# Patient Record
Sex: MALE | Race: OTHER | Employment: OTHER | URBAN - METROPOLITAN AREA
[De-identification: names, ages, dates, MRNs, and addresses within clinical notes are randomized per-mention and may not be internally consistent; named-entity substitution may affect disease eponyms.]

---

## 2019-07-09 ENCOUNTER — APPOINTMENT (OUTPATIENT)
Dept: CT IMAGING | Age: 44
DRG: 020 | End: 2019-07-09
Attending: NURSE PRACTITIONER
Payer: COMMERCIAL

## 2019-07-09 ENCOUNTER — HOSPITAL ENCOUNTER (INPATIENT)
Age: 44
LOS: 27 days | Discharge: NURSING, REHAB OR LONG-TERM CARE | DRG: 020 | End: 2019-08-05
Attending: NEUROLOGICAL SURGERY | Admitting: NEUROLOGICAL SURGERY
Payer: COMMERCIAL

## 2019-07-09 DIAGNOSIS — I61.5 IVH (INTRAVENTRICULAR HEMORRHAGE) (HCC): ICD-10-CM

## 2019-07-09 DIAGNOSIS — I60.4 SUBARACHNOID HEMORRHAGE FROM BASILAR ARTERY ANEURYSM (HCC): ICD-10-CM

## 2019-07-09 DIAGNOSIS — R93.0 ABNORMAL ANGIOGRAM OF HEAD: ICD-10-CM

## 2019-07-09 DIAGNOSIS — J15.211 PNEUMONIA OF LEFT LOWER LOBE DUE TO METHICILLIN SUSCEPTIBLE STAPHYLOCOCCUS AUREUS (MSSA) (HCC): ICD-10-CM

## 2019-07-09 DIAGNOSIS — I60.9 SAH (SUBARACHNOID HEMORRHAGE) (HCC): Primary | ICD-10-CM

## 2019-07-09 DIAGNOSIS — I67.848 CEREBRAL VASOSPASM: ICD-10-CM

## 2019-07-09 DIAGNOSIS — D63.1 ANEMIA DUE TO CHRONIC KIDNEY DISEASE, UNSPECIFIED CKD STAGE: ICD-10-CM

## 2019-07-09 DIAGNOSIS — R56.9 SEIZURE (HCC): ICD-10-CM

## 2019-07-09 DIAGNOSIS — J11.00 INFLUENZA AND PNEUMONIA: ICD-10-CM

## 2019-07-09 DIAGNOSIS — N18.9 ANEMIA DUE TO CHRONIC KIDNEY DISEASE, UNSPECIFIED CKD STAGE: ICD-10-CM

## 2019-07-09 DIAGNOSIS — J96.01 ACUTE RESPIRATORY FAILURE WITH HYPOXIA (HCC): ICD-10-CM

## 2019-07-09 DIAGNOSIS — G91.0 COMMUNICATING HYDROCEPHALUS (HCC): ICD-10-CM

## 2019-07-09 DIAGNOSIS — R79.89 ELEVATED SERUM CREATININE: ICD-10-CM

## 2019-07-09 DIAGNOSIS — Z72.0 TOBACCO ABUSE: ICD-10-CM

## 2019-07-09 LAB
ALBUMIN SERPL-MCNC: 3.4 G/DL (ref 3.5–5)
ALBUMIN/GLOB SERPL: 1 {RATIO} (ref 1.2–3.5)
ALP SERPL-CCNC: 92 U/L (ref 50–136)
ALT SERPL-CCNC: 9 U/L (ref 12–65)
AMPHET UR QL SCN: NEGATIVE
ANION GAP SERPL CALC-SCNC: 6 MMOL/L (ref 7–16)
ANION GAP SERPL CALC-SCNC: 7 MMOL/L (ref 7–16)
AST SERPL-CCNC: 11 U/L (ref 15–37)
BARBITURATES UR QL SCN: NEGATIVE
BENZODIAZ UR QL: NEGATIVE
BILIRUB DIRECT SERPL-MCNC: <0.1 MG/DL
BILIRUB SERPL-MCNC: 0.2 MG/DL (ref 0.2–1.1)
BUN SERPL-MCNC: 40 MG/DL (ref 6–23)
BUN SERPL-MCNC: 48 MG/DL (ref 6–23)
CALCIUM SERPL-MCNC: 8 MG/DL (ref 8.3–10.4)
CALCIUM SERPL-MCNC: 8.6 MG/DL (ref 8.3–10.4)
CANNABINOIDS UR QL SCN: NEGATIVE
CHLORIDE SERPL-SCNC: 111 MMOL/L (ref 98–107)
CHLORIDE SERPL-SCNC: 119 MMOL/L (ref 98–107)
CHOLEST SERPL-MCNC: 220 MG/DL
CK SERPL-CCNC: 122 U/L (ref 21–215)
CO2 SERPL-SCNC: 19 MMOL/L (ref 21–32)
CO2 SERPL-SCNC: 20 MMOL/L (ref 21–32)
COCAINE UR QL SCN: NEGATIVE
CREAT SERPL-MCNC: 2.99 MG/DL (ref 0.8–1.5)
CREAT SERPL-MCNC: 3.15 MG/DL (ref 0.8–1.5)
ERYTHROCYTE [DISTWIDTH] IN BLOOD BY AUTOMATED COUNT: 12.4 % (ref 11.9–14.6)
EST. AVERAGE GLUCOSE BLD GHB EST-MCNC: 111 MG/DL
GLOBULIN SER CALC-MCNC: 3.3 G/DL (ref 2.3–3.5)
GLUCOSE SERPL-MCNC: 110 MG/DL (ref 65–100)
GLUCOSE SERPL-MCNC: 121 MG/DL (ref 65–100)
HBA1C MFR BLD: 5.5 % (ref 4.8–6)
HCT VFR BLD AUTO: 37.7 % (ref 41.1–50.3)
HDLC SERPL-MCNC: 37 MG/DL (ref 40–60)
HDLC SERPL: 5.9 {RATIO}
HGB BLD-MCNC: 12.3 G/DL (ref 13.6–17.2)
LDLC SERPL CALC-MCNC: 154.6 MG/DL
LIPID PROFILE,FLP: ABNORMAL
MAGNESIUM SERPL-MCNC: 2.2 MG/DL (ref 1.8–2.4)
MCH RBC QN AUTO: 29.2 PG (ref 26.1–32.9)
MCHC RBC AUTO-ENTMCNC: 32.6 G/DL (ref 31.4–35)
MCV RBC AUTO: 89.5 FL (ref 79.6–97.8)
METHADONE UR QL: NEGATIVE
NRBC # BLD: 0 K/UL (ref 0–0.2)
OPIATES UR QL: NEGATIVE
PCP UR QL: NEGATIVE
PLATELET # BLD AUTO: 153 K/UL (ref 150–450)
PMV BLD AUTO: 11.2 FL (ref 9.4–12.3)
POTASSIUM SERPL-SCNC: 4.4 MMOL/L (ref 3.5–5.1)
POTASSIUM SERPL-SCNC: 4.7 MMOL/L (ref 3.5–5.1)
PROT SERPL-MCNC: 6.7 G/DL (ref 6.3–8.2)
RBC # BLD AUTO: 4.21 M/UL (ref 4.23–5.6)
SODIUM SERPL-SCNC: 137 MMOL/L (ref 136–145)
SODIUM SERPL-SCNC: 139 MMOL/L (ref 136–145)
SODIUM SERPL-SCNC: 145 MMOL/L (ref 136–145)
TRIGL SERPL-MCNC: 142 MG/DL (ref 35–150)
TROPONIN I SERPL-MCNC: 0.08 NG/ML (ref 0.02–0.05)
VLDLC SERPL CALC-MCNC: 28.4 MG/DL (ref 6–23)
WBC # BLD AUTO: 18.2 K/UL (ref 4.3–11.1)

## 2019-07-09 PROCEDURE — 80048 BASIC METABOLIC PNL TOTAL CA: CPT

## 2019-07-09 PROCEDURE — 80076 HEPATIC FUNCTION PANEL: CPT

## 2019-07-09 PROCEDURE — 74011000258 HC RX REV CODE- 258: Performed by: NEUROLOGICAL SURGERY

## 2019-07-09 PROCEDURE — 75810000275 HC EMERGENCY DEPT VISIT NO LEVEL OF CARE: Performed by: EMERGENCY MEDICINE

## 2019-07-09 PROCEDURE — 36620 INSERTION CATHETER ARTERY: CPT | Performed by: NURSE PRACTITIONER

## 2019-07-09 PROCEDURE — 03HY32Z INSERTION OF MONITORING DEVICE INTO UPPER ARTERY, PERCUTANEOUS APPROACH: ICD-10-PCS | Performed by: NURSE PRACTITIONER

## 2019-07-09 PROCEDURE — 99222 1ST HOSP IP/OBS MODERATE 55: CPT | Performed by: NEUROLOGICAL SURGERY

## 2019-07-09 PROCEDURE — 77030019605

## 2019-07-09 PROCEDURE — 36620 INSERTION CATHETER ARTERY: CPT

## 2019-07-09 PROCEDURE — 65610000001 HC ROOM ICU GENERAL

## 2019-07-09 PROCEDURE — 83036 HEMOGLOBIN GLYCOSYLATED A1C: CPT

## 2019-07-09 PROCEDURE — 36573 INSJ PICC RS&I 5 YR+: CPT | Performed by: NURSE PRACTITIONER

## 2019-07-09 PROCEDURE — 70496 CT ANGIOGRAPHY HEAD: CPT

## 2019-07-09 PROCEDURE — 80307 DRUG TEST PRSMV CHEM ANLYZR: CPT

## 2019-07-09 PROCEDURE — 74011636320 HC RX REV CODE- 636/320: Performed by: NEUROLOGICAL SURGERY

## 2019-07-09 PROCEDURE — 77030020256 HC SOL INJ NACL 0.9%  500ML

## 2019-07-09 PROCEDURE — 77030002916 HC SUT ETHLN J&J -A

## 2019-07-09 PROCEDURE — 36600 WITHDRAWAL OF ARTERIAL BLOOD: CPT

## 2019-07-09 PROCEDURE — 77030032490 HC SLV COMPR SCD KNE COVD -B

## 2019-07-09 PROCEDURE — 70450 CT HEAD/BRAIN W/O DYE: CPT

## 2019-07-09 PROCEDURE — 74011000258 HC RX REV CODE- 258: Performed by: NURSE PRACTITIONER

## 2019-07-09 PROCEDURE — 80061 LIPID PANEL: CPT

## 2019-07-09 PROCEDURE — 36415 COLL VENOUS BLD VENIPUNCTURE: CPT

## 2019-07-09 PROCEDURE — 77030013794 HC KT TRNSDUC BLD EDWD -B

## 2019-07-09 PROCEDURE — 74011000250 HC RX REV CODE- 250: Performed by: NURSE PRACTITIONER

## 2019-07-09 PROCEDURE — 74011250637 HC RX REV CODE- 250/637: Performed by: NURSE PRACTITIONER

## 2019-07-09 PROCEDURE — 85027 COMPLETE CBC AUTOMATED: CPT

## 2019-07-09 PROCEDURE — 77030034850

## 2019-07-09 PROCEDURE — 77030005402 HC CATH RAD ART LN KT TELE -B

## 2019-07-09 PROCEDURE — 93005 ELECTROCARDIOGRAM TRACING: CPT | Performed by: NURSE PRACTITIONER

## 2019-07-09 PROCEDURE — 84295 ASSAY OF SERUM SODIUM: CPT

## 2019-07-09 PROCEDURE — 77030020263 HC SOL INJ SOD CL0.9% LFCR 1000ML

## 2019-07-09 PROCEDURE — 74011258636 HC RX REV CODE- 258/636: Performed by: NURSE PRACTITIONER

## 2019-07-09 PROCEDURE — 83735 ASSAY OF MAGNESIUM: CPT

## 2019-07-09 PROCEDURE — 74011250636 HC RX REV CODE- 250/636: Performed by: NURSE PRACTITIONER

## 2019-07-09 PROCEDURE — 77030034848

## 2019-07-09 PROCEDURE — 82550 ASSAY OF CK (CPK): CPT

## 2019-07-09 PROCEDURE — 84484 ASSAY OF TROPONIN QUANT: CPT

## 2019-07-09 RX ORDER — ACETAMINOPHEN 325 MG/1
650 TABLET ORAL
Status: DISCONTINUED | OUTPATIENT
Start: 2019-07-09 | End: 2019-07-10

## 2019-07-09 RX ORDER — SODIUM CHLORIDE 0.9 % (FLUSH) 0.9 %
10 SYRINGE (ML) INJECTION
Status: DISCONTINUED | OUTPATIENT
Start: 2019-07-09 | End: 2019-07-09

## 2019-07-09 RX ORDER — FENTANYL CITRATE 50 UG/ML
50 INJECTION, SOLUTION INTRAMUSCULAR; INTRAVENOUS
Status: DISCONTINUED | OUTPATIENT
Start: 2019-07-09 | End: 2019-07-12

## 2019-07-09 RX ORDER — ONDANSETRON 2 MG/ML
4 INJECTION INTRAMUSCULAR; INTRAVENOUS
Status: DISCONTINUED | OUTPATIENT
Start: 2019-07-09 | End: 2019-08-05 | Stop reason: HOSPADM

## 2019-07-09 RX ORDER — SODIUM CHLORIDE 3 G/100ML
250 INJECTION, SOLUTION INTRAVENOUS ONCE
Status: COMPLETED | OUTPATIENT
Start: 2019-07-09 | End: 2019-07-09

## 2019-07-09 RX ORDER — SODIUM CHLORIDE 9 MG/ML
1000 INJECTION, SOLUTION INTRAVENOUS ONCE
Status: COMPLETED | OUTPATIENT
Start: 2019-07-09 | End: 2019-07-09

## 2019-07-09 RX ORDER — LEVETIRACETAM 5 MG/ML
500 INJECTION INTRAVASCULAR EVERY 12 HOURS
Status: DISCONTINUED | OUTPATIENT
Start: 2019-07-09 | End: 2019-07-09 | Stop reason: SDUPTHER

## 2019-07-09 RX ORDER — SODIUM CHLORIDE 9 MG/ML
100 INJECTION, SOLUTION INTRAVENOUS CONTINUOUS
Status: DISCONTINUED | OUTPATIENT
Start: 2019-07-09 | End: 2019-07-15

## 2019-07-09 RX ORDER — MAGNESIUM SULFATE HEPTAHYDRATE 40 MG/ML
2 INJECTION, SOLUTION INTRAVENOUS DAILY PRN
Status: DISCONTINUED | OUTPATIENT
Start: 2019-07-09 | End: 2019-08-03

## 2019-07-09 RX ORDER — SODIUM CHLORIDE 3 G/100ML
250 INJECTION, SOLUTION INTRAVENOUS EVERY 6 HOURS
Status: DISPENSED | OUTPATIENT
Start: 2019-07-09 | End: 2019-07-10

## 2019-07-09 RX ORDER — MAGNESIUM SULFATE HEPTAHYDRATE 40 MG/ML
4 INJECTION, SOLUTION INTRAVENOUS DAILY PRN
Status: DISCONTINUED | OUTPATIENT
Start: 2019-07-09 | End: 2019-08-03

## 2019-07-09 RX ORDER — ATORVASTATIN CALCIUM 40 MG/1
40 TABLET, FILM COATED ORAL
Status: DISCONTINUED | OUTPATIENT
Start: 2019-07-09 | End: 2019-07-10

## 2019-07-09 RX ORDER — NICARDIPINE HYDROCHLORIDE 0.1 MG/ML
5-15 INJECTION INTRAVENOUS
Status: DISCONTINUED | OUTPATIENT
Start: 2019-07-09 | End: 2019-07-09 | Stop reason: SDUPTHER

## 2019-07-09 RX ORDER — NIMODIPINE 30 MG/1
60 CAPSULE, LIQUID FILLED ORAL EVERY 4 HOURS
Status: DISCONTINUED | OUTPATIENT
Start: 2019-07-09 | End: 2019-07-10 | Stop reason: SDUPTHER

## 2019-07-09 RX ORDER — FAMOTIDINE 20 MG/1
20 TABLET, FILM COATED ORAL DAILY
Status: DISCONTINUED | OUTPATIENT
Start: 2019-07-10 | End: 2019-07-10

## 2019-07-09 RX ORDER — LANOLIN ALCOHOL/MO/W.PET/CERES
400 CREAM (GRAM) TOPICAL 2 TIMES DAILY
Status: DISCONTINUED | OUTPATIENT
Start: 2019-07-09 | End: 2019-07-10

## 2019-07-09 RX ORDER — AMOXICILLIN 250 MG
2 CAPSULE ORAL
Status: DISCONTINUED | OUTPATIENT
Start: 2019-07-09 | End: 2019-07-10

## 2019-07-09 RX ADMIN — Medication 10 ML: at 19:04

## 2019-07-09 RX ADMIN — SODIUM CHLORIDE 1000 ML: 900 INJECTION, SOLUTION INTRAVENOUS at 19:40

## 2019-07-09 RX ADMIN — NICARDIPINE HYDROCHLORIDE 15 MG/HR: 25 INJECTION INTRAVENOUS at 20:27

## 2019-07-09 RX ADMIN — SODIUM CHLORIDE 250 ML: 3 INJECTION, SOLUTION INTRAVENOUS at 21:23

## 2019-07-09 RX ADMIN — NICARDIPINE HYDROCHLORIDE 15 MG/HR: 25 INJECTION INTRAVENOUS at 22:02

## 2019-07-09 RX ADMIN — SODIUM CHLORIDE 1000 ML: 900 INJECTION, SOLUTION INTRAVENOUS at 20:52

## 2019-07-09 RX ADMIN — SENNOSIDES, DOCUSATE SODIUM 2 TABLET: 50; 8.6 TABLET, FILM COATED ORAL at 22:03

## 2019-07-09 RX ADMIN — ATORVASTATIN CALCIUM 40 MG: 40 TABLET, FILM COATED ORAL at 22:03

## 2019-07-09 RX ADMIN — SODIUM CHLORIDE 100 ML: 900 INJECTION, SOLUTION INTRAVENOUS at 19:04

## 2019-07-09 RX ADMIN — NIMODIPINE 60 MG: 30 CAPSULE ORAL at 20:22

## 2019-07-09 RX ADMIN — SODIUM CHLORIDE 500 MG: 900 INJECTION, SOLUTION INTRAVENOUS at 21:23

## 2019-07-09 RX ADMIN — SODIUM CHLORIDE 100 ML/HR: 900 INJECTION, SOLUTION INTRAVENOUS at 22:07

## 2019-07-09 RX ADMIN — FENTANYL CITRATE 50 MCG: 50 INJECTION INTRAMUSCULAR; INTRAVENOUS at 19:36

## 2019-07-09 RX ADMIN — IOPAMIDOL 70 ML: 755 INJECTION, SOLUTION INTRAVENOUS at 19:04

## 2019-07-09 RX ADMIN — MAGNESIUM GLUCONATE 500 MG ORAL TABLET 400 MG: 500 TABLET ORAL at 20:22

## 2019-07-09 NOTE — H&P
History and Physical    Patient: Lucia Villegas MRN: [id number]  SSN: [id number]    YOB: 1975  Age: 37 y.o. Sex: male      Subjective:      Lucia Villegas is a 37 y.o. male who originally reported to [facility] ED via EMS after found with altered mental status and possible seizure like activity. Per brother, pt was at [address] and had witnessed syncopal event with possible seizure like activity. Pt had CT head and CTA head and neck and found to have [facility] secondary to Basilar tip aneurysm rupture. Pt transferred to [facility] ED via [facility] team. Pt with eyes closed and drowsy, but will open eyes to voice and follow commands. Pt GCS E3, V5, M6: 14, PERRL +3 with EOM's intact. NIHSS of 2 for drowsiness and altered mental status. Pt admitted to our ICU under [facility] protocol. Labs from [facility] show bun/cr 46/3.4, will give pt IVF's x2 L bolus and reeval. Pt states he smokes about 9 cig a day, denies any ETOH or drugs, denies any family hx of SAH. Pt unsure of what happened. Will have low threshold for intubation and EVD if  hydrocephalus increased and LOC changes. Airway is intact at this time. Arterial line placed on admit, pt with IV x2., phelps. MED HX: no past hx per notes/pt. Pt drowsy, will inquire with family when present. No past surgical history on file. unsure. No family history on file. unknown at this time due to pt condition. Social History     Tobacco Use    Smoking status: Not on file   Substance Use Topics    Alcohol use: Not on file      Prior to Admission medications    Not on File        Allergies not on file    Review of Systems:  Unable to obtain secondary to pt condition. Per report, pt found with altered mental status, + nausea and vomiting and complained of headache. Unknown if any trauma. Report of seizure like activity on scene. Objective: There were no vitals filed for this visit.      Physical Exam:  General:  Pt drowsy, but will wake and follow commands. Josh Bellamylanette HEENT: Supple, symmetrical, trachea midline, no adenopathy, thyroid: no enlargment/tenderness/nodules, no carotid bruit and no JVD. Lungs:   Clear to auscultation bilaterally. Heart:  Regular rate and rhythm, S1, S2 normal, no murmur, click, rub or gallop. Abdomen:   Soft, non-tender. Bowel sounds normal. No masses,  No organomegaly. Extremities: Extremities normal, atraumatic, no cyanosis or edema. Pulses: 2+ and symmetric all extremities. Skin: Skin color, texture, turgor normal. No rashes or lesions   Neurologic: CNII-XII intact. Normal strength, sensation and reflexes throughout. Drowsy. Airway intact. Assessment:     Hospital Problems  Never Reviewed          Codes Class Noted POA    SAH (subarachnoid hemorrhage) ([facility] [address] ICD-10-CM: I60.9  ICD-9-CM: 063  7/9/2019 Unknown            SHULTZ SOLIS ON ADMIT:3  WATTS GRADE ON ADMIT: 4  DYSPHAGIA SCORE ON ADMIT: 0 ( NPO)    Plan:     NEURO: Pt admitted to ICU under [address], Shultz/Solis 3, Watts Grade 4 secondary to basilar tip aneurysm rupture. Q1 hour neuro checks. On SAH protocol - Mg, nimotop, zocor. Pt is drowsy, airway patent, will follow commands, maew. PERRL +3. Pt NPO and will have repeat head CT in am, unless acute neuro change and then will repeat ASAP, low threshold for intubation and EVD if increased drowsiness and hydrocephalus. MAP goal 70-90. Fentanyl PRN IV for pain. PT/OT/ST/ per [address] protocol. RESP: Pt airway patent at this time, swallow intact. No distress. 2L NC.   CV:MAP GOAL 70-90, cardene gtt infusing wo difficulty. 2D Echo pending, trop neg, repeat pending. EKG pending. SCD/SQ heparin to start in am.  HEME: Pending repeat INR 0.9 at [facility] ED.   NEPH: bun/cr: 39/3.4 at [facility] ED. Will give 2L NS bolus for cr 3.4, pt with large clear UOP per phelps bag, pt denies any known kidney injury. Na 138. NA goal >140. 3% NS bolus every 6hrs prn. GI: NPO. Pepcid, Stand exam this pm, ST to see in am.  ID: no fever, no abx.   LINES: IV x2, left art line placed, phelps. No family present at this time. Will look for family contact and reach out asap. Consents and procedures to be discussed with family and pt.      Signed By: Louann Keith NP     July 9, 2019

## 2019-07-10 ENCOUNTER — APPOINTMENT (OUTPATIENT)
Dept: CT IMAGING | Age: 44
DRG: 020 | End: 2019-07-10
Attending: NURSE PRACTITIONER
Payer: COMMERCIAL

## 2019-07-10 ENCOUNTER — APPOINTMENT (OUTPATIENT)
Dept: GENERAL RADIOLOGY | Age: 44
DRG: 020 | End: 2019-07-10
Attending: NURSE PRACTITIONER
Payer: COMMERCIAL

## 2019-07-10 PROBLEM — R56.9 SEIZURE (HCC): Status: ACTIVE | Noted: 2019-07-10

## 2019-07-10 PROBLEM — Z97.8 ENDOTRACHEALLY INTUBATED: Status: RESOLVED | Noted: 2019-07-10 | Resolved: 2019-07-10

## 2019-07-10 PROBLEM — G91.0 COMMUNICATING HYDROCEPHALUS (HCC): Status: ACTIVE | Noted: 2019-07-10

## 2019-07-10 PROBLEM — Z97.8 ENDOTRACHEALLY INTUBATED: Status: ACTIVE | Noted: 2019-07-10

## 2019-07-10 PROBLEM — R79.89 ELEVATED SERUM CREATININE: Status: ACTIVE | Noted: 2019-07-10

## 2019-07-10 PROBLEM — I67.848 CEREBRAL VASOSPASM: Status: ACTIVE | Noted: 2019-07-10

## 2019-07-10 PROBLEM — I61.5 IVH (INTRAVENTRICULAR HEMORRHAGE) (HCC): Status: ACTIVE | Noted: 2019-07-10

## 2019-07-10 PROBLEM — Z72.0 TOBACCO ABUSE: Status: ACTIVE | Noted: 2019-07-10

## 2019-07-10 PROBLEM — I60.4 SUBARACHNOID HEMORRHAGE FROM BASILAR ARTERY ANEURYSM (HCC): Status: ACTIVE | Noted: 2019-07-10

## 2019-07-10 PROBLEM — I60.9 SAH (SUBARACHNOID HEMORRHAGE) (HCC): Status: RESOLVED | Noted: 2019-07-09 | Resolved: 2019-07-10

## 2019-07-10 PROBLEM — J96.01 ACUTE RESPIRATORY FAILURE WITH HYPOXIA (HCC): Status: RESOLVED | Noted: 2019-07-10 | Resolved: 2019-07-10

## 2019-07-10 PROBLEM — J96.01 ACUTE RESPIRATORY FAILURE WITH HYPOXIA (HCC): Status: ACTIVE | Noted: 2019-07-10

## 2019-07-10 LAB
ANION GAP SERPL CALC-SCNC: 5 MMOL/L (ref 7–16)
ANION GAP SERPL CALC-SCNC: 8 MMOL/L (ref 7–16)
ARTERIAL PATENCY WRIST A: ABNORMAL
ARTERIAL PATENCY WRIST A: NO
ARTERIAL PATENCY WRIST A: NO
ATRIAL RATE: 86 BPM
BASE DEFICIT BLD-SCNC: 10 MMOL/L
BASE DEFICIT BLD-SCNC: 10 MMOL/L
BASE DEFICIT BLD-SCNC: 5 MMOL/L
BASE DEFICIT BLD-SCNC: 7 MMOL/L
BASE DEFICIT BLD-SCNC: 8 MMOL/L
BDY SITE: ABNORMAL
BODY TEMPERATURE: 98.6
BODY TEMPERATURE: 99.1
BUN SERPL-MCNC: 32 MG/DL (ref 6–23)
BUN SERPL-MCNC: 37 MG/DL (ref 6–23)
CALCIUM SERPL-MCNC: 6.8 MG/DL (ref 8.3–10.4)
CALCIUM SERPL-MCNC: 7.5 MG/DL (ref 8.3–10.4)
CALCULATED P AXIS, ECG09: 77 DEGREES
CALCULATED R AXIS, ECG10: 44 DEGREES
CALCULATED T AXIS, ECG11: 60 DEGREES
CHLORIDE SERPL-SCNC: 123 MMOL/L (ref 98–107)
CHLORIDE SERPL-SCNC: 125 MMOL/L (ref 98–107)
CO2 BLD-SCNC: 16 MMOL/L
CO2 BLD-SCNC: 19 MMOL/L
CO2 BLD-SCNC: 20 MMOL/L
CO2 BLD-SCNC: 20 MMOL/L
CO2 BLD-SCNC: 22 MMOL/L
CO2 SERPL-SCNC: 19 MMOL/L (ref 21–32)
CO2 SERPL-SCNC: 26 MMOL/L (ref 21–32)
COLLECT TIME,HTIME: 1910
COLLECT TIME,HTIME: 304
COLLECT TIME,HTIME: 458
COLLECT TIME,HTIME: 858
CREAT SERPL-MCNC: 2.46 MG/DL (ref 0.8–1.5)
CREAT SERPL-MCNC: 2.83 MG/DL (ref 0.8–1.5)
DIAGNOSIS, 93000: NORMAL
ERYTHROCYTE [DISTWIDTH] IN BLOOD BY AUTOMATED COUNT: 12.6 % (ref 11.9–14.6)
EXHALED MINUTE VOLUME, VE: 6 L/MIN
EXHALED MINUTE VOLUME, VE: 6.1 L/MIN
EXHALED MINUTE VOLUME, VE: 7.1 L/MIN
EXHALED MINUTE VOLUME, VE: 7.1 L/MIN
FLOW RATE ISTAT,IFRATE: 1122 L/MIN
GAS FLOW.O2 O2 DELIVERY SYS: ABNORMAL L/MIN
GAS FLOW.O2 SETTING OXYMISER: 12 BPM
GAS FLOW.O2 SETTING OXYMISER: 12 BPM
GAS FLOW.O2 SETTING OXYMISER: 14 BPM
GAS FLOW.O2 SETTING OXYMISER: 14 BPM
GLUCOSE SERPL-MCNC: 135 MG/DL (ref 65–100)
GLUCOSE SERPL-MCNC: 96 MG/DL (ref 65–100)
HCO3 BLD-SCNC: 15.3 MMOL/L (ref 22–26)
HCO3 BLD-SCNC: 17.3 MMOL/L (ref 22–26)
HCO3 BLD-SCNC: 18.5 MMOL/L (ref 22–26)
HCO3 BLD-SCNC: 19.2 MMOL/L (ref 22–26)
HCO3 BLD-SCNC: 20.8 MMOL/L (ref 22–26)
HCT VFR BLD AUTO: 30.6 % (ref 41.1–50.3)
HGB BLD-MCNC: 10.1 G/DL (ref 13.6–17.2)
INR PPP: 1
INSPIRATION.DURATION SETTING TIME VENT: 1.43 SEC
INSPIRATION.DURATION SETTING TIME VENT: 1.46 SEC
MAGNESIUM SERPL-MCNC: 2.6 MG/DL (ref 1.8–2.4)
MCH RBC QN AUTO: 29.4 PG (ref 26.1–32.9)
MCHC RBC AUTO-ENTMCNC: 33 G/DL (ref 31.4–35)
MCV RBC AUTO: 89.2 FL (ref 79.6–97.8)
NRBC # BLD: 0 K/UL (ref 0–0.2)
O2/TOTAL GAS SETTING VFR VENT: 21 %
O2/TOTAL GAS SETTING VFR VENT: 40 %
P-R INTERVAL, ECG05: 186 MS
PCO2 BLD: 33 MMHG (ref 35–45)
PCO2 BLD: 41.2 MMHG (ref 35–45)
PCO2 BLD: 41.4 MMHG (ref 35–45)
PCO2 BLD: 41.6 MMHG (ref 35–45)
PCO2 BLD: 44.6 MMHG (ref 35–45)
PEEP RESPIRATORY: 5 CMH2O
PEEP RESPIRATORY: 8 CMH2O
PH BLD: 7.2 [PH] (ref 7.35–7.45)
PH BLD: 7.26 [PH] (ref 7.35–7.45)
PH BLD: 7.28 [PH] (ref 7.35–7.45)
PH BLD: 7.28 [PH] (ref 7.35–7.45)
PH BLD: 7.31 [PH] (ref 7.35–7.45)
PLATELET # BLD AUTO: 157 K/UL (ref 150–450)
PMV BLD AUTO: 11 FL (ref 9.4–12.3)
PO2 BLD: 163 MMHG (ref 75–100)
PO2 BLD: 166 MMHG (ref 75–100)
PO2 BLD: 170 MMHG (ref 75–100)
PO2 BLD: 175 MMHG (ref 75–100)
PO2 BLD: 85 MMHG (ref 75–100)
POTASSIUM SERPL-SCNC: 3.6 MMOL/L (ref 3.5–5.1)
POTASSIUM SERPL-SCNC: 4.6 MMOL/L (ref 3.5–5.1)
PROCALCITONIN SERPL-MCNC: 0.1 NG/ML
PROTHROMBIN TIME: 13.4 SEC (ref 11.7–14.5)
Q-T INTERVAL, ECG07: 354 MS
QRS DURATION, ECG06: 72 MS
QTC CALCULATION (BEZET), ECG08: 423 MS
RBC # BLD AUTO: 3.43 M/UL (ref 4.23–5.6)
SAO2 % BLD: 95 % (ref 95–98)
SAO2 % BLD: 99 % (ref 95–98)
SERVICE CMNT-IMP: ABNORMAL
SODIUM SERPL-SCNC: 149 MMOL/L (ref 136–145)
SODIUM SERPL-SCNC: 152 MMOL/L (ref 136–145)
SODIUM SERPL-SCNC: 154 MMOL/L (ref 136–145)
SODIUM SERPL-SCNC: 154 MMOL/L (ref 136–145)
SPECIMEN TYPE: ABNORMAL
TROPONIN I SERPL-MCNC: 0.07 NG/ML (ref 0.02–0.05)
TROPONIN I SERPL-MCNC: 0.08 NG/ML (ref 0.02–0.05)
VENTILATION MODE VENT: ABNORMAL
VENTRICULAR RATE, ECG03: 86 BPM
VT SETTING VENT: 500 ML
WBC # BLD AUTO: 13.8 K/UL (ref 4.3–11.1)

## 2019-07-10 PROCEDURE — 99291 CRITICAL CARE FIRST HOUR: CPT | Performed by: NEUROLOGICAL SURGERY

## 2019-07-10 PROCEDURE — 77030012393 HC DRN CSF INLC -C

## 2019-07-10 PROCEDURE — 74011250637 HC RX REV CODE- 250/637

## 2019-07-10 PROCEDURE — 71045 X-RAY EXAM CHEST 1 VIEW: CPT

## 2019-07-10 PROCEDURE — 77030018798 HC PMP KT ENTRL FED COVD -A

## 2019-07-10 PROCEDURE — 74011250637 HC RX REV CODE- 250/637: Performed by: NEUROLOGICAL SURGERY

## 2019-07-10 PROCEDURE — 74011250636 HC RX REV CODE- 250/636

## 2019-07-10 PROCEDURE — 61210 BURR HOLE IMPLT VENTR CATH: CPT

## 2019-07-10 PROCEDURE — 77030013794 HC KT TRNSDUC BLD EDWD -B

## 2019-07-10 PROCEDURE — 84484 ASSAY OF TROPONIN QUANT: CPT

## 2019-07-10 PROCEDURE — 82803 BLOOD GASES ANY COMBINATION: CPT

## 2019-07-10 PROCEDURE — 74011250636 HC RX REV CODE- 250/636: Performed by: NURSE PRACTITIONER

## 2019-07-10 PROCEDURE — 77030002916 HC SUT ETHLN J&J -A

## 2019-07-10 PROCEDURE — 85610 PROTHROMBIN TIME: CPT

## 2019-07-10 PROCEDURE — 74011250637 HC RX REV CODE- 250/637: Performed by: NURSE PRACTITIONER

## 2019-07-10 PROCEDURE — 36600 WITHDRAWAL OF ARTERIAL BLOOD: CPT

## 2019-07-10 PROCEDURE — 85027 COMPLETE CBC AUTOMATED: CPT

## 2019-07-10 PROCEDURE — 74011000250 HC RX REV CODE- 250: Performed by: NURSE PRACTITIONER

## 2019-07-10 PROCEDURE — 0BH18EZ INSERTION OF ENDOTRACHEAL AIRWAY INTO TRACHEA, VIA NATURAL OR ARTIFICIAL OPENING ENDOSCOPIC: ICD-10-PCS | Performed by: NURSE PRACTITIONER

## 2019-07-10 PROCEDURE — 74011000258 HC RX REV CODE- 258: Performed by: NURSE PRACTITIONER

## 2019-07-10 PROCEDURE — 74011258636 HC RX REV CODE- 258/636: Performed by: NURSE PRACTITIONER

## 2019-07-10 PROCEDURE — 80048 BASIC METABOLIC PNL TOTAL CA: CPT

## 2019-07-10 PROCEDURE — 94002 VENT MGMT INPAT INIT DAY: CPT

## 2019-07-10 PROCEDURE — 93886 INTRACRANIAL COMPLETE STUDY: CPT

## 2019-07-10 PROCEDURE — 84295 ASSAY OF SERUM SODIUM: CPT

## 2019-07-10 PROCEDURE — 31500 INSERT EMERGENCY AIRWAY: CPT

## 2019-07-10 PROCEDURE — 77030020263 HC SOL INJ SOD CL0.9% LFCR 1000ML

## 2019-07-10 PROCEDURE — 65610000001 HC ROOM ICU GENERAL

## 2019-07-10 PROCEDURE — 77030008771 HC TU NG SALEM SUMP -A

## 2019-07-10 PROCEDURE — C1729 CATH, DRAINAGE: HCPCS

## 2019-07-10 PROCEDURE — 74011000250 HC RX REV CODE- 250: Performed by: NEUROLOGICAL SURGERY

## 2019-07-10 PROCEDURE — 84145 PROCALCITONIN (PCT): CPT

## 2019-07-10 PROCEDURE — 77030020257 HC SOL INJ SOD CL 0.45% 1000ML BG

## 2019-07-10 PROCEDURE — 74011000250 HC RX REV CODE- 250

## 2019-07-10 PROCEDURE — C8929 TTE W OR WO FOL WCON,DOPPLER: HCPCS

## 2019-07-10 PROCEDURE — 77030018764 HC KT CRANIAL ACC J&J -D

## 2019-07-10 PROCEDURE — 70450 CT HEAD/BRAIN W/O DYE: CPT

## 2019-07-10 PROCEDURE — C1751 CATH, INF, PER/CENT/MIDLINE: HCPCS

## 2019-07-10 PROCEDURE — 74018 RADEX ABDOMEN 1 VIEW: CPT

## 2019-07-10 PROCEDURE — 93886 INTRACRANIAL COMPLETE STUDY: CPT | Performed by: PSYCHIATRY & NEUROLOGY

## 2019-07-10 PROCEDURE — 83735 ASSAY OF MAGNESIUM: CPT

## 2019-07-10 PROCEDURE — 99252 IP/OBS CONSLTJ NEW/EST SF 35: CPT | Performed by: PHYSICAL MEDICINE & REHABILITATION

## 2019-07-10 PROCEDURE — 5A1955Z RESPIRATORY VENTILATION, GREATER THAN 96 CONSECUTIVE HOURS: ICD-10-PCS | Performed by: NURSE PRACTITIONER

## 2019-07-10 PROCEDURE — 74011250636 HC RX REV CODE- 250/636: Performed by: NEUROLOGICAL SURGERY

## 2019-07-10 RX ORDER — FENTANYL CITRATE 50 UG/ML
50 INJECTION, SOLUTION INTRAMUSCULAR; INTRAVENOUS ONCE
Status: COMPLETED | OUTPATIENT
Start: 2019-07-10 | End: 2019-07-10

## 2019-07-10 RX ORDER — FENTANYL CITRATE 50 UG/ML
INJECTION, SOLUTION INTRAMUSCULAR; INTRAVENOUS
Status: COMPLETED
Start: 2019-07-10 | End: 2019-07-10

## 2019-07-10 RX ORDER — HEPARIN SODIUM 5000 [USP'U]/ML
5000 INJECTION, SOLUTION INTRAVENOUS; SUBCUTANEOUS EVERY 8 HOURS
Status: DISCONTINUED | OUTPATIENT
Start: 2019-07-10 | End: 2019-08-05 | Stop reason: HOSPADM

## 2019-07-10 RX ORDER — SODIUM CHLORIDE 0.9 % (FLUSH) 0.9 %
30 SYRINGE (ML) INJECTION EVERY 8 HOURS
Status: DISCONTINUED | OUTPATIENT
Start: 2019-07-10 | End: 2019-08-05 | Stop reason: HOSPADM

## 2019-07-10 RX ORDER — MIDAZOLAM HYDROCHLORIDE 1 MG/ML
2 INJECTION, SOLUTION INTRAMUSCULAR; INTRAVENOUS ONCE
Status: COMPLETED | OUTPATIENT
Start: 2019-07-10 | End: 2019-07-10

## 2019-07-10 RX ORDER — SODIUM BICARBONATE 84 MG/ML
50 INJECTION, SOLUTION INTRAVENOUS ONCE
Status: COMPLETED | OUTPATIENT
Start: 2019-07-10 | End: 2019-07-10

## 2019-07-10 RX ORDER — HEPARIN 100 UNIT/ML
900 SYRINGE INTRAVENOUS AS NEEDED
Status: DISCONTINUED | OUTPATIENT
Start: 2019-07-10 | End: 2019-08-05 | Stop reason: HOSPADM

## 2019-07-10 RX ORDER — ACETAMINOPHEN 325 MG/1
650 TABLET ORAL
Status: DISCONTINUED | OUTPATIENT
Start: 2019-07-10 | End: 2019-07-10

## 2019-07-10 RX ORDER — FAMOTIDINE 20 MG/1
20 TABLET, FILM COATED ORAL DAILY
Status: DISCONTINUED | OUTPATIENT
Start: 2019-07-10 | End: 2019-07-12

## 2019-07-10 RX ORDER — SODIUM BICARBONATE 1 MEQ/ML
50 SYRINGE (ML) INTRAVENOUS ONCE
Status: COMPLETED | OUTPATIENT
Start: 2019-07-10 | End: 2019-07-10

## 2019-07-10 RX ORDER — ETOMIDATE 2 MG/ML
INJECTION INTRAVENOUS
Status: COMPLETED
Start: 2019-07-10 | End: 2019-07-10

## 2019-07-10 RX ORDER — ACETAMINOPHEN 325 MG/1
650 TABLET ORAL
Status: DISCONTINUED | OUTPATIENT
Start: 2019-07-10 | End: 2019-07-15

## 2019-07-10 RX ORDER — MIDAZOLAM HYDROCHLORIDE 1 MG/ML
INJECTION, SOLUTION INTRAMUSCULAR; INTRAVENOUS
Status: COMPLETED
Start: 2019-07-10 | End: 2019-07-10

## 2019-07-10 RX ORDER — FENTANYL CITRATE 50 UG/ML
100 INJECTION, SOLUTION INTRAMUSCULAR; INTRAVENOUS ONCE
Status: COMPLETED | OUTPATIENT
Start: 2019-07-10 | End: 2019-07-10

## 2019-07-10 RX ORDER — ATORVASTATIN CALCIUM 40 MG/1
40 TABLET, FILM COATED ORAL
Status: DISCONTINUED | OUTPATIENT
Start: 2019-07-10 | End: 2019-07-15

## 2019-07-10 RX ORDER — AMOXICILLIN 250 MG
2 CAPSULE ORAL
Status: DISCONTINUED | OUTPATIENT
Start: 2019-07-10 | End: 2019-07-15

## 2019-07-10 RX ORDER — LANOLIN ALCOHOL/MO/W.PET/CERES
400 CREAM (GRAM) TOPICAL 2 TIMES DAILY
Status: DISCONTINUED | OUTPATIENT
Start: 2019-07-10 | End: 2019-07-15

## 2019-07-10 RX ORDER — IBUPROFEN 200 MG
1 TABLET ORAL EVERY 24 HOURS
Status: DISCONTINUED | OUTPATIENT
Start: 2019-07-10 | End: 2019-07-29

## 2019-07-10 RX ORDER — SODIUM BICARBONATE 1 MEQ/ML
SYRINGE (ML) INTRAVENOUS
Status: COMPLETED
Start: 2019-07-10 | End: 2019-07-10

## 2019-07-10 RX ORDER — SUCCINYLCHOLINE CHLORIDE 20 MG/ML INJECTION SOLUTION
SOLUTION
Status: COMPLETED
Start: 2019-07-10 | End: 2019-07-10

## 2019-07-10 RX ORDER — IBUPROFEN 200 MG
1 TABLET ORAL EVERY 24 HOURS
Status: DISCONTINUED | OUTPATIENT
Start: 2019-07-10 | End: 2019-07-10

## 2019-07-10 RX ORDER — ETOMIDATE 2 MG/ML
20 INJECTION INTRAVENOUS ONCE
Status: COMPLETED | OUTPATIENT
Start: 2019-07-10 | End: 2019-07-10

## 2019-07-10 RX ORDER — TRIPROLIDINE/PSEUDOEPHEDRINE 2.5MG-60MG
800 TABLET ORAL ONCE
Status: COMPLETED | OUTPATIENT
Start: 2019-07-10 | End: 2019-07-10

## 2019-07-10 RX ORDER — PROPOFOL 10 MG/ML
INJECTION, EMULSION INTRAVENOUS
Status: COMPLETED
Start: 2019-07-10 | End: 2019-07-10

## 2019-07-10 RX ORDER — HEPARIN 100 UNIT/ML
900 SYRINGE INTRAVENOUS EVERY 8 HOURS
Status: DISCONTINUED | OUTPATIENT
Start: 2019-07-10 | End: 2019-08-05 | Stop reason: HOSPADM

## 2019-07-10 RX ORDER — PROPOFOL 10 MG/ML
0-50 VIAL (ML) INTRAVENOUS
Status: DISCONTINUED | OUTPATIENT
Start: 2019-07-10 | End: 2019-07-12

## 2019-07-10 RX ORDER — SODIUM CHLORIDE 0.9 % (FLUSH) 0.9 %
30 SYRINGE (ML) INJECTION AS NEEDED
Status: DISCONTINUED | OUTPATIENT
Start: 2019-07-10 | End: 2019-08-03

## 2019-07-10 RX ORDER — SUCCINYLCHOLINE CHLORIDE 20 MG/ML
100 INJECTION INTRAMUSCULAR; INTRAVENOUS ONCE
Status: COMPLETED | OUTPATIENT
Start: 2019-07-10 | End: 2019-07-10

## 2019-07-10 RX ADMIN — Medication 400 MG: at 08:35

## 2019-07-10 RX ADMIN — ETOMIDATE 20 MG: 2 INJECTION INTRAVENOUS at 04:00

## 2019-07-10 RX ADMIN — Medication 50 MEQ: at 05:05

## 2019-07-10 RX ADMIN — SODIUM CHLORIDE 250 ML: 3 INJECTION, SOLUTION INTRAVENOUS at 03:02

## 2019-07-10 RX ADMIN — PROPOFOL 50 MCG/KG/MIN: 10 INJECTION, EMULSION INTRAVENOUS at 04:09

## 2019-07-10 RX ADMIN — NICARDIPINE HYDROCHLORIDE 15 MG/HR: 25 INJECTION INTRAVENOUS at 03:48

## 2019-07-10 RX ADMIN — NICARDIPINE HYDROCHLORIDE 15 MG/HR: 25 INJECTION INTRAVENOUS at 00:20

## 2019-07-10 RX ADMIN — SODIUM CHLORIDE 1000 ML: 450 INJECTION, SOLUTION INTRAVENOUS at 07:50

## 2019-07-10 RX ADMIN — SODIUM CHLORIDE 250 ML: 3 INJECTION, SOLUTION INTRAVENOUS at 14:54

## 2019-07-10 RX ADMIN — NICARDIPINE HYDROCHLORIDE 10 MG/HR: 25 INJECTION INTRAVENOUS at 17:16

## 2019-07-10 RX ADMIN — NIMODIPINE 60 MG: 30 CAPSULE, LIQUID FILLED ORAL at 17:16

## 2019-07-10 RX ADMIN — NIMODIPINE 60 MG: 30 CAPSULE, LIQUID FILLED ORAL at 20:36

## 2019-07-10 RX ADMIN — NIMODIPINE 60 MG: 30 CAPSULE, LIQUID FILLED ORAL at 11:22

## 2019-07-10 RX ADMIN — FENTANYL CITRATE 100 MCG: 50 INJECTION INTRAMUSCULAR; INTRAVENOUS at 04:26

## 2019-07-10 RX ADMIN — FENTANYL CITRATE 100 MCG: 50 INJECTION, SOLUTION INTRAMUSCULAR; INTRAVENOUS at 04:26

## 2019-07-10 RX ADMIN — PROPOFOL 30 MCG/KG/MIN: 10 INJECTION, EMULSION INTRAVENOUS at 18:07

## 2019-07-10 RX ADMIN — NICARDIPINE HYDROCHLORIDE 15 MG/HR: 25 INJECTION INTRAVENOUS at 02:11

## 2019-07-10 RX ADMIN — FENTANYL CITRATE 100 MCG: 50 INJECTION, SOLUTION INTRAMUSCULAR; INTRAVENOUS at 05:20

## 2019-07-10 RX ADMIN — MIDAZOLAM HYDROCHLORIDE 2 MG: 1 INJECTION, SOLUTION INTRAMUSCULAR; INTRAVENOUS at 03:54

## 2019-07-10 RX ADMIN — FENTANYL CITRATE 100 MCG: 50 INJECTION, SOLUTION INTRAMUSCULAR; INTRAVENOUS at 05:40

## 2019-07-10 RX ADMIN — NIMODIPINE 60 MG: 30 CAPSULE, LIQUID FILLED ORAL at 07:50

## 2019-07-10 RX ADMIN — SODIUM CHLORIDE 500 MG: 900 INJECTION, SOLUTION INTRAVENOUS at 09:00

## 2019-07-10 RX ADMIN — Medication 400 MG: at 17:16

## 2019-07-10 RX ADMIN — FENTANYL CITRATE 50 MCG: 50 INJECTION INTRAMUSCULAR; INTRAVENOUS at 21:08

## 2019-07-10 RX ADMIN — Medication 50 MEQ: at 10:02

## 2019-07-10 RX ADMIN — SODIUM BICARBONATE 50 MEQ: 84 INJECTION, SOLUTION INTRAVENOUS at 13:12

## 2019-07-10 RX ADMIN — FENTANYL CITRATE 50 MCG: 50 INJECTION, SOLUTION INTRAMUSCULAR; INTRAVENOUS at 04:17

## 2019-07-10 RX ADMIN — NICARDIPINE HYDROCHLORIDE 12.5 MG/HR: 25 INJECTION INTRAVENOUS at 08:55

## 2019-07-10 RX ADMIN — SODIUM CHLORIDE 500 MG: 900 INJECTION, SOLUTION INTRAVENOUS at 20:36

## 2019-07-10 RX ADMIN — MAGNESIUM SULFATE HEPTAHYDRATE 2 G: 40 INJECTION, SOLUTION INTRAVENOUS at 00:20

## 2019-07-10 RX ADMIN — ACETAMINOPHEN 650 MG: 325 TABLET, FILM COATED ORAL at 07:15

## 2019-07-10 RX ADMIN — FENTANYL CITRATE 50 MCG: 50 INJECTION INTRAMUSCULAR; INTRAVENOUS at 14:59

## 2019-07-10 RX ADMIN — FENTANYL CITRATE 50 MCG: 50 INJECTION INTRAMUSCULAR; INTRAVENOUS at 23:25

## 2019-07-10 RX ADMIN — FENTANYL CITRATE 100 MCG: 50 INJECTION INTRAMUSCULAR; INTRAVENOUS at 05:40

## 2019-07-10 RX ADMIN — NICARDIPINE HYDROCHLORIDE 10 MG/HR: 25 INJECTION INTRAVENOUS at 23:52

## 2019-07-10 RX ADMIN — HEPARIN SODIUM 5000 UNITS: 5000 INJECTION INTRAVENOUS; SUBCUTANEOUS at 13:13

## 2019-07-10 RX ADMIN — IBUPROFEN 800 MG: 200 SUSPENSION ORAL at 10:29

## 2019-07-10 RX ADMIN — SUCCINYLCHOLINE CHLORIDE 100 MG: 20 INJECTION INTRAMUSCULAR; INTRAVENOUS at 04:00

## 2019-07-10 RX ADMIN — MIDAZOLAM HYDROCHLORIDE 2 MG: 1 INJECTION, SOLUTION INTRAMUSCULAR; INTRAVENOUS at 04:09

## 2019-07-10 RX ADMIN — NICARDIPINE HYDROCHLORIDE 12.5 MG/HR: 25 INJECTION INTRAVENOUS at 10:46

## 2019-07-10 RX ADMIN — FENTANYL CITRATE 100 MCG: 50 INJECTION, SOLUTION INTRAMUSCULAR; INTRAVENOUS at 08:48

## 2019-07-10 RX ADMIN — ACETAMINOPHEN 650 MG: 325 TABLET, FILM COATED ORAL at 19:58

## 2019-07-10 RX ADMIN — NICARDIPINE HYDROCHLORIDE 10 MG/HR: 25 INJECTION INTRAVENOUS at 20:40

## 2019-07-10 RX ADMIN — SODIUM CHLORIDE, PRESERVATIVE FREE 900 UNITS: 5 INJECTION INTRAVENOUS at 21:10

## 2019-07-10 RX ADMIN — FENTANYL CITRATE 50 MCG: 50 INJECTION INTRAMUSCULAR; INTRAVENOUS at 06:54

## 2019-07-10 RX ADMIN — FENTANYL CITRATE 50 MCG: 50 INJECTION, SOLUTION INTRAMUSCULAR; INTRAVENOUS at 03:54

## 2019-07-10 RX ADMIN — ATORVASTATIN CALCIUM 40 MG: 40 TABLET, FILM COATED ORAL at 21:10

## 2019-07-10 RX ADMIN — SENNOSIDES, DOCUSATE SODIUM 2 TABLET: 50; 8.6 TABLET, FILM COATED ORAL at 23:23

## 2019-07-10 RX ADMIN — SODIUM CHLORIDE 100 ML/HR: 900 INJECTION, SOLUTION INTRAVENOUS at 19:57

## 2019-07-10 RX ADMIN — Medication 100 MG: at 04:00

## 2019-07-10 RX ADMIN — SODIUM CHLORIDE 1000 ML: 450 INJECTION, SOLUTION INTRAVENOUS at 09:23

## 2019-07-10 RX ADMIN — FENTANYL CITRATE 50 MCG: 50 INJECTION, SOLUTION INTRAMUSCULAR; INTRAVENOUS at 04:15

## 2019-07-10 RX ADMIN — NICARDIPINE HYDROCHLORIDE 12.5 MG/HR: 25 INJECTION INTRAVENOUS at 15:21

## 2019-07-10 RX ADMIN — HEPARIN SODIUM 5000 UNITS: 5000 INJECTION INTRAVENOUS; SUBCUTANEOUS at 21:10

## 2019-07-10 RX ADMIN — FENTANYL CITRATE 50 MCG: 50 INJECTION, SOLUTION INTRAMUSCULAR; INTRAVENOUS at 04:09

## 2019-07-10 RX ADMIN — Medication 50 MEQ: at 07:48

## 2019-07-10 RX ADMIN — PERFLUTREN 1 ML: 6.52 INJECTION, SUSPENSION INTRAVENOUS at 13:00

## 2019-07-10 RX ADMIN — ETOMIDATE 20 MG: 2 INJECTION, SOLUTION INTRAVENOUS at 04:00

## 2019-07-10 RX ADMIN — FENTANYL CITRATE 100 MCG: 50 INJECTION INTRAMUSCULAR; INTRAVENOUS at 05:20

## 2019-07-10 RX ADMIN — Medication 30 ML: at 13:13

## 2019-07-10 RX ADMIN — ONDANSETRON 4 MG: 2 INJECTION INTRAMUSCULAR; INTRAVENOUS at 01:40

## 2019-07-10 RX ADMIN — NIMODIPINE 60 MG: 30 CAPSULE ORAL at 00:10

## 2019-07-10 RX ADMIN — Medication 30 ML: at 23:25

## 2019-07-10 RX ADMIN — FAMOTIDINE 20 MG: 20 TABLET ORAL at 08:35

## 2019-07-10 RX ADMIN — FENTANYL CITRATE 100 MCG: 50 INJECTION, SOLUTION INTRAMUSCULAR; INTRAVENOUS at 09:25

## 2019-07-10 RX ADMIN — PROPOFOL 30 MCG/KG/MIN: 10 INJECTION, EMULSION INTRAVENOUS at 10:47

## 2019-07-10 RX ADMIN — FENTANYL CITRATE 50 MCG: 50 INJECTION INTRAMUSCULAR; INTRAVENOUS at 18:22

## 2019-07-10 RX ADMIN — SODIUM CHLORIDE, PRESERVATIVE FREE 900 UNITS: 5 INJECTION INTRAVENOUS at 13:13

## 2019-07-10 RX ADMIN — SODIUM CHLORIDE 250 ML: 3 INJECTION, SOLUTION INTRAVENOUS at 00:00

## 2019-07-10 NOTE — PROGRESS NOTES
Bedside shift change report given to [address] (oncoming nurse) by Deshawn Shaikh RN (offgoing nurse). Report included the following information SBAR, Kardex, Intake/Output, MAR, Accordion, Recent Results, Med Rec Status, Cardiac Rhythm SR and Alarm Parameters . Diprivan on hold for sedation vacation. Patient awake and restless. Follows commands in all extremities. Pupils 3mm PERRLA.

## 2019-07-10 NOTE — PROGRESS NOTES
Initial visit was made, prayer, emotional support and a spiritual presence were provided for the patient, his father, Estefania Macdonald, and the patient's brother, Deepa Bello .  card was left with the patient's brother. The patient was intubated and not alert.       [employer]

## 2019-07-10 NOTE — PROGRESS NOTES
Pt arrived via [facility] met by Yolanda Maldonado, NP and EndoRN. VSS, Pt drowsy, airway intact. Pt taken to CT for imaging.

## 2019-07-10 NOTE — PROGRESS NOTES
Pt taken to CT. Pt vomited when transferred to CT table. PRN Zofran given. Have You Had Microneedling Treatments Before?: has had a previous microneedling treatment Additional History: Full face and neck package MD with PRP

## 2019-07-10 NOTE — PROGRESS NOTES
Physical Therapy Note:    Orders received, chart reviewed, discussed with RN. Patient very drowsy this AM and easily becoming agitated. Has not had neuro intervention. Will hold today and continue to monitor until medically appropriate to participate.     Thank you,  Juan Linares, PT, DPT

## 2019-07-10 NOTE — CONSULTS
PM&R Rehab Consult    Subjective:     Date of Consultation:  July 10, 2019    Referring Physician: Dr Ricky Frausto    Patient is a 37 y.o. male who is being seen for rehab recommendations/ initial eval due to non traumatic SAH    Active Problems:    SAH (subarachnoid hemorrhage) ([facility] [address] (7/9/2019)      Tobacco abuse (7/10/2019)      Endotracheally intubated (7/10/2019)    per HPI; Aristeo López is a 37 y.o. male who originally reported to [facility] ED via EMS after found with altered mental status and possible seizure like activity. Per brother, pt was at [address] and had witnessed syncopal event with possible seizure like activity. Pt had CT head and CTA head and neck and found to have [facility] secondary to Basilar tip aneurysm rupture. Pt transferred to [facility] ED via [facility] team. Pt with eyes closed and drowsy, but will open eyes to voice and follow commands. Pt GCS E3, V5, M6: 14, PERRL +3 with EOM's intact. NIHSS of 2 for drowsiness and altered mental status. Pt admitted to our ICU under [facility] protocol. Labs from [facility] show bun/cr 46/3.4, will give pt IVF's x2 L bolus and reeval. Pt states he smokes about 9 cig a day, denies any ETOH or drugs, denies any family hx of SAH. Pt unsure of what happened. Arterial line placed on admit, pt with IV x2., phelps. \"    Stuart Older , unfortunately, had a decline in status overnoc and required intubation for airway protection. F/u HCT showed extensive/diffuse SAH and increased ICP. An EVD was placed into the 3rd ventricle and head Ct repeated with dec in ventricular size. Pts creatinine has improved to 2.83 with a BUN of 37 this a.m. troponins elevated at 0.08 and now 0.07. 2D ECHO pending. Na 154 earlier this a.m, now 149. Leukocytosis improved from 118.2 to 13.8. He is on Nimotop to prevent vasospasm and Keppra for sz control. No family at bedside. Pt unresponsive on vent. PT and OT deferred this a.m due to medical status      No past medical history on file.    No family history on file. Social History     Tobacco Use    Smoking status: Not on file   Substance Use Topics    Alcohol use: Not on file     No past surgical history on file. Prior to Admission medications    Not on File     No Known Allergies     Review of Systems:  Review of systems not obtained due to patient factors. Objective:     Vitals:  Blood pressure 179/61, pulse 69, temperature 99.5 °F (37.5 °C), resp. rate 14, height 6' (1.829 m), weight 151 lb 8 oz (68.7 kg), SpO2 100 %. Temp (24hrs), Av.6 °F (37 °C), Min:96.2 °F (35.7 °C), Max:100.2 °F (37.9 °C)      Intake and Output:   1901 - 07/10 0700  In: 8406 [I.V.:2885]  Out: 6548 [Urine:2945; Drains:46]    Physical Exam:  General:  Orally intubated. Not following commands, no spont eye opening   Lungs:   Clear to auscultation bilaterally. Heart:  Regular rate and rhythm, S1, S2 stable, no murmur, click, rub or gallop. Abdomen:   Soft, non-tender. Bowel sounds present. No masses,  No organomegaly. Genitourinary: phelps   Neuro Muscular: Not fcs. W/d to pain all 4. Brisk reflexes at knees and biceps. PERRLA. Skin:  No rashes, lesions, or signs/symptoms or infection.       No edema    Labs/Studies:  Recent Results (from the past 72 hour(s))   DRUG SCREEN, URINE    Collection Time: 19  7:31 PM   Result Value Ref Range    PCP(PHENCYCLIDINE) NEGATIVE       BENZODIAZEPINES NEGATIVE       COCAINE NEGATIVE       AMPHETAMINES NEGATIVE       METHADONE NEGATIVE       THC (TH-CANNABINOL) NEGATIVE       OPIATES NEGATIVE       BARBITURATES NEGATIVE      CBC W/O DIFF    Collection Time: 19  7:38 PM   Result Value Ref Range    WBC 18.2 (H) 4.3 - 11.1 K/uL    RBC 4.21 (L) 4.23 - 5.6 M/uL    HGB 12.3 (L) 13.6 - 17.2 g/dL    HCT 37.7 (L) 41.1 - 50.3 %    MCV 89.5 79.6 - 97.8 FL    MCH 29.2 26.1 - 32.9 PG    MCHC 32.6 31.4 - 35.0 g/dL    RDW 12.4 11.9 - 14.6 %    PLATELET 751 782 - 883 K/uL    MPV 11.2 9.4 - 12.3 FL    ABSOLUTE NRBC 0.00 0.0 - 0.2 K/uL   METABOLIC PANEL, BASIC    Collection Time: 07/09/19  7:38 PM   Result Value Ref Range    Sodium 137 136 - 145 mmol/L    Potassium 4.4 3.5 - 5.1 mmol/L    Chloride 111 (H) 98 - 107 mmol/L    CO2 20 (L) 21 - 32 mmol/L    Anion gap 6 (L) 7 - 16 mmol/L    Glucose 121 (H) 65 - 100 mg/dL    BUN 48 (H) 6 - 23 MG/DL    Creatinine 3.15 (H) 0.8 - 1.5 MG/DL    GFR est AA 28 (L) >60 ml/min/1.73m2    GFR est non-AA 23 (L) >60 ml/min/1.73m2    Calcium 8.6 8.3 - 10.4 MG/DL   MAGNESIUM    Collection Time: 07/09/19  7:38 PM   Result Value Ref Range    Magnesium 2.2 1.8 - 2.4 mg/dL   CK    Collection Time: 07/09/19  7:38 PM   Result Value Ref Range     21 - 215 U/L   TROPONIN I    Collection Time: 07/09/19  7:38 PM   Result Value Ref Range    Troponin-I, Qt. 0.08 (H) 0.02 - 0.05 NG/ML   LIPID PANEL    Collection Time: 07/09/19  7:38 PM   Result Value Ref Range    LIPID PROFILE          Cholesterol, total 220 (H) <200 MG/DL    Triglyceride 142 35 - 150 MG/DL    HDL Cholesterol 37 (L) 40 - 60 MG/DL    LDL, calculated 154.6 (H) <100 MG/DL    VLDL, calculated 28.4 (H) 6.0 - 23.0 MG/DL    CHOL/HDL Ratio 5.9     HEMOGLOBIN A1C WITH EAG    Collection Time: 07/09/19  7:38 PM   Result Value Ref Range    Hemoglobin A1c 5.5 4.8 - 6.0 %    Est. average glucose 111 mg/dL   HEPATIC FUNCTION PANEL    Collection Time: 07/09/19  7:38 PM   Result Value Ref Range    Protein, total 6.7 6.3 - 8.2 g/dL    Albumin 3.4 (L) 3.5 - 5.0 g/dL    Globulin 3.3 2.3 - 3.5 g/dL    A-G Ratio 1.0 (L) 1.2 - 3.5      Bilirubin, total 0.2 0.2 - 1.1 MG/DL    Bilirubin, direct <0.1 <0.4 MG/DL    Alk.  phosphatase 92 50 - 136 U/L    AST (SGOT) 11 (L) 15 - 37 U/L    ALT (SGPT) 9 (L) 12 - 65 U/L   SODIUM    Collection Time: 07/09/19  7:38 PM   Result Value Ref Range    Sodium 139 136 - 432 mmol/L   METABOLIC PANEL, BASIC    Collection Time: 07/09/19 11:08 PM   Result Value Ref Range    Sodium 145 136 - 145 mmol/L    Potassium 4.7 3.5 - 5.1 mmol/L    Chloride 119 (H) 98 - 107 mmol/L    CO2 19 (L) 21 - 32 mmol/L    Anion gap 7 7 - 16 mmol/L    Glucose 110 (H) 65 - 100 mg/dL    BUN 40 (H) 6 - 23 MG/DL    Creatinine 2.99 (H) 0.8 - 1.5 MG/DL    GFR est AA 29 (L) >60 ml/min/1.73m2    GFR est non-AA 24 (L) >60 ml/min/1.73m2    Calcium 8.0 (L) 8.3 - 10.4 MG/DL   EKG, 12 LEAD, INITIAL    Collection Time: 07/09/19 11:50 PM   Result Value Ref Range    Ventricular Rate 86 BPM    Atrial Rate 86 BPM    P-R Interval 186 ms    QRS Duration 72 ms    Q-T Interval 354 ms    QTC Calculation (Bezet) 423 ms    Calculated P Axis 77 degrees    Calculated R Axis 44 degrees    Calculated T Axis 60 degrees    Diagnosis       Normal sinus rhythm    Confirmed by Tee Christy MD (), TEE COLEY ([id number]) on 7/10/2019 7:39:41 AM     POC G3    Collection Time: 07/10/19  3:09 AM   Result Value Ref Range    Device: ROOM AIR      FIO2 (POC) 21 %    pH (POC) 7.277 (L) 7.35 - 7.45      pCO2 (POC) 33.0 (L) 35 - 45 MMHG    pO2 (POC) 85 75 - 100 MMHG    HCO3 (POC) 15.3 (L) 22 - 26 MMOL/L    sO2 (POC) 95 95 - 98 %    Base deficit (POC) 10 mmol/L    Allens test (POC) NOT APPLICABLE      Site DRAWN FROM ARTERIAL LINE      Patient temp. 99.1      Specimen type (POC) ARTERIAL      Performed by Giancarlo     CO2, POC 16 MMOL/L    Respiratory comment: NurseNotified     COLLECT TIME 304     POC G3    Collection Time: 07/10/19  4:58 AM   Result Value Ref Range    Device: VENT      FIO2 (POC) 40 %    pH (POC) 7.197 (LL) 7.35 - 7.45      pCO2 (POC) 44.6 35 - 45 MMHG    pO2 (POC) 175 (H) 75 - 100 MMHG    HCO3 (POC) 17.3 (L) 22 - 26 MMOL/L    sO2 (POC) 99 (H) 95 - 98 %    Base deficit (POC) 10 mmol/L    Mode Pressure regulated volume control      Tidal volume 500 ml    Set Rate 12 bpm    PEEP/CPAP (POC) 5 cmH2O    Allens test (POC) NOT APPLICABLE      Inspiratory Time 1.46 sec    Site DRAWN FROM ARTERIAL LINE      Patient temp.  98.6      Specimen type (POC) ARTERIAL      Performed by Giancarlo TALBOT, POC 19 MMOL/L    Critical value read back 04:59     Respiratory comment: PhysicianNotified     Exhaled minute volume 6.00 L/min    COLLECT TIME 219     METABOLIC PANEL, BASIC    Collection Time: 07/10/19  5:16 AM   Result Value Ref Range    Sodium 154 (H) 136 - 145 mmol/L    Potassium 4.6 3.5 - 5.1 mmol/L    Chloride 123 (H) 98 - 107 mmol/L    CO2 26 21 - 32 mmol/L    Anion gap 5 (L) 7 - 16 mmol/L    Glucose 135 (H) 65 - 100 mg/dL    BUN 37 (H) 6 - 23 MG/DL    Creatinine 2.83 (H) 0.8 - 1.5 MG/DL    GFR est AA 31 (L) >60 ml/min/1.73m2    GFR est non-AA 26 (L) >60 ml/min/1.73m2    Calcium 7.5 (L) 8.3 - 10.4 MG/DL   MAGNESIUM    Collection Time: 07/10/19  5:16 AM   Result Value Ref Range    Magnesium 2.6 (H) 1.8 - 2.4 mg/dL   PROTHROMBIN TIME + INR    Collection Time: 07/10/19  5:16 AM   Result Value Ref Range    Prothrombin time 13.4 11.7 - 14.5 sec    INR 1.0     CBC W/O DIFF    Collection Time: 07/10/19  5:16 AM   Result Value Ref Range    WBC 13.8 (H) 4.3 - 11.1 K/uL    RBC 3.43 (L) 4.23 - 5.6 M/uL    HGB 10.1 (L) 13.6 - 17.2 g/dL    HCT 30.6 (L) 41.1 - 50.3 %    MCV 89.2 79.6 - 97.8 FL    MCH 29.4 26.1 - 32.9 PG    MCHC 33.0 31.4 - 35.0 g/dL    RDW 12.6 11.9 - 14.6 %    PLATELET 991 024 - 533 K/uL    MPV 11.0 9.4 - 12.3 FL    ABSOLUTE NRBC 0.00 0.0 - 0.2 K/uL   TROPONIN I    Collection Time: 07/10/19  5:16 AM   Result Value Ref Range    Troponin-I, Qt. 0.08 (H) 0.02 - 0.05 NG/ML   SODIUM    Collection Time: 07/10/19  8:54 AM   Result Value Ref Range    Sodium 149 (H) 136 - 145 mmol/L   PROCALCITONIN    Collection Time: 07/10/19  8:54 AM   Result Value Ref Range    Procalcitonin 0.1 ng/mL   TROPONIN I    Collection Time: 07/10/19  8:55 AM   Result Value Ref Range    Troponin-I, Qt. 0.07 (H) 0.02 - 0.05 NG/ML   POC G3    Collection Time: 07/10/19  9:01 AM   Result Value Ref Range    Device: VENT      FIO2 (POC) 40 %    pH (POC) 7.256 (L) 7.35 - 7.45      pCO2 (POC) 41.6 35 - 45 MMHG    pO2 (POC) 163 (H) 75 - 100 MMHG HCO3 (POC) 18.5 (L) 22 - 26 MMOL/L    sO2 (POC) 99 (H) 95 - 98 %    Base deficit (POC) 8 mmol/L    Mode Pressure regulated volume control      Tidal volume 500 ml    Set Rate 12 bpm    PEEP/CPAP (POC) 8 cmH2O    Allens test (POC) NO      Site DRAWN FROM ARTERIAL LINE      Patient temp. 98.6      Specimen type (POC) ARTERIAL      Performed by Shania     CO2, POC 20 MMOL/L    Critical value read back 00:02     Exhaled minute volume 6.10 L/min    COLLECT TIME 858             Speech Assessment:    Dysphagia Screening  Vocal Quality/Secretions: Normal  History of Dysphagia: No  O2 Saturation: Normal  Alertness: Normal  Pre-Swallow Assessment Score: 0  Purees: No difficulty noted  Water by Cup: No difficulty noted  Water by Straw: No difficulty noted                Ambulation:  Activity and Safety  Activity Level: Bed Rest  Ambulate: No (Comment)  Activity: In bed, Family/Visitors present  Activity Assistance: Partial (two people)  Weight Bearing Status: WBAT (Weight Bearing as Tolerated)  Mode of Transportation: Stretcher, Oxygen, IV equipment  Repositioned: Head of bed elevated (degrees), Heels off loaded, Extremity elevated, lower, Extremity elevated, upper  Patient Turned: Supine  Head of Bed Elevated: HOB 30  Activity Response:  Tolerated well  Assistive Device: Fall prevention device  Safety Measures: Bed/Chair alarm on, Bed/Chair-Wheels locked, Bed in low position, Call light within reach, Fall prevention (comment), Gripper socks, Restraints, Side rails X 3     Impression/Plan:     Active Problems:    SAH (subarachnoid hemorrhage) (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 EVD placed and pt on vent  -PT/OT/ST when medically stable  -pending IR angio and coiling of basilar tip aneurysm   -ECHO pending  -cont SAH protocol under the direction of Dr Rufino Campbell.  Goal MAP 70-90, Na goal >140 currently on 3% NS bolus q 6hrs prn  -thank you for this consultation; [facility]  Discussion with Staff  Reviewed Labs/Meds/Records  Corrie Booth MD

## 2019-07-10 NOTE — PROCEDURES
Endotracheal Intubation  Date: 7-10-19  Time: 0400    Indication: hydrocephaluss/altered mental statu with need to protect airway. Attending: Dr. La Denny    A time-out was completed verifying correct patient, procedure, site, positioning, and special equipment if applicable. The patient was placed in a flat position. Sedation was obtained using <Versed 2mg>, and additionally with Etomidate 20mg and Anectine 100mcg x1. The patient was easily ventilated using an ambu bag. The GLID[employer]/ MAC 3 BLADE was used and inserted into the oropharynx at which time there was a Grade 1 view of the vocal cords. A 7.5-Irish endotracheal tube was inserted and visualized going through the vocal cords. The stylette was removed. Colorimetric change was visualized on the CO2 meter. Breath sounds were heard in both lung fields equally. The endotracheal tube was placed at 24 cm, measured at the teeth. A chest x-ray was ordered to assess for pneumothorax and verify endotrachealtube placement. The patient tolerated the procedure well and there were no complications.

## 2019-07-10 NOTE — PROGRESS NOTES
Ventilator check complete; patient has a #7.5 ET tube secured at the 24 at the lip. Patient is sedated. Breath sounds are CTAB after ETS. Trachea is midline, Negative for subcutaneous air, and chest excursion is symmetrical. Patient is also Negative for cyanosis and is Negative for pitting edema. All alarms are set and audible.   Resuscitation bag and mask are at the head of the bed.       Ventilator Settings  Mode FIO2 Rate Tidal Volume Pressure PEEP I:E Ratio   PRVC  40 %  14 500 ml     8 cm H20  1:2       Peak airway pressure: 16 cm H2O   Minute ventilation: 7.1 l/min

## 2019-07-10 NOTE — PROGRESS NOTES
CC: called due to decrease in LOC of pt. Plan: CT head obtained and there is increase in hydrocephalus. Will intubate pt and place emergent EVD. Discussed findings and imaging with Dr. Suzette Solis, she is in agreement with plan of care. EXAM: pt more drowsy, agitated and not following commands. Will intubate for airway protection and place emergent EVD for increased ICP/hydrocephalus.

## 2019-07-10 NOTE — PROGRESS NOTES
Chung Rose NP called with CT results. Orders to get ABG and to go ahead to give scheduled 3% Saline.

## 2019-07-10 NOTE — PROCEDURES
ARTERIAL LINE (A-Line) PLACEMENT  Date: 7-9-19  Time: 2000  Indication: Hemodynamic monitoring    Attending: Dr. Kymberly John      A time-out was completed verifying correct patient, procedure, site, positioning, and special equipment if applicable. The patients left wrist was prepped and draped in sterile fashion. 1% Lidocaine was used to anesthetize the area. A 20 Arrow arterial line was introduced into the left radial artery. The catheter was threaded over the guide wire and the needle was removed with appropriate pulsatile blood return. The catheter was then sutured in place to the skin and a sterile dressing applied. Perfusion to the extremity distal to the point of catheter insertion was checked and found to be adequate. Dr. Kwame Jameson was in agreement with plan of care and available at all times during procedure.     The patient tolerated the procedure well and there were no complications

## 2019-07-10 NOTE — PROGRESS NOTES
SAH Education:    Pt's father and brother given education booklet regarding SAH diagnosis, treatment & recovery. Discussed with family - verbalized understanding.

## 2019-07-10 NOTE — PROGRESS NOTES
Nutrition:  Nutrition Consult for TF Management. (Dr. Prince Fleming, NP)  Assessment:  Anthropometrics:   Ht - 6'0\", wgt - 68.7 kg (CCU bed 7/9/19), BMI 20.5 c/w acceptable weight, edema - none reported. Macronutrient Needs:  Estimated calorie needs - 0878-6973 jayce/day (25-28 jayce/kg/day)   Estimated protein needs - 69-83 gm pro/day (1-1.2 gm pro/kgIBW/day)   Max CHO/day - 242 gm CHO/day (50% jayce/day)   Fluid/day - 1.7-2 liters/day (1 ml/jayce/day)  Intake/Comparative Standards: The patient is currently NPO with Diprivan infusing @ 10.3 ml/hr, contributing ~272 calories/day which meets 16% of his calorie and 0% of his protein needs. Pertinent Labs:   Sodium 154, BUN 37, creatinine 2.83; MAP - 78. Pertinent Medications/Drips:   Keppra, Mag-Ox, Pericolace; Cardene and Diprivan drips; IVF - NS @ 100 ml/hr. GI Function/Activity:   Flat, soft abdomen with active bowel sounds. Last bowel movement is unknown. Diet:   NPO. Enteral Access:   NGT. Food/Nutrition History:   37year old lady with a h/o tobacco abuse admitted with SAH. He is currently intubated, ventilated and sedated. Diagnosis (Nutrition): Inadequate energy intake related to NPO status as evidenced by the patient being intubated and ventilated and requires TF for nutrition support. Intervention:  Meals and Snacks: NPO. Enteral Nutrition: Start TF with Promote @ 25 ml/hr with a 20 ml/hr water flush via NGT. Increase TF as tolerated to the goal rate of 65 ml/hr - 1560 calories/day (87% calorie goal), 98 grams protein/day (>100% protein goal), 203 grams CHO/day (does not exceed max CHO limit) and 1790 ml water/day (100% fluid goal). Diprivan contributes an additional 272 calories/day making his total caloric intake 1832 calories/day (100% calorie goal). IV Fluid: Continue current IVF. Mineral Supplement Therapy: Nutrition Support Orders for Electrolyte Management Replacements are activated and placed on the [facility] for potassium only.   Coordination of Nutrition Care by a Nutrition Professional: AM ICU rounds, collaboration with Paula Dumas NP. Nutrition Discharge Plan: Too soon to determine. Heron Apgar.  [facility]  [phone]

## 2019-07-10 NOTE — PROGRESS NOTES
Abg drawn post HCO3 and reported to Dr. Colette Higgins by Saúl Mir, [address] [facility]

## 2019-07-10 NOTE — PROGRESS NOTES
Unable to complete admission required database due to pt's condition and family unable to give account of pt's medical history.

## 2019-07-10 NOTE — PROGRESS NOTES
Progress Note    Patient: Jonny Maldonado MRN: [id number]  SSN: [id number]    YOB: 1975  Age: 37 y.o. Sex: male      Admit Date: 7/9/2019    LOS: 1 day     Subjective:   Pt with increased drowsiness and acute change this am and required intubation for airway protection and EVD placement for hydrocephalus. Family, brother called and updated, he presented post placement and in agreement with plan of care. Father and brother next of legal kin for decisions. Pt will now follow with thumbs up when sedation decreased.      Current Facility-Administered Medications   Medication Dose Route Frequency    propofol (DIPRIVAN) infusion  0-50 mcg/kg/min IntraVENous TITRATE    acetaminophen (TYLENOL) tablet 650 mg  650 mg Per NG tube Q4H PRN    niMODipine (NIMOTOP) 30 mg/mL oral solution (compound) 60 mg  60 mg Per NG tube Q4H    magnesium oxide (MAG-OX) tablet 400 mg  400 mg Per NG tube BID    senna-docusate (PERICOLACE) 8.6-50 mg per tablet 2 Tab  2 Tab Per NG tube QHS    atorvastatin (LIPITOR) tablet 40 mg  40 mg Per NG tube QHS    famotidine (PEPCID) tablet 20 mg  20 mg Per OG Tube DAILY    heparin (porcine) injection 5,000 Units  5,000 Units SubCUTAneous Q8H    sodium chloride (NS) flush 30 mL  30 mL InterCATHeter Q8H    heparin (porcine) pf 900 Units  900 Units InterCATHeter Q8H    sodium chloride (NS) flush 30 mL  30 mL InterCATHeter PRN    heparin (porcine) pf 900 Units  900 Units InterCATHeter PRN    nicotine (NICODERM CQ) 21 mg/24 hr patch 1 Patch  1 Patch TransDERmal Q24H    sodium bicarbonate (8.4%) injection 50 mEq  50 mEq IntraVENous ONCE    ondansetron (ZOFRAN) injection 4 mg  4 mg IntraVENous Q6H PRN    NUTRITIONAL SUPPORT ELECTROLYTE PRN ORDERS   Does Not Apply PRN    0.9% sodium chloride infusion  100 mL/hr IntraVENous CONTINUOUS    magnesium sulfate 4 g/100 mL IVPB  4 g IntraVENous DAILY PRN    magnesium sulfate 2 g/50 ml IVPB (premix or compounded)  2 g IntraVENous DAILY PRN  fentaNYL citrate (PF) injection 50 mcg  50 mcg IntraVENous Q2H PRN    niCARdipine in Saline (CARDENE) 25 MG/250 mL infusion kit  0-15 mg/hr IntraVENous TITRATE    3% sodium chloride (*HIGH ALERT*CONCENTRATED IV*) bolus infusion 250 mL  250 mL IntraVENous Q6H    levETIRAcetam (KEPPRA) 500 mg in 0.9% sodium chloride 100 mL IVPB  500 mg IntraVENous Q12H       Objective:     Vitals:    07/10/19 1215 07/10/19 1230 07/10/19 1245 07/10/19 1300   BP:       Pulse: 71 66 64 71   Resp: 21 18 19 10   Temp: 99.3 °F (37.4 °C) 99.5 °F (37.5 °C) 99.5 °F (37.5 °C) 99.5 °F (37.5 °C)   SpO2: 100% 100% 100% 100%   Weight:       Height:             Intake and Output:  Current Shift: 07/10 0701 - 07/10 1900  In: 1289.1 [I.V.:1159.1]  Out: 585 [Urine:530; Drains:55]  Last 24 hr: 07/09 0701 - 07/10 0700  In: 3963 [I.V.:2885]  Out: 2991 [Urine:2945; Drains:46]    Physical Exam:   General:  Drowsy. Intubated/EVD. Follows commands. Eyes:  PERRL+2, midline. +corneal.   Neck: Supple, symmetrical, trachea midline, no adenopathy, thyroid: no enlargment/tenderness/nodules, no carotid bruit and no JVD. Lungs:   Clear to auscultation bilaterally. Heart:  Regular rate and rhythm, S1, S2 normal, no murmur, click, rub or gallop. Abdomen:   Soft, non-tender. Bowel sounds normal. No masses,  No organomegaly. Extremities: Extremities normal, atraumatic, no cyanosis or edema. Pulses: 2+ and symmetric all extremities. Skin: Skin color, texture, turgor normal. No rashes or lesions   Neurologic: Decreased LOC. Pt can stovall, followed commands after EVD placement, EVD @15/open.         Lab/Data Review:    Recent Results (from the past 12 hour(s))   POC G3    Collection Time: 07/10/19  3:09 AM   Result Value Ref Range    Device: ROOM AIR      FIO2 (POC) 21 %    pH (POC) 7.277 (L) 7.35 - 7.45      pCO2 (POC) 33.0 (L) 35 - 45 MMHG    pO2 (POC) 85 75 - 100 MMHG    HCO3 (POC) 15.3 (L) 22 - 26 MMOL/L    sO2 (POC) 95 95 - 98 %    Base deficit (POC) 10 mmol/L    Allens test (POC) NOT APPLICABLE      Site DRAWN FROM ARTERIAL LINE      Patient temp. 99.1      Specimen type (POC) ARTERIAL      Performed by StoddardBrandonRT     CO2, POC 16 MMOL/L    Respiratory comment: NurseNotified     COLLECT TIME 304     POC G3    Collection Time: 07/10/19  4:58 AM   Result Value Ref Range    Device: VENT      FIO2 (POC) 40 %    pH (POC) 7.197 (LL) 7.35 - 7.45      pCO2 (POC) 44.6 35 - 45 MMHG    pO2 (POC) 175 (H) 75 - 100 MMHG    HCO3 (POC) 17.3 (L) 22 - 26 MMOL/L    sO2 (POC) 99 (H) 95 - 98 %    Base deficit (POC) 10 mmol/L    Mode Pressure regulated volume control      Tidal volume 500 ml    Set Rate 12 bpm    PEEP/CPAP (POC) 5 cmH2O    Allens test (POC) NOT APPLICABLE      Inspiratory Time 1.46 sec    Site DRAWN FROM ARTERIAL LINE      Patient temp.  98.6      Specimen type (POC) ARTERIAL      Performed by StoddardBrandonRT     CO2, POC 19 MMOL/L    Critical value read back 04:59     Respiratory comment: PhysicianNotified     Exhaled minute volume 6.00 L/min    COLLECT TIME 188     METABOLIC PANEL, BASIC    Collection Time: 07/10/19  5:16 AM   Result Value Ref Range    Sodium 154 (H) 136 - 145 mmol/L    Potassium 4.6 3.5 - 5.1 mmol/L    Chloride 123 (H) 98 - 107 mmol/L    CO2 26 21 - 32 mmol/L    Anion gap 5 (L) 7 - 16 mmol/L    Glucose 135 (H) 65 - 100 mg/dL    BUN 37 (H) 6 - 23 MG/DL    Creatinine 2.83 (H) 0.8 - 1.5 MG/DL    GFR est AA 31 (L) >60 ml/min/1.73m2    GFR est non-AA 26 (L) >60 ml/min/1.73m2    Calcium 7.5 (L) 8.3 - 10.4 MG/DL   MAGNESIUM    Collection Time: 07/10/19  5:16 AM   Result Value Ref Range    Magnesium 2.6 (H) 1.8 - 2.4 mg/dL   PROTHROMBIN TIME + INR    Collection Time: 07/10/19  5:16 AM   Result Value Ref Range    Prothrombin time 13.4 11.7 - 14.5 sec    INR 1.0     CBC W/O DIFF    Collection Time: 07/10/19  5:16 AM   Result Value Ref Range    WBC 13.8 (H) 4.3 - 11.1 K/uL    RBC 3.43 (L) 4.23 - 5.6 M/uL    HGB 10.1 (L) 13.6 - 17.2 g/dL    HCT 30.6 (L) 41.1 - 50.3 %    MCV 89.2 79.6 - 97.8 FL    MCH 29.4 26.1 - 32.9 PG    MCHC 33.0 31.4 - 35.0 g/dL    RDW 12.6 11.9 - 14.6 %    PLATELET 357 680 - 377 K/uL    MPV 11.0 9.4 - 12.3 FL    ABSOLUTE NRBC 0.00 0.0 - 0.2 K/uL   TROPONIN I    Collection Time: 07/10/19  5:16 AM   Result Value Ref Range    Troponin-I, Qt. 0.08 (H) 0.02 - 0.05 NG/ML   SODIUM    Collection Time: 07/10/19  8:54 AM   Result Value Ref Range    Sodium 149 (H) 136 - 145 mmol/L   PROCALCITONIN    Collection Time: 07/10/19  8:54 AM   Result Value Ref Range    Procalcitonin 0.1 ng/mL   TROPONIN I    Collection Time: 07/10/19  8:55 AM   Result Value Ref Range    Troponin-I, Qt. 0.07 (H) 0.02 - 0.05 NG/ML   POC G3    Collection Time: 07/10/19  9:01 AM   Result Value Ref Range    Device: VENT      FIO2 (POC) 40 %    pH (POC) 7.256 (L) 7.35 - 7.45      pCO2 (POC) 41.6 35 - 45 MMHG    pO2 (POC) 163 (H) 75 - 100 MMHG    HCO3 (POC) 18.5 (L) 22 - 26 MMOL/L    sO2 (POC) 99 (H) 95 - 98 %    Base deficit (POC) 8 mmol/L    Mode Pressure regulated volume control      Tidal volume 500 ml    Set Rate 12 bpm    PEEP/CPAP (POC) 8 cmH2O    Allens test (POC) NO      Site DRAWN FROM ARTERIAL LINE      Patient temp.  98.6      Specimen type (POC) ARTERIAL      Performed by Shania     CO2, POC 20 MMOL/L    Critical value read back 00:02     Exhaled minute volume 6.10 L/min    COLLECT TIME 112     METABOLIC PANEL, BASIC    Collection Time: 07/10/19 11:24 AM   Result Value Ref Range    Sodium 152 (H) 136 - 145 mmol/L    Potassium 3.6 3.5 - 5.1 mmol/L    Chloride 125 (H) 98 - 107 mmol/L    CO2 19 (L) 21 - 32 mmol/L    Anion gap 8 7 - 16 mmol/L    Glucose 96 65 - 100 mg/dL    BUN 32 (H) 6 - 23 MG/DL    Creatinine 2.46 (H) 0.8 - 1.5 MG/DL    GFR est AA 37 (L) >60 ml/min/1.73m2    GFR est non-AA 30 (L) >60 ml/min/1.73m2    Calcium 6.8 (L) 8.3 - 10.4 MG/DL   POC G3    Collection Time: 07/10/19 11:26 AM   Result Value Ref Range    Device: VENT      FIO2 (POC) 40 %    pH (POC) 7.276 (L) 7.35 - 7.45      pCO2 (POC) 41.2 35 - 45 MMHG    pO2 (POC) 166 (H) 75 - 100 MMHG    HCO3 (POC) 19.2 (L) 22 - 26 MMOL/L    sO2 (POC) 99 (H) 95 - 98 %    Base deficit (POC) 7 mmol/L    Mode Pressure regulated volume control      Tidal volume 500 ml    Set Rate 14 bpm    PEEP/CPAP (POC) 8 cmH2O    Allens test (POC) NO      Site DRAWN FROM ARTERIAL LINE      Patient temp. 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 [address] (7/10/2019)      Seizure ([facility] [address] (7/10/2019)      NEURO: Pt admitted to ICU under [facility], [place] [facility], [place] Grade 4 secondary to basilar tip aneurysm rupture. Q1 hour neuro checks. On [facility] protocol - Mg, nimotop, zocor. PERRL +3. MAP goal 70-90. Patient got drowsy and repeat CT head showed increased ventricles and communicating hydrocephalus. EVD placed emergently. EVD set to 15cm H20. ICP 8-12. Repeat CT head after EVD showed good placement and decreased ventricles. Plan is stent assisted coiling tomorrow. I spoke with the family about the CTA results and that he has a large basilar aneurysm with a wide neck. I discussed the procedure as well the risks, benefits and alternatives. I also discussed the HUD devices. Consent for the procedure and the HUD devise was obtained. Their questions were answered. TCDs daily and show Peak velocities are already increased. RESP: Pt with increased lethargy, intubated for airway protection due to acute respiratory failure with hypoxia.       7.5OETT: AB on RA prior to intubation. POST: 7.19/44/175, on PRVC 40%. Pt given Bicarb ampx4. PH improving. Rate increased to 14. CV:MAP GOAL 70-90, cardene gtt infusing wo difficulty. 2D Echo pending, trop peaked @ 0.8, trending down. SCD/SQ heparin. HEME: 10/30,    NEPH: bun/cr:32/2.4, improving with IVF's, unsure if underlying kidney disease, family unsure. Will monitor. GI: NPO. Pepcid, will start tube feeds today. Nutrition consulted and will see pt. ID: Tm: 100.2  no abx. 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 Motrin x1 only given due to Cr. LINES: IV x2, RUE PICC, phelps, R EVD. Tobacco abuse: On Nicoderm patch.     Patient is critical due to [facility], risk of rebleeding, vasospasm, acute respiratory failure, communicating hydrocephalus, and kidney disease. I spent 40 minutes at bedside doing critical care examining the patient, reviewing labs and films, correcting care and updating the family.       Signed By: Samira Davis NP     July 10, 2019

## 2019-07-10 NOTE — PROGRESS NOTES
Pt seen in ICU s/p admission [facility]. Currently intubated/vent. CSF drain. Father, Heri Adams (75y/o) and cousin, Joelle Grajeda, at bedside. Able to answer some questions. Pt has 13 and 4 y/o old children. Lives with father. Independent of ADL's prior to admission, drives and works full-time at [employer] in [place] where he lives. Family does think pt has insurance through work and will try and get information. Explained that insurance company may need notification of hospital admission. Verbalized understanding. CM to follow for any assist and d/c POC when medically stable. Care Management Interventions  PCP Verified by CM: No(pt uses Urgent care or Doctor's care)  Mode of Transport at Discharge: Other (see comment)  Transition of Care Consult (CM Consult): Discharge Planning  Discharge Durable Medical Equipment: (none currently)  Current Support Network: Relative's Home(pt currently lives with father, Heri Adams [phone], has 2 children -13 and 4 y/o)  Confirm Follow Up Transport: Self(driving self prior to admission)  Plan discussed with Pt/Family/Caregiver: Yes  Freedom of Choice Offered: Yes  [employer] Information Provided?: (family thinks pt has insurance through work.  They will work on getting information)  Discharge Location  Discharge Placement: Unable to determine at this time

## 2019-07-10 NOTE — PROGRESS NOTES
SPEECH PATHOLOGY NOTE:    Speech therapy consult received and appreciated. Patient currently intubated. Will hold speech therapy assessment until medically appropriate to participate.      Sara Sandhoff, [facility] [facility], [facility] KAMI VALENTE, CCC-SLP

## 2019-07-10 NOTE — PROGRESS NOTES
Orders received, chart reviewed. Per interdisciplinary report, Patient very drowsy this AM and easily becoming agitated. Has not had neuro intervention. Will hold today and continue to monitor until medically appropriate to participate.     Sun Stinson, OT

## 2019-07-10 NOTE — PROGRESS NOTES
Kameron Anderson NP called about pt's change in neuro status. Orders to go ahead and get STAT CT. NIH scale is now an 8 from a previous score of 2.

## 2019-07-10 NOTE — PROGRESS NOTES
Problem: Delirium Treatment  Goal: *Level of consciousness restored to baseline  Outcome: Progressing Towards Goal  Goal: *Absence of falls  Outcome: Progressing Towards Goal  Goal: *Will remain free of delirium, CAM Score negative  Outcome: Progressing Towards Goal     Problem: Patient Education: Go to Patient Education Activity  Goal: Patient/Family Education  Outcome: Progressing Towards Goal     Problem: Falls - Risk of  Goal: *Absence of Falls  Description  Document Chelsey Elizabeth Fall Risk and appropriate interventions in the flowsheet.   Outcome: Progressing Towards Goal     Problem: Patient Education: Go to Patient Education Activity  Goal: Patient/Family Education  Outcome: Progressing Towards Goal

## 2019-07-10 NOTE — PROGRESS NOTES
Ventilator check complete; patient has a #7.5 ET tube secured at the 24 at the lip. Patient is sedated. Breath sounds are coarse. Trachea is midline, Negative for subcutaneous air, and chest excursion is symmetric. Patient is also Negative for cyanosis and is Negative for pitting edema. All alarms are set and audible. Resuscitation bag is at the head of the bed. Ventilator Settings  Mode FIO2 Rate Tidal Volume Pressure PEEP I:E Ratio   PRVC  40 %    500 ml     8 cm H20  1:2      Peak airway pressure: 21 cm H2O   Minute ventilation: 7 l/min     ABG: No results for input(s): PH, PCO2, PO2, HCO3 in the last 72 hours.       Jg Villa, RT

## 2019-07-10 NOTE — PROGRESS NOTES
Bedside and Verbal shift change report given to Jose Elias Gong RN (oncoming nurse) by Mary Lou Alejandro RN (offgoing nurse). Report included the following information SBAR, Kardex, Procedure Summary, Intake/Output, MAR, Accordion, Recent Results and Cardiac Rhythm NSR. Dual neuro and NIH performed with Jose Elias Gong RN, Mary Lou Alejandro RN and Daune Shone RN.

## 2019-07-10 NOTE — PROGRESS NOTES
Patient transferred for CT scan on transport ventilator. Returned to ventilator in room on documented settings PRVC 500 Vt, RR12, +5 Peep, and 40% FiO2.

## 2019-07-10 NOTE — PROGRESS NOTES
PICC Placement Note    PRE-PROCEDURE VERIFICATION  Correct Procedure: yes. Time out completed with assistant Panfilo Omer rn and all persons present in agreement with time out. Correct Site:  yes  Temperature: Temp: 99.5 °F (37.5 °C), Temperature Source: Temp Source: Bladder  Recent Labs     07/10/19  0516   BUN 37*   CREA 2.83*      INR 1.0   WBC 13.8*     Allergies: Patient has no known allergies. Education materials for [facility] given to patient or family. PROCEDURE DETAIL  A triple lumen PICC line was started for vascular access. The following documentation is in addition to the PICC properties in the lines/airways flowsheet :  Lot #: [id number]  xylocaine used: yes  Mid-Arm Circumference: 31 (cm)  Internal Catheter Length: 38 (cm)  Internal Catheter Total Length: 38 (cm)  Vein Selection for PICC:right basilic  Central Line Bundle followed yes  Complication Related to Insertion: none  Both the insertion guidewire and ECG guidewire were removed intact all ports have positive blood return and were flush well with normal saline. The location of the tip of the PICC is verified using ECG technology. The tip is in the SVC per ECG reading. See image below.              Line is okay to use: yes

## 2019-07-11 ENCOUNTER — APPOINTMENT (OUTPATIENT)
Dept: INTERVENTIONAL RADIOLOGY/VASCULAR | Age: 44
DRG: 020 | End: 2019-07-11
Attending: NURSE PRACTITIONER
Payer: COMMERCIAL

## 2019-07-11 ENCOUNTER — APPOINTMENT (OUTPATIENT)
Dept: CT IMAGING | Age: 44
DRG: 020 | End: 2019-07-11
Attending: NURSE PRACTITIONER
Payer: COMMERCIAL

## 2019-07-11 ENCOUNTER — APPOINTMENT (OUTPATIENT)
Dept: GENERAL RADIOLOGY | Age: 44
DRG: 020 | End: 2019-07-11
Attending: NURSE PRACTITIONER
Payer: COMMERCIAL

## 2019-07-11 LAB
ANION GAP SERPL CALC-SCNC: 7 MMOL/L (ref 7–16)
ANION GAP SERPL CALC-SCNC: 8 MMOL/L (ref 7–16)
ARTERIAL PATENCY WRIST A: ABNORMAL
BASE DEFICIT BLD-SCNC: 6 MMOL/L
BDY SITE: ABNORMAL
BODY TEMPERATURE: 98.6
BUN SERPL-MCNC: 25 MG/DL (ref 6–23)
BUN SERPL-MCNC: 28 MG/DL (ref 6–23)
CALCIUM SERPL-MCNC: 7.7 MG/DL (ref 8.3–10.4)
CALCIUM SERPL-MCNC: 8.1 MG/DL (ref 8.3–10.4)
CHLORIDE SERPL-SCNC: 123 MMOL/L (ref 98–107)
CHLORIDE SERPL-SCNC: 124 MMOL/L (ref 98–107)
CO2 BLD-SCNC: 21 MMOL/L
CO2 SERPL-SCNC: 20 MMOL/L (ref 21–32)
CO2 SERPL-SCNC: 21 MMOL/L (ref 21–32)
COLLECT TIME,HTIME: 323
CREAT SERPL-MCNC: 2.66 MG/DL (ref 0.8–1.5)
CREAT SERPL-MCNC: 2.73 MG/DL (ref 0.8–1.5)
ERYTHROCYTE [DISTWIDTH] IN BLOOD BY AUTOMATED COUNT: 13.1 % (ref 11.9–14.6)
ERYTHROCYTE [DISTWIDTH] IN BLOOD BY AUTOMATED COUNT: 13.2 % (ref 11.9–14.6)
EXHALED MINUTE VOLUME, VE: 8.5 L/MIN
GAS FLOW.O2 O2 DELIVERY SYS: ABNORMAL L/MIN
GAS FLOW.O2 SETTING OXYMISER: 14 BPM
GLUCOSE BLD STRIP.AUTO-MCNC: 129 MG/DL (ref 65–100)
GLUCOSE BLD STRIP.AUTO-MCNC: 134 MG/DL (ref 65–100)
GLUCOSE SERPL-MCNC: 123 MG/DL (ref 65–100)
GLUCOSE SERPL-MCNC: 125 MG/DL (ref 65–100)
HCO3 BLD-SCNC: 19.8 MMOL/L (ref 22–26)
HCT VFR BLD AUTO: 29.3 % (ref 41.1–50.3)
HCT VFR BLD AUTO: 30.5 % (ref 41.1–50.3)
HGB BLD-MCNC: 9.2 G/DL (ref 13.6–17.2)
HGB BLD-MCNC: 9.6 G/DL (ref 13.6–17.2)
MAGNESIUM SERPL-MCNC: 2.3 MG/DL (ref 1.8–2.4)
MCH RBC QN AUTO: 28.8 PG (ref 26.1–32.9)
MCH RBC QN AUTO: 29 PG (ref 26.1–32.9)
MCHC RBC AUTO-ENTMCNC: 31.4 G/DL (ref 31.4–35)
MCHC RBC AUTO-ENTMCNC: 31.5 G/DL (ref 31.4–35)
MCV RBC AUTO: 91.6 FL (ref 79.6–97.8)
MCV RBC AUTO: 92.4 FL (ref 79.6–97.8)
NRBC # BLD: 0 K/UL (ref 0–0.2)
NRBC # BLD: 0 K/UL (ref 0–0.2)
O2/TOTAL GAS SETTING VFR VENT: 40 %
PCO2 BLD: 40.6 MMHG (ref 35–45)
PEEP RESPIRATORY: 8 CMH2O
PH BLD: 7.29 [PH] (ref 7.35–7.45)
PHOSPHATE SERPL-MCNC: 3.8 MG/DL (ref 2.5–4.5)
PLATELET # BLD AUTO: 148 K/UL (ref 150–450)
PLATELET # BLD AUTO: 154 K/UL (ref 150–450)
PMV BLD AUTO: 10.8 FL (ref 9.4–12.3)
PMV BLD AUTO: 11.2 FL (ref 9.4–12.3)
PO2 BLD: 180 MMHG (ref 75–100)
POTASSIUM SERPL-SCNC: 3.9 MMOL/L (ref 3.5–5.1)
POTASSIUM SERPL-SCNC: 4.1 MMOL/L (ref 3.5–5.1)
RBC # BLD AUTO: 3.17 M/UL (ref 4.23–5.6)
RBC # BLD AUTO: 3.33 M/UL (ref 4.23–5.6)
SAO2 % BLD: 99 % (ref 95–98)
SERVICE CMNT-IMP: ABNORMAL
SERVICE CMNT-IMP: ABNORMAL
SODIUM SERPL-SCNC: 150 MMOL/L (ref 136–145)
SODIUM SERPL-SCNC: 151 MMOL/L (ref 136–145)
SODIUM SERPL-SCNC: 152 MMOL/L (ref 136–145)
SODIUM SERPL-SCNC: 152 MMOL/L (ref 136–145)
SODIUM SERPL-SCNC: 154 MMOL/L (ref 136–145)
SPECIMEN TYPE: ABNORMAL
VENTILATION MODE VENT: ABNORMAL
VT SETTING VENT: 500 ML
WBC # BLD AUTO: 13.5 K/UL (ref 4.3–11.1)
WBC # BLD AUTO: 18.2 K/UL (ref 4.3–11.1)

## 2019-07-11 PROCEDURE — 77030012468 HC VLV BLEEDBK CNTRL ABBT -B

## 2019-07-11 PROCEDURE — 77030020257 HC SOL INJ SOD CL 0.45% 1000ML BG

## 2019-07-11 PROCEDURE — 85027 COMPLETE CBC AUTOMATED: CPT

## 2019-07-11 PROCEDURE — 80048 BASIC METABOLIC PNL TOTAL CA: CPT

## 2019-07-11 PROCEDURE — 76376 3D RENDER W/INTRP POSTPROCES: CPT

## 2019-07-11 PROCEDURE — 74011250636 HC RX REV CODE- 250/636: Performed by: NEUROLOGICAL SURGERY

## 2019-07-11 PROCEDURE — C1760 CLOSURE DEV, VASC: HCPCS

## 2019-07-11 PROCEDURE — 36600 WITHDRAWAL OF ARTERIAL BLOOD: CPT

## 2019-07-11 PROCEDURE — C1894 INTRO/SHEATH, NON-LASER: HCPCS

## 2019-07-11 PROCEDURE — 36226 PLACE CATH VERTEBRAL ART: CPT | Performed by: NEUROLOGICAL SURGERY

## 2019-07-11 PROCEDURE — 03LG3DZ OCCLUSION OF INTRACRANIAL ARTERY WITH INTRALUMINAL DEVICE, PERCUTANEOUS APPROACH: ICD-10-PCS | Performed by: NEUROLOGICAL SURGERY

## 2019-07-11 PROCEDURE — 61624 TCAT PERM OCCLS/EMBOLJ CNS: CPT | Performed by: NEUROLOGICAL SURGERY

## 2019-07-11 PROCEDURE — 93886 INTRACRANIAL COMPLETE STUDY: CPT

## 2019-07-11 PROCEDURE — C1769 GUIDE WIRE: HCPCS

## 2019-07-11 PROCEDURE — 77030004566 HC CATH ANGI DX TORCON COOK -B

## 2019-07-11 PROCEDURE — 75898 FOLLOW-UP ANGIOGRAPHY: CPT | Performed by: NEUROLOGICAL SURGERY

## 2019-07-11 PROCEDURE — 99291 CRITICAL CARE FIRST HOUR: CPT | Performed by: NEUROLOGICAL SURGERY

## 2019-07-11 PROCEDURE — 77030020263 HC SOL INJ SOD CL0.9% LFCR 1000ML

## 2019-07-11 PROCEDURE — 74011250637 HC RX REV CODE- 250/637: Performed by: NURSE PRACTITIONER

## 2019-07-11 PROCEDURE — 77030040172 HC SYS EMB LIQ TRUFILL J&J -D

## 2019-07-11 PROCEDURE — 75894 X-RAYS TRANSCATH THERAPY: CPT

## 2019-07-11 PROCEDURE — 75898 FOLLOW-UP ANGIOGRAPHY: CPT

## 2019-07-11 PROCEDURE — 86022 PLATELET ANTIBODIES: CPT

## 2019-07-11 PROCEDURE — 84295 ASSAY OF SERUM SODIUM: CPT

## 2019-07-11 PROCEDURE — 76376 3D RENDER W/INTRP POSTPROCES: CPT | Performed by: NEUROLOGICAL SURGERY

## 2019-07-11 PROCEDURE — 93886 INTRACRANIAL COMPLETE STUDY: CPT | Performed by: PSYCHIATRY & NEUROLOGY

## 2019-07-11 PROCEDURE — 75894 X-RAYS TRANSCATH THERAPY: CPT | Performed by: NEUROLOGICAL SURGERY

## 2019-07-11 PROCEDURE — C1887 CATHETER, GUIDING: HCPCS

## 2019-07-11 PROCEDURE — 99152 MOD SED SAME PHYS/QHP 5/>YRS: CPT

## 2019-07-11 PROCEDURE — 74011250636 HC RX REV CODE- 250/636: Performed by: NURSE PRACTITIONER

## 2019-07-11 PROCEDURE — 77030002916 HC SUT ETHLN J&J -A

## 2019-07-11 PROCEDURE — 65610000001 HC ROOM ICU GENERAL

## 2019-07-11 PROCEDURE — 82803 BLOOD GASES ANY COMBINATION: CPT

## 2019-07-11 PROCEDURE — 74011000258 HC RX REV CODE- 258: Performed by: NEUROLOGICAL SURGERY

## 2019-07-11 PROCEDURE — 74011000258 HC RX REV CODE- 258: Performed by: NURSE PRACTITIONER

## 2019-07-11 PROCEDURE — 84100 ASSAY OF PHOSPHORUS: CPT

## 2019-07-11 PROCEDURE — 99153 MOD SED SAME PHYS/QHP EA: CPT

## 2019-07-11 PROCEDURE — 70450 CT HEAD/BRAIN W/O DYE: CPT

## 2019-07-11 PROCEDURE — 77030040173 HC COIL EMB ORBIT GALAXY J&J -H

## 2019-07-11 PROCEDURE — C1874 STENT, COATED/COV W/DEL SYS: HCPCS

## 2019-07-11 PROCEDURE — C1877 STENT, NON-COAT/COV W/O DEL: HCPCS

## 2019-07-11 PROCEDURE — 74011636320 HC RX REV CODE- 636/320: Performed by: NEUROLOGICAL SURGERY

## 2019-07-11 PROCEDURE — 71045 X-RAY EXAM CHEST 1 VIEW: CPT

## 2019-07-11 PROCEDURE — 74011250637 HC RX REV CODE- 250/637: Performed by: NEUROLOGICAL SURGERY

## 2019-07-11 PROCEDURE — 99232 SBSQ HOSP IP/OBS MODERATE 35: CPT | Performed by: PHYSICAL MEDICINE & REHABILITATION

## 2019-07-11 PROCEDURE — 82962 GLUCOSE BLOOD TEST: CPT

## 2019-07-11 PROCEDURE — 94003 VENT MGMT INPAT SUBQ DAY: CPT

## 2019-07-11 PROCEDURE — 74011250637 HC RX REV CODE- 250/637

## 2019-07-11 PROCEDURE — 83735 ASSAY OF MAGNESIUM: CPT

## 2019-07-11 PROCEDURE — 74011000250 HC RX REV CODE- 250: Performed by: NURSE PRACTITIONER

## 2019-07-11 PROCEDURE — 74011250636 HC RX REV CODE- 250/636

## 2019-07-11 PROCEDURE — 76937 US GUIDE VASCULAR ACCESS: CPT

## 2019-07-11 RX ORDER — LIDOCAINE HYDROCHLORIDE 20 MG/ML
2-20 INJECTION, SOLUTION INFILTRATION; PERINEURAL ONCE
Status: DISCONTINUED | OUTPATIENT
Start: 2019-07-11 | End: 2019-07-11

## 2019-07-11 RX ORDER — PHENYLEPHRINE HCL IN 0.9% NACL 30MG/250ML
10-300 PLASTIC BAG, INJECTION (ML) INTRAVENOUS
Status: DISCONTINUED | OUTPATIENT
Start: 2019-07-11 | End: 2019-07-12

## 2019-07-11 RX ORDER — NOREPINEPHRINE BIT/0.9 % NACL 4MG/250ML
0-30 PLASTIC BAG, INJECTION (ML) INTRAVENOUS
Status: DISCONTINUED | OUTPATIENT
Start: 2019-07-11 | End: 2019-07-12

## 2019-07-11 RX ORDER — MIDAZOLAM HYDROCHLORIDE 1 MG/ML
.5-2 INJECTION, SOLUTION INTRAMUSCULAR; INTRAVENOUS
Status: DISCONTINUED | OUTPATIENT
Start: 2019-07-11 | End: 2019-07-11

## 2019-07-11 RX ORDER — FENTANYL CITRATE 50 UG/ML
25-50 INJECTION, SOLUTION INTRAMUSCULAR; INTRAVENOUS
Status: DISCONTINUED | OUTPATIENT
Start: 2019-07-11 | End: 2019-07-11

## 2019-07-11 RX ORDER — EPTIFIBATIDE 0.75 MG/ML
1-2 INJECTION, SOLUTION INTRAVENOUS CONTINUOUS
Status: DISCONTINUED | OUTPATIENT
Start: 2019-07-11 | End: 2019-07-12

## 2019-07-11 RX ORDER — HEPARIN SODIUM 200 [USP'U]/100ML
1000 INJECTION, SOLUTION INTRAVENOUS AS NEEDED
Status: DISCONTINUED | OUTPATIENT
Start: 2019-07-11 | End: 2019-07-11

## 2019-07-11 RX ORDER — SODIUM BICARBONATE 1 MEQ/ML
50 SYRINGE (ML) INTRAVENOUS ONCE
Status: COMPLETED | OUTPATIENT
Start: 2019-07-11 | End: 2019-07-11

## 2019-07-11 RX ADMIN — FENTANYL CITRATE 50 MCG: 50 INJECTION INTRAMUSCULAR; INTRAVENOUS at 10:27

## 2019-07-11 RX ADMIN — FAMOTIDINE 20 MG: 20 TABLET ORAL at 08:30

## 2019-07-11 RX ADMIN — NIMODIPINE 60 MG: 30 CAPSULE, LIQUID FILLED ORAL at 03:41

## 2019-07-11 RX ADMIN — FENTANYL CITRATE 50 MCG: 50 INJECTION INTRAMUSCULAR; INTRAVENOUS at 21:17

## 2019-07-11 RX ADMIN — FENTANYL CITRATE 50 MCG: 50 INJECTION INTRAMUSCULAR; INTRAVENOUS at 07:38

## 2019-07-11 RX ADMIN — SODIUM CHLORIDE, PRESERVATIVE FREE 300 UNITS: 5 INJECTION INTRAVENOUS at 13:35

## 2019-07-11 RX ADMIN — PROPOFOL 25 MCG/KG/MIN: 10 INJECTION, EMULSION INTRAVENOUS at 22:53

## 2019-07-11 RX ADMIN — HEPARIN SODIUM 2000 UNITS: 200 INJECTION, SOLUTION INTRAVENOUS at 17:51

## 2019-07-11 RX ADMIN — Medication 30 ML: at 06:00

## 2019-07-11 RX ADMIN — Medication 400 MG: at 08:30

## 2019-07-11 RX ADMIN — SODIUM CHLORIDE 100 ML/HR: 900 INJECTION, SOLUTION INTRAVENOUS at 16:08

## 2019-07-11 RX ADMIN — PROPOFOL 15 MCG/KG/MIN: 10 INJECTION, EMULSION INTRAVENOUS at 09:58

## 2019-07-11 RX ADMIN — FENTANYL CITRATE 50 MCG: 50 INJECTION INTRAMUSCULAR; INTRAVENOUS at 15:20

## 2019-07-11 RX ADMIN — NICARDIPINE HYDROCHLORIDE 15 MG/HR: 25 INJECTION INTRAVENOUS at 02:06

## 2019-07-11 RX ADMIN — NICARDIPINE HYDROCHLORIDE 10 MG/HR: 25 INJECTION INTRAVENOUS at 11:15

## 2019-07-11 RX ADMIN — IOPAMIDOL 250 ML: 612 INJECTION, SOLUTION INTRAVENOUS at 19:00

## 2019-07-11 RX ADMIN — HEPARIN SODIUM 5000 UNITS: 5000 INJECTION INTRAVENOUS; SUBCUTANEOUS at 05:27

## 2019-07-11 RX ADMIN — NICARDIPINE HYDROCHLORIDE 15 MG/HR: 25 INJECTION INTRAVENOUS at 23:48

## 2019-07-11 RX ADMIN — HEPARIN SODIUM 2000 UNITS: 200 INJECTION, SOLUTION INTRAVENOUS at 17:45

## 2019-07-11 RX ADMIN — Medication 30 ML: at 13:35

## 2019-07-11 RX ADMIN — NICARDIPINE HYDROCHLORIDE 10 MG/HR: 25 INJECTION INTRAVENOUS at 14:18

## 2019-07-11 RX ADMIN — NICARDIPINE HYDROCHLORIDE 10 MG/HR: 25 INJECTION INTRAVENOUS at 16:48

## 2019-07-11 RX ADMIN — FENTANYL CITRATE 100 MCG: 50 INJECTION, SOLUTION INTRAMUSCULAR; INTRAVENOUS at 17:26

## 2019-07-11 RX ADMIN — NICARDIPINE HYDROCHLORIDE 10 MG/HR: 25 INJECTION INTRAVENOUS at 08:32

## 2019-07-11 RX ADMIN — NICARDIPINE HYDROCHLORIDE 10 MG/HR: 25 INJECTION INTRAVENOUS at 21:38

## 2019-07-11 RX ADMIN — SODIUM CHLORIDE 100 ML/HR: 900 INJECTION, SOLUTION INTRAVENOUS at 06:03

## 2019-07-11 RX ADMIN — MAGNESIUM SULFATE HEPTAHYDRATE 2 G: 40 INJECTION, SOLUTION INTRAVENOUS at 05:28

## 2019-07-11 RX ADMIN — PROPOFOL 100 MCG/KG/MIN: 10 INJECTION, EMULSION INTRAVENOUS at 17:58

## 2019-07-11 RX ADMIN — HEPARIN SODIUM 2000 UNITS: 200 INJECTION, SOLUTION INTRAVENOUS at 16:58

## 2019-07-11 RX ADMIN — ACETAMINOPHEN 650 MG: 325 TABLET, FILM COATED ORAL at 21:32

## 2019-07-11 RX ADMIN — MIDAZOLAM HYDROCHLORIDE 2 MG: 1 INJECTION, SOLUTION INTRAMUSCULAR; INTRAVENOUS at 17:26

## 2019-07-11 RX ADMIN — HEPARIN SODIUM 2000 UNITS: 200 INJECTION, SOLUTION INTRAVENOUS at 18:47

## 2019-07-11 RX ADMIN — NIMODIPINE 60 MG: 30 CAPSULE, LIQUID FILLED ORAL at 07:38

## 2019-07-11 RX ADMIN — ACETAMINOPHEN 650 MG: 325 TABLET, FILM COATED ORAL at 11:15

## 2019-07-11 RX ADMIN — HEPARIN SODIUM 2000 UNITS: 200 INJECTION, SOLUTION INTRAVENOUS at 18:46

## 2019-07-11 RX ADMIN — ATORVASTATIN CALCIUM 40 MG: 40 TABLET, FILM COATED ORAL at 21:33

## 2019-07-11 RX ADMIN — SODIUM CHLORIDE 1000 ML: 450 INJECTION, SOLUTION INTRAVENOUS at 20:29

## 2019-07-11 RX ADMIN — NICARDIPINE HYDROCHLORIDE 10 MG/HR: 25 INJECTION INTRAVENOUS at 05:46

## 2019-07-11 RX ADMIN — FENTANYL CITRATE 50 MCG: 50 INJECTION INTRAMUSCULAR; INTRAVENOUS at 12:43

## 2019-07-11 RX ADMIN — SENNOSIDES, DOCUSATE SODIUM 2 TABLET: 50; 8.6 TABLET, FILM COATED ORAL at 21:33

## 2019-07-11 RX ADMIN — LEVETIRACETAM 500 MG: 100 SOLUTION ORAL at 20:33

## 2019-07-11 RX ADMIN — FENTANYL CITRATE 50 MCG: 50 INJECTION INTRAMUSCULAR; INTRAVENOUS at 03:08

## 2019-07-11 RX ADMIN — FENTANYL CITRATE 50 MCG: 50 INJECTION INTRAMUSCULAR; INTRAVENOUS at 23:50

## 2019-07-11 RX ADMIN — FENTANYL CITRATE 50 MCG: 50 INJECTION INTRAMUSCULAR; INTRAVENOUS at 06:01

## 2019-07-11 RX ADMIN — ACETAMINOPHEN 650 MG: 325 TABLET, FILM COATED ORAL at 16:48

## 2019-07-11 RX ADMIN — ACETAMINOPHEN 650 MG: 325 TABLET, FILM COATED ORAL at 07:37

## 2019-07-11 RX ADMIN — NIMODIPINE 60 MG: 30 CAPSULE, LIQUID FILLED ORAL at 11:15

## 2019-07-11 RX ADMIN — Medication 50 MEQ: at 12:43

## 2019-07-11 RX ADMIN — NIMODIPINE 60 MG: 30 CAPSULE, LIQUID FILLED ORAL at 15:20

## 2019-07-11 RX ADMIN — HEPARIN SODIUM 2000 UNITS: 200 INJECTION, SOLUTION INTRAVENOUS at 17:47

## 2019-07-11 RX ADMIN — FENTANYL CITRATE 100 MCG: 50 INJECTION, SOLUTION INTRAMUSCULAR; INTRAVENOUS at 17:09

## 2019-07-11 RX ADMIN — NIMODIPINE 60 MG: 30 CAPSULE, LIQUID FILLED ORAL at 20:27

## 2019-07-11 RX ADMIN — EPTIFIBATIDE 1 MCG/KG/MIN: 0.75 INJECTION INTRAVENOUS at 18:04

## 2019-07-11 RX ADMIN — NICARDIPINE HYDROCHLORIDE 15 MG/HR: 25 INJECTION INTRAVENOUS at 03:52

## 2019-07-11 RX ADMIN — NIMODIPINE 60 MG: 30 CAPSULE, LIQUID FILLED ORAL at 00:16

## 2019-07-11 RX ADMIN — SODIUM CHLORIDE 500 MG: 900 INJECTION, SOLUTION INTRAVENOUS at 08:30

## 2019-07-11 RX ADMIN — NIMODIPINE 60 MG: 30 CAPSULE, LIQUID FILLED ORAL at 23:02

## 2019-07-11 RX ADMIN — HEPARIN SODIUM 5000 UNITS: 5000 INJECTION INTRAVENOUS; SUBCUTANEOUS at 13:35

## 2019-07-11 RX ADMIN — SODIUM CHLORIDE, PRESERVATIVE FREE 900 UNITS: 5 INJECTION INTRAVENOUS at 05:28

## 2019-07-11 RX ADMIN — HEPARIN SODIUM 2000 UNITS: 200 INJECTION, SOLUTION INTRAVENOUS at 17:50

## 2019-07-11 RX ADMIN — HEPARIN SODIUM 5000 UNITS: 5000 INJECTION INTRAVENOUS; SUBCUTANEOUS at 21:33

## 2019-07-11 RX ADMIN — PROPOFOL 30 MCG/KG/MIN: 10 INJECTION, EMULSION INTRAVENOUS at 02:06

## 2019-07-11 RX ADMIN — HEPARIN SODIUM 2000 UNITS: 200 INJECTION, SOLUTION INTRAVENOUS at 17:48

## 2019-07-11 NOTE — PROGRESS NOTES
Progress Note    Patient: Jocelyn Sykes MRN: [id number]  SSN: [id number]    YOB: 1975  Age: 37 y.o. Sex: male      Admit Date: 7/9/2019    LOS: 2 days     Subjective:   No acute changes. Pt on minimal sedation, propofol @15mcg/min  Pt will open eyes to voice and follow commands all extrems. Drowsy. EVD at 10/open.      Current Facility-Administered Medications   Medication Dose Route Frequency    levETIRAcetam (KEPPRA) oral solution 500 mg  500 mg Per G Tube Q12H    propofol (DIPRIVAN) infusion  0-50 mcg/kg/min IntraVENous TITRATE    niMODipine (NIMOTOP) 30 mg/mL oral solution (compound) 60 mg  60 mg Per NG tube Q4H    magnesium oxide (MAG-OX) tablet 400 mg  400 mg Per NG tube BID    senna-docusate (PERICOLACE) 8.6-50 mg per tablet 2 Tab  2 Tab Per NG tube QHS    atorvastatin (LIPITOR) tablet 40 mg  40 mg Per NG tube QHS    famotidine (PEPCID) tablet 20 mg  20 mg Per OG Tube DAILY    heparin (porcine) injection 5,000 Units  5,000 Units SubCUTAneous Q8H    sodium chloride (NS) flush 30 mL  30 mL InterCATHeter Q8H    heparin (porcine) pf 900 Units  900 Units InterCATHeter Q8H    sodium chloride (NS) flush 30 mL  30 mL InterCATHeter PRN    heparin (porcine) pf 900 Units  900 Units InterCATHeter PRN    nicotine (NICODERM CQ) 21 mg/24 hr patch 1 Patch  1 Patch TransDERmal Q24H    acetaminophen (TYLENOL) tablet 650 mg  650 mg Per NG tube Q4H PRN    ondansetron (ZOFRAN) injection 4 mg  4 mg IntraVENous Q6H PRN    NUTRITIONAL SUPPORT ELECTROLYTE PRN ORDERS   Does Not Apply PRN    0.9% sodium chloride infusion  100 mL/hr IntraVENous CONTINUOUS    magnesium sulfate 4 g/100 mL IVPB  4 g IntraVENous DAILY PRN    magnesium sulfate 2 g/50 ml IVPB (premix or compounded)  2 g IntraVENous DAILY PRN    fentaNYL citrate (PF) injection 50 mcg  50 mcg IntraVENous Q2H PRN    niCARdipine in Saline (CARDENE) 25 MG/250 mL infusion kit  0-15 mg/hr IntraVENous TITRATE       Objective:     Vitals: 07/11/19 0945 07/11/19 1000 07/11/19 1015 07/11/19 1030   BP:  129/62     Pulse: 86 97 (!) 101 97   Resp: 14 13 13 14   Temp: 99.6 °F (37.6 °C) 99.6 °F (37.6 °C) 99.6 °F (37.6 °C) 99.5 °F (37.5 °C)   SpO2: 100% 100% 100% 100%   Weight:       Height:             Intake and Output:  Current Shift: 07/11 0701 - 07/11 1900  In: 230   Out: 560 [Urine:490; Drains:70]  Last 24 hr: 07/10 0701 - 07/11 0700  In: 9239.4 [I.V.:8687.4]  Out: 4974 [Urine:3410; Drains:234]     IO: +5595/24hr  TOTAL: +5159/overall  EVD: 234/24hr. Physical Exam:   General:  Drowsy. Intubated/EVD. Follows commands. Eyes:  PERRL+3, midline. +corneal.   Neck: Supple, symmetrical, trachea midline, no adenopathy, thyroid: no enlargment/tenderness/nodules, no carotid bruit and no JVD. Lungs:   Clear to auscultation bilaterally. Heart:  Regular rate and rhythm, S1, S2 normal, no murmur, click, rub or gallop. Abdomen:   Soft, non-tender. Bowel sounds normal. No masses,  No organomegaly. Extremities: Extremities normal, atraumatic, no cyanosis or edema. Pulses: 2+ and symmetric all extremities. Skin: Skin color, texture, turgor normal. No rashes or lesions   Neurologic: Drowsy, but will open eyes and follow commands all extrems. EVD at 10/open. Lab/Data Review:    Recent Results (from the past 12 hour(s))   SODIUM    Collection Time: 07/11/19 12:19 AM   Result Value Ref Range    Sodium 154 (H) 136 - 145 mmol/L   POC G3    Collection Time: 07/11/19  3:25 AM   Result Value Ref Range    Device: VENT      FIO2 (POC) 40 %    pH (POC) 7.295 (L) 7.35 - 7.45      pCO2 (POC) 40.6 35 - 45 MMHG    pO2 (POC) 180 (H) 75 - 100 MMHG    HCO3 (POC) 19.8 (L) 22 - 26 MMOL/L    sO2 (POC) 99 (H) 95 - 98 %    Base deficit (POC) 6 mmol/L    Mode Pressure regulated volume control      Tidal volume 500 ml    Set Rate 14 bpm    PEEP/CPAP (POC) 8 cmH2O    Allens test (POC) NOT APPLICABLE      Site DRAWN FROM ARTERIAL LINE      Patient temp.  98.6      Specimen type (POC) ARTERIAL      Performed by Price     CO2, POC 21 MMOL/L    Respiratory comment: NurseNotified     Exhaled minute volume 8.50 L/min    COLLECT TIME 398     METABOLIC PANEL, BASIC    Collection Time: 07/11/19  3:43 AM   Result Value Ref Range    Sodium 152 (H) 136 - 145 mmol/L    Potassium 3.9 3.5 - 5.1 mmol/L    Chloride 123 (H) 98 - 107 mmol/L    CO2 21 21 - 32 mmol/L    Anion gap 8 7 - 16 mmol/L    Glucose 125 (H) 65 - 100 mg/dL    BUN 28 (H) 6 - 23 MG/DL    Creatinine 2.73 (H) 0.8 - 1.5 MG/DL    GFR est AA 33 (L) >60 ml/min/1.73m2    GFR est non-AA 27 (L) >60 ml/min/1.73m2    Calcium 8.1 (L) 8.3 - 10.4 MG/DL   MAGNESIUM    Collection Time: 07/11/19  3:43 AM   Result Value Ref Range    Magnesium 2.3 1.8 - 2.4 mg/dL   CBC W/O DIFF    Collection Time: 07/11/19  3:43 AM   Result Value Ref Range    WBC 13.5 (H) 4.3 - 11.1 K/uL    RBC 3.33 (L) 4.23 - 5.6 M/uL    HGB 9.6 (L) 13.6 - 17.2 g/dL    HCT 30.5 (L) 41.1 - 50.3 %    MCV 91.6 79.6 - 97.8 FL    MCH 28.8 26.1 - 32.9 PG    MCHC 31.5 31.4 - 35.0 g/dL    RDW 13.1 11.9 - 14.6 %    PLATELET 137 (L) 072 - 450 K/uL    MPV 10.8 9.4 - 12.3 FL    ABSOLUTE NRBC 0.00 0.0 - 0.2 K/uL   GLUCOSE, POC    Collection Time: 07/11/19 10:01 AM   Result Value Ref Range    Glucose (POC) 134 (H) 65 - 100 mg/dL   SODIUM    Collection Time: 07/11/19 10:05 AM   Result Value Ref Range    Sodium 150 (H) 136 - 145 mmol/L   PHOSPHORUS    Collection Time: 07/11/19 10:05 AM   Result Value Ref Range    Phosphorus 3.8 2.5 - 4.5 MG/DL       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 (Formerly [facility]) (7/10/2019)    Active Problems:    Tobacco abuse (7/10/2019)      Acute respiratory failure with hypoxia ([facility]) (7/10/2019)      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 Q1 hour neuro checks. On SAH protocol - Mg, nimotop, zocor. PERRL +3. MAP goal 70-90. Patient got drowsy and repeat CT head showed increased ventricles and communicating hydrocephalus. EVD placed emergently. EVD set to 15cm H20. CT head this am showed continued communicating hydrocephalus. EVD taken to 10cm H20. Plan is stent assisted coiling tomorrow. I spoke with the family about the CTA results and that he has a large basilar aneurysm with a wide neck. I discussed the procedure as well the risks, benefits and alternatives. I also discussed the HUD devices. Consent for the procedure and the HUD devise was obtained. Their questions were answered. TCDs daily and show Peak velocities are already increased. Will follow trend. Procedure today at 5PM.  RESP: Intubated PRVC @40%. AB.2/40/180. Will give one amp bicarb. CV: MAP GOAL 70-90, cardene gtt infusing wo difficulty. 2D Echo: 55-60%, trop peaked @ 0.8, trending down. SCD/SQ heparin. HEME: 9.6/30, . HIT panel sent. HH drop most likely dilutional from IVF's. Will monitor. NEPH: bun/cr:28/2.7. Will send Phos, monitor. GI: Tube feeds held for procedure today, pepcid, OGT. ID: Tm: [id number]  no abx. 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 Tylenol PRN. LINES: IV x2, RUE PICC, phelps, R EVD. Tobacco abuse: On Nicoderm patch.     Patient is critical due to [facility], risk of rebleeding, vasospasm, acute respiratory failure, communicating hydrocephalus, and kidney disease. I spent 38 minutes at bedside doing critical care examining the patient, reviewing labs and films, correcting care and updating the family.       Signed By: Erica Grullon NP     2019

## 2019-07-11 NOTE — PROGRESS NOTES
Bedside and Verbal shift change report given to Dylon Dye RN (oncoming nurse) by Michael Bassett RN (offgoing nurse). Report included the following information SBAR, Kardex, ED Summary, Procedure Summary, Intake/Output, MAR, Recent Results and Cardiac Rhythm NSR/ST. Dual skin assessment reviewed. Dual neuro check complete. Pt lightly sedated and intubated. Diprivan gtt stopped to obtain more accurate exam.  Pt opens eyes to voice and will follow commands. Pupils 3mm, round and brisk. Pt able to track/focus. Right arm and Right leg mild drift present. NIH 4.  EVD drain leveled at 15 cm H2O above the tragus, CSF serosanguinous in appearance. Pt denies any pain but acknowledges his throat is sore from ETT. Pt becomes more restless without sedation and not in sync with vent. Will restart sedation at decreased rate to monitor neuro status more accurately and use fentanyl prn for comfort.

## 2019-07-11 NOTE — PROGRESS NOTES
Ventilator check complete; patient has a #7.5 ET tube secured at the 25 at the lip. Patient is sedated. Patient is not able to follow commands. Breath sounds are coarse. Trachea is midline, Negative for subcutaneous air, and chest excursion is symmetric. Patient is also Negative for cyanosis and is Negative for pitting edema. All alarms are set and audible. Resuscitation bag is at the head of the bed.       Ventilator Settings  Mode FIO2 Rate Tidal Volume Pressure PEEP I:E Ratio   PRVC  60 %    500 ml     8 cm H20  1:2      Peak airway pressure: 15.6 cm H2O   Minute ventilation: 9.4 l/min       Louis Polanco

## 2019-07-11 NOTE — OP NOTES
Procedure: Stent Assisted Coiling of Basilar Tip Aneurysm  Surgeon: Dr. 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

## 2019-07-11 NOTE — PROGRESS NOTES
Hold today. Going for intervention this afternoon. Will check on him in the morning.   Vika Zhong, PT

## 2019-07-11 NOTE — PROGRESS NOTES
PM&R Consult Progress Note      Patient: Maritza Aiken  Admit Date: 7/9/2019  Admit Diagnosis: SAH (subarachnoid hemorrhage) ([facility] [address] [I60.9]  Recommendations: Continue Acute Rehab Program, Coordination of rehab/medical care, Counseling of PM & R care issues management  -endovascular intervention later today. Mariola equally and fcs when sedation off. Has had increasing ICP with Hydrocephalus; EVD at 10 above tragus. -PT/OT/ST when medically cleared  -good rehab potential.  History/Subjective/Complaint:     Patient seen and examined. Records reviewed. Awake alert and fcs. A bit restless pulling up arms against wrist restraints    Pain 1  Pain Scale 1: Adult Nonverbal Pain Scale (07/11/19 0700)  Pain Intensity 1: 0 (07/11/19 0700)  Patient Stated Pain Goal: Unable to verbalize/indicate pain (07/11/19 0400)  Pain Reassessment 1: Yes (07/10/19 1847)  Pain Onset 1: since admit (07/09/19 2000)  Pain Location 1: Head (07/10/19 0002)  Pain Orientation 1: Posterior (07/10/19 0002)  Pain Description 1: Aching;Constant; Sore (07/10/19 0002)  Pain Intervention(s) 1: Medication (see MAR) (07/10/19 1822)     Objective:     Vitals:  Patient Vitals for the past 8 hrs:   BP Temp Pulse Resp SpO2   07/11/19 1030 -- 99.5 °F (37.5 °C) 97 14 100 %   07/11/19 1015 -- 99.6 °F (37.6 °C) (!) 101 13 100 %   07/11/19 1000 129/62 99.6 °F (37.6 °C) 97 13 100 %   07/11/19 0945 -- 99.6 °F (37.6 °C) 86 14 100 %   07/11/19 0930 -- 99.8 °F (37.7 °C) 84 13 100 %   07/11/19 0915 -- 99.8 °F (37.7 °C) 84 13 100 %   07/11/19 0900 120/59 100 °F (37.8 °C) 83 13 100 %   07/11/19 0845 -- 100 °F (37.8 °C) 81 15 100 %   07/11/19 0830 -- 100.2 °F (37.9 °C) 85 14 100 %   07/11/19 0815 -- 100.2 °F (37.9 °C) 94 12 100 %   07/11/19 0810 -- -- 96 10 100 %   07/11/19 0800 123/59 100.2 °F (37.9 °C) 84 20 100 %   07/11/19 0745 -- 100 °F (37.8 °C) 94 14 100 %   07/11/19 0730 -- 100 °F (37.8 °C) (!) 111 15 100 %   07/11/19 0715 -- 100.2 °F (37.9 °C) (!) 111 12 100 % 07/11/19 0700 134/64 100.2 °F (37.9 °C) (!) 106 30 100 %   07/11/19 0645 -- 100.2 °F (37.9 °C) 85 16 100 %   07/11/19 0630 -- 100.4 °F (38 °C) 85 14 100 %   07/11/19 0615 -- 100.4 °F (38 °C) 85 14 100 %   07/11/19 0600 133/64 100.4 °F (38 °C) 98 16 100 %   07/11/19 0552 -- 100.4 °F (38 °C) 89 17 100 %   07/11/19 0545 -- 100.4 °F (38 °C) 88 14 100 %   07/11/19 0536 -- 100.4 °F (38 °C) 91 12 100 %   07/11/19 0530 -- 100.4 °F (38 °C) 92 10 100 %   07/11/19 0520 -- 100.4 °F (38 °C) 93 13 100 %   07/11/19 0515 -- 100.4 °F (38 °C) 93 12 100 %   07/11/19 0504 -- 100.4 °F (38 °C) (!) 104 13 100 %   07/11/19 0500 138/62 100.4 °F (38 °C) (!) 108 22 100 %   07/11/19 0450 140/63 -- (!) 121 10 100 %   07/11/19 0415 -- 100.4 °F (38 °C) 88 14 100 %   07/11/19 0400 118/60 100.2 °F (37.9 °C) 90 10 100 %   07/11/19 0345 -- 100.2 °F (37.9 °C) 93 10 100 %   07/11/19 0330 -- 100 °F (37.8 °C) 97 13 100 %   07/11/19 0321 -- -- (!) 102 12 100 %   07/11/19 0320 -- -- (!) 106 11 100 %   07/11/19 0315 -- 100.2 °F (37.9 °C) (!) 125 19 99 %      Intake and Output:  07/09 1901 - 07/11 0700  In: 19009.4 [I.V.:40588.4]  Out: 7073 [Urine:6355; Drains:280]    No Known Allergies  Current Facility-Administered Medications   Medication Dose Route Frequency    levETIRAcetam (KEPPRA) oral solution 500 mg  500 mg Per G Tube Q12H    propofol (DIPRIVAN) infusion  0-50 mcg/kg/min IntraVENous TITRATE    niMODipine (NIMOTOP) 30 mg/mL oral solution (compound) 60 mg  60 mg Per NG tube Q4H    magnesium oxide (MAG-OX) tablet 400 mg  400 mg Per NG tube BID    senna-docusate (PERICOLACE) 8.6-50 mg per tablet 2 Tab  2 Tab Per NG tube QHS    atorvastatin (LIPITOR) tablet 40 mg  40 mg Per NG tube QHS    famotidine (PEPCID) tablet 20 mg  20 mg Per OG Tube DAILY    heparin (porcine) injection 5,000 Units  5,000 Units SubCUTAneous Q8H    sodium chloride (NS) flush 30 mL  30 mL InterCATHeter Q8H    heparin (porcine) pf 900 Units  900 Units InterCATHeter Q8H    sodium chloride (NS) flush 30 mL  30 mL InterCATHeter PRN    heparin (porcine) pf 900 Units  900 Units InterCATHeter PRN    nicotine (NICODERM CQ) 21 mg/24 hr patch 1 Patch  1 Patch TransDERmal Q24H    acetaminophen (TYLENOL) tablet 650 mg  650 mg Per NG tube Q4H PRN    ondansetron (ZOFRAN) injection 4 mg  4 mg IntraVENous Q6H PRN    NUTRITIONAL SUPPORT ELECTROLYTE PRN ORDERS   Does Not Apply PRN    0.9% sodium chloride infusion  100 mL/hr IntraVENous CONTINUOUS    magnesium sulfate 4 g/100 mL IVPB  4 g IntraVENous DAILY PRN    magnesium sulfate 2 g/50 ml IVPB (premix or compounded)  2 g IntraVENous DAILY PRN    fentaNYL citrate (PF) injection 50 mcg  50 mcg IntraVENous Q2H PRN    niCARdipine in Saline (CARDENE) 25 MG/250 mL infusion kit  0-15 mg/hr IntraVENous TITRATE       Physical Exam:  Awake , alert.  Intubated  Opens eyes and attends briefly to examiner  PERRLA 3mm  Mariola on command without focal weakness  CV rrr no m  LUNGS cta b  ABD soft ntnd bs +  EXT no edema      Labs/Studies:  Recent Results (from the past 72 hour(s))   DRUG SCREEN, URINE    Collection Time: 07/09/19  7:31 PM   Result Value Ref Range    PCP(PHENCYCLIDINE) NEGATIVE       BENZODIAZEPINES NEGATIVE       COCAINE NEGATIVE       AMPHETAMINES NEGATIVE       METHADONE NEGATIVE       THC (TH-CANNABINOL) NEGATIVE       OPIATES NEGATIVE       BARBITURATES NEGATIVE      CBC W/O DIFF    Collection Time: 07/09/19  7:38 PM   Result Value Ref Range    WBC 18.2 (H) 4.3 - 11.1 K/uL    RBC 4.21 (L) 4.23 - 5.6 M/uL    HGB 12.3 (L) 13.6 - 17.2 g/dL    HCT 37.7 (L) 41.1 - 50.3 %    MCV 89.5 79.6 - 97.8 FL    MCH 29.2 26.1 - 32.9 PG    MCHC 32.6 31.4 - 35.0 g/dL    RDW 12.4 11.9 - 14.6 %    PLATELET 099 739 - 311 K/uL    MPV 11.2 9.4 - 12.3 FL    ABSOLUTE NRBC 0.00 0.0 - 0.2 K/uL   METABOLIC PANEL, BASIC    Collection Time: 07/09/19  7:38 PM   Result Value Ref Range    Sodium 137 136 - 145 mmol/L    Potassium 4.4 3.5 - 5.1 mmol/L    Chloride 111 (H) 98 - 107 mmol/L    CO2 20 (L) 21 - 32 mmol/L    Anion gap 6 (L) 7 - 16 mmol/L    Glucose 121 (H) 65 - 100 mg/dL    BUN 48 (H) 6 - 23 MG/DL    Creatinine 3.15 (H) 0.8 - 1.5 MG/DL    GFR est AA 28 (L) >60 ml/min/1.73m2    GFR est non-AA 23 (L) >60 ml/min/1.73m2    Calcium 8.6 8.3 - 10.4 MG/DL   MAGNESIUM    Collection Time: 07/09/19  7:38 PM   Result Value Ref Range    Magnesium 2.2 1.8 - 2.4 mg/dL   CK    Collection Time: 07/09/19  7:38 PM   Result Value Ref Range     21 - 215 U/L   TROPONIN I    Collection Time: 07/09/19  7:38 PM   Result Value Ref Range    Troponin-I, Qt. 0.08 (H) 0.02 - 0.05 NG/ML   LIPID PANEL    Collection Time: 07/09/19  7:38 PM   Result Value Ref Range    LIPID PROFILE          Cholesterol, total 220 (H) <200 MG/DL    Triglyceride 142 35 - 150 MG/DL    HDL Cholesterol 37 (L) 40 - 60 MG/DL    LDL, calculated 154.6 (H) <100 MG/DL    VLDL, calculated 28.4 (H) 6.0 - 23.0 MG/DL    CHOL/HDL Ratio 5.9     HEMOGLOBIN A1C WITH EAG    Collection Time: 07/09/19  7:38 PM   Result Value Ref Range    Hemoglobin A1c 5.5 4.8 - 6.0 %    Est. average glucose 111 mg/dL   HEPATIC FUNCTION PANEL    Collection Time: 07/09/19  7:38 PM   Result Value Ref Range    Protein, total 6.7 6.3 - 8.2 g/dL    Albumin 3.4 (L) 3.5 - 5.0 g/dL    Globulin 3.3 2.3 - 3.5 g/dL    A-G Ratio 1.0 (L) 1.2 - 3.5      Bilirubin, total 0.2 0.2 - 1.1 MG/DL    Bilirubin, direct <0.1 <0.4 MG/DL    Alk.  phosphatase 92 50 - 136 U/L    AST (SGOT) 11 (L) 15 - 37 U/L    ALT (SGPT) 9 (L) 12 - 65 U/L   SODIUM    Collection Time: 07/09/19  7:38 PM   Result Value Ref Range    Sodium 139 136 - 245 mmol/L   METABOLIC PANEL, BASIC    Collection Time: 07/09/19 11:08 PM   Result Value Ref Range    Sodium 145 136 - 145 mmol/L    Potassium 4.7 3.5 - 5.1 mmol/L    Chloride 119 (H) 98 - 107 mmol/L    CO2 19 (L) 21 - 32 mmol/L    Anion gap 7 7 - 16 mmol/L    Glucose 110 (H) 65 - 100 mg/dL    BUN 40 (H) 6 - 23 MG/DL    Creatinine 2.99 (H) 0.8 - 1.5 MG/DL GFR est AA 29 (L) >60 ml/min/1.73m2    GFR est non-AA 24 (L) >60 ml/min/1.73m2    Calcium 8.0 (L) 8.3 - 10.4 MG/DL   EKG, 12 LEAD, INITIAL    Collection Time: 07/09/19 11:50 PM   Result Value Ref Range    Ventricular Rate 86 BPM    Atrial Rate 86 BPM    P-R Interval 186 ms    QRS Duration 72 ms    Q-T Interval 354 ms    QTC Calculation (Bezet) 423 ms    Calculated P Axis 77 degrees    Calculated R Axis 44 degrees    Calculated T Axis 60 degrees    Diagnosis       Normal sinus rhythm    Confirmed by [facility]  MD ()TEE ([id number]) on 7/10/2019 7:39:41 AM     POC G3    Collection Time: 07/10/19  3:09 AM   Result Value Ref Range    Device: ROOM AIR      FIO2 (POC) 21 %    pH (POC) 7.277 (L) 7.35 - 7.45      pCO2 (POC) 33.0 (L) 35 - 45 MMHG    pO2 (POC) 85 75 - 100 MMHG    HCO3 (POC) 15.3 (L) 22 - 26 MMOL/L    sO2 (POC) 95 95 - 98 %    Base deficit (POC) 10 mmol/L    Allens test (POC) NOT APPLICABLE      Site DRAWN FROM ARTERIAL LINE      Patient temp. 99.1      Specimen type (POC) ARTERIAL      Performed by StoddardBrandonRT     CO2, POC 16 MMOL/L    Respiratory comment: NurseNotified     COLLECT TIME 304     POC G3    Collection Time: 07/10/19  4:58 AM   Result Value Ref Range    Device: VENT      FIO2 (POC) 40 %    pH (POC) 7.197 (LL) 7.35 - 7.45      pCO2 (POC) 44.6 35 - 45 MMHG    pO2 (POC) 175 (H) 75 - 100 MMHG    HCO3 (POC) 17.3 (L) 22 - 26 MMOL/L    sO2 (POC) 99 (H) 95 - 98 %    Base deficit (POC) 10 mmol/L    Mode Pressure regulated volume control      Tidal volume 500 ml    Set Rate 12 bpm    PEEP/CPAP (POC) 5 cmH2O    Allens test (POC) NOT APPLICABLE      Inspiratory Time 1.46 sec    Site DRAWN FROM ARTERIAL LINE      Patient temp.  98.6      Specimen type (POC) ARTERIAL      Performed by StoddardBrandonRT     CO2, POC 19 MMOL/L    Critical value read back 04:59     Respiratory comment: PhysicianNotified     Exhaled minute volume 6.00 L/min    COLLECT TIME 519     METABOLIC PANEL, BASIC    Collection Time: 07/10/19  5:16 AM   Result Value Ref Range    Sodium 154 (H) 136 - 145 mmol/L    Potassium 4.6 3.5 - 5.1 mmol/L    Chloride 123 (H) 98 - 107 mmol/L    CO2 26 21 - 32 mmol/L    Anion gap 5 (L) 7 - 16 mmol/L    Glucose 135 (H) 65 - 100 mg/dL    BUN 37 (H) 6 - 23 MG/DL    Creatinine 2.83 (H) 0.8 - 1.5 MG/DL    GFR est AA 31 (L) >60 ml/min/1.73m2    GFR est non-AA 26 (L) >60 ml/min/1.73m2    Calcium 7.5 (L) 8.3 - 10.4 MG/DL   MAGNESIUM    Collection Time: 07/10/19  5:16 AM   Result Value Ref Range    Magnesium 2.6 (H) 1.8 - 2.4 mg/dL   PROTHROMBIN TIME + INR    Collection Time: 07/10/19  5:16 AM   Result Value Ref Range    Prothrombin time 13.4 11.7 - 14.5 sec    INR 1.0     CBC W/O DIFF    Collection Time: 07/10/19  5:16 AM   Result Value Ref Range    WBC 13.8 (H) 4.3 - 11.1 K/uL    RBC 3.43 (L) 4.23 - 5.6 M/uL    HGB 10.1 (L) 13.6 - 17.2 g/dL    HCT 30.6 (L) 41.1 - 50.3 %    MCV 89.2 79.6 - 97.8 FL    MCH 29.4 26.1 - 32.9 PG    MCHC 33.0 31.4 - 35.0 g/dL    RDW 12.6 11.9 - 14.6 %    PLATELET 114 328 - 822 K/uL    MPV 11.0 9.4 - 12.3 FL    ABSOLUTE NRBC 0.00 0.0 - 0.2 K/uL   TROPONIN I    Collection Time: 07/10/19  5:16 AM   Result Value Ref Range    Troponin-I, Qt. 0.08 (H) 0.02 - 0.05 NG/ML   SODIUM    Collection Time: 07/10/19  8:54 AM   Result Value Ref Range    Sodium 149 (H) 136 - 145 mmol/L   PROCALCITONIN    Collection Time: 07/10/19  8:54 AM   Result Value Ref Range    Procalcitonin 0.1 ng/mL   TROPONIN I    Collection Time: 07/10/19  8:55 AM   Result Value Ref Range    Troponin-I, Qt. 0.07 (H) 0.02 - 0.05 NG/ML   POC G3    Collection Time: 07/10/19  9:01 AM   Result Value Ref Range    Device: VENT      FIO2 (POC) 40 %    pH (POC) 7.256 (L) 7.35 - 7.45      pCO2 (POC) 41.6 35 - 45 MMHG    pO2 (POC) 163 (H) 75 - 100 MMHG    HCO3 (POC) 18.5 (L) 22 - 26 MMOL/L    sO2 (POC) 99 (H) 95 - 98 %    Base deficit (POC) 8 mmol/L    Mode Pressure regulated volume control      Tidal volume 500 ml    Set Rate 12 bpm PEEP/CPAP (POC) 8 cmH2O    Allens test (POC) NO      Site DRAWN FROM ARTERIAL LINE      Patient temp. 98.6      Specimen type (POC) ARTERIAL      Performed by LizakMichaelRT     CO2, POC 20 MMOL/L    Critical value read back 00:02     Exhaled minute volume 6.10 L/min    COLLECT TIME 474     METABOLIC PANEL, BASIC    Collection Time: 07/10/19 11:24 AM   Result Value Ref Range    Sodium 152 (H) 136 - 145 mmol/L    Potassium 3.6 3.5 - 5.1 mmol/L    Chloride 125 (H) 98 - 107 mmol/L    CO2 19 (L) 21 - 32 mmol/L    Anion gap 8 7 - 16 mmol/L    Glucose 96 65 - 100 mg/dL    BUN 32 (H) 6 - 23 MG/DL    Creatinine 2.46 (H) 0.8 - 1.5 MG/DL    GFR est AA 37 (L) >60 ml/min/1.73m2    GFR est non-AA 30 (L) >60 ml/min/1.73m2    Calcium 6.8 (L) 8.3 - 10.4 MG/DL   POC G3    Collection Time: 07/10/19 11:26 AM   Result Value Ref Range    Device: VENT      FIO2 (POC) 40 %    pH (POC) 7.276 (L) 7.35 - 7.45      pCO2 (POC) 41.2 35 - 45 MMHG    pO2 (POC) 166 (H) 75 - 100 MMHG    HCO3 (POC) 19.2 (L) 22 - 26 MMOL/L    sO2 (POC) 99 (H) 95 - 98 %    Base deficit (POC) 7 mmol/L    Mode Pressure regulated volume control      Tidal volume 500 ml    Set Rate 14 bpm    PEEP/CPAP (POC) 8 cmH2O    Allens test (POC) NO      Site DRAWN FROM ARTERIAL LINE      Patient temp.  98.6      Specimen type (POC) ARTERIAL      Performed by LizakMichaelRT     CO2, POC 20 MMOL/L    Flow rate (POC) 1,122.000 L/min    Critical value read back 00:02     Exhaled minute volume 7.10 L/min   SODIUM    Collection Time: 07/10/19  5:52 PM   Result Value Ref Range    Sodium 154 (H) 136 - 145 mmol/L   POC G3    Collection Time: 07/10/19  7:16 PM   Result Value Ref Range    Device: VENT      FIO2 (POC) 40 %    pH (POC) 7.309 (L) 7.35 - 7.45      pCO2 (POC) 41.4 35 - 45 MMHG    pO2 (POC) 170 (H) 75 - 100 MMHG    HCO3 (POC) 20.8 (L) 22 - 26 MMOL/L    sO2 (POC) 99 (H) 95 - 98 %    Base deficit (POC) 5 mmol/L    Mode Pressure regulated volume control      Tidal volume 500 ml    Set Rate 14 bpm    PEEP/CPAP (POC) 8 cmH2O    Allens test (POC) NOT APPLICABLE      Inspiratory Time 1.43 sec    Site DRAWN FROM ARTERIAL LINE      Patient temp. 98.6      Specimen type (POC) ARTERIAL      Performed by Vicki     CO2, POC 22 MMOL/L    Critical value read back 19:17     Respiratory comment: NurseNotified     Exhaled minute volume 7.10 L/min    COLLECT TIME 1,910     SODIUM    Collection Time: 07/11/19 12:19 AM   Result Value Ref Range    Sodium 154 (H) 136 - 145 mmol/L   POC G3    Collection Time: 07/11/19  3:25 AM   Result Value Ref Range    Device: VENT      FIO2 (POC) 40 %    pH (POC) 7.295 (L) 7.35 - 7.45      pCO2 (POC) 40.6 35 - 45 MMHG    pO2 (POC) 180 (H) 75 - 100 MMHG    HCO3 (POC) 19.8 (L) 22 - 26 MMOL/L    sO2 (POC) 99 (H) 95 - 98 %    Base deficit (POC) 6 mmol/L    Mode Pressure regulated volume control      Tidal volume 500 ml    Set Rate 14 bpm    PEEP/CPAP (POC) 8 cmH2O    Allens test (POC) NOT APPLICABLE      Site DRAWN FROM ARTERIAL LINE      Patient temp.  98.6      Specimen type (POC) ARTERIAL      Performed by Price     CO2, POC 21 MMOL/L    Respiratory comment: NurseNotified     Exhaled minute volume 8.50 L/min    COLLECT TIME 060     METABOLIC PANEL, BASIC    Collection Time: 07/11/19  3:43 AM   Result Value Ref Range    Sodium 152 (H) 136 - 145 mmol/L    Potassium 3.9 3.5 - 5.1 mmol/L    Chloride 123 (H) 98 - 107 mmol/L    CO2 21 21 - 32 mmol/L    Anion gap 8 7 - 16 mmol/L    Glucose 125 (H) 65 - 100 mg/dL    BUN 28 (H) 6 - 23 MG/DL    Creatinine 2.73 (H) 0.8 - 1.5 MG/DL    GFR est AA 33 (L) >60 ml/min/1.73m2    GFR est non-AA 27 (L) >60 ml/min/1.73m2    Calcium 8.1 (L) 8.3 - 10.4 MG/DL   MAGNESIUM    Collection Time: 07/11/19  3:43 AM   Result Value Ref Range    Magnesium 2.3 1.8 - 2.4 mg/dL   CBC W/O DIFF    Collection Time: 07/11/19  3:43 AM   Result Value Ref Range    WBC 13.5 (H) 4.3 - 11.1 K/uL    RBC 3.33 (L) 4.23 - 5.6 M/uL    HGB 9.6 (L) 13.6 - 17.2 g/dL    HCT 30.5 (L) 41.1 - 50.3 %    MCV 91.6 79.6 - 97.8 FL    MCH 28.8 26.1 - 32.9 PG    MCHC 31.5 31.4 - 35.0 g/dL    RDW 13.1 11.9 - 14.6 %    PLATELET 311 (L) 002 - 450 K/uL    MPV 10.8 9.4 - 12.3 FL    ABSOLUTE NRBC 0.00 0.0 - 0.2 K/uL   GLUCOSE, POC    Collection Time: 07/11/19 10:01 AM   Result Value Ref Range    Glucose (POC) 134 (H) 65 - 100 mg/dL   SODIUM    Collection Time: 07/11/19 10:05 AM   Result Value Ref Range    Sodium 150 (H) 136 - 145 mmol/L   PHOSPHORUS    Collection Time: 07/11/19 10:05 AM   Result Value Ref Range    Phosphorus 3.8 2.5 - 4.5 MG/DL        Assessment:     Principal Problem:    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)    Active Problems:    Tobacco abuse (7/10/2019)      Acute respiratory failure with hypoxia (HCC) (7/10/2019)      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

## 2019-07-11 NOTE — PROGRESS NOTES
Informed DEANNE Khalil of AM CT Head results. No new orders received. Also informed [facility], NP regarding pts changed ICP levels from 12- 10 previously to now -2 to 2 post CT. No new orders received - will continue to monitor.

## 2019-07-11 NOTE — PROGRESS NOTES
Harpreet Moise NP notified of new onset R Arm and R Leg drift. Patient able to focus and track with eyes, but delayed with tracking to right side. ICP ranging 11-12, following commands. NIH 4. PERRLA 3 mm. Per Harpreet Moise NP, decrease EVD drain to 10 above tragus from 15.

## 2019-07-11 NOTE — PROGRESS NOTES
OT NOTE:    OT orders received, chart reviewed, and evaluation attempted. Pt awaiting neurological intervention, scheduled for later today. Will hold and check back again tomorrow.     Adis Thomas, OTR/L

## 2019-07-11 NOTE — PROGRESS NOTES
Pt transported to [facility] OR with RN x2 (including Eduardo Felix, Axel Tompkins [id number] RN) and RT on monitor, without problems. Report to Eduardo Felix RN.

## 2019-07-11 NOTE — PROGRESS NOTES
Head to toe assessment completed on pt this PM.     Neuro: Pt eyes open to voice / stimulus. Able to follow commands on BUEs and BLEs. PERRLA - 3 mm and brisk to react. Able to focus with eyes. Propofol on standby to assess for drift - no drift noted. NIH 3 - 4. Pt nods appropriately and is able to indicate pain. Strong cough with suction. Cardiac: NSR to ST. Goal MAP 70-90 - cardene gtt infusing. Resp: PRVC FiO2 40%. Coarse upper / diminished lower breath sounds. GI:  OGT patent and infusing - 24 inches exposed OGT. : Patiño - patent and draining. Skin: Clean, dry and intact. Prevalon boots and Allevyn intact. Propofol at 30, Cardene at 7.5, NS at 100.

## 2019-07-11 NOTE — PROGRESS NOTES
[facility] having procedure done. Will assess patient upon his return to unit.

## 2019-07-11 NOTE — PROGRESS NOTES
Ventilator check complete; patient has a #7.5 ET tube secured at the 25 at the lip. .  Breath sounds are coarse. Trachea is midline, Negative for subcutaneous air, and chest excursion is symmetric. Patient is also Negative for cyanosis and is Negative for pitting edema. All alarms are set and audible. Resuscitation bag is at the head of the bed. Ventilator Settings  Mode FIO2 Rate Tidal Volume Pressure PEEP I:E Ratio   PRVC  40 %    500 ml     8 cm H20  1:2      Peak airway pressure: 8.5 cm H2O   Minute ventilation: 6.9 l/min     ABG: No results for input(s): PH, PCO2, PO2, HCO3 in the last 72 hours.       Rolf Mota, RT

## 2019-07-12 ENCOUNTER — APPOINTMENT (OUTPATIENT)
Dept: GENERAL RADIOLOGY | Age: 44
DRG: 020 | End: 2019-07-12
Attending: NURSE PRACTITIONER
Payer: COMMERCIAL

## 2019-07-12 ENCOUNTER — APPOINTMENT (OUTPATIENT)
Dept: CT IMAGING | Age: 44
DRG: 020 | End: 2019-07-12
Attending: NURSE PRACTITIONER
Payer: COMMERCIAL

## 2019-07-12 LAB
ALBUMIN SERPL-MCNC: 2.4 G/DL (ref 3.5–5)
ALBUMIN/GLOB SERPL: 0.9 {RATIO} (ref 1.2–3.5)
ALP SERPL-CCNC: 52 U/L (ref 50–136)
ALT SERPL-CCNC: 16 U/L (ref 12–65)
AMORPH CRY URNS QL MICRO: NORMAL
ANION GAP SERPL CALC-SCNC: 7 MMOL/L (ref 7–16)
APPEARANCE UR: CLEAR
ARTERIAL PATENCY WRIST A: ABNORMAL
AST SERPL-CCNC: 17 U/L (ref 15–37)
BACTERIA URNS QL MICRO: NORMAL /HPF
BASE DEFICIT BLD-SCNC: 8 MMOL/L
BASE DEFICIT BLD-SCNC: 8 MMOL/L
BASE DEFICIT BLD-SCNC: 9 MMOL/L
BASE DEFICIT BLD-SCNC: 9 MMOL/L
BDY SITE: ABNORMAL
BILIRUB DIRECT SERPL-MCNC: 0.1 MG/DL
BILIRUB SERPL-MCNC: 0.2 MG/DL (ref 0.2–1.1)
BILIRUB UR QL: NEGATIVE
BODY TEMPERATURE: 98.6
BUN SERPL-MCNC: 28 MG/DL (ref 6–23)
CALCIUM SERPL-MCNC: 7.7 MG/DL (ref 8.3–10.4)
CASTS URNS QL MICRO: 0 /LPF
CHLORIDE SERPL-SCNC: 123 MMOL/L (ref 98–107)
CO2 BLD-SCNC: 17 MMOL/L
CO2 BLD-SCNC: 18 MMOL/L
CO2 BLD-SCNC: 19 MMOL/L
CO2 BLD-SCNC: 19 MMOL/L
CO2 SERPL-SCNC: 20 MMOL/L (ref 21–32)
COLLECT TIME,HTIME: 1145
COLLECT TIME,HTIME: 305
COLLECT TIME,HTIME: 410
COLLECT TIME,HTIME: 853
COLOR UR: YELLOW
CREAT SERPL-MCNC: 2.6 MG/DL (ref 0.8–1.5)
CRYSTALS URNS QL MICRO: 0 /LPF
EPI CELLS #/AREA URNS HPF: 0 /HPF
ERYTHROCYTE [DISTWIDTH] IN BLOOD BY AUTOMATED COUNT: 13.2 % (ref 11.9–14.6)
EXHALED MINUTE VOLUME, VE: 10.6 L/MIN
EXHALED MINUTE VOLUME, VE: 12.6 L/MIN
EXHALED MINUTE VOLUME, VE: 7.3 L/MIN
EXHALED MINUTE VOLUME, VE: 8.1 L/MIN
GAS FLOW.O2 O2 DELIVERY SYS: ABNORMAL L/MIN
GAS FLOW.O2 SETTING OXYMISER: 14 BPM
GAS FLOW.O2 SETTING OXYMISER: 14 BPM
GLOBULIN SER CALC-MCNC: 2.7 G/DL (ref 2.3–3.5)
GLUCOSE SERPL-MCNC: 113 MG/DL (ref 65–100)
GLUCOSE UR STRIP.AUTO-MCNC: NEGATIVE MG/DL
HCO3 BLD-SCNC: 16.6 MMOL/L (ref 22–26)
HCO3 BLD-SCNC: 16.6 MMOL/L (ref 22–26)
HCO3 BLD-SCNC: 17.4 MMOL/L (ref 22–26)
HCO3 BLD-SCNC: 17.9 MMOL/L (ref 22–26)
HCT VFR BLD AUTO: 27.4 % (ref 41.1–50.3)
HGB BLD-MCNC: 8.8 G/DL (ref 13.6–17.2)
HGB UR QL STRIP: ABNORMAL
INSPIRATION.DURATION SETTING TIME VENT: 0.6 SEC
INSPIRATION.DURATION SETTING TIME VENT: 4 SEC
KETONES UR QL STRIP.AUTO: NEGATIVE MG/DL
LEUKOCYTE ESTERASE UR QL STRIP.AUTO: NEGATIVE
MAGNESIUM SERPL-MCNC: 2.3 MG/DL (ref 1.8–2.4)
MCH RBC QN AUTO: 29.3 PG (ref 26.1–32.9)
MCHC RBC AUTO-ENTMCNC: 32.1 G/DL (ref 31.4–35)
MCV RBC AUTO: 91.3 FL (ref 79.6–97.8)
MUCOUS THREADS URNS QL MICRO: 0 /LPF
NITRITE UR QL STRIP.AUTO: NEGATIVE
NRBC # BLD: 0 K/UL (ref 0–0.2)
O2/TOTAL GAS SETTING VFR VENT: 100 %
O2/TOTAL GAS SETTING VFR VENT: 60 %
PCO2 BLD: 31.3 MMHG (ref 35–45)
PCO2 BLD: 34 MMHG (ref 35–45)
PCO2 BLD: 35.8 MMHG (ref 35–45)
PCO2 BLD: 37.4 MMHG (ref 35–45)
PEEP RESPIRATORY: 0 CMH2O
PEEP RESPIRATORY: 8 CMH2O
PH BLD: 7.28 [PH] (ref 7.35–7.45)
PH BLD: 7.3 [PH] (ref 7.35–7.45)
PH BLD: 7.31 [PH] (ref 7.35–7.45)
PH BLD: 7.33 [PH] (ref 7.35–7.45)
PH UR STRIP: 5 [PH] (ref 5–9)
PIP ISTAT,IPIP: 26
PLATELET # BLD AUTO: 153 K/UL (ref 150–450)
PMV BLD AUTO: 11.3 FL (ref 9.4–12.3)
PO2 BLD: 132 MMHG (ref 75–100)
PO2 BLD: 302 MMHG (ref 75–100)
PO2 BLD: 62 MMHG (ref 75–100)
PO2 BLD: 83 MMHG (ref 75–100)
POTASSIUM SERPL-SCNC: 3.7 MMOL/L (ref 3.5–5.1)
PRESSURE SUPPORT SETTING VENT: 6 CMH2O
PROT SERPL-MCNC: 5.1 G/DL (ref 6.3–8.2)
PROT UR STRIP-MCNC: 100 MG/DL
RBC # BLD AUTO: 3 M/UL (ref 4.23–5.6)
RBC #/AREA URNS HPF: NORMAL /HPF
SAO2 % BLD: 100 % (ref 95–98)
SAO2 % BLD: 90 % (ref 95–98)
SAO2 % BLD: 95 % (ref 95–98)
SAO2 % BLD: 99 % (ref 95–98)
SERVICE CMNT-IMP: ABNORMAL
SODIUM SERPL-SCNC: 150 MMOL/L (ref 136–145)
SP GR UR REFRACTOMETRY: 1.02 (ref 1–1.02)
SPECIMEN TYPE: ABNORMAL
TOTAL RESP. RATE, ITRR: 12
TRIGL SERPL-MCNC: 155 MG/DL (ref 35–150)
UROBILINOGEN UR QL STRIP.AUTO: 0.2 EU/DL (ref 0.2–1)
VENTILATION MODE VENT: ABNORMAL
VT SETTING VENT: 500 ML
VT SETTING VENT: 500 ML
WBC # BLD AUTO: 17.4 K/UL (ref 4.3–11.1)
WBC URNS QL MICRO: 0 /HPF

## 2019-07-12 PROCEDURE — 36592 COLLECT BLOOD FROM PICC: CPT

## 2019-07-12 PROCEDURE — 99291 CRITICAL CARE FIRST HOUR: CPT | Performed by: NEUROLOGICAL SURGERY

## 2019-07-12 PROCEDURE — 80048 BASIC METABOLIC PNL TOTAL CA: CPT

## 2019-07-12 PROCEDURE — 97166 OT EVAL MOD COMPLEX 45 MIN: CPT

## 2019-07-12 PROCEDURE — 74011250637 HC RX REV CODE- 250/637: Performed by: NEUROLOGICAL SURGERY

## 2019-07-12 PROCEDURE — 83735 ASSAY OF MAGNESIUM: CPT

## 2019-07-12 PROCEDURE — 93886 INTRACRANIAL COMPLETE STUDY: CPT | Performed by: PSYCHIATRY & NEUROLOGY

## 2019-07-12 PROCEDURE — 77030010537 HC BG CSF DRN INLC -C

## 2019-07-12 PROCEDURE — 74011000250 HC RX REV CODE- 250: Performed by: NEUROLOGICAL SURGERY

## 2019-07-12 PROCEDURE — 74011250636 HC RX REV CODE- 250/636: Performed by: NURSE PRACTITIONER

## 2019-07-12 PROCEDURE — 87040 BLOOD CULTURE FOR BACTERIA: CPT

## 2019-07-12 PROCEDURE — 97530 THERAPEUTIC ACTIVITIES: CPT

## 2019-07-12 PROCEDURE — 81015 MICROSCOPIC EXAM OF URINE: CPT

## 2019-07-12 PROCEDURE — 77030040132 HC BLANKET THRM DISP CSZ -B

## 2019-07-12 PROCEDURE — 84478 ASSAY OF TRIGLYCERIDES: CPT

## 2019-07-12 PROCEDURE — 82803 BLOOD GASES ANY COMBINATION: CPT

## 2019-07-12 PROCEDURE — 77030034112 HC PRB ESOPH/RET TEMP CSZ -A

## 2019-07-12 PROCEDURE — 74011250636 HC RX REV CODE- 250/636

## 2019-07-12 PROCEDURE — 94667 MNPJ CHEST WALL 1ST: CPT

## 2019-07-12 PROCEDURE — 70496 CT ANGIOGRAPHY HEAD: CPT

## 2019-07-12 PROCEDURE — 94003 VENT MGMT INPAT SUBQ DAY: CPT

## 2019-07-12 PROCEDURE — 74011000258 HC RX REV CODE- 258: Performed by: NEUROLOGICAL SURGERY

## 2019-07-12 PROCEDURE — 87070 CULTURE OTHR SPECIMN AEROBIC: CPT

## 2019-07-12 PROCEDURE — 65610000001 HC ROOM ICU GENERAL

## 2019-07-12 PROCEDURE — 36600 WITHDRAWAL OF ARTERIAL BLOOD: CPT

## 2019-07-12 PROCEDURE — 77030018798 HC PMP KT ENTRL FED COVD -A

## 2019-07-12 PROCEDURE — 93886 INTRACRANIAL COMPLETE STUDY: CPT

## 2019-07-12 PROCEDURE — 80076 HEPATIC FUNCTION PANEL: CPT

## 2019-07-12 PROCEDURE — 74011000250 HC RX REV CODE- 250: Performed by: NURSE PRACTITIONER

## 2019-07-12 PROCEDURE — 94668 MNPJ CHEST WALL SBSQ: CPT

## 2019-07-12 PROCEDURE — 74011000258 HC RX REV CODE- 258: Performed by: NURSE PRACTITIONER

## 2019-07-12 PROCEDURE — 74011250636 HC RX REV CODE- 250/636: Performed by: NEUROLOGICAL SURGERY

## 2019-07-12 PROCEDURE — 70450 CT HEAD/BRAIN W/O DYE: CPT

## 2019-07-12 PROCEDURE — 74011250637 HC RX REV CODE- 250/637: Performed by: NURSE PRACTITIONER

## 2019-07-12 PROCEDURE — 74011250637 HC RX REV CODE- 250/637

## 2019-07-12 PROCEDURE — 71045 X-RAY EXAM CHEST 1 VIEW: CPT

## 2019-07-12 PROCEDURE — 85027 COMPLETE CBC AUTOMATED: CPT

## 2019-07-12 PROCEDURE — 77030020263 HC SOL INJ SOD CL0.9% LFCR 1000ML

## 2019-07-12 PROCEDURE — 99231 SBSQ HOSP IP/OBS SF/LOW 25: CPT | Performed by: PHYSICAL MEDICINE & REHABILITATION

## 2019-07-12 PROCEDURE — 87186 SC STD MICRODIL/AGAR DIL: CPT

## 2019-07-12 PROCEDURE — 74011636320 HC RX REV CODE- 636/320: Performed by: NEUROLOGICAL SURGERY

## 2019-07-12 PROCEDURE — 97110 THERAPEUTIC EXERCISES: CPT

## 2019-07-12 PROCEDURE — 87077 CULTURE AEROBIC IDENTIFY: CPT

## 2019-07-12 PROCEDURE — 81003 URINALYSIS AUTO W/O SCOPE: CPT

## 2019-07-12 PROCEDURE — L1830 KO IMMOB CANVAS LONG PRE OTS: HCPCS

## 2019-07-12 PROCEDURE — 97162 PT EVAL MOD COMPLEX 30 MIN: CPT

## 2019-07-12 RX ORDER — SODIUM BICARBONATE 1 MEQ/ML
100 SYRINGE (ML) INTRAVENOUS ONCE
Status: COMPLETED | OUTPATIENT
Start: 2019-07-12 | End: 2019-07-12

## 2019-07-12 RX ORDER — VANCOMYCIN HYDROCHLORIDE
1250 EVERY 24 HOURS
Status: DISCONTINUED | OUTPATIENT
Start: 2019-07-13 | End: 2019-07-15

## 2019-07-12 RX ORDER — ALPRAZOLAM 0.5 MG/1
0.5 TABLET ORAL
Status: DISCONTINUED | OUTPATIENT
Start: 2019-07-12 | End: 2019-07-15

## 2019-07-12 RX ORDER — SODIUM CHLORIDE 0.9 % (FLUSH) 0.9 %
10 SYRINGE (ML) INJECTION
Status: COMPLETED | OUTPATIENT
Start: 2019-07-12 | End: 2019-07-12

## 2019-07-12 RX ORDER — FENTANYL CITRATE 50 UG/ML
INJECTION, SOLUTION INTRAMUSCULAR; INTRAVENOUS
Status: COMPLETED
Start: 2019-07-12 | End: 2019-07-12

## 2019-07-12 RX ORDER — ALPRAZOLAM 0.5 MG/1
0.5 TABLET ORAL
Status: DISCONTINUED | OUTPATIENT
Start: 2019-07-12 | End: 2019-07-12

## 2019-07-12 RX ORDER — CLOPIDOGREL BISULFATE 75 MG/1
75 TABLET ORAL DAILY
Status: DISCONTINUED | OUTPATIENT
Start: 2019-07-12 | End: 2019-07-15

## 2019-07-12 RX ORDER — VANCOMYCIN/0.9 % SOD CHLORIDE 1.5G/250ML
1500 PLASTIC BAG, INJECTION (ML) INTRAVENOUS ONCE
Status: COMPLETED | OUTPATIENT
Start: 2019-07-12 | End: 2019-07-12

## 2019-07-12 RX ORDER — POLYVINYL ALCOHOL 14 MG/ML
1 SOLUTION/ DROPS OPHTHALMIC AS NEEDED
Status: DISCONTINUED | OUTPATIENT
Start: 2019-07-12 | End: 2019-08-05 | Stop reason: HOSPADM

## 2019-07-12 RX ORDER — FENTANYL CITRATE 50 UG/ML
100 INJECTION, SOLUTION INTRAMUSCULAR; INTRAVENOUS ONCE
Status: COMPLETED | OUTPATIENT
Start: 2019-07-12 | End: 2019-07-12

## 2019-07-12 RX ORDER — FENTANYL CITRATE 50 UG/ML
50 INJECTION, SOLUTION INTRAMUSCULAR; INTRAVENOUS
Status: DISCONTINUED | OUTPATIENT
Start: 2019-07-12 | End: 2019-07-20 | Stop reason: SDUPTHER

## 2019-07-12 RX ORDER — FAMOTIDINE 20 MG/1
20 TABLET, FILM COATED ORAL DAILY
Status: DISCONTINUED | OUTPATIENT
Start: 2019-07-12 | End: 2019-07-15

## 2019-07-12 RX ORDER — FENTANYL CITRATE 50 UG/ML
50 INJECTION, SOLUTION INTRAMUSCULAR; INTRAVENOUS
Status: DISCONTINUED | OUTPATIENT
Start: 2019-07-12 | End: 2019-07-12

## 2019-07-12 RX ORDER — ASPIRIN 325 MG
650 TABLET ORAL ONCE
Status: COMPLETED | OUTPATIENT
Start: 2019-07-12 | End: 2019-07-12

## 2019-07-12 RX ORDER — ASPIRIN 325 MG
325 TABLET ORAL DAILY
Status: DISCONTINUED | OUTPATIENT
Start: 2019-07-13 | End: 2019-07-15

## 2019-07-12 RX ADMIN — ACETAMINOPHEN 650 MG: 325 TABLET, FILM COATED ORAL at 16:25

## 2019-07-12 RX ADMIN — FENTANYL CITRATE 50 MCG: 50 INJECTION INTRAMUSCULAR; INTRAVENOUS at 23:11

## 2019-07-12 RX ADMIN — HEPARIN SODIUM 5000 UNITS: 5000 INJECTION INTRAVENOUS; SUBCUTANEOUS at 05:58

## 2019-07-12 RX ADMIN — ACETAMINOPHEN 650 MG: 325 TABLET, FILM COATED ORAL at 20:24

## 2019-07-12 RX ADMIN — SODIUM CHLORIDE 100 ML/HR: 900 INJECTION, SOLUTION INTRAVENOUS at 22:57

## 2019-07-12 RX ADMIN — PROPOFOL 30 MCG/KG/MIN: 10 INJECTION, EMULSION INTRAVENOUS at 07:01

## 2019-07-12 RX ADMIN — NIMODIPINE 60 MG: 30 CAPSULE, LIQUID FILLED ORAL at 15:47

## 2019-07-12 RX ADMIN — Medication 30 ML: at 22:07

## 2019-07-12 RX ADMIN — SENNOSIDES, DOCUSATE SODIUM 2 TABLET: 50; 8.6 TABLET, FILM COATED ORAL at 22:05

## 2019-07-12 RX ADMIN — SODIUM CHLORIDE, PRESERVATIVE FREE 600 UNITS: 5 INJECTION INTRAVENOUS at 14:46

## 2019-07-12 RX ADMIN — NICARDIPINE HYDROCHLORIDE 10 MG/HR: 25 INJECTION INTRAVENOUS at 08:58

## 2019-07-12 RX ADMIN — NIMODIPINE 60 MG: 30 CAPSULE, LIQUID FILLED ORAL at 12:07

## 2019-07-12 RX ADMIN — NICARDIPINE HYDROCHLORIDE 12.5 MG/HR: 25 INJECTION INTRAVENOUS at 01:36

## 2019-07-12 RX ADMIN — SODIUM CHLORIDE 100 ML/HR: 900 INJECTION, SOLUTION INTRAVENOUS at 02:04

## 2019-07-12 RX ADMIN — Medication 400 MG: at 08:54

## 2019-07-12 RX ADMIN — CEFEPIME 2 G: 2 INJECTION, POWDER, FOR SOLUTION INTRAVENOUS at 10:52

## 2019-07-12 RX ADMIN — FAMOTIDINE 20 MG: 20 TABLET ORAL at 08:54

## 2019-07-12 RX ADMIN — NIMODIPINE 60 MG: 30 CAPSULE, LIQUID FILLED ORAL at 20:00

## 2019-07-12 RX ADMIN — BENZALKONIUM CHLORIDE, SODIUM PHOSPHATE, DIBASIC, EDETATE DISODIUM,SODIUM PHOSPHATE, MONOBASIC, SODIUM CHLORIDE, WATER, PHOSPHORIC ACID, SODIUM HYDROXIDE 1 DROP: 14 SOLUTION INTRAOCULAR at 20:27

## 2019-07-12 RX ADMIN — NIMODIPINE 60 MG: 30 CAPSULE, LIQUID FILLED ORAL at 07:13

## 2019-07-12 RX ADMIN — SODIUM CHLORIDE, PRESERVATIVE FREE 900 UNITS: 5 INJECTION INTRAVENOUS at 22:07

## 2019-07-12 RX ADMIN — HEPARIN SODIUM 5000 UNITS: 5000 INJECTION INTRAVENOUS; SUBCUTANEOUS at 22:06

## 2019-07-12 RX ADMIN — FENTANYL CITRATE 100 MCG: 50 INJECTION, SOLUTION INTRAMUSCULAR; INTRAVENOUS at 04:40

## 2019-07-12 RX ADMIN — HEPARIN SODIUM 5000 UNITS: 5000 INJECTION INTRAVENOUS; SUBCUTANEOUS at 14:46

## 2019-07-12 RX ADMIN — VANCOMYCIN HYDROCHLORIDE 1500 MG: 10 INJECTION, POWDER, LYOPHILIZED, FOR SOLUTION INTRAVENOUS at 10:49

## 2019-07-12 RX ADMIN — CLOPIDOGREL BISULFATE 75 MG: 75 TABLET ORAL at 08:54

## 2019-07-12 RX ADMIN — EPTIFIBATIDE 1 MCG/KG/MIN: 0.75 INJECTION INTRAVENOUS at 05:28

## 2019-07-12 RX ADMIN — ATORVASTATIN CALCIUM 40 MG: 40 TABLET, FILM COATED ORAL at 22:05

## 2019-07-12 RX ADMIN — LEVETIRACETAM 500 MG: 100 SOLUTION ORAL at 20:24

## 2019-07-12 RX ADMIN — Medication 10 ML: at 04:55

## 2019-07-12 RX ADMIN — MAGNESIUM SULFATE HEPTAHYDRATE 2 G: 40 INJECTION, SOLUTION INTRAVENOUS at 04:05

## 2019-07-12 RX ADMIN — FENTANYL CITRATE 50 MCG: 50 INJECTION INTRAMUSCULAR; INTRAVENOUS at 21:54

## 2019-07-12 RX ADMIN — ALPRAZOLAM 0.5 MG: 0.5 TABLET ORAL at 20:38

## 2019-07-12 RX ADMIN — NICARDIPINE HYDROCHLORIDE 12.5 MG/HR: 25 INJECTION INTRAVENOUS at 03:38

## 2019-07-12 RX ADMIN — FENTANYL CITRATE 100 MCG: 50 INJECTION, SOLUTION INTRAMUSCULAR; INTRAVENOUS at 09:35

## 2019-07-12 RX ADMIN — NICARDIPINE HYDROCHLORIDE 10 MG/HR: 25 INJECTION INTRAVENOUS at 05:59

## 2019-07-12 RX ADMIN — FENTANYL CITRATE 50 MCG: 50 INJECTION INTRAMUSCULAR; INTRAVENOUS at 07:13

## 2019-07-12 RX ADMIN — NIMODIPINE 60 MG: 30 CAPSULE, LIQUID FILLED ORAL at 03:08

## 2019-07-12 RX ADMIN — FENTANYL CITRATE 100 MCG: 50 INJECTION INTRAMUSCULAR; INTRAVENOUS at 04:40

## 2019-07-12 RX ADMIN — LEVETIRACETAM 500 MG: 100 SOLUTION ORAL at 08:54

## 2019-07-12 RX ADMIN — Medication 30 ML: at 14:46

## 2019-07-12 RX ADMIN — ASPIRIN 325 MG ORAL TABLET 650 MG: 325 PILL ORAL at 08:54

## 2019-07-12 RX ADMIN — FENTANYL CITRATE 50 MCG: 50 INJECTION INTRAMUSCULAR; INTRAVENOUS at 03:43

## 2019-07-12 RX ADMIN — CEFEPIME 2 G: 2 INJECTION, POWDER, FOR SOLUTION INTRAVENOUS at 22:29

## 2019-07-12 RX ADMIN — FENTANYL CITRATE 50 MCG: 50 INJECTION INTRAMUSCULAR; INTRAVENOUS at 12:10

## 2019-07-12 RX ADMIN — NIMODIPINE 60 MG: 30 CAPSULE, LIQUID FILLED ORAL at 23:37

## 2019-07-12 RX ADMIN — ACETAMINOPHEN 650 MG: 325 TABLET, FILM COATED ORAL at 08:54

## 2019-07-12 RX ADMIN — IOPAMIDOL 80 ML: 755 INJECTION, SOLUTION INTRAVENOUS at 04:55

## 2019-07-12 RX ADMIN — Medication 100 MEQ: at 17:39

## 2019-07-12 RX ADMIN — Medication 400 MG: at 17:11

## 2019-07-12 RX ADMIN — ACETAMINOPHEN 650 MG: 325 TABLET, FILM COATED ORAL at 01:38

## 2019-07-12 RX ADMIN — FENTANYL CITRATE 50 MCG: 50 INJECTION INTRAMUSCULAR; INTRAVENOUS at 16:25

## 2019-07-12 RX ADMIN — FENTANYL CITRATE 50 MCG: 50 INJECTION INTRAMUSCULAR; INTRAVENOUS at 01:36

## 2019-07-12 RX ADMIN — SODIUM CHLORIDE 100 ML: 900 INJECTION, SOLUTION INTRAVENOUS at 04:55

## 2019-07-12 NOTE — PROGRESS NOTES
Patient returned from OR, dual neuro completed with JOHANNA Denton    Patient has sedation stopped; drowsy, eyes open to stimulus/voice, weakly follows commands, pupils reactive and conjugate. Strong cough with suction, tongue midline. NIH score 17. Sinus tach on monitor, S1/S2 auscultated. Bowel sounds active, abdomen intact and soft. Skin intact with R groin site CDI no bleeding or hematoma, knee immobilizer on. Lines: RUE PICC, L art line  Drips: cardene, integrelin, NS  Drains: EVD, phelps    Will continue to monitor patient.

## 2019-07-12 NOTE — PROGRESS NOTES
Pt assisted oob to recliner with use of gait belt by PT and RN x2. Weak and unsteady on feet but able to bear weight and take steps to pivot transfer from bed to recliner. Pt tolerated fairly. Airway protected. VSS. EVD drain protected and re-leveled at 10 cm H20 above the tragus, ICP 8. Pt denies any pain or other complaints at this time. Continuing to monitor closely.

## 2019-07-12 NOTE — PROGRESS NOTES
Pharmacokinetic Consult to Pharmacist    Wesley Love is a 37 y.o. male being treated for concern for pneumonia. Height: 6' (182.9 cm)  Weight: 79.2 kg (174 lb 8 oz)  Lab Results   Component Value Date/Time    BUN 28 (H) 07/12/2019 03:01 AM    Creatinine 2.60 (H) 07/12/2019 03:01 AM    WBC 17.4 (H) 07/12/2019 03:02 AM    Procalcitonin 0.1 07/10/2019 08:54 AM      Estimated Creatinine Clearance: 39.4 mL/min (A) (based on SCr of 2.6 mg/dL (H)). CULTURES:  All Micro Results     Procedure Component Value Units Date/Time    CULTURE, RESPIRATORY/SPUTUM/BRONCH Pleas Smitha [[id number]] Collected:  07/12/19 0136    Order Status:  Completed Specimen:  Sputum from Endotracheal aspirate Updated:  07/12/19 6724     Special Requests: NO SPECIAL REQUESTS        GRAM STAIN       WBCS SEEN TOO NUMEROUS TO COUNT            NO WBCS SEEN               MODERATE GRAM POSITIVE COCCI            MANY GRAM POSITIVE RODS        Culture result:       NO GROWTH AFTER SHORT PERIOD OF INCUBATION. FURTHER RESULTS TO FOLLOW AFTER OVERNIGHT INCUBATION. Day 1 of vancomycin. Goal trough is 15-20. I will order a loading dose of 1500mg X1 to be given now and 1250mg q24h to begin tomorrow. Noted elevation in SrCr at this time with some improvement over past 24h; unclear if NING vs. CKD. Continue to trend and adjust dose empirically if indicated. Pharmacist will continue to follow patient. Please call with any questions. Thank you,  Rena Anderson.  Liza Bridges, PharmD, [address] [facility]  Clinical Pharmacist  [phone]

## 2019-07-12 NOTE — PROGRESS NOTES
Ventilator check complete; patient has a #7.5 ET tube secured at the 25 at the lip. Patient is not sedated. Patient is able to follow commands. Breath sounds are clear and coarse. Trachea is midline, Negative for subcutaneous air, and chest excursion is symmetric. Patient is also Negative for cyanosis and is Negative for pitting edema. All alarms are set and audible. Resuscitation bag is at the head of the bed.       Ventilator Settings  Mode FIO2 Rate Tidal Volume Pressure PEEP I:E Ratio   Airway pressure release (APR)  60 %    500 ml  9 cm H2O  0 cm H20  1:2      Peak airway pressure: 28.3 cm H2O   Minute ventilation: 11.9 l/min     [place]

## 2019-07-12 NOTE — PROGRESS NOTES
Nutrition F/U:  TF Management for Dr. Yissel Bassett NP. Assessment:  Weight 79.2 kg (ICU bed 7/12/19), edema - 2+ non-pitting. 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 Diprivan has been weaned off today. Labs are remarkable for sodium 150, BUN 28 and creatinine 2.60. Last bowel movement unknown. TF was started 7/10 and advanced to 35 ml/hr before it was stopped in preparation for procedure yesterday. Good GI tolerance was reported. Enteral Access:   OGT  Revised Macronutrient Needs:  Estimated calorie needs - 8226-7022 jayce/day (25-28 jayce/kg/day)   Estimated protein needs - 69-83 gm pro/day (1-1.2 gm pro/kgIBW/day)   Max CHO/day - 266 gm CHO/day (55% jayce/day)   Fluid/day - 1.7-2 liters/day (1 ml/jayce/day)  Intake/Comparative Standards: The patient was NPO since 7/11/19 @ 0015. Diagnosis:  Predicted inadequate energy intake related to discontinuation of Diprivan as evidenced by the need to change TF formula to Jevity 1.2 to meet his daily needs. Intervention:   Meals and Snacks: NPO. Enteral Nutrition: Change TF formula to Jevity 1.2 @ 65 ml/hr, continue water flush as 20 ml every 4 hours -1872 calories/day (100% calorie goal), 87 grams protein/day (100% protein goal), 264 grams CHO/day (does not exceed max CHO limit) and 1383 ml water/day (74% fluid goal). Mineral Supplement Therapy: Nutrition Support Orders/Electrolyte Management Replacement Protocols are active on the [facility] for potassium only. Coordination of Nutrition Care by a Nutrition Professional: AM ICU, collaboration with Ryan Rivas RN. Nutrition Discharge Plan: Too soon to determine. Delmi Wesley.  Batool Das  [phone]

## 2019-07-12 NOTE — PROGRESS NOTES
Ventilator check complete; patient has a #7.5 ET tube secured at the 25 at the lip. Patient is sedated. Patient is not able to follow commands. Breath sounds are coarse. Trachea is midline, Negative for subcutaneous air, and chest excursion is symmetric. Patient is also Negative for cyanosis and is Negative for pitting edema. All alarms are set and audible.   Resuscitation bag is at the head of the bed.       Ventilator Settings  Mode FIO2 Spon Rate Spon Tidal Volume Pressure PEEP I:E Ratio   PSV  100 %  12  796 ml  6   8 cm H20  1:2       Peak airway pressure: 15 cm H2O   Minute ventilation: 10.3 l/min         Keturah Chinchilla, FERNANOD    Ok to change to PS per M.DEANNE Beckett

## 2019-07-12 NOTE — PROGRESS NOTES
Problem: Self Care Deficits Care Plan (Adult)  Goal: *Acute Goals and Plan of Care (Insert Text)  Description  1. Patient will complete total body bathing and dressing with supervision and adaptive equipment as needed. 2. Patient will complete toileting with supervision and adaptive equipment as needed. 3. Patient will tolerate 20 minutes of OT treatment with up to minimal rest breaks to increase activity tolerance for ADLs. 4. Patient will complete functional transfers with supervision and adaptive equipment as needed. 5. Patient will demonstrate modified independence with therapeutic exercise HEP to decrease edema in bilateral hands for increased coordination and independence with functional transfers. 6. Patient will complete functional mobility for ADLs with stand by assistance and adaptive equipment as needed. Timeframe: 7 visits      Outcome: Progressing Towards Goal     OCCUPATIONAL THERAPY: Initial Assessment, Daily Note and PM 7/12/2019  INPATIENT: OT Visit Days: 1  Payor: SELF PAY / Plan: [facility] SELF PAY / Product Type: Self Pay /      NAME/AGE/GENDER: Kal Gambino is a 37 y.o. 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 ASSESSMENT:     Mr. Thong Snyder is a 37 y.o. 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 Seen today in ICU with external ventricular drain and arterial line, pt intubated. Per father at bedside/pt with appropriate nodding to yes/no questions: at baseline pt and his two children live with pt's father, pt is independent with ADLs, ambulation, working a physically demanding job. No recent falls. Upon arrival pt alert and agreeable to OT evaluation and treatment. BUE assessment revealed AROM generally decreased and strength WFL (4+ to 5/5) in BUEs. BUE sensation intact to light touch. Proprioception likely intact in BUEs, difficult to assess fully due to BUE lines. Swelling noted in B hands, pt educated to elevate/practice hand pumps. Tracking and visual fields WNL, slightly increased time needed for saccades. Pt with good command following and able to communicate needs (suctioning) with hand gestures. Treatment initiated to include functional transfers with Junaid/cueing for technique/gait belt and steps from chair to bed with Junaid/cueing, however 2 additional people present to manage pt's numerous lines. Pt sat with CGA and transferred sit to supine with CGA. Pt practiced BUE hand pumps with min cueing. See below. Pt observed to have BLE shaking at times throughout session. RN aware. Pt left supine with BUEs elevated and mitts on per RN. Pt presents with deficits in activity tolerance, balance, functional mobility and functional transfers, all of which negatively impact safety and independence with ADLs. Pt appears to have good prognosis based on this session. Mae Cruz is currently functioning below baseline and would benefit from continued OT to increase safety and independence with ADLs. Will follow. This section established at most recent assessment   PROBLEM LIST (Impairments causing functional limitations):  1. Decreased ADL/Functional Activities  2. Decreased Transfer Abilities  3. Decreased Ambulation Ability/Technique  4. Decreased Balance  5. Decreased Activity Tolerance  6. Increased Fatigue  7. Increased Shortness of Breath  8. Decreased Flexibility/Joint Mobility  9. Edema/Girth  10. Decreased Knowledge of Precautions  11. Decreased Skin Integrity/Hygeine  12. Decreased [place] with Home Exercise Program   INTERVENTIONS PLANNED: (Benefits and precautions of occupational therapy have been discussed with the patient.)  1.  Activities of daily living training  2. Adaptive equipment training  3. Balance training  4. Clothing management  5. Donning&doffing training  6. Hygiene training  7. Neuromuscular re-eduation  8. Re-evaluation  9. Therapeutic activity  10. Therapeutic exercise     TREATMENT PLAN: Frequency/Duration: Follow patient 3x/week to address above goals. Rehabilitation Potential For Stated Goals: Good     REHAB RECOMMENDATIONS (at time of discharge pending progress):    Placement: It is my opinion, based on this patient's performance to date, that Mr. Thong Snyder may benefit from intensive therapy at an [address] [place] [address] after discharge due to a probable need for close medical supervision by a rehab physician, a probable need for 24 hour rehab nursing, a probable need for multiple therapy disciplines and potential to make ongoing and sustainable functional improvement that is of practical value. .  Equipment:    TBD               OCCUPATIONAL PROFILE AND HISTORY:   History of Present Injury/Illness (Reason for Referral):  See H&P. Past Medical History/Comorbidities:   Mr. Thong Snyder  has no past medical history on file. Mr. Thong Snyder  has no past surgical history on file. Social History/Living Environment:   Home Environment: Private residence  One/Two Story Residence: Other (Comment)  Living Alone: No  Support Systems: Parent, Child(rohan)  Patient Expects to be Discharged to[de-identified] Unknown  Current DME Used/Available at Home: None  Prior Level of Function/Work/Activity:  Per father at bedside/pt with appropriate nodding to yes/no questions: at baseline pt and his two children live with pt's father, pt is independent with ADLs, ambulation, working a physically demanding job. No recent falls. Dominant Side:         RIGHT    Personal Factors:          Sex:  male        Age:  37 y.o.         Other factors that influence how disability is experienced by the patient:  [facility]    Number of Personal Factors/Comorbidities that affect the Plan of Care: Expanded review of therapy/medical records (1-2):  MODERATE COMPLEXITY   ASSESSMENT OF OCCUPATIONAL PERFORMANCE[de-identified]   Activities of Daily Living:   Basic ADLs (From Assessment) Complex ADLs (From Assessment)   Feeding: Total assistance(Pt intubated)  Oral Facial Hygiene/Grooming: Minimum assistance(d/t lines)  Bathing: Minimum assistance  Upper Body Dressing: Moderate assistance(d/t lines)  Lower Body Dressing: Minimum assistance  Toileting: Minimum assistance Instrumental ADL  Meal Preparation: Total assistance  Homemaking: Total assistance  Medication Management: Total assistance  Financial Management: Total assistance   Grooming/Bathing/Dressing Activities of Daily Living     Cognitive Retraining  Safety/Judgement: Fall prevention                       Bed/Mat Mobility  Supine to Sit: Moderate assistance;Assist x2  Sit to Stand: Minimum assistance  Stand to Sit: Contact guard assistance  Bed to Chair: Minimum assistance(3 person assist to manage lines)  Scooting: Stand-by assistance     Most Recent Physical Functioning:   Gross Assessment:  AROM: Generally decreased, functional  Strength: Within functional limits  Coordination: Generally decreased, functional(B hands swelling)  Sensation: Intact(BUEs to light touch )               Posture:  Posture (WDL): Exceptions to WDL  Posture Assessment:  Forward head  Balance:  Sitting: Impaired  Sitting - Static: Good (unsupported)  Sitting - Dynamic: Fair (occasional)  Standing: Impaired  Standing - Static: Good;Constant support  Standing - Dynamic : Fair;Constant support Bed Mobility:  Supine to Sit: Moderate assistance;Assist x2  Scooting: Stand-by assistance  Wheelchair Mobility:     Transfers:  Sit to Stand: Minimum assistance  Stand to Sit: Contact guard assistance  Bed to Chair: Minimum assistance(3 person assist to manage lines)            Patient Vitals for the past 6 hrs:   BP SpO2 Pulse   07/12/19 1000 -- 100 % 77   07/12/19 1015 -- 100 % 81   07/12/19 1035 -- 100 % 87   19 1045 -- 100 % 85   19 1100 -- 100 % 86   19 1115 -- 100 % 95   19 1130 -- 100 % 86   19 1145 -- 100 % 91   19 1159 -- 100 % 91   19 1200 -- 100 % 90   19 1201 -- 100 % --   19 1204 133/70 100 % 89   19 1215 -- 100 % 83   19 1230 -- 100 % 71   19 1245 -- 100 % 76   19 1300 -- 100 % 79   19 1315 -- 100 % 79   19 1330 -- 100 % 74   19 1345 -- 100 % 70   19 1400 145/69 100 % 92   19 1415 -- 100 % 87   19 1430 -- 100 % 93       Mental Status  Neurologic State: Alert  Orientation Level: Unable to verbalize  Cognition: Follows commands  Perception: Appears intact  Perseveration: No perseveration noted  Safety/Judgement: Fall prevention                          Physical Skills Involved:  1. Range of Motion  2. Balance  3. Activity Tolerance  4. Fine Motor Control  5. Edema Cognitive Skills Affected (resulting in the inability to perform in a timely and safe manner):  1. None  Psychosocial Skills Affected:  1. Habits/Routines  2. Environmental Adaptation  3. Social Interaction  4. Emotional Regulation  5. Self-Awareness  6. Awareness of Others  7. Social Roles   Number of elements that affect the Plan of Care: 5+:  HIGH COMPLEXITY   CLINICAL DECISION MAKIN [facility] [address] [id number] AM-PAC 6 Clicks   Daily Activity Inpatient Short Form  How much help from another person does the patient currently need. .. Total A Lot A Little None   1. Putting on and taking off regular lower body clothing? ? 1   ? 2   ? 3   ? 4   2. Bathing (including washing, rinsing, drying)? ? 1   ? 2   ? 3   ? 4   3. Toileting, which includes using toilet, bedpan or urinal?   ? 1   ? 2   ? 3   ? 4   4. Putting on and taking off regular upper body clothing? ? 1   ? 2   ? 3   ? 4   5. Taking care of personal grooming such as brushing teeth? ? 1   ? 2   ? 3   ? 4   6. Eating meals?    ? 1   ? 2   ? 3   ? 4   © , Trustees of [facility] [employer] [employer], under license to [employer]. All rights reserved      Score:  Initial: 15 7/12/19 Most Recent: X (Date: -- )    Interpretation of Tool:  Represents activities that are increasingly more difficult (i.e. Bed mobility, Transfers, Gait). Medical Necessity:     · Patient demonstrates good  ·  rehab potential due to higher previous functional level. Reason for Services/Other Comments:  · Patient continues to require skilled intervention due to inability to complete ADLs at prior level of independence   · . Use of outcome tool(s) and clinical judgement create a POC that gives a: MODERATE COMPLEXITY         TREATMENT:   (In addition to Assessment/Re-Assessment sessions the following treatments were rendered)     Pre-treatment Symptoms/Complaints:    Pain: Initial:   Pain Intensity 1: 0(Pt nodded head \"no\" when asked if had any pain)  Post Session:  same     Therapeutic Activity: (    15 minutes): Therapeutic activities including Bed transfers, Chair transfers and Ambulation on level ground to improve mobility, balance, coordination and dynamic movement of arm - bilateral and leg - bilateral to improve functional transfers . Required minimal Safety awareness training;Manual cues; Verbal cues; Visual/Demos to promote dynamic balance in standing. Treatment initiated to include functional transfers with Junaid/cueing for technique/gait belt and steps from chair to bed with Junaid/cueing, however 2 additional people present to manage pt's numerous lines. Pt sat with CGA and transferred sit to supine with CGA. Therapeutic Exercise: ( 8 minutes):  Exercises per grid below to improve mobility, coordination and dynamic movement of hand - bilateral to improve functional coordination and address B hand edema . Required minimal visual and verbal cues to promote proper body mechanics. Progressed repetitions as indicated. Pt practiced BUE hand pumps with min cueing. See below.       Date:  7/12/19 Date:   Date:     Activity/Exercise Parameters Parameters Parameters   BUE hand pumps 2x15 reps                                             Braces/Orthotics/Lines/Etc:   · IV  · phelps catheter  · drain ventricular   · arterial line  · ICU monitoring   · O2 Device: Ventilator  Treatment/Session Assessment:    · Response to Treatment:  Tolerated well   · Interdisciplinary Collaboration:   o Physical Therapist  o Occupational Therapist  o Registered Nurse  · After treatment position/precautions:   o Supine in bed  o Bed/Chair-wheels locked  o Bed in low position  o Call light within reach  o Nurse at bedside  o Restraints   · Compliance with Program/Exercises: Compliant all of the time, Will assess as treatment progresses. · Recommendations/Intent for next treatment session: \"Next visit will focus on advancements to more challenging activities and reduction in assistance provided\".   Total Treatment Duration:  OT Patient Time In/Time Out  Time In: 1452  Time Out: 1700 Jens Diaz OTR/L

## 2019-07-12 NOTE — PROGRESS NOTES
Bedside and Verbal shift change report given to Nena Hobson RN (oncoming nurse) by Dino Christianson RN (offgoing nurse). Report included the following information SBAR, Kardex, ED Summary, OR Summary, Procedure Summary, Intake/Output, MAR, Recent Results and Cardiac Rhythm NSR. Dual skin assessment reviewed. Dual neuro check complete. Pt sedated on diprivan gtt but opens eyes to voice and able to follow commands in all extremities. Nods appropriately to questions. Pupils 3mm, round and brisk. Able to track and focus. BUE strength equal without drift. Mild drift RLE. Holds up \"two finger\" and \"thumbs up. \"  NIH 3.  EVD intact, leveled at 10 cm H2o above the tragus, serosanguinous output, ICP 16. Right groin site w/drsg cdi, no signs of bleeding, pulses all palpable. Pt nods no to pain at this time. NSR on monitor, HR 78. MAP 80-85 via radial art line. On cardene gtt @ 10 mg/hr. Integrilin gtt @ 1 mcg/kg/min. Continuing to monitor.

## 2019-07-12 NOTE — PROGRESS NOTES
Ani East NP notified of ABG PO2. Telephone orders received to turn FiO2 to 100% and repeat gas in 1hr. RN aware.

## 2019-07-12 NOTE — PROGRESS NOTES
Progress Note    Patient: Criselda Maya MRN: [id number]  SSN: [id number]    YOB: 1975  Age: 37 y.o. Sex: male      Admit Date: 7/9/2019    LOS: 3 days     Subjective:   Pt with decreased Pao2 this am, now on ARPV, has LLL effusion. +sputum for gram +cocci, Tm100.2. Will start Cefepime/Vanc pending final read. Pt is awake and will follow commands, drowsy. + secretions. Smoker.      Current Facility-Administered Medications   Medication Dose Route Frequency    levETIRAcetam (KEPPRA) oral solution 500 mg  500 mg Per NG tube Q12H    famotidine (PEPCID) tablet 20 mg  20 mg Per NG tube DAILY    clopidogrel (PLAVIX) tablet 75 mg  75 mg Per NG tube DAILY    [START ON 7/13/2019] aspirin tablet 325 mg  325 mg Nasogastric DAILY    PHENYLephrine (PF)(TONI-SYNEPHRINE) 30 mg in 0.9% sodium chloride 250 mL infusion   mcg/min IntraVENous TITRATE    NOREPINephrine (LEVOPHED) 4 mg/250 mL (16 mcg/mL) in NS infusion  0-30 mcg/min IntraVENous TITRATE    eptifibatide (INTEGRILIN) 0.75 mg/mL infusion  1-2 mcg/kg/min IntraVENous CONTINUOUS    propofol (DIPRIVAN) infusion  0-50 mcg/kg/min IntraVENous TITRATE    niMODipine (NIMOTOP) 30 mg/mL oral solution (compound) 60 mg  60 mg Per NG tube Q4H    magnesium oxide (MAG-OX) tablet 400 mg  400 mg Per NG tube BID    senna-docusate (PERICOLACE) 8.6-50 mg per tablet 2 Tab  2 Tab Per NG tube QHS    atorvastatin (LIPITOR) tablet 40 mg  40 mg Per NG tube QHS    heparin (porcine) injection 5,000 Units  5,000 Units SubCUTAneous Q8H    sodium chloride (NS) flush 30 mL  30 mL InterCATHeter Q8H    heparin (porcine) pf 900 Units  900 Units InterCATHeter Q8H    sodium chloride (NS) flush 30 mL  30 mL InterCATHeter PRN    heparin (porcine) pf 900 Units  900 Units InterCATHeter PRN    nicotine (NICODERM CQ) 21 mg/24 hr patch 1 Patch  1 Patch TransDERmal Q24H    acetaminophen (TYLENOL) tablet 650 mg  650 mg Per NG tube Q4H PRN    ondansetron (ZOFRAN) injection 4 Pt sleeping in stretcher, easily arousable to verbal stimuli, respirations even/unlabored b/l. Pt denies any dizziness/lightheadedness at this time. H&H 6.8/20.9 on repeat cbc, t&s and confirmatory T&S sent. Awaiting blood transfusion consent. NAD at this time. Will continue to monitor. mg  4 mg IntraVENous Q6H PRN    NUTRITIONAL SUPPORT ELECTROLYTE PRN ORDERS   Does Not Apply PRN    0.9% sodium chloride infusion  100 mL/hr IntraVENous CONTINUOUS    magnesium sulfate 4 g/100 mL IVPB  4 g IntraVENous DAILY PRN    magnesium sulfate 2 g/50 ml IVPB (premix or compounded)  2 g IntraVENous DAILY PRN    fentaNYL citrate (PF) injection 50 mcg  50 mcg IntraVENous Q2H PRN    niCARdipine in Saline (CARDENE) 25 MG/250 mL infusion kit  0-15 mg/hr IntraVENous TITRATE       Objective:     Vitals:    07/12/19 0615 07/12/19 0630 07/12/19 0807 07/12/19 0929   BP:       Pulse: 81 81 80 (!) 108   Resp: 9 10 12 16   Temp: 99.6 °F (37.6 °C) 99.6 °F (37.6 °C)     SpO2: 100% 100% 100% 100%   Weight:       Height:             Intake and Output:  Current Shift: 07/12 0701 - 07/12 1900  In: 765.4 [I.V.:765.4]  Out: 470 [Urine:450; Drains:20]  Last 24 hr: 07/11 0701 - 07/12 0700  In: 7536.6 [I.V.:6902.6]  Out: 3243 [Urine:2920; Drains:323]     IO: +4293/24hr  TOTAL: +10L/overall  EVD: 323/24hr. Physical Exam:   General:  Drowsy. Intubated/EVD. Follows commands. Eyes:  PERRL+3, midline. +corneal.   Neck: Supple, symmetrical, trachea midline, no adenopathy, thyroid: no enlargment/tenderness/nodules, no carotid bruit and no JVD. Lungs:   Decreased bases bilat. + secretions. Heart:  Regular rate and rhythm, S1, S2 normal, no murmur, click, rub or gallop. Abdomen:   Soft, non-tender. Bowel sounds normal. No masses,  No organomegaly. Extremities: Extremities normal, atraumatic, no cyanosis or edema. Pulses: 2+ and symmetric all extremities. Skin: Skin color, texture, turgor normal. No rashes or lesions   Neurologic: Drowsy, but will open eyes and follow commands all extrems. EVD  10/open.          Lab/Data Review:    Recent Results (from the past 12 hour(s))   CULTURE, RESPIRATORY/SPUTUM/BRONCH W GRAM STAIN    Collection Time: 07/12/19  1:36 AM   Result Value Ref Range    Special Requests: NO SPECIAL REQUESTS      GRAM STAIN WBCS SEEN TOO NUMEROUS TO COUNT      GRAM STAIN NO WBCS SEEN      GRAM STAIN MODERATE GRAM POSITIVE COCCI      GRAM STAIN MANY GRAM POSITIVE RODS      Culture result:        NO GROWTH AFTER SHORT PERIOD OF INCUBATION. FURTHER RESULTS TO FOLLOW AFTER OVERNIGHT INCUBATION. METABOLIC PANEL, BASIC    Collection Time: 07/12/19  3:01 AM   Result Value Ref Range    Sodium 150 (H) 136 - 145 mmol/L    Potassium 3.7 3.5 - 5.1 mmol/L    Chloride 123 (H) 98 - 107 mmol/L    CO2 20 (L) 21 - 32 mmol/L    Anion gap 7 7 - 16 mmol/L    Glucose 113 (H) 65 - 100 mg/dL    BUN 28 (H) 6 - 23 MG/DL    Creatinine 2.60 (H) 0.8 - 1.5 MG/DL    GFR est AA 35 (L) >60 ml/min/1.73m2    GFR est non-AA 29 (L) >60 ml/min/1.73m2    Calcium 7.7 (L) 8.3 - 10.4 MG/DL   MAGNESIUM    Collection Time: 07/12/19  3:01 AM   Result Value Ref Range    Magnesium 2.3 1.8 - 2.4 mg/dL   TRIGLYCERIDE    Collection Time: 07/12/19  3:01 AM   Result Value Ref Range    Triglyceride 155 (H) 35 - 150 MG/DL   HEPATIC FUNCTION PANEL    Collection Time: 07/12/19  3:01 AM   Result Value Ref Range    Protein, total 5.1 (L) 6.3 - 8.2 g/dL    Albumin 2.4 (L) 3.5 - 5.0 g/dL    Globulin 2.7 2.3 - 3.5 g/dL    A-G Ratio 0.9 (L) 1.2 - 3.5      Bilirubin, total 0.2 0.2 - 1.1 MG/DL    Bilirubin, direct 0.1 <0.4 MG/DL    Alk.  phosphatase 52 50 - 136 U/L    AST (SGOT) 17 15 - 37 U/L    ALT (SGPT) 16 12 - 65 U/L   CBC W/O DIFF    Collection Time: 07/12/19  3:02 AM   Result Value Ref Range    WBC 17.4 (H) 4.3 - 11.1 K/uL    RBC 3.00 (L) 4.23 - 5.6 M/uL    HGB 8.8 (L) 13.6 - 17.2 g/dL    HCT 27.4 (L) 41.1 - 50.3 %    MCV 91.3 79.6 - 97.8 FL    MCH 29.3 26.1 - 32.9 PG    MCHC 32.1 31.4 - 35.0 g/dL    RDW 13.2 11.9 - 14.6 %    PLATELET 047 333 - 390 K/uL    MPV 11.3 9.4 - 12.3 FL    ABSOLUTE NRBC 0.00 0.0 - 0.2 K/uL   POC G3    Collection Time: 07/12/19  3:06 AM   Result Value Ref Range    Device: VENT      FIO2 (POC) 60 %    pH (POC) 7.331 (L) 7.35 - 7.45      pCO2 (POC) 31.3 (L) 35 - 45 MMHG    pO2 (POC) 62 (L) 75 - 100 MMHG    HCO3 (POC) 16.6 (L) 22 - 26 MMOL/L    sO2 (POC) 90 (L) 95 - 98 %    Base deficit (POC) 8 mmol/L    Mode Pressure regulated volume control      Tidal volume 500 ml    Set Rate 14 bpm    PEEP/CPAP (POC) 8 cmH2O    Allens test (POC) NOT APPLICABLE      Site DRAWN FROM ARTERIAL LINE      Patient temp. 98.6      Specimen type (POC) ARTERIAL      Performed by BrowneAngelaRT     CO2, POC 17 MMOL/L    Respiratory comment: NurseNotified     Exhaled minute volume 8.10 L/min    COLLECT TIME 305     POC G3    Collection Time: 07/12/19  4:11 AM   Result Value Ref Range    Device: VENT      FIO2 (POC) 100 %    pH (POC) 7.308 (L) 7.35 - 7.45      pCO2 (POC) 35.8 35 - 45 MMHG    pO2 (POC) 132 (H) 75 - 100 MMHG    HCO3 (POC) 17.9 (L) 22 - 26 MMOL/L    sO2 (POC) 99 (H) 95 - 98 %    Base deficit (POC) 8 mmol/L    Mode Pressure regulated volume control      Tidal volume 500 ml    Set Rate 14 bpm    PEEP/CPAP (POC) 8 cmH2O    Allens test (POC) NOT APPLICABLE      Site DRAWN FROM ARTERIAL LINE      Patient temp. 98.6      Specimen type (POC) ARTERIAL      Performed by BrowneAngelaRT     CO2, POC 19 MMOL/L    Respiratory comment: NurseNotified     Exhaled minute volume 7.30 L/min    COLLECT TIME 410     POC G3    Collection Time: 07/12/19  8:55 AM   Result Value Ref Range    Device: VENT      FIO2 (POC) 100 %    pH (POC) 7.298 (L) 7.35 - 7.45      pCO2 (POC) 34.0 (L) 35 - 45 MMHG    pO2 (POC) 83 75 - 100 MMHG    HCO3 (POC) 16.6 (L) 22 - 26 MMOL/L    sO2 (POC) 95 95 - 98 %    Base deficit (POC) 9 mmol/L    Mode VENTILATOR/SPONTANEOUS/PRESSURE SUPPORT      PEEP/CPAP (POC) 8 cmH2O    Pressure support 6 cmH2O    Allens test (POC) NOT APPLICABLE      Total resp. rate 12      Site DRAWN FROM ARTERIAL LINE      Patient temp.  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 Q1 hour neuro checks. On SAH protocol - Mg, nimotop, zocor. PERRL +3. MAP goal 70-90. Patient got drowsy and repeat CT head showed increased ventricles and communicating hydrocephalus. EVD placed emergently. EVD set to 10cm H20. CT head this am showed improved communicating hydrocephalus. CTA head and neck this am showed no filling of the aneurysm and good filling of the PCAs. Will continue [address] protocol, PT/OT. OOB to chair today. Loaded with asa/plavix this am and Integrilin gtt stopped. Will check AR/NV in am.   RESP: PRVC @60%: 7.3/31/62, increased to 100%: 7.3/35/132, pt had difficulty with vent settings per RT, placed on PS and repeat AB.2/34/83. Pt now on APRV @100%, PH 26/TH4. 7.27/37/302. FIO2 taken to 60%. Pt with LLL effusion, will start CPT, sit up to chair. CV: NO MAP GOAL NOW.  2D Echo: 55-60%, trop peaked @ 0.8, trending down. SCD/SQ heparin. HEME: 8.8/, . HIT panel sent. HH drop most likely dilutional from IVF's. Will monitor. NEPH: bun/cr:28/2.6. Phos: 3.8, K+3.7. Still slightly acidotic. Will give 2 more amps of bicarb. GI: . Promote TF infusing wo difficulty, pepcid. Senna. ID: [id number]. Sputum cx sent, pending final, showing gram + cocci. Blood cultures and UA ordered. Will start vanc/cefepime, consult pharmacy for dosing. Renal cefepime due to cr cl. Monitor.    LINES: KISHOR [facility], SHANNAN phelps. Tobacco abuse: On Nicoderm patch.     Patient is critical due to [facility], risk of rebleeding, vasospasm, acute respiratory failure, communicating hydrocephalus, and kidney disease. I spent 34 minutes at bedside doing critical care examining the patient, reviewing labs and films, correcting care and updating the family.       Signed By: Louann Keith NP     July 12, 2019

## 2019-07-12 NOTE — CDMP QUERY
Pt admitted with SAH. Pt noted to have an abnormal CXR with decreased PaO2, and + sputum cultures. If possible, please document in the progress notes and d/c summary if you are evaluating and / or treating any of the following: 
 
? Gram positive pneumonia ? Bacterial pneumonia  
? Other (please specify) ? Unknown The medical record reflects the following: 
 
 Risk Factors: SAH, hydrocephalus, ventilated, possible seizure PTA, post op Clinical Indicators: note says \"decreased Pao2 this am, now on ARPV, has LLL effusion. +sputum for gram +cocci, Tm100. 2. \", CXR reading says \"New left basilar opacity, likely atelectasis or consolidation. There is a 
possibility of small left effusion. \" Treatment: IV Cefepime/Vanc. Lab monitoring, continued vent support, awaiting full sputum culture sensitivity. Enid Kay, RN, BSN, CDS Clinical Documentation Management Program 
[facility] AT [address] 
[phone] [email or web address]

## 2019-07-12 NOTE — PROGRESS NOTES
PM&R Consult Progress Note      Patient: Haritha Cuevas  Admit Date: 7/9/2019  Admit Diagnosis: SAH (subarachnoid hemorrhage) ([facility] [address] [I60.9]  Recommendations: Continue Acute Rehab Program, Coordination of rehab/medical care, Counseling of PM & R care issues management  -increased vent settings this a.m due to desats and new LLL effusion. 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 Post procedure CT shows resolved hydrocephalus, stable CTA. Continues on [facility] protocol  -seen by PT today and participated well despite being orally intubated. Will require extensive aggressive rehab. Will need to detm a solid dc plan after rehab. He will require physical assistance  -still pending notification of insurance; if so, need to look into disability policy   History/Subjective/Complaint:     Records reviewed. Pain 1  Pain Scale 1: Adult Nonverbal Pain Scale (07/12/19 1200)  Pain Intensity 1: 0 (07/12/19 1200)  Patient Stated Pain Goal: Unable to verbalize/indicate pain (07/11/19 2300)  Pain Reassessment 1: Yes (07/10/19 1847)  Pain Onset 1: since admit (07/09/19 2000)  Pain Location 1: Head (07/10/19 0002)  Pain Orientation 1: Posterior (07/10/19 0002)  Pain Description 1: Aching;Constant; Sore (07/10/19 0002)  Pain Intervention(s) 1: Medication (see MAR) (07/10/19 1822)     Objective:     Vitals:  Patient Vitals for the past 8 hrs:   BP Temp Pulse Resp SpO2   07/12/19 1430 -- 98.9 °F (37.2 °C) 93 13 100 %   07/12/19 1415 -- 98.7 °F (37.1 °C) 87 14 100 %   07/12/19 1400 145/69 98.6 °F (37 °C) 92 13 100 %   07/12/19 1345 -- 98.4 °F (36.9 °C) 70 12 100 %   07/12/19 1330 -- 98.4 °F (36.9 °C) 74 12 100 %   07/12/19 1315 -- 98.2 °F (36.8 °C) 79 13 100 %   07/12/19 1300 -- 98.2 °F (36.8 °C) 79 13 100 %   07/12/19 1245 -- 98.4 °F (36.9 °C) 76 12 100 %   07/12/19 1230 -- 98.2 °F (36.8 °C) 71 13 100 %   07/12/19 1215 -- 98.4 °F (36.9 °C) 83 12 100 %   07/12/19 1204 133/70 98.4 °F (36.9 °C) 89 16 100 %   07/12/19 1201 -- -- -- -- 100 %   07/12/19 1200 -- 98.4 °F (36.9 °C) 90 16 100 %   07/12/19 1159 -- -- 91 18 100 %   07/12/19 1145 -- 98.4 °F (36.9 °C) 91 24 100 %   07/12/19 1130 -- 98.4 °F (36.9 °C) 86 20 100 %   07/12/19 1115 -- 98.6 °F (37 °C) 95 13 100 %   07/12/19 1100 -- 99.1 °F (37.3 °C) 86 11 100 %   07/12/19 1045 -- 99.5 °F (37.5 °C) 85 13 100 %   07/12/19 1035 -- 99.6 °F (37.6 °C) 87 12 100 %   07/12/19 1015 -- 99.6 °F (37.6 °C) 81 13 100 %   07/12/19 1000 -- 99.8 °F (37.7 °C) 77 13 100 %   07/12/19 0945 -- 99.8 °F (37.7 °C) 80 13 100 %   07/12/19 0930 -- 100 °F (37.8 °C) (!) 101 13 100 %   07/12/19 0929 -- -- (!) 108 16 100 %   07/12/19 0915 -- 100 °F (37.8 °C) (!) 101 22 100 %   07/12/19 0900 133/65 100 °F (37.8 °C) 90 11 100 %   07/12/19 0845 -- 100 °F (37.8 °C) 82 11 100 %   07/12/19 0830 -- 100 °F (37.8 °C) 83 12 100 %   07/12/19 0815 -- 99.8 °F (37.7 °C) 84 12 100 %   07/12/19 0807 -- -- 80 12 100 %   07/12/19 0800 123/62 99.8 °F (37.7 °C) 80 11 100 %   07/12/19 0745 -- 99.8 °F (37.7 °C) 80 11 100 %   07/12/19 0730 -- 99.8 °F (37.7 °C) 81 11 100 %   07/12/19 0715 -- 99.8 °F (37.7 °C) 92 11 100 %   07/12/19 0700 129/65 99.8 °F (37.7 °C) 95 11 100 %      Intake and Output:  07/10 1901 - 07/12 0700  In: 31142.6 [I.V.:9848.6]  Out: 6999 [Urine:4660; Drains:454]    No Known Allergies  Current Facility-Administered Medications   Medication Dose Route Frequency    levETIRAcetam (KEPPRA) oral solution 500 mg  500 mg Per NG tube Q12H    famotidine (PEPCID) tablet 20 mg  20 mg Per NG tube DAILY    clopidogrel (PLAVIX) tablet 75 mg  75 mg Per NG tube DAILY    [START ON 7/13/2019] aspirin tablet 325 mg  325 mg Nasogastric DAILY    cefepime (MAXIPIME) 2 g in 0.9% sodium chloride (MBP/ADV) 100 mL  2 g IntraVENous Q12H    [START ON 7/13/2019] vancomycin (VANCOCIN) 1250 mg in  ml infusion  1,250 mg IntraVENous Q24H    fentaNYL citrate (PF) injection 50 mcg  50 mcg IntraVENous Q1H PRN    niMODipine (NIMOTOP) 30 mg/mL oral solution (compound) 60 mg  60 mg Per NG tube Q4H    magnesium oxide (MAG-OX) tablet 400 mg  400 mg Per NG tube BID    senna-docusate (PERICOLACE) 8.6-50 mg per tablet 2 Tab  2 Tab Per NG tube QHS    atorvastatin (LIPITOR) tablet 40 mg  40 mg Per NG tube QHS    heparin (porcine) injection 5,000 Units  5,000 Units SubCUTAneous Q8H    sodium chloride (NS) flush 30 mL  30 mL InterCATHeter Q8H    heparin (porcine) pf 900 Units  900 Units InterCATHeter Q8H    sodium chloride (NS) flush 30 mL  30 mL InterCATHeter PRN    heparin (porcine) pf 900 Units  900 Units InterCATHeter PRN    nicotine (NICODERM CQ) 21 mg/24 hr patch 1 Patch  1 Patch TransDERmal Q24H    acetaminophen (TYLENOL) tablet 650 mg  650 mg Per NG tube Q4H PRN    ondansetron (ZOFRAN) injection 4 mg  4 mg IntraVENous Q6H PRN    NUTRITIONAL SUPPORT ELECTROLYTE PRN ORDERS   Does Not Apply PRN    0.9% sodium chloride infusion  100 mL/hr IntraVENous CONTINUOUS    magnesium sulfate 4 g/100 mL IVPB  4 g IntraVENous DAILY PRN    magnesium sulfate 2 g/50 ml IVPB (premix or compounded)  2 g IntraVENous DAILY PRN       Functional Assessment:  Gross Assessment  AROM: Generally decreased, functional (07/12/19 1100)  PROM: Generally decreased, functional (07/12/19 1100)  Strength: Generally decreased, functional (07/12/19 1100)  Coordination: Generally decreased, functional (07/12/19 1100)     Gait  Base of Support: Center of gravity altered;Narrowed (07/12/19 1100)  Speed/Aminata: Slow;Shuffled (07/12/19 1100)  Step Length: Right shortened;Left shortened (07/12/19 1100)  Gait Abnormalities: Decreased step clearance; Path deviations; Shuffling gait;Trunk sway increased (07/12/19 1100)  Ambulation - Level of Assistance:  Moderate assistance(assist x3 (2 people for vent and lines, 1 person t/f)) (07/12/19 1100)  Distance (ft): 3 Feet (ft) (07/12/19 1100)  Assistive Device: Gait belt (07/12/19 1100)  Interventions: Safety awareness training;Manual cues; Verbal cues; Visual/Demos (07/12/19 1100)     Bed Mobility  Supine to Sit: Moderate assistance;Assist x2 (07/12/19 1100)  Scooting: Contact guard assistance (07/12/19 1100)     Balance  Sitting: Impaired (07/12/19 1100)  Sitting - Static: Good (unsupported) (07/12/19 1100)  Sitting - Dynamic: Fair (occasional) (07/12/19 1100)  Standing: Impaired (07/12/19 1100)  Standing - Static: Fair (07/12/19 1100)  Standing - Dynamic : Fair(-) (07/12/19 1100)                       Bed/Mat Mobility  Supine to Sit: Moderate assistance;Assist x2 (07/12/19 1100)  Sit to Stand: Minimum assistance (07/12/19 1100)  Stand to Sit: Minimum assistance (07/12/19 1100)  Bed to Chair: Moderate assistance (07/12/19 1100)  Scooting: Contact guard assistance (07/12/19 1100)     Labs/Studies:  Recent Results (from the past 72 hour(s))   DRUG SCREEN, URINE    Collection Time: 07/09/19  7:31 PM   Result Value Ref Range    PCP(PHENCYCLIDINE) NEGATIVE       BENZODIAZEPINES NEGATIVE       COCAINE NEGATIVE       AMPHETAMINES NEGATIVE       METHADONE NEGATIVE       THC (TH-CANNABINOL) NEGATIVE       OPIATES NEGATIVE       BARBITURATES NEGATIVE      CBC W/O DIFF    Collection Time: 07/09/19  7:38 PM   Result Value Ref Range    WBC 18.2 (H) 4.3 - 11.1 K/uL    RBC 4.21 (L) 4.23 - 5.6 M/uL    HGB 12.3 (L) 13.6 - 17.2 g/dL    HCT 37.7 (L) 41.1 - 50.3 %    MCV 89.5 79.6 - 97.8 FL    MCH 29.2 26.1 - 32.9 PG    MCHC 32.6 31.4 - 35.0 g/dL    RDW 12.4 11.9 - 14.6 %    PLATELET 729 187 - 229 K/uL    MPV 11.2 9.4 - 12.3 FL    ABSOLUTE NRBC 0.00 0.0 - 0.2 K/uL   METABOLIC PANEL, BASIC    Collection Time: 07/09/19  7:38 PM   Result Value Ref Range    Sodium 137 136 - 145 mmol/L    Potassium 4.4 3.5 - 5.1 mmol/L    Chloride 111 (H) 98 - 107 mmol/L    CO2 20 (L) 21 - 32 mmol/L    Anion gap 6 (L) 7 - 16 mmol/L    Glucose 121 (H) 65 - 100 mg/dL    BUN 48 (H) 6 - 23 MG/DL    Creatinine 3.15 (H) 0.8 - 1.5 MG/DL    GFR est AA 28 (L) >60 ml/min/1.73m2    GFR est non-AA 23 (L) >60 ml/min/1.73m2    Calcium 8.6 8.3 - 10.4 MG/DL   MAGNESIUM    Collection Time: 07/09/19  7:38 PM   Result Value Ref Range    Magnesium 2.2 1.8 - 2.4 mg/dL   CK    Collection Time: 07/09/19  7:38 PM   Result Value Ref Range     21 - 215 U/L   TROPONIN I    Collection Time: 07/09/19  7:38 PM   Result Value Ref Range    Troponin-I, Qt. 0.08 (H) 0.02 - 0.05 NG/ML   LIPID PANEL    Collection Time: 07/09/19  7:38 PM   Result Value Ref Range    LIPID PROFILE          Cholesterol, total 220 (H) <200 MG/DL    Triglyceride 142 35 - 150 MG/DL    HDL Cholesterol 37 (L) 40 - 60 MG/DL    LDL, calculated 154.6 (H) <100 MG/DL    VLDL, calculated 28.4 (H) 6.0 - 23.0 MG/DL    CHOL/HDL Ratio 5.9     HEMOGLOBIN A1C WITH EAG    Collection Time: 07/09/19  7:38 PM   Result Value Ref Range    Hemoglobin A1c 5.5 4.8 - 6.0 %    Est. average glucose 111 mg/dL   HEPATIC FUNCTION PANEL    Collection Time: 07/09/19  7:38 PM   Result Value Ref Range    Protein, total 6.7 6.3 - 8.2 g/dL    Albumin 3.4 (L) 3.5 - 5.0 g/dL    Globulin 3.3 2.3 - 3.5 g/dL    A-G Ratio 1.0 (L) 1.2 - 3.5      Bilirubin, total 0.2 0.2 - 1.1 MG/DL    Bilirubin, direct <0.1 <0.4 MG/DL    Alk.  phosphatase 92 50 - 136 U/L    AST (SGOT) 11 (L) 15 - 37 U/L    ALT (SGPT) 9 (L) 12 - 65 U/L   SODIUM    Collection Time: 07/09/19  7:38 PM   Result Value Ref Range    Sodium 139 136 - 294 mmol/L   METABOLIC PANEL, BASIC    Collection Time: 07/09/19 11:08 PM   Result Value Ref Range    Sodium 145 136 - 145 mmol/L    Potassium 4.7 3.5 - 5.1 mmol/L    Chloride 119 (H) 98 - 107 mmol/L    CO2 19 (L) 21 - 32 mmol/L    Anion gap 7 7 - 16 mmol/L    Glucose 110 (H) 65 - 100 mg/dL    BUN 40 (H) 6 - 23 MG/DL    Creatinine 2.99 (H) 0.8 - 1.5 MG/DL    GFR est AA 29 (L) >60 ml/min/1.73m2    GFR est non-AA 24 (L) >60 ml/min/1.73m2    Calcium 8.0 (L) 8.3 - 10.4 MG/DL   EKG, 12 LEAD, INITIAL    Collection Time: 07/09/19 11:50 PM   Result Value Ref Range Ventricular Rate 86 BPM    Atrial Rate 86 BPM    P-R Interval 186 ms    QRS Duration 72 ms    Q-T Interval 354 ms    QTC Calculation (Bezet) 423 ms    Calculated P Axis 77 degrees    Calculated R Axis 44 degrees    Calculated T Axis 60 degrees    Diagnosis       Normal sinus rhythm    Confirmed by Trino Guajardo MD (), TEE COLEY ([id number]) on 7/10/2019 7:39:41 AM     POC G3    Collection Time: 07/10/19  3:09 AM   Result Value Ref Range    Device: ROOM AIR      FIO2 (POC) 21 %    pH (POC) 7.277 (L) 7.35 - 7.45      pCO2 (POC) 33.0 (L) 35 - 45 MMHG    pO2 (POC) 85 75 - 100 MMHG    HCO3 (POC) 15.3 (L) 22 - 26 MMOL/L    sO2 (POC) 95 95 - 98 %    Base deficit (POC) 10 mmol/L    Allens test (POC) NOT APPLICABLE      Site DRAWN FROM ARTERIAL LINE      Patient temp. 99.1      Specimen type (POC) ARTERIAL      Performed by StoddardBrandonRT     CO2, POC 16 MMOL/L    Respiratory comment: NurseNotified     COLLECT TIME 304     POC G3    Collection Time: 07/10/19  4:58 AM   Result Value Ref Range    Device: VENT      FIO2 (POC) 40 %    pH (POC) 7.197 (LL) 7.35 - 7.45      pCO2 (POC) 44.6 35 - 45 MMHG    pO2 (POC) 175 (H) 75 - 100 MMHG    HCO3 (POC) 17.3 (L) 22 - 26 MMOL/L    sO2 (POC) 99 (H) 95 - 98 %    Base deficit (POC) 10 mmol/L    Mode Pressure regulated volume control      Tidal volume 500 ml    Set Rate 12 bpm    PEEP/CPAP (POC) 5 cmH2O    Allens test (POC) NOT APPLICABLE      Inspiratory Time 1.46 sec    Site DRAWN FROM ARTERIAL LINE      Patient temp.  98.6      Specimen type (POC) ARTERIAL      Performed by StoddardBrandonRT     CO2, POC 19 MMOL/L    Critical value read back 04:59     Respiratory comment: PhysicianNotified     Exhaled minute volume 6.00 L/min    COLLECT TIME 362     METABOLIC PANEL, BASIC    Collection Time: 07/10/19  5:16 AM   Result Value Ref Range    Sodium 154 (H) 136 - 145 mmol/L    Potassium 4.6 3.5 - 5.1 mmol/L    Chloride 123 (H) 98 - 107 mmol/L    CO2 26 21 - 32 mmol/L    Anion gap 5 (L) 7 - 16 mmol/L    Glucose 135 (H) 65 - 100 mg/dL    BUN 37 (H) 6 - 23 MG/DL    Creatinine 2.83 (H) 0.8 - 1.5 MG/DL    GFR est AA 31 (L) >60 ml/min/1.73m2    GFR est non-AA 26 (L) >60 ml/min/1.73m2    Calcium 7.5 (L) 8.3 - 10.4 MG/DL   MAGNESIUM    Collection Time: 07/10/19  5:16 AM   Result Value Ref Range    Magnesium 2.6 (H) 1.8 - 2.4 mg/dL   PROTHROMBIN TIME + INR    Collection Time: 07/10/19  5:16 AM   Result Value Ref Range    Prothrombin time 13.4 11.7 - 14.5 sec    INR 1.0     CBC W/O DIFF    Collection Time: 07/10/19  5:16 AM   Result Value Ref Range    WBC 13.8 (H) 4.3 - 11.1 K/uL    RBC 3.43 (L) 4.23 - 5.6 M/uL    HGB 10.1 (L) 13.6 - 17.2 g/dL    HCT 30.6 (L) 41.1 - 50.3 %    MCV 89.2 79.6 - 97.8 FL    MCH 29.4 26.1 - 32.9 PG    MCHC 33.0 31.4 - 35.0 g/dL    RDW 12.6 11.9 - 14.6 %    PLATELET 997 620 - 379 K/uL    MPV 11.0 9.4 - 12.3 FL    ABSOLUTE NRBC 0.00 0.0 - 0.2 K/uL   TROPONIN I    Collection Time: 07/10/19  5:16 AM   Result Value Ref Range    Troponin-I, Qt. 0.08 (H) 0.02 - 0.05 NG/ML   SODIUM    Collection Time: 07/10/19  8:54 AM   Result Value Ref Range    Sodium 149 (H) 136 - 145 mmol/L   PROCALCITONIN    Collection Time: 07/10/19  8:54 AM   Result Value Ref Range    Procalcitonin 0.1 ng/mL   TROPONIN I    Collection Time: 07/10/19  8:55 AM   Result Value Ref Range    Troponin-I, Qt. 0.07 (H) 0.02 - 0.05 NG/ML   POC G3    Collection Time: 07/10/19  9:01 AM   Result Value Ref Range    Device: VENT      FIO2 (POC) 40 %    pH (POC) 7.256 (L) 7.35 - 7.45      pCO2 (POC) 41.6 35 - 45 MMHG    pO2 (POC) 163 (H) 75 - 100 MMHG    HCO3 (POC) 18.5 (L) 22 - 26 MMOL/L    sO2 (POC) 99 (H) 95 - 98 %    Base deficit (POC) 8 mmol/L    Mode Pressure regulated volume control      Tidal volume 500 ml    Set Rate 12 bpm    PEEP/CPAP (POC) 8 cmH2O    Allens test (POC) NO      Site DRAWN FROM ARTERIAL LINE      Patient temp.  98.6      Specimen type (POC) ARTERIAL      Performed by Shania     CO2, POC 20 MMOL/L Critical value read back 00:02     Exhaled minute volume 6.10 L/min    COLLECT TIME 137     METABOLIC PANEL, BASIC    Collection Time: 07/10/19 11:24 AM   Result Value Ref Range    Sodium 152 (H) 136 - 145 mmol/L    Potassium 3.6 3.5 - 5.1 mmol/L    Chloride 125 (H) 98 - 107 mmol/L    CO2 19 (L) 21 - 32 mmol/L    Anion gap 8 7 - 16 mmol/L    Glucose 96 65 - 100 mg/dL    BUN 32 (H) 6 - 23 MG/DL    Creatinine 2.46 (H) 0.8 - 1.5 MG/DL    GFR est AA 37 (L) >60 ml/min/1.73m2    GFR est non-AA 30 (L) >60 ml/min/1.73m2    Calcium 6.8 (L) 8.3 - 10.4 MG/DL   POC G3    Collection Time: 07/10/19 11:26 AM   Result Value Ref Range    Device: VENT      FIO2 (POC) 40 %    pH (POC) 7.276 (L) 7.35 - 7.45      pCO2 (POC) 41.2 35 - 45 MMHG    pO2 (POC) 166 (H) 75 - 100 MMHG    HCO3 (POC) 19.2 (L) 22 - 26 MMOL/L    sO2 (POC) 99 (H) 95 - 98 %    Base deficit (POC) 7 mmol/L    Mode Pressure regulated volume control      Tidal volume 500 ml    Set Rate 14 bpm    PEEP/CPAP (POC) 8 cmH2O    Allens test (POC) NO      Site DRAWN FROM ARTERIAL LINE      Patient temp. 98.6      Specimen type (POC) ARTERIAL      Performed by Shania     CO2, POC 20 MMOL/L    Flow rate (POC) 1,122.000 L/min    Critical value read back 00:02     Exhaled minute volume 7.10 L/min   SODIUM    Collection Time: 07/10/19  5:52 PM   Result Value Ref Range    Sodium 154 (H) 136 - 145 mmol/L   POC G3    Collection Time: 07/10/19  7:16 PM   Result Value Ref Range    Device: VENT      FIO2 (POC) 40 %    pH (POC) 7.309 (L) 7.35 - 7.45      pCO2 (POC) 41.4 35 - 45 MMHG    pO2 (POC) 170 (H) 75 - 100 MMHG    HCO3 (POC) 20.8 (L) 22 - 26 MMOL/L    sO2 (POC) 99 (H) 95 - 98 %    Base deficit (POC) 5 mmol/L    Mode Pressure regulated volume control      Tidal volume 500 ml    Set Rate 14 bpm    PEEP/CPAP (POC) 8 cmH2O    Allens test (POC) NOT APPLICABLE      Inspiratory Time 1.43 sec    Site DRAWN FROM ARTERIAL LINE      Patient temp.  98.6      Specimen type (POC) ARTERIAL Performed by Vicki     CO2, POC 22 MMOL/L    Critical value read back 19:17     Respiratory comment: NurseNotified     Exhaled minute volume 7.10 L/min    COLLECT TIME 1,910     SODIUM    Collection Time: 07/11/19 12:19 AM   Result Value Ref Range    Sodium 154 (H) 136 - 145 mmol/L   POC G3    Collection Time: 07/11/19  3:25 AM   Result Value Ref Range    Device: VENT      FIO2 (POC) 40 %    pH (POC) 7.295 (L) 7.35 - 7.45      pCO2 (POC) 40.6 35 - 45 MMHG    pO2 (POC) 180 (H) 75 - 100 MMHG    HCO3 (POC) 19.8 (L) 22 - 26 MMOL/L    sO2 (POC) 99 (H) 95 - 98 %    Base deficit (POC) 6 mmol/L    Mode Pressure regulated volume control      Tidal volume 500 ml    Set Rate 14 bpm    PEEP/CPAP (POC) 8 cmH2O    Allens test (POC) NOT APPLICABLE      Site DRAWN FROM ARTERIAL LINE      Patient temp.  98.6      Specimen type (POC) ARTERIAL      Performed by Price     CO2, POC 21 MMOL/L    Respiratory comment: NurseNotified     Exhaled minute volume 8.50 L/min    COLLECT TIME 554     METABOLIC PANEL, BASIC    Collection Time: 07/11/19  3:43 AM   Result Value Ref Range    Sodium 152 (H) 136 - 145 mmol/L    Potassium 3.9 3.5 - 5.1 mmol/L    Chloride 123 (H) 98 - 107 mmol/L    CO2 21 21 - 32 mmol/L    Anion gap 8 7 - 16 mmol/L    Glucose 125 (H) 65 - 100 mg/dL    BUN 28 (H) 6 - 23 MG/DL    Creatinine 2.73 (H) 0.8 - 1.5 MG/DL    GFR est AA 33 (L) >60 ml/min/1.73m2    GFR est non-AA 27 (L) >60 ml/min/1.73m2    Calcium 8.1 (L) 8.3 - 10.4 MG/DL   MAGNESIUM    Collection Time: 07/11/19  3:43 AM   Result Value Ref Range    Magnesium 2.3 1.8 - 2.4 mg/dL   CBC W/O DIFF    Collection Time: 07/11/19  3:43 AM   Result Value Ref Range    WBC 13.5 (H) 4.3 - 11.1 K/uL    RBC 3.33 (L) 4.23 - 5.6 M/uL    HGB 9.6 (L) 13.6 - 17.2 g/dL    HCT 30.5 (L) 41.1 - 50.3 %    MCV 91.6 79.6 - 97.8 FL    MCH 28.8 26.1 - 32.9 PG    MCHC 31.5 31.4 - 35.0 g/dL    RDW 13.1 11.9 - 14.6 %    PLATELET 627 (L) 762 - 450 K/uL    MPV 10.8 9.4 - 12.3 FL    ABSOLUTE NRBC 0.00 0.0 - 0.2 K/uL   GLUCOSE, POC    Collection Time: 07/11/19 10:01 AM   Result Value Ref Range    Glucose (POC) 134 (H) 65 - 100 mg/dL   SODIUM    Collection Time: 07/11/19 10:05 AM   Result Value Ref Range    Sodium 150 (H) 136 - 145 mmol/L   PHOSPHORUS    Collection Time: 07/11/19 10:05 AM   Result Value Ref Range    Phosphorus 3.8 2.5 - 4.5 MG/DL   SODIUM    Collection Time: 07/11/19  4:08 PM   Result Value Ref Range    Sodium 152 (H) 136 - 145 mmol/L   GLUCOSE, POC    Collection Time: 07/11/19  4:09 PM   Result Value Ref Range    Glucose (POC) 129 (H) 65 - 100 mg/dL   CBC W/O DIFF    Collection Time: 07/11/19  8:18 PM   Result Value Ref Range    WBC 18.2 (H) 4.3 - 11.1 K/uL    RBC 3.17 (L) 4.23 - 5.6 M/uL    HGB 9.2 (L) 13.6 - 17.2 g/dL    HCT 29.3 (L) 41.1 - 50.3 %    MCV 92.4 79.6 - 97.8 FL    MCH 29.0 26.1 - 32.9 PG    MCHC 31.4 31.4 - 35.0 g/dL    RDW 13.2 11.9 - 14.6 %    PLATELET 133 581 - 076 K/uL    MPV 11.2 9.4 - 12.3 FL    ABSOLUTE NRBC 0.00 0.0 - 0.2 K/uL   METABOLIC PANEL, BASIC    Collection Time: 07/11/19  8:18 PM   Result Value Ref Range    Sodium 151 (H) 136 - 145 mmol/L    Potassium 4.1 3.5 - 5.1 mmol/L    Chloride 124 (H) 98 - 107 mmol/L    CO2 20 (L) 21 - 32 mmol/L    Anion gap 7 7 - 16 mmol/L    Glucose 123 (H) 65 - 100 mg/dL    BUN 25 (H) 6 - 23 MG/DL    Creatinine 2.66 (H) 0.8 - 1.5 MG/DL    GFR est AA 34 (L) >60 ml/min/1.73m2    GFR est non-AA 28 (L) >60 ml/min/1.73m2    Calcium 7.7 (L) 8.3 - 10.4 MG/DL   CULTURE, RESPIRATORY/SPUTUM/BRONCH W GRAM STAIN    Collection Time: 07/12/19  1:36 AM   Result Value Ref Range    Special Requests: NO SPECIAL REQUESTS      GRAM STAIN WBCS SEEN TOO NUMEROUS TO COUNT      GRAM STAIN NO WBCS SEEN      GRAM STAIN MODERATE GRAM POSITIVE COCCI      GRAM STAIN MANY GRAM POSITIVE RODS      Culture result:        NO GROWTH AFTER SHORT PERIOD OF INCUBATION. FURTHER RESULTS TO FOLLOW AFTER OVERNIGHT INCUBATION.    METABOLIC PANEL, BASIC Collection Time: 07/12/19  3:01 AM   Result Value Ref Range    Sodium 150 (H) 136 - 145 mmol/L    Potassium 3.7 3.5 - 5.1 mmol/L    Chloride 123 (H) 98 - 107 mmol/L    CO2 20 (L) 21 - 32 mmol/L    Anion gap 7 7 - 16 mmol/L    Glucose 113 (H) 65 - 100 mg/dL    BUN 28 (H) 6 - 23 MG/DL    Creatinine 2.60 (H) 0.8 - 1.5 MG/DL    GFR est AA 35 (L) >60 ml/min/1.73m2    GFR est non-AA 29 (L) >60 ml/min/1.73m2    Calcium 7.7 (L) 8.3 - 10.4 MG/DL   MAGNESIUM    Collection Time: 07/12/19  3:01 AM   Result Value Ref Range    Magnesium 2.3 1.8 - 2.4 mg/dL   TRIGLYCERIDE    Collection Time: 07/12/19  3:01 AM   Result Value Ref Range    Triglyceride 155 (H) 35 - 150 MG/DL   HEPATIC FUNCTION PANEL    Collection Time: 07/12/19  3:01 AM   Result Value Ref Range    Protein, total 5.1 (L) 6.3 - 8.2 g/dL    Albumin 2.4 (L) 3.5 - 5.0 g/dL    Globulin 2.7 2.3 - 3.5 g/dL    A-G Ratio 0.9 (L) 1.2 - 3.5      Bilirubin, total 0.2 0.2 - 1.1 MG/DL    Bilirubin, direct 0.1 <0.4 MG/DL    Alk.  phosphatase 52 50 - 136 U/L    AST (SGOT) 17 15 - 37 U/L    ALT (SGPT) 16 12 - 65 U/L   CBC W/O DIFF    Collection Time: 07/12/19  3:02 AM   Result Value Ref Range    WBC 17.4 (H) 4.3 - 11.1 K/uL    RBC 3.00 (L) 4.23 - 5.6 M/uL    HGB 8.8 (L) 13.6 - 17.2 g/dL    HCT 27.4 (L) 41.1 - 50.3 %    MCV 91.3 79.6 - 97.8 FL    MCH 29.3 26.1 - 32.9 PG    MCHC 32.1 31.4 - 35.0 g/dL    RDW 13.2 11.9 - 14.6 %    PLATELET 518 971 - 381 K/uL    MPV 11.3 9.4 - 12.3 FL    ABSOLUTE NRBC 0.00 0.0 - 0.2 K/uL   POC G3    Collection Time: 07/12/19  3:06 AM   Result Value Ref Range    Device: VENT      FIO2 (POC) 60 %    pH (POC) 7.331 (L) 7.35 - 7.45      pCO2 (POC) 31.3 (L) 35 - 45 MMHG    pO2 (POC) 62 (L) 75 - 100 MMHG    HCO3 (POC) 16.6 (L) 22 - 26 MMOL/L    sO2 (POC) 90 (L) 95 - 98 %    Base deficit (POC) 8 mmol/L    Mode Pressure regulated volume control      Tidal volume 500 ml    Set Rate 14 bpm    PEEP/CPAP (POC) 8 cmH2O    Allens test (POC) NOT APPLICABLE      Site DRAWN FROM ARTERIAL LINE      Patient temp. 98.6      Specimen type (POC) ARTERIAL      Performed by BrowneAngelaRT     CO2, POC 17 MMOL/L    Respiratory comment: NurseNotified     Exhaled minute volume 8.10 L/min    COLLECT TIME 305     POC G3    Collection Time: 07/12/19  4:11 AM   Result Value Ref Range    Device: VENT      FIO2 (POC) 100 %    pH (POC) 7.308 (L) 7.35 - 7.45      pCO2 (POC) 35.8 35 - 45 MMHG    pO2 (POC) 132 (H) 75 - 100 MMHG    HCO3 (POC) 17.9 (L) 22 - 26 MMOL/L    sO2 (POC) 99 (H) 95 - 98 %    Base deficit (POC) 8 mmol/L    Mode Pressure regulated volume control      Tidal volume 500 ml    Set Rate 14 bpm    PEEP/CPAP (POC) 8 cmH2O    Allens test (POC) NOT APPLICABLE      Site DRAWN FROM ARTERIAL LINE      Patient temp. 98.6      Specimen type (POC) ARTERIAL      Performed by BrowneAngelaRT     CO2, POC 19 MMOL/L    Respiratory comment: NurseNotified     Exhaled minute volume 7.30 L/min    COLLECT TIME 410     POC G3    Collection Time: 07/12/19  8:55 AM   Result Value Ref Range    Device: VENT      FIO2 (POC) 100 %    pH (POC) 7.298 (L) 7.35 - 7.45      pCO2 (POC) 34.0 (L) 35 - 45 MMHG    pO2 (POC) 83 75 - 100 MMHG    HCO3 (POC) 16.6 (L) 22 - 26 MMOL/L    sO2 (POC) 95 95 - 98 %    Base deficit (POC) 9 mmol/L    Mode VENTILATOR/SPONTANEOUS/PRESSURE SUPPORT      PEEP/CPAP (POC) 8 cmH2O    Pressure support 6 cmH2O    Allens test (POC) NOT APPLICABLE      Total resp. rate 12      Site DRAWN FROM ARTERIAL LINE      Patient temp.  98.6      Specimen type (POC) ARTERIAL      Performed by ManteiKevinRT     CO2, POC 18 MMOL/L    Respiratory comment: NurseNotified     Exhaled minute volume 12.60 L/min    COLLECT TIME 853     POC G3    Collection Time: 07/12/19 11:46 AM   Result Value Ref Range    Device: VENT      FIO2 (POC) 100 %    pH (POC) 7.276 (L) 7.35 - 7.45      pCO2 (POC) 37.4 35 - 45 MMHG    pO2 (POC) 302 (H) 75 - 100 MMHG    HCO3 (POC) 17.4 (L) 22 - 26 MMOL/L    sO2 (POC) 100 (H) 95 - 98 %    Base deficit (POC) 9 mmol/L    Mode BILEVEL      PEEP/CPAP (POC) 0 cmH2O    PIP (POC) 26      Allens test (POC) NOT APPLICABLE      Inspiratory Time High 4.0 sec    Inspiratory Time Low 0.6 sec    Site DRAWN FROM ARTERIAL LINE      Patient temp.  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

## 2019-07-12 NOTE — PROGRESS NOTES
Bedside shift change report given to Dorcas Zurita RN (oncoming nurse) by Freedom Lucas RN (offgoing nurse). Report included the following information SBAR, Kardex, ED Summary, Intake/Output, MAR, Recent Results, Cardiac Rhythm SR/ST and Alarm Parameters    Dual neuro and drain assessment completed.  See flowsheet

## 2019-07-12 NOTE — CDMP QUERY
Patient admitted with [facility] secondary to Basilar tip aneurysm rupture, noted to have abnormal sodium levels. If possible, please document in progress notes and d/c summary if you are evaluating and/or treating any of the following: ? Hypernatremia ? Expected hypernatremia d/t SAH 
? Other, please specify ? Unable to Determine The medical record reflects the following: 
  Risk Factors: SAH Clinical Indicators:  Na level [phone] Treatment: lab monitoring. Thanks, Cherri Bautista RN, BSN, CDS Clinical Documentation Management Program 
[facility] AT [address] 
[phone] [email or web address]

## 2019-07-12 NOTE — PROGRESS NOTES
Occupational Therapy Note:  OT orders received, chart reviewed, and evaluation attempted. Patient working with PT at this time. Will check back as schedule permits and patient is available to initiate occupational therapy evaluation.    Thank you for this consult,   Anjelica Avendaño, OTR/L

## 2019-07-12 NOTE — PROGRESS NOTES
Problem: Mobility Impaired (Adult and Pediatric)  Goal: *Acute Goals and Plan of Care (Insert Text)  Description  STG:  (1.)Mr. Andrés Torres will move from supine to sit and sit to supine , scoot up and down and roll side to side with CONTACT GUARD ASSIST within 3 treatment day(s). (2.)Mr. Andrés Torres will transfer from bed to chair and chair to bed with CONTACT GUARD ASSIST using the least restrictive device within 3 treatment day(s). (3.)Mr. Andrés Torres will ambulate with MINIMAL ASSIST for 50+ feet with the least restrictive device within 3 treatment day(s). (4.)Mr. Andrés Torres will perform standing static and dynamic balance activities x 15 minutes with MINIMAL ASSIST to improve safety within 3 day(s). (5.)Mr. Andrés Torres will maintain stable vital signs throughout all functional mobility within 3 days. LTG:  (1.)Mr. Andrés Torres will move from supine to sit and sit to supine , scoot up and down and roll side to side in bed with SUPERVISION within 7 treatment day(s). (2.)Mr. Andrés Torres will transfer from bed to chair and chair to bed with STAND BY ASSIST using the least restrictive device within 7 treatment day(s). (3.)Mr. Andrés Torres will ambulate with STAND BY ASSIST for 75+ feet with the least restrictive device within 7 treatment day(s). (4.)Mr. Andrés Torres will perform standing static and dynamic balance activities x 15 minutes with STAND BY ASSIST to improve safety within 7 day(s).   ________________________________________________________________________________________________     Outcome: Progressing Towards Goal     PHYSICAL THERAPY: Initial Assessment, Daily Note and AM 7/12/2019  INPATIENT: PT Visit Days : 1  Payor: SELF PAY / Plan: [facility] SELF PAY / Product Type: Self Pay /       NAME/AGE/GENDER: Mae Cruz is a 37 y.o. 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 ASSESSMENT:     Mr. Dmitry Alexandra is a 37 y.o. Male admitted for subarachnoid hemorrhage and had surgical intervention yesterday. He is seen today in ICU, intubated and drowsy, however able to awaken to voice and follow commands. BLE grossly 3+/5. Unsure of patient's baseline as no family/friends present and patient unable to verbalize. Per RN, he works at baseline. He is supine and agreeable to PT evaluation and treatment. Additional time required for all mobility and RN and another skilled therapist present for transfer to monitor and manage lines. He required moderate assist to transfer to sitting and able to scoot to edge of bed with CGA. C/o lightheadedness in sitting which improved with time. Treatment initiated to include sit to stand transfer with moderate assist, ambulation x3' with gait belt (no RW due to weak BUE ). Knees flexed but no buckling appreciated with ambulation and patient sat in recliner. Vital signs remained stable throughout transfer and patient fatigued post transfer. Feel he will progress well with therapy and anticipate he will be able to ambulate while on vent if he stays on the ventilator. Hetal Roberto is currently functioning below his baseline and would benefit from skilled PT during acute care stay to maximize safety and independence with functional mobility.     This section established at most recent assessment   PROBLEM LIST (Impairments causing functional limitations):  Decreased Strength  Decreased ADL/Functional Activities  Decreased Transfer Abilities  Decreased Ambulation Ability/Technique  Decreased Balance  Decreased Activity Tolerance  Decreased Pacing Skills  Increased Shortness of Breath  Decreased Knowledge of Precautions  Decreased [place] with Home Exercise Program   INTERVENTIONS PLANNED: (Benefits and precautions of physical therapy have been discussed with the patient.)  Balance Exercise  Bed Mobility  Family Education  Gait Training  Home Exercise Program (HEP)  Therapeutic Activites  Therapeutic Exercise/Strengthening  Transfer Training     TREATMENT PLAN: Frequency/Duration: 3 times a week for duration of hospital stay  Rehabilitation Potential For Stated Goals: Good     REHAB RECOMMENDATIONS (at time of discharge pending progress):    Placement: It is my opinion, based on this patient's performance to date, that Mr. Dmitry Alexandra may benefit from intensive therapy at an [address] [place] [address] after discharge due to a probable need for close medical supervision by a rehab physician, a probable need for 24 hour rehab nursing, a probable need for multiple therapy disciplines and potential to make ongoing and sustainable functional improvement that is of practical value. .  Equipment:   None at this time              HISTORY:   History of Present Injury/Illness (Reason for Referral):  Subarachnoid hemorrhage. Past Medical History/Comorbidities:   Mr. Dmitry Alexandra  has no past medical history on file. Mr. Dmitry Alexandra  has no past surgical history on file. Social History/Living Environment:   Home Environment: Private residence  One/Two Story Residence: Other (Comment)  Living Alone: No  Support Systems: Child(rohan), Parent, Family member(s)  Patient Expects to be Discharged to[de-identified] Private residence  Current DME Used/Available at Home: None  Prior Level of Function/Work/Activity:  Unsure, RN reports patient worked.      Number of Personal Factors/Comorbidities that affect the Plan of Care: 1-2: MODERATE COMPLEXITY   EXAMINATION:   Most Recent Physical Functioning:   Gross Assessment:  AROM: Generally decreased, functional  PROM: Generally decreased, functional  Strength: Generally decreased, functional  Coordination: Generally decreased, functional               Posture:  Posture (WDL): Exceptions to WDL  Posture Assessment: Forward head  Balance:  Sitting: Impaired  Sitting - Static: Good (unsupported)  Sitting - Dynamic: Fair (occasional)  Standing: Impaired  Standing - Static: Fair  Standing - Dynamic : Fair(-) Bed Mobility:  Supine to Sit: Moderate assistance;Assist x2  Scooting: Contact guard assistance  Wheelchair Mobility:     Transfers:  Sit to Stand: Minimum assistance  Stand to Sit: Minimum assistance  Bed to Chair: Moderate assistance  Gait:     Base of Support: Center of gravity altered;Narrowed  Speed/Aminata: Slow;Shuffled  Step Length: Right shortened;Left shortened  Gait Abnormalities: Decreased step clearance; Path deviations; Shuffling gait;Trunk sway increased  Distance (ft): 3 Feet (ft)  Assistive Device: Gait belt  Ambulation - Level of Assistance: Moderate assistance(assist x3 (2 people for vent and lines, 1 person t/f))  Interventions: Safety awareness training;Manual cues; Verbal cues; Visual/Demos      Body Structures Involved:  Nerves  Heart  Lungs  Muscles Body Functions Affected:  Sensory/Pain  Voice and Speech  Cardio  Respiratory  Neuromusculoskeletal  Movement Related Activities and Participation Affected:  Learning and Applying Knowledge  General Tasks and Demands  Communication  Mobility  Self Care  Domestic Life  Interpersonal Interactions and Relationships  Community, Social and Civic Life   Number of elements that affect the Plan of Care: 4+: HIGH COMPLEXITY   CLINICAL PRESENTATION:   Presentation: Evolving clinical presentation with changing clinical characteristics: MODERATE COMPLEXITY   CLINICAL DECISION MAKIN [address]? ?6 Clicks? Basic Mobility Inpatient Short Form  How much difficulty does the patient currently have. .. Unable A Lot A Little None   1. Turning over in bed (including adjusting bedclothes, sheets and blankets)? ? 1   ? 2   ? 3   ? 4   2. Sitting down on and standing up from a chair with arms ( e.g., wheelchair, bedside commode, etc.)   ? 1   ? 2   ? 3   ? 4   3. Moving from lying on back to sitting on the side of the bed?   ? 1   ? 2   ? 3   ? 4   How much help from another person does the patient currently need. .. Total A Lot A Little None   4. Moving to and from a bed to a chair (including a wheelchair)? ? 1   ? 2   ? 3   ? 4   5. Need to walk in hospital room? ? 1   ? 2   ? 3   ? 4   6. Climbing 3-5 steps with a railing? ? 1   ? 2   ? 3   ? 4   © 2007, Trustees of [address] [place] [address] [address] [id number], under license to [employer]. All rights reserved      Score:  Initial: 10 Most Recent: X (Date: -- )    Interpretation of Tool:  Represents activities that are increasingly more difficult (i.e. Bed mobility, Transfers, Gait). Medical Necessity:     Patient demonstrates good   rehab potential due to higher previous functional level. Reason for Services/Other Comments:  Patient continues to require modification of therapeutic interventions to increase complexity of exercises  . Use of outcome tool(s) and clinical judgement create a POC that gives a: Questionable prediction of patient's progress: MODERATE COMPLEXITY            TREATMENT:   (In addition to Assessment/Re-Assessment sessions the following treatments were rendered)   Pre-treatment Symptoms/Complaints:  none  Pain: Initial:   Pain Intensity 1: 0  Post Session:  0/10     Therapeutic Activity: (    23 minutes): Therapeutic activities including Bed transfers, Chair transfers and Ambulation on level ground to improve mobility, strength, balance, coordination and activity tolerance . Required maximal Safety awareness training;Manual cues; Verbal cues; Visual/Demos to promote static and dynamic balance in standing and promote coordination of bilateral, lower extremity(s).      Braces/Orthotics/Lines/Etc:   IV  phelps catheter  drain EVD   arterial line  O2 Device: Endotracheal tube, Ventilator  Treatment/Session Assessment:    Response to Treatment:  Patient maintained stable vitals, able to transfer to chair with mod assist x3 for line management. Interdisciplinary Collaboration:   Physical Therapist  Registered Nurse  Physical Therapist   After treatment position/precautions:   Up in chair  Bed alarm/tab alert on  Bed/Chair-wheels locked  Bed in low position  Call light within reach  RN notified  Nurse at bedside  B mitts placed    Compliance with Program/Exercises: Will assess as treatment progresses  Recommendations/Intent for next treatment session: \"Next visit will focus on advancements to more challenging activities and reduction in assistance provided\".   Total Treatment Duration:  PT Patient Time In/Time Out  Time In: 0959  Time Out: [address], PT, DPT

## 2019-07-13 ENCOUNTER — APPOINTMENT (OUTPATIENT)
Dept: GENERAL RADIOLOGY | Age: 44
DRG: 020 | End: 2019-07-13
Attending: NURSE PRACTITIONER
Payer: COMMERCIAL

## 2019-07-13 LAB
ANION GAP SERPL CALC-SCNC: 7 MMOL/L (ref 7–16)
ARTERIAL PATENCY WRIST A: ABNORMAL
ASPIRIN TEST, ASPIRN: 484 ARU (ref 620–672)
BASE DEFICIT BLD-SCNC: 5 MMOL/L
BDY SITE: ABNORMAL
BODY TEMPERATURE: 98.6
BUN SERPL-MCNC: 37 MG/DL (ref 6–23)
CALCIUM SERPL-MCNC: 8.2 MG/DL (ref 8.3–10.4)
CHLORIDE SERPL-SCNC: 124 MMOL/L (ref 98–107)
CO2 BLD-SCNC: 21 MMOL/L
CO2 SERPL-SCNC: 21 MMOL/L (ref 21–32)
COLLECT TIME,HTIME: 319
CREAT SERPL-MCNC: 2.79 MG/DL (ref 0.8–1.5)
ERYTHROCYTE [DISTWIDTH] IN BLOOD BY AUTOMATED COUNT: 13.6 % (ref 11.9–14.6)
EXHALED MINUTE VOLUME, VE: 9.7 L/MIN
GAS FLOW.O2 O2 DELIVERY SYS: ABNORMAL L/MIN
GLUCOSE SERPL-MCNC: 118 MG/DL (ref 65–100)
HCO3 BLD-SCNC: 20.1 MMOL/L (ref 22–26)
HCT VFR BLD AUTO: 26.2 % (ref 41.1–50.3)
HEPARIN INDUCED PLT,XHIPA: NEGATIVE
HGB BLD-MCNC: 8.4 G/DL (ref 13.6–17.2)
HIT INTERPRETATION,XINTPR: NEGATIVE
HIT PROFILE,XHITT: NORMAL
INSPIRATION.DURATION SETTING TIME VENT: 0.6 SEC
INSPIRATION.DURATION SETTING TIME VENT: 4 SEC
MAGNESIUM SERPL-MCNC: 2.9 MG/DL (ref 1.8–2.4)
MCH RBC QN AUTO: 29.6 PG (ref 26.1–32.9)
MCHC RBC AUTO-ENTMCNC: 32.1 G/DL (ref 31.4–35)
MCV RBC AUTO: 92.3 FL (ref 79.6–97.8)
NRBC # BLD: 0 K/UL (ref 0–0.2)
O2/TOTAL GAS SETTING VFR VENT: 60 %
OPTICAL DENSITY READ,XHITAO: 0.11 ABS
P2Y12 PLT RESPONSE,PPPR: 117 PRU
PCO2 BLD: 37.7 MMHG (ref 35–45)
PEEP RESPIRATORY: 0 CMH2O
PH BLD: 7.33 [PH] (ref 7.35–7.45)
PIP ISTAT,IPIP: 26
PLATELET # BLD AUTO: 152 K/UL (ref 150–450)
PMV BLD AUTO: 11.6 FL (ref 9.4–12.3)
PO2 BLD: 279 MMHG (ref 75–100)
POTASSIUM SERPL-SCNC: 3.8 MMOL/L (ref 3.5–5.1)
RBC # BLD AUTO: 2.84 M/UL (ref 4.23–5.6)
SAO2 % BLD: 100 % (ref 95–98)
SERVICE CMNT-IMP: ABNORMAL
SERVICE CMNT-IMP: ABNORMAL
SODIUM SERPL-SCNC: 152 MMOL/L (ref 136–145)
SPECIMEN TYPE: ABNORMAL
VENTILATION MODE VENT: ABNORMAL
WBC # BLD AUTO: 17 K/UL (ref 4.3–11.1)

## 2019-07-13 PROCEDURE — 77030018846 HC SOL IRR STRL H20 ICUM -A

## 2019-07-13 PROCEDURE — 65610000001 HC ROOM ICU GENERAL

## 2019-07-13 PROCEDURE — 74011000258 HC RX REV CODE- 258: Performed by: NURSE PRACTITIONER

## 2019-07-13 PROCEDURE — 85576 BLOOD PLATELET AGGREGATION: CPT

## 2019-07-13 PROCEDURE — 74011250637 HC RX REV CODE- 250/637: Performed by: NURSE PRACTITIONER

## 2019-07-13 PROCEDURE — 77030040132 HC BLANKET THRM DISP CSZ -B

## 2019-07-13 PROCEDURE — 93886 INTRACRANIAL COMPLETE STUDY: CPT | Performed by: PSYCHIATRY & NEUROLOGY

## 2019-07-13 PROCEDURE — 71045 X-RAY EXAM CHEST 1 VIEW: CPT

## 2019-07-13 PROCEDURE — 94668 MNPJ CHEST WALL SBSQ: CPT

## 2019-07-13 PROCEDURE — 93886 INTRACRANIAL COMPLETE STUDY: CPT

## 2019-07-13 PROCEDURE — 94003 VENT MGMT INPAT SUBQ DAY: CPT

## 2019-07-13 PROCEDURE — 83735 ASSAY OF MAGNESIUM: CPT

## 2019-07-13 PROCEDURE — 74011250637 HC RX REV CODE- 250/637: Performed by: NEUROLOGICAL SURGERY

## 2019-07-13 PROCEDURE — 74011250636 HC RX REV CODE- 250/636: Performed by: NURSE PRACTITIONER

## 2019-07-13 PROCEDURE — 77030020256 HC SOL INJ NACL 0.9%  500ML

## 2019-07-13 PROCEDURE — 82803 BLOOD GASES ANY COMBINATION: CPT

## 2019-07-13 PROCEDURE — 77030020263 HC SOL INJ SOD CL0.9% LFCR 1000ML

## 2019-07-13 PROCEDURE — 74011250636 HC RX REV CODE- 250/636: Performed by: NEUROLOGICAL SURGERY

## 2019-07-13 PROCEDURE — 74011250637 HC RX REV CODE- 250/637

## 2019-07-13 PROCEDURE — 77030013794 HC KT TRNSDUC BLD EDWD -B

## 2019-07-13 PROCEDURE — 80048 BASIC METABOLIC PNL TOTAL CA: CPT

## 2019-07-13 PROCEDURE — 85027 COMPLETE CBC AUTOMATED: CPT

## 2019-07-13 PROCEDURE — 99291 CRITICAL CARE FIRST HOUR: CPT | Performed by: NEUROLOGICAL SURGERY

## 2019-07-13 RX ORDER — ACETAMINOPHEN 10 MG/ML
1000 INJECTION, SOLUTION INTRAVENOUS ONCE
Status: COMPLETED | OUTPATIENT
Start: 2019-07-13 | End: 2019-07-13

## 2019-07-13 RX ORDER — TRIPROLIDINE/PSEUDOEPHEDRINE 2.5MG-60MG
800 TABLET ORAL ONCE
Status: COMPLETED | OUTPATIENT
Start: 2019-07-13 | End: 2019-07-13

## 2019-07-13 RX ORDER — ADHESIVE BANDAGE
30 BANDAGE TOPICAL DAILY PRN
Status: DISCONTINUED | OUTPATIENT
Start: 2019-07-13 | End: 2019-07-15

## 2019-07-13 RX ADMIN — SENNOSIDES, DOCUSATE SODIUM 2 TABLET: 50; 8.6 TABLET, FILM COATED ORAL at 21:11

## 2019-07-13 RX ADMIN — ATORVASTATIN CALCIUM 40 MG: 40 TABLET, FILM COATED ORAL at 21:11

## 2019-07-13 RX ADMIN — FENTANYL CITRATE 50 MCG: 50 INJECTION INTRAMUSCULAR; INTRAVENOUS at 07:12

## 2019-07-13 RX ADMIN — FENTANYL CITRATE 50 MCG: 50 INJECTION INTRAMUSCULAR; INTRAVENOUS at 03:07

## 2019-07-13 RX ADMIN — FENTANYL CITRATE 50 MCG: 50 INJECTION INTRAMUSCULAR; INTRAVENOUS at 10:54

## 2019-07-13 RX ADMIN — Medication 400 MG: at 17:24

## 2019-07-13 RX ADMIN — CEFEPIME 2 G: 2 INJECTION, POWDER, FOR SOLUTION INTRAVENOUS at 21:11

## 2019-07-13 RX ADMIN — MAGNESIUM HYDROXIDE 30 ML: 400 SUSPENSION ORAL at 21:09

## 2019-07-13 RX ADMIN — ASPIRIN 325 MG ORAL TABLET 325 MG: 325 PILL ORAL at 08:33

## 2019-07-13 RX ADMIN — Medication 30 ML: at 21:18

## 2019-07-13 RX ADMIN — LEVETIRACETAM 500 MG: 100 SOLUTION ORAL at 21:10

## 2019-07-13 RX ADMIN — FENTANYL CITRATE 50 MCG: 50 INJECTION INTRAMUSCULAR; INTRAVENOUS at 22:23

## 2019-07-13 RX ADMIN — NIMODIPINE 60 MG: 30 CAPSULE, LIQUID FILLED ORAL at 04:07

## 2019-07-13 RX ADMIN — FENTANYL CITRATE 50 MCG: 50 INJECTION INTRAMUSCULAR; INTRAVENOUS at 11:57

## 2019-07-13 RX ADMIN — HEPARIN SODIUM 5000 UNITS: 5000 INJECTION INTRAVENOUS; SUBCUTANEOUS at 06:07

## 2019-07-13 RX ADMIN — ACETAMINOPHEN 650 MG: 325 TABLET, FILM COATED ORAL at 01:31

## 2019-07-13 RX ADMIN — SODIUM CHLORIDE 100 ML/HR: 900 INJECTION, SOLUTION INTRAVENOUS at 10:45

## 2019-07-13 RX ADMIN — FENTANYL CITRATE 50 MCG: 50 INJECTION INTRAMUSCULAR; INTRAVENOUS at 17:24

## 2019-07-13 RX ADMIN — LEVETIRACETAM 500 MG: 100 SOLUTION ORAL at 09:00

## 2019-07-13 RX ADMIN — SODIUM CHLORIDE, PRESERVATIVE FREE 900 UNITS: 5 INJECTION INTRAVENOUS at 13:44

## 2019-07-13 RX ADMIN — ALPRAZOLAM 0.5 MG: 0.5 TABLET ORAL at 07:11

## 2019-07-13 RX ADMIN — SODIUM CHLORIDE, PRESERVATIVE FREE 900 UNITS: 5 INJECTION INTRAVENOUS at 21:10

## 2019-07-13 RX ADMIN — Medication 400 MG: at 08:33

## 2019-07-13 RX ADMIN — NIMODIPINE 60 MG: 30 CAPSULE, LIQUID FILLED ORAL at 08:34

## 2019-07-13 RX ADMIN — Medication 30 ML: at 14:00

## 2019-07-13 RX ADMIN — VANCOMYCIN HYDROCHLORIDE 1250 MG: 10 INJECTION, POWDER, LYOPHILIZED, FOR SOLUTION INTRAVENOUS at 10:45

## 2019-07-13 RX ADMIN — FENTANYL CITRATE 50 MCG: 50 INJECTION INTRAMUSCULAR; INTRAVENOUS at 18:49

## 2019-07-13 RX ADMIN — FENTANYL CITRATE 50 MCG: 50 INJECTION INTRAMUSCULAR; INTRAVENOUS at 06:06

## 2019-07-13 RX ADMIN — HEPARIN SODIUM 5000 UNITS: 5000 INJECTION INTRAVENOUS; SUBCUTANEOUS at 13:43

## 2019-07-13 RX ADMIN — FENTANYL CITRATE 50 MCG: 50 INJECTION INTRAMUSCULAR; INTRAVENOUS at 14:27

## 2019-07-13 RX ADMIN — HEPARIN SODIUM 5000 UNITS: 5000 INJECTION INTRAVENOUS; SUBCUTANEOUS at 21:10

## 2019-07-13 RX ADMIN — NIMODIPINE 60 MG: 30 CAPSULE, LIQUID FILLED ORAL at 17:24

## 2019-07-13 RX ADMIN — IBUPROFEN 800 MG: 200 SUSPENSION ORAL at 15:38

## 2019-07-13 RX ADMIN — ACETAMINOPHEN 1000 MG: 10 INJECTION, SOLUTION INTRAVENOUS at 08:31

## 2019-07-13 RX ADMIN — NIMODIPINE 60 MG: 30 CAPSULE, LIQUID FILLED ORAL at 13:35

## 2019-07-13 RX ADMIN — FAMOTIDINE 20 MG: 20 TABLET ORAL at 08:33

## 2019-07-13 RX ADMIN — FENTANYL CITRATE 50 MCG: 50 INJECTION INTRAMUSCULAR; INTRAVENOUS at 01:31

## 2019-07-13 RX ADMIN — ALPRAZOLAM 0.5 MG: 0.5 TABLET ORAL at 18:28

## 2019-07-13 RX ADMIN — SODIUM CHLORIDE, PRESERVATIVE FREE 900 UNITS: 5 INJECTION INTRAVENOUS at 06:06

## 2019-07-13 RX ADMIN — NIMODIPINE 60 MG: 30 CAPSULE, LIQUID FILLED ORAL at 20:04

## 2019-07-13 RX ADMIN — Medication 30 ML: at 06:11

## 2019-07-13 RX ADMIN — SODIUM CHLORIDE 100 ML/HR: 900 INJECTION, SOLUTION INTRAVENOUS at 21:11

## 2019-07-13 RX ADMIN — CEFEPIME 2 G: 2 INJECTION, POWDER, FOR SOLUTION INTRAVENOUS at 10:04

## 2019-07-13 RX ADMIN — CLOPIDOGREL BISULFATE 75 MG: 75 TABLET ORAL at 08:33

## 2019-07-13 NOTE — PROGRESS NOTES
Pt increasingly irritated after CPT, c/o headache. Fentanyl push administered per PRN order. Cooling blanket turned off to reduce stimulation.

## 2019-07-13 NOTE — PROGRESS NOTES
Progress Note    Patient: Konrad Santos MRN: [id number]  SSN: [id number]    YOB: 1975  Age: 37 y.o. Sex: male      Admit Date: 7/9/2019    LOS: 4 days     Subjective:   Patient continues with low grade fevers.     Current Facility-Administered Medications   Medication Dose Route Frequency    magnesium hydroxide (MILK OF MAGNESIA) 400 mg/5 mL oral suspension 30 mL  30 mL Per NG tube DAILY PRN    levETIRAcetam (KEPPRA) oral solution 500 mg  500 mg Per NG tube Q12H    famotidine (PEPCID) tablet 20 mg  20 mg Per NG tube DAILY    clopidogrel (PLAVIX) tablet 75 mg  75 mg Per NG tube DAILY    aspirin tablet 325 mg  325 mg Nasogastric DAILY    cefepime (MAXIPIME) 2 g in 0.9% sodium chloride (MBP/ADV) 100 mL  2 g IntraVENous Q12H    vancomycin (VANCOCIN) 1250 mg in  ml infusion  1,250 mg IntraVENous Q24H    fentaNYL citrate (PF) injection 50 mcg  50 mcg IntraVENous Q1H PRN    polyvinyl alcohol (LIQUIFILM TEARS) 1.4 % ophthalmic solution 1 Drop  1 Drop Both Eyes PRN    ALPRAZolam (XANAX) tablet 0.5 mg  0.5 mg Per NG tube Q6H PRN    niMODipine (NIMOTOP) 30 mg/mL oral solution (compound) 60 mg  60 mg Per NG tube Q4H    magnesium oxide (MAG-OX) tablet 400 mg  400 mg Per NG tube BID    senna-docusate (PERICOLACE) 8.6-50 mg per tablet 2 Tab  2 Tab Per NG tube QHS    atorvastatin (LIPITOR) tablet 40 mg  40 mg Per NG tube QHS    heparin (porcine) injection 5,000 Units  5,000 Units SubCUTAneous Q8H    sodium chloride (NS) flush 30 mL  30 mL InterCATHeter Q8H    heparin (porcine) pf 900 Units  900 Units InterCATHeter Q8H    sodium chloride (NS) flush 30 mL  30 mL InterCATHeter PRN    heparin (porcine) pf 900 Units  900 Units InterCATHeter PRN    nicotine (NICODERM CQ) 21 mg/24 hr patch 1 Patch  1 Patch TransDERmal Q24H    acetaminophen (TYLENOL) tablet 650 mg  650 mg Per NG tube Q4H PRN    ondansetron (ZOFRAN) injection 4 mg  4 mg IntraVENous Q6H PRN    NUTRITIONAL SUPPORT ELECTROLYTE PRN ORDERS   Does Not Apply PRN    0.9% sodium chloride infusion  100 mL/hr IntraVENous CONTINUOUS    magnesium sulfate 4 g/100 mL IVPB  4 g IntraVENous DAILY PRN    magnesium sulfate 2 g/50 ml IVPB (premix or compounded)  2 g IntraVENous DAILY PRN       Objective:     Vitals:    07/13/19 1630 07/13/19 1700 07/13/19 1730 07/13/19 1800   BP:       Pulse: (!) 58 60 (!) 59 (!) 59   Resp: 8 10 (!) 7 13   Temp: 100.4 °F (38 °C) 99.8 °F (37.7 °C) 99.6 °F (37.6 °C) 99.6 °F (37.6 °C)   SpO2: 100% 100% 100% 100%   Weight:       Height:             Intake and Output:  Current Shift: 07/13 0701 - 07/13 1900  In: 2447.7 [I.V.:1631.7]  Out: 1325 [Urine:1210; Drains:115]  Last 24 hr: 07/12 0701 - 07/13 0700  In: 2596 [I.V.:3303]  Out: 3300 [Urine:3070; Drains:230]    Physical Exam:   General:  Alert, cooperative, no distress, appears stated age. Eyes:  Conjunctivae/corneas clear. PERRL, EOMs intact. Neck: Supple, symmetrical, trachea midline, no adenopathy, thyroid: no enlargment/tenderness/nodules, no carotid bruit and no JVD. Lungs:   Clear to auscultation bilaterally. Heart:  Regular rate and rhythm, S1, S2 normal, no murmur, click, rub or gallop. Abdomen:   Soft, non-tender. Bowel sounds normal. No masses,  No organomegaly. Extremities: Extremities normal, atraumatic, no cyanosis or edema. Pulses: 2+ and symmetric all extremities. Skin: Skin color, texture, turgor normal. No rashes or lesions   Neurologic:  Intubated. Follows all commands. Lifted arms and legs off the bed. Lab/Data Review:    No results found for this or any previous visit (from the past 12 hour(s)).     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 Q1 hour neuro checks. On SAH protocol - Mg, nimotop, zocor. PERRL +3. No MAP goal now. TCDs daily. EVD set to 10cm H20. Last CT head showed improved communicating hydrocephalus. Last CTA head and neck showed no filling of the aneurysm and good filling of the PCAs. Will continue [address] protocol, PT/OT. OOB to chair today. He walked 3 feet. /. RESP: APRV phigh 26, FIO2 60%: 7.33/37.7/279. CXR which I reviewed showed improved atelectasis and effusion. Chest PT every 4 hours. Lungs clear. CV: NO MAP GOAL NOW.  2D Echo: 55-60%, trop peaked @ 0.8, trending down. SCD/SQ heparin. HEME: 8.4/26.2, . HIT panel negative. HH drop most likely dilutional from IVF's. Will monitor.   NEPH: bun/cr:37/2.8. Phos: 3.8, K+3.8. Acidosis better. GI: . Promote TF infusing wo difficulty, pepcid. Senna. MOM given since no BM yet. Abdomen slightly distended but +BS. ID: Tm: 100.9. Sputum cx sent, pending final, showing gram + cocci. Blood cultures neg so far and UA neg. Vanc/cefepime D2/10, consult pharmacy for dosing. Renal cefepime due to cr cl. Monitor. LINES: RUE PICC, phelps, R EVD. Tobacco abuse: On Nicoderm patch.     Patient is critical due to [address], risk of rebleeding, vasospasm, acute respiratory failure, communicating hydrocephalus, and kidney disease. I spent 36 minutes at bedside doing critical care examining the patient, reviewing labs and films, correcting care and updating the family.           Signed By: Teresa Riddle MD     July 13, 2019

## 2019-07-13 NOTE — PROGRESS NOTES
Ventilator check complete; patient has a #7.5 ET tube secured at the 25 at the lip. Patient is able to follow commands. Breath sounds are diminished. Trachea is midline, Negative for subcutaneous air, and chest excursion is symmetric. Patient is also Negative for cyanosis and is Negative for pitting edema. All alarms are set and audible. Resuscitation bag is at the head of the bed. Ventilator Settings  Mode FIO2 Rate Tidal Volume Pressure PEEP I:E Ratio   Airway pressure release (APR)  60 %    500 ml  9 cm H2O  0 cm H20  1:2      Peak airway pressure: 28.3 cm H2O   Minute ventilation: 7.5 l/min     ABG: No results for input(s): PH, PCO2, PO2, HCO3 in the last 72 hours.       Inna Villa, RT

## 2019-07-13 NOTE — PROGRESS NOTES
Ventilator check complete; patient has a #7.5 ET tube secured at the 25 at the lip. Patient is not sedated. Patient is able to follow commands. Breath sounds are clear and coarse. Trachea is midline, Negative for subcutaneous air, and chest excursion is symmetric. Patient is also Negative for cyanosis and is Negative for pitting edema. All alarms are set and audible.   Resuscitation bag is at the head of the bed.       Ventilator Settings  Mode FIO2 Spon Rate Spon Tidal Volume Pressure PEEP I:E Ratio   Airway pressure release (APR)  60 % 16   667 ml  9 cm H2O  0 cm H20        Peak airway pressure:28 cm H2O   Minute ventilation: 7.4 l/min          Frank Perkins, FERNANDO

## 2019-07-13 NOTE — PROGRESS NOTES
Bedside shift change report given to Keren Villalobos (oncoming nurse) by Elisa Luque RN (offgoing nurse). Report included the following information SBAR, Kardex, ED Summary, Intake/Output, MAR, Med Rec Status, Cardiac Rhythm NSR and Alarm Parameters . Dual neuro assessment completed.

## 2019-07-13 NOTE — PROGRESS NOTES
Suctioned large/mod, thick tan yellow secretions from ETT. Pt attempting to pull off restrain mitts and reach for ETT. Notified FIOR Molina NP

## 2019-07-13 NOTE — PROGRESS NOTES
Pt not tolerating cooling blanket even after Fentanyl administration. Current temp 100.8F, Tylenol administered.

## 2019-07-14 ENCOUNTER — APPOINTMENT (OUTPATIENT)
Dept: GENERAL RADIOLOGY | Age: 44
DRG: 020 | End: 2019-07-14
Attending: NURSE PRACTITIONER
Payer: COMMERCIAL

## 2019-07-14 LAB
ANION GAP SERPL CALC-SCNC: 7 MMOL/L (ref 7–16)
APPEARANCE FLD: NORMAL
ARTERIAL PATENCY WRIST A: ABNORMAL
BACTERIA SPEC CULT: ABNORMAL
BACTERIA SPEC CULT: ABNORMAL
BASE DEFICIT BLD-SCNC: 4 MMOL/L
BDY SITE: ABNORMAL
BODY TEMPERATURE: 98.6
BUN SERPL-MCNC: 45 MG/DL (ref 6–23)
CALCIUM SERPL-MCNC: 8.1 MG/DL (ref 8.3–10.4)
CHLORIDE SERPL-SCNC: 124 MMOL/L (ref 98–107)
CO2 BLD-SCNC: 23 MMOL/L
CO2 SERPL-SCNC: 24 MMOL/L (ref 21–32)
COLLECT TIME,HTIME: 325
COLLECTION COMMENT, COLCM: NORMAL
COLOR FLD: NORMAL
CREAT SERPL-MCNC: 2.8 MG/DL (ref 0.8–1.5)
ERYTHROCYTE [DISTWIDTH] IN BLOOD BY AUTOMATED COUNT: 13.6 % (ref 11.9–14.6)
EXHALED MINUTE VOLUME, VE: 7.7 L/MIN
GAS FLOW.O2 O2 DELIVERY SYS: ABNORMAL L/MIN
GAS FLOW.O2 SETTING OXYMISER: 13 BPM
GLUCOSE CSF-MCNC: 78 MG/DL (ref 40–70)
GLUCOSE SERPL-MCNC: 120 MG/DL (ref 65–100)
GRAM STN SPEC: ABNORMAL
HCO3 BLD-SCNC: 21.4 MMOL/L (ref 22–26)
HCT VFR BLD AUTO: 25.3 % (ref 41.1–50.3)
HGB BLD-MCNC: 8 G/DL (ref 13.6–17.2)
INSPIRATION.DURATION SETTING TIME VENT: 0.6 SEC
INSPIRATION.DURATION SETTING TIME VENT: 4 SEC
MAGNESIUM SERPL-MCNC: 2.7 MG/DL (ref 1.8–2.4)
MCH RBC QN AUTO: 29.2 PG (ref 26.1–32.9)
MCHC RBC AUTO-ENTMCNC: 31.6 G/DL (ref 31.4–35)
MCV RBC AUTO: 92.3 FL (ref 79.6–97.8)
NRBC # BLD: 0 K/UL (ref 0–0.2)
NUC CELL # FLD: 8 /CU MM
O2/TOTAL GAS SETTING VFR VENT: 60 %
PCO2 BLD: 39.8 MMHG (ref 35–45)
PEEP RESPIRATORY: 0 CMH2O
PH BLD: 7.34 [PH] (ref 7.35–7.45)
PIP ISTAT,IPIP: 26
PLATELET # BLD AUTO: 144 K/UL (ref 150–450)
PMV BLD AUTO: 11 FL (ref 9.4–12.3)
PO2 BLD: 284 MMHG (ref 75–100)
POTASSIUM SERPL-SCNC: 3.8 MMOL/L (ref 3.5–5.1)
PROT CSF-MCNC: 71 MG/DL (ref 15–45)
RBC # BLD AUTO: 2.74 M/UL (ref 4.23–5.6)
RBC # FLD: NORMAL /CU MM
SAO2 % BLD: 100 % (ref 95–98)
SERVICE CMNT-IMP: ABNORMAL
SODIUM SERPL-SCNC: 155 MMOL/L (ref 136–145)
SPECIMEN SOURCE FLD: NORMAL
SPECIMEN TYPE: ABNORMAL
TUBE # CSF: 1
TUBE # CSF: 1
VENTILATION MODE VENT: ABNORMAL
WBC # BLD AUTO: 12.7 K/UL (ref 4.3–11.1)

## 2019-07-14 PROCEDURE — 83735 ASSAY OF MAGNESIUM: CPT

## 2019-07-14 PROCEDURE — 74011250636 HC RX REV CODE- 250/636: Performed by: NURSE PRACTITIONER

## 2019-07-14 PROCEDURE — 77030020263 HC SOL INJ SOD CL0.9% LFCR 1000ML

## 2019-07-14 PROCEDURE — 99232 SBSQ HOSP IP/OBS MODERATE 35: CPT | Performed by: NURSE PRACTITIONER

## 2019-07-14 PROCEDURE — 93886 INTRACRANIAL COMPLETE STUDY: CPT

## 2019-07-14 PROCEDURE — 82945 GLUCOSE OTHER FLUID: CPT

## 2019-07-14 PROCEDURE — 87205 SMEAR GRAM STAIN: CPT

## 2019-07-14 PROCEDURE — 74011250637 HC RX REV CODE- 250/637

## 2019-07-14 PROCEDURE — 80048 BASIC METABOLIC PNL TOTAL CA: CPT

## 2019-07-14 PROCEDURE — 82803 BLOOD GASES ANY COMBINATION: CPT

## 2019-07-14 PROCEDURE — 94003 VENT MGMT INPAT SUBQ DAY: CPT

## 2019-07-14 PROCEDURE — 93886 INTRACRANIAL COMPLETE STUDY: CPT | Performed by: PSYCHIATRY & NEUROLOGY

## 2019-07-14 PROCEDURE — 74011250637 HC RX REV CODE- 250/637: Performed by: NURSE PRACTITIONER

## 2019-07-14 PROCEDURE — 74011000258 HC RX REV CODE- 258: Performed by: NURSE PRACTITIONER

## 2019-07-14 PROCEDURE — 65610000001 HC ROOM ICU GENERAL

## 2019-07-14 PROCEDURE — 85027 COMPLETE CBC AUTOMATED: CPT

## 2019-07-14 PROCEDURE — 84157 ASSAY OF PROTEIN OTHER: CPT

## 2019-07-14 PROCEDURE — 94668 MNPJ CHEST WALL SBSQ: CPT

## 2019-07-14 PROCEDURE — 74011250637 HC RX REV CODE- 250/637: Performed by: NEUROLOGICAL SURGERY

## 2019-07-14 PROCEDURE — 89050 BODY FLUID CELL COUNT: CPT

## 2019-07-14 PROCEDURE — 71045 X-RAY EXAM CHEST 1 VIEW: CPT

## 2019-07-14 RX ADMIN — LEVETIRACETAM 500 MG: 100 SOLUTION ORAL at 09:00

## 2019-07-14 RX ADMIN — NIMODIPINE 60 MG: 30 CAPSULE, LIQUID FILLED ORAL at 11:13

## 2019-07-14 RX ADMIN — Medication 400 MG: at 08:18

## 2019-07-14 RX ADMIN — ACETAMINOPHEN 650 MG: 325 TABLET, FILM COATED ORAL at 07:20

## 2019-07-14 RX ADMIN — Medication 400 MG: at 18:00

## 2019-07-14 RX ADMIN — ALPRAZOLAM 0.5 MG: 0.5 TABLET ORAL at 07:21

## 2019-07-14 RX ADMIN — CLOPIDOGREL BISULFATE 75 MG: 75 TABLET ORAL at 08:18

## 2019-07-14 RX ADMIN — SODIUM CHLORIDE, PRESERVATIVE FREE 900 UNITS: 5 INJECTION INTRAVENOUS at 21:13

## 2019-07-14 RX ADMIN — FENTANYL CITRATE 50 MCG: 50 INJECTION INTRAMUSCULAR; INTRAVENOUS at 21:36

## 2019-07-14 RX ADMIN — LEVETIRACETAM 500 MG: 100 SOLUTION ORAL at 21:11

## 2019-07-14 RX ADMIN — Medication 30 ML: at 21:14

## 2019-07-14 RX ADMIN — NIMODIPINE 60 MG: 30 CAPSULE, LIQUID FILLED ORAL at 17:29

## 2019-07-14 RX ADMIN — Medication 30 ML: at 05:59

## 2019-07-14 RX ADMIN — FENTANYL CITRATE 50 MCG: 50 INJECTION INTRAMUSCULAR; INTRAVENOUS at 03:08

## 2019-07-14 RX ADMIN — ATORVASTATIN CALCIUM 40 MG: 40 TABLET, FILM COATED ORAL at 21:11

## 2019-07-14 RX ADMIN — CEFEPIME 2 G: 2 INJECTION, POWDER, FOR SOLUTION INTRAVENOUS at 21:37

## 2019-07-14 RX ADMIN — NIMODIPINE 60 MG: 30 CAPSULE, LIQUID FILLED ORAL at 20:14

## 2019-07-14 RX ADMIN — SODIUM CHLORIDE, PRESERVATIVE FREE 900 UNITS: 5 INJECTION INTRAVENOUS at 13:49

## 2019-07-14 RX ADMIN — FENTANYL CITRATE 50 MCG: 50 INJECTION INTRAMUSCULAR; INTRAVENOUS at 13:39

## 2019-07-14 RX ADMIN — ACETAMINOPHEN 650 MG: 325 TABLET, FILM COATED ORAL at 03:08

## 2019-07-14 RX ADMIN — SENNOSIDES, DOCUSATE SODIUM 2 TABLET: 50; 8.6 TABLET, FILM COATED ORAL at 21:11

## 2019-07-14 RX ADMIN — CEFEPIME 2 G: 2 INJECTION, POWDER, FOR SOLUTION INTRAVENOUS at 10:18

## 2019-07-14 RX ADMIN — HEPARIN SODIUM 5000 UNITS: 5000 INJECTION INTRAVENOUS; SUBCUTANEOUS at 13:48

## 2019-07-14 RX ADMIN — ACETAMINOPHEN 650 MG: 325 TABLET, FILM COATED ORAL at 18:15

## 2019-07-14 RX ADMIN — FENTANYL CITRATE 50 MCG: 50 INJECTION INTRAMUSCULAR; INTRAVENOUS at 16:59

## 2019-07-14 RX ADMIN — HEPARIN SODIUM 5000 UNITS: 5000 INJECTION INTRAVENOUS; SUBCUTANEOUS at 06:00

## 2019-07-14 RX ADMIN — NIMODIPINE 60 MG: 30 CAPSULE, LIQUID FILLED ORAL at 08:18

## 2019-07-14 RX ADMIN — NIMODIPINE 60 MG: 30 CAPSULE, LIQUID FILLED ORAL at 00:19

## 2019-07-14 RX ADMIN — NIMODIPINE 60 MG: 30 CAPSULE, LIQUID FILLED ORAL at 03:59

## 2019-07-14 RX ADMIN — Medication 30 ML: at 14:00

## 2019-07-14 RX ADMIN — FENTANYL CITRATE 50 MCG: 50 INJECTION INTRAMUSCULAR; INTRAVENOUS at 10:18

## 2019-07-14 RX ADMIN — FENTANYL CITRATE 50 MCG: 50 INJECTION INTRAMUSCULAR; INTRAVENOUS at 04:27

## 2019-07-14 RX ADMIN — SODIUM CHLORIDE, PRESERVATIVE FREE 900 UNITS: 5 INJECTION INTRAVENOUS at 05:59

## 2019-07-14 RX ADMIN — ALPRAZOLAM 0.5 MG: 0.5 TABLET ORAL at 13:40

## 2019-07-14 RX ADMIN — FENTANYL CITRATE 50 MCG: 50 INJECTION INTRAMUSCULAR; INTRAVENOUS at 23:39

## 2019-07-14 RX ADMIN — ALPRAZOLAM 0.5 MG: 0.5 TABLET ORAL at 21:11

## 2019-07-14 RX ADMIN — SODIUM CHLORIDE 100 ML/HR: 900 INJECTION, SOLUTION INTRAVENOUS at 07:20

## 2019-07-14 RX ADMIN — FENTANYL CITRATE 50 MCG: 50 INJECTION INTRAMUSCULAR; INTRAVENOUS at 19:29

## 2019-07-14 RX ADMIN — FAMOTIDINE 20 MG: 20 TABLET ORAL at 08:18

## 2019-07-14 RX ADMIN — ASPIRIN 325 MG ORAL TABLET 325 MG: 325 PILL ORAL at 08:18

## 2019-07-14 RX ADMIN — FENTANYL CITRATE 50 MCG: 50 INJECTION INTRAMUSCULAR; INTRAVENOUS at 06:29

## 2019-07-14 RX ADMIN — FENTANYL CITRATE 50 MCG: 50 INJECTION INTRAMUSCULAR; INTRAVENOUS at 01:27

## 2019-07-14 RX ADMIN — NIMODIPINE 60 MG: 30 CAPSULE, LIQUID FILLED ORAL at 23:57

## 2019-07-14 RX ADMIN — ACETAMINOPHEN 650 MG: 325 TABLET, FILM COATED ORAL at 13:40

## 2019-07-14 RX ADMIN — VANCOMYCIN HYDROCHLORIDE 1250 MG: 10 INJECTION, POWDER, LYOPHILIZED, FOR SOLUTION INTRAVENOUS at 11:13

## 2019-07-14 RX ADMIN — HEPARIN SODIUM 5000 UNITS: 5000 INJECTION INTRAVENOUS; SUBCUTANEOUS at 21:11

## 2019-07-14 NOTE — PROGRESS NOTES
Bedside shift change report given to Brian Mcgowan RN (oncoming nurse) by Lew Dickinson RN (offgoing nurse). Report included the following information SBAR, Kardex, ED Summary, Intake/Output, MAR, Recent Results, Cardiac Rhythm NSR/SB and Alarm Parameters . Dual neuro assessment completed with shift change.

## 2019-07-14 NOTE — PROGRESS NOTES
Bedside shift change report given to Gregorio Estrada RN (oncoming nurse) by Shara Sheffield RN (offgoing nurse). Report included the following information SBAR, Kardex, ED Summary, Intake/Output, MAR, Recent Results, Cardiac Rhythm NSR and Alarm Parameters . Dual neuro assessment completed. See flowsheet.

## 2019-07-14 NOTE — PROGRESS NOTES
Bedside shift change report given to Miriam Vitale RN (oncoming nurse) by Fito Christy RN (offgoing nurse). Report included the following information SBAR, Kardex, ED Summary, Intake/Output, MAR, Recent Results, Cardiac Rhythm SR/SB and Alarm Parameters . Dual neuro assessment completed.

## 2019-07-14 NOTE — PROGRESS NOTES
Progress Note    Patient: Guy Duke MRN: [id number]  SSN: [id number]    YOB: 1975  Age: 37 y.o. Sex: male      Admit Date: 7/9/2019    LOS: 5 days     Subjective:   Pt more awake this am, requires fentanyl with agitation with OETT. Will start to wean vent today. CSF sent for culture as continues with low grade fever. EVD remains intact and @10/open.     Current Facility-Administered Medications   Medication Dose Route Frequency    [START ON 7/15/2019] vanc trough reminder   Other ONCE    magnesium hydroxide (MILK OF MAGNESIA) 400 mg/5 mL oral suspension 30 mL  30 mL Per NG tube DAILY PRN    levETIRAcetam (KEPPRA) oral solution 500 mg  500 mg Per NG tube Q12H    famotidine (PEPCID) tablet 20 mg  20 mg Per NG tube DAILY    clopidogrel (PLAVIX) tablet 75 mg  75 mg Per NG tube DAILY    aspirin tablet 325 mg  325 mg Nasogastric DAILY    cefepime (MAXIPIME) 2 g in 0.9% sodium chloride (MBP/ADV) 100 mL  2 g IntraVENous Q12H    vancomycin (VANCOCIN) 1250 mg in  ml infusion  1,250 mg IntraVENous Q24H    fentaNYL citrate (PF) injection 50 mcg  50 mcg IntraVENous Q1H PRN    polyvinyl alcohol (LIQUIFILM TEARS) 1.4 % ophthalmic solution 1 Drop  1 Drop Both Eyes PRN    ALPRAZolam (XANAX) tablet 0.5 mg  0.5 mg Per NG tube Q6H PRN    niMODipine (NIMOTOP) 30 mg/mL oral solution (compound) 60 mg  60 mg Per NG tube Q4H    magnesium oxide (MAG-OX) tablet 400 mg  400 mg Per NG tube BID    senna-docusate (PERICOLACE) 8.6-50 mg per tablet 2 Tab  2 Tab Per NG tube QHS    atorvastatin (LIPITOR) tablet 40 mg  40 mg Per NG tube QHS    heparin (porcine) injection 5,000 Units  5,000 Units SubCUTAneous Q8H    sodium chloride (NS) flush 30 mL  30 mL InterCATHeter Q8H    heparin (porcine) pf 900 Units  900 Units InterCATHeter Q8H    sodium chloride (NS) flush 30 mL  30 mL InterCATHeter PRN    heparin (porcine) pf 900 Units  900 Units InterCATHeter PRN    nicotine (NICODERM CQ) 21 mg/24 hr patch 1 Patch  1 Patch TransDERmal Q24H    acetaminophen (TYLENOL) tablet 650 mg  650 mg Per NG tube Q4H PRN    ondansetron (ZOFRAN) injection 4 mg  4 mg IntraVENous Q6H PRN    NUTRITIONAL SUPPORT ELECTROLYTE PRN ORDERS   Does Not Apply PRN    0.9% sodium chloride infusion  100 mL/hr IntraVENous CONTINUOUS    magnesium sulfate 4 g/100 mL IVPB  4 g IntraVENous DAILY PRN    magnesium sulfate 2 g/50 ml IVPB (premix or compounded)  2 g IntraVENous DAILY PRN       Objective:     Vitals:    07/14/19 1000 07/14/19 1030 07/14/19 1100 07/14/19 1209   BP:       Pulse: 85 (!) 56 74 74   Resp: 22 8 10 10   Temp: 98.9 °F (37.2 °C) 98.6 °F (37 °C) 98.6 °F (37 °C)    SpO2: 100% 100% 100% 100%   Weight:       Height:             Intake and Output:  Current Shift: 07/14 0701 - 07/14 1900  In: -   Out: 246 [Urine:215; Drains:31]  Last 24 hr: 07/13 0701 - 07/14 0700  In: 5280.7 [I.V.:3031.7]  Out: 6153 [Urine:2495; Drains:295]     IO: +2490/24hrs  TOTAL: +13L overall  EVD: 295/24hrs. Physical Exam:   General:  Alert, cooperative, no distress, appears stated age. Eyes:  Conjunctivae/corneas clear. PERRL, EOMs intact. Neck: Supple, symmetrical, trachea midline, no adenopathy, thyroid: no enlargment/tenderness/nodules, no carotid bruit and no JVD. Lungs:   Clear to auscultation bilaterally. Heart:  Regular rate and rhythm, S1, S2 normal, no murmur, click, rub or gallop. Abdomen:   Soft, non-tender. Bowel sounds normal. No masses,  No organomegaly. Extremities: Extremities normal, atraumatic, no cyanosis or edema. Pulses: 2+ and symmetric all extremities. Skin: Skin color, texture, turgor normal. No rashes or lesions   Neurologic:  Intubated. Follows all commands. Lifted arms and legs off the bed.        Lab/Data Review:    Recent Results (from the past 12 hour(s))   CBC W/O DIFF    Collection Time: 07/14/19  3:15 AM   Result Value Ref Range    WBC 12.7 (H) 4.3 - 11.1 K/uL    RBC 2.74 (L) 4.23 - 5.6 M/uL    HGB 8.0 (L) 13.6 - 17.2 g/dL    HCT 25.3 (L) 41.1 - 50.3 %    MCV 92.3 79.6 - 97.8 FL    MCH 29.2 26.1 - 32.9 PG    MCHC 31.6 31.4 - 35.0 g/dL    RDW 13.6 11.9 - 14.6 %    PLATELET 128 (L) 953 - 450 K/uL    MPV 11.0 9.4 - 12.3 FL    ABSOLUTE NRBC 0.00 0.0 - 0.2 K/uL   METABOLIC PANEL, BASIC    Collection Time: 07/14/19  3:15 AM   Result Value Ref Range    Sodium 155 (H) 136 - 145 mmol/L    Potassium 3.8 3.5 - 5.1 mmol/L    Chloride 124 (H) 98 - 107 mmol/L    CO2 24 21 - 32 mmol/L    Anion gap 7 7 - 16 mmol/L    Glucose 120 (H) 65 - 100 mg/dL    BUN 45 (H) 6 - 23 MG/DL    Creatinine 2.80 (H) 0.8 - 1.5 MG/DL    GFR est AA 32 (L) >60 ml/min/1.73m2    GFR est non-AA 26 (L) >60 ml/min/1.73m2    Calcium 8.1 (L) 8.3 - 10.4 MG/DL   MAGNESIUM    Collection Time: 07/14/19  3:15 AM   Result Value Ref Range    Magnesium 2.7 (H) 1.8 - 2.4 mg/dL   POC G3    Collection Time: 07/14/19  3:27 AM   Result Value Ref Range    Device: VENT      FIO2 (POC) 60 %    pH (POC) 7.338 (L) 7.35 - 7.45      pCO2 (POC) 39.8 35 - 45 MMHG    pO2 (POC) 284 (H) 75 - 100 MMHG    HCO3 (POC) 21.4 (L) 22 - 26 MMOL/L    sO2 (POC) 100 (H) 95 - 98 %    Base deficit (POC) 4 mmol/L    Mode BILEVEL      Set Rate 13 bpm    PEEP/CPAP (POC) 0 cmH2O    PIP (POC) 26      Allens test (POC) NOT APPLICABLE      Inspiratory Time High 4 sec    Inspiratory Time Low 0.6 sec    Site DRAWN FROM ARTERIAL LINE      Patient temp.  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 Q1 hour neuro checks. On SAH protocol - Mg, nimotop, zocor. PERRL +3. No MAP goal now. TCDs daily. EVD set to 10cm H20. Last CT head showed improved communicating hydrocephalus. Last CTA head and neck showed no filling of the aneurysm and good filling of the PCAs. Will continue [facility] protocol, PT/OT. OOB to chair today. He walked 3 feet. /. RESP: APRV phigh 26, FIO2 60%: 7.33/39./284. Chest PT every 4 hours. Lungs clear. Drop and stretch to [place] 24/TH5, Fio2 40%. CV: NO MAP GOAL NOW.  2D Echo: 55-60%, trop peaked @ 0.8, trending down. SCD/SQ heparin. HEME: 8/25.3, . HIT panel negative. HH drop most likely dilutional from IVF's. Will monitor.   NEPH: bun/cr: 45/2.8. Phos: 3.8, K+3.8. Acidosis better. GI: . Promote TF infusing wo difficulty, pepcid. Senna. MOM given since no BM yet. Abdomen slightly distended but +BS. + BM today. ID: Tm: 100.5. Sputum cx sent, pending final, showing gram + cocci. Blood cultures neg so far and UA neg. Vanc/cefepime D3/10, consult pharmacy for dosing. Renal cefepime due to cr cl. Monitor. LINES: RUE PICC, lenin, R EVD. Tobacco abuse: On Nicoderm patch.     Patient is critical due to [facility], risk of rebleeding, vasospasm, acute respiratory failure, communicating hydrocephalus, and kidney disease. I spent 36 minutes at bedside doing critical care examining the patient, reviewing labs and films, correcting care and updating the family. Family updated at bedside.          Signed By: Harleen Nina NP     July 14, 2019

## 2019-07-14 NOTE — PROGRESS NOTES
Ventilator check complete; patient has a #7.5 ET tube secured at the 25 at the lip. Patient is able to follow commands. Breath sounds are diminished. Trachea is midline, Negative for subcutaneous air, and chest excursion is symmetric. Patient is also Negative for cyanosis and is Negative for pitting edema. All alarms are set and audible.   Resuscitation bag is at the head of the bed.       Ventilator Settings  Mode FIO2 Spon Rate spon Tidal Volume Pressure PEEP I:E Ratio   Airway pressure release (APR)  60 %  13  746 ml    0 cm H20         Peak airway pressure: 22.1 cm H2O   Minute ventilation: 7.5 l/min          Marcello Becerril, RRT

## 2019-07-14 NOTE — PROGRESS NOTES
Ventilator check complete; patient has a #7.5 ET tube secured at the 25 at the lip. Patient is not sedated. Patient is able to follow commands. Breath sounds are coarse and diminished. Trachea is midline, Negative for subcutaneous air, and chest excursion is symmetric. Patient is also Negative for cyanosis and is Negative for pitting edema. All alarms are set and audible. Resuscitation bag is at the head of the bed.       Ventilator Settings  Mode FIO2 Rate Tidal Volume Pressure PEEP I:E Ratio   Airway pressure release (APR)  40 %    500 ml  9 cm H2O  0 cm H20  1:2      Peak airway pressure: 26 cm H2O   Minute ventilation: 7.8 l/min     Marleta Belts

## 2019-07-15 ENCOUNTER — APPOINTMENT (OUTPATIENT)
Dept: GENERAL RADIOLOGY | Age: 44
DRG: 020 | End: 2019-07-15
Attending: NURSE PRACTITIONER
Payer: COMMERCIAL

## 2019-07-15 PROBLEM — J15.211 PNEUMONIA DUE TO METHICILLIN SUSCEPTIBLE STAPHYLOCOCCUS AUREUS (MSSA) (HCC): Status: ACTIVE | Noted: 2019-07-15

## 2019-07-15 LAB
ANION GAP SERPL CALC-SCNC: 9 MMOL/L (ref 7–16)
ARTERIAL PATENCY WRIST A: ABNORMAL
BASE DEFICIT BLD-SCNC: 5 MMOL/L
BDY SITE: ABNORMAL
BODY TEMPERATURE: 98.6
BUN SERPL-MCNC: 52 MG/DL (ref 6–23)
CALCIUM SERPL-MCNC: 7.9 MG/DL (ref 8.3–10.4)
CHLORIDE SERPL-SCNC: 125 MMOL/L (ref 98–107)
CO2 BLD-SCNC: 20 MMOL/L
CO2 SERPL-SCNC: 21 MMOL/L (ref 21–32)
COLLECT TIME,HTIME: 315
CREAT SERPL-MCNC: 2.86 MG/DL (ref 0.8–1.5)
ERYTHROCYTE [DISTWIDTH] IN BLOOD BY AUTOMATED COUNT: 13.8 % (ref 11.9–14.6)
EXHALED MINUTE VOLUME, VE: 7.5 L/MIN
GAS FLOW.O2 O2 DELIVERY SYS: ABNORMAL L/MIN
GAS FLOW.O2 SETTING OXYMISER: 9 BPM
GLUCOSE SERPL-MCNC: 147 MG/DL (ref 65–100)
HCO3 BLD-SCNC: 19.4 MMOL/L (ref 22–26)
HCT VFR BLD AUTO: 25 % (ref 41.1–50.3)
HGB BLD-MCNC: 7.8 G/DL (ref 13.6–17.2)
INSPIRATION.DURATION SETTING TIME VENT: 0.6 SEC
INSPIRATION.DURATION SETTING TIME VENT: 6 SEC
MAGNESIUM SERPL-MCNC: 2.8 MG/DL (ref 1.8–2.4)
MCH RBC QN AUTO: 29.3 PG (ref 26.1–32.9)
MCHC RBC AUTO-ENTMCNC: 31.2 G/DL (ref 31.4–35)
MCV RBC AUTO: 94 FL (ref 79.6–97.8)
NRBC # BLD: 0 K/UL (ref 0–0.2)
O2/TOTAL GAS SETTING VFR VENT: 40 %
PCO2 BLD: 34.1 MMHG (ref 35–45)
PEEP RESPIRATORY: 0 CMH2O
PH BLD: 7.36 [PH] (ref 7.35–7.45)
PIP ISTAT,IPIP: 22
PLATELET # BLD AUTO: 152 K/UL (ref 150–450)
PMV BLD AUTO: 11.5 FL (ref 9.4–12.3)
PO2 BLD: 209 MMHG (ref 75–100)
POTASSIUM SERPL-SCNC: 3.7 MMOL/L (ref 3.5–5.1)
RBC # BLD AUTO: 2.66 M/UL (ref 4.23–5.6)
SAO2 % BLD: 100 % (ref 95–98)
SERVICE CMNT-IMP: ABNORMAL
SERVICE CMNT-IMP: ABNORMAL
SODIUM SERPL-SCNC: 155 MMOL/L (ref 136–145)
SPECIMEN TYPE: ABNORMAL
VANCOMYCIN TROUGH SERPL-MCNC: 22.3 UG/ML (ref 5–20)
VENTILATION MODE VENT: ABNORMAL
WBC # BLD AUTO: 11.7 K/UL (ref 4.3–11.1)

## 2019-07-15 PROCEDURE — 94640 AIRWAY INHALATION TREATMENT: CPT

## 2019-07-15 PROCEDURE — 82803 BLOOD GASES ANY COMBINATION: CPT

## 2019-07-15 PROCEDURE — 74011250637 HC RX REV CODE- 250/637

## 2019-07-15 PROCEDURE — 99231 SBSQ HOSP IP/OBS SF/LOW 25: CPT | Performed by: PHYSICAL MEDICINE & REHABILITATION

## 2019-07-15 PROCEDURE — 80048 BASIC METABOLIC PNL TOTAL CA: CPT

## 2019-07-15 PROCEDURE — 99291 CRITICAL CARE FIRST HOUR: CPT | Performed by: NEUROLOGICAL SURGERY

## 2019-07-15 PROCEDURE — 97530 THERAPEUTIC ACTIVITIES: CPT

## 2019-07-15 PROCEDURE — 77030010537 HC BG CSF DRN INLC -C

## 2019-07-15 PROCEDURE — 83735 ASSAY OF MAGNESIUM: CPT

## 2019-07-15 PROCEDURE — 74011250637 HC RX REV CODE- 250/637: Performed by: NURSE PRACTITIONER

## 2019-07-15 PROCEDURE — 74011250637 HC RX REV CODE- 250/637: Performed by: NEUROLOGICAL SURGERY

## 2019-07-15 PROCEDURE — 77030020257 HC SOL INJ SOD CL 0.45% 1000ML BG

## 2019-07-15 PROCEDURE — 65610000001 HC ROOM ICU GENERAL

## 2019-07-15 PROCEDURE — 36600 WITHDRAWAL OF ARTERIAL BLOOD: CPT

## 2019-07-15 PROCEDURE — 77010033678 HC OXYGEN DAILY

## 2019-07-15 PROCEDURE — 94003 VENT MGMT INPAT SUBQ DAY: CPT

## 2019-07-15 PROCEDURE — 85027 COMPLETE CBC AUTOMATED: CPT

## 2019-07-15 PROCEDURE — 92610 EVALUATE SWALLOWING FUNCTION: CPT

## 2019-07-15 PROCEDURE — 93886 INTRACRANIAL COMPLETE STUDY: CPT | Performed by: PSYCHIATRY & NEUROLOGY

## 2019-07-15 PROCEDURE — 71045 X-RAY EXAM CHEST 1 VIEW: CPT

## 2019-07-15 PROCEDURE — 74011250636 HC RX REV CODE- 250/636: Performed by: NURSE PRACTITIONER

## 2019-07-15 PROCEDURE — 94667 MNPJ CHEST WALL 1ST: CPT

## 2019-07-15 PROCEDURE — 94668 MNPJ CHEST WALL SBSQ: CPT

## 2019-07-15 PROCEDURE — 97110 THERAPEUTIC EXERCISES: CPT

## 2019-07-15 PROCEDURE — 93886 INTRACRANIAL COMPLETE STUDY: CPT

## 2019-07-15 PROCEDURE — 74011000258 HC RX REV CODE- 258: Performed by: NURSE PRACTITIONER

## 2019-07-15 PROCEDURE — 74011000250 HC RX REV CODE- 250: Performed by: NURSE PRACTITIONER

## 2019-07-15 PROCEDURE — 80202 ASSAY OF VANCOMYCIN: CPT

## 2019-07-15 RX ORDER — FERROUS SULFATE 300 MG/5ML
300 LIQUID (ML) ORAL 3 TIMES DAILY
Status: DISCONTINUED | OUTPATIENT
Start: 2019-07-15 | End: 2019-07-19

## 2019-07-15 RX ORDER — DEXAMETHASONE SODIUM PHOSPHATE 100 MG/10ML
10 INJECTION INTRAMUSCULAR; INTRAVENOUS ONCE
Status: COMPLETED | OUTPATIENT
Start: 2019-07-15 | End: 2019-07-15

## 2019-07-15 RX ORDER — ASPIRIN 325 MG
325 TABLET ORAL DAILY
Status: DISCONTINUED | OUTPATIENT
Start: 2019-07-16 | End: 2019-08-05 | Stop reason: HOSPADM

## 2019-07-15 RX ORDER — ADHESIVE BANDAGE
30 BANDAGE TOPICAL DAILY PRN
Status: DISCONTINUED | OUTPATIENT
Start: 2019-07-15 | End: 2019-08-05 | Stop reason: HOSPADM

## 2019-07-15 RX ORDER — ATORVASTATIN CALCIUM 40 MG/1
40 TABLET, FILM COATED ORAL
Status: DISCONTINUED | OUTPATIENT
Start: 2019-07-15 | End: 2019-08-05 | Stop reason: HOSPADM

## 2019-07-15 RX ORDER — LANOLIN ALCOHOL/MO/W.PET/CERES
400 CREAM (GRAM) TOPICAL 2 TIMES DAILY
Status: COMPLETED | OUTPATIENT
Start: 2019-07-15 | End: 2019-07-23

## 2019-07-15 RX ORDER — ALPRAZOLAM 0.5 MG/1
0.5 TABLET ORAL
Status: DISCONTINUED | OUTPATIENT
Start: 2019-07-15 | End: 2019-07-20

## 2019-07-15 RX ORDER — ASCORBIC ACID 500 MG
500 TABLET ORAL 3 TIMES DAILY
Status: DISCONTINUED | OUTPATIENT
Start: 2019-07-15 | End: 2019-08-05 | Stop reason: HOSPADM

## 2019-07-15 RX ORDER — ALBUTEROL SULFATE 0.83 MG/ML
2.5 SOLUTION RESPIRATORY (INHALATION)
Status: DISCONTINUED | OUTPATIENT
Start: 2019-07-15 | End: 2019-07-18

## 2019-07-15 RX ORDER — FAMOTIDINE 20 MG/1
20 TABLET, FILM COATED ORAL DAILY
Status: DISCONTINUED | OUTPATIENT
Start: 2019-07-16 | End: 2019-08-05 | Stop reason: HOSPADM

## 2019-07-15 RX ORDER — LANOLIN ALCOHOL/MO/W.PET/CERES
1 CREAM (GRAM) TOPICAL 3 TIMES DAILY
Status: DISCONTINUED | OUTPATIENT
Start: 2019-07-15 | End: 2019-07-15

## 2019-07-15 RX ORDER — SODIUM CHLORIDE 450 MG/100ML
100 INJECTION, SOLUTION INTRAVENOUS CONTINUOUS
Status: DISCONTINUED | OUTPATIENT
Start: 2019-07-15 | End: 2019-08-02

## 2019-07-15 RX ORDER — FERROUS SULFATE 300 MG/5ML
300 LIQUID (ML) ORAL 3 TIMES DAILY
Status: DISCONTINUED | OUTPATIENT
Start: 2019-07-15 | End: 2019-07-15

## 2019-07-15 RX ORDER — ACETAMINOPHEN 325 MG/1
650 TABLET ORAL
Status: DISCONTINUED | OUTPATIENT
Start: 2019-07-15 | End: 2019-07-21

## 2019-07-15 RX ORDER — SODIUM CHLORIDE FOR INHALATION 3 %
1 VIAL, NEBULIZER (ML) INHALATION
Status: DISCONTINUED | OUTPATIENT
Start: 2019-07-15 | End: 2019-07-19 | Stop reason: ALTCHOICE

## 2019-07-15 RX ORDER — ASCORBIC ACID 500 MG
500 TABLET ORAL 3 TIMES DAILY
Status: DISCONTINUED | OUTPATIENT
Start: 2019-07-15 | End: 2019-07-15

## 2019-07-15 RX ORDER — LORAZEPAM 2 MG/ML
1 INJECTION INTRAMUSCULAR ONCE
Status: COMPLETED | OUTPATIENT
Start: 2019-07-15 | End: 2019-07-15

## 2019-07-15 RX ORDER — AMOXICILLIN 250 MG
2 CAPSULE ORAL
Status: DISCONTINUED | OUTPATIENT
Start: 2019-07-15 | End: 2019-08-05 | Stop reason: HOSPADM

## 2019-07-15 RX ORDER — CLOPIDOGREL BISULFATE 75 MG/1
75 TABLET ORAL DAILY
Status: DISCONTINUED | OUTPATIENT
Start: 2019-07-16 | End: 2019-08-05 | Stop reason: HOSPADM

## 2019-07-15 RX ADMIN — FENTANYL CITRATE 50 MCG: 50 INJECTION INTRAMUSCULAR; INTRAVENOUS at 22:03

## 2019-07-15 RX ADMIN — SENNOSIDES, DOCUSATE SODIUM 2 TABLET: 50; 8.6 TABLET, FILM COATED ORAL at 21:29

## 2019-07-15 RX ADMIN — LEVETIRACETAM 500 MG: 100 SOLUTION ORAL at 09:00

## 2019-07-15 RX ADMIN — FENTANYL CITRATE 50 MCG: 50 INJECTION INTRAMUSCULAR; INTRAVENOUS at 05:45

## 2019-07-15 RX ADMIN — MINERAL SUPPLEMENT IRON 300 MG / 5 ML STRENGTH LIQUID 100 PER BOX UNFLAVORED 300 MG: at 21:29

## 2019-07-15 RX ADMIN — LEVETIRACETAM 500 MG: 100 SOLUTION ORAL at 21:29

## 2019-07-15 RX ADMIN — Medication 30 ML: at 23:15

## 2019-07-15 RX ADMIN — NIMODIPINE 60 MG: 30 CAPSULE, LIQUID FILLED ORAL at 04:07

## 2019-07-15 RX ADMIN — CLOPIDOGREL BISULFATE 75 MG: 75 TABLET ORAL at 08:24

## 2019-07-15 RX ADMIN — LORAZEPAM 1 MG: 2 INJECTION INTRAMUSCULAR; INTRAVENOUS at 19:56

## 2019-07-15 RX ADMIN — CEFEPIME 2 G: 2 INJECTION, POWDER, FOR SOLUTION INTRAVENOUS at 09:44

## 2019-07-15 RX ADMIN — MINERAL SUPPLEMENT IRON 300 MG / 5 ML STRENGTH LIQUID 100 PER BOX UNFLAVORED 300 MG: at 16:52

## 2019-07-15 RX ADMIN — NIMODIPINE 60 MG: 30 CAPSULE, LIQUID FILLED ORAL at 19:58

## 2019-07-15 RX ADMIN — ACETAMINOPHEN 650 MG: 325 TABLET, FILM COATED ORAL at 14:29

## 2019-07-15 RX ADMIN — ALBUTEROL SULFATE 2.5 MG: 2.5 SOLUTION RESPIRATORY (INHALATION) at 11:30

## 2019-07-15 RX ADMIN — DEXAMETHASONE SODIUM PHOSPHATE 10 MG: 10 INJECTION INTRAMUSCULAR; INTRAVENOUS at 12:02

## 2019-07-15 RX ADMIN — NIMODIPINE 60 MG: 30 CAPSULE, LIQUID FILLED ORAL at 16:59

## 2019-07-15 RX ADMIN — SODIUM CHLORIDE, PRESERVATIVE FREE 900 UNITS: 5 INJECTION INTRAVENOUS at 21:29

## 2019-07-15 RX ADMIN — SODIUM CHLORIDE, PRESERVATIVE FREE 900 UNITS: 5 INJECTION INTRAVENOUS at 06:25

## 2019-07-15 RX ADMIN — VANCOMYCIN HYDROCHLORIDE 1250 MG: 10 INJECTION, POWDER, LYOPHILIZED, FOR SOLUTION INTRAVENOUS at 11:56

## 2019-07-15 RX ADMIN — MAGNESIUM OXIDE TAB 400 MG (241.3 MG ELEMENTAL MG) 400 MG: 400 (241.3 MG) TAB at 18:10

## 2019-07-15 RX ADMIN — ACETAMINOPHEN 650 MG: 325 TABLET, FILM COATED ORAL at 19:19

## 2019-07-15 RX ADMIN — SODIUM CHLORIDE, PRESERVATIVE FREE 900 UNITS: 5 INJECTION INTRAVENOUS at 14:33

## 2019-07-15 RX ADMIN — SODIUM CHLORIDE 100 ML/HR: 450 INJECTION, SOLUTION INTRAVENOUS at 11:45

## 2019-07-15 RX ADMIN — SODIUM CHLORIDE 100 ML/HR: 450 INJECTION, SOLUTION INTRAVENOUS at 22:47

## 2019-07-15 RX ADMIN — ACETAMINOPHEN 650 MG: 325 TABLET, FILM COATED ORAL at 09:45

## 2019-07-15 RX ADMIN — FENTANYL CITRATE 50 MCG: 50 INJECTION INTRAMUSCULAR; INTRAVENOUS at 01:08

## 2019-07-15 RX ADMIN — HEPARIN SODIUM 5000 UNITS: 5000 INJECTION INTRAVENOUS; SUBCUTANEOUS at 06:25

## 2019-07-15 RX ADMIN — Medication 30 ML: at 06:25

## 2019-07-15 RX ADMIN — Medication 400 MG: at 08:24

## 2019-07-15 RX ADMIN — NIMODIPINE 60 MG: 30 CAPSULE, LIQUID FILLED ORAL at 08:15

## 2019-07-15 RX ADMIN — OXYCODONE HYDROCHLORIDE AND ACETAMINOPHEN 500 MG: 500 TABLET ORAL at 21:29

## 2019-07-15 RX ADMIN — FENTANYL CITRATE 50 MCG: 50 INJECTION INTRAMUSCULAR; INTRAVENOUS at 04:44

## 2019-07-15 RX ADMIN — ATORVASTATIN CALCIUM 40 MG: 40 TABLET, FILM COATED ORAL at 21:29

## 2019-07-15 RX ADMIN — SODIUM CHLORIDE 30 MG/ML INHALATION SOLUTION 1 ML: 30 SOLUTION INHALANT at 11:29

## 2019-07-15 RX ADMIN — NIMODIPINE 60 MG: 30 CAPSULE, LIQUID FILLED ORAL at 11:05

## 2019-07-15 RX ADMIN — ASPIRIN 325 MG ORAL TABLET 325 MG: 325 PILL ORAL at 08:24

## 2019-07-15 RX ADMIN — ALBUTEROL SULFATE 2.5 MG: 2.5 SOLUTION RESPIRATORY (INHALATION) at 20:14

## 2019-07-15 RX ADMIN — ACETAMINOPHEN 650 MG: 325 TABLET, FILM COATED ORAL at 02:15

## 2019-07-15 RX ADMIN — HEPARIN SODIUM 5000 UNITS: 5000 INJECTION INTRAVENOUS; SUBCUTANEOUS at 14:32

## 2019-07-15 RX ADMIN — OXYCODONE HYDROCHLORIDE AND ACETAMINOPHEN 500 MG: 500 TABLET ORAL at 16:52

## 2019-07-15 RX ADMIN — SODIUM CHLORIDE 30 MG/ML INHALATION SOLUTION 1 ML: 30 SOLUTION INHALANT at 20:14

## 2019-07-15 RX ADMIN — FAMOTIDINE 20 MG: 20 TABLET ORAL at 08:24

## 2019-07-15 RX ADMIN — SODIUM CHLORIDE 100 ML/HR: 900 INJECTION, SOLUTION INTRAVENOUS at 04:07

## 2019-07-15 RX ADMIN — FENTANYL CITRATE 50 MCG: 50 INJECTION INTRAMUSCULAR; INTRAVENOUS at 03:13

## 2019-07-15 RX ADMIN — Medication 30 ML: at 14:33

## 2019-07-15 RX ADMIN — HEPARIN SODIUM 5000 UNITS: 5000 INJECTION INTRAVENOUS; SUBCUTANEOUS at 22:07

## 2019-07-15 NOTE — PROGRESS NOTES
Ventilator check complete; Patient has a 7.5 ETT secured 24 at the lip. Patient was able to follow commands. Breath sounds were coarse. Trachea midline and chest excursion is symmetrical. Patient is negative for cyanosis. All alarms are set and audible. Resuscitation bag and mask at the head of bed. Vent settings changed to pressure support of 5 and a PEEP of 8. FiO2 40% per Dr. Miley Carlton RN. Patient tolerating changes well. Will continue to monitor.

## 2019-07-15 NOTE — PROGRESS NOTES
Respiratory Mechanics completed and are as follows:  Weaning Parameters  Spontaneous Breathing Trial Complete: No (Comments)  Resp Rate Observed: 12  Ve: 11.1  VT: 621  Silva Agitation Sedation Scale (RASS): Alert and calm  Patient extubated to a 5L NC. Patient is able to communicate and is negative for stridor. Breath sounds are diminished. No complications with extubation.      Pepper Ryan, RT

## 2019-07-15 NOTE — PROGRESS NOTES
SPEECH PATHOLOGY NOTE:    Patient remains intubated. Will continue to follow and will initiate evaluation when medically appropriate.      Bethany Javier, [facility], [facility] JASE VALENTE, CCC-SLP

## 2019-07-15 NOTE — PROGRESS NOTES
Nutrition F/U:  TF Management for Dr. Prince Fleming, NP  Assessment:  Weight 79.2 kg (ICU bed 7/15/19), edema - 1+ edema generalized. TF continued over the weekend at its goal rate with good GI tolerance reported. The patient was extubated today and the FT was removed at the same time. Noted the results of his bedside ST evaluation this afternoon and he remains NPO until re-evaluated tomorrow. Labs are remarkable for sodium 155, BUN 52, creatinine 2.86. Last bowel movement was yesterday. Enteral Access:   Removed today. Macronutrient Needs:  Estimated calorie needs - 3010-1346 jayce/day (25-28 jayce/kg/day)   Estimated protein needs - 69-83 gm pro/day (1-1.2 gm pro/kgIBW/day)   Max CHO/day - 266 gm CHO/day (55% jayce/day)   Fluid/day - 1.7-2 liters/day (1 ml/jayce/day)  Intake/Comparative Standards:  Intake of TF (Jevity 1.2 @ 65 ml/hr with a 20 ml water flush every 4 hours provided 1872 calories/day (100% calorie goal), 87 grams protein/day (100% protein goal), 264 grams CHO/day (does not exceed max CHO limit) and 1383 ml water/day (74% fluid goal). Intervention:   Meals and Snacks: NPO based on ST evaluation today. Follow-up tomorrow. Mineral Supplement Therapy: Nutrition Support Orders/Electrolyte Management Replacement Protocols are active on the [facility] for potassium only. Coordination of Nutrition Care by a Nutrition Professional: AM ICU rounds, collaboration with Nelly Valdez. Nutrition Discharge Plan: Too soon to determine. Fay Mead.  Randolph Nettle  [phone]

## 2019-07-15 NOTE — PROGRESS NOTES
Bedside shift change report given to Shruthi [address] (oncoming nurse) by Tahir Geronimo (offgoing nurse). Report included the following information SBAR, Kardex, ED Summary, Procedure Summary, Intake/Output, MAR, Accordion, Recent Results, Med Rec Status and Cardiac Rhythm SB. Dual neuro assessment complete. NIH 2.

## 2019-07-15 NOTE — PROGRESS NOTES
Chart reviewed this am.  Pt remains in ICU on vent. Pt's Father has been at bedside. Pt's brother is to arrive in a few days and per chart he will follow up with admissions/DECO about financial assistance. Will continue to follow pt plan of care and assist with transitional care plan as appropriate.

## 2019-07-15 NOTE — PROGRESS NOTES
Problem: Mobility Impaired (Adult and Pediatric)  Goal: *Acute Goals and Plan of Care (Insert Text)  Description  STG:  (1.)Mr. Sarah Heredia will move from supine to sit and sit to supine , scoot up and down and roll side to side with CONTACT GUARD ASSIST within 3 treatment day(s). (2.)Mr. Sarah Heredia will transfer from bed to chair and chair to bed with CONTACT GUARD ASSIST using the least restrictive device within 3 treatment day(s). (3.)Mr. Sarah Heredia will ambulate with MINIMAL ASSIST for 50+ feet with the least restrictive device within 3 treatment day(s). (4.)Mr. Sarah Heredia will perform standing static and dynamic balance activities x 15 minutes with MINIMAL ASSIST to improve safety within 3 day(s). (5.)Mr. Sarah Heredia will maintain stable vital signs throughout all functional mobility within 3 days. LTG:  (1.)Mr. Sarah Heredia will move from supine to sit and sit to supine , scoot up and down and roll side to side in bed with SUPERVISION within 7 treatment day(s). (2.)Mr. Sarah Heredia will transfer from bed to chair and chair to bed with STAND BY ASSIST using the least restrictive device within 7 treatment day(s). (3.)Mr. Sarah Heredia will ambulate with STAND BY ASSIST for 75+ feet with the least restrictive device within 7 treatment day(s). (4.)Mr. Sarah Heredia will perform standing static and dynamic balance activities x 15 minutes with STAND BY ASSIST to improve safety within 7 day(s).   ________________________________________________________________________________________________     Outcome: Progressing Towards Goal     PHYSICAL THERAPY: Daily Note and PM 7/15/2019  INPATIENT: PT Visit Days : 2  Payor: SELF PAY / Plan: [facility] SELF PAY / Product Type: Self Pay /       NAME/AGE/GENDER: Yolanda Ramírez is a 37 y.o. 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 ASSESSMENT:     Mr. Deanna Cox is a 37 y.o. Male admitted for subarachnoid hemorrhage, extubated this AM and alert. He required additional time for all mobility, able to transfer to sitting with CGA, stand with CGA x2 and transfer to recliner with no AD and CGA x2, cues for decreased speed to maintain integrity of EVD and arterial line. Assist of a 3rd person for line management. He performed BLE exercises with frequent cues to remain on task and reports more difficulty with R side this PM. Cues required during transfer for full knee extension and posture. He will likely be able to ambulate further next therapy session, however deferred due to recent extubation and fatigue from speech therapy session. He remained in chair with all needs in reach and RN aware. Faustino Koo is currently functioning below his baseline and would benefit from skilled PT during acute care stay to maximize safety and independence with functional mobility. This section established at most recent assessment   PROBLEM LIST (Impairments causing functional limitations):  1. Decreased Strength  2. Decreased ADL/Functional Activities  3. Decreased Transfer Abilities  4. Decreased Ambulation Ability/Technique  5. Decreased Balance  6. Decreased Activity Tolerance  7. Decreased Pacing Skills  8. Increased Shortness of Breath  9. Decreased Knowledge of Precautions  10. Decreased [place] with Home Exercise Program   INTERVENTIONS PLANNED: (Benefits and precautions of physical therapy have been discussed with the patient.)  1. Balance Exercise  2. Bed Mobility  3. Family Education  4. Gait Training  5. Home Exercise Program (HEP)  6. Therapeutic Activites  7. Therapeutic Exercise/Strengthening  8.  Transfer Training     TREATMENT PLAN: Frequency/Duration: 3 times a week for duration of hospital stay  Rehabilitation Potential For Stated Goals: Good     REHAB RECOMMENDATIONS (at time of discharge pending progress):    Placement: It is my opinion, based on this patient's performance to date, that Mr. Breanne Randolph may benefit from intensive therapy at an [address] [place] [address] after discharge due to a probable need for close medical supervision by a rehab physician, a probable need for 24 hour rehab nursing, a probable need for multiple therapy disciplines and potential to make ongoing and sustainable functional improvement that is of practical value. .  Equipment:    None at this time              HISTORY:   History of Present Injury/Illness (Reason for Referral):  Subarachnoid hemorrhage. Past Medical History/Comorbidities:   Mr. Breanne Randolph  has no past medical history on file. Mr. Breanne Randolph  has a past surgical history that includes ir emboli intracran lt (7/11/2019). Social History/Living Environment:   Home Environment: Private residence  One/Two Story Residence: Other (Comment)  Living Alone: No  Support Systems: Parent, Child(rohan)  Patient Expects to be Discharged to[de-identified] Unknown  Current DME Used/Available at Home: None  Prior Level of Function/Work/Activity:  Unsure, RN reports patient worked. Number of Personal Factors/Comorbidities that affect the Plan of Care: 1-2: MODERATE COMPLEXITY   EXAMINATION:   Most Recent Physical Functioning:   Gross Assessment:  AROM: Generally decreased, functional  PROM: Generally decreased, functional  Strength: Generally decreased, functional  Coordination: Generally decreased, functional               Posture:  Posture (WDL): Exceptions to WDL  Posture Assessment: Forward head  Balance:  Sitting: Impaired  Sitting - Static: Good (unsupported)  Sitting - Dynamic: Fair (occasional)  Standing: Impaired  Standing - Static: Constant support; Fair  Standing - Dynamic : Fair Bed Mobility:  Supine to Sit: Contact guard assistance  Scooting: Contact guard assistance  Wheelchair Mobility: Transfers:  Sit to Stand: Contact guard assistance;Assist x2  Stand to Sit: Contact guard assistance;Assist x2  Bed to Chair: Contact guard assistance;Assist x2  Gait:     Base of Support: Narrowed; Center of gravity altered  Speed/Aminata: Slow;Shuffled  Step Length: Right shortened;Left shortened  Gait Abnormalities: Decreased step clearance; Path deviations;Trunk sway increased  Distance (ft): 3 Feet (ft)  Assistive Device: Gait belt  Ambulation - Level of Assistance: Contact guard assistance;Assist x2  Interventions: Safety awareness training;Verbal cues; Visual/Demos      Body Structures Involved:  1. Nerves  2. Heart  3. Lungs  4. Muscles Body Functions Affected:  1. Sensory/Pain  2. Voice and Speech  3. Cardio  4. Respiratory  5. Neuromusculoskeletal  6. Movement Related Activities and Participation Affected:  1. Learning and Applying Knowledge  2. General Tasks and Demands  3. Communication  4. Mobility  5. Self Care  6. Domestic Life  7. Interpersonal Interactions and Relationships  8. Community, Social and [place]   Number of elements that affect the Plan of Care: 4+: HIGH COMPLEXITY   CLINICAL PRESENTATION:   Presentation: Evolving clinical presentation with changing clinical characteristics: MODERATE COMPLEXITY   CLINICAL DECISION MAKIN [facility] Mobility Inpatient Short Form  How much difficulty does the patient currently have. .. Unable A Lot A Little None   1. Turning over in bed (including adjusting bedclothes, sheets and blankets)? ? 1   ? 2   ? 3   ? 4   2. Sitting down on and standing up from a chair with arms ( e.g., wheelchair, bedside commode, etc.)   ? 1   ? 2   ? 3   ? 4   3. Moving from lying on back to sitting on the side of the bed?   ? 1   ? 2   ? 3   ? 4   How much help from another person does the patient currently need. .. Total A Lot A Little None   4. Moving to and from a bed to a chair (including a wheelchair)?    ? 1   ? 2   ? 3   ? 4 5.  Need to walk in hospital room? ? 1   ? 2   ? 3   ? 4   6. Climbing 3-5 steps with a railing? ? 1   ? 2   ? 3   ? 4   © 2007, Trustees of [address] [place] [address] [address] [address], under license to [employer]. All rights reserved      Score:  Initial: 10 Most Recent: X (Date: -- )    Interpretation of Tool:  Represents activities that are increasingly more difficult (i.e. Bed mobility, Transfers, Gait). Medical Necessity:     · Patient demonstrates good  ·  rehab potential due to higher previous functional level. Reason for Services/Other Comments:  · Patient continues to require modification of therapeutic interventions to increase complexity of exercises  · . Use of outcome tool(s) and clinical judgement create a POC that gives a: Questionable prediction of patient's progress: MODERATE COMPLEXITY            TREATMENT:   (In addition to Assessment/Re-Assessment sessions the following treatments were rendered)   Pre-treatment Symptoms/Complaints:  none  Pain: Initial:   Pain Intensity 1: 0  Post Session:  0/10     Therapeutic Activity: (    18 minutes): Therapeutic activities including Bed transfers, Chair transfers and Ambulation on level ground to improve mobility, strength, balance, coordination and activity tolerance . Required maximal Safety awareness training;Verbal cues; Visual/Demos to promote static and dynamic balance in standing and promote coordination of bilateral, lower extremity(s). Therapeutic Exercise: (  8 minutes):  Exercises per grid below to improve mobility, strength, balance and coordination. Required minimal visual and verbal cues to promote proper body alignment. Progressed complexity of movement as indicated.      Date:  7/15/19 Date:   Date:     Activity/Exercise Parameters Parameters Parameters   Seated heel raises x15 B A     Seated toe raises x15 B A     Seated LAQ x15 B A                                     Braces/Orthotics/Lines/Etc:   · IV  · phelps catheter  · drain EVD  · arterial line  · O2 Nasal Cannula  Treatment/Session Assessment:    · Response to Treatment:  Patient maintained stable vitals, able to transfer to chair with mod assist x3 for line management. · Interdisciplinary Collaboration:   o Physical Therapist  o Registered Nurse  o Physical Therapist   · After treatment position/precautions:   o Up in chair  o Bed alarm/tab alert on  o Bed/Chair-wheels locked  o Bed in low position  o Call light within reach  o RN notified   · Compliance with Program/Exercises: Will assess as treatment progresses  · Recommendations/Intent for next treatment session: \"Next visit will focus on advancements to more challenging activities and reduction in assistance provided\".   Total Treatment Duration:  PT Patient Time In/Time Out  Time In: 1454  Time Out: 508 Colby Tillman, PT, DPT

## 2019-07-15 NOTE — PROGRESS NOTES
Progress Note    Patient: Kayleen Sanabria MRN: [id number]  SSN: [id number]    YOB: 1975  Age: 37 y.o. Sex: male      Admit Date: 7/9/2019    LOS: 6 days     Subjective:   Pt looks good this am, extubated wo difficulty and tolerating well. EVD @ 10/open and draining wo difficulty, CSF cx is neg. Will start VIT C/Iron today. Monitor HH, Cr.   Otherwise no acute changes.     Current Facility-Administered Medications   Medication Dose Route Frequency    0.45% sodium chloride infusion  100 mL/hr IntraVENous CONTINUOUS    ascorbic acid (vitamin C) (VITAMIN C) tablet 500 mg  500 mg Per NG tube TID    ferrous sulfate 300 mg (60 mg iron)/5 mL oral syrup 300 mg  300 mg Per NG tube TID    albuterol (PROVENTIL VENTOLIN) nebulizer solution 2.5 mg  2.5 mg Nebulization BID RT    sodium chloride 3% hypertonic nebulizer soln  1 mL Nebulization BID RT    [START ON 7/16/2019] vancomycin (VANCOCIN) 1,000 mg in 0.9% sodium chloride (MBP/ADV) 250 mL  1,000 mg IntraVENous Q24H    magnesium hydroxide (MILK OF MAGNESIA) 400 mg/5 mL oral suspension 30 mL  30 mL Per NG tube DAILY PRN    levETIRAcetam (KEPPRA) oral solution 500 mg  500 mg Per NG tube Q12H    famotidine (PEPCID) tablet 20 mg  20 mg Per NG tube DAILY    clopidogrel (PLAVIX) tablet 75 mg  75 mg Per NG tube DAILY    aspirin tablet 325 mg  325 mg Nasogastric DAILY    cefepime (MAXIPIME) 2 g in 0.9% sodium chloride (MBP/ADV) 100 mL  2 g IntraVENous Q12H    fentaNYL citrate (PF) injection 50 mcg  50 mcg IntraVENous Q1H PRN    polyvinyl alcohol (LIQUIFILM TEARS) 1.4 % ophthalmic solution 1 Drop  1 Drop Both Eyes PRN    ALPRAZolam (XANAX) tablet 0.5 mg  0.5 mg Per NG tube Q6H PRN    niMODipine (NIMOTOP) 30 mg/mL oral solution (compound) 60 mg  60 mg Per NG tube Q4H    magnesium oxide (MAG-OX) tablet 400 mg  400 mg Per NG tube BID    senna-docusate (PERICOLACE) 8.6-50 mg per tablet 2 Tab  2 Tab Per NG tube QHS    atorvastatin (LIPITOR) tablet 40 mg 40 mg Per NG tube QHS    heparin (porcine) injection 5,000 Units  5,000 Units SubCUTAneous Q8H    sodium chloride (NS) flush 30 mL  30 mL InterCATHeter Q8H    heparin (porcine) pf 900 Units  900 Units InterCATHeter Q8H    sodium chloride (NS) flush 30 mL  30 mL InterCATHeter PRN    heparin (porcine) pf 900 Units  900 Units InterCATHeter PRN    nicotine (NICODERM CQ) 21 mg/24 hr patch 1 Patch  1 Patch TransDERmal Q24H    acetaminophen (TYLENOL) tablet 650 mg  650 mg Per NG tube Q4H PRN    ondansetron (ZOFRAN) injection 4 mg  4 mg IntraVENous Q6H PRN    NUTRITIONAL SUPPORT ELECTROLYTE PRN ORDERS   Does Not Apply PRN    magnesium sulfate 4 g/100 mL IVPB  4 g IntraVENous DAILY PRN    magnesium sulfate 2 g/50 ml IVPB (premix or compounded)  2 g IntraVENous DAILY PRN       Objective:     Vitals:    07/15/19 1030 07/15/19 1045 07/15/19 1100 07/15/19 1130   BP:       Pulse: 63 62 79    Resp: 10 12 13    Temp: 99.6 °F (37.6 °C) 99.6 °F (37.6 °C) 99.6 °F (37.6 °C)    SpO2: 100% 100% 99% 100%   Weight:       Height:             Intake and Output:  Current Shift: 07/15 0701 - 07/15 1900  In: 145   Out: 687 [Urine:520; Drains:65]  Last 24 hr: 07/14 0701 - 07/15 0700  In: 4457.3 [I.V.:2838.3]  Out: 4779 [Urine:2540; Drains:305]     IO: +2490/24hrs  TOTAL: +13L overall  EVD: 295/24hrs. IO: +1612/24hr  TOTAL OVERALL: +15L  EVD: 305/24hrs    Physical Exam:   General:  Alert, cooperative, no distress, appears stated age. Eyes:   PERRL, EOMs intact. Neck: Supple, symmetrical, trachea midline, no adenopathy, thyroid: no enlargment/tenderness/nodules, no carotid bruit and no JVD. Lungs:   Clear to auscultation bilaterally. Heart:  Regular rate and rhythm, S1, S2 normal, no murmur, click, rub or gallop. Abdomen:   Soft, non-tender. Bowel sounds normal. No masses,  No organomegaly. Extremities: Extremities normal, atraumatic, no cyanosis or edema. Pulses: 2+ and symmetric all extremities.    Skin: Skin color, texture, turgor normal. No rashes or lesions   Neurologic:  Extubated and tolerating well. Airway patent. Follows all commands. Lifted arms and legs off the bed. Lab/Data Review:    Recent Results (from the past 12 hour(s))   MAGNESIUM    Collection Time: 07/15/19  3:14 AM   Result Value Ref Range    Magnesium 2.8 (H) 1.8 - 2.4 mg/dL   CBC W/O DIFF    Collection Time: 07/15/19  3:14 AM   Result Value Ref Range    WBC 11.7 (H) 4.3 - 11.1 K/uL    RBC 2.66 (L) 4.23 - 5.6 M/uL    HGB 7.8 (L) 13.6 - 17.2 g/dL    HCT 25.0 (L) 41.1 - 50.3 %    MCV 94.0 79.6 - 97.8 FL    MCH 29.3 26.1 - 32.9 PG    MCHC 31.2 (L) 31.4 - 35.0 g/dL    RDW 13.8 11.9 - 14.6 %    PLATELET 479 221 - 060 K/uL    MPV 11.5 9.4 - 12.3 FL    ABSOLUTE NRBC 0.00 0.0 - 0.2 K/uL   METABOLIC PANEL, BASIC    Collection Time: 07/15/19  3:14 AM   Result Value Ref Range    Sodium 155 (H) 136 - 145 mmol/L    Potassium 3.7 3.5 - 5.1 mmol/L    Chloride 125 (H) 98 - 107 mmol/L    CO2 21 21 - 32 mmol/L    Anion gap 9 7 - 16 mmol/L    Glucose 147 (H) 65 - 100 mg/dL    BUN 52 (H) 6 - 23 MG/DL    Creatinine 2.86 (H) 0.8 - 1.5 MG/DL    GFR est AA 31 (L) >60 ml/min/1.73m2    GFR est non-AA 26 (L) >60 ml/min/1.73m2    Calcium 7.9 (L) 8.3 - 10.4 MG/DL   POC G3    Collection Time: 07/15/19  3:16 AM   Result Value Ref Range    Device: VENT      FIO2 (POC) 40 %    pH (POC) 7.364 7.35 - 7.45      pCO2 (POC) 34.1 (L) 35 - 45 MMHG    pO2 (POC) 209 (H) 75 - 100 MMHG    HCO3 (POC) 19.4 (L) 22 - 26 MMOL/L    sO2 (POC) 100 (H) 95 - 98 %    Base deficit (POC) 5 mmol/L    Mode BILEVEL      Set Rate 9 bpm    PEEP/CPAP (POC) 0 cmH2O    PIP (POC) 22      Allens test (POC) NOT APPLICABLE      Inspiratory Time High 6 sec    Inspiratory Time Low 0.6 sec    Site DRAWN FROM ARTERIAL LINE      Patient temp.  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 Q1 hour neuro checks. On SAH protocol - Mg, nimotop, zocor. PERRL +3. No MAP goal now. TCDs daily and showing a trend of increasing peak velocities especially in the left MCA with peaks over 250. EVD set to 10cm H20. Last CT head showed improved communicating hydrocephalus. Last CTA head and neck showed no filling of the aneurysm and good filling of the PCAs. Will continue [facility] protocol, PT/OT. OOB to chair today. /. RESP: Pt weaned off vent this am and extubated, tolerating NC @ 4L well, will wean off oxygen as tolerated. +secretions, will monitor. CV: NO MAP GOAL NOW.  2D Echo: 55-60%, trop peaked @ 0.8, trending down. SCD/SQ heparin. HEME: 7.8/25, . HIT panel negative. HH continues to drift down. Started Fe and Vit C. NEPH: bun/cr: 52/2.8. Phos: 3.8, K+3.7. Fluids changed to 1/2 NS. GI: Pepcid. Senna. MOM given. Abdomen less distended. +BS. + BM today. Extubated, ST to see pt for swallow eval. Bedside STAND. ID: Tm: [id number]. Sputum cx sent, pending final, showing gram + cocci - Staph Aureus. Patient has MSSA pneumonia. Blood cultures neg so far and UA neg. Vanc/cefepime D4/10, consult pharmacy for dosing. Will dc cefepime as Vanc coverage for STAPH.    LINES: KISHOR PICC, SHANNAN phelps EVD.   Tobacco abuse: On Nicoderm patch.     Patient is critical due to [facility], risk of rebleeding, vasospasm, acute respiratory failure, communicating hydrocephalus, and kidney disease. I spent 40 minutes at bedside doing critical care examining the patient, reviewing labs and films, correcting care and updating the family.         Signed By: Samira Davis NP     July 15, 2019

## 2019-07-15 NOTE — PROGRESS NOTES
PM&R Consult Progress Note      Patient: Yelena Or  Admit Date: 7/9/2019  Admit Diagnosis: SAH (subarachnoid hemorrhage) ([facility] [I60.9]  Recommendations: Continue Acute Rehab Program, Coordination of rehab/medical care, Counseling of PM & R care issues management, Hospital Inpatient Rehab  -pt remains on vent with plans to possibly wean towards extubation today. (current PEEP 8, FiO2 40%, PS 5). Continues to have EVD drain in place.   -Cont PT/OT; ST when extub.   -low grade temps continue. CSF sent for cx. Continues on abx  -excellent rehab potential. Will need supervision at [facility] pending medical stability and admin approval. DECO to assist with financial issues. History/Subjective/Complaint:     Patient seen and examined. Records reviewed. Awake and alert, nods y/n inconsistently , NAD    Pain 1  Pain Scale 1: Behavioral Pain Scale (BPS) (07/15/19 0700)  Pain Intensity 1: 0 (07/15/19 0700)  Patient Stated Pain Goal: Unable to verbalize/indicate pain (07/15/19 0445)  Pain Reassessment 1: Yes (07/15/19 0515)  Pain Onset 1: since admit (07/09/19 2000)  Pain Location 1: Throat (07/14/19 1018)  Pain Orientation 1: Posterior (07/10/19 0002)  Pain Description 1: Aching;Constant; Sore (07/10/19 0002)  Pain Intervention(s) 1: Medication (see MAR) (07/14/19 2130)     Objective:     Vitals:  Patient Vitals for the past 8 hrs:   BP Temp Pulse Resp SpO2 Weight   07/15/19 1130 -- -- -- -- 100 % --   07/15/19 1100 -- 99.6 °F (37.6 °C) 79 13 99 % --   07/15/19 1045 -- 99.6 °F (37.6 °C) 62 12 100 % --   07/15/19 1030 -- 99.6 °F (37.6 °C) 63 10 100 % --   07/15/19 1015 -- 99.6 °F (37.6 °C) 64 12 100 % --   07/15/19 1000 -- 99.6 °F (37.6 °C) (!) 59 11 100 % --   07/15/19 0945 -- 99.6 °F (37.6 °C) 62 9 100 % --   07/15/19 0930 -- 99.6 °F (37.6 °C) 63 11 100 % --   07/15/19 0915 -- 99.6 °F (37.6 °C) (!) 52 12 100 % --   07/15/19 0900 -- 99.6 °F (37.6 °C) (!) 54 11 100 % --   07/15/19 0845 -- 99.6 °F (37.6 °C) (!) 53 13 100 % -- 07/15/19 0830 -- 99.6 °F (37.6 °C) (!) 59 12 100 % --   07/15/19 0815 (!) 213/68 99.5 °F (37.5 °C) (!) 57 12 100 % --   07/15/19 0800 -- 99.5 °F (37.5 °C) (!) 59 13 100 % --   07/15/19 0750 -- -- 60 12 100 % --   07/15/19 0745 -- 99.3 °F (37.4 °C) 81 15 100 % --   07/15/19 0730 -- 99.3 °F (37.4 °C) (!) 59 11 100 % --   07/15/19 0715 -- 99.1 °F (37.3 °C) 60 8 100 % --   07/15/19 0700 -- 99.1 °F (37.3 °C) (!) 54 8 100 % --   07/15/19 0645 -- 99.1 °F (37.3 °C) (!) 53 8 100 % --   07/15/19 0626 -- -- -- -- -- 189 lb 9.5 oz (86 kg)   07/15/19 0600 -- 99.1 °F (37.3 °C) (!) 53 8 100 % --   07/15/19 0542 -- -- 97 21 100 % --   07/15/19 0500 -- 99.1 °F (37.3 °C) 63 16 100 % --   07/15/19 0407 (!) 217/76 -- (!) 52 -- -- --   07/15/19 0400 -- 99.6 °F (37.6 °C) (!) 53 13 100 % --      Intake and Output:  07/13 1901 - 07/15 0700  In: 7290.3 [I.V.:4238.3]  Out: 4153 [Urine:3700; Drains:453]    No Known Allergies  Current Facility-Administered Medications   Medication Dose Route Frequency    0.45% sodium chloride infusion  100 mL/hr IntraVENous CONTINUOUS    ascorbic acid (vitamin C) (VITAMIN C) tablet 500 mg  500 mg Per NG tube TID    ferrous sulfate 300 mg (60 mg iron)/5 mL oral syrup 300 mg  300 mg Per NG tube TID    dexamethasone (DECADRON) injection 10 mg  10 mg IntraVENous ONCE    albuterol (PROVENTIL VENTOLIN) nebulizer solution 2.5 mg  2.5 mg Nebulization BID RT    sodium chloride 3% hypertonic nebulizer soln  1 mL Nebulization BID RT    magnesium hydroxide (MILK OF MAGNESIA) 400 mg/5 mL oral suspension 30 mL  30 mL Per NG tube DAILY PRN    levETIRAcetam (KEPPRA) oral solution 500 mg  500 mg Per NG tube Q12H    famotidine (PEPCID) tablet 20 mg  20 mg Per NG tube DAILY    clopidogrel (PLAVIX) tablet 75 mg  75 mg Per NG tube DAILY    aspirin tablet 325 mg  325 mg Nasogastric DAILY    cefepime (MAXIPIME) 2 g in 0.9% sodium chloride (MBP/ADV) 100 mL  2 g IntraVENous Q12H    vancomycin (VANCOCIN) 1250 mg in NS 250 ml infusion  1,250 mg IntraVENous Q24H    fentaNYL citrate (PF) injection 50 mcg  50 mcg IntraVENous Q1H PRN    polyvinyl alcohol (LIQUIFILM TEARS) 1.4 % ophthalmic solution 1 Drop  1 Drop Both Eyes PRN    ALPRAZolam (XANAX) tablet 0.5 mg  0.5 mg Per NG tube Q6H PRN    niMODipine (NIMOTOP) 30 mg/mL oral solution (compound) 60 mg  60 mg Per NG tube Q4H    magnesium oxide (MAG-OX) tablet 400 mg  400 mg Per NG tube BID    senna-docusate (PERICOLACE) 8.6-50 mg per tablet 2 Tab  2 Tab Per NG tube QHS    atorvastatin (LIPITOR) tablet 40 mg  40 mg Per NG tube QHS    heparin (porcine) injection 5,000 Units  5,000 Units SubCUTAneous Q8H    sodium chloride (NS) flush 30 mL  30 mL InterCATHeter Q8H    heparin (porcine) pf 900 Units  900 Units InterCATHeter Q8H    sodium chloride (NS) flush 30 mL  30 mL InterCATHeter PRN    heparin (porcine) pf 900 Units  900 Units InterCATHeter PRN    nicotine (NICODERM CQ) 21 mg/24 hr patch 1 Patch  1 Patch TransDERmal Q24H    acetaminophen (TYLENOL) tablet 650 mg  650 mg Per NG tube Q4H PRN    ondansetron (ZOFRAN) injection 4 mg  4 mg IntraVENous Q6H PRN    NUTRITIONAL SUPPORT ELECTROLYTE PRN ORDERS   Does Not Apply PRN    magnesium sulfate 4 g/100 mL IVPB  4 g IntraVENous DAILY PRN    magnesium sulfate 2 g/50 ml IVPB (premix or compounded)  2 g IntraVENous DAILY PRN       Physical Exam:  Orally intub. AA, attends to examiner  Nods y/n appropriately 2/5 times  Follows one step commands  PERRLA, EOMI  cv rrr no m  Lungs ; coarse bs  abd soft ntnd bs +  Ext; no edema    Functional Assessment:  Gross Assessment  AROM: Generally decreased, functional (07/12/19 1500)  PROM: Generally decreased, functional (07/12/19 1100)  Strength:  Within functional limits (07/12/19 1500)  Coordination: Generally decreased, functional(B hands swelling) (07/12/19 1500)  Sensation: Intact(BUEs to light touch ) (07/12/19 1500)     Gait  Base of Support: Center of gravity altered;Narrowed (07/12/19 1100)  Speed/Aminata: Slow;Shuffled (07/12/19 1100)  Step Length: Right shortened;Left shortened (07/12/19 1100)  Gait Abnormalities: Decreased step clearance; Path deviations; Shuffling gait;Trunk sway increased (07/12/19 1100)  Ambulation - Level of Assistance: Moderate assistance(assist x3 (2 people for vent and lines, 1 person t/f)) (07/12/19 1100)  Distance (ft): 3 Feet (ft) (07/12/19 1100)  Assistive Device: Gait belt (07/12/19 1100)  Interventions: Safety awareness training;Manual cues; Verbal cues; Visual/Demos (07/12/19 1100)     Bed Mobility  Supine to Sit: Moderate assistance;Assist x2 (07/12/19 1100)  Scooting: Stand-by assistance (07/12/19 1500)     Balance  Sitting: Impaired (07/12/19 1100)  Sitting - Static: Good (unsupported) (07/12/19 1500)  Sitting - Dynamic: Fair (occasional) (07/12/19 1100)  Standing: Impaired (07/12/19 1500)  Standing - Static: Good;Constant support (07/12/19 1500)  Standing - Dynamic : Fair;Constant support (07/12/19 1500)                       Bed/Mat Mobility  Supine to Sit: Moderate assistance;Assist x2 (07/12/19 1100)  Sit to Stand: Minimum assistance (07/12/19 1500)  Stand to Sit: Contact guard assistance (07/12/19 1500)  Bed to Chair: Minimum assistance(3 person assist to manage lines) (07/12/19 1500)  Scooting: Stand-by assistance (07/12/19 1500)     Labs/Studies:  Recent Results (from the past 72 hour(s))   POC G3    Collection Time: 07/12/19 11:46 AM   Result Value Ref Range    Device: VENT      FIO2 (POC) 100 %    pH (POC) 7.276 (L) 7.35 - 7.45      pCO2 (POC) 37.4 35 - 45 MMHG    pO2 (POC) 302 (H) 75 - 100 MMHG    HCO3 (POC) 17.4 (L) 22 - 26 MMOL/L    sO2 (POC) 100 (H) 95 - 98 %    Base deficit (POC) 9 mmol/L    Mode BILEVEL      PEEP/CPAP (POC) 0 cmH2O    PIP (POC) 26      Allens test (POC) NOT APPLICABLE      Inspiratory Time High 4.0 sec    Inspiratory Time Low 0.6 sec    Site DRAWN FROM ARTERIAL LINE      Patient temp.  98.6      Specimen type (POC) ARTERIAL Performed by Grace TALBOT POC 19 MMOL/L    Respiratory comment: NurseNotified     Exhaled minute volume 10.60 L/min    COLLECT TIME 1,145     URINALYSIS W/ RFLX MICROSCOPIC    Collection Time: 07/12/19  6:02 PM   Result Value Ref Range    Color YELLOW      Appearance CLEAR      Specific gravity 1.020 1.001 - 1.023      pH (UA) 5.0 5.0 - 9.0      Protein 100 (A) NEG mg/dL    Glucose NEGATIVE  NEG mg/dL    Ketone NEGATIVE  NEG mg/dL    Bilirubin NEGATIVE  NEG      Blood MODERATE (A) NEG      Urobilinogen 0.2 0.2 - 1.0 EU/dL    Nitrites NEGATIVE  NEG      Leukocyte Esterase NEGATIVE  NEG     URINE MICROSCOPIC    Collection Time: 07/12/19  6:02 PM   Result Value Ref Range    WBC 0 0 /hpf    RBC 0-3 0 /hpf    Epithelial cells 0 0 /hpf    Bacteria TRACE 0 /hpf    Casts 0 0 /lpf    Crystals, urine 0 0 /LPF    Amorphous Crystals TRACE 0      Mucus 0 0 /lpf   CULTURE, BLOOD    Collection Time: 07/12/19  6:03 PM   Result Value Ref Range    Special Requests: LEFT  ARM  ARTL (ART LINE)        Culture result: NO GROWTH 3 DAYS     CULTURE, BLOOD    Collection Time: 07/12/19  6:08 PM   Result Value Ref Range    Special Requests: RIGHT  ARM  RED PICC        Culture result: NO GROWTH 3 DAYS     POC G3    Collection Time: 07/13/19  3:17 AM   Result Value Ref Range    Device: VENT      FIO2 (POC) 60 %    pH (POC) 7.334 (L) 7.35 - 7.45      pCO2 (POC) 37.7 35 - 45 MMHG    pO2 (POC) 279 (H) 75 - 100 MMHG    HCO3 (POC) 20.1 (L) 22 - 26 MMOL/L    sO2 (POC) 100 (H) 95 - 98 %    Base deficit (POC) 5 mmol/L    Mode BILEVEL      PEEP/CPAP (POC) 0 cmH2O    PIP (POC) 26      Allens test (POC) NOT APPLICABLE      Inspiratory Time High 4.0 sec    Inspiratory Time Low 0.6 sec    Site DRAWN FROM ARTERIAL LINE      Patient temp.  98.6      Specimen type (POC) ARTERIAL      Performed by Edilberto TALBOT, POC 21 MMOL/L    Respiratory comment: NurseNotified     Exhaled minute volume 9.70 L/min    COLLECT TIME 319     MAGNESIUM Collection Time: 07/13/19  3:25 AM   Result Value Ref Range    Magnesium 2.9 (H) 1.8 - 2.4 mg/dL   METABOLIC PANEL, BASIC    Collection Time: 07/13/19  3:25 AM   Result Value Ref Range    Sodium 152 (H) 136 - 145 mmol/L    Potassium 3.8 3.5 - 5.1 mmol/L    Chloride 124 (H) 98 - 107 mmol/L    CO2 21 21 - 32 mmol/L    Anion gap 7 7 - 16 mmol/L    Glucose 118 (H) 65 - 100 mg/dL    BUN 37 (H) 6 - 23 MG/DL    Creatinine 2.79 (H) 0.8 - 1.5 MG/DL    GFR est AA 32 (L) >60 ml/min/1.73m2    GFR est non-AA 26 (L) >60 ml/min/1.73m2    Calcium 8.2 (L) 8.3 - 10.4 MG/DL   CBC W/O DIFF    Collection Time: 07/13/19  3:25 AM   Result Value Ref Range    WBC 17.0 (H) 4.3 - 11.1 K/uL    RBC 2.84 (L) 4.23 - 5.6 M/uL    HGB 8.4 (L) 13.6 - 17.2 g/dL    HCT 26.2 (L) 41.1 - 50.3 %    MCV 92.3 79.6 - 97.8 FL    MCH 29.6 26.1 - 32.9 PG    MCHC 32.1 31.4 - 35.0 g/dL    RDW 13.6 11.9 - 14.6 %    PLATELET 621 197 - 567 K/uL    MPV 11.6 9.4 - 12.3 FL    ABSOLUTE NRBC 0.00 0.0 - 0.2 K/uL   ASPIRIN TEST    Collection Time: 07/13/19  3:25 AM   Result Value Ref Range    Aspirin test 484 (L) 620 - 672 ARU   PLATELET FUNCTION, VERIFY NOW P2Y12    Collection Time: 07/13/19  3:25 AM   Result Value Ref Range    P2Y12 Plt response 117 PRU   CBC W/O DIFF    Collection Time: 07/14/19  3:15 AM   Result Value Ref Range    WBC 12.7 (H) 4.3 - 11.1 K/uL    RBC 2.74 (L) 4.23 - 5.6 M/uL    HGB 8.0 (L) 13.6 - 17.2 g/dL    HCT 25.3 (L) 41.1 - 50.3 %    MCV 92.3 79.6 - 97.8 FL    MCH 29.2 26.1 - 32.9 PG    MCHC 31.6 31.4 - 35.0 g/dL    RDW 13.6 11.9 - 14.6 %    PLATELET 059 (L) 518 - 450 K/uL    MPV 11.0 9.4 - 12.3 FL    ABSOLUTE NRBC 0.00 0.0 - 0.2 K/uL   METABOLIC PANEL, BASIC    Collection Time: 07/14/19  3:15 AM   Result Value Ref Range    Sodium 155 (H) 136 - 145 mmol/L    Potassium 3.8 3.5 - 5.1 mmol/L    Chloride 124 (H) 98 - 107 mmol/L    CO2 24 21 - 32 mmol/L    Anion gap 7 7 - 16 mmol/L    Glucose 120 (H) 65 - 100 mg/dL    BUN 45 (H) 6 - 23 MG/DL Creatinine 2.80 (H) 0.8 - 1.5 MG/DL    GFR est AA 32 (L) >60 ml/min/1.73m2    GFR est non-AA 26 (L) >60 ml/min/1.73m2    Calcium 8.1 (L) 8.3 - 10.4 MG/DL   MAGNESIUM    Collection Time: 07/14/19  3:15 AM   Result Value Ref Range    Magnesium 2.7 (H) 1.8 - 2.4 mg/dL   POC G3    Collection Time: 07/14/19  3:27 AM   Result Value Ref Range    Device: VENT      FIO2 (POC) 60 %    pH (POC) 7.338 (L) 7.35 - 7.45      pCO2 (POC) 39.8 35 - 45 MMHG    pO2 (POC) 284 (H) 75 - 100 MMHG    HCO3 (POC) 21.4 (L) 22 - 26 MMOL/L    sO2 (POC) 100 (H) 95 - 98 %    Base deficit (POC) 4 mmol/L    Mode BILEVEL      Set Rate 13 bpm    PEEP/CPAP (POC) 0 cmH2O    PIP (POC) 26      Allens test (POC) NOT APPLICABLE      Inspiratory Time High 4 sec    Inspiratory Time Low 0.6 sec    Site DRAWN FROM ARTERIAL LINE      Patient temp.  98.6      Specimen type (POC) ARTERIAL      Performed by Jona     CO2, POC 23 MMOL/L    Critical value read back 03:26     Respiratory comment: NurseNotified     Exhaled minute volume 7.70 L/min    COLLECT TIME 325     CULTURE, CSF W GRAM STAIN    Collection Time: 07/14/19 12:51 PM   Result Value Ref Range    Special Requests: NO SPECIAL REQUESTS      GRAM STAIN 0 TO 5 WBC'S SEEN PER OIF     GRAM STAIN NO DEFINITE ORGANISM SEEN      Culture result: NO GROWTH 1 DAY     GLUCOSE, CSF    Collection Time: 07/14/19 12:51 PM   Result Value Ref Range    Tube No. 1      Glucose,CSF 78 (H) 40 - 70 MG/DL   PROTEIN, CSF    Collection Time: 07/14/19 12:51 PM   Result Value Ref Range    Tube No. 1      Protein,CSF 71 (H) 15 - 45 MG/DL   CELL COUNT, BODY FLUID    Collection Time: 07/14/19 12:51 PM   Result Value Ref Range    BODY FLUID TYPE CEREBROSPINAL FLUID      FLUID COLOR LIGHT      FLUID APPEARANCE TURBID      FLUID RBC CT. 15,125 /cu mm    FLUID WBC COUNT 8 /cu mm   COLLECTION COMMENT    Collection Time: 07/14/19 12:51 PM   Result Value Ref Range    Collection Comment CSF BROUGHT TO LAB IN 1 SMALL SYRINGE MAGNESIUM    Collection Time: 07/15/19  3:14 AM   Result Value Ref Range    Magnesium 2.8 (H) 1.8 - 2.4 mg/dL   CBC W/O DIFF    Collection Time: 07/15/19  3:14 AM   Result Value Ref Range    WBC 11.7 (H) 4.3 - 11.1 K/uL    RBC 2.66 (L) 4.23 - 5.6 M/uL    HGB 7.8 (L) 13.6 - 17.2 g/dL    HCT 25.0 (L) 41.1 - 50.3 %    MCV 94.0 79.6 - 97.8 FL    MCH 29.3 26.1 - 32.9 PG    MCHC 31.2 (L) 31.4 - 35.0 g/dL    RDW 13.8 11.9 - 14.6 %    PLATELET 205 037 - 145 K/uL    MPV 11.5 9.4 - 12.3 FL    ABSOLUTE NRBC 0.00 0.0 - 0.2 K/uL   METABOLIC PANEL, BASIC    Collection Time: 07/15/19  3:14 AM   Result Value Ref Range    Sodium 155 (H) 136 - 145 mmol/L    Potassium 3.7 3.5 - 5.1 mmol/L    Chloride 125 (H) 98 - 107 mmol/L    CO2 21 21 - 32 mmol/L    Anion gap 9 7 - 16 mmol/L    Glucose 147 (H) 65 - 100 mg/dL    BUN 52 (H) 6 - 23 MG/DL    Creatinine 2.86 (H) 0.8 - 1.5 MG/DL    GFR est AA 31 (L) >60 ml/min/1.73m2    GFR est non-AA 26 (L) >60 ml/min/1.73m2    Calcium 7.9 (L) 8.3 - 10.4 MG/DL   POC G3    Collection Time: 07/15/19  3:16 AM   Result Value Ref Range    Device: VENT      FIO2 (POC) 40 %    pH (POC) 7.364 7.35 - 7.45      pCO2 (POC) 34.1 (L) 35 - 45 MMHG    pO2 (POC) 209 (H) 75 - 100 MMHG    HCO3 (POC) 19.4 (L) 22 - 26 MMOL/L    sO2 (POC) 100 (H) 95 - 98 %    Base deficit (POC) 5 mmol/L    Mode BILEVEL      Set Rate 9 bpm    PEEP/CPAP (POC) 0 cmH2O    PIP (POC) 22      Allens test (POC) NOT APPLICABLE      Inspiratory Time High 6 sec    Inspiratory Time Low 0.6 sec    Site DRAWN FROM ARTERIAL LINE      Patient temp.  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

## 2019-07-15 NOTE — PROGRESS NOTES
A follow up visit was made to the patient. Emotional support, spiritual presence and   prayer were provided. Patient was awake and still intubated.       [employer]

## 2019-07-15 NOTE — PROGRESS NOTES
Problem: Dysphagia (Adult)  Goal: *Acute Goals and Plan of Care (Insert Text)  Description  LTG: Patient will tolerate least restrictive diet without overt signs or symptoms of airway compromise. STG: Patient will tolerate po trials with speech therapy only without overt signs or symptoms of airway compromise. STG: Patient will participate in modified barium swallow study as clinically indicated. Outcome: Progressing Towards Goal  SPEECH LANGUAGE PATHOLOGY: BEDSIDE SWALLOW NOTE: Initial Assessment    NAME/AGE/GENDER: Bryan Holloway is a 37 y.o. male  DATE: 7/15/2019  PRIMARY DIAGNOSIS: SAH (subarachnoid hemorrhage) ([facility] [address] [I60.9]       ICD-10: Treatment Diagnosis: R13.12 Oropharyngeal Dysphagia. INTERDISCIPLINARY COLLABORATION: Physical Therapist, Registered Nurse and Physician  PRECAUTIONS/ALLERGIES: Patient has no known allergies. ASSESSMENT:   Based on the objective data described below, Mr. Alina Patrick presents with overt s/sx of airway compromise with po intake. Extubated this AM after ~5 days intubation. Vocal quality remains low volume and hoarse. Periods of aphonia when talking at the sentence level. He denies history of dysphagia prior to this admission. Oral motor exam unremarkable aside from mild trismus. He was presented with ice chips, thin by tsp, thin by cup, and thin by straw. Strong cough following first ice chip trial. No overt s/sx on remaining ice chips or thin by tsp. Patient able to tolerate single cup sips without over cough or throat clear; however, he was impulsive when taking sips independently. Strong coughing occurred with serial sips of thin by cup and with water by straw. Multiple swallows palpated with puree trials, concerning for pharyngeal stasis. Medications administered by RN during evaluation. + cough when administered with water, but improved tolerance when presented whole in applesauce. Recommend continue NPO.  OK for sips of water by cup for pleasure if presented by nursing staff (due to patient impulsivity with independent sips). Medications whole in puree. Suspect dysphagia is related to intubation and should resolve over next several days. Will follow up for continued po trials in attempt to identify least restrictive oral diet. During evaluation, patient with delayed responses to open ended questions. Mild difficulty with following 1-step commands during oral motor assessment. He will benefit from full assessment of speech-language/cognitive abilities, but will hold today due to patient fatigue/poor endurance during dysphagia assessment  Discussed results and recommendations with RNs Eagle and Demetra Vasquez. Patient will benefit from skilled intervention to address the below impairments. ?????? ? ? This section established at most recent assessment??????????  PROBLEM LIST (Impairments causing functional limitations):  Oropharyngeal dysphagia  REHABILITATION POTENTIAL FOR STATED GOALS: Good  PLAN OF CARE:   Patient will benefit from skilled intervention to address the following impairments. RECOMMENDATIONS AND PLANNED INTERVENTIONS (Benefits and precautions of therapy have been discussed with the patient.):  NPO  Thin liquids via cup sip for pleasure with assistance from nursing staff- single sips only  MEDICATIONS:  Whole in puree  ASPIRATION PRECAUTIONS:  Slow rate of intake  Small bites/sips  Upright at 90 degrees during meal  COMPENSATORY STRATEGIES/MODIFICATIONS INCLUDING:  Cup/sip  OTHER RECOMMENDATIONS (including follow up treatment recommendations):   Family training/education  Laryngeal exercises  Patient education  RECOMMENDED DIET MODIFICATIONS DISCUSSED WITH:  Nursing  Family  Patient  FREQUENCY/DURATION: Continue to follow patient 3 times a week for duration of hospital stay to address above goals.    RECOMMENDED REHABILITATION/EQUIPMENT: (at time of discharge pending progress): Due to the probability of continued deficits (see above) this patient will likely need continued skilled speech therapy after discharge. SUBJECTIVE:   Alert and participatory, but easily fatigued during session. History of Present Injury/Illness: Mr. Thong Snyder  has no past medical history on file. Renee Grant He also  has a past surgical history that includes ir emboli intracran lt (7/11/2019). Present Symptoms: oropharyngeal dysphagia   Pain Scale 1: Numeric (0 - 10)  Pain Intensity 1: 0  Current Medications:   No current facility-administered medications on file prior to encounter. No current outpatient medications on file prior to encounter. Current Dietary Status:     NPO  OBJECTIVE:   Respiratory Status:  Nasal cannula  6 l/min  Oral Motor Structure/Speech:  Oral-Motor Structure/Motor Speech  Labial: Decreased rate  Dentition: Intact  Oral Hygiene: Adequate  Lingual: Decreased strength, Decreased rate    Cognitive and Communication Status:  Neurologic State: Alert  Orientation Level: Oriented to person;Disoriented to place;Oriented to time  Cognition: Decreased command following;Decreased attention/concentration  Perception: Appears intact  Perseveration: No perseveration noted  Safety/Judgement: Fall prevention;Decreased insight into deficits    BEDSIDE SWALLOW EVALUATION  Oral Assessment:  Oral Assessment  Labial: Decreased rate  Dentition: Intact  Oral Hygiene: Adequate  Lingual: Decreased strength;Decreased rate  P.O. Trials:  Patient Position: Upright in bed    The patient was given tsp-small sip amounts of the following:   Consistency Presented: Ice chips; Thin liquid;Puree  How Presented: Self-fed/presented;SLP-fed/presented;Cup/sip;Spoon;Straw    ORAL PHASE:  Bolus Acceptance: No impairment  Bolus Formation/Control: Impaired  Propulsion: Delayed (# of seconds)  Type of Impairment: Delayed  Oral Residue: None    PHARYNGEAL PHASE:  Initiation of Swallow: No impairment  Laryngeal Elevation: Decreased;Weak  Aspiration Signs/Symptoms: Strong cough  Vocal Quality: Hoarse;Phonation breaks Effective Modifications: Cup/sip     Pharyngeal Phase Characteristics: Suspected pharyngeal residue;Poor endurance;Multiple swallows    OTHER OBSERVATIONS:  Rate/bite size: Questionable   Endurance:  Impaired   Comments: Tool Used: Dysphagia Outcome and Severity Scale (MIR)    Score Comments   Normal Diet  ? 7 With no strategies or extra time needed   Functional Swallow  ? 6 May have mild oral or pharyngeal delay       Mild Dysphagia    ? 5 Which may require one diet consistency restricted (those who demonstrate penetration which is entirely cleared on MBS would be included)   Mild-Moderate Dysphagia  ? 4 With 1-2 diet consistencies restricted       Moderate Dysphagia  ? 3 With 2 or more diet consistencies restricted       Moderately Severe Dysphagia  ? 2 With partial PO strategies (trials with ST only)       Severe Dysphagia  ? 1 With inability to tolerate any PO safely          Score:  Initial: 23 Most Recent: X (Date: --)   Interpretation of Tool: The Dysphagia Outcome and Severity Scale (MIR) is a simple, easy-to-use, 7-point scale developed to systematically rate the functional severity of dysphagia based on objective assessment and make recommendations for diet level, independence level, and type of nutrition.        Payor: SELF PAY / Plan: [facility] SELF PAY / Product Type: Self Pay /     TREATMENT:    (In addition to Assessment/Re-Assessment sessions the following treatments were rendered)  Assessment/Reassessment only, no treatment provided today  MODALITIES:                                                                    ORAL MOTOR  EXERCISES:                                                                                                                                                                      LARYNGEAL / PHARYNGEAL EXERCISES: __________________________________________________________________________________________________  Safety:   After treatment position/precautions:  Upright in Bed  . Progression/Medical Necessity:   Patient is expected to demonstrate progress in swallow strength, swallow timeliness, swallow function, diet tolerance and swallow safety   to improve swallow safety, work toward diet advancement, decrease aspiration risk and endurance   . Compliance with Program/Exercises: Will assess as treatment progresses  Reason for Continuation of Services/Other Comments:  Patient continues to require skilled intervention due to dysphagia   . Recommendations/Intent for next treatment session: \"Treatment next visit will focus on po trials, cognitive-linguistic assessment \".     Total Treatment Duration:  Time In: 1415  Time Out: [address] [place] [facility], DEMETRIUS, CCC-SLP

## 2019-07-16 ENCOUNTER — APPOINTMENT (OUTPATIENT)
Dept: ULTRASOUND IMAGING | Age: 44
DRG: 020 | End: 2019-07-16
Attending: NURSE PRACTITIONER
Payer: COMMERCIAL

## 2019-07-16 LAB
ANION GAP SERPL CALC-SCNC: 9 MMOL/L (ref 7–16)
BUN SERPL-MCNC: 53 MG/DL (ref 6–23)
CALCIUM SERPL-MCNC: 8.4 MG/DL (ref 8.3–10.4)
CHLORIDE SERPL-SCNC: 123 MMOL/L (ref 98–107)
CO2 SERPL-SCNC: 20 MMOL/L (ref 21–32)
CREAT SERPL-MCNC: 2.83 MG/DL (ref 0.8–1.5)
ERYTHROCYTE [DISTWIDTH] IN BLOOD BY AUTOMATED COUNT: 13.4 % (ref 11.9–14.6)
GLUCOSE SERPL-MCNC: 91 MG/DL (ref 65–100)
HCT VFR BLD AUTO: 24.3 % (ref 41.1–50.3)
HGB BLD-MCNC: 7.8 G/DL (ref 13.6–17.2)
MAGNESIUM SERPL-MCNC: 2.9 MG/DL (ref 1.8–2.4)
MCH RBC QN AUTO: 29.5 PG (ref 26.1–32.9)
MCHC RBC AUTO-ENTMCNC: 32.1 G/DL (ref 31.4–35)
MCV RBC AUTO: 92 FL (ref 79.6–97.8)
NRBC # BLD: 0 K/UL (ref 0–0.2)
PLATELET # BLD AUTO: 161 K/UL (ref 150–450)
PMV BLD AUTO: 11.2 FL (ref 9.4–12.3)
POTASSIUM SERPL-SCNC: 3.9 MMOL/L (ref 3.5–5.1)
RBC # BLD AUTO: 2.64 M/UL (ref 4.23–5.6)
SODIUM SERPL-SCNC: 152 MMOL/L (ref 136–145)
WBC # BLD AUTO: 12.9 K/UL (ref 4.3–11.1)

## 2019-07-16 PROCEDURE — 74011000250 HC RX REV CODE- 250: Performed by: NURSE PRACTITIONER

## 2019-07-16 PROCEDURE — 85027 COMPLETE CBC AUTOMATED: CPT

## 2019-07-16 PROCEDURE — 94668 MNPJ CHEST WALL SBSQ: CPT

## 2019-07-16 PROCEDURE — 97530 THERAPEUTIC ACTIVITIES: CPT

## 2019-07-16 PROCEDURE — 99233 SBSQ HOSP IP/OBS HIGH 50: CPT | Performed by: NEUROLOGICAL SURGERY

## 2019-07-16 PROCEDURE — 74011250637 HC RX REV CODE- 250/637

## 2019-07-16 PROCEDURE — 93886 INTRACRANIAL COMPLETE STUDY: CPT | Performed by: PSYCHIATRY & NEUROLOGY

## 2019-07-16 PROCEDURE — 65610000001 HC ROOM ICU GENERAL

## 2019-07-16 PROCEDURE — 74011250636 HC RX REV CODE- 250/636: Performed by: NURSE PRACTITIONER

## 2019-07-16 PROCEDURE — 80048 BASIC METABOLIC PNL TOTAL CA: CPT

## 2019-07-16 PROCEDURE — 93886 INTRACRANIAL COMPLETE STUDY: CPT

## 2019-07-16 PROCEDURE — 93970 EXTREMITY STUDY: CPT

## 2019-07-16 PROCEDURE — 77030020257 HC SOL INJ SOD CL 0.45% 1000ML BG

## 2019-07-16 PROCEDURE — 83735 ASSAY OF MAGNESIUM: CPT

## 2019-07-16 PROCEDURE — 97112 NEUROMUSCULAR REEDUCATION: CPT

## 2019-07-16 PROCEDURE — 36600 WITHDRAWAL OF ARTERIAL BLOOD: CPT

## 2019-07-16 PROCEDURE — 94640 AIRWAY INHALATION TREATMENT: CPT

## 2019-07-16 PROCEDURE — 92526 ORAL FUNCTION THERAPY: CPT

## 2019-07-16 PROCEDURE — 99231 SBSQ HOSP IP/OBS SF/LOW 25: CPT | Performed by: PHYSICAL MEDICINE & REHABILITATION

## 2019-07-16 PROCEDURE — 74011000258 HC RX REV CODE- 258: Performed by: NURSE PRACTITIONER

## 2019-07-16 PROCEDURE — 74011250637 HC RX REV CODE- 250/637: Performed by: NEUROLOGICAL SURGERY

## 2019-07-16 PROCEDURE — 77030034112 HC PRB ESOPH/RET TEMP CSZ -A

## 2019-07-16 PROCEDURE — 77010033678 HC OXYGEN DAILY

## 2019-07-16 PROCEDURE — 74011250637 HC RX REV CODE- 250/637: Performed by: NURSE PRACTITIONER

## 2019-07-16 RX ORDER — CEFAZOLIN SODIUM/WATER 2 G/20 ML
2 SYRINGE (ML) INTRAVENOUS EVERY 8 HOURS
Status: COMPLETED | OUTPATIENT
Start: 2019-07-16 | End: 2019-07-22

## 2019-07-16 RX ADMIN — SODIUM CHLORIDE, PRESERVATIVE FREE 900 UNITS: 5 INJECTION INTRAVENOUS at 13:25

## 2019-07-16 RX ADMIN — SODIUM CHLORIDE 30 MG/ML INHALATION SOLUTION 1 ML: 30 SOLUTION INHALANT at 20:20

## 2019-07-16 RX ADMIN — MAGNESIUM OXIDE TAB 400 MG (241.3 MG ELEMENTAL MG) 400 MG: 400 (241.3 MG) TAB at 17:44

## 2019-07-16 RX ADMIN — NIMODIPINE 60 MG: 30 CAPSULE, LIQUID FILLED ORAL at 08:27

## 2019-07-16 RX ADMIN — Medication 2 G: at 22:16

## 2019-07-16 RX ADMIN — MINERAL SUPPLEMENT IRON 300 MG / 5 ML STRENGTH LIQUID 100 PER BOX UNFLAVORED 300 MG: at 22:00

## 2019-07-16 RX ADMIN — SODIUM CHLORIDE, PRESERVATIVE FREE 900 UNITS: 5 INJECTION INTRAVENOUS at 06:14

## 2019-07-16 RX ADMIN — ACETAMINOPHEN 650 MG: 325 TABLET, FILM COATED ORAL at 20:21

## 2019-07-16 RX ADMIN — MINERAL SUPPLEMENT IRON 300 MG / 5 ML STRENGTH LIQUID 100 PER BOX UNFLAVORED 300 MG: at 09:39

## 2019-07-16 RX ADMIN — MINERAL SUPPLEMENT IRON 300 MG / 5 ML STRENGTH LIQUID 100 PER BOX UNFLAVORED 300 MG: at 16:12

## 2019-07-16 RX ADMIN — HEPARIN SODIUM 5000 UNITS: 5000 INJECTION INTRAVENOUS; SUBCUTANEOUS at 06:14

## 2019-07-16 RX ADMIN — HEPARIN SODIUM 5000 UNITS: 5000 INJECTION INTRAVENOUS; SUBCUTANEOUS at 13:22

## 2019-07-16 RX ADMIN — ASPIRIN 325 MG ORAL TABLET 325 MG: 325 PILL ORAL at 09:39

## 2019-07-16 RX ADMIN — Medication 30 ML: at 22:00

## 2019-07-16 RX ADMIN — OXYCODONE HYDROCHLORIDE AND ACETAMINOPHEN 500 MG: 500 TABLET ORAL at 16:45

## 2019-07-16 RX ADMIN — SODIUM CHLORIDE 30 MG/ML INHALATION SOLUTION 1 ML: 30 SOLUTION INHALANT at 08:07

## 2019-07-16 RX ADMIN — FAMOTIDINE 20 MG: 20 TABLET ORAL at 09:40

## 2019-07-16 RX ADMIN — MAGNESIUM OXIDE TAB 400 MG (241.3 MG ELEMENTAL MG) 400 MG: 400 (241.3 MG) TAB at 09:39

## 2019-07-16 RX ADMIN — Medication 30 ML: at 13:25

## 2019-07-16 RX ADMIN — NIMODIPINE 60 MG: 30 CAPSULE, LIQUID FILLED ORAL at 00:03

## 2019-07-16 RX ADMIN — LEVETIRACETAM 500 MG: 100 SOLUTION ORAL at 09:21

## 2019-07-16 RX ADMIN — SODIUM CHLORIDE 100 ML/HR: 450 INJECTION, SOLUTION INTRAVENOUS at 09:03

## 2019-07-16 RX ADMIN — ATORVASTATIN CALCIUM 40 MG: 40 TABLET, FILM COATED ORAL at 22:00

## 2019-07-16 RX ADMIN — OXYCODONE HYDROCHLORIDE AND ACETAMINOPHEN 500 MG: 500 TABLET ORAL at 22:00

## 2019-07-16 RX ADMIN — NIMODIPINE 60 MG: 30 CAPSULE, LIQUID FILLED ORAL at 16:12

## 2019-07-16 RX ADMIN — ALBUTEROL SULFATE 2.5 MG: 2.5 SOLUTION RESPIRATORY (INHALATION) at 20:20

## 2019-07-16 RX ADMIN — OXYCODONE HYDROCHLORIDE AND ACETAMINOPHEN 500 MG: 500 TABLET ORAL at 09:39

## 2019-07-16 RX ADMIN — CLOPIDOGREL BISULFATE 75 MG: 75 TABLET ORAL at 09:39

## 2019-07-16 RX ADMIN — ACETAMINOPHEN 650 MG: 325 TABLET, FILM COATED ORAL at 09:39

## 2019-07-16 RX ADMIN — SODIUM CHLORIDE, PRESERVATIVE FREE 900 UNITS: 5 INJECTION INTRAVENOUS at 22:00

## 2019-07-16 RX ADMIN — Medication 2 G: at 14:44

## 2019-07-16 RX ADMIN — Medication 30 ML: at 06:15

## 2019-07-16 RX ADMIN — NIMODIPINE 60 MG: 30 CAPSULE, LIQUID FILLED ORAL at 20:21

## 2019-07-16 RX ADMIN — SODIUM CHLORIDE 100 ML/HR: 450 INJECTION, SOLUTION INTRAVENOUS at 22:14

## 2019-07-16 RX ADMIN — ACETAMINOPHEN 650 MG: 325 TABLET, FILM COATED ORAL at 16:12

## 2019-07-16 RX ADMIN — NIMODIPINE 60 MG: 30 CAPSULE, LIQUID FILLED ORAL at 12:22

## 2019-07-16 RX ADMIN — HEPARIN SODIUM 5000 UNITS: 5000 INJECTION INTRAVENOUS; SUBCUTANEOUS at 22:00

## 2019-07-16 RX ADMIN — ALBUTEROL SULFATE 2.5 MG: 2.5 SOLUTION RESPIRATORY (INHALATION) at 08:07

## 2019-07-16 RX ADMIN — LEVETIRACETAM 500 MG: 100 SOLUTION ORAL at 20:21

## 2019-07-16 RX ADMIN — ACETAMINOPHEN 650 MG: 325 TABLET, FILM COATED ORAL at 02:00

## 2019-07-16 RX ADMIN — NIMODIPINE 60 MG: 30 CAPSULE, LIQUID FILLED ORAL at 04:02

## 2019-07-16 NOTE — PROGRESS NOTES
Shift report received from [facility]. Pt resting in bed. VSS within MD defined limits. Temp is 99.9 F from bladder (Next dose of tylenol in 2 hours). Room cooled. Dual neuro performed. NIH= 0. Responses are delayed. Oriented to person, place, and month. See flowsheet for full assessment.

## 2019-07-16 NOTE — PROGRESS NOTES
Problem: Mobility Impaired (Adult and Pediatric)  Goal: *Acute Goals and Plan of Care (Insert Text)  Description  STG:  (1.)Mr. Catalino Coates will move from supine to sit and sit to supine , scoot up and down and roll side to side with CONTACT GUARD ASSIST within 3 treatment day(s). (2.)Mr. Catalino Coates will transfer from bed to chair and chair to bed with CONTACT GUARD ASSIST using the least restrictive device within 3 treatment day(s). (3.)Mr. Catalino Coates will ambulate with MINIMAL ASSIST for 50+ feet with the least restrictive device within 3 treatment day(s). (4.)Mr. Catalino Coates will perform standing static and dynamic balance activities x 15 minutes with MINIMAL ASSIST to improve safety within 3 day(s). (5.)Mr. Catalino Coates will maintain stable vital signs throughout all functional mobility within 3 days. LTG:  (1.)Mr. Catalino Coates will move from supine to sit and sit to supine , scoot up and down and roll side to side in bed with SUPERVISION within 7 treatment day(s). (2.)Mr. Catalino Coates will transfer from bed to chair and chair to bed with STAND BY ASSIST using the least restrictive device within 7 treatment day(s). (3.)Mr. Catalino Coates will ambulate with STAND BY ASSIST for 75+ feet with the least restrictive device within 7 treatment day(s). (4.)Mr. Catalino Coates will perform standing static and dynamic balance activities x 15 minutes with STAND BY ASSIST to improve safety within 7 day(s).   ________________________________________________________________________________________________     Outcome: Progressing Towards Goal     PHYSICAL THERAPY: Daily Note and PM 7/16/2019  INPATIENT: PT Visit Days : 3  Payor: SELF PAY / Plan: [facility] SELF PAY / Product Type: Self Pay /       NAME/AGE/GENDER: Guy Duke is a 37 y.o. 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 ASSESSMENT:     Mr. Nick Chapa is a 37 y.o. Male admitted for subarachnoid hemorrhage and presents up in the chair with father present. He is alert but somnolent but willing to try and walk. Disconnected all but his arterial lines and he was able to walk about 100 feet in the unit with the walker and CGA. He was slow with what seemed like a slight R sided neglect looking slightly to the L, walking slightly to the L and not holding onto the walker fully with his R hand. His legs seemed quite steady and he did well with this distance. Good progress. This section established at most recent assessment   PROBLEM LIST (Impairments causing functional limitations):  1. Decreased Strength  2. Decreased ADL/Functional Activities  3. Decreased Transfer Abilities  4. Decreased Ambulation Ability/Technique  5. Decreased Balance  6. Decreased Activity Tolerance  7. Decreased Pacing Skills  8. Increased Shortness of Breath  9. Decreased Knowledge of Precautions  10. Decreased [place] with Home Exercise Program   INTERVENTIONS PLANNED: (Benefits and precautions of physical therapy have been discussed with the patient.)  1. Balance Exercise  2. Bed Mobility  3. Family Education  4. Gait Training  5. Home Exercise Program (HEP)  6. Therapeutic Activites  7. Therapeutic Exercise/Strengthening  8. Transfer Training     TREATMENT PLAN: Frequency/Duration: 3 times a week for duration of hospital stay  Rehabilitation Potential For Stated Goals: Good     REHAB RECOMMENDATIONS (at time of discharge pending progress):    Placement:   It is my opinion, based on this patient's performance to date, that Mr. Nick Chapa may benefit from intensive therapy at an [address] [place] [address] after discharge due to a probable need for close medical supervision by a rehab physician, a probable need for 24 hour rehab nursing, a probable need for multiple therapy disciplines and potential to make ongoing and sustainable functional improvement that is of practical value. .  Equipment:    None at this time              HISTORY:   History of Present Injury/Illness (Reason for Referral):  Subarachnoid hemorrhage. Past Medical History/Comorbidities:   Mr. Hemanth Canchola  has no past medical history on file. Mr. Hemanth Canchola  has a past surgical history that includes ir emboli intracran lt (7/11/2019). Social History/Living Environment:   Home Environment: Private residence  One/Two Story Residence: Other (Comment)  Living Alone: No  Support Systems: Parent, Child(rohan)  Patient Expects to be Discharged to[de-identified] Unknown  Current DME Used/Available at Home: None  Prior Level of Function/Work/Activity:  Unsure, RN reports patient worked. Number of Personal Factors/Comorbidities that affect the Plan of Care: 1-2: MODERATE COMPLEXITY   EXAMINATION:   Most Recent Physical Functioning:   Gross Assessment:                  Posture:     Balance:    Bed Mobility:     Wheelchair Mobility:     Transfers:     Gait:     Speed/Aminata: Slow  Gait Abnormalities: Decreased step clearance  Distance (ft): 100 Feet (ft)  Assistive Device: Gait belt;Walker, rolling  Ambulation - Level of Assistance: Contact guard assistance      Body Structures Involved:  1. Nerves  2. Heart  3. Lungs  4. Muscles Body Functions Affected:  1. Sensory/Pain  2. Voice and Speech  3. Cardio  4. Respiratory  5. Neuromusculoskeletal  6. Movement Related Activities and Participation Affected:  1. Learning and Applying Knowledge  2. General Tasks and Demands  3. Communication  4. Mobility  5. Self Care  6. Domestic Life  7. Interpersonal Interactions and Relationships  8.  Community, Social and [place]   Number of elements that affect the Plan of Care: 4+: HIGH COMPLEXITY   CLINICAL PRESENTATION:   Presentation: Evolving clinical presentation with changing clinical characteristics: MODERATE COMPLEXITY   CLINICAL DECISION MAKIN[address] [place] [address] AM-PAC 6 Clicks   Basic Mobility Inpatient Short Form  How much difficulty does the patient currently have. .. Unable A Lot A Little None   1. Turning over in bed (including adjusting bedclothes, sheets and blankets)? ? 1   ? 2   ? 3   ? 4   2. Sitting down on and standing up from a chair with arms ( e.g., wheelchair, bedside commode, etc.)   ? 1   ? 2   ? 3   ? 4   3. Moving from lying on back to sitting on the side of the bed?   ? 1   ? 2   ? 3   ? 4   How much help from another person does the patient currently need. .. Total A Lot A Little None   4. Moving to and from a bed to a chair (including a wheelchair)? ? 1   ? 2   ? 3   ? 4   5. Need to walk in hospital room? ? 1   ? 2   ? 3   ? 4   6. Climbing 3-5 steps with a railing? ? 1   ? 2   ? 3   ? 4   © , Trustees of [address] [place] [address], under license to [employer]. All rights reserved      Score:  Initial: 10 Most Recent: X (Date: -- )    Interpretation of Tool:  Represents activities that are increasingly more difficult (i.e. Bed mobility, Transfers, Gait). Medical Necessity:     · Patient demonstrates good  ·  rehab potential due to higher previous functional level. Reason for Services/Other Comments:  · Patient continues to require modification of therapeutic interventions to increase complexity of exercises  · . Use of outcome tool(s) and clinical judgement create a POC that gives a: Questionable prediction of patient's progress: MODERATE COMPLEXITY            TREATMENT:   (In addition to Assessment/Re-Assessment sessions the following treatments were rendered)   Pre-treatment Symptoms/Complaints:  none  Pain: Initial:   Pain Intensity 1: 0  Post Session:  0/10     Therapeutic Activity: (    23 minutes):   Therapeutic activities including Chair transfers and Ambulation on level ground to improve mobility, strength, balance, coordination and activity tolerance . Required minimal assist to promote static and dynamic balance in standing and promote coordination of bilateral, lower extremity(s). Date:  7/15/19 Date:   Date:     Activity/Exercise Parameters Parameters Parameters   Seated heel raises x15 B A     Seated toe raises x15 B A     Seated LAQ x15 B A                               Braces/Orthotics/Lines/Etc:   · IV  · phelps catheter  · drain EVD  · arterial line  · O2 Nasal Cannula  Treatment/Session Assessment:    · Response to Treatment:  Patient maintained stable vitals  · Interdisciplinary Collaboration:   o Physical Therapy Assistant  o Occupational Therapist  o Registered Nurse  · After treatment position/precautions:   o Up in chair  o Bed alarm/tab alert on  o Bed/Chair-wheels locked  o Bed in low position  o Call light within reach  o RN notified   · Compliance with Program/Exercises: Compliant all of the time  · Recommendations/Intent for next treatment session: \"Next visit will focus on advancements to more challenging activities and reduction in assistance provided\".   Total Treatment Duration:  PT Patient Time In/Time Out  Time In: 1422  Time Out: 1445    Diana Chan, PTA

## 2019-07-16 NOTE — PROGRESS NOTES
PM&R Consult Progress Note      Patient: Haritha Cuevas  Admit Date: 7/9/2019  Admit Diagnosis: SAH (subarachnoid hemorrhage) ([facility] [address] [I60.9]  Recommendations: Continue Acute Rehab Program, Coordination of rehab/medical care, Counseling of PM & R care issues management, Hospital Inpatient Rehab  -extubated; on 6L O2. Tolerating /weaning  -functional impairments RAPIDLY improving; ambulated 100ft with RW with CGA ; mainly min assist with ADLs. Suspect these will improve as well. Approved for a [facility] soft diet with NTL  -Tm 101.3. MSSA pneumonia;Vanc/Cefepime d5/10  -R EVD; output 295 past 24hrs  -medical status/needs continue. IRC when medically stable. Need cogn /linguistic eval  History/Subjective/Complaint:     Records reviewed. Pain 1  Pain Scale 1: FLACC (07/16/19 1422)  Pain Intensity 1: 0 (07/16/19 1422)  Patient Stated Pain Goal: 0 (07/16/19 0700)  Pain Reassessment 1: Yes (07/15/19 0515)  Pain Onset 1: since admit (07/09/19 2000)  Pain Location 1: Throat (07/14/19 1018)  Pain Orientation 1: Posterior (07/10/19 0002)  Pain Description 1: Aching;Constant; Sore (07/10/19 0002)  Pain Intervention(s) 1: Medication (see MAR) (07/14/19 2130)     Objective:     Vitals:  Patient Vitals for the past 8 hrs:   Temp Pulse Resp SpO2   07/16/19 1330 99.5 °F (37.5 °C) (!) 52 15 100 %   07/16/19 1315 99.6 °F (37.6 °C) (!) 50 13 100 %   07/16/19 1300 99.6 °F (37.6 °C) (!) 57 16 100 %   07/16/19 1245 99.6 °F (37.6 °C) (!) 56 17 99 %   07/16/19 1230 99.8 °F (37.7 °C) (!) 51 13 98 %   07/16/19 1215 99.8 °F (37.7 °C) (!) 51 12 99 %   07/16/19 1200 99.8 °F (37.7 °C) (!) 52 13 100 %   07/16/19 1145 100 °F (37.8 °C) 60 21 100 %   07/16/19 1130 100.2 °F (37.9 °C) (!) 58 23 100 %   07/16/19 1115 100.2 °F (37.9 °C) (!) 53 14 100 %   07/16/19 1101 100.4 °F (38 °C) (!) 57 14 99 %   07/16/19 1100 100.4 °F (38 °C) (!) 57 16 99 %   07/16/19 1045 -- 88 20 --   07/16/19 1030 100.4 °F (38 °C) (!) 58 16 96 %   07/16/19 1015 100.4 °F (38 °C) 69 20 95 %   07/16/19 1000 100.4 °F (38 °C) 60 13 94 %   07/16/19 0945 100 °F (37.8 °C) 66 12 94 %   07/16/19 0930 100.2 °F (37.9 °C) 72 18 94 %   07/16/19 0915 100.4 °F (38 °C) 90 18 98 %   07/16/19 0900 100.4 °F (38 °C) (!) 58 11 98 %   07/16/19 0845 (!) 100.5 °F (38.1 °C) 64 13 98 %   07/16/19 0830 (!) 100.5 °F (38.1 °C) (!) 54 9 100 %   07/16/19 0815 (!) 100.5 °F (38.1 °C) 60 24 100 %   07/16/19 0810 -- -- -- 96 %   07/16/19 0809 -- -- -- 95 %   07/16/19 0800 (!) 100.5 °F (38.1 °C) (!) 52 15 98 %   07/16/19 0745 100.4 °F (38 °C) 63 (!) 7 98 %   07/16/19 0730 100.4 °F (38 °C) (!) 49 9 99 %   07/16/19 0715 100.4 °F (38 °C) (!) 57 19 98 %      Intake and Output:  07/14 1901 - 07/16 0700  In: 4286.7 [P.O.:120;  I.V.:3171.7]  Out: 5605 [Urine:5105; Drains:500]    No Known Allergies  Current Facility-Administered Medications   Medication Dose Route Frequency    ceFAZolin (ANCEF) 2 g/20 mL in sterile water IV syringe  2 g IntraVENous Q8H    0.45% sodium chloride infusion  100 mL/hr IntraVENous CONTINUOUS    albuterol (PROVENTIL VENTOLIN) nebulizer solution 2.5 mg  2.5 mg Nebulization BID RT    sodium chloride 3% hypertonic nebulizer soln  1 mL Nebulization BID RT    acetaminophen (TYLENOL) tablet 650 mg  650 mg Oral Q4H PRN    ascorbic acid (vitamin C) (VITAMIN C) tablet 500 mg  500 mg Oral TID    aspirin tablet 325 mg  325 mg Oral DAILY    atorvastatin (LIPITOR) tablet 40 mg  40 mg Oral QHS    clopidogrel (PLAVIX) tablet 75 mg  75 mg Oral DAILY    famotidine (PEPCID) tablet 20 mg  20 mg Oral DAILY    ferrous sulfate 300 mg (60 mg iron)/5 mL oral syrup 300 mg  300 mg Oral TID    levETIRAcetam (KEPPRA) oral solution 500 mg  500 mg Oral Q12H    magnesium oxide (MAG-OX) tablet 400 mg  400 mg Oral BID    senna-docusate (PERICOLACE) 8.6-50 mg per tablet 2 Tab  2 Tab Oral QHS    ALPRAZolam (XANAX) tablet 0.5 mg  0.5 mg Oral Q6H PRN    magnesium hydroxide (MILK OF MAGNESIA) 400 mg/5 mL oral suspension 30 mL  30 mL Oral DAILY PRN    niMODipine (NIMOTOP) 30 mg/mL oral solution (compound) 60 mg  60 mg Oral Q4H    fentaNYL citrate (PF) injection 50 mcg  50 mcg IntraVENous Q1H PRN    polyvinyl alcohol (LIQUIFILM TEARS) 1.4 % ophthalmic solution 1 Drop  1 Drop Both Eyes PRN    heparin (porcine) injection 5,000 Units  5,000 Units SubCUTAneous Q8H    sodium chloride (NS) flush 30 mL  30 mL InterCATHeter Q8H    heparin (porcine) pf 900 Units  900 Units InterCATHeter Q8H    sodium chloride (NS) flush 30 mL  30 mL InterCATHeter PRN    heparin (porcine) pf 900 Units  900 Units InterCATHeter PRN    nicotine (NICODERM CQ) 21 mg/24 hr patch 1 Patch  1 Patch TransDERmal Q24H    ondansetron (ZOFRAN) injection 4 mg  4 mg IntraVENous Q6H PRN    NUTRITIONAL SUPPORT ELECTROLYTE PRN ORDERS   Does Not Apply PRN    magnesium sulfate 4 g/100 mL IVPB  4 g IntraVENous DAILY PRN    magnesium sulfate 2 g/50 ml IVPB (premix or compounded)  2 g IntraVENous DAILY PRN         Functional Assessment:  Gross Assessment  AROM: Generally decreased, functional (07/12/19 1500)  PROM: Generally decreased, functional (07/12/19 1100)  Strength: Within functional limits (07/12/19 1500)  Coordination: Generally decreased, functional(B hands swelling) (07/12/19 1500)  Sensation: Intact(BUEs to light touch ) (07/12/19 1500)     Gait  Base of Support: Narrowed; Center of gravity altered (07/15/19 1500)  Speed/Aminata: Slow (07/16/19 1400)  Step Length: Right shortened;Left shortened (07/15/19 1500)  Gait Abnormalities: Decreased step clearance (07/16/19 1400)  Ambulation - Level of Assistance: Contact guard assistance (07/16/19 1400)  Distance (ft): 100 Feet (ft) (07/16/19 1400)  Assistive Device: Gait belt;Walker, rolling (07/16/19 1400)  Interventions: Safety awareness training;Verbal cues; Visual/Demos (07/15/19 1500)     Bed Mobility  Supine to Sit: Contact guard assistance (07/15/19 1500)  Scooting: Contact guard assistance (07/15/19 1500) Balance  Sitting: Impaired (07/16/19 1400)  Sitting - Static: Good (unsupported) (07/16/19 1400)  Sitting - Dynamic: Fair (occasional) (07/16/19 1400)  Standing: Impaired (07/16/19 1400)  Standing - Static: Fair (07/16/19 1400)  Standing - Dynamic : Fair (07/16/19 1400)                       Bed/Mat Mobility  Supine to Sit: Contact guard assistance (07/15/19 1500)  Sit to Stand: Minimum assistance (07/16/19 1400)  Stand to Sit: Contact guard assistance (07/16/19 1400)  Bed to Chair: Contact guard assistance;Assist x2 (07/15/19 1500)  Scooting: Contact guard assistance (07/15/19 1500)     Labs/Studies:  Recent Results (from the past 72 hour(s))   CBC W/O DIFF    Collection Time: 07/14/19  3:15 AM   Result Value Ref Range    WBC 12.7 (H) 4.3 - 11.1 K/uL    RBC 2.74 (L) 4.23 - 5.6 M/uL    HGB 8.0 (L) 13.6 - 17.2 g/dL    HCT 25.3 (L) 41.1 - 50.3 %    MCV 92.3 79.6 - 97.8 FL    MCH 29.2 26.1 - 32.9 PG    MCHC 31.6 31.4 - 35.0 g/dL    RDW 13.6 11.9 - 14.6 %    PLATELET 566 (L) 284 - 450 K/uL    MPV 11.0 9.4 - 12.3 FL    ABSOLUTE NRBC 0.00 0.0 - 0.2 K/uL   METABOLIC PANEL, BASIC    Collection Time: 07/14/19  3:15 AM   Result Value Ref Range    Sodium 155 (H) 136 - 145 mmol/L    Potassium 3.8 3.5 - 5.1 mmol/L    Chloride 124 (H) 98 - 107 mmol/L    CO2 24 21 - 32 mmol/L    Anion gap 7 7 - 16 mmol/L    Glucose 120 (H) 65 - 100 mg/dL    BUN 45 (H) 6 - 23 MG/DL    Creatinine 2.80 (H) 0.8 - 1.5 MG/DL    GFR est AA 32 (L) >60 ml/min/1.73m2    GFR est non-AA 26 (L) >60 ml/min/1.73m2    Calcium 8.1 (L) 8.3 - 10.4 MG/DL   MAGNESIUM    Collection Time: 07/14/19  3:15 AM   Result Value Ref Range    Magnesium 2.7 (H) 1.8 - 2.4 mg/dL   POC G3    Collection Time: 07/14/19  3:27 AM   Result Value Ref Range    Device: VENT      FIO2 (POC) 60 %    pH (POC) 7.338 (L) 7.35 - 7.45      pCO2 (POC) 39.8 35 - 45 MMHG    pO2 (POC) 284 (H) 75 - 100 MMHG    HCO3 (POC) 21.4 (L) 22 - 26 MMOL/L    sO2 (POC) 100 (H) 95 - 98 %    Base deficit (POC) 4 mmol/L    Mode BILEVEL      Set Rate 13 bpm    PEEP/CPAP (POC) 0 cmH2O    PIP (POC) 26      Allens test (POC) NOT APPLICABLE      Inspiratory Time High 4 sec    Inspiratory Time Low 0.6 sec    Site DRAWN FROM ARTERIAL LINE      Patient temp.  98.6      Specimen type (POC) ARTERIAL      Performed by Jona     CO2, POC 23 MMOL/L    Critical value read back 03:26     Respiratory comment: NurseNotified     Exhaled minute volume 7.70 L/min    COLLECT TIME 325     CULTURE, CSF W GRAM STAIN    Collection Time: 07/14/19 12:51 PM   Result Value Ref Range    Special Requests: NO SPECIAL REQUESTS      GRAM STAIN 0 TO 5 WBC'S SEEN PER OIF     GRAM STAIN NO DEFINITE ORGANISM SEEN      Culture result: NO GROWTH 2 DAYS     GLUCOSE, CSF    Collection Time: 07/14/19 12:51 PM   Result Value Ref Range    Tube No. 1      Glucose,CSF 78 (H) 40 - 70 MG/DL   PROTEIN, CSF    Collection Time: 07/14/19 12:51 PM   Result Value Ref Range    Tube No. 1      Protein,CSF 71 (H) 15 - 45 MG/DL   CELL COUNT, BODY FLUID    Collection Time: 07/14/19 12:51 PM   Result Value Ref Range    BODY FLUID TYPE CEREBROSPINAL FLUID      FLUID COLOR LIGHT      FLUID APPEARANCE TURBID      FLUID RBC CT. 15,125 /cu mm    FLUID WBC COUNT 8 /cu mm   COLLECTION COMMENT    Collection Time: 07/14/19 12:51 PM   Result Value Ref Range    Collection Comment CSF BROUGHT TO LAB IN 1 SMALL SYRINGE    MAGNESIUM    Collection Time: 07/15/19  3:14 AM   Result Value Ref Range    Magnesium 2.8 (H) 1.8 - 2.4 mg/dL   CBC W/O DIFF    Collection Time: 07/15/19  3:14 AM   Result Value Ref Range    WBC 11.7 (H) 4.3 - 11.1 K/uL    RBC 2.66 (L) 4.23 - 5.6 M/uL    HGB 7.8 (L) 13.6 - 17.2 g/dL    HCT 25.0 (L) 41.1 - 50.3 %    MCV 94.0 79.6 - 97.8 FL    MCH 29.3 26.1 - 32.9 PG    MCHC 31.2 (L) 31.4 - 35.0 g/dL    RDW 13.8 11.9 - 14.6 %    PLATELET 504 176 - 814 K/uL    MPV 11.5 9.4 - 12.3 FL    ABSOLUTE NRBC 0.00 0.0 - 0.2 K/uL   METABOLIC PANEL, BASIC    Collection Time: 07/15/19 3:14 AM   Result Value Ref Range    Sodium 155 (H) 136 - 145 mmol/L    Potassium 3.7 3.5 - 5.1 mmol/L    Chloride 125 (H) 98 - 107 mmol/L    CO2 21 21 - 32 mmol/L    Anion gap 9 7 - 16 mmol/L    Glucose 147 (H) 65 - 100 mg/dL    BUN 52 (H) 6 - 23 MG/DL    Creatinine 2.86 (H) 0.8 - 1.5 MG/DL    GFR est AA 31 (L) >60 ml/min/1.73m2    GFR est non-AA 26 (L) >60 ml/min/1.73m2    Calcium 7.9 (L) 8.3 - 10.4 MG/DL   POC G3    Collection Time: 07/15/19  3:16 AM   Result Value Ref Range    Device: VENT      FIO2 (POC) 40 %    pH (POC) 7.364 7.35 - 7.45      pCO2 (POC) 34.1 (L) 35 - 45 MMHG    pO2 (POC) 209 (H) 75 - 100 MMHG    HCO3 (POC) 19.4 (L) 22 - 26 MMOL/L    sO2 (POC) 100 (H) 95 - 98 %    Base deficit (POC) 5 mmol/L    Mode BILEVEL      Set Rate 9 bpm    PEEP/CPAP (POC) 0 cmH2O    PIP (POC) 22      Allens test (POC) NOT APPLICABLE      Inspiratory Time High 6 sec    Inspiratory Time Low 0.6 sec    Site DRAWN FROM ARTERIAL LINE      Patient temp.  98.6      Specimen type (POC) ARTERIAL      Performed by Cali     CO2, POC 20 MMOL/L    Respiratory comment: NurseNotified     Exhaled minute volume 7.50 L/min    COLLECT TIME 315     VANCOMYCIN, TROUGH    Collection Time: 07/15/19  9:51 AM   Result Value Ref Range    Vancomycin,trough 22.3 (HH) 5 - 20 ug/mL   MAGNESIUM    Collection Time: 07/16/19  3:43 AM   Result Value Ref Range    Magnesium 2.9 (H) 1.8 - 2.4 mg/dL   CBC W/O DIFF    Collection Time: 07/16/19  3:43 AM   Result Value Ref Range    WBC 12.9 (H) 4.3 - 11.1 K/uL    RBC 2.64 (L) 4.23 - 5.6 M/uL    HGB 7.8 (L) 13.6 - 17.2 g/dL    HCT 24.3 (L) 41.1 - 50.3 %    MCV 92.0 79.6 - 97.8 FL    MCH 29.5 26.1 - 32.9 PG    MCHC 32.1 31.4 - 35.0 g/dL    RDW 13.4 11.9 - 14.6 %    PLATELET 799 526 - 960 K/uL    MPV 11.2 9.4 - 12.3 FL    ABSOLUTE NRBC 0.00 0.0 - 0.2 K/uL   METABOLIC PANEL, BASIC    Collection Time: 07/16/19  3:43 AM   Result Value Ref Range    Sodium 152 (H) 136 - 145 mmol/L    Potassium 3.9 3.5 - 5.1 mmol/L    Chloride 123 (H) 98 - 107 mmol/L    CO2 20 (L) 21 - 32 mmol/L    Anion gap 9 7 - 16 mmol/L    Glucose 91 65 - 100 mg/dL    BUN 53 (H) 6 - 23 MG/DL    Creatinine 2.83 (H) 0.8 - 1.5 MG/DL    GFR est AA 31 (L) >60 ml/min/1.73m2    GFR est non-AA 26 (L) >60 ml/min/1.73m2    Calcium 8.4 8.3 - 10.4 MG/DL        Assessment:     Principal Problem:    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)    Active Problems:    Tobacco abuse (7/10/2019)      Acute respiratory failure with hypoxia ([facility]) (7/10/2019)      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)      IVH (intraventricular hemorrhage) ([facility]) (7/10/2019)      Seizure ([facility] (7/10/2019)      Elevated serum creatinine (7/10/2019)      Cerebral vasospasm (7/10/2019)      Pneumonia due to methicillin susceptible Staphylococcus aureus (MSSA) ([facility] (7/15/2019)        Plan:     Recommendations: Continue Acute Rehab Program  Coordination of rehab/medical care  Counseling of PM & R care issues management  Monitoring and management of medical conditions per plan of care/orders  Discussion with Family/Caregiver/Staff  Reviewed Therapies/Labs/Medications/Records

## 2019-07-16 NOTE — PROGRESS NOTES
Problem: Dysphagia (Adult)  Goal: *Acute Goals and Plan of Care (Insert Text)  Description  LTG: Patient will tolerate least restrictive diet without overt signs or symptoms of airway compromise. STG: Patient will tolerate po trials with speech therapy only without overt signs or symptoms of airway compromise. MET 7/16/19  STG: Patient will tolerate mechanical soft diet/nectar thick liquids without overt s/sx of airway compromise. ADDED 7/16/19  STG: Patient will participate in modified barium swallow study as clinically indicated. STG: Patient will participate in full assessment of speech-language/cognitive function. Outcome: Progressing Towards Goal  SPEECH LANGUAGE PATHOLOGY: BEDSIDE SWALLOW NOTE: Daily Note 1    NAME/AGE/GENDER: Michele Ho is a 37 y.o. male  DATE: 7/16/2019  PRIMARY DIAGNOSIS: SA[facility]) [facility] [I60.9]      ICD-10: Treatment Diagnosis: R13.12 Oropharyngeal Dysphagia. INTERDISCIPLINARY COLLABORATION: Physical Therapist, Registered Nurse and Physician  PRECAUTIONS/ALLERGIES: Patient has no known allergies. ASSESSMENT:   Patient initially drowsy, but increased alertness after repositioning and po presentations. Improved vocal quality today; however, low volume of speech persists. Appropriate oral prep and swallow with ice chips. Strong cough on 1/2 cup sips of thin liquids. Nectar thick liquids then attempted via tsp, cup, and straw sips. Mild bolus holding to swallow with nectar, but no overt s/sx of airway compromise. Impulsivity noted when he independently consumed liquids, benefiting from moderate verbal cues to decrease rate/improve safety with intake. Mildly increased oral prep with puree and mechanical soft textures, but adequate oral clearing after the swallow. Single swallow palpated per bolus, likely indicating adequate pharyngeal clearance. Recommend mechanical soft diet/nectar thick liquids. Medications whole in puree.  Speech to continue following regarding diet tolerance and po trials for potential upgrade. May benefit from modified barium swallow study if s/sx of dysphagia persist. Also plan for full assessment of cognitive-linguistic function later this week as alertness and activity tolerance improves. ?????? ? ? This section established at most recent assessment??????????  PROBLEM LIST (Impairments causing functional limitations):  1. Oropharyngeal dysphagia  REHABILITATION POTENTIAL FOR STATED GOALS: Good  PLAN OF CARE:   Patient will benefit from skilled intervention to address the following impairments. RECOMMENDATIONS AND PLANNED INTERVENTIONS (Benefits and precautions of therapy have been discussed with the patient.):  · PO:  Mechanical soft  · Liquids:  nectar  ·   MEDICATIONS:  · Whole in puree  ASPIRATION PRECAUTIONS:  · Slow rate of intake  · Small bites/sips  · Upright at 90 degrees during meal  COMPENSATORY STRATEGIES/MODIFICATIONS INCLUDING:  · Cup/sip  OTHER RECOMMENDATIONS (including follow up treatment recommendations): · Family training/education  · Laryngeal exercises  · Patient education  RECOMMENDED DIET MODIFICATIONS DISCUSSED WITH:  · Nursing  · Family  · Patient  FREQUENCY/DURATION: Continue to follow patient 3 times a week for duration of hospital stay to address above goals. RECOMMENDED REHABILITATION/EQUIPMENT: (at time of discharge pending progress): Due to the probability of continued deficits (see above) this patient will likely need continued skilled speech therapy after discharge. SUBJECTIVE:   Initially drowsy, but improved alertness as session progressed. History of Present Injury/Illness: Mr. Breanne Randolph  has no past medical history on file. Ritchiejenaro Sebastian He also  has a past surgical history that includes ir emboli intracran lt (7/11/2019).    Present Symptoms: oropharyngeal dysphagia   Pain Scale 1: Numeric (0 - 10)  Pain Intensity 1: 0  Current Medications:   No current facility-administered medications on file prior to encounter. No current outpatient medications on file prior to encounter. Current Dietary Status:     NPO  OBJECTIVE:   Respiratory Status:  Nasal cannula  6 l/min  Oral Motor Structure/Speech:  Oral-Motor Structure/Motor Speech  Labial: Decreased rate  Dentition: Intact  Oral Hygiene: Adequate  Lingual: Decreased rate    Cognitive and Communication Status:  Neurologic State: Alert  Orientation Level: Oriented to person(able to state [place] )  Cognition: Decreased command following;Decreased attention/concentration; Impulsive  Perception: Appears intact  Perseveration: No perseveration noted  Safety/Judgement: Decreased insight into deficits; Fall prevention    BEDSIDE SWALLOW EVALUATION  Oral Assessment:  Oral Assessment  Labial: Decreased rate  Dentition: Intact  Lingual: Decreased rate  P.O. Trials:  Patient Position: up in bed    The patient was given tsp-small sip amounts of the following:   Consistency Presented: Thin liquid; Ice chips; Nectar thick liquid;Puree;Mechanical soft  How Presented: Self-fed/presented;SLP-fed/presented;Cup/sip;Spoon;Straw;Successive swallows    ORAL PHASE:  Bolus Acceptance: No impairment  Bolus Formation/Control: Impaired  Propulsion: Delayed (# of seconds)  Type of Impairment: Delayed  Oral Residue: None    PHARYNGEAL PHASE:  Initiation of Swallow: Delayed (# of seconds)  Laryngeal Elevation: Decreased;Weak  Aspiration Signs/Symptoms: Strong cough  Vocal Quality: Low volume           Pharyngeal Phase Characteristics: No impairment, issues, or problems     OTHER OBSERVATIONS:  Rate/bite size: Questionable   Endurance:  Impaired   Comments: Tool Used: Dysphagia Outcome and Severity Scale (MIR)    Score Comments   Normal Diet  ? 7 With no strategies or extra time needed   Functional Swallow  ? 6 May have mild oral or pharyngeal delay       Mild Dysphagia    ?  5 Which may require one diet consistency restricted (those who demonstrate penetration which is entirely cleared on MBS would be included)   Mild-Moderate Dysphagia  ? 4 With 1-2 diet consistencies restricted       Moderate Dysphagia  ? 3 With 2 or more diet consistencies restricted       Moderately Severe Dysphagia  ? 2 With partial PO strategies (trials with ST only)       Severe Dysphagia  ? 1 With inability to tolerate any PO safely          Score:  Initial: 3 Most Recent: 4(Date: 07/16/19   Interpretation of Tool: The Dysphagia Outcome and Severity Scale (MIR) is a simple, easy-to-use, 7-point scale developed to systematically rate the functional severity of dysphagia based on objective assessment and make recommendations for diet level, independence level, and type of nutrition. Payor: SELF PAY / Plan: [facility] SELF PAY / Product Type: Self Pay /     TREATMENT:    (In addition to Assessment/Re-Assessment sessions the following treatments were rendered)  Assessment/Reassessment only, no treatment provided today  MODALITIES:                                                                    ORAL MOTOR  EXERCISES:                                                                                                                                                                      LARYNGEAL / PHARYNGEAL EXERCISES:                                                                                                                                     __________________________________________________________________________________________________  Safety:   After treatment position/precautions:  · Upright in Bed  . Progression/Medical Necessity:   · Patient is expected to demonstrate progress in swallow strength, swallow timeliness, swallow function, diet tolerance and swallow safety  ·  to improve swallow safety, work toward diet advancement, decrease aspiration risk and endurance   · . Compliance with Program/Exercises:  Will assess as treatment progresses  Reason for Continuation of Services/Other Comments:  · Patient continues to require skilled intervention due to dysphagia   · . Recommendations/Intent for next treatment session: \"Treatment next visit will focus on diet tolerance, po trials, cognitive-linguistic assessment\".     Total Treatment Duration:  Time In: 1029  Time Out: [address] CCC-SLP

## 2019-07-16 NOTE — PROGRESS NOTES
Progress Note    Patient: Shemar Rubio MRN: [id number]  SSN: [id number]    YOB: 1975  Age: 37 y.o. Sex: male      Admit Date: 7/9/2019    LOS: 7 days     Subjective:   Pt looks good this am, following commands. EVD @ 10/open and draining wo difficulty, CSF cx is neg. Resp CX: + MSSA- will change therapy to Cefazolin and monitor-discussed with pharmacy today. TCD: shows peak 200's R/L MCA, left better today. Pt with no acute neuro changes. Autoregulating well.     Current Facility-Administered Medications   Medication Dose Route Frequency    ceFAZolin (ANCEF) 2 g/20 mL in sterile water IV syringe  2 g IntraVENous Q8H    0.45% sodium chloride infusion  100 mL/hr IntraVENous CONTINUOUS    albuterol (PROVENTIL VENTOLIN) nebulizer solution 2.5 mg  2.5 mg Nebulization BID RT    sodium chloride 3% hypertonic nebulizer soln  1 mL Nebulization BID RT    acetaminophen (TYLENOL) tablet 650 mg  650 mg Oral Q4H PRN    ascorbic acid (vitamin C) (VITAMIN C) tablet 500 mg  500 mg Oral TID    aspirin tablet 325 mg  325 mg Oral DAILY    atorvastatin (LIPITOR) tablet 40 mg  40 mg Oral QHS    clopidogrel (PLAVIX) tablet 75 mg  75 mg Oral DAILY    famotidine (PEPCID) tablet 20 mg  20 mg Oral DAILY    ferrous sulfate 300 mg (60 mg iron)/5 mL oral syrup 300 mg  300 mg Oral TID    levETIRAcetam (KEPPRA) oral solution 500 mg  500 mg Oral Q12H    magnesium oxide (MAG-OX) tablet 400 mg  400 mg Oral BID    senna-docusate (PERICOLACE) 8.6-50 mg per tablet 2 Tab  2 Tab Oral QHS    ALPRAZolam (XANAX) tablet 0.5 mg  0.5 mg Oral Q6H PRN    magnesium hydroxide (MILK OF MAGNESIA) 400 mg/5 mL oral suspension 30 mL  30 mL Oral DAILY PRN    niMODipine (NIMOTOP) 30 mg/mL oral solution (compound) 60 mg  60 mg Oral Q4H    fentaNYL citrate (PF) injection 50 mcg  50 mcg IntraVENous Q1H PRN    polyvinyl alcohol (LIQUIFILM TEARS) 1.4 % ophthalmic solution 1 Drop  1 Drop Both Eyes PRN    heparin (porcine) injection 5,000 Units  5,000 Units SubCUTAneous Q8H    sodium chloride (NS) flush 30 mL  30 mL InterCATHeter Q8H    heparin (porcine) pf 900 Units  900 Units InterCATHeter Q8H    sodium chloride (NS) flush 30 mL  30 mL InterCATHeter PRN    heparin (porcine) pf 900 Units  900 Units InterCATHeter PRN    nicotine (NICODERM CQ) 21 mg/24 hr patch 1 Patch  1 Patch TransDERmal Q24H    ondansetron (ZOFRAN) injection 4 mg  4 mg IntraVENous Q6H PRN    NUTRITIONAL SUPPORT ELECTROLYTE PRN ORDERS   Does Not Apply PRN    magnesium sulfate 4 g/100 mL IVPB  4 g IntraVENous DAILY PRN    magnesium sulfate 2 g/50 ml IVPB (premix or compounded)  2 g IntraVENous DAILY PRN       Objective:     Vitals:    07/16/19 1100 07/16/19 1101 07/16/19 1115 07/16/19 1130   BP:       Pulse: (!) 57 (!) 57 (!) 53 (!) 58   Resp: 16 14 14 23   Temp: 100.4 °F (38 °C) 100.4 °F (38 °C) 100.2 °F (37.9 °C) 100.2 °F (37.9 °C)   SpO2: 99% 99% 100% 100%   Weight:       Height:             Intake and Output:  Current Shift: 07/16 0701 - 07/16 1900  In: -   Out: 601 [Urine:540; Drains:61]  Last 24 hr: 07/15 0701 - 07/16 0700  In: 2090 [P.O.:120; I.V.:1825]  Out: 6096 [Urine:3485; Drains:322]     IO: +2490/24hrs  TOTAL: +13L overall  EVD: 295/24hrs. IO: -1717/24hr  TOTAL OVERALL: +12.9L  EVD: 322/24hrs    Physical Exam:   General:  Alert, cooperative, no distress, appears stated age. Eyes:   PERRL, EOMs intact. Neck: Supple, symmetrical, trachea midline, no adenopathy, thyroid: no enlargment/tenderness/nodules, no carotid bruit and no JVD. Lungs:   Clear to auscultation bilaterally. Heart:  Regular rate and rhythm, S1, S2 normal, no murmur, click, rub or gallop. Abdomen:   Soft, non-tender. Bowel sounds normal. No masses,  No organomegaly. Extremities: Extremities normal, atraumatic, no cyanosis or edema. Pulses: 2+ and symmetric all extremities.    Skin: Skin color, texture, turgor normal. No rashes or lesions   Neurologic:  Extubated and tolerating well. Airway patent. Follows all commands. Lifted arms and legs off the bed. Lab/Data Review:    Recent Results (from the past 12 hour(s))   MAGNESIUM    Collection Time: 07/16/19  3:43 AM   Result Value Ref Range    Magnesium 2.9 (H) 1.8 - 2.4 mg/dL   CBC W/O DIFF    Collection Time: 07/16/19  3:43 AM   Result Value Ref Range    WBC 12.9 (H) 4.3 - 11.1 K/uL    RBC 2.64 (L) 4.23 - 5.6 M/uL    HGB 7.8 (L) 13.6 - 17.2 g/dL    HCT 24.3 (L) 41.1 - 50.3 %    MCV 92.0 79.6 - 97.8 FL    MCH 29.5 26.1 - 32.9 PG    MCHC 32.1 31.4 - 35.0 g/dL    RDW 13.4 11.9 - 14.6 %    PLATELET 079 805 - 959 K/uL    MPV 11.2 9.4 - 12.3 FL    ABSOLUTE NRBC 0.00 0.0 - 0.2 K/uL   METABOLIC PANEL, BASIC    Collection Time: 07/16/19  3:43 AM   Result Value Ref Range    Sodium 152 (H) 136 - 145 mmol/L    Potassium 3.9 3.5 - 5.1 mmol/L    Chloride 123 (H) 98 - 107 mmol/L    CO2 20 (L) 21 - 32 mmol/L    Anion gap 9 7 - 16 mmol/L    Glucose 91 65 - 100 mg/dL    BUN 53 (H) 6 - 23 MG/DL    Creatinine 2.83 (H) 0.8 - 1.5 MG/DL    GFR est AA 31 (L) >60 ml/min/1.73m2    GFR est non-AA 26 (L) >60 ml/min/1.73m2    Calcium 8.4 8.3 - 10.4 MG/DL       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)    Active Problems:    Tobacco abuse (7/10/2019)      Acute respiratory failure with hypoxia (HCC) (7/10/2019)      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 Q1 hour neuro checks. On SAH protocol - Mg, nimotop, zocor. PERRL +3. No MAP goal now. TCDs daily - . EVD set to 10cm H20. Last CT head showed improved communicating hydrocephalus. Last CTA head and neck showed no filling of the aneurysm and good filling of the PCAs. Will continue [facility] protocol, PT/OT. OOB to chair today. /. RESP: Pt weaned off vent this am and extubated, tolerating NC @ 4L well, will wean off oxygen as tolerated. +secretions, will monitor. CXRs have been clear. CV: NO MAP GOAL NOW.  2D Echo: 55-60%, trop peaked @ 0.8, trending down. SCD/SQ heparin. HEME: 7.8/25, . HIT panel negative. HH continues to drift down. Started Fe and Vit C. If it drifts down again, we will do CTA abdomen and pelvis. NEPH: bun/cr: 52/2.8. Phos: 3.8, K+3.7. Fluids changed to 1/2 NS. GI: Pepcid. Senna. MOM given. Abdomen less distended. +BS. + BM. ST advanced to puree diet today. ID: Tm: [id number]. Sputum cx sent, pending final, showing gram + cocci -  Patient has MSSA pneumonia. Blood cultures neg so far and UA neg. Vanc/cefepime D5/10, discussed continued fever and treatment with pharmacy: will change over for 10d of cefazolin and monitor. Will get US of arms and legs for possible DVT. LINES: KISHOR PICFROYLAN, SHANNAN phelps. Tobacco abuse: On Nicoderm patch.         Signed By: Maria R Leonard NP     July 16, 2019

## 2019-07-16 NOTE — PROGRESS NOTES
Bedside shift change report given to Dorcas Zurita RN (oncoming nurse) by Huy Sterling RN (offgoing nurse). Report included the following information SBAR, Kardex, ED Summary, Intake/Output, MAR, Recent Results, Cardiac Rhythm NSR/SB and Alarm Parameters . Dual neuro assessment completed.

## 2019-07-16 NOTE — PROGRESS NOTES
SPEECH PATHOLOGY NOTE:    Speech therapy attempt this AM, but patient unavailable.  Will re-attempt at later time this AM.     Scott [address] CCC-SLP

## 2019-07-16 NOTE — PROGRESS NOTES
Problem: Self Care Deficits Care Plan (Adult)  Goal: *Acute Goals and Plan of Care (Insert Text)  Description  1. Patient will complete total body bathing and dressing with supervision and adaptive equipment as needed. 2. Patient will complete toileting with supervision and adaptive equipment as needed. 3. Patient will tolerate 20 minutes of OT treatment with up to minimal rest breaks to increase activity tolerance for ADLs. 4. Patient will complete functional transfers with supervision and adaptive equipment as needed. 5. Patient will demonstrate modified independence with therapeutic exercise HEP to decrease edema in bilateral hands for increased coordination and independence with functional transfers. 6. Patient will complete functional mobility for ADLs with stand by assistance and adaptive equipment as needed. Timeframe: 7 visits      Outcome: Progressing Towards Goal     OCCUPATIONAL THERAPY: Initial Assessment, Daily Note and PM 7/16/2019  INPATIENT: OT Visit Days: 2  Payor: SELF PAY / Plan: [facility] SELF PAY / Product Type: Self Pay /      NAME/AGE/GENDER: Bryan Holloway is a 37 y.o. 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 ASSESSMENT:     Mr. Alina Patrick is a 37 y.o. 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 Seen today in ICU with external ventricular drain and arterial line, pt intubated. Per father at bedside/pt with appropriate nodding to yes/no questions: at baseline pt and his two children live with pt's father, pt is independent with ADLs, ambulation, working a physically demanding job. No recent falls. Upon arrival pt alert and agreeable to OT evaluation and treatment. BUE assessment revealed AROM generally decreased and strength WFL (4+ to 5/5) in BUEs. BUE sensation intact to light touch. Proprioception likely intact in BUEs, difficult to assess fully due to BUE lines. Swelling noted in B hands, pt educated to elevate/practice hand pumps. Tracking and visual fields WNL, slightly increased time needed for saccades. Pt with good command following and able to communicate needs (suctioning) with hand gestures. Treatment initiated to include functional transfers x 2 with Junaid/cueing for RUE hand placement and additional time. Pt required min verbal and tactile cueing for hand placement and RW use. Assisted PT with functional mobility in hallway with min A x 2. Good progress towards goals. Will continue to address OT goals and plan focare. This section established at most recent assessment   PROBLEM LIST (Impairments causing functional limitations):  1. Decreased ADL/Functional Activities  2. Decreased Transfer Abilities  3. Decreased Ambulation Ability/Technique  4. Decreased Balance  5. Decreased Activity Tolerance  6. Increased Fatigue  7. Increased Shortness of Breath  8. Decreased Flexibility/Joint Mobility  9. Edema/Girth  10. Decreased Knowledge of Precautions  11. Decreased Skin Integrity/Hygeine  12. Decreased [place] with Home Exercise Program   INTERVENTIONS PLANNED: (Benefits and precautions of occupational therapy have been discussed with the patient.)  1. Activities of daily living training  2. Adaptive equipment training  3. Balance training  4. Clothing management  5. Donning&doffing training  6. Hygiene training  7. Neuromuscular re-eduation  8. Re-evaluation  9. Therapeutic activity  10. Therapeutic exercise     TREATMENT PLAN: Frequency/Duration: Follow patient 3x/week to address above goals. Rehabilitation Potential For Stated Goals: Good     REHAB RECOMMENDATIONS (at time of discharge pending progress):    Placement:   It is my opinion, based on this patient's performance to date, that Mr. Liliya Wesley may benefit from intensive therapy at an [address] [place] [address] after discharge due to a probable need for close medical supervision by a rehab physician, a probable need for 24 hour rehab nursing, a probable need for multiple therapy disciplines and potential to make ongoing and sustainable functional improvement that is of practical value. .  Equipment:    TBD               OCCUPATIONAL PROFILE AND HISTORY:   History of Present Injury/Illness (Reason for Referral):  See H&P. Past Medical History/Comorbidities:   Mr. Liliya Wesley  has no past medical history on file. Mr. Liliya Wesley  has a past surgical history that includes ir emboli intracran lt (7/11/2019). Social History/Living Environment:   Home Environment: Private residence  One/Two Story Residence: Other (Comment)  Living Alone: No  Support Systems: Parent, Child(rohan)  Patient Expects to be Discharged to[de-identified] Unknown  Current DME Used/Available at Home: None  Prior Level of Function/Work/Activity:  Per father at bedside/pt with appropriate nodding to yes/no questions: at baseline pt and his two children live with pt's father, pt is independent with ADLs, ambulation, working a physically demanding job. No recent falls. Dominant Side:         RIGHT    Personal Factors:          Sex:  male        Age:  37 y.o. Other factors that influence how disability is experienced by the patient:  [facility]    Number of Personal Factors/Comorbidities that affect the Plan of Care: Expanded review of therapy/medical records (1-2):  MODERATE COMPLEXITY   ASSESSMENT OF OCCUPATIONAL PERFORMANCE[de-identified]   Activities of Daily Living:   Basic ADLs (From Assessment) Complex ADLs (From Assessment)   Feeding: Total assistance(Pt intubated)  Oral Facial Hygiene/Grooming: Minimum assistance(d/t lines)  Bathing: Minimum assistance  Upper Body Dressing:  Moderate assistance(d/t lines)  Lower Body Dressing: Minimum assistance  Toileting: Minimum assistance Instrumental ADL  Meal Preparation: Total assistance  Homemaking: Total assistance  Medication Management: Total assistance  Financial Management: Total assistance   Grooming/Bathing/Dressing Activities of Daily Living     Cognitive Retraining  Safety/Judgement: Decreased insight into deficits; Fall prevention                       Bed/Mat Mobility  Sit to Stand: Minimum assistance  Stand to Sit: Contact guard assistance     Most Recent Physical Functioning:   Gross Assessment:                  Posture:  Posture (WDL): Exceptions to WDL  Posture Assessment: Forward head  Balance:  Sitting: Impaired  Sitting - Static: Good (unsupported)  Sitting - Dynamic: Fair (occasional)  Standing: Impaired  Standing - Static: Fair  Standing - Dynamic : Fair Bed Mobility:     Wheelchair Mobility:     Transfers:  Sit to Stand: Minimum assistance  Stand to Sit: Contact guard assistance            Patient Vitals for the past 6 hrs:   SpO2 Pulse   07/16/19 0900 98 % (!) 58   07/16/19 0915 98 % 90   07/16/19 0930 94 % 72   07/16/19 0945 94 % 66   07/16/19 1000 94 % 60   07/16/19 1015 95 % 69   07/16/19 1030 96 % (!) 58   07/16/19 1045 -- 88   07/16/19 1100 99 % (!) 57   07/16/19 1101 99 % (!) 57   07/16/19 1115 100 % (!) 53   07/16/19 1130 100 % (!) 58   07/16/19 1145 100 % 60   07/16/19 1200 100 % (!) 52   07/16/19 1215 99 % (!) 51   07/16/19 1230 98 % (!) 51   07/16/19 1245 99 % (!) 56   07/16/19 1300 100 % (!) 57   07/16/19 1315 100 % (!) 50   07/16/19 1330 100 % (!) 52       Mental Status  Neurologic State: Alert  Orientation Level: Oriented to person(able to state [place] )  Cognition: Decreased command following, Decreased attention/concentration, Impulsive  Perception: Appears intact  Perseveration: No perseveration noted  Safety/Judgement: Decreased insight into deficits, Fall prevention                          Physical Skills Involved:  1. Range of Motion  2. Balance  3. Activity Tolerance  4.  Fine Motor Control  5. Edema Cognitive Skills Affected (resulting in the inability to perform in a timely and safe manner):  1. None  Psychosocial Skills Affected:  1. Habits/Routines  2. Environmental Adaptation  3. Social Interaction  4. Emotional Regulation  5. Self-Awareness  6. Awareness of Others  7. Social Roles   Number of elements that affect the Plan of Care: 5+:  HIGH COMPLEXITY   CLINICAL DECISION MAKIN[address] [place] [address] AM-PAC 6 Clicks   Daily Activity Inpatient Short Form  How much help from another person does the patient currently need. .. Total A Lot A Little None   1. Putting on and taking off regular lower body clothing? ? 1   ? 2   ? 3   ? 4   2. Bathing (including washing, rinsing, drying)? ? 1   ? 2   ? 3   ? 4   3. Toileting, which includes using toilet, bedpan or urinal?   ? 1   ? 2   ? 3   ? 4   4. Putting on and taking off regular upper body clothing? ? 1   ? 2   ? 3   ? 4   5. Taking care of personal grooming such as brushing teeth? ? 1   ? 2   ? 3   ? 4   6. Eating meals? ? 1   ? 2   ? 3   ? 4   © , Trustees of [address] [place] [address], under license to [employer]. All rights reserved      Score:  Initial: 15 19 Most Recent: X (Date: -- )    Interpretation of Tool:  Represents activities that are increasingly more difficult (i.e. Bed mobility, Transfers, Gait). Medical Necessity:     · Patient demonstrates good  ·  rehab potential due to higher previous functional level. Reason for Services/Other Comments:  · Patient continues to require skilled intervention due to inability to complete ADLs at prior level of independence   · .    Use of outcome tool(s) and clinical judgement create a POC that gives a: MODERATE COMPLEXITY         TREATMENT:   (In addition to Assessment/Re-Assessment sessions the following treatments were rendered)     Pre-treatment Symptoms/Complaints:    Pain: Initial:   Pain Intensity 1: 0  Post Session:  same     Therapeutic Activity: (    8 minutes): Therapeutic activities including Bed transfers, Chair transfers and Ambulation on level ground to improve mobility, balance, coordination and dynamic movement of arm - bilateral and leg - bilateral to improve functional transfers . Required minimal   to promote dynamic balance in standing. Treatment initiated to include functional transfers x 2 with Junaid/cueing for technique, hand placement with RW. Therapeutic Exercise: ( 0 minutes):  Exercises per grid below to improve mobility, coordination and dynamic movement of hand - bilateral to improve functional coordination and address B hand edema . Required minimal visual and verbal cues to promote proper body mechanics. Progressed repetitions as indicated. Pt practiced BUE hand pumps with min cueing. See below. Date:  7/12/19 Date:   Date:     Activity/Exercise Parameters Parameters Parameters   BUE hand pumps 2x15 reps                                             Braces/Orthotics/Lines/Etc:   · IV  · phelps catheter  · drain ventricular   · arterial line  · ICU monitoring   · O2 Device: Ventilator  Treatment/Session Assessment:    · Response to Treatment:  Tolerated well   · Interdisciplinary Collaboration:   o Physical Therapy Assistant  o Occupational Therapist  o Registered Nurse  · After treatment position/precautions:   o Call light within reach  o Family at bedside  o working with PT.   · Compliance with Program/Exercises: Compliant all of the time, Will assess as treatment progresses. · Recommendations/Intent for next treatment session: \"Next visit will focus on advancements to more challenging activities and reduction in assistance provided\".   Total Treatment Duration:  OT Patient Time In/Time Out  Time In: 1414  Time Out: 1400 Anish Tillman OT

## 2019-07-16 NOTE — PROGRESS NOTES
OT NOTE:    OT treatment attempted, however pt unable to be seen d/t having procedure in room. Will treatment later as schedule permits.     Florina Nunes, OTR/L

## 2019-07-16 NOTE — PROGRESS NOTES
Pharmacokinetic Consult to Pharmacist    Maritzaevette Aiken is a 37 y.o. male being treated for MSSA pneumonia. Height: 6' (182.9 cm)  Weight: 85.2 kg (187 lb 13.3 oz)  Lab Results   Component Value Date/Time    BUN 53 (H) 07/16/2019 03:43 AM    Creatinine 2.83 (H) 07/16/2019 03:43 AM    WBC 12.9 (H) 07/16/2019 03:43 AM    Procalcitonin 0.1 07/10/2019 08:54 AM      Estimated Creatinine Clearance: 36.2 mL/min (A) (based on SCr of 2.83 mg/dL (H)). CULTURES:  All Micro Results     Procedure Component Value Units Date/Time    CULTURE, CSF Barb Stapleton [[id number]] Collected:  07/14/19 1251    Order Status:  Completed Specimen:  Cerebrospinal Fluid Updated:  07/15/19 0817     Special Requests: NO SPECIAL REQUESTS        GRAM STAIN 0 TO 5 WBC'S SEEN PER OIF      NO DEFINITE ORGANISM SEEN        Culture result: NO GROWTH 1 DAY       CULTURE, BLOOD [[id number]] Collected:  07/12/19 1808    Order Status:  Completed Specimen:  Blood Updated:  07/15/19 0636     Special Requests: --        RIGHT  ARM  RED PICC       Culture result: NO GROWTH 3 DAYS       CULTURE, BLOOD [[id number]] Collected:  07/12/19 1803    Order Status:  Completed Specimen:  Blood Updated:  07/15/19 0636     Special Requests: --        LEFT  ARM  ARTL (ART LINE)       Culture result: NO GROWTH 3 DAYS       CULTURE, RESPIRATORY/SPUTUM/BRONCH W GRAM STAIN [[id number]]  (Abnormal)  (Susceptibility) Collected:  07/12/19 0136    Order Status:  Completed Specimen:  Sputum from Endotracheal aspirate Updated:  07/14/19 0839     Special Requests: NO SPECIAL REQUESTS        GRAM STAIN       WBCS SEEN TOO NUMEROUS TO COUNT            NO WBCS SEEN               MODERATE GRAM POSITIVE COCCI            MANY GRAM POSITIVE RODS        Culture result:       HEAVY STAPHYLOCOCCUS AUREUS                  HEAVY NORMAL RESPIRATORY GEOVANNY                  Lab Results   Component Value Date/Time    Vancomycin,trough 22.3 (HH) 07/15/2019 09:51 AM       Day 5 of vancomycin.   Goal trough is 15-20. The dose was adjusted on 7/15 from 1250mg q24h to 1000mg q24h due to trough above target range. Culture result is positive for MSSA, therefore, consider de-escalation today to cefazolin to complete course of therapy. Pharmacist will continue to follow patient. Please call with any questions. Thank you,  Marixa Bergman.  Fernando Enciso, PharmD, [address] [facility]  Clinical Pharmacist  [phone]

## 2019-07-17 ENCOUNTER — APPOINTMENT (OUTPATIENT)
Dept: GENERAL RADIOLOGY | Age: 44
DRG: 020 | End: 2019-07-17
Attending: NURSE PRACTITIONER
Payer: COMMERCIAL

## 2019-07-17 LAB
ANION GAP SERPL CALC-SCNC: 9 MMOL/L (ref 7–16)
BACTERIA SPEC CULT: NORMAL
BACTERIA SPEC CULT: NORMAL
BUN SERPL-MCNC: 54 MG/DL (ref 6–23)
CALCIUM SERPL-MCNC: 8.2 MG/DL (ref 8.3–10.4)
CHLORIDE SERPL-SCNC: 117 MMOL/L (ref 98–107)
CO2 SERPL-SCNC: 21 MMOL/L (ref 21–32)
CREAT SERPL-MCNC: 3 MG/DL (ref 0.8–1.5)
ERYTHROCYTE [DISTWIDTH] IN BLOOD BY AUTOMATED COUNT: 12.9 % (ref 11.9–14.6)
GLUCOSE SERPL-MCNC: 94 MG/DL (ref 65–100)
HCT VFR BLD AUTO: 24.3 % (ref 41.1–50.3)
HGB BLD-MCNC: 7.8 G/DL (ref 13.6–17.2)
MAGNESIUM SERPL-MCNC: 2.8 MG/DL (ref 1.8–2.4)
MCH RBC QN AUTO: 28.9 PG (ref 26.1–32.9)
MCHC RBC AUTO-ENTMCNC: 32.1 G/DL (ref 31.4–35)
MCV RBC AUTO: 90 FL (ref 79.6–97.8)
NRBC # BLD: 0 K/UL (ref 0–0.2)
PLATELET # BLD AUTO: 199 K/UL (ref 150–450)
PMV BLD AUTO: 11.1 FL (ref 9.4–12.3)
POTASSIUM SERPL-SCNC: 3.5 MMOL/L (ref 3.5–5.1)
RBC # BLD AUTO: 2.7 M/UL (ref 4.23–5.6)
SERVICE CMNT-IMP: NORMAL
SERVICE CMNT-IMP: NORMAL
SODIUM SERPL-SCNC: 147 MMOL/L (ref 136–145)
WBC # BLD AUTO: 13.2 K/UL (ref 4.3–11.1)

## 2019-07-17 PROCEDURE — 97530 THERAPEUTIC ACTIVITIES: CPT

## 2019-07-17 PROCEDURE — 92523 SPEECH SOUND LANG COMPREHEN: CPT

## 2019-07-17 PROCEDURE — 74011250637 HC RX REV CODE- 250/637

## 2019-07-17 PROCEDURE — 85027 COMPLETE CBC AUTOMATED: CPT

## 2019-07-17 PROCEDURE — 80048 BASIC METABOLIC PNL TOTAL CA: CPT

## 2019-07-17 PROCEDURE — 83735 ASSAY OF MAGNESIUM: CPT

## 2019-07-17 PROCEDURE — 93886 INTRACRANIAL COMPLETE STUDY: CPT | Performed by: PSYCHIATRY & NEUROLOGY

## 2019-07-17 PROCEDURE — 74011250636 HC RX REV CODE- 250/636: Performed by: NURSE PRACTITIONER

## 2019-07-17 PROCEDURE — 77030020257 HC SOL INJ SOD CL 0.45% 1000ML BG

## 2019-07-17 PROCEDURE — 74011250637 HC RX REV CODE- 250/637: Performed by: NEUROLOGICAL SURGERY

## 2019-07-17 PROCEDURE — 99231 SBSQ HOSP IP/OBS SF/LOW 25: CPT | Performed by: PHYSICAL MEDICINE & REHABILITATION

## 2019-07-17 PROCEDURE — 74011250637 HC RX REV CODE- 250/637: Performed by: NURSE PRACTITIONER

## 2019-07-17 PROCEDURE — 94669 MECHANICAL CHEST WALL OSCILL: CPT

## 2019-07-17 PROCEDURE — 65610000001 HC ROOM ICU GENERAL

## 2019-07-17 PROCEDURE — 74011000258 HC RX REV CODE- 258: Performed by: NURSE PRACTITIONER

## 2019-07-17 PROCEDURE — 74011000250 HC RX REV CODE- 250: Performed by: NURSE PRACTITIONER

## 2019-07-17 PROCEDURE — 77030040132 HC BLANKET THRM DISP CSZ -B

## 2019-07-17 PROCEDURE — 94668 MNPJ CHEST WALL SBSQ: CPT

## 2019-07-17 PROCEDURE — 92526 ORAL FUNCTION THERAPY: CPT

## 2019-07-17 PROCEDURE — 94640 AIRWAY INHALATION TREATMENT: CPT

## 2019-07-17 PROCEDURE — 36600 WITHDRAWAL OF ARTERIAL BLOOD: CPT

## 2019-07-17 PROCEDURE — 93886 INTRACRANIAL COMPLETE STUDY: CPT

## 2019-07-17 PROCEDURE — 99232 SBSQ HOSP IP/OBS MODERATE 35: CPT | Performed by: NEUROLOGICAL SURGERY

## 2019-07-17 RX ADMIN — HEPARIN SODIUM 5000 UNITS: 5000 INJECTION INTRAVENOUS; SUBCUTANEOUS at 05:48

## 2019-07-17 RX ADMIN — NIMODIPINE 60 MG: 30 CAPSULE, LIQUID FILLED ORAL at 00:38

## 2019-07-17 RX ADMIN — MAGNESIUM OXIDE TAB 400 MG (241.3 MG ELEMENTAL MG) 400 MG: 400 (241.3 MG) TAB at 08:50

## 2019-07-17 RX ADMIN — SODIUM CHLORIDE, PRESERVATIVE FREE 900 UNITS: 5 INJECTION INTRAVENOUS at 05:48

## 2019-07-17 RX ADMIN — FAMOTIDINE 20 MG: 20 TABLET ORAL at 08:49

## 2019-07-17 RX ADMIN — ASPIRIN 325 MG ORAL TABLET 325 MG: 325 PILL ORAL at 08:50

## 2019-07-17 RX ADMIN — NIMODIPINE 60 MG: 30 CAPSULE, LIQUID FILLED ORAL at 20:09

## 2019-07-17 RX ADMIN — ACETAMINOPHEN 650 MG: 325 TABLET, FILM COATED ORAL at 13:07

## 2019-07-17 RX ADMIN — MINERAL SUPPLEMENT IRON 300 MG / 5 ML STRENGTH LIQUID 100 PER BOX UNFLAVORED 300 MG: at 21:44

## 2019-07-17 RX ADMIN — Medication 30 ML: at 21:43

## 2019-07-17 RX ADMIN — Medication 30 ML: at 05:48

## 2019-07-17 RX ADMIN — MINERAL SUPPLEMENT IRON 300 MG / 5 ML STRENGTH LIQUID 100 PER BOX UNFLAVORED 300 MG: at 08:49

## 2019-07-17 RX ADMIN — LEVETIRACETAM 500 MG: 100 SOLUTION ORAL at 08:49

## 2019-07-17 RX ADMIN — SODIUM CHLORIDE 100 ML/HR: 450 INJECTION, SOLUTION INTRAVENOUS at 08:48

## 2019-07-17 RX ADMIN — Medication 30 ML: at 14:00

## 2019-07-17 RX ADMIN — ALBUTEROL SULFATE 2.5 MG: 2.5 SOLUTION RESPIRATORY (INHALATION) at 21:08

## 2019-07-17 RX ADMIN — ACETAMINOPHEN 650 MG: 325 TABLET, FILM COATED ORAL at 01:00

## 2019-07-17 RX ADMIN — NIMODIPINE 60 MG: 30 CAPSULE, LIQUID FILLED ORAL at 08:52

## 2019-07-17 RX ADMIN — SODIUM CHLORIDE 30 MG/ML INHALATION SOLUTION 1 ML: 30 SOLUTION INHALANT at 11:21

## 2019-07-17 RX ADMIN — NIMODIPINE 60 MG: 30 CAPSULE, LIQUID FILLED ORAL at 04:35

## 2019-07-17 RX ADMIN — HEPARIN SODIUM 5000 UNITS: 5000 INJECTION INTRAVENOUS; SUBCUTANEOUS at 14:30

## 2019-07-17 RX ADMIN — Medication 2 G: at 14:30

## 2019-07-17 RX ADMIN — SODIUM CHLORIDE 100 ML/HR: 450 INJECTION, SOLUTION INTRAVENOUS at 19:19

## 2019-07-17 RX ADMIN — NIMODIPINE 60 MG: 30 CAPSULE, LIQUID FILLED ORAL at 12:22

## 2019-07-17 RX ADMIN — ALBUTEROL SULFATE 2.5 MG: 2.5 SOLUTION RESPIRATORY (INHALATION) at 11:22

## 2019-07-17 RX ADMIN — NIMODIPINE 60 MG: 30 CAPSULE, LIQUID FILLED ORAL at 16:25

## 2019-07-17 RX ADMIN — SODIUM CHLORIDE 30 MG/ML INHALATION SOLUTION 1 ML: 30 SOLUTION INHALANT at 21:08

## 2019-07-17 RX ADMIN — OXYCODONE HYDROCHLORIDE AND ACETAMINOPHEN 500 MG: 500 TABLET ORAL at 08:49

## 2019-07-17 RX ADMIN — LEVETIRACETAM 500 MG: 100 SOLUTION ORAL at 21:43

## 2019-07-17 RX ADMIN — Medication 2 G: at 21:52

## 2019-07-17 RX ADMIN — ACETAMINOPHEN 650 MG: 325 TABLET, FILM COATED ORAL at 19:17

## 2019-07-17 RX ADMIN — CLOPIDOGREL BISULFATE 75 MG: 75 TABLET ORAL at 08:50

## 2019-07-17 RX ADMIN — OXYCODONE HYDROCHLORIDE AND ACETAMINOPHEN 500 MG: 500 TABLET ORAL at 17:18

## 2019-07-17 RX ADMIN — MAGNESIUM OXIDE TAB 400 MG (241.3 MG ELEMENTAL MG) 400 MG: 400 (241.3 MG) TAB at 17:21

## 2019-07-17 RX ADMIN — SENNOSIDES, DOCUSATE SODIUM 2 TABLET: 50; 8.6 TABLET, FILM COATED ORAL at 21:44

## 2019-07-17 RX ADMIN — ATORVASTATIN CALCIUM 40 MG: 40 TABLET, FILM COATED ORAL at 21:44

## 2019-07-17 RX ADMIN — SODIUM CHLORIDE, PRESERVATIVE FREE 900 UNITS: 5 INJECTION INTRAVENOUS at 14:30

## 2019-07-17 RX ADMIN — HEPARIN SODIUM 5000 UNITS: 5000 INJECTION INTRAVENOUS; SUBCUTANEOUS at 21:43

## 2019-07-17 RX ADMIN — MINERAL SUPPLEMENT IRON 300 MG / 5 ML STRENGTH LIQUID 100 PER BOX UNFLAVORED 300 MG: at 16:57

## 2019-07-17 RX ADMIN — Medication 2 G: at 05:53

## 2019-07-17 RX ADMIN — OXYCODONE HYDROCHLORIDE AND ACETAMINOPHEN 500 MG: 500 TABLET ORAL at 21:44

## 2019-07-17 RX ADMIN — SODIUM CHLORIDE, PRESERVATIVE FREE 900 UNITS: 5 INJECTION INTRAVENOUS at 21:43

## 2019-07-17 NOTE — PROGRESS NOTES
PM&R Consult Progress Note      Patient: Lucia Villegas  Admit Date: 7/9/2019  Admit Diagnosis: SAH (subarachnoid hemorrhage) ([facility] [I60.9]  Recommendations: Continue Acute Rehab Program, Coordination of rehab/medical care, Counseling of PM & R care issues management, Hospital Inpatient Rehab  Remains febrile. On cooling blanket. Functionally, doing surprisingly well. EVD remains. Ongoing ICU care per Dr Aries Martinez. Depending on how long he will require acute care, may become too functional to warrant IRC. However, without funding, outpt difficult to obtain. I will continue to follow with you. History/Subjective/Complaint:     Patient seen and examined. Records reviewed. \" Im cold. \"  Pt chattering teeth     Pain 1  Pain Scale 1: Numeric (0 - 10) (07/17/19 0946)  Pain Intensity 1: 0 (07/17/19 0946)  Patient Stated Pain Goal: 0 (07/17/19 0700)  Pain Reassessment 1: Yes (07/15/19 0515)  Pain Onset 1: since admit (07/09/19 2000)  Pain Location 1: Throat (07/14/19 1018)  Pain Orientation 1: Posterior (07/10/19 0002)  Pain Description 1: Aching;Constant; Sore (07/10/19 0002)  Pain Intervention(s) 1: Medication (see MAR) (07/14/19 2130)     Objective:     Vitals:  Patient Vitals for the past 8 hrs:   BP Temp Pulse Resp SpO2   07/17/19 1000 -- 100 °F (37.8 °C) 73 14 97 %   07/17/19 0930 -- 99.8 °F (37.7 °C) 79 16 94 %   07/17/19 0900 -- 100 °F (37.8 °C) 85 17 97 %   07/17/19 0830 -- 100 °F (37.8 °C) 73 11 94 %   07/17/19 0800 -- 100 °F (37.8 °C) 90 19 94 %   07/17/19 0730 -- 100 °F (37.8 °C) 83 18 96 %   07/17/19 0700 (!) 233/77 99.8 °F (37.7 °C) 99 26 93 %   07/17/19 0630 -- 100 °F (37.8 °C) (!) 54 14 95 %   07/17/19 0615 -- 100.2 °F (37.9 °C) 90 21 93 %   07/17/19 0600 -- 100 °F (37.8 °C) 93 19 92 %   07/17/19 0545 -- 99.8 °F (37.7 °C) 85 14 93 %   07/17/19 0530 -- 99.8 °F (37.7 °C) 68 9 94 %   07/17/19 0515 -- 99.8 °F (37.7 °C) 77 17 93 %   07/17/19 0500 -- 99.8 °F (37.7 °C) (!) 56 11 96 %   07/17/19 0445 -- 99.6 °F (37.6 °C) (!) 57 12 99 %   07/17/19 0435 (!) 225/85 -- (!) 55 -- --   07/17/19 0430 -- 99.6 °F (37.6 °C) 64 12 95 %   07/17/19 0415 -- 99.5 °F (37.5 °C) (!) 58 11 97 %   07/17/19 0400 -- 99.5 °F (37.5 °C) (!) 59 14 97 %   07/17/19 0345 -- 99.5 °F (37.5 °C) (!) 53 13 97 %   07/17/19 0330 -- 99.5 °F (37.5 °C) (!) 58 16 95 %   07/17/19 0315 -- 99.5 °F (37.5 °C) (!) 54 11 97 %   07/17/19 0300 -- 99.5 °F (37.5 °C) (!) 58 12 97 %      Intake and Output:  07/15 1901 - 07/17 0700  In: 4438.7 [P.O.:1180;  I.V.:3258.7]  Out: 6425 [Urine:6050; Drains:375]    No Known Allergies  Current Facility-Administered Medications   Medication Dose Route Frequency    ceFAZolin (ANCEF) 2 g/20 mL in sterile water IV syringe  2 g IntraVENous Q8H    0.45% sodium chloride infusion  100 mL/hr IntraVENous CONTINUOUS    albuterol (PROVENTIL VENTOLIN) nebulizer solution 2.5 mg  2.5 mg Nebulization BID RT    sodium chloride 3% hypertonic nebulizer soln  1 mL Nebulization BID RT    acetaminophen (TYLENOL) tablet 650 mg  650 mg Oral Q4H PRN    ascorbic acid (vitamin C) (VITAMIN C) tablet 500 mg  500 mg Oral TID    aspirin tablet 325 mg  325 mg Oral DAILY    atorvastatin (LIPITOR) tablet 40 mg  40 mg Oral QHS    clopidogrel (PLAVIX) tablet 75 mg  75 mg Oral DAILY    famotidine (PEPCID) tablet 20 mg  20 mg Oral DAILY    ferrous sulfate 300 mg (60 mg iron)/5 mL oral syrup 300 mg  300 mg Oral TID    levETIRAcetam (KEPPRA) oral solution 500 mg  500 mg Oral Q12H    magnesium oxide (MAG-OX) tablet 400 mg  400 mg Oral BID    senna-docusate (PERICOLACE) 8.6-50 mg per tablet 2 Tab  2 Tab Oral QHS    ALPRAZolam (XANAX) tablet 0.5 mg  0.5 mg Oral Q6H PRN    magnesium hydroxide (MILK OF MAGNESIA) 400 mg/5 mL oral suspension 30 mL  30 mL Oral DAILY PRN    niMODipine (NIMOTOP) 30 mg/mL oral solution (compound) 60 mg  60 mg Oral Q4H    fentaNYL citrate (PF) injection 50 mcg  50 mcg IntraVENous Q1H PRN    polyvinyl alcohol (LIQUIFILM TEARS) 1.4 % ophthalmic solution 1 Drop  1 Drop Both Eyes PRN    heparin (porcine) injection 5,000 Units  5,000 Units SubCUTAneous Q8H    sodium chloride (NS) flush 30 mL  30 mL InterCATHeter Q8H    heparin (porcine) pf 900 Units  900 Units InterCATHeter Q8H    sodium chloride (NS) flush 30 mL  30 mL InterCATHeter PRN    heparin (porcine) pf 900 Units  900 Units InterCATHeter PRN    nicotine (NICODERM CQ) 21 mg/24 hr patch 1 Patch  1 Patch TransDERmal Q24H    ondansetron (ZOFRAN) injection 4 mg  4 mg IntraVENous Q6H PRN    NUTRITIONAL SUPPORT ELECTROLYTE PRN ORDERS   Does Not Apply PRN    magnesium sulfate 4 g/100 mL IVPB  4 g IntraVENous DAILY PRN    magnesium sulfate 2 g/50 ml IVPB (premix or compounded)  2 g IntraVENous DAILY PRN       Physical Exam:  Sleepy but arousable  Speech of low volume, delayed responses  Can tell me he is at [facility], says it is June and that he is in the hospital bc \" I have a hole in my head. \", knows its 2019  Easily cognitively fatigued  Right inattn    Functional Assessment:  Gross Assessment  AROM: Generally decreased, functional (07/12/19 1500)  PROM: Generally decreased, functional (07/12/19 1100)  Strength: Within functional limits (07/12/19 1500)  Coordination: Generally decreased, functional(B hands swelling) (07/12/19 1500)  Sensation: Intact(BUEs to light touch ) (07/12/19 1500)     Gait  Base of Support: Narrowed; Center of gravity altered (07/15/19 1500)  Speed/Aminata: Slow (07/16/19 1400)  Step Length: Right shortened;Left shortened (07/15/19 1500)  Gait Abnormalities: Decreased step clearance (07/16/19 1400)  Ambulation - Level of Assistance: Contact guard assistance (07/16/19 1400)  Distance (ft): 100 Feet (ft) (07/16/19 1400)  Assistive Device: Gait belt;Walker, rolling (07/16/19 1400)  Interventions: Safety awareness training;Verbal cues; Visual/Demos (07/15/19 1500)     Bed Mobility  Supine to Sit: Contact guard assistance (07/15/19 1500)  Scooting: Contact guard assistance (07/15/19 1500)     Balance  Sitting: Impaired (07/16/19 1400)  Sitting - Static: Good (unsupported) (07/16/19 1400)  Sitting - Dynamic: Fair (occasional) (07/16/19 1400)  Standing: Impaired (07/16/19 1400)  Standing - Static: Fair (07/16/19 1400)  Standing - Dynamic : Fair (07/16/19 1400)                       Bed/Mat Mobility  Supine to Sit: Contact guard assistance (07/15/19 1500)  Sit to Stand: Minimum assistance (07/16/19 1400)  Stand to Sit: Contact guard assistance (07/16/19 1400)  Bed to Chair: Contact guard assistance;Assist x2 (07/15/19 1500)  Scooting: Contact guard assistance (07/15/19 1500)     Labs/Studies:  Recent Results (from the past 72 hour(s))   CULTURE, CSF W GRAM STAIN    Collection Time: 07/14/19 12:51 PM   Result Value Ref Range    Special Requests: NO SPECIAL REQUESTS      GRAM STAIN 0 TO 5 WBC'S SEEN PER OIF     GRAM STAIN NO DEFINITE ORGANISM SEEN      Culture result: NO GROWTH 3 DAYS     GLUCOSE, CSF    Collection Time: 07/14/19 12:51 PM   Result Value Ref Range    Tube No. 1      Glucose,CSF 78 (H) 40 - 70 MG/DL   PROTEIN, CSF    Collection Time: 07/14/19 12:51 PM   Result Value Ref Range    Tube No. 1      Protein,CSF 71 (H) 15 - 45 MG/DL   CELL COUNT, BODY FLUID    Collection Time: 07/14/19 12:51 PM   Result Value Ref Range    BODY FLUID TYPE CEREBROSPINAL FLUID      FLUID COLOR LIGHT      FLUID APPEARANCE TURBID      FLUID RBC CT. 15,125 /cu mm    FLUID WBC COUNT 8 /cu mm   COLLECTION COMMENT    Collection Time: 07/14/19 12:51 PM   Result Value Ref Range    Collection Comment CSF BROUGHT TO LAB IN 1 SMALL SYRINGE    MAGNESIUM    Collection Time: 07/15/19  3:14 AM   Result Value Ref Range    Magnesium 2.8 (H) 1.8 - 2.4 mg/dL   CBC W/O DIFF    Collection Time: 07/15/19  3:14 AM   Result Value Ref Range    WBC 11.7 (H) 4.3 - 11.1 K/uL    RBC 2.66 (L) 4.23 - 5.6 M/uL    HGB 7.8 (L) 13.6 - 17.2 g/dL    HCT 25.0 (L) 41.1 - 50.3 %    MCV 94.0 79.6 - 97.8 FL    MCH 29.3 26.1 - 32.9 PG    MCHC 31.2 (L) 31.4 - 35.0 g/dL    RDW 13.8 11.9 - 14.6 %    PLATELET 947 902 - 667 K/uL    MPV 11.5 9.4 - 12.3 FL    ABSOLUTE NRBC 0.00 0.0 - 0.2 K/uL   METABOLIC PANEL, BASIC    Collection Time: 07/15/19  3:14 AM   Result Value Ref Range    Sodium 155 (H) 136 - 145 mmol/L    Potassium 3.7 3.5 - 5.1 mmol/L    Chloride 125 (H) 98 - 107 mmol/L    CO2 21 21 - 32 mmol/L    Anion gap 9 7 - 16 mmol/L    Glucose 147 (H) 65 - 100 mg/dL    BUN 52 (H) 6 - 23 MG/DL    Creatinine 2.86 (H) 0.8 - 1.5 MG/DL    GFR est AA 31 (L) >60 ml/min/1.73m2    GFR est non-AA 26 (L) >60 ml/min/1.73m2    Calcium 7.9 (L) 8.3 - 10.4 MG/DL   POC G3    Collection Time: 07/15/19  3:16 AM   Result Value Ref Range    Device: VENT      FIO2 (POC) 40 %    pH (POC) 7.364 7.35 - 7.45      pCO2 (POC) 34.1 (L) 35 - 45 MMHG    pO2 (POC) 209 (H) 75 - 100 MMHG    HCO3 (POC) 19.4 (L) 22 - 26 MMOL/L    sO2 (POC) 100 (H) 95 - 98 %    Base deficit (POC) 5 mmol/L    Mode BILEVEL      Set Rate 9 bpm    PEEP/CPAP (POC) 0 cmH2O    PIP (POC) 22      Allens test (POC) NOT APPLICABLE      Inspiratory Time High 6 sec    Inspiratory Time Low 0.6 sec    Site DRAWN FROM ARTERIAL LINE      Patient temp.  98.6      Specimen type (POC) ARTERIAL      Performed by Cali     CO2, POC 20 MMOL/L    Respiratory comment: NurseNotified     Exhaled minute volume 7.50 L/min    COLLECT TIME 315     VANCOMYCIN, TROUGH    Collection Time: 07/15/19  9:51 AM   Result Value Ref Range    Vancomycin,trough 22.3 (HH) 5 - 20 ug/mL   MAGNESIUM    Collection Time: 07/16/19  3:43 AM   Result Value Ref Range    Magnesium 2.9 (H) 1.8 - 2.4 mg/dL   CBC W/O DIFF    Collection Time: 07/16/19  3:43 AM   Result Value Ref Range    WBC 12.9 (H) 4.3 - 11.1 K/uL    RBC 2.64 (L) 4.23 - 5.6 M/uL    HGB 7.8 (L) 13.6 - 17.2 g/dL    HCT 24.3 (L) 41.1 - 50.3 %    MCV 92.0 79.6 - 97.8 FL    MCH 29.5 26.1 - 32.9 PG    MCHC 32.1 31.4 - 35.0 g/dL    RDW 13.4 11.9 - 14.6 %    PLATELET 054 393 - 349 K/uL    MPV 11.2 9.4 - 12.3 FL    ABSOLUTE NRBC 0.00 0.0 - 0.2 K/uL   METABOLIC PANEL, BASIC    Collection Time: 07/16/19  3:43 AM   Result Value Ref Range    Sodium 152 (H) 136 - 145 mmol/L    Potassium 3.9 3.5 - 5.1 mmol/L    Chloride 123 (H) 98 - 107 mmol/L    CO2 20 (L) 21 - 32 mmol/L    Anion gap 9 7 - 16 mmol/L    Glucose 91 65 - 100 mg/dL    BUN 53 (H) 6 - 23 MG/DL    Creatinine 2.83 (H) 0.8 - 1.5 MG/DL    GFR est AA 31 (L) >60 ml/min/1.73m2    GFR est non-AA 26 (L) >60 ml/min/1.73m2    Calcium 8.4 8.3 - 10.4 MG/DL   MAGNESIUM    Collection Time: 07/17/19  3:30 AM   Result Value Ref Range    Magnesium 2.8 (H) 1.8 - 2.4 mg/dL   CBC W/O DIFF    Collection Time: 07/17/19  3:30 AM   Result Value Ref Range    WBC 13.2 (H) 4.3 - 11.1 K/uL    RBC 2.70 (L) 4.23 - 5.6 M/uL    HGB 7.8 (L) 13.6 - 17.2 g/dL    HCT 24.3 (L) 41.1 - 50.3 %    MCV 90.0 79.6 - 97.8 FL    MCH 28.9 26.1 - 32.9 PG    MCHC 32.1 31.4 - 35.0 g/dL    RDW 12.9 11.9 - 14.6 %    PLATELET 306 101 - 418 K/uL    MPV 11.1 9.4 - 12.3 FL    ABSOLUTE NRBC 0.00 0.0 - 0.2 K/uL   METABOLIC PANEL, BASIC    Collection Time: 07/17/19  3:30 AM   Result Value Ref Range    Sodium 147 (H) 136 - 145 mmol/L    Potassium 3.5 3.5 - 5.1 mmol/L    Chloride 117 (H) 98 - 107 mmol/L    CO2 21 21 - 32 mmol/L    Anion gap 9 7 - 16 mmol/L    Glucose 94 65 - 100 mg/dL    BUN 54 (H) 6 - 23 MG/DL    Creatinine 3.00 (H) 0.8 - 1.5 MG/DL    GFR est AA 29 (L) >60 ml/min/1.73m2    GFR est non-AA 24 (L) >60 ml/min/1.73m2    Calcium 8.2 (L) 8.3 - 10.4 MG/DL        Assessment:     Principal Problem:    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)    Active Problems:    Tobacco abuse (7/10/2019)      Acute respiratory failure with hypoxia ([facility]) (7/10/2019)      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)      IVH (intraventricular hemorrhage) ([facility]) (7/10/2019)      Seizure ([facility]) (7/10/2019)      Elevated serum creatinine (7/10/2019)      Cerebral vasospasm (7/10/2019)      Pneumonia due to methicillin susceptible Staphylococcus aureus (MSSA) ([facility] (7/15/2019)        Plan:     Recommendations: Continue Acute Rehab Program  Coordination of rehab/medical care  Counseling of PM & R care issues management  Monitoring and management of medical conditions per plan of care/orders  Discussion with Family/Caregiver/Staff  Reviewed Therapies/Labs/Medications/Records

## 2019-07-17 NOTE — PROGRESS NOTES
Progress Note    Patient: Criselda Maya MRN: [id number]  SSN: [id number]    YOB: 1975  Age: 37 y.o. Sex: male      Admit Date: 7/9/2019    LOS: 8 days     Subjective:   Pt looks good this am, following commands. Ambulated with PT this am. Tolerating po. EVD @ 10/open and draining wo difficulty, CSF cx is neg. Cefazolin D2/10. Temps better with cefazolin vs Vanc.        Current Facility-Administered Medications   Medication Dose Route Frequency    ceFAZolin (ANCEF) 2 g/20 mL in sterile water IV syringe  2 g IntraVENous Q8H    0.45% sodium chloride infusion  100 mL/hr IntraVENous CONTINUOUS    albuterol (PROVENTIL VENTOLIN) nebulizer solution 2.5 mg  2.5 mg Nebulization BID RT    sodium chloride 3% hypertonic nebulizer soln  1 mL Nebulization BID RT    acetaminophen (TYLENOL) tablet 650 mg  650 mg Oral Q4H PRN    ascorbic acid (vitamin C) (VITAMIN C) tablet 500 mg  500 mg Oral TID    aspirin tablet 325 mg  325 mg Oral DAILY    atorvastatin (LIPITOR) tablet 40 mg  40 mg Oral QHS    clopidogrel (PLAVIX) tablet 75 mg  75 mg Oral DAILY    famotidine (PEPCID) tablet 20 mg  20 mg Oral DAILY    ferrous sulfate 300 mg (60 mg iron)/5 mL oral syrup 300 mg  300 mg Oral TID    levETIRAcetam (KEPPRA) oral solution 500 mg  500 mg Oral Q12H    magnesium oxide (MAG-OX) tablet 400 mg  400 mg Oral BID    senna-docusate (PERICOLACE) 8.6-50 mg per tablet 2 Tab  2 Tab Oral QHS    ALPRAZolam (XANAX) tablet 0.5 mg  0.5 mg Oral Q6H PRN    magnesium hydroxide (MILK OF MAGNESIA) 400 mg/5 mL oral suspension 30 mL  30 mL Oral DAILY PRN    niMODipine (NIMOTOP) 30 mg/mL oral solution (compound) 60 mg  60 mg Oral Q4H    fentaNYL citrate (PF) injection 50 mcg  50 mcg IntraVENous Q1H PRN    polyvinyl alcohol (LIQUIFILM TEARS) 1.4 % ophthalmic solution 1 Drop  1 Drop Both Eyes PRN    heparin (porcine) injection 5,000 Units  5,000 Units SubCUTAneous Q8H    sodium chloride (NS) flush 30 mL  30 mL InterCATHeter Q8H    heparin (porcine) pf 900 Units  900 Units InterCATHeter Q8H    sodium chloride (NS) flush 30 mL  30 mL InterCATHeter PRN    heparin (porcine) pf 900 Units  900 Units InterCATHeter PRN    nicotine (NICODERM CQ) 21 mg/24 hr patch 1 Patch  1 Patch TransDERmal Q24H    ondansetron (ZOFRAN) injection 4 mg  4 mg IntraVENous Q6H PRN    NUTRITIONAL SUPPORT ELECTROLYTE PRN ORDERS   Does Not Apply PRN    magnesium sulfate 4 g/100 mL IVPB  4 g IntraVENous DAILY PRN    magnesium sulfate 2 g/50 ml IVPB (premix or compounded)  2 g IntraVENous DAILY PRN       Objective:     Vitals:    07/17/19 1130 07/17/19 1200 07/17/19 1230 07/17/19 1300   BP:       Pulse: 81 79 75 72   Resp: 18 22 15 12   Temp: 99.6 °F (37.6 °C) 99.3 °F (37.4 °C) 98.9 °F (37.2 °C) 99.3 °F (37.4 °C)   SpO2: 98% 99% 98% 99%   Weight:       Height:             Intake and Output:  Current Shift: 07/17 0701 - 07/17 1900  In: 120 [P.O.:120]  Out: 1176 [Urine:1105; Drains:71]  Last 24 hr: 07/16 0701 - 07/17 0700  In: 9601 [P.O.:1060; I.V.:2117]  Out: 1165 [Urine:4360; Drains:184]     IO: -1367/24hrs  TOTAL: +9.2L overall  EVD: 184/24hrs. Physical Exam:   General:  Alert, cooperative, no distress, appears stated age. Eyes:   PERRL, EOMs intact. Neck: Supple, symmetrical, trachea midline, no adenopathy, thyroid: no enlargment/tenderness/nodules, no carotid bruit and no JVD. Lungs:   Clear to auscultation bilaterally. Heart:  Regular rate and rhythm, S1, S2 normal, no murmur, click, rub or gallop. Abdomen:   Soft, non-tender. Bowel sounds normal. No masses,  No organomegaly. Extremities: Extremities normal, atraumatic, no cyanosis or edema. Pulses: 2+ and symmetric all extremities. Skin: Skin color, texture, turgor normal. No rashes or lesions   Neurologic:  Extubated and tolerating well. Airway patent. Follows all commands. Lifted arms and legs off the bed.        Lab/Data Review:    Recent Results (from the past 12 hour(s)) MAGNESIUM    Collection Time: 07/17/19  3:30 AM   Result Value Ref Range    Magnesium 2.8 (H) 1.8 - 2.4 mg/dL   CBC W/O DIFF    Collection Time: 07/17/19  3:30 AM   Result Value Ref Range    WBC 13.2 (H) 4.3 - 11.1 K/uL    RBC 2.70 (L) 4.23 - 5.6 M/uL    HGB 7.8 (L) 13.6 - 17.2 g/dL    HCT 24.3 (L) 41.1 - 50.3 %    MCV 90.0 79.6 - 97.8 FL    MCH 28.9 26.1 - 32.9 PG    MCHC 32.1 31.4 - 35.0 g/dL    RDW 12.9 11.9 - 14.6 %    PLATELET 828 348 - 136 K/uL    MPV 11.1 9.4 - 12.3 FL    ABSOLUTE NRBC 0.00 0.0 - 0.2 K/uL   METABOLIC PANEL, BASIC    Collection Time: 07/17/19  3:30 AM   Result Value Ref Range    Sodium 147 (H) 136 - 145 mmol/L    Potassium 3.5 3.5 - 5.1 mmol/L    Chloride 117 (H) 98 - 107 mmol/L    CO2 21 21 - 32 mmol/L    Anion gap 9 7 - 16 mmol/L    Glucose 94 65 - 100 mg/dL    BUN 54 (H) 6 - 23 MG/DL    Creatinine 3.00 (H) 0.8 - 1.5 MG/DL    GFR est AA 29 (L) >60 ml/min/1.73m2    GFR est non-AA 24 (L) >60 ml/min/1.73m2    Calcium 8.2 (L) 8.3 - 10.4 MG/DL       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)    Active Problems:    Tobacco abuse (7/10/2019)      Acute respiratory failure with hypoxia ([facility]) (7/10/2019)      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 Q1 hour neuro checks. On SAH protocol - Mg, nimotop, zocor. PERRL +3. No MAP goal now. TCDs daily - . EVD set to 10cm H20. Last CT head showed improved communicating hydrocephalus. Last CTA head and neck showed no filling of the aneurysm and good filling of the PCAs.  Will continue SAH protocol, PT/OT. OOB to chair today. /. RESP: Pt weaned off vent this am and extubated, tolerating NC @ 4L well, will wean off oxygen as tolerated. +secretions, will monitor. CXRs have been clear. CV: NO MAP GOAL NOW.  2D Echo: 55-60%, trop peaked @ 0.8, trending down. SCD/SQ heparin. HEME: 7.8/24, . HIT panel negative. Started Fe and Vit C. NEPH: bun/cr: 52/2.8. Phos: 3.8, K+3.7. Fluids changed to 1/2 NS. GI: Pepcid. Senna. MOM given. Abdomen less distended. +BS. + BM. ST advanced to liquid diet/puree. ID: Tm: 100.0. Sputum cx sent, pending final, showing gram + cocci -  Patient has MSSA pneumonia. Blood cultures neg so far and UA neg. Cefazolin D2/10. CSF neg. LINES: RUE PICC, SHANNAN phelps. Tobacco abuse: On Nicoderm patch. Updated pt and father at bedside.          Signed By: Diana Whitmore NP     July 17, 2019

## 2019-07-17 NOTE — PROCEDURES
Transcranial Doppler    Transcranial Doppler studies are obtained and compared with the study of 710. Insonation was performed via the transtemporal window bilaterally and via the foramen magnum window    There has been no significant change in flow velocities in the left middle and anterior cerebral arteries. The right middle and anterior cerebral arteries remain normal.  Flow velocities in the vertebrobasilar circulation are normal    Left and right Lindegaard ratios are 2.4517 respectively.     Impression    No intracranial vascular spasm is present at this time

## 2019-07-17 NOTE — PROCEDURES
Transcranial Doppler    Transcranial Doppler studies were performed on this patient for the evaluation of intracranial vascular spasm related to subarachnoid hemorrhage and compared with previous studies    There has been an increase in peak systolic flow velocities in the left middle cerebral artery consistent with intracranial vascular spasm. The right middle cerebral artery flow velocities remain similar to the previous day. Flow velocities in the carotid siphon in the posterior cerebral artery and in the vertebral arteries remain physiologic.     Left Lindegaard ratio 2.94 right Lindegaard ratio 2.27 which remained within normal limits    Impression    Evolving intracranial vascular spasm in the middle cerebral artery on the right

## 2019-07-17 NOTE — PROGRESS NOTES
Bedside shift change report given to Rajat Nichols (oncoming nurse) by Tyrell Roe RN (offgoing nurse). Report included the following information SBAR, Kardex, ED Summary, OR Summary, Procedure Summary, Intake/Output, MAR, Recent Results and Cardiac Rhythm NSR.     Dual neuro assessment performed, NIH=1  External ventricular drain re-leveled, drainage serosanguinous

## 2019-07-17 NOTE — PROGRESS NOTES
Nutrition F/U:  Assessment:  Weight 85.2kg (ICU bed 7/16/19), edema - anasarca, 1+ non-pitting all extremities. The patient was extubated on 7/15 and his FT was removed at the same time. An oral diet was ordered after clearance by ST was obtained. Poor oral intake is noted since oral intake was resumed. Today, his breakfast intake consisted of 4 bites of eggs. Labs are remarkable for sodium 147, BUN 54 and creatinine 3. Last reported bowel movement was on 7/14/19. Enteral Access:   Removed on 7/15. Macronutrient Needs:  Estimated calorie needs - 2239-2734 jayce/day (25-28 jayce/kg/day)   Estimated protein needs - 69-83 gm pro/day (1-1.2 gm pro/kgIBW/day)   Max CHO/day - 266 gm CHO/day (55% jayce/day)   Fluid/day - 1.7-2 liters/day (1 ml/jayce/day)  Intake/Comparative Standards:  Minimal oral intake is documented. Calorie Count was started at breakfast today and will continue until Friday dinner if 3 days are needed to sufficiently document inadequate oral intake. Diagnosis:  Inadequate oral intake related to AMS as evidenced by intake reported as a few bites at each meal with the possibility of needing TF resumed. Intervention:   Meals and Snacks: Continue mechanical soft diet. Calorie Count. Mineral Supplement Therapy: Nutrition Support Orders/Electrolyte Management Replacement Protocols are active on the [facility] for potassium only. Coordination of Nutrition Care by a Nutrition Professional: AM ICU rounds, collaboration with Cheri Salinas RN. Nutrition Discharge Plan: Too soon to determine. Марина Sheehan.  [facility]  [phone]

## 2019-07-17 NOTE — PROCEDURES
Transcranial Doppler    Transcranial Doppler studies were performed utilizing a 2 MHz probe and utilizing the temporal and occipital windows. Flow velocities in the left middle cerebral artery demonstrates some degree of mild acceleration since study of yesterday. Similarly there has been a  increase in the right middle cerebral artery. The IDALIA velocities and internal carotid flow velocities at the siphon are unremarkable. The flow in the vertebrobasilar system likewise is normal antegrade flow and no change values is identified. The left and right Lindegaard ratios are 3.11 and 3.51.   Respectively    Impression    This  study demonstrates that there has been some increase in intracranial flow velocities since the study of 7/13/2019 and in the setting of subarachnoid hemorrhage is suggestive of cerebral vasospasm

## 2019-07-17 NOTE — PROCEDURES
TCD    Transcranial Doppler studies were performed utilizing a 2 MHz probe and utilizing the temporal and occipital windows. Flow in the left and right anterior cerebral artery is normal and is not significantly different from the study of 7/12/2019. Flow in the vertebrobasilar system likewise is normal antegrade flow and no change values is identified.     The left and right Lindegaard ratios are 1.58 and 1.15 respectively    Impression    This is a stable transcranial Doppler study no evidence of intracranial vascular spasm is identified

## 2019-07-17 NOTE — PROGRESS NOTES
Family at bedside discussing POC with NP. Marbella Izquierdo, notified family here. States she will be right up to see family.

## 2019-07-17 NOTE — PROGRESS NOTES
Problem: Mobility Impaired (Adult and Pediatric)  Goal: *Acute Goals and Plan of Care (Insert Text)  Description  STG:  (1.)Mr. Bernard Chavis will move from supine to sit and sit to supine , scoot up and down and roll side to side with CONTACT GUARD ASSIST within 3 treatment day(s). (2.)Mr. Bernard Chavis will transfer from bed to chair and chair to bed with CONTACT GUARD ASSIST using the least restrictive device within 3 treatment day(s). (3.)Mr. Bernard Chavis will ambulate with MINIMAL ASSIST for 50+ feet with the least restrictive device within 3 treatment day(s). (4.)Mr. Bernard Chavis will perform standing static and dynamic balance activities x 15 minutes with MINIMAL ASSIST to improve safety within 3 day(s). (5.)Mr. Bernard Chavis will maintain stable vital signs throughout all functional mobility within 3 days. LTG:  (1.)Mr. Bernard Chavis will move from supine to sit and sit to supine , scoot up and down and roll side to side in bed with SUPERVISION within 7 treatment day(s). (2.)Mr. Bernard Chavis will transfer from bed to chair and chair to bed with STAND BY ASSIST using the least restrictive device within 7 treatment day(s). (3.)Mr. Bernard Chavis will ambulate with STAND BY ASSIST for 75+ feet with the least restrictive device within 7 treatment day(s). (4.)Mr. Bernard Chavis will perform standing static and dynamic balance activities x 15 minutes with STAND BY ASSIST to improve safety within 7 day(s).   ________________________________________________________________________________________________     Outcome: Progressing Towards Goal     PHYSICAL THERAPY: Daily Note and AM 7/17/2019  INPATIENT: PT Visit Days : 3  Payor: SELF PAY / Plan: [facility] SELF PAY / Product Type: Self Pay /       NAME/AGE/GENDER: Shemar Rubio is a 37 y.o. 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 ASSESSMENT:     Mr. Bernard Chavis is a 37 y.o. Male admitted for subarachnoid hemorrhage in bed in ICU and agreeable to physical therapy treatment. Continues to be drowsy, but able to be awakened. Perseverating on water this therapy session. He transferred to sitting with additional time and CGA. He stood with minimal assist and ambulated requiring multiple standing rest breaks, moderate cues for posture and head position as well as walker negotiation. Assist x2 for line management (EVD and arterial line). Noted minimally increased instability and fatigue with further ambulation this AM. Able to identify objects including fire extinguisher on R side of hallway and able to state his room number after therapist pointed it out on the R. Improved  on R side of walker this AM with verbal cues. He is progressing well towards his goals. Will continue therapy efforts. This section established at most recent assessment   PROBLEM LIST (Impairments causing functional limitations):  1. Decreased Strength  2. Decreased ADL/Functional Activities  3. Decreased Transfer Abilities  4. Decreased Ambulation Ability/Technique  5. Decreased Balance  6. Decreased Activity Tolerance  7. Decreased Pacing Skills  8. Increased Shortness of Breath  9. Decreased Knowledge of Precautions  10. Decreased [place] with Home Exercise Program   INTERVENTIONS PLANNED: (Benefits and precautions of physical therapy have been discussed with the patient.)  1. Balance Exercise  2. Bed Mobility  3. Family Education  4. Gait Training  5. Home Exercise Program (HEP)  6. Therapeutic Activites  7. Therapeutic Exercise/Strengthening  8.  Transfer Training     TREATMENT PLAN: Frequency/Duration: 3 times a week for duration of hospital stay  Rehabilitation Potential For Stated Goals: Good     REHAB RECOMMENDATIONS (at time of discharge pending progress):    Placement: It is my opinion, based on this patient's performance to date, that Mr. Sepideh Bates may benefit from intensive therapy at an [address] [place] [address] after discharge due to a probable need for close medical supervision by a rehab physician, a probable need for 24 hour rehab nursing, a probable need for multiple therapy disciplines and potential to make ongoing and sustainable functional improvement that is of practical value. .  Equipment:    None at this time              HISTORY:   History of Present Injury/Illness (Reason for Referral):  Subarachnoid hemorrhage. Past Medical History/Comorbidities:   Mr. Sepideh Bates  has no past medical history on file. Mr. Sepideh Bates  has a past surgical history that includes ir emboli intracran lt (7/11/2019). Social History/Living Environment:   Home Environment: Private residence  One/Two Story Residence: Other (Comment)  Living Alone: No  Support Systems: Parent, Child(rohan)  Patient Expects to be Discharged to[de-identified] Unknown  Current DME Used/Available at Home: None  Prior Level of Function/Work/Activity:  Unsure, RN reports patient worked. Number of Personal Factors/Comorbidities that affect the Plan of Care: 1-2: MODERATE COMPLEXITY   EXAMINATION:   Most Recent Physical Functioning:   Gross Assessment:  AROM: Generally decreased, functional  PROM: Generally decreased, functional  Strength: Generally decreased, functional  Coordination: Generally decreased, functional               Posture:  Posture (WDL): Exceptions to WDL  Posture Assessment: Forward head  Balance:    Bed Mobility:     Wheelchair Mobility:     Transfers:     Gait:            Body Structures Involved:  1. Nerves  2. Heart  3. Lungs  4. Muscles Body Functions Affected:  1. Sensory/Pain  2. Voice and Speech  3. Cardio  4. Respiratory  5. Neuromusculoskeletal  6. Movement Related Activities and Participation Affected:  1. Learning and Applying Knowledge  2.  General Tasks and Demands  3. Communication  4. Mobility  5. Self Care  6. Domestic Life  7. Interpersonal Interactions and Relationships  8. Community, Social and [place]   Number of elements that affect the Plan of Care: 4+: HIGH COMPLEXITY   CLINICAL PRESENTATION:   Presentation: Evolving clinical presentation with changing clinical characteristics: MODERATE COMPLEXITY   CLINICAL DECISION MAKIN [facility] Mobility Inpatient Short Form  How much difficulty does the patient currently have. .. Unable A Lot A Little None   1. Turning over in bed (including adjusting bedclothes, sheets and blankets)? ? 1   ? 2   ? 3   ? 4   2. Sitting down on and standing up from a chair with arms ( e.g., wheelchair, bedside commode, etc.)   ? 1   ? 2   ? 3   ? 4   3. Moving from lying on back to sitting on the side of the bed?   ? 1   ? 2   ? 3   ? 4   How much help from another person does the patient currently need. .. Total A Lot A Little None   4. Moving to and from a bed to a chair (including a wheelchair)? ? 1   ? 2   ? 3   ? 4   5. Need to walk in hospital room? ? 1   ? 2   ? 3   ? 4   6. Climbing 3-5 steps with a railing? ? 1   ? 2   ? 3   ? 4   © , Trustees of [address] [place] [address] [address] [id number], under license to [employer]. All rights reserved      Score:  Initial: 10 Most Recent: X (Date: -- )    Interpretation of Tool:  Represents activities that are increasingly more difficult (i.e. Bed mobility, Transfers, Gait). Medical Necessity:     · Patient demonstrates good  ·  rehab potential due to higher previous functional level. Reason for Services/Other Comments:  · Patient continues to require modification of therapeutic interventions to increase complexity of exercises  · .    Use of outcome tool(s) and clinical judgement create a POC that gives a: Questionable prediction of patient's progress: MODERATE COMPLEXITY            TREATMENT:   (In addition to Assessment/Re-Assessment sessions the following treatments were rendered)   Pre-treatment Symptoms/Complaints:  none  Pain: Initial:   Pain Intensity 1: 0  Post Session:  0/10     Therapeutic Activity: (    25 minutes): Therapeutic activities including bed transfers, Chair transfers and Ambulation on level ground to improve mobility, strength, balance, coordination and activity tolerance . Required minimal assist to promote static and dynamic balance in standing and promote coordination of bilateral, lower extremity(s). Date:  7/15/19 Date:   Date:     Activity/Exercise Parameters Parameters Parameters   Seated heel raises x15 B A     Seated toe raises x15 B A     Seated LAQ x15 B A                               Braces/Orthotics/Lines/Etc:   · IV  · phelps catheter  · drain EVD  · arterial line  · O2 Nasal Cannula  Treatment/Session Assessment:    · Response to Treatment:  Patient maintained stable vitals  · Interdisciplinary Collaboration:   o Physical Therapist  o Registered Nurse  · After treatment position/precautions:   o Up in chair  o Bed alarm/tab alert on  o Bed/Chair-wheels locked  o Bed in low position  o Call light within reach  o RN notified  o lap belt placed   · Compliance with Program/Exercises: Compliant all of the time  · Recommendations/Intent for next treatment session: \"Next visit will focus on advancements to more challenging activities and reduction in assistance provided\".   Total Treatment Duration:  PT Patient Time In/Time Out  Time In: 1025  Time Out: Marry Dunbar 53., PT, DPT

## 2019-07-17 NOTE — PROGRESS NOTES
[employer] rep, here in unit attempting to meet with family to start paperwork for possible EMY/disability. Still no contact with family, not here currently. Other family members numbers checked by Avis Martin with paper chart. CM following. Pt up today with PT ambulating. CM continues to follow.

## 2019-07-17 NOTE — PROGRESS NOTES
Problem: Dysphagia (Adult)  Goal: *Acute Goals and Plan of Care (Insert Text)  Description  LTG: Patient will tolerate least restrictive diet without overt signs or symptoms of airway compromise. STG: Patient will tolerate po trials with speech therapy only without overt signs or symptoms of airway compromise. STG: Patient will participate in modified barium swallow study as clinically indicated. Outcome: Progressing Towards Goal     Problem: Neurolinguistics Impaired (Adult)  Goal: *Acute Goals and Plan of Care (Insert Text)  Description  STG: Patient will recall verbal information after a 3 minute delay with 80% accuracy and minimal assistance. STG: Patient will recall orientation information with 100% accuracy with minimal assistance. STG: Patient will participate in continued therapeutic assessment of cognitive-linguistic abilities. LTG: Patient will increase neuro-linguistic abilities to increase safety and awareness of deficits. Outcome: Progressing Towards Goal  SPEECH LANGUAGE PATHOLOGY: BEDSIDE SWALLOW AND SPEECH LANGUAGE NOTE: Initial Assessment    NAME/AGE/GENDER: Toshia Olivas is a 37 y.o. male  DATE: 7/17/2019  PRIMARY DIAGNOSIS: SA[facility] [I60.9]       ICD-10: Treatment Diagnosis: R13.12 Oropharyngeal Dysphagia. R.41.841 Cognitive-Communication Deficit    INTERDISCIPLINARY COLLABORATION: Physical Therapist, Registered Nurse and Physician  ASSESSMENT:   Based on the objective data described below, Mr. Malgorzata Huitron presents with mild oropharyngeal dysphagia and mild-moderate cognitive linguistic impairments. Swallowing Results: Patient more alert this AM during session. He consumed thin liquid via single cup and straw sips without overt s/sx of airway compromise. Mild impulsivity when independently consuming thin liquids via straw. Cough on 1/4 trials with serial straw sips. Educated patient on need for small, slow rate of intake.  He demonstrated ability to utilize strategy with minimal cues. All solids textures declined by patient this AM. Recommend mechanical soft diet/thin liquids. Slow rate of intake with liquids. Speech to continue following. Speech Language-Cognitive Results: Cognitive-linguistic assessment initiated this AM. Decreased endurance as he became increasingly delayed and lower volume of speech as session progressed. Impaired orientation- stating location as \"[place]\", only able to provide year for temporal orientation, and stated situation as \"I have a hole in my head\". He followed 1 step commands with 100% accuracy, but greatly delayed responses and need for repetition of prompts. Basic confrontational naming also 100%. Increased time to complete automatic speech tasks including VIDYA and MAUREEN, but all responses accuracy. He immediately recalled 3/3 unrelated words, but declined to 1/3 after a 3 minute delay. Clock drawing attempted. Decreased visual attention to right side of clock. He wrote numbers 1 and 2 vertically, then slightly moved toward right for 3. However, number was not in line with clock contour. No additional numbers placed. Poor working memory as he was unable to recall requested time and independently set hands at unspecified time. Session ultimately discontinued due to increasingly delayed response times and patient closing eyes as if he was fatigued. Patient reports he was previously independently, working full time prior to this hospitalization. He is functioning below his baseline status and will benefit from skilled speech therapy to address above mentioned deficits. ?????? ? ? This section established at most recent assessment??????????  PROBLEM LIST (Impairments causing functional limitations):  Oropharyngeal dysphagia  Cognitive-linguistic impairments  REHABILITATION POTENTIAL FOR STATED GOALS: Good  PLAN OF CARE:   Patient will benefit from skilled intervention to address the following impairments.   RECOMMENDATIONS AND PLANNED INTERVENTIONS (Benefits and precautions of therapy have been discussed with the patient.):  PO:  Mechanical soft  Liquids:  regular thin  MEDICATIONS:  With liquid  COMPENSATORY STRATEGIES/MODIFICATIONS INCLUDING:  Small sips and bites  Slow rate of intake   OTHER RECOMMENDATIONS (including follow up treatment recommendations):   Family training/education  Patient education  RECOMMENDED DIET MODIFICATIONS DISCUSSED WITH:  Medical Sub-Specialist  Nursing  Patient  FREQUENCY/DURATION: Continue to follow patient 3 times a week for duration of hospital stay to address above goals. RECOMMENDED REHABILITATION/EQUIPMENT: (at time of discharge pending progress): Due to the probability of continued deficits (see above) this patient will likely need continued skilled speech therapy after discharge. SUBJECTIVE:   Initially more alert, but fatigued during session  History of Present Injury/Illness: Mr. Debbi Pacheco  has no past medical history on file. Michaeljayesh Amor He also  has a past surgical history that includes ir emboli intracran lt (7/11/2019). Present Symptoms: oropharyngeal dysphagia   Pain Intensity 1: 0  Pain Location 1: Throat  Pain Orientation 1: Posterior  Pain Intervention(s) 1: Medication (see MAR)  Current Medications:   No current facility-administered medications on file prior to encounter. No current outpatient medications on file prior to encounter.    Current Dietary Status:  MS/nectar  Social History/Home Situation:    Home Environment: Private residence  One/Two Story Residence: Other (Comment)  Living Alone: No  Support Systems: Parent, Child(rohan)  Patient Expects to be Discharged to[de-identified] Unknown  Current DME Used/Available at Home: None  OBJECTIVE:   Respiratory Status:  Nasal cannula  2 l/min    Oral Motor Structure/Speech:  Oral-Motor Structure/Motor Speech  Labial: Decreased rate  Dentition: Intact  Oral Hygiene: Adequate  Lingual: Decreased rate    Cognitive and Communication Status:  Neurologic State: Alert  Orientation Level: Oriented to person  Cognition: Decreased attention/concentration; Follows commands  Perception: Appears intact  Perseveration: No perseveration noted       BEDSIDE SWALLOW EVALUATION  Oral Assessment:     P.O. Trials:  Patient Position: up in bed    The patient was given tsp-small sip amounts of the following:   Consistency Presented: Thin liquid  How Presented: Self-fed/presented;Cup/sip;Straw;Successive swallows    ORAL PHASE:  Bolus Acceptance: No impairment  Bolus Formation/Control: No impairment  Propulsion: No impairment     Oral Residue: None    PHARYNGEAL PHASE:  Initiation of Swallow: No impairment  Laryngeal Elevation: Functional  Aspiration Signs/Symptoms: Strong cough  Vocal Quality: No impairment           Pharyngeal Phase Characteristics: No impairment, issues, or problems     OTHER OBSERVATIONS:  Rate/bite size: Questionable  Comments:   Endurance: Questionable  Comments:     SPEECH-LANGUAGE COGNITIVE EVALUATION  Tests Given:Portions of Mini-cog and MOCA    Motor Speech: Auditory Comprehension:   Auditory Comprehension  Auditory Impairment: Yes(Delayed responses, but accurate)  Response to Basic Yes/No Questions (%): 100 %  Response to Moderately Complex Yes/No Questions (%): 100 %  One-Step Basic Commands (%): 100 %  Body Part Identification (%): 391 %  Interfering Components: Processing speed    Reading Comprehension:   Reading Comprehension  Interfering Components: Right neglect;Processing time; Attention to detail; Working memory    Verbal Expression:   Verbal Expression  Initiation: Impaired (%)(Delayed)  Automatic Speech Task: Impaired (comment)(VIDYA and MAUREEN 100%, but delayed responses (greater than 45 seconds to begin task))  Repetition: No impairment  Confrontation (%): 100 %    Neuro-Linguistics:       Memory Exercises  Digit/Word Span Length: 3/3 immedately, 1/3 after 3 minute delay.  Relative strengths in functional memory as he was able to recall details from coversation and events that occurred earlier in the day                      Vocal Quality: No impairment                               Assessment/Reassessment only, no treatment provided today    Tool Used: Dysphagia Outcome and Severity Scale (MIR)    Score Comments   Normal Diet  ? 7 With no strategies or extra time needed       Functional Swallow  ? 6 May have mild oral or pharyngeal delay       Mild Dysphagia    ? 5 Which may require one diet consistency restricted (those who demonstrate penetration which is entirely cleared on MBS would be included)   Mild-Moderate Dysphagia  ? 4 With 1-2 diet consistencies restricted       Moderate Dysphagia  ? 3 With 2 or more diet consistencies restricted       Moderately Severe Dysphagia  ? 2 With partial PO strategies (trials with ST only)       Severe Dysphagia  ? 1 With inability to tolerate any PO safely          Score:  Initial: 5 Most Recent: X (Date: -- )   Interpretation of Tool: The Dysphagia Outcome and Severity Scale (MIR) is a simple, easy-to-use, 7-point scale developed to systematically rate the functional severity of dysphagia based on objective assessment and make recommendations for diet level, independence level, and type of nutrition. Score 7 6 5 4 3 2 1   Modifier CH CI CJ CK CL CM CN     Payor: SELF PAY / Plan: [facility] SELF PAY / Product Type: Self Pay /     ________________________________________________________________________________________________    Safety:   After treatment position/precautions:  Upright in Bed  . Progression/Medical Necessity:   Patient is expected to demonstrate progress in diet tolerance   to improve swallow safety, work toward diet advancement and decrease aspiration risk  .   Patient is expected to demonstrate progress in expressive communication, receptive ability and cognitive ability   to decrease assistance required communication, increase independence with activities of daily living and increase communication with family/caregivers  . Compliance with Program/Exercises: Will assess as treatment progresses   Reason for Continuation of Services/Other Comments:  Patient continues to require skilled intervention due to dysphagia and cognitive-linguistic impairment   . Recommendations/Intent for next treatment session: \"Treatment next visit will focus on advancements to more challenging activities and reduction in assistance provided\".     Total Treatment Duration:  Time In: 0909  Time Out: [address] [facility], DEMETRIUS, CCC-SLP

## 2019-07-17 NOTE — PROCEDURES
Transcranial Doppler study    Transcranial Doppler studies were performed on this patient for the evaluation of intracranial vascular spasm. The transtemporal and foramen magnum windows were utilized for this exam patient. The study demonstrates the presence of antegrade flow in the intracranial vasculature. There are normal flow velocities present in the left and right middle and anterior cerebral arteries.   These are at the upper limit of normal    Flow velocities in the posterior circulation are normal    Left and right Lindegaard ratios are normal at 1.63 and 1.47    Impression normal baseline transcranial Doppler study in patient with subarachnoid hemorrhage

## 2019-07-17 NOTE — PROCEDURES
Transcranial Doppler    Transcranial Doppler studies were performed for evaluation of intracranial vascular spasm. 2 MHz probe standard transtemporal occipital windows were utilized. The left and right middle cerebral arteries demonstrated normal peak end-diastolic flow velocities. Anterior cerebral arteries were normal and antegrade flow vertebrobasilar system demonstrated normal flow.         Impression    Normal transcranial Doppler study

## 2019-07-17 NOTE — PROGRESS NOTES
A follow up visit was made to the patient. Emotional support, spiritual presence and   prayer were provided. The patient appeared to b sleeping.       [employer]

## 2019-07-17 NOTE — INTERDISCIPLINARY ROUNDS
Interdisciplinary team rounds were held 7/17/2019 with the following team members:Care Management, Nursing, Nurse Practitioner, Nutrition, Occupational Therapy, Palliative Care, Pastoral Care, Pharmacy, Physical Therapy, Physician, Respiratory Therapy and Clinical Coordinator and the patient. Plan of care discussed. See clinical pathway and/or care plan for interventions and desired outcomes.

## 2019-07-18 ENCOUNTER — APPOINTMENT (OUTPATIENT)
Dept: GENERAL RADIOLOGY | Age: 44
DRG: 020 | End: 2019-07-18
Attending: NURSE PRACTITIONER
Payer: COMMERCIAL

## 2019-07-18 LAB
ANION GAP SERPL CALC-SCNC: 11 MMOL/L (ref 7–16)
BUN SERPL-MCNC: 51 MG/DL (ref 6–23)
CALCIUM SERPL-MCNC: 7.9 MG/DL (ref 8.3–10.4)
CHLORIDE SERPL-SCNC: 111 MMOL/L (ref 98–107)
CO2 SERPL-SCNC: 21 MMOL/L (ref 21–32)
CREAT SERPL-MCNC: 2.93 MG/DL (ref 0.8–1.5)
ERYTHROCYTE [DISTWIDTH] IN BLOOD BY AUTOMATED COUNT: 12.5 % (ref 11.9–14.6)
GLUCOSE BLD STRIP.AUTO-MCNC: 93 MG/DL (ref 65–100)
GLUCOSE SERPL-MCNC: 94 MG/DL (ref 65–100)
HCT VFR BLD AUTO: 24.9 % (ref 41.1–50.3)
HGB BLD-MCNC: 8.2 G/DL (ref 13.6–17.2)
MAGNESIUM SERPL-MCNC: 2.8 MG/DL (ref 1.8–2.4)
MCH RBC QN AUTO: 29 PG (ref 26.1–32.9)
MCHC RBC AUTO-ENTMCNC: 32.9 G/DL (ref 31.4–35)
MCV RBC AUTO: 88 FL (ref 79.6–97.8)
NRBC # BLD: 0 K/UL (ref 0–0.2)
PLATELET # BLD AUTO: 236 K/UL (ref 150–450)
PMV BLD AUTO: 11.1 FL (ref 9.4–12.3)
POTASSIUM SERPL-SCNC: 2.1 MMOL/L (ref 3.5–5.1)
POTASSIUM SERPL-SCNC: 3.1 MMOL/L (ref 3.5–5.1)
POTASSIUM SERPL-SCNC: 3.2 MMOL/L (ref 3.5–5.1)
POTASSIUM SERPL-SCNC: 3.3 MMOL/L (ref 3.5–5.1)
POTASSIUM SERPL-SCNC: 3.4 MMOL/L (ref 3.5–5.1)
RBC # BLD AUTO: 2.83 M/UL (ref 4.23–5.6)
SODIUM SERPL-SCNC: 143 MMOL/L (ref 136–145)
WBC # BLD AUTO: 15.1 K/UL (ref 4.3–11.1)

## 2019-07-18 PROCEDURE — 99232 SBSQ HOSP IP/OBS MODERATE 35: CPT | Performed by: PHYSICAL MEDICINE & REHABILITATION

## 2019-07-18 PROCEDURE — 84132 ASSAY OF SERUM POTASSIUM: CPT

## 2019-07-18 PROCEDURE — 93886 INTRACRANIAL COMPLETE STUDY: CPT

## 2019-07-18 PROCEDURE — 74011000258 HC RX REV CODE- 258: Performed by: NURSE PRACTITIONER

## 2019-07-18 PROCEDURE — 83735 ASSAY OF MAGNESIUM: CPT

## 2019-07-18 PROCEDURE — 80048 BASIC METABOLIC PNL TOTAL CA: CPT

## 2019-07-18 PROCEDURE — 74011000250 HC RX REV CODE- 250: Performed by: NURSE PRACTITIONER

## 2019-07-18 PROCEDURE — 74011250636 HC RX REV CODE- 250/636: Performed by: NEUROLOGICAL SURGERY

## 2019-07-18 PROCEDURE — 74011250637 HC RX REV CODE- 250/637: Performed by: NURSE PRACTITIONER

## 2019-07-18 PROCEDURE — 77030010537 HC BG CSF DRN INLC -C

## 2019-07-18 PROCEDURE — 36600 WITHDRAWAL OF ARTERIAL BLOOD: CPT

## 2019-07-18 PROCEDURE — 74011250637 HC RX REV CODE- 250/637: Performed by: NEUROLOGICAL SURGERY

## 2019-07-18 PROCEDURE — 99232 SBSQ HOSP IP/OBS MODERATE 35: CPT | Performed by: NEUROLOGICAL SURGERY

## 2019-07-18 PROCEDURE — 97530 THERAPEUTIC ACTIVITIES: CPT

## 2019-07-18 PROCEDURE — 92507 TX SP LANG VOICE COMM INDIV: CPT

## 2019-07-18 PROCEDURE — 36415 COLL VENOUS BLD VENIPUNCTURE: CPT

## 2019-07-18 PROCEDURE — 82962 GLUCOSE BLOOD TEST: CPT

## 2019-07-18 PROCEDURE — 74011250636 HC RX REV CODE- 250/636: Performed by: NURSE PRACTITIONER

## 2019-07-18 PROCEDURE — 65610000001 HC ROOM ICU GENERAL

## 2019-07-18 PROCEDURE — 71045 X-RAY EXAM CHEST 1 VIEW: CPT

## 2019-07-18 PROCEDURE — 77030020257 HC SOL INJ SOD CL 0.45% 1000ML BG

## 2019-07-18 PROCEDURE — 74011250637 HC RX REV CODE- 250/637

## 2019-07-18 PROCEDURE — 94640 AIRWAY INHALATION TREATMENT: CPT

## 2019-07-18 PROCEDURE — 85027 COMPLETE CBC AUTOMATED: CPT

## 2019-07-18 PROCEDURE — 94669 MECHANICAL CHEST WALL OSCILL: CPT

## 2019-07-18 RX ORDER — POTASSIUM CHLORIDE 14.9 MG/ML
20 INJECTION INTRAVENOUS ONCE
Status: COMPLETED | OUTPATIENT
Start: 2019-07-18 | End: 2019-07-18

## 2019-07-18 RX ORDER — POTASSIUM CHLORIDE 14.9 MG/ML
20 INJECTION INTRAVENOUS ONCE
Status: COMPLETED | OUTPATIENT
Start: 2019-07-19 | End: 2019-07-19

## 2019-07-18 RX ADMIN — ACETAMINOPHEN 650 MG: 325 TABLET, FILM COATED ORAL at 23:58

## 2019-07-18 RX ADMIN — SENNOSIDES, DOCUSATE SODIUM 2 TABLET: 50; 8.6 TABLET, FILM COATED ORAL at 21:06

## 2019-07-18 RX ADMIN — MINERAL SUPPLEMENT IRON 300 MG / 5 ML STRENGTH LIQUID 100 PER BOX UNFLAVORED 300 MG: at 16:10

## 2019-07-18 RX ADMIN — Medication 2 G: at 21:39

## 2019-07-18 RX ADMIN — MAGNESIUM OXIDE TAB 400 MG (241.3 MG ELEMENTAL MG) 400 MG: 400 (241.3 MG) TAB at 17:35

## 2019-07-18 RX ADMIN — Medication 30 ML: at 14:59

## 2019-07-18 RX ADMIN — ALBUTEROL SULFATE 2.5 MG: 2.5 SOLUTION RESPIRATORY (INHALATION) at 07:31

## 2019-07-18 RX ADMIN — POTASSIUM CHLORIDE 20 MEQ: 200 INJECTION, SOLUTION INTRAVENOUS at 12:43

## 2019-07-18 RX ADMIN — ATORVASTATIN CALCIUM 40 MG: 40 TABLET, FILM COATED ORAL at 21:06

## 2019-07-18 RX ADMIN — POTASSIUM CHLORIDE 20 MEQ: 200 INJECTION, SOLUTION INTRAVENOUS at 23:58

## 2019-07-18 RX ADMIN — Medication 2 G: at 06:06

## 2019-07-18 RX ADMIN — NIMODIPINE 60 MG: 30 CAPSULE, LIQUID FILLED ORAL at 00:17

## 2019-07-18 RX ADMIN — FAMOTIDINE 20 MG: 20 TABLET ORAL at 08:27

## 2019-07-18 RX ADMIN — NIMODIPINE 60 MG: 30 CAPSULE, LIQUID FILLED ORAL at 04:19

## 2019-07-18 RX ADMIN — POTASSIUM CHLORIDE 20 MEQ: 200 INJECTION, SOLUTION INTRAVENOUS at 19:24

## 2019-07-18 RX ADMIN — Medication 30 ML: at 06:06

## 2019-07-18 RX ADMIN — OXYCODONE HYDROCHLORIDE AND ACETAMINOPHEN 500 MG: 500 TABLET ORAL at 21:06

## 2019-07-18 RX ADMIN — HEPARIN SODIUM 5000 UNITS: 5000 INJECTION INTRAVENOUS; SUBCUTANEOUS at 21:06

## 2019-07-18 RX ADMIN — MINERAL SUPPLEMENT IRON 300 MG / 5 ML STRENGTH LIQUID 100 PER BOX UNFLAVORED 300 MG: at 21:06

## 2019-07-18 RX ADMIN — Medication 30 ML: at 21:05

## 2019-07-18 RX ADMIN — SODIUM CHLORIDE 100 ML/HR: 450 INJECTION, SOLUTION INTRAVENOUS at 05:09

## 2019-07-18 RX ADMIN — ACETAMINOPHEN 650 MG: 325 TABLET, FILM COATED ORAL at 16:10

## 2019-07-18 RX ADMIN — POTASSIUM CHLORIDE 20 MEQ: 200 INJECTION, SOLUTION INTRAVENOUS at 05:09

## 2019-07-18 RX ADMIN — NIMODIPINE 60 MG: 30 CAPSULE, LIQUID FILLED ORAL at 08:26

## 2019-07-18 RX ADMIN — SODIUM CHLORIDE 30 MG/ML INHALATION SOLUTION 1 ML: 30 SOLUTION INHALANT at 07:31

## 2019-07-18 RX ADMIN — LEVETIRACETAM 500 MG: 100 SOLUTION ORAL at 08:27

## 2019-07-18 RX ADMIN — SODIUM CHLORIDE, PRESERVATIVE FREE 900 UNITS: 5 INJECTION INTRAVENOUS at 21:05

## 2019-07-18 RX ADMIN — SODIUM CHLORIDE 100 ML/HR: 450 INJECTION, SOLUTION INTRAVENOUS at 16:14

## 2019-07-18 RX ADMIN — SODIUM CHLORIDE, PRESERVATIVE FREE 900 UNITS: 5 INJECTION INTRAVENOUS at 14:58

## 2019-07-18 RX ADMIN — OXYCODONE HYDROCHLORIDE AND ACETAMINOPHEN 500 MG: 500 TABLET ORAL at 16:10

## 2019-07-18 RX ADMIN — ASPIRIN 325 MG ORAL TABLET 325 MG: 325 PILL ORAL at 08:27

## 2019-07-18 RX ADMIN — NIMODIPINE 60 MG: 30 CAPSULE, LIQUID FILLED ORAL at 19:23

## 2019-07-18 RX ADMIN — CLOPIDOGREL BISULFATE 75 MG: 75 TABLET ORAL at 08:27

## 2019-07-18 RX ADMIN — NIMODIPINE 60 MG: 30 CAPSULE, LIQUID FILLED ORAL at 12:03

## 2019-07-18 RX ADMIN — LEVETIRACETAM 500 MG: 100 SOLUTION ORAL at 21:06

## 2019-07-18 RX ADMIN — SODIUM CHLORIDE, PRESERVATIVE FREE 900 UNITS: 5 INJECTION INTRAVENOUS at 06:06

## 2019-07-18 RX ADMIN — ACETAMINOPHEN 650 MG: 325 TABLET, FILM COATED ORAL at 19:23

## 2019-07-18 RX ADMIN — ACETAMINOPHEN 650 MG: 325 TABLET, FILM COATED ORAL at 00:17

## 2019-07-18 RX ADMIN — MAGNESIUM OXIDE TAB 400 MG (241.3 MG ELEMENTAL MG) 400 MG: 400 (241.3 MG) TAB at 08:27

## 2019-07-18 RX ADMIN — HEPARIN SODIUM 5000 UNITS: 5000 INJECTION INTRAVENOUS; SUBCUTANEOUS at 14:58

## 2019-07-18 RX ADMIN — OXYCODONE HYDROCHLORIDE AND ACETAMINOPHEN 500 MG: 500 TABLET ORAL at 08:27

## 2019-07-18 RX ADMIN — Medication 2 G: at 14:22

## 2019-07-18 RX ADMIN — NIMODIPINE 60 MG: 30 CAPSULE, LIQUID FILLED ORAL at 16:40

## 2019-07-18 RX ADMIN — HEPARIN SODIUM 5000 UNITS: 5000 INJECTION INTRAVENOUS; SUBCUTANEOUS at 06:05

## 2019-07-18 RX ADMIN — MINERAL SUPPLEMENT IRON 300 MG / 5 ML STRENGTH LIQUID 100 PER BOX UNFLAVORED 300 MG: at 08:27

## 2019-07-18 RX ADMIN — ACETAMINOPHEN 650 MG: 325 TABLET, FILM COATED ORAL at 04:12

## 2019-07-18 NOTE — PROGRESS NOTES
Problem: Delirium Treatment  Goal: *Level of consciousness restored to baseline  Outcome: Progressing Towards Goal  Goal: *Level of environmental perceptions restored to baseline  Outcome: Progressing Towards Goal  Goal: *Sensory perception restored to baseline  Outcome: Progressing Towards Goal  Goal: *Emotional stability restored to baseline  Outcome: Progressing Towards Goal  Goal: *Functional assessment restored to baseline  Outcome: Progressing Towards Goal  Goal: *Absence of falls  Outcome: Progressing Towards Goal  Goal: *Will remain free of delirium, CAM Score negative  Outcome: Progressing Towards Goal  Goal: *Cognitive status will be restored to baseline  Outcome: Progressing Towards Goal  Goal: Interventions  Outcome: Progressing Towards Goal

## 2019-07-18 NOTE — PROGRESS NOTES
Problem: Dysphagia (Adult)  Goal: *Acute Goals and Plan of Care (Insert Text)  Description  LTG: Patient will tolerate least restrictive diet without overt signs or symptoms of airway compromise. STG: Patient will tolerate po trials with speech therapy only without overt signs or symptoms of airway compromise. STG: Patient will participate in modified barium swallow study as clinically indicated. Outcome: Progressing Towards Goal     Problem: Neurolinguistics Impaired (Adult)  Goal: *Acute Goals and Plan of Care (Insert Text)  Description  STG: Patient will recall verbal information after a 3 minute delay with 80% accuracy and minimal assistance. STG: Patient will recall orientation information with 100% accuracy with minimal assistance. STG: Patient will participate in continued therapeutic assessment of cognitive-linguistic abilities. LTG: Patient will increase neuro-linguistic abilities to increase safety and awareness of deficits. Outcome: Progressing Towards Goal  SPEECH LANGUAGE PATHOLOGY: BEDSIDE SWALLOW AND SPEECH LANGUAGE NOTE: Daily Note 1    NAME/AGE/GENDER: Haritha Cuevas is a 37 y.o. male  DATE: 7/18/2019  PRIMARY DIAGNOSIS: [facility] [I60.9]      ICD-10: Treatment Diagnosis: R13.12 Oropharyngeal Dysphagia. J.26.927 Cognitive-Communication Deficit    INTERDISCIPLINARY COLLABORATION: Registered Nurse  ASSESSMENT:   Swallowing: declined po trials, therefore swallowing not addressed this session. Recommend continue current diet of mechanical soft diet/thin liquids. Slow rate of intake with liquids. Speech to continue following. Speech Language-Cognitive: benefits from increased time due to significantly delayed responses. During concrete divergent naming, patient initially efficiently categorizing items into subcategories resulting in ability to name~8-10 items in first 30 seconds, however declined to only 1-2 items in remaining 30 seconds.    Decreased attention/working memory as patient required prompt to recall category during divergent naming task and demonstrated difficulty with digit repetition task at 5-digit sequences. Patient inverting sequence or adding numbers. Immediately recalled 4 unrelated items. With short delay (3 min), able to recall 3/4 words, however no recall of final item given category cue or multiple choice. Decreased reasoning during 2-item similarity task with patient verbalizing concrete rather than abstract responses. Patient will benefit from ongoing cognitive linguistic treatment to improve communication, safety, and independence. Will continue to follow. ?????? ? ? This section established at most recent assessment??????????  PROBLEM LIST (Impairments causing functional limitations):  1. Oropharyngeal dysphagia  2. Cognitive-linguistic impairments  REHABILITATION POTENTIAL FOR STATED GOALS: Good  PLAN OF CARE:   Patient will benefit from skilled intervention to address the following impairments. RECOMMENDATIONS AND PLANNED INTERVENTIONS (Benefits and precautions of therapy have been discussed with the patient.):  · PO:  Mechanical soft  · Liquids:  regular thin  MEDICATIONS:  · With liquid  COMPENSATORY STRATEGIES/MODIFICATIONS INCLUDING:  · Small sips and bites  · Slow rate of intake   OTHER RECOMMENDATIONS (including follow up treatment recommendations): · Family training/education  · Patient education  RECOMMENDED DIET MODIFICATIONS DISCUSSED WITH:  · Nursing  · Patient  FREQUENCY/DURATION: Continue to follow patient 3 times a week for duration of hospital stay to address above goals. RECOMMENDED REHABILITATION/EQUIPMENT: (at time of discharge pending progress): Due to the probability of continued deficits (see above) this patient will likely need continued skilled speech therapy after discharge. SUBJECTIVE:   Delayed responses.  Oriented x4  History of Present Injury/Illness: Mr. Nafisa Singleton  has no past medical history on file. Lizabeth Velasco He also  has a past surgical history that includes ir emboli intracran lt (7/11/2019). Present Symptoms: oropharyngeal dysphagia   Pain Intensity 1: 0  Pain Location 1: Throat  Pain Orientation 1: Posterior  Pain Intervention(s) 1: Medication (see MAR)  Current Medications:   No current facility-administered medications on file prior to encounter. No current outpatient medications on file prior to encounter. Current Dietary Status:  MS/nectar  Social History/Home Situation:    Home Environment: Private residence  One/Two Story Residence: Other (Comment)  Living Alone: No  Support Systems: Parent, Child(rohan)  Patient Expects to be Discharged to[de-identified] Unknown  Current DME Used/Available at Home: None  OBJECTIVE:   Respiratory Status:  Room air       Oral Motor Structure/Speech:  Oral-Motor Structure/Motor Speech  Labial: Decreased rate  Dentition: Intact  Oral Hygiene: Adequate  Lingual: Decreased rate    Cognitive and Communication Status:  Neurologic State: Sleeping;Eyes open to voice  Orientation Level: Oriented to person;Oriented to place;Oriented to situation;Disoriented to time  Cognition: Follows commands;Decreased attention/concentration;Recognition of people/places             BEDSIDE SWALLOW EVALUATION  Oral Assessment:     P.O. Trials: The patient was given tsp-small sip amounts of the following:           ORAL PHASE:                   PHARYNGEAL PHASE:                            OTHER OBSERVATIONS:  Rate/bite size: Questionable  Comments:   Endurance: Questionable  Comments:     SPEECH-LANGUAGE COGNITIVE TREATMENT  Motor Speech:        Auditory Comprehension:        Reading Comprehension:        Verbal Expression:   Verbal Expression  Naming: Impaired  Divergent (%): (8-12 items)  Responsive (%): (1/2)    Neuro-Linguistics:             Attention Exercises Performed  Task: (digit repetition)  Accuracy (%): (5 digits 0/2)                                                Cognitive Skills Activities: Activities/Procedures listed utilized to improve and/or restore cognitive function as related to attention to tasks, problem solving skills, memory, sequencing and thought organization. Required minimal verbal and tactile cueing to improve improve recall of information and perform graded activities focusing on attention skills. Tool Used: Dysphagia Outcome and Severity Scale (MIR)    Score Comments   Normal Diet  ? 7 With no strategies or extra time needed       Functional Swallow  ? 6 May have mild oral or pharyngeal delay       Mild Dysphagia    ? 5 Which may require one diet consistency restricted (those who demonstrate penetration which is entirely cleared on MBS would be included)   Mild-Moderate Dysphagia  ? 4 With 1-2 diet consistencies restricted       Moderate Dysphagia  ? 3 With 2 or more diet consistencies restricted       Moderately Severe Dysphagia  ? 2 With partial PO strategies (trials with ST only)       Severe Dysphagia  ? 1 With inability to tolerate any PO safely          Score:  Initial: 5 Most Recent: X (Date: -- )   Interpretation of Tool: The Dysphagia Outcome and Severity Scale (MIR) is a simple, easy-to-use, 7-point scale developed to systematically rate the functional severity of dysphagia based on objective assessment and make recommendations for diet level, independence level, and type of nutrition. Score 7 6 5 4 3 2 1   Modifier CH CI CJ CK CL CM CN     Payor: BLUE CROSS / Plan: [place] BLUE CR[place] [facility] / Product Type: PPO /     ________________________________________________________________________________________________    Safety:   After treatment position/precautions:  · Up in chair  · RN notified  .   Progression/Medical Necessity:   · Patient is expected to demonstrate progress in diet tolerance  ·  to improve swallow safety, work toward diet advancement and decrease aspiration risk  · .  · Patient is expected to demonstrate progress in expressive communication, receptive ability and cognitive ability  ·  to decrease assistance required communication, increase independence with activities of daily living and increase communication with family/caregivers  · . Compliance with Program/Exercises: Will assess as treatment progresses   Reason for Continuation of Services/Other Comments:  · Patient continues to require skilled intervention due to dysphagia and cognitive-linguistic impairment   · . Recommendations/Intent for next treatment session: \"Treatment next visit will focus on advancements to more challenging activities and reduction in assistance provided\".     Total Treatment Duration:  Time In: 1506  Time Out: Jose Waller, [facility], [facility] KAMI VALENTE, CF-SLP

## 2019-07-18 NOTE — PROGRESS NOTES
Progress Note    Patient: Lucia Villegas MRN: [id number]  SSN: [id number]    YOB: 1975  Age: 37 y.o. Sex: male      Admit Date: 7/9/2019    LOS: 9 days     Subjective:   Pt looks good this am, following commands. EVD @ 10/open and draining wo difficulty.      Current Facility-Administered Medications   Medication Dose Route Frequency    potassium chloride 20 mEq in 100 ml IVPB  20 mEq IntraVENous ONCE    ceFAZolin (ANCEF) 2 g/20 mL in sterile water IV syringe  2 g IntraVENous Q8H    0.45% sodium chloride infusion  100 mL/hr IntraVENous CONTINUOUS    albuterol (PROVENTIL VENTOLIN) nebulizer solution 2.5 mg  2.5 mg Nebulization BID RT    sodium chloride 3% hypertonic nebulizer soln  1 mL Nebulization BID RT    acetaminophen (TYLENOL) tablet 650 mg  650 mg Oral Q4H PRN    ascorbic acid (vitamin C) (VITAMIN C) tablet 500 mg  500 mg Oral TID    aspirin tablet 325 mg  325 mg Oral DAILY    atorvastatin (LIPITOR) tablet 40 mg  40 mg Oral QHS    clopidogrel (PLAVIX) tablet 75 mg  75 mg Oral DAILY    famotidine (PEPCID) tablet 20 mg  20 mg Oral DAILY    ferrous sulfate 300 mg (60 mg iron)/5 mL oral syrup 300 mg  300 mg Oral TID    levETIRAcetam (KEPPRA) oral solution 500 mg  500 mg Oral Q12H    magnesium oxide (MAG-OX) tablet 400 mg  400 mg Oral BID    senna-docusate (PERICOLACE) 8.6-50 mg per tablet 2 Tab  2 Tab Oral QHS    ALPRAZolam (XANAX) tablet 0.5 mg  0.5 mg Oral Q6H PRN    magnesium hydroxide (MILK OF MAGNESIA) 400 mg/5 mL oral suspension 30 mL  30 mL Oral DAILY PRN    niMODipine (NIMOTOP) 30 mg/mL oral solution (compound) 60 mg  60 mg Oral Q4H    fentaNYL citrate (PF) injection 50 mcg  50 mcg IntraVENous Q1H PRN    polyvinyl alcohol (LIQUIFILM TEARS) 1.4 % ophthalmic solution 1 Drop  1 Drop Both Eyes PRN    heparin (porcine) injection 5,000 Units  5,000 Units SubCUTAneous Q8H    sodium chloride (NS) flush 30 mL  30 mL InterCATHeter Q8H    heparin (porcine) pf 900 Units  900 Units InterCATHeter Q8H    sodium chloride (NS) flush 30 mL  30 mL InterCATHeter PRN    heparin (porcine) pf 900 Units  900 Units InterCATHeter PRN    nicotine (NICODERM CQ) 21 mg/24 hr patch 1 Patch  1 Patch TransDERmal Q24H    ondansetron (ZOFRAN) injection 4 mg  4 mg IntraVENous Q6H PRN    NUTRITIONAL SUPPORT ELECTROLYTE PRN ORDERS   Does Not Apply PRN    magnesium sulfate 4 g/100 mL IVPB  4 g IntraVENous DAILY PRN    magnesium sulfate 2 g/50 ml IVPB (premix or compounded)  2 g IntraVENous DAILY PRN       Objective:     Vitals:    07/18/19 1045 07/18/19 1100 07/18/19 1115 07/18/19 1200   BP:       Pulse: 74 73 70 76   Resp: 13 (!) 35 16 30   Temp:   98.7 °F (37.1 °C) 98.7 °F (37.1 °C)   SpO2: 100% 100% 99% 99%   Weight:       Height:             Intake and Output:  Current Shift: 07/18 0701 - 07/18 1900  In: 120 [P.O.:120]  Out: 1280 [Urine:1215; Drains:65]  Last 24 hr: 07/17 0701 - 07/18 0700  In: 3607 [P.O.:1920; I.V.:2566]  Out: 0492 [Urine:4795; Drains:300]     IO: +2490/24hrs  TOTAL: +13L overall  EVD: 295/24hrs. IO: -1717/24hr  TOTAL OVERALL: +12.9L  EVD: 322/24hrs    Physical Exam:   General:  Alert, cooperative, no distress, appears stated age. Eyes:   PERRL, EOMs intact. Neck: Supple, symmetrical, trachea midline, no adenopathy, thyroid: no enlargment/tenderness/nodules, no carotid bruit and no JVD. Lungs:   Clear to auscultation bilaterally. Heart:  Regular rate and rhythm, S1, S2 normal, no murmur, click, rub or gallop. Abdomen:   Soft, non-tender. Bowel sounds normal. No masses,  No organomegaly. Extremities: Extremities normal, atraumatic, no cyanosis or edema. Pulses: 2+ and symmetric all extremities. Skin: Skin color, texture, turgor normal. No rashes or lesions   Neurologic:  Extubated and tolerating well. Airway patent. Follows all commands. Lifted arms and legs off the bed.        Lab/Data Review:    Recent Results (from the past 12 hour(s))   MAGNESIUM    Collection Time: 07/18/19  4:10 AM   Result Value Ref Range    Magnesium 2.8 (H) 1.8 - 2.4 mg/dL   CBC W/O DIFF    Collection Time: 07/18/19  4:10 AM   Result Value Ref Range    WBC 15.1 (H) 4.3 - 11.1 K/uL    RBC 2.83 (L) 4.23 - 5.6 M/uL    HGB 8.2 (L) 13.6 - 17.2 g/dL    HCT 24.9 (L) 41.1 - 50.3 %    MCV 88.0 79.6 - 97.8 FL    MCH 29.0 26.1 - 32.9 PG    MCHC 32.9 31.4 - 35.0 g/dL    RDW 12.5 11.9 - 14.6 %    PLATELET 253 383 - 630 K/uL    MPV 11.1 9.4 - 12.3 FL    ABSOLUTE NRBC 0.00 0.0 - 0.2 K/uL   METABOLIC PANEL, BASIC    Collection Time: 07/18/19  4:10 AM   Result Value Ref Range    Sodium 143 136 - 145 mmol/L    Potassium 3.3 (L) 3.5 - 5.1 mmol/L    Chloride 111 (H) 98 - 107 mmol/L    CO2 21 21 - 32 mmol/L    Anion gap 11 7 - 16 mmol/L    Glucose 94 65 - 100 mg/dL    BUN 51 (H) 6 - 23 MG/DL    Creatinine 2.93 (H) 0.8 - 1.5 MG/DL    GFR est AA 30 (L) >60 ml/min/1.73m2    GFR est non-AA 25 (L) >60 ml/min/1.73m2    Calcium 7.9 (L) 8.3 - 10.4 MG/DL   POTASSIUM    Collection Time: 07/18/19 10:58 AM   Result Value Ref Range    Potassium 2.1 (LL) 3.5 - 5.1 mmol/L   POTASSIUM    Collection Time: 07/18/19 11:54 AM   Result Value Ref Range    Potassium 3.1 (L) 3.5 - 5.1 mmol/L       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)    Active Problems:    Tobacco abuse (7/10/2019)      Acute respiratory failure with hypoxia (HCC) (7/10/2019)      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 Q1 hour neuro checks. On SAH protocol - Mg, nimotop, zocor. PERRL +3.  No MAP goal now. TCDs daily - they have been 180-220 in the left and right MCAs . EVD set to 10cm H20. Last CT head showed improved communicating hydrocephalus. Last CTA head and neck showed no filling of the aneurysm and good filling of the PCAs. Will continue [facility] protocol, PT/OT. OOB to chair today. /. CT head, CTA/CTP head in am to assess vasospasm and hydrocephalus. RESP: Tolerating RA well. Will dc CPT/breathing tx for now. CXR showed some edema. Will hold off on diuresis until we see how the vessels look tomorrow. CV: NO MAP GOAL NOW.  2D Echo: 55-60%, trop peaked @ 0.8, trending down. SCD/SQ heparin. HEME: 8.2/24,  HIT panel negative. HH continues to drift down. Started Fe and Vit C. If it drifts down again, we will do CTA abdomen and pelvis. NEPH: bun/cr: 51/2.9. K+3.3. Fluids changed to 1/2 NS. GI: Pepcid. Senna. MOM given. Abdomen less distended. +BS. + BM. ST advanced to puree diet today. ID: Tm: 100. Sputum cx sent, pending final, showing gram + cocci -  Patient has MSSA pneumonia. Blood cultures neg so far and UA neg. US neg for DVT. Pt on Cefazolin D3/10. Fevers better. LINES: RUE PICC, SHANNAN phelps EVD. Tobacco abuse: On Nicoderm patch.         Signed By: Samira Davis NP     July 18, 2019

## 2019-07-18 NOTE — PROGRESS NOTES
Problem: Mobility Impaired (Adult and Pediatric)  Goal: *Acute Goals and Plan of Care (Insert Text)  Description  STG:  (1.)Mr. Deanna Cox will move from supine to sit and sit to supine , scoot up and down and roll side to side with CONTACT GUARD ASSIST within 3 treatment day(s). (2.)Mr. Deanna Cox will transfer from bed to chair and chair to bed with CONTACT GUARD ASSIST using the least restrictive device within 3 treatment day(s). (3.)Mr. Deanna Cox will ambulate with MINIMAL ASSIST for 50+ feet with the least restrictive device within 3 treatment day(s). (4.)Mr. Deanna Cox will perform standing static and dynamic balance activities x 15 minutes with MINIMAL ASSIST to improve safety within 3 day(s). (5.)Mr. Deanna Cox will maintain stable vital signs throughout all functional mobility within 3 days. LTG:  (1.)Mr. Deanna Cox will move from supine to sit and sit to supine , scoot up and down and roll side to side in bed with SUPERVISION within 7 treatment day(s). (2.)Mr. Deanna Cox will transfer from bed to chair and chair to bed with STAND BY ASSIST using the least restrictive device within 7 treatment day(s). (3.)Mr. Deanna Cox will ambulate with STAND BY ASSIST for 75+ feet with the least restrictive device within 7 treatment day(s). (4.)Mr. Deanna Cox will perform standing static and dynamic balance activities x 15 minutes with STAND BY ASSIST to improve safety within 7 day(s).   ________________________________________________________________________________________________     Outcome: Progressing Towards Goal     PHYSICAL THERAPY: Daily Note and AM 7/18/2019  INPATIENT: PT Visit Days : 5  Payor: Sophy Owen / Plan: [facility] / Product Type: PPO /       NAME/AGE/GENDER: Faustino Koo is a 37 y.o. 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 ASSESSMENT:     Mr. Jd Gonzales is a 37 y.o. Male admitted for subarachnoid hemorrhage in bed in ICU and agreeable to physical therapy treatment. Continues to be drowsy, but able to be awakened. Seems sad today and when asked why he's down, he states it is his son's 8th birthday. He required additional time for all mobility this AM, very slow gait pattern with continued decreased R side awareness. Very slow gait tiffanie with heavy hand use use on rolling walker. Increased risk of falling, especially as he fatigues requires more assist for ambulation with increased instability noted of RLE with fatigue. Chair follow for safety and patient required multiple rest breaks during ambulation due to fatigue. Requires frequent cues for posture, balance, r hand use of rolling walker. He is progressing well towards his goals, however he is still well below his baseline. Will continue therapy efforts. This section established at most recent assessment   PROBLEM LIST (Impairments causing functional limitations):  1. Decreased Strength  2. Decreased ADL/Functional Activities  3. Decreased Transfer Abilities  4. Decreased Ambulation Ability/Technique  5. Decreased Balance  6. Decreased Activity Tolerance  7. Decreased Pacing Skills  8. Increased Shortness of Breath  9. Decreased Knowledge of Precautions  10. Decreased Huffman with Home Exercise Program   INTERVENTIONS PLANNED: (Benefits and precautions of physical therapy have been discussed with the patient.)  1. Balance Exercise  2. Bed Mobility  3. Family Education  4. Gait Training  5. Home Exercise Program (HEP)  6. Therapeutic Activites  7. Therapeutic Exercise/Strengthening  8.  Transfer Training     TREATMENT PLAN: Frequency/Duration: 3 times a week for duration of hospital stay  Rehabilitation Potential For Stated Goals: Good     REHAB RECOMMENDATIONS (at time of discharge pending progress):    Placement: It is my opinion, based on this patient's performance to date, that Mr. Loi Saul may benefit from intensive therapy at an [address] [place] [address] after discharge due to a probable need for close medical supervision by a rehab physician, a probable need for 24 hour rehab nursing, a probable need for multiple therapy disciplines and potential to make ongoing and sustainable functional improvement that is of practical value. .  Equipment:    None at this time              HISTORY:   History of Present Injury/Illness (Reason for Referral):  Subarachnoid hemorrhage. Past Medical History/Comorbidities:   Mr. Loi Saul  has no past medical history on file. Mr. Loi Saul  has a past surgical history that includes ir emboli intracran lt (7/11/2019). Social History/Living Environment:   Home Environment: Private residence  One/Two Story Residence: Other (Comment)  Living Alone: No  Support Systems: Parent, Child(rohan)  Patient Expects to be Discharged to[de-identified] Unknown  Current DME Used/Available at Home: None  Prior Level of Function/Work/Activity:  Unsure, RN reports patient worked. Number of Personal Factors/Comorbidities that affect the Plan of Care: 1-2: MODERATE COMPLEXITY   EXAMINATION:   Most Recent Physical Functioning:   Gross Assessment:  AROM: Generally decreased, functional  PROM: Generally decreased, functional  Strength: Generally decreased, functional  Coordination: Generally decreased, functional               Posture:  Posture (WDL): Exceptions to WDL  Posture Assessment:  Forward head  Balance:  Sitting: Impaired  Sitting - Static: Good (unsupported)  Sitting - Dynamic: Fair (occasional)  Standing: Impaired  Standing - Static: Fair  Standing - Dynamic : Fair Bed Mobility:  Supine to Sit: Minimum assistance;Assist x2  Scooting: Minimum assistance  Wheelchair Mobility:     Transfers:  Sit to Stand: Minimum assistance;Assist x2  Stand to Sit: Minimum assistance  Gait:     Speed/Aminata: Slow  Gait Abnormalities: Decreased step clearance;Trunk sway increased; Path deviations  Distance (ft): 50 Feet (ft)(multiple bouts with standing rest breaks required and slow)  Assistive Device: Gait belt;Walker, rolling  Ambulation - Level of Assistance: Contact guard assistance;Minimal assistance  Interventions: Safety awareness training;Manual cues; Visual/Demos; Verbal cues      Body Structures Involved:  1. Nerves  2. Heart  3. Lungs  4. Muscles Body Functions Affected:  1. Sensory/Pain  2. Voice and Speech  3. Cardio  4. Respiratory  5. Neuromusculoskeletal  6. Movement Related Activities and Participation Affected:  1. Learning and Applying Knowledge  2. General Tasks and Demands  3. Communication  4. Mobility  5. Self Care  6. Domestic Life  7. Interpersonal Interactions and Relationships  8. Community, Social and [place]   Number of elements that affect the Plan of Care: 4+: HIGH COMPLEXITY   CLINICAL PRESENTATION:   Presentation: Evolving clinical presentation with changing clinical characteristics: MODERATE COMPLEXITY   CLINICAL DECISION MAKIN [facility] Mobility Inpatient Short Form  How much difficulty does the patient currently have. .. Unable A Lot A Little None   1. Turning over in bed (including adjusting bedclothes, sheets and blankets)? ? 1   ? 2   ? 3   ? 4   2. Sitting down on and standing up from a chair with arms ( e.g., wheelchair, bedside commode, etc.)   ? 1   ? 2   ? 3   ? 4   3. Moving from lying on back to sitting on the side of the bed?   ? 1   ? 2   ? 3   ? 4   How much help from another person does the patient currently need. .. Total A Lot A Little None   4. Moving to and from a bed to a chair (including a wheelchair)? ? 1   ? 2   ? 3   ? 4   5. Need to walk in hospital room? ? 1   ? 2   ? 3   ? 4   6. Climbing 3-5 steps with a railing?    ? 1   ? 2   ? 3 ? 4   © 2007, Trustees of [facility] [employer], under license to [employer]. All rights reserved      Score:  Initial: 10 Most Recent: X (Date: -- )    Interpretation of Tool:  Represents activities that are increasingly more difficult (i.e. Bed mobility, Transfers, Gait). Medical Necessity:     · Patient demonstrates good  ·  rehab potential due to higher previous functional level. Reason for Services/Other Comments:  · Patient continues to require modification of therapeutic interventions to increase complexity of exercises  · . Use of outcome tool(s) and clinical judgement create a POC that gives a: Questionable prediction of patient's progress: MODERATE COMPLEXITY            TREATMENT:   (In addition to Assessment/Re-Assessment sessions the following treatments were rendered)   Pre-treatment Symptoms/Complaints:  none  Pain: Initial:   Pain Intensity 1: 0  Post Session:  0/10     Therapeutic Activity: (    38 minutes): Therapeutic activities including bed transfers, Chair transfers and Ambulation on level ground to improve mobility, strength, balance, coordination and activity tolerance . Required minimal assist to promote static and dynamic balance in standing and promote coordination of bilateral, lower extremity(s).         Date:  7/15/19 Date:   Date:     Activity/Exercise Parameters Parameters Parameters   Seated heel raises x15 B A     Seated toe raises x15 B A     Seated LAQ x15 B A                               Braces/Orthotics/Lines/Etc:   · IV  · phelps catheter  · drain EVD  · arterial line  · O2 Nasal Cannula  Treatment/Session Assessment:    · Response to Treatment:  Patient maintained stable vitals  · Interdisciplinary Collaboration:   o Physical Therapist  o Registered Nurse  · After treatment position/precautions:   o Up in chair  o Bed alarm/tab alert on  o Bed/Chair-wheels locked  o Bed in low position  o Call light within reach  o RN notified   · Compliance with Program/Exercises: Compliant all of the time  · Recommendations/Intent for next treatment session: \"Next visit will focus on advancements to more challenging activities and reduction in assistance provided\".   Total Treatment Duration:  PT Patient Time In/Time Out  Time In: 0959  Time Out: [id number], PT, DPT

## 2019-07-18 NOTE — PROGRESS NOTES
Bedside shift change report given to Deep Mack RN (oncoming nurse) by Osiel Wakefield RN (offgoing nurse). Report included the following information SBAR, Kardex, ED Summary, OR Summary, Procedure Summary, Intake/Output, MAR, Recent Results and Cardiac Rhythm SR.     Dual neuro assessment performed

## 2019-07-18 NOTE — PROGRESS NOTES
PM&R Consult Progress Note      Patient: Kayleen Sanabria  Admit Date: 7/9/2019  Admit Diagnosis: SAH (subarachnoid hemorrhage) ([facility] [I60.9]  Recommendations: Continue Acute Rehab Program, Coordination of rehab/medical care, Counseling of PM & R care issues management, Hospital Inpatient Rehab  -observed pt with PT. Mobilizing with RW. Pt seems depressed, today is son's 10th birthday. Progressing well. Still with EVD with plans to repeat scans tomorrow.  -turns out pt does have commercial insurance; thus, will need precert for [facility]. With each day that passes, he is getting more mobile and less dependent on assistance. Still feel he would benefit greatly from [facility] to maximize recovery and provide close neuro f/u and monitoring  -poor po intake; may need tube feeds resumed to meet caloric needs. Consider appetite stimulant. -NING likely on CKD ; bun and creat 51/2.93 (48/3.15 on admission; Na 143  -leukocytosis; Tm 100.4, current 99.3; MSSA pneum but clinically no pulm issues. bld cx and csf neg  -hypokalemia; replace K  -HTN  History/Subjective/Complaint:     Patient seen and examined. Records reviewed. \" I am ok. \" denies feeling depressed but is down. Wants to see his children, especially today since it is his son's 10th bday    Pain 1  Pain Scale 1: Numeric (0 - 10) (07/18/19 0700)  Pain Intensity 1: 0 (07/18/19 0700)  Patient Stated Pain Goal: 0 (07/18/19 0400)  Pain Reassessment 1: Yes (07/15/19 0515)  Pain Onset 1: since admit (07/09/19 2000)  Pain Location 1: Throat (07/14/19 1018)  Pain Orientation 1: Posterior (07/10/19 0002)  Pain Description 1: Aching;Constant; Sore (07/10/19 0002)  Pain Intervention(s) 1: Medication (see MAR) (07/14/19 2130)     Objective:     Vitals:  Patient Vitals for the past 8 hrs:   BP Temp Pulse Resp SpO2 Weight   07/18/19 0930 -- -- 69 10 96 % --   07/18/19 0915 -- -- 78 13 94 % --   07/18/19 0900 -- 99.3 °F (37.4 °C) 75 12 92 % --   07/18/19 0845 -- -- 86 15 91 % --   07/18/19 0830 -- -- 80 20 97 % --   07/18/19 0826 (!) 228/84 -- 69 -- -- --   07/18/19 0815 -- 99.1 °F (37.3 °C) (!) 59 11 97 % --   07/18/19 0800 -- 98.9 °F (37.2 °C) (!) 57 10 97 % --   07/18/19 0745 -- 98.9 °F (37.2 °C) 65 14 97 % --   07/18/19 0730 -- 98.9 °F (37.2 °C) 61 13 98 % --   07/18/19 0715 -- 98.9 °F (37.2 °C) 69 12 98 % --   07/18/19 0700 -- 98.9 °F (37.2 °C) (!) 57 9 95 % --   07/18/19 0645 -- 99.1 °F (37.3 °C) 61 12 96 % --   07/18/19 0630 -- 99.1 °F (37.3 °C) 78 19 95 % --   07/18/19 0615 -- 98.9 °F (37.2 °C) 73 14 93 % --   07/18/19 0600 -- 99.1 °F (37.3 °C) 62 16 92 % --   07/18/19 0545 -- 99.1 °F (37.3 °C) 67 18 94 % --   07/18/19 0530 -- 99.1 °F (37.3 °C) 68 10 95 % --   07/18/19 0515 -- 99.3 °F (37.4 °C) 66 11 93 % --   07/18/19 0500 -- 99.3 °F (37.4 °C) 70 12 94 % 187 lb 6.3 oz (85 kg)   07/18/19 0445 -- 99.3 °F (37.4 °C) 67 12 94 % --   07/18/19 0430 -- 99.5 °F (37.5 °C) 80 13 93 % --   07/18/19 0415 -- 99.5 °F (37.5 °C) 76 16 96 % --   07/18/19 0400 (!) 216/77 99.5 °F (37.5 °C) (!) 59 14 96 % --   07/18/19 0345 -- 99.3 °F (37.4 °C) (!) 56 19 97 % --   07/18/19 0330 -- 99.3 °F (37.4 °C) (!) 57 10 97 % --   07/18/19 0315 -- 99.5 °F (37.5 °C) 63 12 95 % --   07/18/19 0300 -- 99.5 °F (37.5 °C) 94 21 94 % --   07/18/19 0245 -- 99.5 °F (37.5 °C) 63 14 94 % --      Intake and Output:  07/16 1901 - 07/18 0700  In: 6271 [P.O.:2380; I.V.:3891]  Out: 7900 [Urine:7545; Drains:355]    No Known Allergies  Current Facility-Administered Medications   Medication Dose Route Frequency    ceFAZolin (ANCEF) 2 g/20 mL in sterile water IV syringe  2 g IntraVENous Q8H    0.45% sodium chloride infusion  100 mL/hr IntraVENous CONTINUOUS    albuterol (PROVENTIL VENTOLIN) nebulizer solution 2.5 mg  2.5 mg Nebulization BID RT    sodium chloride 3% hypertonic nebulizer soln  1 mL Nebulization BID RT    acetaminophen (TYLENOL) tablet 650 mg  650 mg Oral Q4H PRN    ascorbic acid (vitamin C) (VITAMIN C) tablet 500 mg  500 mg Oral TID  aspirin tablet 325 mg  325 mg Oral DAILY    atorvastatin (LIPITOR) tablet 40 mg  40 mg Oral QHS    clopidogrel (PLAVIX) tablet 75 mg  75 mg Oral DAILY    famotidine (PEPCID) tablet 20 mg  20 mg Oral DAILY    ferrous sulfate 300 mg (60 mg iron)/5 mL oral syrup 300 mg  300 mg Oral TID    levETIRAcetam (KEPPRA) oral solution 500 mg  500 mg Oral Q12H    magnesium oxide (MAG-OX) tablet 400 mg  400 mg Oral BID    senna-docusate (PERICOLACE) 8.6-50 mg per tablet 2 Tab  2 Tab Oral QHS    ALPRAZolam (XANAX) tablet 0.5 mg  0.5 mg Oral Q6H PRN    magnesium hydroxide (MILK OF MAGNESIA) 400 mg/5 mL oral suspension 30 mL  30 mL Oral DAILY PRN    niMODipine (NIMOTOP) 30 mg/mL oral solution (compound) 60 mg  60 mg Oral Q4H    fentaNYL citrate (PF) injection 50 mcg  50 mcg IntraVENous Q1H PRN    polyvinyl alcohol (LIQUIFILM TEARS) 1.4 % ophthalmic solution 1 Drop  1 Drop Both Eyes PRN    heparin (porcine) injection 5,000 Units  5,000 Units SubCUTAneous Q8H    sodium chloride (NS) flush 30 mL  30 mL InterCATHeter Q8H    heparin (porcine) pf 900 Units  900 Units InterCATHeter Q8H    sodium chloride (NS) flush 30 mL  30 mL InterCATHeter PRN    heparin (porcine) pf 900 Units  900 Units InterCATHeter PRN    nicotine (NICODERM CQ) 21 mg/24 hr patch 1 Patch  1 Patch TransDERmal Q24H    ondansetron (ZOFRAN) injection 4 mg  4 mg IntraVENous Q6H PRN    NUTRITIONAL SUPPORT ELECTROLYTE PRN ORDERS   Does Not Apply PRN    magnesium sulfate 4 g/100 mL IVPB  4 g IntraVENous DAILY PRN    magnesium sulfate 2 g/50 ml IVPB (premix or compounded)  2 g IntraVENous DAILY PRN       Physical Exam:  Affect blunted, speech slow and vocal quality low  HEENT, PERRLA, EOMI  With gait; slow steppage gait, leans on RW, vcs to remain upright and to not look at feet. No LOB  Exam limited due to having therapies.    Functional Assessment:a  Gross Assessment  AROM: Generally decreased, functional (07/12/19 1500)  PROM: Generally decreased, functional (07/12/19 1100)  Strength: Within functional limits (07/12/19 1500)  Coordination: Generally decreased, functional(B hands swelling) (07/12/19 1500)  Sensation: Intact(BUEs to light touch ) (07/12/19 1500)     Gait  Base of Support: Narrowed; Center of gravity altered (07/15/19 1500)  Speed/Aminata: Slow (07/17/19 1100)  Step Length: Right shortened;Left shortened (07/15/19 1500)  Gait Abnormalities: Decreased step clearance (07/17/19 1100)  Ambulation - Level of Assistance: Contact guard assistance (07/17/19 1100)  Distance (ft): 100 Feet (ft)(x2 with rest breaks) (07/17/19 1100)  Assistive Device: Gait belt;Walker, rolling(chair follow) (07/17/19 1100)  Interventions: Safety awareness training;Manual cues; Verbal cues (07/17/19 1100)     Bed Mobility  Supine to Sit: Contact guard assistance (07/17/19 1100)  Scooting: Contact guard assistance (07/17/19 1100)     Balance  Sitting: Impaired (07/17/19 1100)  Sitting - Static: Good (unsupported) (07/17/19 1100)  Sitting - Dynamic: Fair (occasional) (07/17/19 1100)  Standing: Impaired (07/17/19 1100)  Standing - Static: Fair (07/17/19 1100)  Standing - Dynamic : Fair (07/17/19 1100)         Bed/Mat Mobility  Supine to Sit: Contact guard assistance (07/17/19 1100)  Sit to Stand: Minimum assistance (07/17/19 1100)  Stand to Sit: Contact guard assistance (07/17/19 1100)  Bed to Chair: Contact guard assistance;Assist x2 (07/15/19 1500)  Scooting: Contact guard assistance (07/17/19 1100)     Labs/Studies:  Recent Results (from the past 72 hour(s))   MAGNESIUM    Collection Time: 07/16/19  3:43 AM   Result Value Ref Range    Magnesium 2.9 (H) 1.8 - 2.4 mg/dL   CBC W/O DIFF    Collection Time: 07/16/19  3:43 AM   Result Value Ref Range    WBC 12.9 (H) 4.3 - 11.1 K/uL    RBC 2.64 (L) 4.23 - 5.6 M/uL    HGB 7.8 (L) 13.6 - 17.2 g/dL    HCT 24.3 (L) 41.1 - 50.3 %    MCV 92.0 79.6 - 97.8 FL    MCH 29.5 26.1 - 32.9 PG    MCHC 32.1 31.4 - 35.0 g/dL    RDW 13.4 11.9 - 14.6 % PLATELET 624 690 - 038 K/uL    MPV 11.2 9.4 - 12.3 FL    ABSOLUTE NRBC 0.00 0.0 - 0.2 K/uL   METABOLIC PANEL, BASIC    Collection Time: 07/16/19  3:43 AM   Result Value Ref Range    Sodium 152 (H) 136 - 145 mmol/L    Potassium 3.9 3.5 - 5.1 mmol/L    Chloride 123 (H) 98 - 107 mmol/L    CO2 20 (L) 21 - 32 mmol/L    Anion gap 9 7 - 16 mmol/L    Glucose 91 65 - 100 mg/dL    BUN 53 (H) 6 - 23 MG/DL    Creatinine 2.83 (H) 0.8 - 1.5 MG/DL    GFR est AA 31 (L) >60 ml/min/1.73m2    GFR est non-AA 26 (L) >60 ml/min/1.73m2    Calcium 8.4 8.3 - 10.4 MG/DL   MAGNESIUM    Collection Time: 07/17/19  3:30 AM   Result Value Ref Range    Magnesium 2.8 (H) 1.8 - 2.4 mg/dL   CBC W/O DIFF    Collection Time: 07/17/19  3:30 AM   Result Value Ref Range    WBC 13.2 (H) 4.3 - 11.1 K/uL    RBC 2.70 (L) 4.23 - 5.6 M/uL    HGB 7.8 (L) 13.6 - 17.2 g/dL    HCT 24.3 (L) 41.1 - 50.3 %    MCV 90.0 79.6 - 97.8 FL    MCH 28.9 26.1 - 32.9 PG    MCHC 32.1 31.4 - 35.0 g/dL    RDW 12.9 11.9 - 14.6 %    PLATELET 970 963 - 950 K/uL    MPV 11.1 9.4 - 12.3 FL    ABSOLUTE NRBC 0.00 0.0 - 0.2 K/uL   METABOLIC PANEL, BASIC    Collection Time: 07/17/19  3:30 AM   Result Value Ref Range    Sodium 147 (H) 136 - 145 mmol/L    Potassium 3.5 3.5 - 5.1 mmol/L    Chloride 117 (H) 98 - 107 mmol/L    CO2 21 21 - 32 mmol/L    Anion gap 9 7 - 16 mmol/L    Glucose 94 65 - 100 mg/dL    BUN 54 (H) 6 - 23 MG/DL    Creatinine 3.00 (H) 0.8 - 1.5 MG/DL    GFR est AA 29 (L) >60 ml/min/1.73m2    GFR est non-AA 24 (L) >60 ml/min/1.73m2    Calcium 8.2 (L) 8.3 - 10.4 MG/DL   MAGNESIUM    Collection Time: 07/18/19  4:10 AM   Result Value Ref Range    Magnesium 2.8 (H) 1.8 - 2.4 mg/dL   CBC W/O DIFF    Collection Time: 07/18/19  4:10 AM   Result Value Ref Range    WBC 15.1 (H) 4.3 - 11.1 K/uL    RBC 2.83 (L) 4.23 - 5.6 M/uL    HGB 8.2 (L) 13.6 - 17.2 g/dL    HCT 24.9 (L) 41.1 - 50.3 %    MCV 88.0 79.6 - 97.8 FL    MCH 29.0 26.1 - 32.9 PG    MCHC 32.9 31.4 - 35.0 g/dL    RDW 12.5 11.9 - 14.6 %    PLATELET 906 926 - 692 K/uL    MPV 11.1 9.4 - 12.3 FL    ABSOLUTE NRBC 0.00 0.0 - 0.2 K/uL   METABOLIC PANEL, BASIC    Collection Time: 07/18/19  4:10 AM   Result Value Ref Range    Sodium 143 136 - 145 mmol/L    Potassium 3.3 (L) 3.5 - 5.1 mmol/L    Chloride 111 (H) 98 - 107 mmol/L    CO2 21 21 - 32 mmol/L    Anion gap 11 7 - 16 mmol/L    Glucose 94 65 - 100 mg/dL    BUN 51 (H) 6 - 23 MG/DL    Creatinine 2.93 (H) 0.8 - 1.5 MG/DL    GFR est AA 30 (L) >60 ml/min/1.73m2    GFR est non-AA 25 (L) >60 ml/min/1.73m2    Calcium 7.9 (L) 8.3 - 10.4 MG/DL        Assessment:     Principal Problem:    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) (7/10/2019)      Seizure ([facility]) (7/10/2019)      Elevated serum creatinine (7/10/2019)      Cerebral vasospasm (7/10/2019)      Pneumonia due to methicillin susceptible Staphylococcus aureus ([facility]) [facility] (7/15/2019)        Plan:     Recommendations: Continue Acute Rehab Program  Coordination of rehab/medical care  Counseling of PM & R care issues management  Monitoring and management of medical conditions per plan of care/orders  Discussion with Family/Caregiver/Staff  Reviewed Therapies/Labs/Medications/Records

## 2019-07-18 NOTE — PROGRESS NOTES
Nutrition F/U:  Calorie Count. Assessment:  Weight 85 kg (ICU bed 7/18/19), edema - 1+ generalized. The patient continues to eat poorly, stating to the RN that he \"never really ate much\". Labs are remarkable for sodium 143, potassium 3.3, BUN 51 and creatinine 2.93. Last reported bowel movement was 7/16. Macronutrient Needs:  Estimated calorie needs - 9356-3872 jayce/day (25-28 jayce/kg/day)   Estimated protein needs - 69-83 gm pro/day (1-1.2 gm pro/kgIBW/day)   Max CHO/day - 266 gm CHO/day (55% jayce/day)   Fluid/day - 1.7-2 liters/day (1 ml/jayce/day)  Intake/Comparative Standards:  Calorie count results for yesterday: 3 meals recorded - ~350 calories and 11 gm protein - grossly inadequate. Diagnosis:  Inadequate energy intake related to lack of appetite and hunger trigger as evidenced by poor oral intake. Intervention:   Meals and Snacks: Mechanical soft based on ST evaluation. Medical Food Supplement Therapy: Commercial Beverage: Ensure Enlive (350 calories, 20 gm protein per bottle) with all trays. Mineral Supplement Therapy: Nutrition Support Orders/Electrolyte Management Replacement Protocols are active on the [facility] for potassium only. Coordination of Nutrition Care by a Nutrition Professional: AM ICU rounds, collaboration with Al López. Nutrition Discharge Plan: Too soon to determine. Carlos Pryor.  Dianna Garrett  [phone]

## 2019-07-18 NOTE — PROGRESS NOTES
A follow up visit was made to the patient. Emotional support, spiritual presence and   prayer were provided for the patient and his cousin, Kalpana Magallanes. Kalpana Magallanes was helping him eat.       [employer]

## 2019-07-19 ENCOUNTER — APPOINTMENT (OUTPATIENT)
Dept: CT IMAGING | Age: 44
DRG: 020 | End: 2019-07-19
Attending: NURSE PRACTITIONER
Payer: COMMERCIAL

## 2019-07-19 LAB
ANION GAP SERPL CALC-SCNC: 9 MMOL/L (ref 7–16)
BACTERIA SPEC CULT: NORMAL
BUN SERPL-MCNC: 48 MG/DL (ref 6–23)
CALCIUM SERPL-MCNC: 7.8 MG/DL (ref 8.3–10.4)
CHLORIDE SERPL-SCNC: 109 MMOL/L (ref 98–107)
CO2 SERPL-SCNC: 22 MMOL/L (ref 21–32)
CREAT SERPL-MCNC: 3.04 MG/DL (ref 0.8–1.5)
ERYTHROCYTE [DISTWIDTH] IN BLOOD BY AUTOMATED COUNT: 12.4 % (ref 11.9–14.6)
GLUCOSE SERPL-MCNC: 92 MG/DL (ref 65–100)
GRAM STN SPEC: NORMAL
GRAM STN SPEC: NORMAL
HCT VFR BLD AUTO: 25.5 % (ref 41.1–50.3)
HGB BLD-MCNC: 8.6 G/DL (ref 13.6–17.2)
MAGNESIUM SERPL-MCNC: 2.8 MG/DL (ref 1.8–2.4)
MCH RBC QN AUTO: 29.4 PG (ref 26.1–32.9)
MCHC RBC AUTO-ENTMCNC: 33.7 G/DL (ref 31.4–35)
MCV RBC AUTO: 87 FL (ref 79.6–97.8)
NRBC # BLD: 0 K/UL (ref 0–0.2)
PLATELET # BLD AUTO: 269 K/UL (ref 150–450)
PMV BLD AUTO: 11 FL (ref 9.4–12.3)
POTASSIUM SERPL-SCNC: 3.4 MMOL/L (ref 3.5–5.1)
POTASSIUM SERPL-SCNC: 3.6 MMOL/L (ref 3.5–5.1)
RBC # BLD AUTO: 2.93 M/UL (ref 4.23–5.6)
SERVICE CMNT-IMP: NORMAL
SODIUM SERPL-SCNC: 140 MMOL/L (ref 136–145)
WBC # BLD AUTO: 15.2 K/UL (ref 4.3–11.1)

## 2019-07-19 PROCEDURE — 92526 ORAL FUNCTION THERAPY: CPT

## 2019-07-19 PROCEDURE — 70450 CT HEAD/BRAIN W/O DYE: CPT

## 2019-07-19 PROCEDURE — 99232 SBSQ HOSP IP/OBS MODERATE 35: CPT | Performed by: NEUROLOGICAL SURGERY

## 2019-07-19 PROCEDURE — 94762 N-INVAS EAR/PLS OXIMTRY CONT: CPT

## 2019-07-19 PROCEDURE — 93886 INTRACRANIAL COMPLETE STUDY: CPT

## 2019-07-19 PROCEDURE — 80048 BASIC METABOLIC PNL TOTAL CA: CPT

## 2019-07-19 PROCEDURE — 65610000001 HC ROOM ICU GENERAL

## 2019-07-19 PROCEDURE — 99231 SBSQ HOSP IP/OBS SF/LOW 25: CPT | Performed by: PHYSICAL MEDICINE & REHABILITATION

## 2019-07-19 PROCEDURE — 36600 WITHDRAWAL OF ARTERIAL BLOOD: CPT

## 2019-07-19 PROCEDURE — 74011250637 HC RX REV CODE- 250/637

## 2019-07-19 PROCEDURE — 0042T CT PERF W CBF: CPT

## 2019-07-19 PROCEDURE — 74011250637 HC RX REV CODE- 250/637: Performed by: NURSE PRACTITIONER

## 2019-07-19 PROCEDURE — 74011636320 HC RX REV CODE- 636/320: Performed by: NEUROLOGICAL SURGERY

## 2019-07-19 PROCEDURE — 83735 ASSAY OF MAGNESIUM: CPT

## 2019-07-19 PROCEDURE — 70496 CT ANGIOGRAPHY HEAD: CPT

## 2019-07-19 PROCEDURE — 97530 THERAPEUTIC ACTIVITIES: CPT

## 2019-07-19 PROCEDURE — 74011000258 HC RX REV CODE- 258: Performed by: NEUROLOGICAL SURGERY

## 2019-07-19 PROCEDURE — 74011000258 HC RX REV CODE- 258: Performed by: NURSE PRACTITIONER

## 2019-07-19 PROCEDURE — 74011250636 HC RX REV CODE- 250/636: Performed by: NURSE PRACTITIONER

## 2019-07-19 PROCEDURE — 84132 ASSAY OF SERUM POTASSIUM: CPT

## 2019-07-19 PROCEDURE — 92507 TX SP LANG VOICE COMM INDIV: CPT

## 2019-07-19 PROCEDURE — 85027 COMPLETE CBC AUTOMATED: CPT

## 2019-07-19 PROCEDURE — 74011250637 HC RX REV CODE- 250/637: Performed by: NEUROLOGICAL SURGERY

## 2019-07-19 PROCEDURE — 77030020257 HC SOL INJ SOD CL 0.45% 1000ML BG

## 2019-07-19 RX ORDER — POTASSIUM CHLORIDE 14.9 MG/ML
20 INJECTION INTRAVENOUS ONCE
Status: COMPLETED | OUTPATIENT
Start: 2019-07-19 | End: 2019-07-19

## 2019-07-19 RX ORDER — SODIUM CHLORIDE 0.9 % (FLUSH) 0.9 %
10 SYRINGE (ML) INJECTION
Status: COMPLETED | OUTPATIENT
Start: 2019-07-19 | End: 2019-07-19

## 2019-07-19 RX ORDER — LANOLIN ALCOHOL/MO/W.PET/CERES
1 CREAM (GRAM) TOPICAL
Status: DISCONTINUED | OUTPATIENT
Start: 2019-07-19 | End: 2019-08-05 | Stop reason: HOSPADM

## 2019-07-19 RX ORDER — LEVETIRACETAM 500 MG/1
500 TABLET ORAL EVERY 12 HOURS
Status: DISCONTINUED | OUTPATIENT
Start: 2019-07-19 | End: 2019-08-01

## 2019-07-19 RX ORDER — NIMODIPINE 30 MG/1
60 CAPSULE, LIQUID FILLED ORAL EVERY 4 HOURS
Status: DISCONTINUED | OUTPATIENT
Start: 2019-07-19 | End: 2019-07-19

## 2019-07-19 RX ADMIN — Medication 2 G: at 22:13

## 2019-07-19 RX ADMIN — SODIUM CHLORIDE, PRESERVATIVE FREE 900 UNITS: 5 INJECTION INTRAVENOUS at 05:47

## 2019-07-19 RX ADMIN — SODIUM CHLORIDE, PRESERVATIVE FREE 900 UNITS: 5 INJECTION INTRAVENOUS at 22:13

## 2019-07-19 RX ADMIN — LEVETIRACETAM 500 MG: 100 SOLUTION ORAL at 09:35

## 2019-07-19 RX ADMIN — OXYCODONE HYDROCHLORIDE AND ACETAMINOPHEN 500 MG: 500 TABLET ORAL at 09:35

## 2019-07-19 RX ADMIN — FERROUS SULFATE TAB 325 MG (65 MG ELEMENTAL FE) 325 MG: 325 (65 FE) TAB at 14:15

## 2019-07-19 RX ADMIN — NIMODIPINE 60 MG: 30 CAPSULE ORAL at 20:05

## 2019-07-19 RX ADMIN — MAGNESIUM OXIDE TAB 400 MG (241.3 MG ELEMENTAL MG) 400 MG: 400 (241.3 MG) TAB at 17:20

## 2019-07-19 RX ADMIN — HEPARIN SODIUM 5000 UNITS: 5000 INJECTION INTRAVENOUS; SUBCUTANEOUS at 22:13

## 2019-07-19 RX ADMIN — MAGNESIUM OXIDE TAB 400 MG (241.3 MG ELEMENTAL MG) 400 MG: 400 (241.3 MG) TAB at 09:35

## 2019-07-19 RX ADMIN — SODIUM CHLORIDE, PRESERVATIVE FREE 900 UNITS: 5 INJECTION INTRAVENOUS at 14:14

## 2019-07-19 RX ADMIN — FAMOTIDINE 20 MG: 20 TABLET ORAL at 09:35

## 2019-07-19 RX ADMIN — HEPARIN SODIUM 5000 UNITS: 5000 INJECTION INTRAVENOUS; SUBCUTANEOUS at 05:48

## 2019-07-19 RX ADMIN — NIMODIPINE 60 MG: 30 CAPSULE ORAL at 14:15

## 2019-07-19 RX ADMIN — OXYCODONE HYDROCHLORIDE AND ACETAMINOPHEN 500 MG: 500 TABLET ORAL at 22:13

## 2019-07-19 RX ADMIN — SENNOSIDES, DOCUSATE SODIUM 2 TABLET: 50; 8.6 TABLET, FILM COATED ORAL at 22:13

## 2019-07-19 RX ADMIN — ASPIRIN 325 MG ORAL TABLET 325 MG: 325 PILL ORAL at 09:35

## 2019-07-19 RX ADMIN — Medication 30 ML: at 22:20

## 2019-07-19 RX ADMIN — MINERAL SUPPLEMENT IRON 300 MG / 5 ML STRENGTH LIQUID 100 PER BOX UNFLAVORED 300 MG: at 09:35

## 2019-07-19 RX ADMIN — NIMODIPINE 60 MG: 30 CAPSULE, LIQUID FILLED ORAL at 04:54

## 2019-07-19 RX ADMIN — Medication 10 ML: at 03:51

## 2019-07-19 RX ADMIN — SODIUM CHLORIDE 100 ML/HR: 450 INJECTION, SOLUTION INTRAVENOUS at 01:41

## 2019-07-19 RX ADMIN — POTASSIUM CHLORIDE 20 MEQ: 200 INJECTION, SOLUTION INTRAVENOUS at 05:47

## 2019-07-19 RX ADMIN — Medication 30 ML: at 14:00

## 2019-07-19 RX ADMIN — SODIUM CHLORIDE 100 ML: 900 INJECTION, SOLUTION INTRAVENOUS at 03:51

## 2019-07-19 RX ADMIN — NIMODIPINE 60 MG: 30 CAPSULE ORAL at 17:20

## 2019-07-19 RX ADMIN — ATORVASTATIN CALCIUM 40 MG: 40 TABLET, FILM COATED ORAL at 22:13

## 2019-07-19 RX ADMIN — NIMODIPINE 60 MG: 30 CAPSULE, LIQUID FILLED ORAL at 23:59

## 2019-07-19 RX ADMIN — FERROUS SULFATE TAB 325 MG (65 MG ELEMENTAL FE) 325 MG: 325 (65 FE) TAB at 17:20

## 2019-07-19 RX ADMIN — LEVETIRACETAM 500 MG: 500 TABLET, FILM COATED ORAL at 21:19

## 2019-07-19 RX ADMIN — ACETAMINOPHEN 650 MG: 325 TABLET, FILM COATED ORAL at 20:05

## 2019-07-19 RX ADMIN — IOPAMIDOL 111 ML: 755 INJECTION, SOLUTION INTRAVENOUS at 03:51

## 2019-07-19 RX ADMIN — Medication 2 G: at 05:57

## 2019-07-19 RX ADMIN — Medication 30 ML: at 05:48

## 2019-07-19 RX ADMIN — ACETAMINOPHEN 650 MG: 325 TABLET, FILM COATED ORAL at 10:27

## 2019-07-19 RX ADMIN — OXYCODONE HYDROCHLORIDE AND ACETAMINOPHEN 500 MG: 500 TABLET ORAL at 17:20

## 2019-07-19 RX ADMIN — HEPARIN SODIUM 5000 UNITS: 5000 INJECTION INTRAVENOUS; SUBCUTANEOUS at 14:15

## 2019-07-19 RX ADMIN — CLOPIDOGREL BISULFATE 75 MG: 75 TABLET ORAL at 09:35

## 2019-07-19 RX ADMIN — SODIUM CHLORIDE 100 ML/HR: 450 INJECTION, SOLUTION INTRAVENOUS at 15:31

## 2019-07-19 RX ADMIN — Medication 2 G: at 14:00

## 2019-07-19 RX ADMIN — NIMODIPINE 60 MG: 30 CAPSULE, LIQUID FILLED ORAL at 09:34

## 2019-07-19 RX ADMIN — NIMODIPINE 60 MG: 30 CAPSULE, LIQUID FILLED ORAL at 00:02

## 2019-07-19 NOTE — PROGRESS NOTES
PM&R Consult Progress Note      Patient: Wesley Love  Admit Date: 7/9/2019  Admit Diagnosis: SAH (subarachnoid hemorrhage) ([facility] [address] [I60.9]  Recommendations: Continue Acute Rehab Program, Coordination of rehab/medical care, Counseling of PM & R care issues management  -remains in ICU; participating with therapies and mobilizing well. EVD remains at 10/open; f/u CT with dec in IVH, increased size of 3rd and 4th ventricles. Improved SAH  -cont PT/OT/ST; excellent rehab prognosis  -intermittent low grade temp and continued leukocytosis  -I will be out of office next week. Will have admissions and covering MD continue to follow progress. [facility] will require precert. If he continues to increase his mobility, this may be an issue. Needs intensive rehab given excellent prognosis, age, and fact that he was employed  History/Subjective/Complaint:     Patient seen and examined. Records reviewed. No specific complaints    Pain 1  Pain Scale 1: Numeric (0 - 10) (07/19/19 0800)  Pain Intensity 1: 0 (07/19/19 0800)  Patient Stated Pain Goal: 0 (07/19/19 0800)  Pain Reassessment 1: Yes (07/15/19 0515)  Pain Onset 1: since admit (07/09/19 2000)  Pain Location 1: Throat (07/14/19 1018)  Pain Orientation 1: Posterior (07/10/19 0002)  Pain Description 1: Aching;Constant; Sore (07/10/19 0002)  Pain Intervention(s) 1: Medication (see MAR) (07/14/19 2130)     Objective:     Vitals:  Patient Vitals for the past 8 hrs:   BP Temp Pulse Resp SpO2   07/19/19 1015 -- 99.6 °F (37.6 °C) 80 14 97 %   07/19/19 1000 (!) 206/81 99.8 °F (37.7 °C) 82 14 95 %   07/19/19 0945 -- 100 °F (37.8 °C) 76 18 98 %   07/19/19 0934 (!) 221/87 -- 80 -- --   07/19/19 0930 -- 99.8 °F (37.7 °C) 67 10 97 %   07/19/19 0915 -- 99.8 °F (37.7 °C) 64 8 99 %   07/19/19 0900 (!) 209/82 99.8 °F (37.7 °C) 81 14 97 %   07/19/19 0845 -- 99.8 °F (37.7 °C) 64 17 99 %   07/19/19 0830 -- 99.8 °F (37.7 °C) 65 17 98 %   07/19/19 0815 -- 99.8 °F (37.7 °C) 63 9 98 %   07/19/19 0800 (!) 231/86 99.8 °F (37.7 °C) 65 15 98 %   07/19/19 0745 -- 99.8 °F (37.7 °C) 64 11 98 %   07/19/19 0730 -- 100 °F (37.8 °C) 72 13 98 %   07/19/19 0715 -- 99.8 °F (37.7 °C) 85 21 99 %   07/19/19 0700 -- 99.6 °F (37.6 °C) 71 (!) 47 97 %   07/19/19 0645 -- 99.6 °F (37.6 °C) 83 22 97 %   07/19/19 0630 -- 99.6 °F (37.6 °C) 61 12 97 %   07/19/19 0615 -- 99.6 °F (37.6 °C) 65 12 96 %   07/19/19 0600 (!) 212/79 99.6 °F (37.6 °C) 75 19 96 %   07/19/19 0545 -- 99.6 °F (37.6 °C) 86 20 95 %   07/19/19 0530 -- 99.5 °F (37.5 °C) 67 30 96 %   07/19/19 0515 -- 99.3 °F (37.4 °C) 76 17 94 %   07/19/19 0500 192/81 99.1 °F (37.3 °C) (!) 57 (!) 34 96 %   07/19/19 0454 (!) 202/83 -- 62 -- --   07/19/19 0445 -- 99.3 °F (37.4 °C) (!) 59 10 96 %   07/19/19 0430 -- 99.3 °F (37.4 °C) 65 14 95 %   07/19/19 0415 -- 99.3 °F (37.4 °C) 64 12 96 %   07/19/19 0401 (!) 227/85 99.3 °F (37.4 °C) 82 21 93 %   07/19/19 0400 -- 99.3 °F (37.4 °C) 72 23 93 %   07/19/19 0315 -- 99.5 °F (37.5 °C) (!) 59 28 95 %   07/19/19 0300 -- 99.5 °F (37.5 °C) 62 (!) 6 97 %      Intake and Output:  07/17 1901 - 07/19 0700  In: 4683.7 [P.O.:840;  I.V.:3843.7]  Out: 9539 [Urine:9145; Drains:394]    No Known Allergies  Current Facility-Administered Medications   Medication Dose Route Frequency    ferrous sulfate tablet 325 mg  1 Tab Oral TID WITH MEALS    levETIRAcetam (KEPPRA) tablet 500 mg  500 mg Oral Q12H    ceFAZolin (ANCEF) 2 g/20 mL in sterile water IV syringe  2 g IntraVENous Q8H    0.45% sodium chloride infusion  100 mL/hr IntraVENous CONTINUOUS    acetaminophen (TYLENOL) tablet 650 mg  650 mg Oral Q4H PRN    ascorbic acid (vitamin C) (VITAMIN C) tablet 500 mg  500 mg Oral TID    aspirin tablet 325 mg  325 mg Oral DAILY    atorvastatin (LIPITOR) tablet 40 mg  40 mg Oral QHS    clopidogrel (PLAVIX) tablet 75 mg  75 mg Oral DAILY    famotidine (PEPCID) tablet 20 mg  20 mg Oral DAILY    magnesium oxide (MAG-OX) tablet 400 mg  400 mg Oral BID    senna-docusate (PERICOLACE) 8.6-50 mg per tablet 2 Tab  2 Tab Oral QHS    ALPRAZolam (XANAX) tablet 0.5 mg  0.5 mg Oral Q6H PRN    magnesium hydroxide (MILK OF MAGNESIA) 400 mg/5 mL oral suspension 30 mL  30 mL Oral DAILY PRN    niMODipine (NIMOTOP) 30 mg/mL oral solution (compound) 60 mg  60 mg Oral Q4H    fentaNYL citrate (PF) injection 50 mcg  50 mcg IntraVENous Q1H PRN    polyvinyl alcohol (LIQUIFILM TEARS) 1.4 % ophthalmic solution 1 Drop  1 Drop Both Eyes PRN    heparin (porcine) injection 5,000 Units  5,000 Units SubCUTAneous Q8H    sodium chloride (NS) flush 30 mL  30 mL InterCATHeter Q8H    heparin (porcine) pf 900 Units  900 Units InterCATHeter Q8H    sodium chloride (NS) flush 30 mL  30 mL InterCATHeter PRN    heparin (porcine) pf 900 Units  900 Units InterCATHeter PRN    nicotine (NICODERM CQ) 21 mg/24 hr patch 1 Patch  1 Patch TransDERmal Q24H    ondansetron (ZOFRAN) injection 4 mg  4 mg IntraVENous Q6H PRN    NUTRITIONAL SUPPORT ELECTROLYTE PRN ORDERS   Does Not Apply PRN    magnesium sulfate 4 g/100 mL IVPB  4 g IntraVENous DAILY PRN    magnesium sulfate 2 g/50 ml IVPB (premix or compounded)  2 g IntraVENous DAILY PRN       Physical Exam:  Bed in sitting position; sleepy  Affect blunted  Voice quality low. Answers questions , Ox3  PERRLA EOMI, no facial asymm  Generalized weakness  Delayed processing, poor sustained attn, recall 2/3          Functional Assessment:  Gross Assessment  AROM: Generally decreased, functional (07/12/19 1500)  PROM: Generally decreased, functional (07/12/19 1100)  Strength: Within functional limits (07/12/19 1500)  Coordination: Generally decreased, functional(B hands swelling) (07/12/19 1500)  Sensation: Intact(BUEs to light touch ) (07/12/19 1500)     Gait  Base of Support: Narrowed; Center of gravity altered (07/15/19 1500)  Speed/Aminata: Slow (07/18/19 1100)  Step Length: Right shortened;Left shortened (07/15/19 1500)  Gait Abnormalities: Decreased step clearance;Trunk sway increased; Path deviations (07/18/19 1100)  Ambulation - Level of Assistance: Contact guard assistance;Minimal assistance (07/18/19 1100)  Distance (ft): 50 Feet (ft)(multiple bouts with standing rest breaks required and slow) (07/18/19 1100)  Assistive Device: Gait belt;Walker, rolling (07/18/19 1100)  Interventions: Safety awareness training;Manual cues; Visual/Demos; Verbal cues (07/18/19 1100)     Bed Mobility  Supine to Sit: Minimum assistance;Assist x2 (07/18/19 1100)  Scooting: Minimum assistance (07/18/19 1100)     Balance  Sitting: Impaired (07/18/19 1100)  Sitting - Static: Good (unsupported) (07/18/19 1100)  Sitting - Dynamic: Fair (occasional) (07/18/19 1100)  Standing: Impaired (07/18/19 1100)  Standing - Static: Fair (07/18/19 1100)  Standing - Dynamic : Fair (07/18/19 1100)                       Bed/Mat Mobility  Supine to Sit: Minimum assistance;Assist x2 (07/18/19 1100)  Sit to Stand: Minimum assistance;Assist x2 (07/18/19 1100)  Stand to Sit: Minimum assistance (07/18/19 1100)  Bed to Chair: Contact guard assistance;Assist x2 (07/15/19 1500)  Scooting: Minimum assistance (07/18/19 1100)     Labs/Studies:  Recent Results (from the past 72 hour(s))   MAGNESIUM    Collection Time: 07/17/19  3:30 AM   Result Value Ref Range    Magnesium 2.8 (H) 1.8 - 2.4 mg/dL   CBC W/O DIFF    Collection Time: 07/17/19  3:30 AM   Result Value Ref Range    WBC 13.2 (H) 4.3 - 11.1 K/uL    RBC 2.70 (L) 4.23 - 5.6 M/uL    HGB 7.8 (L) 13.6 - 17.2 g/dL    HCT 24.3 (L) 41.1 - 50.3 %    MCV 90.0 79.6 - 97.8 FL    MCH 28.9 26.1 - 32.9 PG    MCHC 32.1 31.4 - 35.0 g/dL    RDW 12.9 11.9 - 14.6 %    PLATELET 368 339 - 218 K/uL    MPV 11.1 9.4 - 12.3 FL    ABSOLUTE NRBC 0.00 0.0 - 0.2 K/uL   METABOLIC PANEL, BASIC    Collection Time: 07/17/19  3:30 AM   Result Value Ref Range    Sodium 147 (H) 136 - 145 mmol/L    Potassium 3.5 3.5 - 5.1 mmol/L    Chloride 117 (H) 98 - 107 mmol/L    CO2 21 21 - 32 mmol/L    Anion gap 9 7 - 16 mmol/L    Glucose 94 65 - 100 mg/dL    BUN 54 (H) 6 - 23 MG/DL    Creatinine 3.00 (H) 0.8 - 1.5 MG/DL    GFR est AA 29 (L) >60 ml/min/1.73m2    GFR est non-AA 24 (L) >60 ml/min/1.73m2    Calcium 8.2 (L) 8.3 - 10.4 MG/DL   MAGNESIUM    Collection Time: 07/18/19  4:10 AM   Result Value Ref Range    Magnesium 2.8 (H) 1.8 - 2.4 mg/dL   CBC W/O DIFF    Collection Time: 07/18/19  4:10 AM   Result Value Ref Range    WBC 15.1 (H) 4.3 - 11.1 K/uL    RBC 2.83 (L) 4.23 - 5.6 M/uL    HGB 8.2 (L) 13.6 - 17.2 g/dL    HCT 24.9 (L) 41.1 - 50.3 %    MCV 88.0 79.6 - 97.8 FL    MCH 29.0 26.1 - 32.9 PG    MCHC 32.9 31.4 - 35.0 g/dL    RDW 12.5 11.9 - 14.6 %    PLATELET 480 272 - 936 K/uL    MPV 11.1 9.4 - 12.3 FL    ABSOLUTE NRBC 0.00 0.0 - 0.2 K/uL   METABOLIC PANEL, BASIC    Collection Time: 07/18/19  4:10 AM   Result Value Ref Range    Sodium 143 136 - 145 mmol/L    Potassium 3.3 (L) 3.5 - 5.1 mmol/L    Chloride 111 (H) 98 - 107 mmol/L    CO2 21 21 - 32 mmol/L    Anion gap 11 7 - 16 mmol/L    Glucose 94 65 - 100 mg/dL    BUN 51 (H) 6 - 23 MG/DL    Creatinine 2.93 (H) 0.8 - 1.5 MG/DL    GFR est AA 30 (L) >60 ml/min/1.73m2    GFR est non-AA 25 (L) >60 ml/min/1.73m2    Calcium 7.9 (L) 8.3 - 10.4 MG/DL   POTASSIUM    Collection Time: 07/18/19 10:58 AM   Result Value Ref Range    Potassium 2.1 (LL) 3.5 - 5.1 mmol/L   POTASSIUM    Collection Time: 07/18/19 11:54 AM   Result Value Ref Range    Potassium 3.1 (L) 3.5 - 5.1 mmol/L   GLUCOSE, POC    Collection Time: 07/18/19  2:56 PM   Result Value Ref Range    Glucose (POC) 93 65 - 100 mg/dL   POTASSIUM    Collection Time: 07/18/19  4:45 PM   Result Value Ref Range    Potassium 3.4 (L) 3.5 - 5.1 mmol/L   POTASSIUM    Collection Time: 07/18/19 10:26 PM   Result Value Ref Range    Potassium 3.2 (L) 3.5 - 5.1 mmol/L   MAGNESIUM    Collection Time: 07/19/19  2:59 AM   Result Value Ref Range    Magnesium 2.8 (H) 1.8 - 2.4 mg/dL   CBC W/O DIFF    Collection Time: 07/19/19  2:59 AM   Result Value Ref Range    WBC 15.2 (H) 4.3 - 11.1 K/uL    RBC 2.93 (L) 4.23 - 5.6 M/uL    HGB 8.6 (L) 13.6 - 17.2 g/dL    HCT 25.5 (L) 41.1 - 50.3 %    MCV 87.0 79.6 - 97.8 FL    MCH 29.4 26.1 - 32.9 PG    MCHC 33.7 31.4 - 35.0 g/dL    RDW 12.4 11.9 - 14.6 %    PLATELET 005 514 - 164 K/uL    MPV 11.0 9.4 - 12.3 FL    ABSOLUTE NRBC 0.00 0.0 - 0.2 K/uL   METABOLIC PANEL, BASIC    Collection Time: 07/19/19  2:59 AM   Result Value Ref Range    Sodium 140 136 - 145 mmol/L    Potassium 3.4 (L) 3.5 - 5.1 mmol/L    Chloride 109 (H) 98 - 107 mmol/L    CO2 22 21 - 32 mmol/L    Anion gap 9 7 - 16 mmol/L    Glucose 92 65 - 100 mg/dL    BUN 48 (H) 6 - 23 MG/DL    Creatinine 3.04 (H) 0.8 - 1.5 MG/DL    GFR est AA 29 (L) >60 ml/min/1.73m2    GFR est non-AA 24 (L) >60 ml/min/1.73m2    Calcium 7.8 (L) 8.3 - 10.4 MG/DL   POTASSIUM    Collection Time: 07/19/19  9:52 AM   Result Value Ref Range    Potassium 3.6 3.5 - 5.1 mmol/L        Assessment:     Principal Problem:    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)    Active Problems:    Tobacco abuse (7/10/2019)      Acute respiratory failure with hypoxia (HCC) (7/10/2019)      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

## 2019-07-19 NOTE — PROGRESS NOTES
Pt is insured with [employer] [place], liaison with IRC/[facility] aware and will start precert when pt ready for d/c. They are continue to follow. CM will continue to follow for d/c needs.

## 2019-07-19 NOTE — INTERDISCIPLINARY ROUNDS
Interdisciplinary team rounds were held 7/19/2019 with the following team members:Cardiac Rehab and Wellness, Nursing, Nurse Practitioner, Nutrition, Occupational Therapy, Outcomes Management, Palliative Care, Pastoral Care, Patient Relations, Pharmacy, Physical Therapy, Physician, Respiratory Therapy, Speech Therapy and Clinical Coordinator and the patient. Plan of care discussed. See clinical pathway and/or care plan for interventions and desired outcomes.

## 2019-07-19 NOTE — PROGRESS NOTES
Nutrition F/U:  Calorie Count. Assessment:  Weight 85 kg (ICU bed 7/18/19), edema - 1+ generalized. Oral intake remains poor. Labs are remarkable for sodium 140, potassium 3.4, BUN 48 and creatinine 3.04. Last reported bowel movement was this morning. Macronutrient Needs:  Estimated calorie needs - 9327-9474 jayce/day (25-28 jayce/kg/day)   Estimated protein needs - 69-83 gm pro/day (1-1.2 gm pro/kgIBW/day)   Max CHO/day - 266 gm CHO/day (55% jayce/day)   Fluid/day - 1.7-2 liters/day (1 ml/jayce/day)  Intake/Comparative Standards:  Calorie count results for yesterday (7/18/19): 2 meals recorded (lunch and dinner) - no intake at lunch and 300 calories and 17 gm protein at dinner - grossly inadequate. No intake of Ensure reported. Diagnosis:  Inadequate energy intake related to lack of appetite and hunger trigger as evidenced by poor oral intake. Intervention:   Meals and Snacks: Mechanical soft based on ST evaluation. Medical Food Supplement Therapy: Commercial Beverage: Ensure Enlive (350 calories, 20 gm protein per bottle) with all trays. Mineral Supplement Therapy: Nutrition Support Orders/Electrolyte Management Replacement Protocols are active on the [facility] for potassium only. Coordination of Nutrition Care by a Nutrition Professional: AM ICU rounds, collaboration with JOHANNA Harrison. Nutrition Discharge Plan: Too soon to determine. [place] Councilman.  Mary Anne Coleman  [phone]

## 2019-07-19 NOTE — PROGRESS NOTES
Shift Report received from [facility]. Dual neuro performed. VS within MD defined limits.

## 2019-07-19 NOTE — PROGRESS NOTES
Problem: Mobility Impaired (Adult and Pediatric)  Goal: *Acute Goals and Plan of Care (Insert Text)  Description  STG:  (1.)Mr. Alina Patrick will move from supine to sit and sit to supine , scoot up and down and roll side to side with CONTACT GUARD ASSIST within 3 treatment day(s). (2.)Mr. Alina Patrick will transfer from bed to chair and chair to bed with CONTACT GUARD ASSIST using the least restrictive device within 3 treatment day(s). (3.)Mr. Alina Patrick will ambulate with MINIMAL ASSIST for 50+ feet with the least restrictive device within 3 treatment day(s). (4.)Mr. Alina Patrick will perform standing static and dynamic balance activities x 15 minutes with MINIMAL ASSIST to improve safety within 3 day(s). (5.)Mr. Alina Patrick will maintain stable vital signs throughout all functional mobility within 3 days. LTG:  (1.)Mr. Alina Patrick will move from supine to sit and sit to supine , scoot up and down and roll side to side in bed with SUPERVISION within 7 treatment day(s). (2.)Mr. Alina Patrick will transfer from bed to chair and chair to bed with STAND BY ASSIST using the least restrictive device within 7 treatment day(s). (3.)Mr. Alina Patrick will ambulate with STAND BY ASSIST for 75+ feet with the least restrictive device within 7 treatment day(s). (4.)Mr. Alina Patrick will perform standing static and dynamic balance activities x 15 minutes with STAND BY ASSIST to improve safety within 7 day(s).   ________________________________________________________________________________________________     Outcome: Progressing Towards Goal     PHYSICAL THERAPY: Daily Note 7/19/2019  INPATIENT: PT Visit Days : 6  Payor: Toni Scott / Plan: [facility] / Product Type: PPO /       NAME/AGE/GENDER: Bryan Holloway is a 37 y.o. 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 ASSESSMENT:     Mr. Malgorzata Huitron is a 37 y.o. Male admitted for subarachnoid hemorrhage in bed in ICU and agreeable to physical therapy treatment. Uncle and father at bedside. He required additional time for all mobility, but follows commands with delayed response. Patient ambulated 75'x3 with RW and very slow gait pattern with continued slightly decreased R side awareness. When instructed to turn right for a full [place], he only turned right. When questioned he admitted it was half a [place]. Increased risk of falling, especially as he fatigues requires more assist for ambulation with increased instability noted of RLE with fatigue. Chair follow for safety and patient required multiple rest breaks during ambulation due to fatigue. Requires frequent cues for posture, balance, r hand use of rolling walker. Worked on standing balance activities at bedside. Patient able to shift weight Lisa and maintain fair balance. He is progressing well towards his goals, however he is still well below his baseline. Will continue therapy efforts. This section established at most recent assessment   PROBLEM LIST (Impairments causing functional limitations):  1. Decreased Strength  2. Decreased ADL/Functional Activities  3. Decreased Transfer Abilities  4. Decreased Ambulation Ability/Technique  5. Decreased Balance  6. Decreased Activity Tolerance  7. Decreased Pacing Skills  8. Increased Shortness of Breath  9. Decreased Knowledge of Precautions  10. Decreased [place] with Home Exercise Program   INTERVENTIONS PLANNED: (Benefits and precautions of physical therapy have been discussed with the patient.)  1. Balance Exercise  2. Bed Mobility  3. Family Education  4. Gait Training  5. Home Exercise Program (HEP)  6. Therapeutic Activites  7.  Therapeutic Exercise/Strengthening  8. Transfer Training     TREATMENT PLAN: Frequency/Duration: 3 times a week for duration of hospital stay  Rehabilitation Potential For Stated Goals: Good     REHAB RECOMMENDATIONS (at time of discharge pending progress):    Placement: It is my opinion, based on this patient's performance to date, that Mr. Sarah Heredia may benefit from intensive therapy at an [address] [place] [address] after discharge due to a probable need for close medical supervision by a rehab physician, a probable need for 24 hour rehab nursing, a probable need for multiple therapy disciplines and potential to make ongoing and sustainable functional improvement that is of practical value. .  Equipment:    None at this time              HISTORY:   History of Present Injury/Illness (Reason for Referral):  Subarachnoid hemorrhage. Past Medical History/Comorbidities:   Mr. Sarah Heredia  has no past medical history on file. Mr. Sarah Heredia  has a past surgical history that includes ir emboli intracran lt (7/11/2019). Social History/Living Environment:   Home Environment: Private residence  One/Two Story Residence: Other (Comment)  Living Alone: No  Support Systems: Parent, Child(rohan)  Patient Expects to be Discharged to[de-identified] Unknown  Current DME Used/Available at Home: None  Prior Level of Function/Work/Activity:  Unsure, RN reports patient worked.      Number of Personal Factors/Comorbidities that affect the Plan of Care: 1-2: MODERATE COMPLEXITY   EXAMINATION:   Most Recent Physical Functioning:   Gross Assessment:                  Posture:     Balance:  Sitting: Impaired  Sitting - Static: Good (unsupported)  Sitting - Dynamic: Prop sitting  Standing: Impaired  Standing - Static: Fair  Standing - Dynamic : Fair Bed Mobility:  Supine to Sit: Minimum assistance(with HOB elevated)  Scooting: Contact guard assistance  Duration: 30 Minutes  Wheelchair Mobility:     Transfers:  Sit to Stand: Minimum assistance;Assist x1  Stand to Sit: Contact guard assistance  Gait:     Base of Support: Center of gravity altered  Speed/Aminata: Slow  Step Length: Right shortened;Left shortened  Gait Abnormalities: Decreased step clearance  Distance (ft): 75 Feet (ft)(x3)  Assistive Device: Gait belt;Walker, rolling  Ambulation - Level of Assistance: Contact guard assistance;Minimal assistance  Interventions: Safety awareness training;Verbal cues      Body Structures Involved:  1. Nerves  2. Heart  3. Lungs  4. Muscles Body Functions Affected:  1. Sensory/Pain  2. Voice and Speech  3. Cardio  4. Respiratory  5. Neuromusculoskeletal  6. Movement Related Activities and Participation Affected:  1. Learning and Applying Knowledge  2. General Tasks and Demands  3. Communication  4. Mobility  5. Self Care  6. Domestic Life  7. Interpersonal Interactions and Relationships  8. Community, Social and [place]   Number of elements that affect the Plan of Care: 4+: HIGH COMPLEXITY   CLINICAL PRESENTATION:   Presentation: Evolving clinical presentation with changing clinical characteristics: MODERATE COMPLEXITY   CLINICAL DECISION MAKIN [facility] Mobility Inpatient Short Form  How much difficulty does the patient currently have. .. Unable A Lot A Little None   1. Turning over in bed (including adjusting bedclothes, sheets and blankets)? ? 1   ? 2   ? 3   ? 4   2. Sitting down on and standing up from a chair with arms ( e.g., wheelchair, bedside commode, etc.)   ? 1   ? 2   ? 3   ? 4   3. Moving from lying on back to sitting on the side of the bed?   ? 1   ? 2   ? 3   ? 4   How much help from another person does the patient currently need. .. Total A Lot A Little None   4. Moving to and from a bed to a chair (including a wheelchair)? ? 1   ? 2   ? 3   ? 4   5. Need to walk in hospital room? ? 1   ? 2   ? 3   ? 4   6. Climbing 3-5 steps with a railing?    ? 1   ? 2   ? 3   ? 4   © , Trustees of [address] [place] [address] [address] [id number], under license to [employer]. All rights reserved      Score:  Initial: 10 Most Recent: X (Date: -- )    Interpretation of Tool:  Represents activities that are increasingly more difficult (i.e. Bed mobility, Transfers, Gait). Medical Necessity:     · Patient demonstrates good  ·  rehab potential due to higher previous functional level. Reason for Services/Other Comments:  · Patient continues to require modification of therapeutic interventions to increase complexity of exercises  · . Use of outcome tool(s) and clinical judgement create a POC that gives a: Questionable prediction of patient's progress: MODERATE COMPLEXITY            TREATMENT:   (In addition to Assessment/Re-Assessment sessions the following treatments were rendered)   Pre-treatment Symptoms/Complaints:  none  Pain: Initial:   Pain Intensity 1: 0  Post Session:  0/10     Therapeutic Activity: (30 Minutes   ):  Therapeutic activities including bed transfers, Chair transfers, standing balance activities,  and Ambulation on level ground to improve mobility, strength, balance, coordination and activity tolerance . Required minimal assist to promote static and dynamic balance in standing and promote coordination of bilateral, lower extremity(s).         Date:  7/15/19 Date:   Date:     Activity/Exercise Parameters Parameters Parameters   Seated heel raises x15 B A     Seated toe raises x15 B A     Seated LAQ x15 B A                               Braces/Orthotics/Lines/Etc:   · IV  · phelps catheter  · drain EVD  · arterial line  ·    Treatment/Session Assessment:    · Response to Treatment:  Patient maintained stable vitals  · Interdisciplinary Collaboration:   o Physical Therapist  o Registered Nurse  · After treatment position/precautions:   o Up in chair  o Bed alarm/tab alert on  o Bed/Chair-wheels locked  o Bed in low position  o Call light within reach  o RN notified  o Family at bedside   · Compliance with Program/Exercises: Compliant all of the time  · Recommendations/Intent for next treatment session: \"Next visit will focus on advancements to more challenging activities and reduction in assistance provided\".   Total Treatment Duration:  PT Patient Time In/Time Out  Time In: 1340  Time Out: [address], PT, DPT

## 2019-07-19 NOTE — PROGRESS NOTES
Shift report given to Mary De La Paz RN. Pt resting in bed. Vital signs within MD defined limits with exception of Temp >99 degrees F. Pt had large bowel movement. Pt bathed. Dual neuro assessment performed. NIH= 0. Pt got location wrong but corrected himself. Dual inspection of EVD drain and site (c/d/i).

## 2019-07-19 NOTE — PROGRESS NOTES
Progress Note    Patient: Coery Cassidy MRN: [id number]  SSN: [id number]    YOB: 1975  Age: 37 y.o. Sex: male      Admit Date: 7/9/2019    LOS: 10 days     Subjective:   Pt looks good this am, following commands. EVD @ 10/open and draining wo difficulty.      Current Facility-Administered Medications   Medication Dose Route Frequency    ferrous sulfate tablet 325 mg  1 Tab Oral TID WITH MEALS    levETIRAcetam (KEPPRA) tablet 500 mg  500 mg Oral Q12H    ceFAZolin (ANCEF) 2 g/20 mL in sterile water IV syringe  2 g IntraVENous Q8H    0.45% sodium chloride infusion  100 mL/hr IntraVENous CONTINUOUS    acetaminophen (TYLENOL) tablet 650 mg  650 mg Oral Q4H PRN    ascorbic acid (vitamin C) (VITAMIN C) tablet 500 mg  500 mg Oral TID    aspirin tablet 325 mg  325 mg Oral DAILY    atorvastatin (LIPITOR) tablet 40 mg  40 mg Oral QHS    clopidogrel (PLAVIX) tablet 75 mg  75 mg Oral DAILY    famotidine (PEPCID) tablet 20 mg  20 mg Oral DAILY    magnesium oxide (MAG-OX) tablet 400 mg  400 mg Oral BID    senna-docusate (PERICOLACE) 8.6-50 mg per tablet 2 Tab  2 Tab Oral QHS    ALPRAZolam (XANAX) tablet 0.5 mg  0.5 mg Oral Q6H PRN    magnesium hydroxide (MILK OF MAGNESIA) 400 mg/5 mL oral suspension 30 mL  30 mL Oral DAILY PRN    niMODipine (NIMOTOP) 30 mg/mL oral solution (compound) 60 mg  60 mg Oral Q4H    fentaNYL citrate (PF) injection 50 mcg  50 mcg IntraVENous Q1H PRN    polyvinyl alcohol (LIQUIFILM TEARS) 1.4 % ophthalmic solution 1 Drop  1 Drop Both Eyes PRN    heparin (porcine) injection 5,000 Units  5,000 Units SubCUTAneous Q8H    sodium chloride (NS) flush 30 mL  30 mL InterCATHeter Q8H    heparin (porcine) pf 900 Units  900 Units InterCATHeter Q8H    sodium chloride (NS) flush 30 mL  30 mL InterCATHeter PRN    heparin (porcine) pf 900 Units  900 Units InterCATHeter PRN    nicotine (NICODERM CQ) 21 mg/24 hr patch 1 Patch  1 Patch TransDERmal Q24H    ondansetron (ZOFRAN) injection 4 mg  4 mg IntraVENous Q6H PRN    NUTRITIONAL SUPPORT ELECTROLYTE PRN ORDERS   Does Not Apply PRN    magnesium sulfate 4 g/100 mL IVPB  4 g IntraVENous DAILY PRN    magnesium sulfate 2 g/50 ml IVPB (premix or compounded)  2 g IntraVENous DAILY PRN       Objective:     Vitals:    07/19/19 0934 07/19/19 0945 07/19/19 1000 07/19/19 1015   BP: (!) 221/87  (!) 206/81    Pulse: 80 76 82 80   Resp:  18 14 14   Temp:  100 °F (37.8 °C) 99.8 °F (37.7 °C) 99.6 °F (37.6 °C)   SpO2:  98% 95% 97%   Weight:       Height:             Intake and Output:  Current Shift: 07/19 0701 - 07/19 1900  In: -   Out: 727.5 [Urine:710; Drains:17.5]  Last 24 hr: 07/18 0701 - 07/19 0700  In: 2552.7 [P.O.:120; I.V.:2432.7]  Out: 2400 [LRPEV:9068; Drains:259]       IO: -4141/24hr  TOTAL OVERALL: +6.3L  EVD: 259/24hrs    Physical Exam:   General:  Alert, cooperative, no distress, appears stated age. Eyes:   PERRL, EOMs intact. Neck: Supple, symmetrical, trachea midline, no adenopathy, thyroid: no enlargment/tenderness/nodules, no carotid bruit and no JVD. Lungs:   Clear to auscultation bilaterally. Heart:  Regular rate and rhythm, S1, S2 normal, no murmur, click, rub or gallop. Abdomen:   Soft, non-tender. Bowel sounds normal. No masses,  No organomegaly. Extremities: Extremities normal, atraumatic, + BLE edema +2   Pulses: 2+ and symmetric all extremities. Skin: Skin color, texture, turgor normal. No rashes or lesions   Neurologic:  Extubated and tolerating well. Airway patent. Follows all commands. Lifted arms and legs off the bed.        Lab/Data Review:    Recent Results (from the past 12 hour(s))   MAGNESIUM    Collection Time: 07/19/19  2:59 AM   Result Value Ref Range    Magnesium 2.8 (H) 1.8 - 2.4 mg/dL   CBC W/O DIFF    Collection Time: 07/19/19  2:59 AM   Result Value Ref Range    WBC 15.2 (H) 4.3 - 11.1 K/uL    RBC 2.93 (L) 4.23 - 5.6 M/uL    HGB 8.6 (L) 13.6 - 17.2 g/dL    HCT 25.5 (L) 41.1 - 50.3 %    MCV 87.0 79.6 - 97.8 FL    MCH 29.4 26.1 - 32.9 PG    MCHC 33.7 31.4 - 35.0 g/dL    RDW 12.4 11.9 - 14.6 %    PLATELET 468 392 - 469 K/uL    MPV 11.0 9.4 - 12.3 FL    ABSOLUTE NRBC 0.00 0.0 - 0.2 K/uL   METABOLIC PANEL, BASIC    Collection Time: 07/19/19  2:59 AM   Result Value Ref Range    Sodium 140 136 - 145 mmol/L    Potassium 3.4 (L) 3.5 - 5.1 mmol/L    Chloride 109 (H) 98 - 107 mmol/L    CO2 22 21 - 32 mmol/L    Anion gap 9 7 - 16 mmol/L    Glucose 92 65 - 100 mg/dL    BUN 48 (H) 6 - 23 MG/DL    Creatinine 3.04 (H) 0.8 - 1.5 MG/DL    GFR est AA 29 (L) >60 ml/min/1.73m2    GFR est non-AA 24 (L) >60 ml/min/1.73m2    Calcium 7.8 (L) 8.3 - 10.4 MG/DL   POTASSIUM    Collection Time: 07/19/19  9:52 AM   Result Value Ref Range    Potassium 3.6 3.5 - 5.1 mmol/L       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)    Active Problems:    Tobacco abuse (7/10/2019)      Acute respiratory failure with hypoxia (HCC) (7/10/2019)      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 Q1 hour neuro checks. On SAH protocol - Mg, nimotop, zocor. PERRL +3. No MAP goal now. EVD set to 10cm H20. CT head this am which I reviewed showed improved communicating hydrocephalus and no CVA. CTA head and neck this am which I reviewed showed no filling of the aneurysm and good filling of the PCAs. There is vasospasm in both supraclinoid ICAs. Will continue [facility] protocol, PT/OT. OOB to chair today. /. TCD Right MCA/IDALIA >200, pt autoregulating well and no real deficits noted. RESP: Tolerating RA well. Will dc CPT/breathing tx for now. No resp distress. CV: NO MAP GOAL NOW.  2D Echo: 55-60%, trop peaked @ 0.8, trending down. SCD/SQ heparin. HEME: 8.6/25,  HIT panel negative. Vit C/Iron daily. NEPH: bun/cr: 48/3. 04. K+3.4. GI: Pepcid. Senna. MOM given. Abdomen less distended. +BS. + BM. ST advanced to puree diet today. ID: Tm: 100. Sputum cx sent, pending final, showing gram + cocci -  Patient has MSSA pneumonia. Blood cultures neg so far and UA neg. US neg for DVT. Pt on Cefazolin D4/10. Fevers better. LINES: KISHOR PICC, SHANNAN phelps. Tobacco abuse: On Nicoderm patch.         Signed By: Amy Ruiz NP     July 19, 2019

## 2019-07-20 ENCOUNTER — APPOINTMENT (OUTPATIENT)
Dept: CT IMAGING | Age: 44
DRG: 020 | End: 2019-07-20
Attending: NURSE PRACTITIONER
Payer: COMMERCIAL

## 2019-07-20 LAB
ANION GAP SERPL CALC-SCNC: 11 MMOL/L (ref 7–16)
BUN SERPL-MCNC: 40 MG/DL (ref 6–23)
CALCIUM SERPL-MCNC: 8 MG/DL (ref 8.3–10.4)
CHLORIDE SERPL-SCNC: 109 MMOL/L (ref 98–107)
CO2 SERPL-SCNC: 22 MMOL/L (ref 21–32)
CREAT SERPL-MCNC: 3.12 MG/DL (ref 0.8–1.5)
ERYTHROCYTE [DISTWIDTH] IN BLOOD BY AUTOMATED COUNT: 12.6 % (ref 11.9–14.6)
GLUCOSE SERPL-MCNC: 90 MG/DL (ref 65–100)
HCT VFR BLD AUTO: 25.2 % (ref 41.1–50.3)
HGB BLD-MCNC: 8.4 G/DL (ref 13.6–17.2)
MAGNESIUM SERPL-MCNC: 2.6 MG/DL (ref 1.8–2.4)
MCH RBC QN AUTO: 29 PG (ref 26.1–32.9)
MCHC RBC AUTO-ENTMCNC: 33.3 G/DL (ref 31.4–35)
MCV RBC AUTO: 86.9 FL (ref 79.6–97.8)
NRBC # BLD: 0 K/UL (ref 0–0.2)
PLATELET # BLD AUTO: 294 K/UL (ref 150–450)
PMV BLD AUTO: 10.9 FL (ref 9.4–12.3)
POTASSIUM SERPL-SCNC: 3.2 MMOL/L (ref 3.5–5.1)
POTASSIUM SERPL-SCNC: 3.5 MMOL/L (ref 3.5–5.1)
RBC # BLD AUTO: 2.9 M/UL (ref 4.23–5.6)
SODIUM SERPL-SCNC: 142 MMOL/L (ref 136–145)
WBC # BLD AUTO: 14.2 K/UL (ref 4.3–11.1)

## 2019-07-20 PROCEDURE — 65610000001 HC ROOM ICU GENERAL

## 2019-07-20 PROCEDURE — 70450 CT HEAD/BRAIN W/O DYE: CPT

## 2019-07-20 PROCEDURE — 99232 SBSQ HOSP IP/OBS MODERATE 35: CPT | Performed by: NURSE PRACTITIONER

## 2019-07-20 PROCEDURE — 0042T CT PERF W CBF: CPT

## 2019-07-20 PROCEDURE — 70496 CT ANGIOGRAPHY HEAD: CPT

## 2019-07-20 PROCEDURE — 77030020263 HC SOL INJ SOD CL0.9% LFCR 1000ML

## 2019-07-20 PROCEDURE — 74011250637 HC RX REV CODE- 250/637

## 2019-07-20 PROCEDURE — 84132 ASSAY OF SERUM POTASSIUM: CPT

## 2019-07-20 PROCEDURE — 74011000250 HC RX REV CODE- 250: Performed by: NURSE PRACTITIONER

## 2019-07-20 PROCEDURE — 74011250636 HC RX REV CODE- 250/636: Performed by: NURSE PRACTITIONER

## 2019-07-20 PROCEDURE — 74011636320 HC RX REV CODE- 636/320: Performed by: NEUROLOGICAL SURGERY

## 2019-07-20 PROCEDURE — 85027 COMPLETE CBC AUTOMATED: CPT

## 2019-07-20 PROCEDURE — 80048 BASIC METABOLIC PNL TOTAL CA: CPT

## 2019-07-20 PROCEDURE — 74011000258 HC RX REV CODE- 258: Performed by: NURSE PRACTITIONER

## 2019-07-20 PROCEDURE — 83735 ASSAY OF MAGNESIUM: CPT

## 2019-07-20 PROCEDURE — 74011250637 HC RX REV CODE- 250/637: Performed by: NURSE PRACTITIONER

## 2019-07-20 PROCEDURE — 77030032490 HC SLV COMPR SCD KNE COVD -B

## 2019-07-20 PROCEDURE — 74011000258 HC RX REV CODE- 258: Performed by: NEUROLOGICAL SURGERY

## 2019-07-20 PROCEDURE — 74011250636 HC RX REV CODE- 250/636

## 2019-07-20 PROCEDURE — 77030020257 HC SOL INJ SOD CL 0.45% 1000ML BG

## 2019-07-20 PROCEDURE — 74011250637 HC RX REV CODE- 250/637: Performed by: NEUROLOGICAL SURGERY

## 2019-07-20 PROCEDURE — 93886 INTRACRANIAL COMPLETE STUDY: CPT

## 2019-07-20 RX ORDER — HYDROMORPHONE HYDROCHLORIDE 1 MG/ML
1 INJECTION, SOLUTION INTRAMUSCULAR; INTRAVENOUS; SUBCUTANEOUS ONCE
Status: ACTIVE | OUTPATIENT
Start: 2019-07-20 | End: 2019-07-20

## 2019-07-20 RX ORDER — DEXAMETHASONE SODIUM PHOSPHATE 100 MG/10ML
10 INJECTION INTRAMUSCULAR; INTRAVENOUS ONCE
Status: COMPLETED | OUTPATIENT
Start: 2019-07-20 | End: 2019-07-20

## 2019-07-20 RX ORDER — LIDOCAINE 4 G/100G
1 PATCH TOPICAL EVERY 24 HOURS
Status: DISCONTINUED | OUTPATIENT
Start: 2019-07-20 | End: 2019-08-05 | Stop reason: HOSPADM

## 2019-07-20 RX ORDER — POTASSIUM CHLORIDE 14.9 MG/ML
20 INJECTION INTRAVENOUS ONCE
Status: COMPLETED | OUTPATIENT
Start: 2019-07-20 | End: 2019-07-20

## 2019-07-20 RX ORDER — DIAZEPAM 5 MG/1
5 TABLET ORAL EVERY 6 HOURS
Status: DISCONTINUED | OUTPATIENT
Start: 2019-07-20 | End: 2019-07-20

## 2019-07-20 RX ORDER — HYDROMORPHONE HYDROCHLORIDE 1 MG/ML
INJECTION, SOLUTION INTRAMUSCULAR; INTRAVENOUS; SUBCUTANEOUS
Status: COMPLETED
Start: 2019-07-20 | End: 2019-07-20

## 2019-07-20 RX ORDER — HYDROMORPHONE HYDROCHLORIDE 1 MG/ML
1 INJECTION, SOLUTION INTRAMUSCULAR; INTRAVENOUS; SUBCUTANEOUS ONCE
Status: COMPLETED | OUTPATIENT
Start: 2019-07-20 | End: 2019-07-20

## 2019-07-20 RX ORDER — SODIUM CHLORIDE 0.9 % (FLUSH) 0.9 %
10 SYRINGE (ML) INJECTION
Status: COMPLETED | OUTPATIENT
Start: 2019-07-21 | End: 2019-07-20

## 2019-07-20 RX ORDER — DIAZEPAM 10 MG/2ML
5 INJECTION INTRAMUSCULAR ONCE
Status: DISCONTINUED | OUTPATIENT
Start: 2019-07-20 | End: 2019-07-20

## 2019-07-20 RX ORDER — HYDROMORPHONE HYDROCHLORIDE 1 MG/ML
1 INJECTION, SOLUTION INTRAMUSCULAR; INTRAVENOUS; SUBCUTANEOUS
Status: DISCONTINUED | OUTPATIENT
Start: 2019-07-20 | End: 2019-07-20

## 2019-07-20 RX ORDER — FENTANYL CITRATE 50 UG/ML
50 INJECTION, SOLUTION INTRAMUSCULAR; INTRAVENOUS
Status: DISCONTINUED | OUTPATIENT
Start: 2019-07-20 | End: 2019-08-03

## 2019-07-20 RX ADMIN — FERROUS SULFATE TAB 325 MG (65 MG ELEMENTAL FE) 325 MG: 325 (65 FE) TAB at 09:14

## 2019-07-20 RX ADMIN — ACETAMINOPHEN 650 MG: 325 TABLET, FILM COATED ORAL at 14:10

## 2019-07-20 RX ADMIN — CLOPIDOGREL BISULFATE 75 MG: 75 TABLET ORAL at 09:14

## 2019-07-20 RX ADMIN — SODIUM CHLORIDE, PRESERVATIVE FREE 900 UNITS: 5 INJECTION INTRAVENOUS at 21:41

## 2019-07-20 RX ADMIN — Medication 30 ML: at 06:15

## 2019-07-20 RX ADMIN — Medication 30 ML: at 21:41

## 2019-07-20 RX ADMIN — OXYCODONE HYDROCHLORIDE AND ACETAMINOPHEN 500 MG: 500 TABLET ORAL at 17:04

## 2019-07-20 RX ADMIN — HYDROMORPHONE HYDROCHLORIDE 1 MG: 1 INJECTION, SOLUTION INTRAMUSCULAR; INTRAVENOUS; SUBCUTANEOUS at 07:16

## 2019-07-20 RX ADMIN — NIMODIPINE 60 MG: 30 CAPSULE, LIQUID FILLED ORAL at 12:07

## 2019-07-20 RX ADMIN — LEVETIRACETAM 500 MG: 500 TABLET, FILM COATED ORAL at 20:07

## 2019-07-20 RX ADMIN — MAGNESIUM OXIDE TAB 400 MG (241.3 MG ELEMENTAL MG) 400 MG: 400 (241.3 MG) TAB at 09:14

## 2019-07-20 RX ADMIN — OXYCODONE HYDROCHLORIDE AND ACETAMINOPHEN 500 MG: 500 TABLET ORAL at 09:14

## 2019-07-20 RX ADMIN — FENTANYL CITRATE 50 MCG: 50 INJECTION INTRAMUSCULAR; INTRAVENOUS at 23:03

## 2019-07-20 RX ADMIN — IOPAMIDOL 100 ML: 755 INJECTION, SOLUTION INTRAVENOUS at 23:33

## 2019-07-20 RX ADMIN — FENTANYL CITRATE 50 MCG: 50 INJECTION INTRAMUSCULAR; INTRAVENOUS at 19:57

## 2019-07-20 RX ADMIN — Medication 10 ML: at 23:33

## 2019-07-20 RX ADMIN — HYDROMORPHONE HYDROCHLORIDE 1 MG: 1 INJECTION, SOLUTION INTRAMUSCULAR; INTRAVENOUS; SUBCUTANEOUS at 10:32

## 2019-07-20 RX ADMIN — ASPIRIN 325 MG ORAL TABLET 325 MG: 325 PILL ORAL at 09:14

## 2019-07-20 RX ADMIN — Medication 30 ML: at 14:00

## 2019-07-20 RX ADMIN — FAMOTIDINE 20 MG: 20 TABLET ORAL at 09:14

## 2019-07-20 RX ADMIN — LEVETIRACETAM 500 MG: 500 TABLET, FILM COATED ORAL at 09:14

## 2019-07-20 RX ADMIN — SODIUM CHLORIDE 100 ML/HR: 450 INJECTION, SOLUTION INTRAVENOUS at 23:47

## 2019-07-20 RX ADMIN — SODIUM CHLORIDE 100 ML: 900 INJECTION, SOLUTION INTRAVENOUS at 23:33

## 2019-07-20 RX ADMIN — ATORVASTATIN CALCIUM 40 MG: 40 TABLET, FILM COATED ORAL at 21:37

## 2019-07-20 RX ADMIN — NIMODIPINE 60 MG: 30 CAPSULE, LIQUID FILLED ORAL at 09:15

## 2019-07-20 RX ADMIN — HEPARIN SODIUM 5000 UNITS: 5000 INJECTION INTRAVENOUS; SUBCUTANEOUS at 06:14

## 2019-07-20 RX ADMIN — DIAZEPAM 5 MG: 5 TABLET ORAL at 09:14

## 2019-07-20 RX ADMIN — Medication 2 G: at 06:12

## 2019-07-20 RX ADMIN — FENTANYL CITRATE 50 MCG: 50 INJECTION INTRAMUSCULAR; INTRAVENOUS at 04:04

## 2019-07-20 RX ADMIN — POTASSIUM CHLORIDE 20 MEQ: 200 INJECTION, SOLUTION INTRAVENOUS at 05:09

## 2019-07-20 RX ADMIN — Medication 2 G: at 21:41

## 2019-07-20 RX ADMIN — FERROUS SULFATE TAB 325 MG (65 MG ELEMENTAL FE) 325 MG: 325 (65 FE) TAB at 17:05

## 2019-07-20 RX ADMIN — POTASSIUM CHLORIDE 20 MEQ: 200 INJECTION, SOLUTION INTRAVENOUS at 12:53

## 2019-07-20 RX ADMIN — DEXAMETHASONE SODIUM PHOSPHATE 10 MG: 10 INJECTION INTRAMUSCULAR; INTRAVENOUS at 09:12

## 2019-07-20 RX ADMIN — OXYCODONE HYDROCHLORIDE AND ACETAMINOPHEN 500 MG: 500 TABLET ORAL at 21:37

## 2019-07-20 RX ADMIN — NIMODIPINE 60 MG: 30 CAPSULE, LIQUID FILLED ORAL at 04:18

## 2019-07-20 RX ADMIN — ACETAMINOPHEN 650 MG: 325 TABLET, FILM COATED ORAL at 03:05

## 2019-07-20 RX ADMIN — Medication 2 G: at 16:18

## 2019-07-20 RX ADMIN — SODIUM CHLORIDE, PRESERVATIVE FREE 900 UNITS: 5 INJECTION INTRAVENOUS at 14:10

## 2019-07-20 RX ADMIN — NIMODIPINE 60 MG: 30 CAPSULE, LIQUID FILLED ORAL at 16:57

## 2019-07-20 RX ADMIN — NIMODIPINE 60 MG: 30 CAPSULE, LIQUID FILLED ORAL at 20:07

## 2019-07-20 RX ADMIN — HEPARIN SODIUM 5000 UNITS: 5000 INJECTION INTRAVENOUS; SUBCUTANEOUS at 21:39

## 2019-07-20 RX ADMIN — FENTANYL CITRATE 50 MCG: 50 INJECTION INTRAMUSCULAR; INTRAVENOUS at 18:43

## 2019-07-20 RX ADMIN — MAGNESIUM OXIDE TAB 400 MG (241.3 MG ELEMENTAL MG) 400 MG: 400 (241.3 MG) TAB at 17:04

## 2019-07-20 RX ADMIN — SODIUM CHLORIDE, PRESERVATIVE FREE 900 UNITS: 5 INJECTION INTRAVENOUS at 06:15

## 2019-07-20 RX ADMIN — FERROUS SULFATE TAB 325 MG (65 MG ELEMENTAL FE) 325 MG: 325 (65 FE) TAB at 12:52

## 2019-07-20 RX ADMIN — HEPARIN SODIUM 5000 UNITS: 5000 INJECTION INTRAVENOUS; SUBCUTANEOUS at 14:10

## 2019-07-20 RX ADMIN — FENTANYL CITRATE 50 MCG: 50 INJECTION INTRAMUSCULAR; INTRAVENOUS at 21:38

## 2019-07-20 NOTE — PROGRESS NOTES
Bedside report given to [facility]. [facility] completed and documented.

## 2019-07-20 NOTE — PROGRESS NOTES
Progress Note    Patient: Evie Robles MRN: [id number]  SSN: [id number]    YOB: 1975  Age: 37 y.o. Sex: male      Admit Date: 7/9/2019    LOS: 11 days     Subjective:   Pt with posterior neck pain not responsive to Fentanyl PRN. Pt with no neuro changes. EVD remains at 10/open. Pt given dilaudid 1mg IV and responded well, will give today PRN for pain, monitor bun/cr, pt with baseline CKD. Will add valilum 5mg every 6hrs to see if helps post neck pain.      Current Facility-Administered Medications   Medication Dose Route Frequency    HYDROmorphone (PF) (DILAUDID) injection 1 mg  1 mg IntraVENous ONCE    HYDROmorphone (PF) (DILAUDID) injection 1 mg  1 mg IntraVENous Q2H PRN    diazePAM (VALIUM) tablet 5 mg  5 mg Oral Q6H    ferrous sulfate tablet 325 mg  1 Tab Oral TID WITH MEALS    levETIRAcetam (KEPPRA) tablet 500 mg  500 mg Oral Q12H    niMODipine (NIMOTOP) 30 mg/mL oral solution (compound) 60 mg  60 mg Oral Q4H    ceFAZolin (ANCEF) 2 g/20 mL in sterile water IV syringe  2 g IntraVENous Q8H    0.45% sodium chloride infusion  100 mL/hr IntraVENous CONTINUOUS    acetaminophen (TYLENOL) tablet 650 mg  650 mg Oral Q4H PRN    ascorbic acid (vitamin C) (VITAMIN C) tablet 500 mg  500 mg Oral TID    aspirin tablet 325 mg  325 mg Oral DAILY    atorvastatin (LIPITOR) tablet 40 mg  40 mg Oral QHS    clopidogrel (PLAVIX) tablet 75 mg  75 mg Oral DAILY    famotidine (PEPCID) tablet 20 mg  20 mg Oral DAILY    magnesium oxide (MAG-OX) tablet 400 mg  400 mg Oral BID    senna-docusate (PERICOLACE) 8.6-50 mg per tablet 2 Tab  2 Tab Oral QHS    magnesium hydroxide (MILK OF MAGNESIA) 400 mg/5 mL oral suspension 30 mL  30 mL Oral DAILY PRN    polyvinyl alcohol (LIQUIFILM TEARS) 1.4 % ophthalmic solution 1 Drop  1 Drop Both Eyes PRN    heparin (porcine) injection 5,000 Units  5,000 Units SubCUTAneous Q8H    sodium chloride (NS) flush 30 mL  30 mL InterCATHeter Q8H    heparin (porcine) pf 900 Units  900 Units InterCATHeter Q8H    sodium chloride (NS) flush 30 mL  30 mL InterCATHeter PRN    heparin (porcine) pf 900 Units  900 Units InterCATHeter PRN    nicotine (NICODERM CQ) 21 mg/24 hr patch 1 Patch  1 Patch TransDERmal Q24H    ondansetron (ZOFRAN) injection 4 mg  4 mg IntraVENous Q6H PRN    NUTRITIONAL SUPPORT ELECTROLYTE PRN ORDERS   Does Not Apply PRN    magnesium sulfate 4 g/100 mL IVPB  4 g IntraVENous DAILY PRN    magnesium sulfate 2 g/50 ml IVPB (premix or compounded)  2 g IntraVENous DAILY PRN       Objective:     Vitals:    07/20/19 0418 07/20/19 0500 07/20/19 0559 07/20/19 0600   BP: (!) 206/79      Pulse: 86 100 98 98   Resp:  11 19 19   Temp:  98.9 °F (37.2 °C) 98.4 °F (36.9 °C) 98.2 °F (36.8 °C)   SpO2:  94% 95% 96%   Weight:       Height:             Intake and Output:  Current Shift: No intake/output data recorded. Last 24 hr: 07/19 0701 - 07/20 0700  In: 2485 [P.O.:120; I.V.:2365]  Out: 5208.5 [Urine:4970; Drains:238.5]     IO: +2490/24hrs  TOTAL: +13L overall  EVD: 295/24hrs. IO: -1717/24hr  TOTAL OVERALL: +12.9L  EVD: 322/24hrs    Physical Exam:   General:  Alert, cooperative, no distress, + post neck pain. Eyes:   PERRL, EOMs intact. Neck: Supple, symmetrical, trachea midline, no adenopathy, thyroid: no enlargment/tenderness/nodules, no carotid bruit and no JVD. Lungs:   Clear to auscultation bilaterally. Heart:  Regular rate and rhythm, S1, S2 normal, no murmur, click, rub or gallop. Abdomen:   Soft, non-tender. Bowel sounds normal. No masses,  No organomegaly. Extremities: Extremities normal, atraumatic, no cyanosis or edema. Pulses: 2+ and symmetric all extremities. Skin: Skin color, texture, turgor normal. No rashes or lesions   Neurologic:  pt aox3 this am, pain to posterior neck 10/10, will change to dilaudid and add valium to see if any relief. Monitor for any acute neuro changes/vasospasm.        Lab/Data Review:    Recent Results (from the past 15 hour(s))   MAGNESIUM    Collection Time: 07/20/19  3:06 AM   Result Value Ref Range    Magnesium 2.6 (H) 1.8 - 2.4 mg/dL   CBC W/O DIFF    Collection Time: 07/20/19  3:06 AM   Result Value Ref Range    WBC 14.2 (H) 4.3 - 11.1 K/uL    RBC 2.90 (L) 4.23 - 5.6 M/uL    HGB 8.4 (L) 13.6 - 17.2 g/dL    HCT 25.2 (L) 41.1 - 50.3 %    MCV 86.9 79.6 - 97.8 FL    MCH 29.0 26.1 - 32.9 PG    MCHC 33.3 31.4 - 35.0 g/dL    RDW 12.6 11.9 - 14.6 %    PLATELET 034 937 - 943 K/uL    MPV 10.9 9.4 - 12.3 FL    ABSOLUTE NRBC 0.00 0.0 - 0.2 K/uL   METABOLIC PANEL, BASIC    Collection Time: 07/20/19  3:06 AM   Result Value Ref Range    Sodium 142 136 - 145 mmol/L    Potassium 3.2 (L) 3.5 - 5.1 mmol/L    Chloride 109 (H) 98 - 107 mmol/L    CO2 22 21 - 32 mmol/L    Anion gap 11 7 - 16 mmol/L    Glucose 90 65 - 100 mg/dL    BUN 40 (H) 6 - 23 MG/DL    Creatinine 3.12 (H) 0.8 - 1.5 MG/DL    GFR est AA 28 (L) >60 ml/min/1.73m2    GFR est non-AA 23 (L) >60 ml/min/1.73m2    Calcium 8.0 (L) 8.3 - 10.4 MG/DL       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)    Active Problems:    Tobacco abuse (7/10/2019)      Acute respiratory failure with hypoxia (HCC) (7/10/2019)      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 Q1 hour neuro checks. On SAH protocol - Mg, nimotop, zocor. PERRL +3. No MAP goal now. TCDs daily - they have been 180-220 in the left and right MCAs . EVD set to 10cm H20. Last CT head showed improved communicating hydrocephalus.  Last CTA head and neck showed no filling of the aneurysm and good filling of the PCAs. Will continue [facility] protocol, PT/OT. OOB to chair today. /. Cont to autoregulate well, but increased post neck pain this am. Will monitor and neuro changes with low threshold for imaging to ensure no increase vasospasm. SBP >200 this am via artline. Pt following commands. RESP: Tolerating RA well. Will dc CPT/breathing tx for now. CXR showed some edema. Will hold off on diuresis for now, monitor. CV: NO MAP GOAL NOW.  2D Echo: 55-60%, trop peaked @ 0.8, trending down. SCD/SQ heparin. HEME: 8.4/25,  HIT panel negative. NEPH: bun/cr: 40/3.2  K+3.2. Fluids changed to 1/2 NS. GI: Pepcid. Senna. MOM given. Abdomen less distended. +BS. + BM. ST advanced to puree diet today. ID: Tm: [id number]. Sputum cx sent, pending final, showing gram + cocci -  Patient has MSSA pneumonia. Blood cultures neg so far and UA neg. US neg for DVT. Pt on Cefazolin D5/10. Fevers better. LINES: RUE PICC, SHANNAN phelps. Tobacco abuse: On Nicoderm patch.         Signed By: Isaiah David NP     July 20, 2019

## 2019-07-20 NOTE — PROGRESS NOTES
Bedside shift change report given to Dorcas Zurita RN (oncoming nurse) by JOHANNA Harrison (offgoing nurse). Report included the following information SBAR, Kardex, ED Summary, Intake/Output, MAR, Recent Results, Cardiac Rhythm NSR and Alarm Parameters . Dual neuro assessment completed with shift change.

## 2019-07-20 NOTE — PROGRESS NOTES
Pt c/o severe neck pain despite receiving PRN Fentanyl 50mcg. ICP waveform dampened. EVD remains intact and continues to drain. No neuro changes. Izabella Palomares NP notified. No new orders at this time.

## 2019-07-21 ENCOUNTER — APPOINTMENT (OUTPATIENT)
Dept: CT IMAGING | Age: 44
DRG: 020 | End: 2019-07-21
Attending: NURSE PRACTITIONER
Payer: COMMERCIAL

## 2019-07-21 LAB
ANION GAP SERPL CALC-SCNC: 9 MMOL/L (ref 7–16)
ARTERIAL PATENCY WRIST A: ABNORMAL
ARTERIAL PATENCY WRIST A: ABNORMAL
BASE DEFICIT BLD-SCNC: 3 MMOL/L
BASE DEFICIT BLD-SCNC: 4 MMOL/L
BDY SITE: ABNORMAL
BDY SITE: ABNORMAL
BODY TEMPERATURE: 98.6
BODY TEMPERATURE: 98.6
BUN SERPL-MCNC: 33 MG/DL (ref 6–23)
CALCIUM SERPL-MCNC: 7.9 MG/DL (ref 8.3–10.4)
CHLORIDE SERPL-SCNC: 106 MMOL/L (ref 98–107)
CO2 BLD-SCNC: 21 MMOL/L
CO2 BLD-SCNC: 22 MMOL/L
CO2 SERPL-SCNC: 25 MMOL/L (ref 21–32)
COLLECT TIME,HTIME: 102
COLLECT TIME,HTIME: 207
CREAT SERPL-MCNC: 3.19 MG/DL (ref 0.8–1.5)
ERYTHROCYTE [DISTWIDTH] IN BLOOD BY AUTOMATED COUNT: 12.8 % (ref 11.9–14.6)
FLOW RATE ISTAT,IFRATE: 7 L/MIN
GAS FLOW.O2 O2 DELIVERY SYS: ABNORMAL L/MIN
GAS FLOW.O2 O2 DELIVERY SYS: ABNORMAL L/MIN
GLUCOSE BLD STRIP.AUTO-MCNC: 119 MG/DL (ref 65–100)
GLUCOSE BLD STRIP.AUTO-MCNC: 131 MG/DL (ref 65–100)
GLUCOSE SERPL-MCNC: 120 MG/DL (ref 65–100)
HCO3 BLD-SCNC: 19.8 MMOL/L (ref 22–26)
HCO3 BLD-SCNC: 21.2 MMOL/L (ref 22–26)
HCT VFR BLD AUTO: 24 % (ref 41.1–50.3)
HGB BLD-MCNC: 8 G/DL (ref 13.6–17.2)
MAGNESIUM SERPL-MCNC: 2.5 MG/DL (ref 1.8–2.4)
MCH RBC QN AUTO: 29.3 PG (ref 26.1–32.9)
MCHC RBC AUTO-ENTMCNC: 33.3 G/DL (ref 31.4–35)
MCV RBC AUTO: 87.9 FL (ref 79.6–97.8)
NRBC # BLD: 0 K/UL (ref 0–0.2)
O2/TOTAL GAS SETTING VFR VENT: 21 %
PCO2 BLD: 28.9 MMHG (ref 35–45)
PCO2 BLD: 34.9 MMHG (ref 35–45)
PH BLD: 7.39 [PH] (ref 7.35–7.45)
PH BLD: 7.44 [PH] (ref 7.35–7.45)
PLATELET # BLD AUTO: 311 K/UL (ref 150–450)
PMV BLD AUTO: 10.7 FL (ref 9.4–12.3)
PO2 BLD: 155 MMHG (ref 75–100)
PO2 BLD: 72 MMHG (ref 75–100)
POTASSIUM SERPL-SCNC: 3.4 MMOL/L (ref 3.5–5.1)
POTASSIUM SERPL-SCNC: 3.6 MMOL/L (ref 3.5–5.1)
RBC # BLD AUTO: 2.73 M/UL (ref 4.23–5.6)
SAO2 % BLD: 94 % (ref 95–98)
SAO2 % BLD: 99 % (ref 95–98)
SERVICE CMNT-IMP: ABNORMAL
SODIUM SERPL-SCNC: 140 MMOL/L (ref 136–145)
SPECIMEN TYPE: ABNORMAL
SPECIMEN TYPE: ABNORMAL
WBC # BLD AUTO: 18.9 K/UL (ref 4.3–11.1)

## 2019-07-21 PROCEDURE — 74011000258 HC RX REV CODE- 258: Performed by: NURSE PRACTITIONER

## 2019-07-21 PROCEDURE — 65610000001 HC ROOM ICU GENERAL

## 2019-07-21 PROCEDURE — 85027 COMPLETE CBC AUTOMATED: CPT

## 2019-07-21 PROCEDURE — 82962 GLUCOSE BLOOD TEST: CPT

## 2019-07-21 PROCEDURE — 77030002916 HC SUT ETHLN J&J -A

## 2019-07-21 PROCEDURE — 80048 BASIC METABOLIC PNL TOTAL CA: CPT

## 2019-07-21 PROCEDURE — 74011250636 HC RX REV CODE- 250/636

## 2019-07-21 PROCEDURE — 77010033678 HC OXYGEN DAILY

## 2019-07-21 PROCEDURE — 61107 TDH PNXR IMPLT VENTR CATH: CPT | Performed by: NURSE PRACTITIONER

## 2019-07-21 PROCEDURE — 61210 BURR HOLE IMPLT VENTR CATH: CPT

## 2019-07-21 PROCEDURE — 77030018846 HC SOL IRR STRL H20 ICUM -A

## 2019-07-21 PROCEDURE — 74011250636 HC RX REV CODE- 250/636: Performed by: NURSE PRACTITIONER

## 2019-07-21 PROCEDURE — 77030018764 HC KT CRANIAL ACC J&J -D

## 2019-07-21 PROCEDURE — C1729 CATH, DRAINAGE: HCPCS

## 2019-07-21 PROCEDURE — 009630Z DRAINAGE OF CEREBRAL VENTRICLE WITH DRAINAGE DEVICE, PERCUTANEOUS APPROACH: ICD-10-PCS | Performed by: NEUROLOGICAL SURGERY

## 2019-07-21 PROCEDURE — 77030012393 HC DRN CSF INLC -C

## 2019-07-21 PROCEDURE — 70450 CT HEAD/BRAIN W/O DYE: CPT

## 2019-07-21 PROCEDURE — 82803 BLOOD GASES ANY COMBINATION: CPT

## 2019-07-21 PROCEDURE — 93886 INTRACRANIAL COMPLETE STUDY: CPT

## 2019-07-21 PROCEDURE — 74011250637 HC RX REV CODE- 250/637

## 2019-07-21 PROCEDURE — 84132 ASSAY OF SERUM POTASSIUM: CPT

## 2019-07-21 PROCEDURE — 74011250637 HC RX REV CODE- 250/637: Performed by: NEUROLOGICAL SURGERY

## 2019-07-21 PROCEDURE — 74011250637 HC RX REV CODE- 250/637: Performed by: NURSE PRACTITIONER

## 2019-07-21 PROCEDURE — 83735 ASSAY OF MAGNESIUM: CPT

## 2019-07-21 PROCEDURE — 77030020257 HC SOL INJ SOD CL 0.45% 1000ML BG

## 2019-07-21 PROCEDURE — 77030013794 HC KT TRNSDUC BLD EDWD -B

## 2019-07-21 PROCEDURE — 77030002996 HC SUT SLK J&J -A

## 2019-07-21 RX ORDER — FENTANYL CITRATE 50 UG/ML
100 INJECTION, SOLUTION INTRAMUSCULAR; INTRAVENOUS ONCE
Status: COMPLETED | OUTPATIENT
Start: 2019-07-21 | End: 2019-07-21

## 2019-07-21 RX ORDER — FENTANYL CITRATE 50 UG/ML
INJECTION, SOLUTION INTRAMUSCULAR; INTRAVENOUS
Status: COMPLETED
Start: 2019-07-21 | End: 2019-07-21

## 2019-07-21 RX ORDER — ACETAMINOPHEN 325 MG/1
650 TABLET ORAL
Status: DISCONTINUED | OUTPATIENT
Start: 2019-07-21 | End: 2019-08-05 | Stop reason: HOSPADM

## 2019-07-21 RX ORDER — ACETAMINOPHEN 650 MG/1
650 SUPPOSITORY RECTAL
Status: DISCONTINUED | OUTPATIENT
Start: 2019-07-21 | End: 2019-07-21

## 2019-07-21 RX ORDER — FENTANYL CITRATE 50 UG/ML
50 INJECTION, SOLUTION INTRAMUSCULAR; INTRAVENOUS ONCE
Status: COMPLETED | OUTPATIENT
Start: 2019-07-21 | End: 2019-07-21

## 2019-07-21 RX ORDER — POTASSIUM CHLORIDE 14.9 MG/ML
20 INJECTION INTRAVENOUS ONCE
Status: COMPLETED | OUTPATIENT
Start: 2019-07-21 | End: 2019-07-21

## 2019-07-21 RX ADMIN — NIMODIPINE 60 MG: 30 CAPSULE, LIQUID FILLED ORAL at 23:09

## 2019-07-21 RX ADMIN — ASPIRIN 325 MG ORAL TABLET 325 MG: 325 PILL ORAL at 09:00

## 2019-07-21 RX ADMIN — FERROUS SULFATE TAB 325 MG (65 MG ELEMENTAL FE) 325 MG: 325 (65 FE) TAB at 14:30

## 2019-07-21 RX ADMIN — LEVETIRACETAM 500 MG: 500 TABLET, FILM COATED ORAL at 21:01

## 2019-07-21 RX ADMIN — FAMOTIDINE 20 MG: 20 TABLET ORAL at 09:52

## 2019-07-21 RX ADMIN — FERROUS SULFATE TAB 325 MG (65 MG ELEMENTAL FE) 325 MG: 325 (65 FE) TAB at 17:05

## 2019-07-21 RX ADMIN — ACETAMINOPHEN 650 MG: 325 TABLET, FILM COATED ORAL at 17:05

## 2019-07-21 RX ADMIN — Medication 2 G: at 21:02

## 2019-07-21 RX ADMIN — FENTANYL CITRATE 50 MCG: 50 INJECTION, SOLUTION INTRAMUSCULAR; INTRAVENOUS at 01:06

## 2019-07-21 RX ADMIN — ATORVASTATIN CALCIUM 40 MG: 40 TABLET, FILM COATED ORAL at 21:01

## 2019-07-21 RX ADMIN — NIMODIPINE 60 MG: 30 CAPSULE, LIQUID FILLED ORAL at 04:00

## 2019-07-21 RX ADMIN — SODIUM CHLORIDE 100 ML/HR: 450 INJECTION, SOLUTION INTRAVENOUS at 19:23

## 2019-07-21 RX ADMIN — SODIUM CHLORIDE, PRESERVATIVE FREE 900 UNITS: 5 INJECTION INTRAVENOUS at 14:30

## 2019-07-21 RX ADMIN — Medication 30 ML: at 21:02

## 2019-07-21 RX ADMIN — FENTANYL CITRATE 100 MCG: 50 INJECTION, SOLUTION INTRAMUSCULAR; INTRAVENOUS at 01:05

## 2019-07-21 RX ADMIN — NIMODIPINE 60 MG: 30 CAPSULE, LIQUID FILLED ORAL at 17:06

## 2019-07-21 RX ADMIN — HEPARIN SODIUM 5000 UNITS: 5000 INJECTION INTRAVENOUS; SUBCUTANEOUS at 05:01

## 2019-07-21 RX ADMIN — Medication 2 G: at 14:35

## 2019-07-21 RX ADMIN — OXYCODONE HYDROCHLORIDE AND ACETAMINOPHEN 500 MG: 500 TABLET ORAL at 17:05

## 2019-07-21 RX ADMIN — FENTANYL CITRATE 50 MCG: 50 INJECTION INTRAMUSCULAR; INTRAVENOUS at 20:01

## 2019-07-21 RX ADMIN — NIMODIPINE 60 MG: 30 CAPSULE, LIQUID FILLED ORAL at 14:29

## 2019-07-21 RX ADMIN — OXYCODONE HYDROCHLORIDE AND ACETAMINOPHEN 500 MG: 500 TABLET ORAL at 09:52

## 2019-07-21 RX ADMIN — LEVETIRACETAM 500 MG: 500 TABLET, FILM COATED ORAL at 09:52

## 2019-07-21 RX ADMIN — NIMODIPINE 60 MG: 30 CAPSULE, LIQUID FILLED ORAL at 04:32

## 2019-07-21 RX ADMIN — SODIUM CHLORIDE, PRESERVATIVE FREE 900 UNITS: 5 INJECTION INTRAVENOUS at 05:01

## 2019-07-21 RX ADMIN — SODIUM CHLORIDE, PRESERVATIVE FREE 900 UNITS: 5 INJECTION INTRAVENOUS at 21:02

## 2019-07-21 RX ADMIN — ACETAMINOPHEN 650 MG: 325 TABLET, FILM COATED ORAL at 21:01

## 2019-07-21 RX ADMIN — Medication 30 ML: at 05:01

## 2019-07-21 RX ADMIN — FERROUS SULFATE TAB 325 MG (65 MG ELEMENTAL FE) 325 MG: 325 (65 FE) TAB at 09:52

## 2019-07-21 RX ADMIN — NIMODIPINE 60 MG: 30 CAPSULE, LIQUID FILLED ORAL at 09:52

## 2019-07-21 RX ADMIN — HEPARIN SODIUM 5000 UNITS: 5000 INJECTION INTRAVENOUS; SUBCUTANEOUS at 14:30

## 2019-07-21 RX ADMIN — Medication 2 G: at 05:02

## 2019-07-21 RX ADMIN — ACETAMINOPHEN 650 MG: 650 SUPPOSITORY RECTAL at 02:43

## 2019-07-21 RX ADMIN — POTASSIUM CHLORIDE 20 MEQ: 200 INJECTION, SOLUTION INTRAVENOUS at 04:55

## 2019-07-21 RX ADMIN — FENTANYL CITRATE 50 MCG: 50 INJECTION INTRAMUSCULAR; INTRAVENOUS at 01:06

## 2019-07-21 RX ADMIN — FENTANYL CITRATE 100 MCG: 50 INJECTION INTRAMUSCULAR; INTRAVENOUS at 01:05

## 2019-07-21 RX ADMIN — MAGNESIUM OXIDE TAB 400 MG (241.3 MG ELEMENTAL MG) 400 MG: 400 (241.3 MG) TAB at 09:52

## 2019-07-21 RX ADMIN — HEPARIN SODIUM 5000 UNITS: 5000 INJECTION INTRAVENOUS; SUBCUTANEOUS at 21:01

## 2019-07-21 RX ADMIN — FENTANYL CITRATE 50 MCG: 50 INJECTION INTRAMUSCULAR; INTRAVENOUS at 23:07

## 2019-07-21 RX ADMIN — OXYCODONE HYDROCHLORIDE AND ACETAMINOPHEN 500 MG: 500 TABLET ORAL at 21:01

## 2019-07-21 RX ADMIN — MAGNESIUM OXIDE TAB 400 MG (241.3 MG ELEMENTAL MG) 400 MG: 400 (241.3 MG) TAB at 17:06

## 2019-07-21 RX ADMIN — SODIUM CHLORIDE 100 ML/HR: 450 INJECTION, SOLUTION INTRAVENOUS at 09:34

## 2019-07-21 RX ADMIN — Medication 30 ML: at 14:36

## 2019-07-21 RX ADMIN — CLOPIDOGREL BISULFATE 75 MG: 75 TABLET ORAL at 09:52

## 2019-07-21 NOTE — PROGRESS NOTES
Bedside report received from Rickie Boyd, [facility]. Pt in bed - calm and cooperative. Dual neuro/NIH complete. Pt oriented to self and place only. NIH of 1. Dual skin assessment complete. EVD in place - dressing c/d/i and draining. +1 genital swelling present. +1 pitting edema present in BLEs. Pulses palpable and present. O2 sats in the 90s via 6L HFNC. Lung sounds coarse and diminished bilaterally. Abdomen intact and semi soft with active bowel sounds. Patiño patent and draining clear yellow urine. BP = permissive HTN. All other VS stable at this time. Will continue to monitor pt closely.

## 2019-07-21 NOTE — PROGRESS NOTES
Dao Dennis, NP notified of rising BP. Verbal orders received to contact NP when SBP is greater than 250. NP also aware that drain in no long draining and seems to have clots present in tubing. NP to be at bedside soon. No new orders received.

## 2019-07-21 NOTE — PROGRESS NOTES
Called earlier this evening because pt had acute neuro changes, altered and did not follow commands and did not know who he was. STAT CT head, CTA/CTP obtained, pt with noted hydrocephalus, EVD has migrated out of ventricle. Dr. Ricky Frausto aware and has viewed images, EVD removed and replaced with new one without difficulty. ( see note). Pt more awake, following commands, stovall. Drowsy- received fentanyl for agitation prior to EVD placement. Will get ABG and monitor airway, patent and stable at this time. EVD secure @ 10/Open.

## 2019-07-21 NOTE — PROGRESS NOTES
Progress Note    Patient: Pam Viera MRN: [id number]  SSN: [id number]    YOB: 1975  Age: 37 y.o. Sex: male      Admit Date: 7/9/2019    LOS: 12 days     Subjective:   Pt had acute neuro change last pm, EVD had to be replaced due to migration out of ventricle and increased hydrocephalus. Pt is now awake and can follow commands, tell me his name, year and where he is at. Repeat head CT this am did not show any new ICH or blood, 3rd vent smaller but still with hydrocephalus, EVD dropped to 5 and open.      Current Facility-Administered Medications   Medication Dose Route Frequency    acetaminophen (TYLENOL) suppository 650 mg  650 mg Rectal Q4H PRN    lidocaine 4 % patch 1 Patch  1 Patch TransDERmal Q24H    fentaNYL citrate (PF) injection 50 mcg  50 mcg IntraVENous Q1H PRN    ferrous sulfate tablet 325 mg  1 Tab Oral TID WITH MEALS    levETIRAcetam (KEPPRA) tablet 500 mg  500 mg Oral Q12H    niMODipine (NIMOTOP) 30 mg/mL oral solution (compound) 60 mg  60 mg Oral Q4H    ceFAZolin (ANCEF) 2 g/20 mL in sterile water IV syringe  2 g IntraVENous Q8H    0.45% sodium chloride infusion  100 mL/hr IntraVENous CONTINUOUS    ascorbic acid (vitamin C) (VITAMIN C) tablet 500 mg  500 mg Oral TID    aspirin tablet 325 mg  325 mg Oral DAILY    atorvastatin (LIPITOR) tablet 40 mg  40 mg Oral QHS    clopidogrel (PLAVIX) tablet 75 mg  75 mg Oral DAILY    famotidine (PEPCID) tablet 20 mg  20 mg Oral DAILY    magnesium oxide (MAG-OX) tablet 400 mg  400 mg Oral BID    senna-docusate (PERICOLACE) 8.6-50 mg per tablet 2 Tab  2 Tab Oral QHS    magnesium hydroxide (MILK OF MAGNESIA) 400 mg/5 mL oral suspension 30 mL  30 mL Oral DAILY PRN    polyvinyl alcohol (LIQUIFILM TEARS) 1.4 % ophthalmic solution 1 Drop  1 Drop Both Eyes PRN    heparin (porcine) injection 5,000 Units  5,000 Units SubCUTAneous Q8H    sodium chloride (NS) flush 30 mL  30 mL InterCATHeter Q8H    heparin (porcine) pf 900 Units  900 Units InterCATHeter Q8H    sodium chloride (NS) flush 30 mL  30 mL InterCATHeter PRN    heparin (porcine) pf 900 Units  900 Units InterCATHeter PRN    nicotine (NICODERM CQ) 21 mg/24 hr patch 1 Patch  1 Patch TransDERmal Q24H    ondansetron (ZOFRAN) injection 4 mg  4 mg IntraVENous Q6H PRN    NUTRITIONAL SUPPORT ELECTROLYTE PRN ORDERS   Does Not Apply PRN    magnesium sulfate 4 g/100 mL IVPB  4 g IntraVENous DAILY PRN    magnesium sulfate 2 g/50 ml IVPB (premix or compounded)  2 g IntraVENous DAILY PRN       Objective:     Vitals:    07/21/19 0715 07/21/19 0730 07/21/19 0745 07/21/19 0800   BP:       Pulse: 73 69 82 73   Resp: 11 11 9 (!) 7   Temp: 99.3 °F (37.4 °C) 99.5 °F (37.5 °C) 99.5 °F (37.5 °C) 99.3 °F (37.4 °C)   SpO2: 100% 100% 100% 100%   Weight:       Height:             Intake and Output:  Current Shift: No intake/output data recorded. Last 24 hr: 07/20 0701 - 07/21 0700  In: 240 [P.O.:240]  Out: 0435 [Urine:5210; Drains:87]     IO: -5057/24hr  TOTAL OVERALL: -916  EVD: 87/24hr      Physical Exam:   General:  Alert, cooperative, no distress. Eyes:   PERRL   Neck: Supple, symmetrical, trachea midline, no adenopathy, thyroid: no enlargment/tenderness/nodules, no carotid bruit and no JVD. Lungs:   Clear to auscultation bilaterally. Heart:  Regular rate and rhythm, S1, S2 normal, no murmur, click, rub or gallop. Abdomen:   Soft, non-tender. Bowel sounds normal. No masses,  No organomegaly. Extremities: Extremities normal, atraumatic, no cyanosis or edema. Pulses: 2+ and symmetric all extremities. Skin: Skin color, texture, turgor normal. EVD intact. Neurologic:  pt aox3 this am, pain to posterior neck 10/10, will change to dilaudid and add valium to see if any relief. Monitor for any acute neuro changes/vasospasm.        Lab/Data Review:    Recent Results (from the past 12 hour(s))   POC G3    Collection Time: 07/21/19  1:06 AM   Result Value Ref Range    Device: ROOM AIR      FIO2 (POC) 21 %    pH (POC) 7.392 7.35 - 7.45      pCO2 (POC) 34.9 (L) 35 - 45 MMHG    pO2 (POC) 72 (L) 75 - 100 MMHG    HCO3 (POC) 21.2 (L) 22 - 26 MMOL/L    sO2 (POC) 94 (L) 95 - 98 %    Base deficit (POC) 3 mmol/L    Allens test (POC) NOT APPLICABLE      Site DRAWN FROM ARTERIAL LINE      Patient temp. 98.6      Specimen type (POC) ARTERIAL      Performed by StoddardBrandonRT     CO2, POC 22 MMOL/L    Respiratory comment: NurseNotified     COLLECT TIME 102     POC G3    Collection Time: 07/21/19  2:11 AM   Result Value Ref Range    Device: High Flow Nasal Cannula      pH (POC) 7.444 7.35 - 7.45      pCO2 (POC) 28.9 (L) 35 - 45 MMHG    pO2 (POC) 155 (H) 75 - 100 MMHG    HCO3 (POC) 19.8 (L) 22 - 26 MMOL/L    sO2 (POC) 99 (H) 95 - 98 %    Base deficit (POC) 4 mmol/L    Allens test (POC) NOT APPLICABLE      Site DRAWN FROM ARTERIAL LINE      Patient temp.  98.6      Specimen type (POC) ARTERIAL      Performed by StoddardBrandonRT     CO2, POC 21 MMOL/L    Flow rate (POC) 7.000 L/min    Respiratory comment: NurseNotified     COLLECT TIME 207     GLUCOSE, POC    Collection Time: 07/21/19  2:29 AM   Result Value Ref Range    Glucose (POC) 131 (H) 65 - 100 mg/dL   MAGNESIUM    Collection Time: 07/21/19  3:04 AM   Result Value Ref Range    Magnesium 2.5 (H) 1.8 - 2.4 mg/dL   CBC W/O DIFF    Collection Time: 07/21/19  3:04 AM   Result Value Ref Range    WBC 18.9 (H) 4.3 - 11.1 K/uL    RBC 2.73 (L) 4.23 - 5.6 M/uL    HGB 8.0 (L) 13.6 - 17.2 g/dL    HCT 24.0 (L) 41.1 - 50.3 %    MCV 87.9 79.6 - 97.8 FL    MCH 29.3 26.1 - 32.9 PG    MCHC 33.3 31.4 - 35.0 g/dL    RDW 12.8 11.9 - 14.6 %    PLATELET 773 059 - 712 K/uL    MPV 10.7 9.4 - 12.3 FL    ABSOLUTE NRBC 0.00 0.0 - 0.2 K/uL   METABOLIC PANEL, BASIC    Collection Time: 07/21/19  3:04 AM   Result Value Ref Range    Sodium 140 136 - 145 mmol/L    Potassium 3.4 (L) 3.5 - 5.1 mmol/L    Chloride 106 98 - 107 mmol/L    CO2 25 21 - 32 mmol/L    Anion gap 9 7 - 16 mmol/L    Glucose 120 (H) 65 - 100 mg/dL    BUN 33 (H) 6 - 23 MG/DL    Creatinine 3.19 (H) 0.8 - 1.5 MG/DL    GFR est AA 27 (L) >60 ml/min/1.73m2    GFR est non-AA 23 (L) >60 ml/min/1.73m2    Calcium 7.9 (L) 8.3 - 10.4 MG/DL   GLUCOSE, POC    Collection Time: 07/21/19  4:54 AM   Result Value Ref Range    Glucose (POC) 119 (H) 65 - 100 mg/dL       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)    Active Problems:    Tobacco abuse (7/10/2019)      Acute respiratory failure with hypoxia ([facility]) (7/10/2019)      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 Q1 hour neuro checks. On SAH protocol - Mg, nimotop, zocor. PERRL +3. No MAP goal now. TCDs daily - they have been 180-220 in the left and right MCAs . EVD set to 10cm H20. Last CT head showed improved communicating hydrocephalus. Last CTA head and neck showed no filling of the aneurysm and good filling of the PCAs. Will continue [facility] protocol, PT/OT. OOB to chair today. /. Pt had EVD replaced last pm, doing better this am, more alert and following commands. Airway patent. RESP: NC @4L, tolerating well. Will wean as tolerated. No distress. CV: NO MAP GOAL NOW.  2D Echo: 55-60%, trop peaked @ 0.8, trending down. SCD/SQ heparin. TCD's elevated R MCA. Will monitor. HEME: 8/24,  HIT panel negative. NEPH: bun/cr: 33/3.1     GI: Pepcid. Senna. MOM given. Abdomen less distended. +BS. + BM today. ID: Tm: 99.8. Sputum cx sent, pending final, showing gram + cocci -  Patient has MSSA pneumonia.  Blood cultures neg so far and UA neg. US neg for DVT. Pt on Cefazolin D6/10. Fevers better. LINES: RULUCÍA PICC, SHANNAN phelps. Tobacco abuse: On Nicoderm patch.         Signed By: Gee Harris NP     July 21, 2019

## 2019-07-21 NOTE — PROGRESS NOTES
Bedside report received from Ketan Valenzuela [facility]. Pt in bed - restless at this time. Dual neuro/NIH complete. Pt a/o x4 with a NIH of 0. Dual skin assessment complete. EVD in place - dressing c/d/i but currently not draining - NP aware. +1 genital swelling present. +1 pitting edema present in BLEs. Pulses palpable and present. O2 sats in the 90s via RA. Lung sounds coarse and diminished bilaterally. Abdomen intact and semi soft with active bowel sounds. Patiño patent and draining clear yellow urine. BP = permissive HTN. All other VS stable at this time. Will continue to monitor pt closely.

## 2019-07-21 NOTE — PROGRESS NOTES
Pt attempting to get out of bed. Neuro and NIH done at bedside. Pt disoriented x 4. States that his name is Constantine Daugherty, he is 23years old and that he is \"climbing a mountain at "[employer] \" Hung Doss NP notified. New orders received for STAT CTA, CT, and perfusion scan. Will continue to monitor pt closely.

## 2019-07-21 NOTE — PROCEDURES
NAME: Agnes Llamas  DATE OF PROCEDURE: 7-21-19  SURGEON: Dr. Natividad Ganser  NP: Remi Mixon NP    Pre-op Diagnosis: SAH and Hydrocephalus  Post-op Diagnosis: SAH and hydrocephalus    PROCEDURE: External Ventricular Drain Placement. Anesthesia: local and IV    Complications: NONE    INDICATIONS:     The pt is a 41-y.o male who presents with SAH and hydrocephalus. After discussing the risks and benefits with his family, they elected to have him undergo emergent placement of an external ventricular drain. DESCRIPTION OF PROCEDURE:     The patient had the right side of his head clipped. The appropriate incision was then marked. The patient was then prepped and draped in the usual sterile fashion. The incision was then infiltrated with 1% lidocaine with epi. The incision was made with a #10 blade down to the periosteum. The drill was then used to drill a bur hole. The dura was then opened with a # 11 blade. A Bactiseal EVD catheter was then placed @ 6cm of water. Bloody CSF was obtained upon placement. The drain was tunneled and was secure with 2.0 Nylon and the incision was sutured with running suture, 3.0 prolene. The patient then had the EVD placed at 10 and open. The pt tolerated the procedure well. No complications occurred. Dr. Tammi Thomason was available at all times during the procedure.

## 2019-07-22 LAB
ANION GAP SERPL CALC-SCNC: 9 MMOL/L (ref 7–16)
BUN SERPL-MCNC: 26 MG/DL (ref 6–23)
CALCIUM SERPL-MCNC: 8.1 MG/DL (ref 8.3–10.4)
CHLORIDE SERPL-SCNC: 108 MMOL/L (ref 98–107)
CO2 SERPL-SCNC: 25 MMOL/L (ref 21–32)
CREAT SERPL-MCNC: 3.27 MG/DL (ref 0.8–1.5)
ERYTHROCYTE [DISTWIDTH] IN BLOOD BY AUTOMATED COUNT: 13.2 % (ref 11.9–14.6)
GLUCOSE CSF-MCNC: 39 MG/DL (ref 40–70)
GLUCOSE SERPL-MCNC: 85 MG/DL (ref 65–100)
HCT VFR BLD AUTO: 25.3 % (ref 41.1–50.3)
HGB BLD-MCNC: 8.4 G/DL (ref 13.6–17.2)
MAGNESIUM SERPL-MCNC: 2.5 MG/DL (ref 1.8–2.4)
MCH RBC QN AUTO: 29.9 PG (ref 26.1–32.9)
MCHC RBC AUTO-ENTMCNC: 33.2 G/DL (ref 31.4–35)
MCV RBC AUTO: 90 FL (ref 79.6–97.8)
NRBC # BLD: 0 K/UL (ref 0–0.2)
PLATELET # BLD AUTO: 375 K/UL (ref 150–450)
PMV BLD AUTO: 10.6 FL (ref 9.4–12.3)
POTASSIUM SERPL-SCNC: 3.6 MMOL/L (ref 3.5–5.1)
PROT CSF-MCNC: 172 MG/DL (ref 15–45)
RBC # BLD AUTO: 2.81 M/UL (ref 4.23–5.6)
SODIUM SERPL-SCNC: 142 MMOL/L (ref 136–145)
TUBE # CSF: 1
TUBE # CSF: 1
WBC # BLD AUTO: 22.1 K/UL (ref 4.3–11.1)

## 2019-07-22 PROCEDURE — 92526 ORAL FUNCTION THERAPY: CPT

## 2019-07-22 PROCEDURE — 74011000250 HC RX REV CODE- 250: Performed by: NURSE PRACTITIONER

## 2019-07-22 PROCEDURE — 74011250637 HC RX REV CODE- 250/637: Performed by: NURSE PRACTITIONER

## 2019-07-22 PROCEDURE — 74011250636 HC RX REV CODE- 250/636: Performed by: NURSE PRACTITIONER

## 2019-07-22 PROCEDURE — 97530 THERAPEUTIC ACTIVITIES: CPT

## 2019-07-22 PROCEDURE — 74011000258 HC RX REV CODE- 258: Performed by: NEUROLOGICAL SURGERY

## 2019-07-22 PROCEDURE — 99232 SBSQ HOSP IP/OBS MODERATE 35: CPT | Performed by: NURSE PRACTITIONER

## 2019-07-22 PROCEDURE — 77010033678 HC OXYGEN DAILY

## 2019-07-22 PROCEDURE — 77030034849

## 2019-07-22 PROCEDURE — 74011250637 HC RX REV CODE- 250/637

## 2019-07-22 PROCEDURE — 82945 GLUCOSE OTHER FLUID: CPT

## 2019-07-22 PROCEDURE — 84157 ASSAY OF PROTEIN OTHER: CPT

## 2019-07-22 PROCEDURE — 77030002916 HC SUT ETHLN J&J -A

## 2019-07-22 PROCEDURE — 74011000258 HC RX REV CODE- 258: Performed by: NURSE PRACTITIONER

## 2019-07-22 PROCEDURE — 36592 COLLECT BLOOD FROM PICC: CPT

## 2019-07-22 PROCEDURE — 99232 SBSQ HOSP IP/OBS MODERATE 35: CPT | Performed by: NEUROLOGICAL SURGERY

## 2019-07-22 PROCEDURE — 74011250637 HC RX REV CODE- 250/637: Performed by: NEUROLOGICAL SURGERY

## 2019-07-22 PROCEDURE — 65610000001 HC ROOM ICU GENERAL

## 2019-07-22 PROCEDURE — 80048 BASIC METABOLIC PNL TOTAL CA: CPT

## 2019-07-22 PROCEDURE — 87205 SMEAR GRAM STAIN: CPT

## 2019-07-22 PROCEDURE — 92507 TX SP LANG VOICE COMM INDIV: CPT

## 2019-07-22 PROCEDURE — 77030020257 HC SOL INJ SOD CL 0.45% 1000ML BG

## 2019-07-22 PROCEDURE — 85027 COMPLETE CBC AUTOMATED: CPT

## 2019-07-22 PROCEDURE — 97164 PT RE-EVAL EST PLAN CARE: CPT

## 2019-07-22 PROCEDURE — 83735 ASSAY OF MAGNESIUM: CPT

## 2019-07-22 PROCEDURE — 93886 INTRACRANIAL COMPLETE STUDY: CPT

## 2019-07-22 RX ADMIN — OXYCODONE HYDROCHLORIDE AND ACETAMINOPHEN 500 MG: 500 TABLET ORAL at 16:03

## 2019-07-22 RX ADMIN — OXYCODONE HYDROCHLORIDE AND ACETAMINOPHEN 500 MG: 500 TABLET ORAL at 08:16

## 2019-07-22 RX ADMIN — LEVETIRACETAM 500 MG: 500 TABLET, FILM COATED ORAL at 08:16

## 2019-07-22 RX ADMIN — MAGNESIUM OXIDE TAB 400 MG (241.3 MG ELEMENTAL MG) 400 MG: 400 (241.3 MG) TAB at 17:02

## 2019-07-22 RX ADMIN — Medication 30 ML: at 21:44

## 2019-07-22 RX ADMIN — SODIUM CHLORIDE, PRESERVATIVE FREE 900 UNITS: 5 INJECTION INTRAVENOUS at 13:30

## 2019-07-22 RX ADMIN — OXYCODONE HYDROCHLORIDE AND ACETAMINOPHEN 500 MG: 500 TABLET ORAL at 21:44

## 2019-07-22 RX ADMIN — SODIUM CHLORIDE 100 ML/HR: 450 INJECTION, SOLUTION INTRAVENOUS at 04:05

## 2019-07-22 RX ADMIN — Medication 30 ML: at 13:30

## 2019-07-22 RX ADMIN — ATORVASTATIN CALCIUM 40 MG: 40 TABLET, FILM COATED ORAL at 21:44

## 2019-07-22 RX ADMIN — NIMODIPINE 60 MG: 30 CAPSULE, LIQUID FILLED ORAL at 04:05

## 2019-07-22 RX ADMIN — ACETAMINOPHEN 650 MG: 325 TABLET, FILM COATED ORAL at 08:15

## 2019-07-22 RX ADMIN — NIMODIPINE 60 MG: 30 CAPSULE, LIQUID FILLED ORAL at 20:10

## 2019-07-22 RX ADMIN — SODIUM CHLORIDE 100 ML/HR: 450 INJECTION, SOLUTION INTRAVENOUS at 18:16

## 2019-07-22 RX ADMIN — HEPARIN SODIUM 5000 UNITS: 5000 INJECTION INTRAVENOUS; SUBCUTANEOUS at 13:29

## 2019-07-22 RX ADMIN — HEPARIN SODIUM 5000 UNITS: 5000 INJECTION INTRAVENOUS; SUBCUTANEOUS at 21:45

## 2019-07-22 RX ADMIN — FERROUS SULFATE TAB 325 MG (65 MG ELEMENTAL FE) 325 MG: 325 (65 FE) TAB at 08:16

## 2019-07-22 RX ADMIN — FERROUS SULFATE TAB 325 MG (65 MG ELEMENTAL FE) 325 MG: 325 (65 FE) TAB at 17:02

## 2019-07-22 RX ADMIN — HEPARIN SODIUM 5000 UNITS: 5000 INJECTION INTRAVENOUS; SUBCUTANEOUS at 05:12

## 2019-07-22 RX ADMIN — SODIUM CHLORIDE, PRESERVATIVE FREE 900 UNITS: 5 INJECTION INTRAVENOUS at 05:11

## 2019-07-22 RX ADMIN — FENTANYL CITRATE 50 MCG: 50 INJECTION INTRAMUSCULAR; INTRAVENOUS at 11:41

## 2019-07-22 RX ADMIN — FENTANYL CITRATE 50 MCG: 50 INJECTION INTRAMUSCULAR; INTRAVENOUS at 06:08

## 2019-07-22 RX ADMIN — FERROUS SULFATE TAB 325 MG (65 MG ELEMENTAL FE) 325 MG: 325 (65 FE) TAB at 12:20

## 2019-07-22 RX ADMIN — MAGNESIUM OXIDE TAB 400 MG (241.3 MG ELEMENTAL MG) 400 MG: 400 (241.3 MG) TAB at 08:16

## 2019-07-22 RX ADMIN — ACETAMINOPHEN 650 MG: 325 TABLET, FILM COATED ORAL at 02:53

## 2019-07-22 RX ADMIN — SENNOSIDES, DOCUSATE SODIUM 2 TABLET: 50; 8.6 TABLET, FILM COATED ORAL at 21:44

## 2019-07-22 RX ADMIN — SODIUM CHLORIDE 1000 ML: 450 INJECTION, SOLUTION INTRAVENOUS at 13:31

## 2019-07-22 RX ADMIN — ASPIRIN 325 MG ORAL TABLET 325 MG: 325 PILL ORAL at 08:17

## 2019-07-22 RX ADMIN — SODIUM CHLORIDE, PRESERVATIVE FREE 900 UNITS: 5 INJECTION INTRAVENOUS at 21:45

## 2019-07-22 RX ADMIN — FAMOTIDINE 20 MG: 20 TABLET ORAL at 08:16

## 2019-07-22 RX ADMIN — LEVETIRACETAM 500 MG: 500 TABLET, FILM COATED ORAL at 21:44

## 2019-07-22 RX ADMIN — Medication 2 G: at 05:05

## 2019-07-22 RX ADMIN — FENTANYL CITRATE 50 MCG: 50 INJECTION INTRAMUSCULAR; INTRAVENOUS at 03:12

## 2019-07-22 RX ADMIN — CLOPIDOGREL BISULFATE 75 MG: 75 TABLET ORAL at 08:16

## 2019-07-22 RX ADMIN — ACETAMINOPHEN 650 MG: 325 TABLET, FILM COATED ORAL at 18:16

## 2019-07-22 RX ADMIN — NIMODIPINE 60 MG: 30 CAPSULE, LIQUID FILLED ORAL at 08:15

## 2019-07-22 RX ADMIN — NIMODIPINE 60 MG: 30 CAPSULE, LIQUID FILLED ORAL at 11:14

## 2019-07-22 RX ADMIN — Medication 30 ML: at 05:11

## 2019-07-22 RX ADMIN — NIMODIPINE 60 MG: 30 CAPSULE, LIQUID FILLED ORAL at 16:03

## 2019-07-22 NOTE — PROGRESS NOTES
Bedside report received from Nathalie Sotelo, [facility]. Pt in bed - calm and cooperative but drowsy. Dual neuro/NIH complete. Pt a/o x4. NIH of 1. Dual skin assessment complete. EVD in place - dressing c/d/i and draining. +1 genital swelling present. +1 pitting edema present in BLEs. Pulses palpable and present. O2 sats in the 90s via RA. Lung sounds clear and diminished bilaterally. Abdomen intact and semi soft with active bowel sounds. Patiño patent and draining clear yellow urine. BP = permissive HTN. All other VS stable at this time.  Will continue to monitor pt closely

## 2019-07-22 NOTE — PROCEDURES
Transcranial Doppler  Transcranial Dopplers were performed this patient for the evaluation of intracranial vascular spasm post subarachnoid hemorrhage and compared with previous studies. Flow velocities in the middle cerebral artery on the right side continue to demonstrate a increase and do demonstrate the presence of intracranial vascular spasm. On the left side there is some degree of improvement. Vertebrobasilar system remains uninvolved. Carotid siphons are normal    Left and right Lindegaard ratios are 2.24 and 2.97    Impression    There has been some degree of improvement on the left side with reference to flow velocities there remains involvement in the right middle cerebral artery.   This is suggestive of mild vasospasm

## 2019-07-22 NOTE — PROCEDURES
Transcranial Doppler    Transcranial Doppler studies were obtained for follow-up evaluation of an intracranial hemorrhage. Insonation was performed via the transtemporal window and via the foramen magnum window for posterior fossa    This study is compared with previous examinations. There continues to be a moderate increase in peak systolic flow in both left and right middle cerebral arteries. The anterior cerebral artery is physiologic Bilaterally. Vertebrobasilar system demonstrates antegrade flow which is physiologic. The left and right Lindegaard ratios are 2.82 and 3.96    Impression    There is some evidence of mild intracranial vascular spasm in the right middle cerebral artery as described.   There is has been no substantial evolutional change since the prior study

## 2019-07-22 NOTE — PROCEDURES
Transcranial Doppler    Transcranial Doppler studies were performed for the evaluation of intracranial vascular spasm.     Direct comparison the studies of the past week    Insonation was performed via the transtemporal window in the the foramen magnum window for posterior fossa    This study continues to demonstrate there has been some degree of improvement in the intracranial vascular parameters and that there has now resolution of the findings of intracranial vascular spasm in the right middle cerebral artery left middle cerebral is stable antegrade flow in the basilar artery which remains uninvolved    Carotid siphon flows are normal    Impression    Improving intracranial vascular spasm picture with substantial improvement    Chrissie Pederson MD

## 2019-07-22 NOTE — PROGRESS NOTES
Problem: Mobility Impaired (Adult and Pediatric)  Goal: *Acute Goals and Plan of Care (Insert Text)  Description  STG:  (1.)Mr. Karmen Townsend will move from supine to sit and sit to supine , scoot up and down and roll side to side with CONTACT GUARD ASSIST within 3 treatment day(s). (2.)Mr. Karmen Townsend will transfer from bed to chair and chair to bed with CONTACT GUARD ASSIST using the least restrictive device within 3 treatment day(s). (3.)Mr. Karmen Townsend will ambulate with MINIMAL ASSIST for 50+ feet with the least restrictive device within 3 treatment day(s). (4.)Mr. Karmen Townsend will perform standing static and dynamic balance activities x 15 minutes with MINIMAL ASSIST to improve safety within 3 day(s). (5.)Mr. Karmen Townsend will maintain stable vital signs throughout all functional mobility within 3 days. LTG:  (1.)Mr. Karmen Townsend will move from supine to sit and sit to supine , scoot up and down and roll side to side in bed with SUPERVISION within 7 treatment day(s). (2.)Mr. Karmen Townsend will transfer from bed to chair and chair to bed with STAND BY ASSIST using the least restrictive device within 7 treatment day(s). (3.)Mr. Karmen Townsend will ambulate with STAND BY ASSIST for 75+ feet with the least restrictive device within 7 treatment day(s). (4.)Mr. Karmen Townsend will perform standing static and dynamic balance activities x 15 minutes with STAND BY ASSIST to improve safety within 7 day(s).   ________________________________________________________________________________________________     Outcome: Progressing Towards Goal     PHYSICAL THERAPY: Re-evaluation and PM 7/22/2019  INPATIENT: PT Visit Days : 1  Payor: Avni Calhoun / Plan: [address] / Product Type: PPO /       NAME/AGE/GENDER: Ronald Momin is a 37 y.o. 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 ASSESSMENT:     Mr. Jonel Morataya is a 37 y.o. Male admitted for subarachnoid hemorrhage in bed in ICU and agreeable to physical therapy treatment. RN reported that pt required EVD drain to be re-done this past weekend and pt up for re-evaluation today per chart. Pt observed with continued R side neglect. He also appeared to have flat affect and decreased attention. Pt continues to have decreased strength and AROM in R LE. The majority of his goals remain appropriate however new goal added to POC. Today pt required Emil for supine to sit with intact sitting balance. He required numerous cues to prevent weightbearing through L wrist given ART line. Pt unable to stand initially from bed with modA/RW. Once bed was elevated slightly and pt scooted forward some he was able ot come to full standing with modA/RW/gait belt. Pt ambulated short distance to recliner chair with Emil and fair standing balance. No significant progress noted today. This section established at most recent assessment   PROBLEM LIST (Impairments causing functional limitations):  1. Decreased Strength  2. Decreased ADL/Functional Activities  3. Decreased Transfer Abilities  4. Decreased Ambulation Ability/Technique  5. Decreased Balance  6. Decreased Activity Tolerance  7. Decreased Pacing Skills  8. Increased Shortness of Breath  9. Decreased Knowledge of Precautions  10. Decreased [place] with Home Exercise Program   INTERVENTIONS PLANNED: (Benefits and precautions of physical therapy have been discussed with the patient.)  1. Balance Exercise  2. Bed Mobility  3. Family Education  4. Gait Training  5. Home Exercise Program (HEP)  6. Therapeutic Activites  7. Therapeutic Exercise/Strengthening  8.  Transfer Training     TREATMENT PLAN: Frequency/Duration: 3 times a week for duration of hospital stay  Rehabilitation Potential For Stated Goals: Good     REHAB RECOMMENDATIONS (at time of discharge pending progress):    Placement: It is my opinion, based on this patient's performance to date, that Mr. Andrés Torres may benefit from intensive therapy at an [address] [place] [address] after discharge due to a probable need for close medical supervision by a rehab physician, a probable need for 24 hour rehab nursing, a probable need for multiple therapy disciplines and potential to make ongoing and sustainable functional improvement that is of practical value. .  Equipment:    None at this time              HISTORY:   History of Present Injury/Illness (Reason for Referral):  Subarachnoid hemorrhage. Past Medical History/Comorbidities:   Mr. Andrés Torres  has no past medical history on file. Mr. Andrés Torres  has a past surgical history that includes ir emboli intracran lt (7/11/2019). Social History/Living Environment:   Home Environment: Private residence  One/Two Story Residence: Other (Comment)  Living Alone: No  Support Systems: Parent, Child(rohan)  Patient Expects to be Discharged to[de-identified] Unknown  Current DME Used/Available at Home: None  Prior Level of Function/Work/Activity:  Unsure, RN reports patient worked. Number of Personal Factors/Comorbidities that affect the Plan of Care: 1-2: MODERATE COMPLEXITY   EXAMINATION:   Most Recent Physical Functioning:   Gross Assessment:  AROM: Generally decreased, functional(R LE)  Strength: Generally decreased, functional(R LE)               Posture:     Balance:  Sitting: Impaired  Sitting - Static: Good (unsupported)  Sitting - Dynamic: Fair (occasional)  Standing: Impaired  Standing - Static: Fair  Standing - Dynamic : Fair Bed Mobility:  Supine to Sit: Minimum assistance  Scooting: Minimum assistance  Interventions: Safety awareness training;Manual cues; Verbal cues; Visual cues  Wheelchair Mobility:     Transfers:  Sit to Stand:  Moderate assistance  Stand to Sit: Minimum assistance  Bed to Chair: Minimum assistance  Interventions: Safety awareness training;Manual cues; Verbal cues; Visual cues  Gait:     Speed/Aminata: Slow  Step Length: Right shortened;Left shortened  Gait Abnormalities: Decreased step clearance;Trunk sway increased  Distance (ft): 4 Feet (ft)  Assistive Device: Gait belt;Walker, rolling  Ambulation - Level of Assistance: Minimal assistance  Interventions: Safety awareness training;Manual cues; Verbal cues; Visual/Demos      Body Structures Involved:  1. Nerves  2. Heart  3. Lungs  4. Muscles Body Functions Affected:  1. Sensory/Pain  2. Voice and Speech  3. Cardio  4. Respiratory  5. Neuromusculoskeletal  6. Movement Related Activities and Participation Affected:  1. Learning and Applying Knowledge  2. General Tasks and Demands  3. Communication  4. Mobility  5. Self Care  6. Domestic Life  7. Interpersonal Interactions and Relationships  8. Community, Social and [place]   Number of elements that affect the Plan of Care: 4+: HIGH COMPLEXITY   CLINICAL PRESENTATION:   Presentation: Evolving clinical presentation with changing clinical characteristics: MODERATE COMPLEXITY   CLINICAL DECISION MAKIN [facility] Short Form  How much difficulty does the patient currently have. .. Unable A Lot A Little None   1. Turning over in bed (including adjusting bedclothes, sheets and blankets)? ? 1   ? 2   ? 3   ? 4   2. Sitting down on and standing up from a chair with arms ( e.g., wheelchair, bedside commode, etc.)   ? 1   ? 2   ? 3   ? 4   3. Moving from lying on back to sitting on the side of the bed?   ? 1   ? 2   ? 3   ? 4   How much help from another person does the patient currently need. .. Total A Lot A Little None   4. Moving to and from a bed to a chair (including a wheelchair)? ? 1   ? 2   ? 3   ? 4   5. Need to walk in hospital room? ? 1   ? 2   ? 3   ? 4   6.   Climbing 3-5 steps with a railing? ? 1   ? 2   ? 3   ? 4   © 2007, Trustees of [address] [place] [address] [address] [id number], under license to [employer]. All rights reserved      Score:  Initial: 10 Most Recent: X (Date: -- )    Interpretation of Tool:  Represents activities that are increasingly more difficult (i.e. Bed mobility, Transfers, Gait). Medical Necessity:     · Patient demonstrates good  ·  rehab potential due to higher previous functional level. Reason for Services/Other Comments:  · Patient continues to require modification of therapeutic interventions to increase complexity of exercises  · . Use of outcome tool(s) and clinical judgement create a POC that gives a: Questionable prediction of patient's progress: MODERATE COMPLEXITY            TREATMENT:   (In addition to Assessment/Re-Assessment sessions the following treatments were rendered)   Pre-treatment Symptoms/Complaints:  none  Pain: Initial:   Pain Intensity 1: 0  Post Session:  0/10     Therapeutic Activity: (    15 minutes): Therapeutic activities including bed transfers, Chair transfers, and Ambulation on level ground to improve mobility, strength, balance, coordination and activity tolerance . Required minimal-mod assist to promote static and dynamic balance in standing and promote coordination of bilateral, lower extremity(s).         Date:  7/15/19 Date:   Date:     Activity/Exercise Parameters Parameters Parameters   Seated heel raises x15 B A     Seated toe raises x15 B A     Seated LAQ x15 B A                               Braces/Orthotics/Lines/Etc:   · IV  · phelps catheter  · drain EVD  · arterial line  ·    Treatment/Session Assessment:    · Response to Treatment:  Tolerated well but appeared withdrawn  · Interdisciplinary Collaboration:   o Physical Therapist  o Registered Nurse  o SPT  · After treatment position/precautions:   o Up in chair  o Bed alarm/tab alert on  o Bed/Chair-wheels locked  o Call light within reach  o Nurse at bedside   · Compliance with Program/Exercises: Compliant all of the time  · Recommendations/Intent for next treatment session: \"Next visit will focus on advancements to more challenging activities and reduction in assistance provided\".   Total Treatment Duration:  PT Patient Time In/Time Out  Time In: 1340  Time Out: 7322 Za Mata, PT, DPT

## 2019-07-22 NOTE — PROGRESS NOTES
Patient returned to bed with RN assist x2. Neuro exam remains unchanged. EVD site c/d/i- no more drainage or bleeding noted.

## 2019-07-22 NOTE — PROGRESS NOTES
OT treatment attempted; pt working with other staff. Will follow up as schedule allows.     Hilario Moses OT

## 2019-07-22 NOTE — PROGRESS NOTES
Problem: Dysphagia (Adult)  Goal: *Acute Goals and Plan of Care (Insert Text)  Description  LTG: Patient will tolerate least restrictive diet without overt signs or symptoms of airway compromise. STG: Patient will tolerate po trials with speech therapy only without overt signs or symptoms of airway compromise. STG: Patient will participate in modified barium swallow study as clinically indicated. Outcome: Progressing Towards Goal     Problem: Neurolinguistics Impaired (Adult)  Goal: *Acute Goals and Plan of Care (Insert Text)  Description  STG: Patient will recall verbal information after a 3 minute delay with 80% accuracy and minimal assistance. STG: Patient will recall orientation information with 100% accuracy with minimal assistance. STG: Patient will participate in continued therapeutic assessment of cognitive-linguistic abilities. LTG: Patient will increase neuro-linguistic abilities to increase safety and awareness of deficits. Outcome: Progressing Towards Goal  SPEECH LANGUAGE PATHOLOGY: BEDSIDE SWALLOW AND SPEECH LANGUAGE NOTE: Daily Note 3    NAME/AGE/GENDER: Criselda Maya is a 37 y.o. male  DATE: 7/22/2019  PRIMARY DIAGNOSIS: SAH [facility]arachnoid hemorrhage) [facility] [I60.9]      ICD-10: Treatment Diagnosis: R13.12 Oropharyngeal Dysphagia. A.24.146 Cognitive-Communication Deficit    INTERDISCIPLINARY COLLABORATION: Registered Nurse  ASSESSMENT:   Swallowing: Patient seen for dysphagia treatment with noontime meal. Able to feed self, but required moderate verbal cues to engage in po intake. Strong cough on 3/3 sips of thin liquid via straw sips. However, when presented with cup sips, no cough or throat clear observed. Slow mastication with chewable textures but adequate oral clearing. Recommend continue regular diet/thin liquids. No straws. ? If decline in swallow today is related to medications provided earlier this AM and drowsiness.  May benefit from modified barium swallow study if dysphagia symptoms persist.     Speech Language-Cognitive: Patient speaking only in whisper today. Intelligibility ranged from 0-60% at the phrase level due to low volume of speech. He demonstrated ability to vocalize at appropriate volume, but required moderate to max cues to do so. First verbalization was almost always unintelligible. Increased volume with moderate verbal cues. He could ultimately reach 100% intelligibility at the phrase level, but typicaly required repetition x3 before this was reached. With first trial of sustained \"ah\" he merely opened mouth, but no vocalizations. Moderate feedback and 3 trials in order for patient to reach appropriate loudness. Suspect low volume of speech is behavioral as he is able to reach appropriate volume and states \"I am just too tired to talk\". Educated patient on importance of appropriate volume in order to improve his communication abilities/his ability to express wants and needs. Patient will benefit from ongoing cognitive linguistic treatment to improve communication, safety, and independence. Will continue to follow. ?????? ? ? This section established at most recent assessment??????????  PROBLEM LIST (Impairments causing functional limitations):  1. Oropharyngeal dysphagia  2. Cognitive-linguistic impairments  REHABILITATION POTENTIAL FOR STATED GOALS: Good  PLAN OF CARE:   Patient will benefit from skilled intervention to address the following impairments. RECOMMENDATIONS AND PLANNED INTERVENTIONS (Benefits and precautions of therapy have been discussed with the patient.):  · PO:  Regular  · Liquids:  regular thin   · No straws  MEDICATIONS:  · With liquid  COMPENSATORY STRATEGIES/MODIFICATIONS INCLUDING:  · Small sips and bites  · Slow rate of intake   OTHER RECOMMENDATIONS (including follow up treatment recommendations):    · Family training/education  · Patient education  RECOMMENDED DIET MODIFICATIONS DISCUSSED WITH:  · Nursing  · Patient  FREQUENCY/DURATION: Continue to follow patient 3 times a week for duration of hospital stay to address above goals. RECOMMENDED REHABILITATION/EQUIPMENT: (at time of discharge pending progress): Due to the probability of continued deficits (see above) this patient will likely need continued skilled speech therapy after discharge. SUBJECTIVE:   Reports feeling \"tired\" today. History of Present Injury/Illness: Mr. Breanne Randolph  has no past medical history on file. Ritchie Sebastian He also  has a past surgical history that includes ir emboli intracran lt (7/11/2019). Present Symptoms: oropharyngeal dysphagia   Pain Intensity 1: 0  Pain Location 1: Head  Pain Orientation 1: Posterior  Pain Intervention(s) 1: Medication (see MAR)  Current Medications:   No current facility-administered medications on file prior to encounter. No current outpatient medications on file prior to encounter. Current Dietary Status:  MS/nectar  Social History/Home Situation:    Home Environment: Private residence  One/Two Story Residence: Other (Comment)  Living Alone: No  Support Systems: Parent, Child(rohan)  Patient Expects to be Discharged to[de-identified] Unknown  Current DME Used/Available at Home: None  OBJECTIVE:   Respiratory Status:  Hi flow nasal cannula  2 l/min    Oral Motor Structure/Speech:  Oral-Motor Structure/Motor Speech  Labial: No impairment  Dentition: Intact  Oral Hygiene: Adequate  Lingual: No impairment    Cognitive and Communication Status:  Neurologic State: Alert  Orientation Level: Oriented to person;Oriented to place  Cognition: Follows commands  Perception: Cues to attend right visual field  Perseveration: No perseveration noted  Safety/Judgement: Decreased insight into deficits    BEDSIDE SWALLOW EVALUATION  Oral Assessment:  Oral Assessment  Labial: No impairment  Dentition: Intact  Lingual: No impairment  P.O.  Trials:  Patient Position: upright in bed    The patient was given tsp-small sip amounts of the following:   Consistency Presented: Thin liquid; Solid  How Presented: Self-fed/presented;Cup/sip;Spoon;Straw    ORAL PHASE:  Bolus Acceptance: No impairment  Bolus Formation/Control: No impairment     Type of Impairment: Delayed  Oral Residue: None    PHARYNGEAL PHASE:  Initiation of Swallow: No impairment  Laryngeal Elevation: Functional  Aspiration Signs/Symptoms: Strong cough  Vocal Quality: Low volume           Pharyngeal Phase Characteristics: No impairment, issues, or problems     OTHER OBSERVATIONS:  Rate/bite size: Questionable  Comments:   Endurance: Questionable  Comments:     SPEECH-LANGUAGE COGNITIVE TREATMENT  Motor Speech:  Oral-Motor Structure/Motor Speech  Labial: No impairment  Dentition: Intact  Lingual: No impairment    Auditory Comprehension:   Auditory Comprehension  Response to Basic Yes/No Questions (%): 100 %  One-Step Basic Commands (%): 100 %    Reading Comprehension:        Verbal Expression:        Neuro-Linguistics:                              Vocal Quality: Low volume                               Cognitive Skills Activities: Activities/Procedures listed utilized to improve and/or restore cognitive function as related to attention to tasks, problem solving skills, memory, sequencing and thought organization. Required minimal verbal and tactile cueing to improve improve recall of information and perform graded activities focusing on attention skills. Tool Used: Dysphagia Outcome and Severity Scale (MIR)    Score Comments   Normal Diet  ? 7 With no strategies or extra time needed       Functional Swallow  ? 6 May have mild oral or pharyngeal delay       Mild Dysphagia    ? 5 Which may require one diet consistency restricted (those who demonstrate penetration which is entirely cleared on MBS would be included)   Mild-Moderate Dysphagia  ? 4 With 1-2 diet consistencies restricted       Moderate Dysphagia  ?  3 With 2 or more diet consistencies restricted       Moderately Severe Dysphagia  ? 2 With partial PO strategies (trials with ST only)       Severe Dysphagia  ? 1 With inability to tolerate any PO safely          Score:  Initial: 5 Most Recent: X (Date: -- )   Interpretation of Tool: The Dysphagia Outcome and Severity Scale (MIR) is a simple, easy-to-use, 7-point scale developed to systematically rate the functional severity of dysphagia based on objective assessment and make recommendations for diet level, independence level, and type of nutrition. Score 7 6 5 4 3 2 1   Modifier CH CI CJ CK CL CM CN     Payor: BLUE CROSS / Plan: [place] BLUE CROSS [facility] / Product Type: PPO /     ________________________________________________________________________________________________    Safety:   After treatment position/precautions:  · Up in chair  · RN notified  . Progression/Medical Necessity:   · Patient is expected to demonstrate progress in diet tolerance  ·  to improve swallow safety, work toward diet advancement and decrease aspiration risk  · .  · Patient is expected to demonstrate progress in expressive communication, receptive ability and cognitive ability  ·  to decrease assistance required communication, increase independence with activities of daily living and increase communication with family/caregivers  · . Compliance with Program/Exercises: Will assess as treatment progresses   Reason for Continuation of Services/Other Comments:  · Patient continues to require skilled intervention due to dysphagia and cognitive-linguistic impairment   · . Recommendations/Intent for next treatment session: \"Treatment next visit will focus on advancements to more challenging activities and reduction in assistance provided\".     Total Treatment Duration:  Time In: 1315  Time Out: [address], MSP, CCC-SLP

## 2019-07-22 NOTE — PROGRESS NOTES
Nutrition F/U:  Assessment:  edema - 1+ pitting BLEs  Last 3 Recorded Weights in this Encounter    07/18/19 0500 07/21/19 0327 07/22/19 0300   Weight: 85 kg (187 lb 6.3 oz) 75.3 kg (166 lb 0.1 oz) 74 kg (163 lb 2.3 oz)   Admission wt (7/9/19): 68.7 kg, bed scale  Change in neuro status over the weekend and EVD replaced 7/21. Pt seen eating breakfast this morning-feeding himself with minimal assistance. Ensure enlive untouched and pt states \"no\" when asked if he would prefer a different flavor. It does not seem that he has been consuming ONS provided with meals. Date of last BM: 7/21. Macronutrient Needs: (69 kg)  Estimated calorie needs - 2431-5743 jayce/day (25-28 jayce/kg/day)   Estimated protein needs - 69-83 gm pro/day (1-1.2 gm pro/kgBW/day)   Max CHO/day - 266 gm CHO/day (55% jayce/day)   Fluid/day - 1.7-2 liters/day (1 ml/jayce/day)  Intake/Comparative Standards: RD meal rounds: 25-50% of eggs off of breakfast tray. Per RN, ate ~3/4 of muffin and 100% of eggs later this morning. Five recorded meals over the weekend averaging 70%, potentially meeting % EEN and % estimated PRO needs. Intervention:   Meals and Snacks: Mechanical soft based on ST evaluation. Medical Food Supplement Therapy: Continue ensure enlive with one meal/day and add ensure clear to one meal, and a magic cup to one meal.  Unsure whether pt's macronutrient needs can be met with oral intake. Question whether he would benefit from a FT for supplemental nutrition while he is recovering and at rehab. Mineral Supplement Therapy: Nutrition Support Orders/Electrolyte Management Replacement Protocols are active on the [facility] for potassium only. Coordination of Care: Willian Tracy, RN and AM ICU rounds  Nutrition Discharge Plan: Too soon to determine.     Mortimer Pandy, [address], [address] [place] [address], [address] [place] [address], [facility] [address]

## 2019-07-22 NOTE — PROGRESS NOTES
Chart reviewed as pt remains ICU. EVD in place per MD notes. Dr. Chip Garcia following for possible IRC/9th floor admission. Currently alert and oriented. Pt does have insurance and will need precert with [employer] [place] when ready for d/c to rehab. CM following for any assist and d/c POC.

## 2019-07-22 NOTE — PROGRESS NOTES
Progress Note    Patient: Yolanda Ramírez MRN: [id number]  SSN: [id number]    YOB: 1975  Age: 37 y.o. Sex: male      Admit Date: 7/9/2019    LOS: 13 days     Subjective:   Pt with no acute neuro changes over night with new EVD in place. Alert but drowsy (woke him up from sleeping to assess). Following commands. Ate most of his breakfast per student nurse. EVD at [address]        Current Facility-Administered Medications   Medication Dose Route Frequency    acetaminophen (TYLENOL) tablet 650 mg  650 mg Oral Q4H PRN    lidocaine 4 % patch 1 Patch  1 Patch TransDERmal Q24H    fentaNYL citrate (PF) injection 50 mcg  50 mcg IntraVENous Q1H PRN    ferrous sulfate tablet 325 mg  1 Tab Oral TID WITH MEALS    levETIRAcetam (KEPPRA) tablet 500 mg  500 mg Oral Q12H    niMODipine (NIMOTOP) 30 mg/mL oral solution (compound) 60 mg  60 mg Oral Q4H    0.45% sodium chloride infusion  100 mL/hr IntraVENous CONTINUOUS    ascorbic acid (vitamin C) (VITAMIN C) tablet 500 mg  500 mg Oral TID    aspirin tablet 325 mg  325 mg Oral DAILY    atorvastatin (LIPITOR) tablet 40 mg  40 mg Oral QHS    clopidogrel (PLAVIX) tablet 75 mg  75 mg Oral DAILY    famotidine (PEPCID) tablet 20 mg  20 mg Oral DAILY    magnesium oxide (MAG-OX) tablet 400 mg  400 mg Oral BID    senna-docusate (PERICOLACE) 8.6-50 mg per tablet 2 Tab  2 Tab Oral QHS    magnesium hydroxide (MILK OF MAGNESIA) 400 mg/5 mL oral suspension 30 mL  30 mL Oral DAILY PRN    polyvinyl alcohol (LIQUIFILM TEARS) 1.4 % ophthalmic solution 1 Drop  1 Drop Both Eyes PRN    heparin (porcine) injection 5,000 Units  5,000 Units SubCUTAneous Q8H    sodium chloride (NS) flush 30 mL  30 mL InterCATHeter Q8H    heparin (porcine) pf 900 Units  900 Units InterCATHeter Q8H    sodium chloride (NS) flush 30 mL  30 mL InterCATHeter PRN    heparin (porcine) pf 900 Units  900 Units InterCATHeter PRN    nicotine (NICODERM CQ) 21 mg/24 hr patch 1 Patch  1 Patch TransDERmal Q24H    ondansetron (ZOFRAN) injection 4 mg  4 mg IntraVENous Q6H PRN    NUTRITIONAL SUPPORT ELECTROLYTE PRN ORDERS   Does Not Apply PRN    magnesium sulfate 4 g/100 mL IVPB  4 g IntraVENous DAILY PRN    magnesium sulfate 2 g/50 ml IVPB (premix or compounded)  2 g IntraVENous DAILY PRN       Objective:     Vitals:    07/22/19 0600 07/22/19 0615 07/22/19 0700 07/22/19 0800   BP:   (!) 217/81 (!) 198/95   Pulse: 85 74 80 89   Resp: 12 8 15 24   Temp: 99.1 °F (37.3 °C) 99.1 °F (37.3 °C) 98.9 °F (37.2 °C) 99.1 °F (37.3 °C)   SpO2: 100% 100% 100% 99%   Weight:       Height:             Intake and Output:  Current Shift: 07/22 0701 - 07/22 1900  In: -   Out: 163 [Urine:155; Drains:8]  Last 24 hr: 07/21 0701 - 07/22 0700  In: 9845 [P.O.:300; I.V.:2475]  Out: 7274 [Urine:5695; Drains:350]     IO: -3.2L/24hr  TOTAL OVERALL: -4.5L  EVD: 344/24hrs    Physical Exam:   General:  Alert, drowsy, cooperative, no distress. Eyes:   PERRL   Neck: Supple, symmetrical, trachea midline, no adenopathy, thyroid: no enlargment/tenderness/nodules, no carotid bruit and no JVD. Lungs:   Clear to auscultation bilaterally. Heart:  Regular rate and rhythm, S1, S2 normal, no murmur, click, rub or gallop. Abdomen:   Soft, non-tender. Bowel sounds normal. No masses,  No organomegaly. Extremities: Extremities normal, atraumatic, no cyanosis. 2+ edema RLE, 1+ edema LLE. Pulses: 2+ and symmetric all extremities. Skin: Skin color, texture, turgor normal. EVD intact. Neurologic:  pt aox3 this am. Monitor for any acute neuro changes/vasospasm.        Lab/Data Review:    Recent Results (from the past 12 hour(s))   MAGNESIUM    Collection Time: 07/22/19  3:11 AM   Result Value Ref Range    Magnesium 2.5 (H) 1.8 - 2.4 mg/dL   CBC W/O DIFF    Collection Time: 07/22/19  3:11 AM   Result Value Ref Range    WBC 22.1 (H) 4.3 - 11.1 K/uL    RBC 2.81 (L) 4.23 - 5.6 M/uL    HGB 8.4 (L) 13.6 - 17.2 g/dL    HCT 25.3 (L) 41.1 - 50.3 %    MCV 90.0 79.6 - 97.8 FL    MCH 29.9 26.1 - 32.9 PG    MCHC 33.2 31.4 - 35.0 g/dL    RDW 13.2 11.9 - 14.6 %    PLATELET 588 475 - 736 K/uL    MPV 10.6 9.4 - 12.3 FL    ABSOLUTE NRBC 0.00 0.0 - 0.2 K/uL   METABOLIC PANEL, BASIC    Collection Time: 07/22/19  3:11 AM   Result Value Ref Range    Sodium 142 136 - 145 mmol/L    Potassium 3.6 3.5 - 5.1 mmol/L    Chloride 108 (H) 98 - 107 mmol/L    CO2 25 21 - 32 mmol/L    Anion gap 9 7 - 16 mmol/L    Glucose 85 65 - 100 mg/dL    BUN 26 (H) 6 - 23 MG/DL    Creatinine 3.27 (H) 0.8 - 1.5 MG/DL    GFR est AA 26 (L) >60 ml/min/1.73m2    GFR est non-AA 22 (L) >60 ml/min/1.73m2    Calcium 8.1 (L) 8.3 - 10.4 MG/DL       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)    Active Problems:    Tobacco abuse (7/10/2019)      Acute respiratory failure with hypoxia ([facility]) (7/10/2019)      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 Q1 hour neuro checks. On SAH protocol - Mg, nimotop, zocor. PERRL +3. No MAP goal now. TCDs daily - they have been 180-220 in the left and right MCAs . Last CT head showed improved communicating hydrocephalus. Last CTA head and neck showed no filling of the aneurysm and good filling of the PCAs. Vasospasm seen in the right MCA and both supraclinoid ICAs as well as the ACAs and the left MCA but to a lesser extent. CTP was normal. Will continue SAH protocol, PT/OT. OOB to chair today. /. Pt had EVD replaced 7/20 due to it getting pulled put.  EVD with some dried blood drainage but working and still set at 5cm H20. Will continue to monitor. Alert and following commands this morning. Patient may need a shunt. We will drain for several more days and then challenge the EVD. Will repeat CT head in am to assess the hydrocephalus. RESP: NC @4L, tolerating well. Will wean as tolerated. No distress. Will check CXR in am to assess effusions. CV: NO MAP GOAL NOW.  2D Echo: 55-60%, trop peaked @ 0.8, trending down. SCD/SQ heparin. TCD's elevated R MCA. Will monitor. HEME: 8.4/25.3,  HIT panel negative. On oral iron. And Vit C. NEPH: bun/cr: 26/3. 27. UOP is good. -4.5L. Will give 1L 1/2 NS. GI: Pepcid. Senna. +BS. + BM yesterday. ID: Tm: [id number]. Sputum cx sent, pending final, showing gram + cocci -  Patient has MSSA pneumonia. Blood cultures neg and UA neg. US neg for DVT. Pt on Cefazolin D7/10. Will send CSF today for cx, labs. WBC increased due to the dose of decadron given for the severe headache and neck pain due to the [facility]. LINES: KISHOR MCDOWELL, SHANNAN phelps. Tobacco abuse: On Nicoderm patch.         Signed By: [place]     July 22, 2019

## 2019-07-23 ENCOUNTER — APPOINTMENT (OUTPATIENT)
Dept: CT IMAGING | Age: 44
DRG: 020 | End: 2019-07-23
Attending: NEUROLOGICAL SURGERY
Payer: COMMERCIAL

## 2019-07-23 ENCOUNTER — APPOINTMENT (OUTPATIENT)
Dept: GENERAL RADIOLOGY | Age: 44
DRG: 020 | End: 2019-07-23
Attending: NEUROLOGICAL SURGERY
Payer: COMMERCIAL

## 2019-07-23 LAB
ANION GAP SERPL CALC-SCNC: 12 MMOL/L (ref 7–16)
BUN SERPL-MCNC: 23 MG/DL (ref 6–23)
CALCIUM SERPL-MCNC: 8.3 MG/DL (ref 8.3–10.4)
CHLORIDE SERPL-SCNC: 106 MMOL/L (ref 98–107)
CO2 SERPL-SCNC: 25 MMOL/L (ref 21–32)
CREAT SERPL-MCNC: 3.15 MG/DL (ref 0.8–1.5)
ERYTHROCYTE [DISTWIDTH] IN BLOOD BY AUTOMATED COUNT: 13.2 % (ref 11.9–14.6)
GLUCOSE SERPL-MCNC: 87 MG/DL (ref 65–100)
HCT VFR BLD AUTO: 23.5 % (ref 41.1–50.3)
HGB BLD-MCNC: 7.7 G/DL (ref 13.6–17.2)
MAGNESIUM SERPL-MCNC: 2.5 MG/DL (ref 1.8–2.4)
MCH RBC QN AUTO: 29.5 PG (ref 26.1–32.9)
MCHC RBC AUTO-ENTMCNC: 32.8 G/DL (ref 31.4–35)
MCV RBC AUTO: 90 FL (ref 79.6–97.8)
NRBC # BLD: 0 K/UL (ref 0–0.2)
PLATELET # BLD AUTO: 367 K/UL (ref 150–450)
PMV BLD AUTO: 10.5 FL (ref 9.4–12.3)
POTASSIUM SERPL-SCNC: 3.4 MMOL/L (ref 3.5–5.1)
RBC # BLD AUTO: 2.61 M/UL (ref 4.23–5.6)
SODIUM SERPL-SCNC: 143 MMOL/L (ref 136–145)
WBC # BLD AUTO: 19.7 K/UL (ref 4.3–11.1)

## 2019-07-23 PROCEDURE — 71045 X-RAY EXAM CHEST 1 VIEW: CPT

## 2019-07-23 PROCEDURE — 74011000258 HC RX REV CODE- 258: Performed by: NURSE PRACTITIONER

## 2019-07-23 PROCEDURE — 99232 SBSQ HOSP IP/OBS MODERATE 35: CPT | Performed by: PSYCHIATRY & NEUROLOGY

## 2019-07-23 PROCEDURE — 70450 CT HEAD/BRAIN W/O DYE: CPT

## 2019-07-23 PROCEDURE — 77030010537 HC BG CSF DRN INLC -C

## 2019-07-23 PROCEDURE — 74011250637 HC RX REV CODE- 250/637

## 2019-07-23 PROCEDURE — 80048 BASIC METABOLIC PNL TOTAL CA: CPT

## 2019-07-23 PROCEDURE — 77030020257 HC SOL INJ SOD CL 0.45% 1000ML BG

## 2019-07-23 PROCEDURE — 74011250637 HC RX REV CODE- 250/637: Performed by: NEUROLOGICAL SURGERY

## 2019-07-23 PROCEDURE — 93886 INTRACRANIAL COMPLETE STUDY: CPT

## 2019-07-23 PROCEDURE — 97116 GAIT TRAINING THERAPY: CPT

## 2019-07-23 PROCEDURE — 65610000001 HC ROOM ICU GENERAL

## 2019-07-23 PROCEDURE — 74011250637 HC RX REV CODE- 250/637: Performed by: NURSE PRACTITIONER

## 2019-07-23 PROCEDURE — 77030020256 HC SOL INJ NACL 0.9%  500ML

## 2019-07-23 PROCEDURE — 77030013794 HC KT TRNSDUC BLD EDWD -B

## 2019-07-23 PROCEDURE — 97112 NEUROMUSCULAR REEDUCATION: CPT

## 2019-07-23 PROCEDURE — 85027 COMPLETE CBC AUTOMATED: CPT

## 2019-07-23 PROCEDURE — 74011000250 HC RX REV CODE- 250: Performed by: NURSE PRACTITIONER

## 2019-07-23 PROCEDURE — 36592 COLLECT BLOOD FROM PICC: CPT

## 2019-07-23 PROCEDURE — 99232 SBSQ HOSP IP/OBS MODERATE 35: CPT | Performed by: NEUROLOGICAL SURGERY

## 2019-07-23 PROCEDURE — 74011250636 HC RX REV CODE- 250/636: Performed by: NURSE PRACTITIONER

## 2019-07-23 PROCEDURE — 83735 ASSAY OF MAGNESIUM: CPT

## 2019-07-23 PROCEDURE — 97530 THERAPEUTIC ACTIVITIES: CPT

## 2019-07-23 RX ORDER — POTASSIUM CHLORIDE 14.9 MG/ML
20 INJECTION INTRAVENOUS ONCE
Status: COMPLETED | OUTPATIENT
Start: 2019-07-23 | End: 2019-07-23

## 2019-07-23 RX ADMIN — SODIUM CHLORIDE, PRESERVATIVE FREE 900 UNITS: 5 INJECTION INTRAVENOUS at 13:48

## 2019-07-23 RX ADMIN — SODIUM CHLORIDE 100 ML/HR: 450 INJECTION, SOLUTION INTRAVENOUS at 05:01

## 2019-07-23 RX ADMIN — SODIUM CHLORIDE, PRESERVATIVE FREE 900 UNITS: 5 INJECTION INTRAVENOUS at 05:08

## 2019-07-23 RX ADMIN — NIMODIPINE 60 MG: 30 CAPSULE, LIQUID FILLED ORAL at 08:58

## 2019-07-23 RX ADMIN — ACETAMINOPHEN 650 MG: 325 TABLET, FILM COATED ORAL at 00:52

## 2019-07-23 RX ADMIN — SODIUM CHLORIDE 1000 ML: 450 INJECTION, SOLUTION INTRAVENOUS at 12:57

## 2019-07-23 RX ADMIN — Medication 30 ML: at 05:07

## 2019-07-23 RX ADMIN — ACETAMINOPHEN 650 MG: 325 TABLET, FILM COATED ORAL at 05:09

## 2019-07-23 RX ADMIN — ASPIRIN 325 MG ORAL TABLET 325 MG: 325 PILL ORAL at 08:56

## 2019-07-23 RX ADMIN — SENNOSIDES, DOCUSATE SODIUM 2 TABLET: 50; 8.6 TABLET, FILM COATED ORAL at 21:18

## 2019-07-23 RX ADMIN — OXYCODONE HYDROCHLORIDE AND ACETAMINOPHEN 500 MG: 500 TABLET ORAL at 16:16

## 2019-07-23 RX ADMIN — ACETAMINOPHEN 650 MG: 325 TABLET, FILM COATED ORAL at 16:16

## 2019-07-23 RX ADMIN — CLOPIDOGREL BISULFATE 75 MG: 75 TABLET ORAL at 08:56

## 2019-07-23 RX ADMIN — MAGNESIUM OXIDE TAB 400 MG (241.3 MG ELEMENTAL MG) 400 MG: 400 (241.3 MG) TAB at 08:56

## 2019-07-23 RX ADMIN — HEPARIN SODIUM 5000 UNITS: 5000 INJECTION INTRAVENOUS; SUBCUTANEOUS at 16:16

## 2019-07-23 RX ADMIN — FAMOTIDINE 20 MG: 20 TABLET ORAL at 08:56

## 2019-07-23 RX ADMIN — POTASSIUM CHLORIDE 20 MEQ: 200 INJECTION, SOLUTION INTRAVENOUS at 05:07

## 2019-07-23 RX ADMIN — ATORVASTATIN CALCIUM 40 MG: 40 TABLET, FILM COATED ORAL at 21:18

## 2019-07-23 RX ADMIN — LEVETIRACETAM 500 MG: 500 TABLET, FILM COATED ORAL at 21:20

## 2019-07-23 RX ADMIN — HEPARIN SODIUM 5000 UNITS: 5000 INJECTION INTRAVENOUS; SUBCUTANEOUS at 05:08

## 2019-07-23 RX ADMIN — FERROUS SULFATE TAB 325 MG (65 MG ELEMENTAL FE) 325 MG: 325 (65 FE) TAB at 16:16

## 2019-07-23 RX ADMIN — Medication 30 ML: at 13:49

## 2019-07-23 RX ADMIN — Medication 30 ML: at 21:20

## 2019-07-23 RX ADMIN — FENTANYL CITRATE 50 MCG: 50 INJECTION INTRAMUSCULAR; INTRAVENOUS at 00:53

## 2019-07-23 RX ADMIN — ACETAMINOPHEN 650 MG: 325 TABLET, FILM COATED ORAL at 20:27

## 2019-07-23 RX ADMIN — NIMODIPINE 60 MG: 30 CAPSULE, LIQUID FILLED ORAL at 16:17

## 2019-07-23 RX ADMIN — NIMODIPINE 60 MG: 30 CAPSULE, LIQUID FILLED ORAL at 03:11

## 2019-07-23 RX ADMIN — HEPARIN SODIUM 5000 UNITS: 5000 INJECTION INTRAVENOUS; SUBCUTANEOUS at 21:19

## 2019-07-23 RX ADMIN — LEVETIRACETAM 500 MG: 500 TABLET, FILM COATED ORAL at 08:56

## 2019-07-23 RX ADMIN — NIMODIPINE 60 MG: 30 CAPSULE, LIQUID FILLED ORAL at 20:27

## 2019-07-23 RX ADMIN — SODIUM CHLORIDE, PRESERVATIVE FREE 900 UNITS: 5 INJECTION INTRAVENOUS at 21:19

## 2019-07-23 RX ADMIN — OXYCODONE HYDROCHLORIDE AND ACETAMINOPHEN 500 MG: 500 TABLET ORAL at 21:18

## 2019-07-23 RX ADMIN — SODIUM CHLORIDE 100 ML/HR: 450 INJECTION, SOLUTION INTRAVENOUS at 20:31

## 2019-07-23 RX ADMIN — FERROUS SULFATE TAB 325 MG (65 MG ELEMENTAL FE) 325 MG: 325 (65 FE) TAB at 08:56

## 2019-07-23 RX ADMIN — OXYCODONE HYDROCHLORIDE AND ACETAMINOPHEN 500 MG: 500 TABLET ORAL at 08:56

## 2019-07-23 RX ADMIN — NIMODIPINE 60 MG: 30 CAPSULE, LIQUID FILLED ORAL at 00:53

## 2019-07-23 RX ADMIN — NIMODIPINE 60 MG: 30 CAPSULE, LIQUID FILLED ORAL at 12:57

## 2019-07-23 RX ADMIN — FERROUS SULFATE TAB 325 MG (65 MG ELEMENTAL FE) 325 MG: 325 (65 FE) TAB at 12:58

## 2019-07-23 NOTE — PROGRESS NOTES
SPEECH PATHOLOGY NOTE:    Speech therapy attempt this AM. Patient had just transferred from bed to chair with nursing assistance. He reports fatigue and politely declined therapy. Will follow up at later time/date as patient is available and as schedule permits.      Scott [address] CCC-SLP

## 2019-07-23 NOTE — PROCEDURES
Follow-up transcranial Doppler study was performed for evaluation of intracranial vascular spasm the patient with subarachnoid hemorrhage     Insonation was performed via the transtemporal window and via the foramen magnum fossa.     Mean flow velocities and peak flow velocities in the middle cerebral artery did not demonstrate significant change from the prior days investigation.     On the right side there has been some degree of decrease in mean and peak flow velocities.     Antegrade flow continues to be observed in the vertebrobasilar circulation posterior cerebral artery flow velocities are normal     Impression     Current study has a normal Lindegaard ratio bilaterally 2.98 and 4.04 Study is considered negative for intracranial vascular spasm

## 2019-07-23 NOTE — PROGRESS NOTES
Problem: Self Care Deficits Care Plan (Adult)  Goal: *Acute Goals and Plan of Care (Insert Text)  Description  1. Patient will complete total body bathing and dressing with supervision and adaptive equipment as needed. 2. Patient will complete toileting with supervision and adaptive equipment as needed. 3. Patient will tolerate 20 minutes of OT treatment with up to minimal rest breaks to increase activity tolerance for ADLs. 4. Patient will complete functional transfers with supervision and adaptive equipment as needed. 5. Patient will demonstrate modified independence with therapeutic exercise HEP to decrease edema in bilateral hands for increased coordination and independence with functional transfers. 6. Patient will complete functional mobility for ADLs with stand by assistance and adaptive equipment as needed. Timeframe: 7 visits      Outcome: Progressing Towards Goal     OCCUPATIONAL THERAPY: Daily Note and PM 7/23/2019  INPATIENT: OT Visit Days: 3  Payor: Flaquita Meter / Plan: [place] [facility] [facility] / Product Type: PPO /      NAME/AGE/GENDER: Haritha Cuevas is a 37 y.o. 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 ASSESSMENT:     Mr. Rony Whaley is a 37 y.o. 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 Seen today in ICU with external ventricular drain and arterial line, pt intubated. Per father at bedside/pt with appropriate nodding to yes/no questions: at baseline pt and his two children live with pt's father, pt is independent with ADLs, ambulation, working a physically demanding job. No recent falls. Upon arrival pt alert and agreeable to OT evaluation and treatment.  BUDDY assessment revealed AROM generally decreased and strength WFL (4+ to 5/5) in BUEs. BUE sensation intact to light touch. Proprioception likely intact in BUEs, difficult to assess fully due to BUE lines. Swelling noted in B hands, pt educated to elevate/practice hand pumps. Tracking and visual fields WNL, slightly increased time needed for saccades. Pt with good command following and able to communicate needs (suctioning) with hand gestures. Co-Treatment completed with PT today d/t increased need for safety cues as well as overall decreased strength, activity tolerance, functional mobility and ADL performance. OT treatment initiated to include functional transfers  with Junaid x 2 and cueing for proper hand placement and RW use. Pt ambulated for ~150 ft with min A x 2. Pt returned back to room to complete sit to supine transfer back to bed with min A x 2. Pt left with needs met, call light within reach, RN notified, and family at bedside. Good progress towards goals today. Will continue to address OT  Goals and plan of care. This section established at most recent assessment   PROBLEM LIST (Impairments causing functional limitations):  1. Decreased ADL/Functional Activities  2. Decreased Transfer Abilities  3. Decreased Ambulation Ability/Technique  4. Decreased Balance  5. Decreased Activity Tolerance  6. Increased Fatigue  7. Increased Shortness of Breath  8. Decreased Flexibility/Joint Mobility  9. Edema/Girth  10. Decreased Knowledge of Precautions  11. Decreased Skin Integrity/Hygeine  12. Decreased [place] with Home Exercise Program   INTERVENTIONS PLANNED: (Benefits and precautions of occupational therapy have been discussed with the patient.)  1. Activities of daily living training  2. Adaptive equipment training  3. Balance training  4. Clothing management  5. Donning&doffing training  6. Hygiene training  7. Neuromuscular re-eduation  8. Re-evaluation  9. Therapeutic activity  10.  Therapeutic exercise     TREATMENT PLAN: Frequency/Duration: Follow patient 3x/week to address above goals. Rehabilitation Potential For Stated Goals: Good     REHAB RECOMMENDATIONS (at time of discharge pending progress):    Placement: It is my opinion, based on this patient's performance to date, that Mr. Hemanth Canchola may benefit from intensive therapy at an [address] [place] [address] after discharge due to a probable need for close medical supervision by a rehab physician, a probable need for 24 hour rehab nursing, a probable need for multiple therapy disciplines and potential to make ongoing and sustainable functional improvement that is of practical value. .  Equipment:    TBD               OCCUPATIONAL PROFILE AND HISTORY:   History of Present Injury/Illness (Reason for Referral):  See H&P. Past Medical History/Comorbidities:   Mr. Hemanth Canchola  has no past medical history on file. Mr. Hemanth Canchola  has a past surgical history that includes ir emboli intracran lt (7/11/2019). Social History/Living Environment:   Home Environment: Private residence  One/Two Story Residence: Other (Comment)  Living Alone: No  Support Systems: Parent, Child(rohan)  Patient Expects to be Discharged to[de-identified] Unknown  Current DME Used/Available at Home: None  Prior Level of Function/Work/Activity:  Per father at bedside/pt with appropriate nodding to yes/no questions: at baseline pt and his two children live with pt's father, pt is independent with ADLs, ambulation, working a physically demanding job. No recent falls. Dominant Side:         RIGHT    Personal Factors:          Sex:  male        Age:  37 y.o.         Other factors that influence how disability is experienced by the patient:  [facility]    Number of Personal Factors/Comorbidities that affect the Plan of Care: Expanded review of therapy/medical records (1-2):  MODERATE COMPLEXITY   ASSESSMENT OF OCCUPATIONAL PERFORMANCE[de-identified]   Activities of Daily Living:   Basic ADLs (From Assessment) Complex ADLs (From Assessment)   Feeding: Total assistance(Pt intubated)  Oral Facial Hygiene/Grooming: Minimum assistance(d/t lines)  Bathing: Minimum assistance  Upper Body Dressing: Moderate assistance(d/t lines)  Lower Body Dressing: Minimum assistance  Toileting: Minimum assistance Instrumental ADL  Meal Preparation: Total assistance  Homemaking: Total assistance  Medication Management: Total assistance  Financial Management: Total assistance   Grooming/Bathing/Dressing Activities of Daily Living     Cognitive Retraining  Safety/Judgement: Fall prevention; Awareness of environment                             Most Recent Physical Functioning:   Gross Assessment:                  Posture:  Posture (WDL): Exceptions to WDL  Posture Assessment: Forward head  Balance:  Sitting: Impaired  Sitting - Static: Good (unsupported)  Sitting - Dynamic: Fair (occasional)  Standing: Impaired  Standing - Static: Fair  Standing - Dynamic : Fair Bed Mobility:     Wheelchair Mobility:     Transfers:               Patient Vitals for the past 6 hrs:   SpO2 Pulse   07/23/19 0945 100 % 77   07/23/19 1000 98 % 76   07/23/19 1015 98 % 77   07/23/19 1030 100 % 78   07/23/19 1045 100 % 75   07/23/19 1100 100 % 85   07/23/19 1104 100 % 83   07/23/19 1115 97 % 85   07/23/19 1130 97 % 79   07/23/19 1145 98 % 88   07/23/19 1200 100 % 81   07/23/19 1215 100 % 90   07/23/19 1230 (!) 87 % 95   07/23/19 1245 100 % 90   07/23/19 1300 100 % 90   07/23/19 1315 98 % 77   07/23/19 1330 (!) 85 % 92   07/23/19 1345 (!) 87 % 91       Mental Status  Neurologic State: Eyes open to voice, Drowsy  Orientation Level: Oriented X4  Cognition: Follows commands  Perception: Cues to maintain midline in sitting  Perseveration: No perseveration noted  Safety/Judgement: Fall prevention, Awareness of environment                          Physical Skills Involved:  1. Range of Motion  2. Balance  3. Activity Tolerance  4. Fine Motor Control  5.  Edema Cognitive Skills Affected (resulting in the inability to perform in a timely and safe manner):  1. None  Psychosocial Skills Affected:  1. Habits/Routines  2. Environmental Adaptation  3. Social Interaction  4. Emotional Regulation  5. Self-Awareness  6. Awareness of Others  7. Social Roles   Number of elements that affect the Plan of Care: 5+:  HIGH COMPLEXITY   CLINICAL DECISION MAKIN[address] [place] [address] AM-PAC 6 Clicks   Daily Activity Inpatient Short Form  How much help from another person does the patient currently need. .. Total A Lot A Little None   1. Putting on and taking off regular lower body clothing? ? 1   ? 2   ? 3   ? 4   2. Bathing (including washing, rinsing, drying)? ? 1   ? 2   ? 3   ? 4   3. Toileting, which includes using toilet, bedpan or urinal?   ? 1   ? 2   ? 3   ? 4   4. Putting on and taking off regular upper body clothing? ? 1   ? 2   ? 3   ? 4   5. Taking care of personal grooming such as brushing teeth? ? 1   ? 2   ? 3   ? 4   6. Eating meals? ? 1   ? 2   ? 3   ? 4   © , Trustees of [address] [place] [address], under license to [employer]. All rights reserved      Score:  Initial: 15 19 Most Recent: X (Date: -- )    Interpretation of Tool:  Represents activities that are increasingly more difficult (i.e. Bed mobility, Transfers, Gait). Medical Necessity:     · Patient demonstrates good  ·  rehab potential due to higher previous functional level. Reason for Services/Other Comments:  · Patient continues to require skilled intervention due to inability to complete ADLs at prior level of independence   · . Use of outcome tool(s) and clinical judgement create a POC that gives a: MODERATE COMPLEXITY         TREATMENT:   (In addition to Assessment/Re-Assessment sessions the following treatments were rendered)     Pre-treatment Symptoms/Complaints:    Pain: Initial:   Pain Intensity 1: 0  Post Session:  same     Therapeutic Activity: (   25 minutes):   Therapeutic activities including Bed transfers, Chair transfers and Ambulation on level ground to improve mobility, balance, coordination and dynamic movement of arm - bilateral and leg - bilateral to improve functional transfers . Required minimal   to promote dynamic balance in standing. Therapeutic Exercise: ( 0 minutes):  Exercises per grid below to improve mobility, coordination and dynamic movement of hand - bilateral to improve functional coordination and address B hand edema . Required minimal visual and verbal cues to promote proper body mechanics. Progressed repetitions as indicated. Pt practiced BUE hand pumps with min cueing. See below. Date:  7/12/19 Date:   Date:     Activity/Exercise Parameters Parameters Parameters   BUE hand pumps 2x15 reps                                             Braces/Orthotics/Lines/Etc:   · IV  · phelps catheter  · drain ventricular   · arterial line  · ICU monitoring   · O2 Device: Ventilator  Treatment/Session Assessment:    · Response to Treatment:  Tolerated well   · Interdisciplinary Collaboration:   o Physical Therapist  o Occupational Therapist  o Registered Nurse  o Rehabilitation Attendant  · After treatment position/precautions:   o Supine in bed  o Bed alarm/tab alert on  o Bed/Chair-wheels locked  o Bed in low position  o Call light within reach  o RN notified  o Family at bedside  o Side rails x 3   · Compliance with Program/Exercises: Compliant all of the time, Will assess as treatment progresses. · Recommendations/Intent for next treatment session: \"Next visit will focus on advancements to more challenging activities and reduction in assistance provided\".   Total Treatment Duration:  OT Patient Time In/Time Out  Time In: 1405  Time Out: [address]

## 2019-07-23 NOTE — PROGRESS NOTES
Problem: Mobility Impaired (Adult and Pediatric)  Goal: *Acute Goals and Plan of Care (Insert Text)  Description  STG:  (1.)Mr. Darren Lowery will move from supine to sit and sit to supine , scoot up and down and roll side to side with CONTACT GUARD ASSIST within 3 treatment day(s). (2.)Mr. Darren Lowery will transfer from bed to chair and chair to bed with CONTACT GUARD ASSIST using the least restrictive device within 3 treatment day(s). (3.)Mr. Darren Lowery will ambulate with MINIMAL ASSIST for 50+ feet with the least restrictive device within 3 treatment day(s). (4.)Mr. Darren Lowery will perform standing static and dynamic balance activities x 15 minutes with MINIMAL ASSIST to improve safety within 3 day(s). (5.)Mr. Darren Lowery will maintain stable vital signs throughout all functional mobility within 3 days. LTG:  (1.)Mr. Darren Lowery will move from supine to sit and sit to supine , scoot up and down and roll side to side in bed with SUPERVISION within 7 treatment day(s). (2.)Mr. Darren Lowery will transfer from bed to chair and chair to bed with STAND BY ASSIST using the least restrictive device within 7 treatment day(s). (3.)Mr. Darren Lowery will ambulate with STAND BY ASSIST for 75+ feet with the least restrictive device within 7 treatment day(s). (4.)Mr. Darren Lowery will perform standing static and dynamic balance activities x 15 minutes with STAND BY ASSIST to improve safety within 7 day(s).   ________________________________________________________________________________________________     Outcome: Progressing Towards Goal     PHYSICAL THERAPY: Daily Note and PM 7/23/2019  INPATIENT: PT Visit Days : 2  Payor: Camila Guadalupe / [place] [address] / Product Type: PPO /       NAME/AGE/GENDER: Jada Lind is a 37 y.o. 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 ASSESSMENT:     Mr. Sarah Heredia is a 37 y.o. Male admitted for subarachnoid hemorrhage in bed in ICU and agreeable to physical therapy treatment. Per RN pt continuing to get hourly neuro checks and appeared drowsy and slightly withdrawn. Pt noted to have worse sitting balance in recliner today and was unable to hold midline for long. He also demonstrated impulsive tendencies. Pt able to stand with Emil x 2/RW. His standing balance is fair and he ambulated in pathak with Emil x 2/RW/gait belt and chair follow. Pt noted to have step-to gait pattern with decreased gait speed but was able to become more reciprocal with cuing. Pt also noted to have increased cervical flexion and was cued for upright posture. After returning to room pt given min-modA x 2 for sit to supine. Pt progressed in ambulation and functional mobility today. At this time, patient is appropriate for Co-treatment with occupational therapy due to patient's decreased overall endurance/tolerance levels, as well as need for high level assistance to complete functional transfers/mobility and functional tasks. Yolanda Ramírez is appropriate for a multidisciplinary co-treatment of PT and OT to address goals of both disciplines. This section established at most recent assessment   PROBLEM LIST (Impairments causing functional limitations):  1. Decreased Strength  2. Decreased ADL/Functional Activities  3. Decreased Transfer Abilities  4. Decreased Ambulation Ability/Technique  5. Decreased Balance  6. Decreased Activity Tolerance  7. Decreased Pacing Skills  8. Increased Shortness of Breath  9. Decreased Knowledge of Precautions  10. Decreased [place] with Home Exercise Program   INTERVENTIONS PLANNED: (Benefits and precautions of physical therapy have been discussed with the patient.)  1.  Balance Exercise  2. Bed Mobility  3. Family Education  4. Gait Training  5. Home Exercise Program (HEP)  6. Therapeutic Activites  7. Therapeutic Exercise/Strengthening  8. Transfer Training     TREATMENT PLAN: Frequency/Duration: 3 times a week for duration of hospital stay  Rehabilitation Potential For Stated Goals: Good     REHAB RECOMMENDATIONS (at time of discharge pending progress):    Placement: It is my opinion, based on this patient's performance to date, that Mr. Karrie Fernandez may benefit from intensive therapy at an [address] [place] [address] after discharge due to a probable need for close medical supervision by a rehab physician, a probable need for 24 hour rehab nursing, a probable need for multiple therapy disciplines and potential to make ongoing and sustainable functional improvement that is of practical value. .  Equipment:    None at this time              HISTORY:   History of Present Injury/Illness (Reason for Referral):  Subarachnoid hemorrhage. Past Medical History/Comorbidities:   Mr. Karrie Fernandez  has no past medical history on file. Mr. Karrie Fernandez  has a past surgical history that includes ir emboli intracran lt (7/11/2019). Social History/Living Environment:   Home Environment: Private residence  One/Two Story Residence: Other (Comment)  Living Alone: No  Support Systems: Parent, Child(rohan)  Patient Expects to be Discharged to[de-identified] Unknown  Current DME Used/Available at Home: None  Prior Level of Function/Work/Activity:  Unsure, RN reports patient worked.      Number of Personal Factors/Comorbidities that affect the Plan of Care: 1-2: MODERATE COMPLEXITY   EXAMINATION:   Most Recent Physical Functioning:   Gross Assessment:  AROM: Generally decreased, functional(R LE)  Strength: Generally decreased, functional(R LE)               Posture:     Balance:  Sitting: Impaired  Sitting - Static: Fair (occasional)  Sitting - Dynamic: Fair (occasional)  Standing: Impaired  Standing - Static: Fair  Standing - Dynamic : Fair Bed Mobility:  Sit to Supine: Minimum assistance; Moderate assistance;Assist x2  Interventions: Manual cues; Safety awareness training;Verbal cues; Visual cues  Wheelchair Mobility:     Transfers:  Sit to Stand: Minimum assistance;Assist x2  Stand to Sit: Minimum assistance  Bed to Chair: Contact guard assistance;Minimum assistance;Assist x2  Interventions: Manual cues; Safety awareness training;Verbal cues; Visual cues; Tactile cues  Gait:     Base of Support: Narrowed  Speed/Aminata: Slow  Step Length: Right shortened;Left shortened  Gait Abnormalities: Trunk sway increased;Decreased step clearance; Step to gait  Distance (ft): 250 Feet (ft)  Assistive Device: Gait belt;Walker, rolling  Ambulation - Level of Assistance: Contact guard assistance;Minimal assistance;Assist x2  Interventions: Safety awareness training; Tactile cues; Visual/Demos; Verbal cues;Manual cues      Body Structures Involved:  1. Nerves  2. Heart  3. Lungs  4. Muscles Body Functions Affected:  1. Sensory/Pain  2. Voice and Speech  3. Cardio  4. Respiratory  5. Neuromusculoskeletal  6. Movement Related Activities and Participation Affected:  1. Learning and Applying Knowledge  2. General Tasks and Demands  3. Communication  4. Mobility  5. Self Care  6. Domestic Life  7. Interpersonal Interactions and Relationships  8. Community, Social and [facility]   Number of elements that affect the Plan of Care: 4+: HIGH COMPLEXITY   CLINICAL PRESENTATION:   Presentation: Evolving clinical presentation with changing clinical characteristics: MODERATE COMPLEXITY   CLINICAL DECISION MAKIN [facility] Mobility Inpatient Short Form  How much difficulty does the patient currently have. .. Unable A Lot A Little None   1. Turning over in bed (including adjusting bedclothes, sheets and blankets)? ? 1   ? 2   ? 3   ? 4   2.   Sitting down on and standing up from a chair with arms ( e.g., wheelchair, bedside commode, etc.) ? 1   ? 2   ? 3   ? 4   3. Moving from lying on back to sitting on the side of the bed?   ? 1   ? 2   ? 3   ? 4   How much help from another person does the patient currently need. .. Total A Lot A Little None   4. Moving to and from a bed to a chair (including a wheelchair)? ? 1   ? 2   ? 3   ? 4   5. Need to walk in hospital room? ? 1   ? 2   ? 3   ? 4   6. Climbing 3-5 steps with a railing? ? 1   ? 2   ? 3   ? 4   © 2007, Trustees of [address] [place] [address] [address] [id number], under license to [employer]. All rights reserved      Score:  Initial: 10 Most Recent: X (Date: -- )    Interpretation of Tool:  Represents activities that are increasingly more difficult (i.e. Bed mobility, Transfers, Gait). Medical Necessity:     · Patient demonstrates good  ·  rehab potential due to higher previous functional level. Reason for Services/Other Comments:  · Patient continues to require modification of therapeutic interventions to increase complexity of exercises  · . Use of outcome tool(s) and clinical judgement create a POC that gives a: Questionable prediction of patient's progress: MODERATE COMPLEXITY            TREATMENT:   (In addition to Assessment/Re-Assessment sessions the following treatments were rendered)   Pre-treatment Symptoms/Complaints:  none  Pain: Initial:   Pain Intensity 1: 1  Post Session:  0/10     Therapeutic Activity: (    14 minutes): Therapeutic activities including bed transfers, Chair transfers, and sitting activities to improve mobility, strength, balance, coordination and activity tolerance . Required minimal assist to promote static and dynamic balance in standing and promote coordination of bilateral, lower extremity(s).         Date:  7/15/19 Date:   Date:     Activity/Exercise Parameters Parameters Parameters   Seated heel raises x15 B A     Seated toe raises x15 B A     Seated LAQ x15 B A                               Gait Training (  9 minutes):  Gait training to improve and/or restore physical functioning as related to mobility, strength, balance and coordination. Ambulated 250 Feet (ft) with Contact guard assistance;Minimal assistance;Assist x2 using a Gait belt;Walker, rolling and minimal Safety awareness training; Tactile cues; Visual/Demos; Verbal cues;Manual cues related to their stride length and posture to promote proper body posture and promote proper body mechanics. Today's treatment session addressed Decreased Strength, Decreased Transfer Abilities, Decreased Ambulation Ability/Technique, Decreased Balance, Decreased Activity Tolerance and Decreased Cognition to progress towards achieving goal(s) 1, 2, 3 and 4. During this session, Occupational Therapy addressed neuromuscular re-education to progress towards their discipline specific goal(s). Co-treatment was necessary to improve patient's cognitive participation, ability to follow higher level commands, ability to increase activity demands and ability to return to normal functional activity. Braces/Orthotics/Lines/Etc:   · IV  · phelps catheter  · drain EVD  · arterial line  ·    Treatment/Session Assessment:    · Response to Treatment:  Flat affect but cooperative. · Interdisciplinary Collaboration:   o Physical Therapist  o Occupational Therapist  o Registered Nurse  o Rehabilitation Attendant  o SPT  · After treatment position/precautions:   o Supine in bed  o Bed/Chair-wheels locked  o Bed in low position  o Call light within reach  o RN notified  o Side rails x 3   · Compliance with Program/Exercises: Compliant all of the time  · Recommendations/Intent for next treatment session: \"Next visit will focus on advancements to more challenging activities and reduction in assistance provided\".   Total Treatment Duration:  PT Patient Time In/Time Out  Time In: 1406  Time Out: [address]  Beryle Overland, PT, DPT

## 2019-07-23 NOTE — PROGRESS NOTES
Bedside report given to [facility], [address]. Dual neuro and NIH performed at bedside.

## 2019-07-23 NOTE — PROGRESS NOTES
A follow up visit was made to the patient. Emotional support, spiritual presence and   prayer were provided. The patient was alert and there was a nurse in the room providing care to the patient.       [employer]

## 2019-07-23 NOTE — PROGRESS NOTES
Bedside and Verbal shift change report given to Tanmay Keller RN (oncoming nurse) by Maribell Pickering RN (offgoing nurse). Report included the following information SBAR, Kardex, ED Summary, Procedure Summary, Intake/Output, MAR, Recent Results and Cardiac Rhythm NSR. Pt resting in bed A&O X4 and on room air with O2 sat 100%. Dual NIH complete with Maribell Pickering, [address] 0. PERRLA 4 mm. EVD drain intact and draining sanguinous output. Lung sounds clear. NSR on monitor HR 78 and /85. Stomach soft upon palpation with bowel sounds active. L radial ART line intact. R PICC line cdi. Patiño in place. SCD's to BLE with pedal pulses palpable. Pt denies pain at this time and call light in reach.     45% NS @ 100/hr

## 2019-07-23 NOTE — PROCEDURES
Transcranial Doppler    Follow-up transcranial Doppler study was performed for evaluation of intracranial vascular spasm the patient with subarachnoid hemorrhage    Insonation was performed via the transtemporal window and via the foramen magnum fossa. Mean flow velocities and peak flow velocities in the middle cerebral artery did not demonstrate significant change from the prior days investigation. On the right side there has been some degree of decrease in mean and peak flow velocities. Antegrade flow continues to be observed in the vertebrobasilar circulation posterior cerebral artery flow velocities are normal    Impression    Current study has a normal Lindegaard ratio bilaterally 4.16 and 2.49.   Study is considered unremarkable for intracranial vascular spasm

## 2019-07-24 ENCOUNTER — APPOINTMENT (OUTPATIENT)
Dept: GENERAL RADIOLOGY | Age: 44
DRG: 020 | End: 2019-07-24
Attending: NURSE PRACTITIONER
Payer: COMMERCIAL

## 2019-07-24 LAB
ANION GAP SERPL CALC-SCNC: 10 MMOL/L (ref 7–16)
BUN SERPL-MCNC: 25 MG/DL (ref 6–23)
CALCIUM SERPL-MCNC: 8 MG/DL (ref 8.3–10.4)
CHLORIDE SERPL-SCNC: 108 MMOL/L (ref 98–107)
CO2 SERPL-SCNC: 24 MMOL/L (ref 21–32)
CREAT SERPL-MCNC: 3.11 MG/DL (ref 0.8–1.5)
ERYTHROCYTE [DISTWIDTH] IN BLOOD BY AUTOMATED COUNT: 13.5 % (ref 11.9–14.6)
GLUCOSE SERPL-MCNC: 87 MG/DL (ref 65–100)
HBV SURFACE AG SERPL QL IA: NEGATIVE
HCT VFR BLD AUTO: 23.3 % (ref 41.1–50.3)
HCV AB S/CO SERPL IA: <0.1 S/CO RATIO (ref 0–0.9)
HCV AB SERPL QL IA: NORMAL
HGB BLD-MCNC: 7.6 G/DL (ref 13.6–17.2)
HIV1 P24 AG SERPL QL IA: NONREACTIVE
HIV1+2 AB SERPL QL IA: NONREACTIVE
MAGNESIUM SERPL-MCNC: 2.3 MG/DL (ref 1.8–2.4)
MAGNESIUM SERPL-MCNC: 2.7 MG/DL (ref 1.8–2.4)
MCH RBC QN AUTO: 29.5 PG (ref 26.1–32.9)
MCHC RBC AUTO-ENTMCNC: 32.6 G/DL (ref 31.4–35)
MCV RBC AUTO: 90.3 FL (ref 79.6–97.8)
NRBC # BLD: 0 K/UL (ref 0–0.2)
PLATELET # BLD AUTO: 398 K/UL (ref 150–450)
PMV BLD AUTO: 11.2 FL (ref 9.4–12.3)
POTASSIUM SERPL-SCNC: 3.4 MMOL/L (ref 3.5–5.1)
POTASSIUM SERPL-SCNC: 4 MMOL/L (ref 3.5–5.1)
RBC # BLD AUTO: 2.58 M/UL (ref 4.23–5.6)
SODIUM SERPL-SCNC: 142 MMOL/L (ref 136–145)
WBC # BLD AUTO: 16.8 K/UL (ref 4.3–11.1)

## 2019-07-24 PROCEDURE — 71045 X-RAY EXAM CHEST 1 VIEW: CPT

## 2019-07-24 PROCEDURE — 65610000001 HC ROOM ICU GENERAL

## 2019-07-24 PROCEDURE — 97530 THERAPEUTIC ACTIVITIES: CPT

## 2019-07-24 PROCEDURE — 83735 ASSAY OF MAGNESIUM: CPT

## 2019-07-24 PROCEDURE — 74011000258 HC RX REV CODE- 258: Performed by: NURSE PRACTITIONER

## 2019-07-24 PROCEDURE — 80048 BASIC METABOLIC PNL TOTAL CA: CPT

## 2019-07-24 PROCEDURE — 36600 WITHDRAWAL OF ARTERIAL BLOOD: CPT

## 2019-07-24 PROCEDURE — 77030020257 HC SOL INJ SOD CL 0.45% 1000ML BG

## 2019-07-24 PROCEDURE — 74011250637 HC RX REV CODE- 250/637: Performed by: NURSE PRACTITIONER

## 2019-07-24 PROCEDURE — 93886 INTRACRANIAL COMPLETE STUDY: CPT

## 2019-07-24 PROCEDURE — 92507 TX SP LANG VOICE COMM INDIV: CPT

## 2019-07-24 PROCEDURE — 99232 SBSQ HOSP IP/OBS MODERATE 35: CPT | Performed by: NEUROLOGICAL SURGERY

## 2019-07-24 PROCEDURE — 74011000250 HC RX REV CODE- 250: Performed by: NURSE PRACTITIONER

## 2019-07-24 PROCEDURE — 74011250637 HC RX REV CODE- 250/637

## 2019-07-24 PROCEDURE — 84132 ASSAY OF SERUM POTASSIUM: CPT

## 2019-07-24 PROCEDURE — 85027 COMPLETE CBC AUTOMATED: CPT

## 2019-07-24 PROCEDURE — 92526 ORAL FUNCTION THERAPY: CPT

## 2019-07-24 PROCEDURE — 74011250637 HC RX REV CODE- 250/637: Performed by: NEUROLOGICAL SURGERY

## 2019-07-24 PROCEDURE — 74011250636 HC RX REV CODE- 250/636: Performed by: NURSE PRACTITIONER

## 2019-07-24 RX ORDER — POTASSIUM CHLORIDE 14.9 MG/ML
20 INJECTION INTRAVENOUS ONCE
Status: COMPLETED | OUTPATIENT
Start: 2019-07-24 | End: 2019-07-24

## 2019-07-24 RX ADMIN — ASPIRIN 325 MG ORAL TABLET 325 MG: 325 PILL ORAL at 08:21

## 2019-07-24 RX ADMIN — NIMODIPINE 60 MG: 30 CAPSULE, LIQUID FILLED ORAL at 12:32

## 2019-07-24 RX ADMIN — OXYCODONE HYDROCHLORIDE AND ACETAMINOPHEN 500 MG: 500 TABLET ORAL at 21:58

## 2019-07-24 RX ADMIN — SENNOSIDES, DOCUSATE SODIUM 2 TABLET: 50; 8.6 TABLET, FILM COATED ORAL at 21:58

## 2019-07-24 RX ADMIN — OXYCODONE HYDROCHLORIDE AND ACETAMINOPHEN 500 MG: 500 TABLET ORAL at 08:21

## 2019-07-24 RX ADMIN — ATORVASTATIN CALCIUM 40 MG: 40 TABLET, FILM COATED ORAL at 21:58

## 2019-07-24 RX ADMIN — HEPARIN SODIUM 5000 UNITS: 5000 INJECTION INTRAVENOUS; SUBCUTANEOUS at 15:07

## 2019-07-24 RX ADMIN — FAMOTIDINE 20 MG: 20 TABLET ORAL at 08:21

## 2019-07-24 RX ADMIN — OXYCODONE HYDROCHLORIDE AND ACETAMINOPHEN 500 MG: 500 TABLET ORAL at 17:33

## 2019-07-24 RX ADMIN — ACETAMINOPHEN 650 MG: 325 TABLET, FILM COATED ORAL at 01:01

## 2019-07-24 RX ADMIN — SODIUM CHLORIDE, PRESERVATIVE FREE 900 UNITS: 5 INJECTION INTRAVENOUS at 21:59

## 2019-07-24 RX ADMIN — FERROUS SULFATE TAB 325 MG (65 MG ELEMENTAL FE) 325 MG: 325 (65 FE) TAB at 12:33

## 2019-07-24 RX ADMIN — SODIUM CHLORIDE 100 ML/HR: 450 INJECTION, SOLUTION INTRAVENOUS at 06:04

## 2019-07-24 RX ADMIN — SODIUM CHLORIDE 1000 ML: 450 INJECTION, SOLUTION INTRAVENOUS at 20:04

## 2019-07-24 RX ADMIN — SODIUM CHLORIDE, PRESERVATIVE FREE 900 UNITS: 5 INJECTION INTRAVENOUS at 15:07

## 2019-07-24 RX ADMIN — NIMODIPINE 60 MG: 30 CAPSULE, LIQUID FILLED ORAL at 04:04

## 2019-07-24 RX ADMIN — SODIUM CHLORIDE 1000 ML: 450 INJECTION, SOLUTION INTRAVENOUS at 18:39

## 2019-07-24 RX ADMIN — POTASSIUM CHLORIDE 20 MEQ: 200 INJECTION, SOLUTION INTRAVENOUS at 06:06

## 2019-07-24 RX ADMIN — SODIUM CHLORIDE, PRESERVATIVE FREE 900 UNITS: 5 INJECTION INTRAVENOUS at 06:06

## 2019-07-24 RX ADMIN — Medication 30 ML: at 17:34

## 2019-07-24 RX ADMIN — Medication 30 ML: at 06:00

## 2019-07-24 RX ADMIN — FERROUS SULFATE TAB 325 MG (65 MG ELEMENTAL FE) 325 MG: 325 (65 FE) TAB at 17:33

## 2019-07-24 RX ADMIN — LEVETIRACETAM 500 MG: 500 TABLET, FILM COATED ORAL at 21:16

## 2019-07-24 RX ADMIN — ACETAMINOPHEN 650 MG: 325 TABLET, FILM COATED ORAL at 12:33

## 2019-07-24 RX ADMIN — MAGNESIUM SULFATE HEPTAHYDRATE 2 G: 40 INJECTION, SOLUTION INTRAVENOUS at 06:04

## 2019-07-24 RX ADMIN — NIMODIPINE 60 MG: 30 CAPSULE, LIQUID FILLED ORAL at 08:22

## 2019-07-24 RX ADMIN — CLOPIDOGREL BISULFATE 75 MG: 75 TABLET ORAL at 08:21

## 2019-07-24 RX ADMIN — NIMODIPINE 60 MG: 30 CAPSULE, LIQUID FILLED ORAL at 00:07

## 2019-07-24 RX ADMIN — HEPARIN SODIUM 5000 UNITS: 5000 INJECTION INTRAVENOUS; SUBCUTANEOUS at 06:07

## 2019-07-24 RX ADMIN — HEPARIN SODIUM 5000 UNITS: 5000 INJECTION INTRAVENOUS; SUBCUTANEOUS at 21:59

## 2019-07-24 RX ADMIN — NIMODIPINE 60 MG: 30 CAPSULE, LIQUID FILLED ORAL at 20:04

## 2019-07-24 RX ADMIN — FERROUS SULFATE TAB 325 MG (65 MG ELEMENTAL FE) 325 MG: 325 (65 FE) TAB at 08:21

## 2019-07-24 RX ADMIN — NIMODIPINE 60 MG: 30 CAPSULE, LIQUID FILLED ORAL at 17:09

## 2019-07-24 RX ADMIN — LEVETIRACETAM 500 MG: 500 TABLET, FILM COATED ORAL at 08:21

## 2019-07-24 NOTE — PROGRESS NOTES
Bedside and verbal shift report received from Pau Castro RN. Dual neuro assessment performed at bedside. Neuro: Pt eyes open to voice - drowsy. Orientation waxes and wanes. Able to state name and situation (in the hospital for issues related to his brain) -  disoriented to place (city), time (year). L pupil slightly oval shaped, both 4 mm and brisk to react. Able to follow commands and [place] purposefully. EVD patent and draining 5 above tragus - serosanguineous. Cardiac: NSR - autoregulating BP. Resp: Room air. Clear upper / diminished lower breath sounds. GI: Regular diet. Active bowel sounds. : Patiño - draining and patent. Skin: CDI - Allevyn in place.

## 2019-07-24 NOTE — PROGRESS NOTES
Problem: Dysphagia (Adult)  Goal: *Acute Goals and Plan of Care (Insert Text)  Description  LTG: Patient will tolerate least restrictive diet without overt signs or symptoms of airway compromise. STG: Patient will tolerate po trials with speech therapy only without overt signs or symptoms of airway compromise. STG: Patient will participate in modified barium swallow study as clinically indicated. Outcome: Progressing Towards Goal     Problem: Neurolinguistics Impaired (Adult)  Goal: *Acute Goals and Plan of Care (Insert Text)  Description  STG: Patient will recall verbal information after a 3 minute delay with 80% accuracy and minimal assistance. STG: Patient will recall orientation information with 100% accuracy with minimal assistance. STG: Patient will participate in continued therapeutic assessment of cognitive-linguistic abilities. LTG: Patient will increase neuro-linguistic abilities to increase safety and awareness of deficits. Outcome: Progressing Towards Goal  SPEECH LANGUAGE PATHOLOGY: BEDSIDE SWALLOW AND SPEECH LANGUAGE NOTE: Daily Note 4    NAME/AGE/GENDER: Jocelyn Sykes is a 37 y.o. male  DATE: 7/24/2019  PRIMARY DIAGNOSIS: SA[facility] [I60.9]      ICD-10: Treatment Diagnosis: R13.12 Oropharyngeal Dysphagia. F.21.529 Cognitive-Communication Deficit    INTERDISCIPLINARY COLLABORATION: Registered Nurse  ASSESSMENT:   Swallowing: Patient seen for ongoing dysphagia treatment. Patient only agreeable to thin liquids today. Liquids presented via straw sip. Strong cough on first sip only. He attributed cough to water being \"too cold\". Tap water then provided. Increased delayed with propulsion and mild bolus holding today. He benefited from verbal cues to swallow bolus, but no cough or throat clear observed with repeated thin liquid trials. Ok to resume straw sips of thin liquids. Swallow did appear to be more delayed today.      Speech Language-Cognitive: Patient more confused in session today. EV nurse and RN notified and at bedside. Oriented only to self, but unable to state age. He perseverated on currently location as \"[place]\". Moderate-max verbal cues to correctly identify correct location, but unable to retain information after 1 minute delay. Also disoriented to situation and year. Verbal responses were delayed by 1-3 seconds, and voicing at only a whisper. Minimal increase in volume despite verbal cues. While patient has been delayed with responses, more prolonged delay observed and need for more frequent cues than in previous speech therapy session. In regards to command following, he required 3-4 repetitions of task and tactile cues to correctly follow commands for nurses. Decline in performance today compared to most recent speech therapy session. ? Delirium vs fatigue vs cognitive change. MD notified of concerns by nursing staff. Patient will benefit from ongoing cognitive linguistic treatment to improve communication, safety, and independence. Will continue to follow. ?????? ? ? This section established at most recent assessment??????????  PROBLEM LIST (Impairments causing functional limitations):  1. Oropharyngeal dysphagia  2. Cognitive-linguistic impairments  REHABILITATION POTENTIAL FOR STATED GOALS: Good  PLAN OF CARE:   Patient will benefit from skilled intervention to address the following impairments. RECOMMENDATIONS AND PLANNED INTERVENTIONS (Benefits and precautions of therapy have been discussed with the patient.):  · PO:  Regular  · Liquids:  regular thin   · No straws  MEDICATIONS:  · With liquid  COMPENSATORY STRATEGIES/MODIFICATIONS INCLUDING:  · Small sips and bites  · Slow rate of intake   OTHER RECOMMENDATIONS (including follow up treatment recommendations):    · Family training/education  · Patient education  RECOMMENDED DIET MODIFICATIONS DISCUSSED WITH:  · Nursing  · Patient  FREQUENCY/DURATION: Continue to follow patient 3 times a week for duration of hospital stay to address above goals. RECOMMENDED REHABILITATION/EQUIPMENT: (at time of discharge pending progress): Due to the probability of continued deficits (see above) this patient will likely need continued skilled speech therapy after discharge. SUBJECTIVE:   \" I am tired. I am just so tired\". History of Present Injury/Illness: Mr. Vane Ennis  has no past medical history on file. Wilner Stearns He also  has a past surgical history that includes ir emboli intracran lt (7/11/2019). Present Symptoms: oropharyngeal dysphagia   Pain Intensity 1: 0  Pain Location 1: Head  Pain Orientation 1: Posterior  Pain Intervention(s) 1: Medication (see MAR)  Current Medications:   No current facility-administered medications on file prior to encounter. No current outpatient medications on file prior to encounter. Current Dietary Status:  MS/nectar  Social History/Home Situation:    Home Environment: Private residence  One/Two Story Residence: Other (Comment)  Living Alone: No  Support Systems: Parent, Child(rohan)  Patient Expects to be Discharged to[de-identified] Unknown  Current DME Used/Available at Home: None  OBJECTIVE:   Respiratory Status:  Room air       Oral Motor Structure/Speech:  Oral-Motor Structure/Motor Speech  Labial: No impairment  Dentition: Intact  Oral Hygiene: Adequate  Lingual: No impairment    Cognitive and Communication Status:  Neurologic State: Eyes open to voice  Orientation Level: Oriented to person;Disoriented to place; Disoriented to situation;Disoriented to time  Cognition: Decreased attention/concentration;Decreased command following  Perception: Appears intact  Perseveration: Perseverates during conversation  Safety/Judgement: Decreased awareness of need for safety;Decreased awareness of environment    BEDSIDE SWALLOW EVALUATION  Oral Assessment:     P.O.  Trials:  Patient Position: upright in bed    The patient was given tsp-small sip amounts of the following: Consistency Presented: Thin liquid  How Presented: SLP-fed/presented;Straw;Successive swallows    ORAL PHASE:  Bolus Acceptance: No impairment  Bolus Formation/Control: No impairment  Propulsion: Delayed (# of seconds)  Type of Impairment: Delayed  Oral Residue: None    PHARYNGEAL PHASE:     Laryngeal Elevation: Functional  Aspiration Signs/Symptoms: Strong cough  Vocal Quality: Low volume     Effective Modifications: None     Pharyngeal Phase Characteristics: No impairment, issues, or problems     OTHER OBSERVATIONS:  Rate/bite size: Questionable  Comments:   Endurance: Questionable  Comments:     SPEECH-LANGUAGE COGNITIVE TREATMENT       Auditory Comprehension:   Auditory Comprehension  Auditory Impairment: Yes                       Vocal Quality: Low volume                               Cognitive Skills Activities: Activities/Procedures listed utilized to improve and/or restore cognitive function as related to attention to tasks, problem solving skills, memory, sequencing and thought organization. Required minimal verbal and tactile cueing to improve improve recall of information and perform graded activities focusing on attention skills. Tool Used: Dysphagia Outcome and Severity Scale (MIR)    Score Comments   Normal Diet  ? 7 With no strategies or extra time needed       Functional Swallow  ? 6 May have mild oral or pharyngeal delay       Mild Dysphagia    ? 5 Which may require one diet consistency restricted (those who demonstrate penetration which is entirely cleared on MBS would be included)   Mild-Moderate Dysphagia  ? 4 With 1-2 diet consistencies restricted       Moderate Dysphagia  ? 3 With 2 or more diet consistencies restricted       Moderately Severe Dysphagia  ? 2 With partial PO strategies (trials with ST only)       Severe Dysphagia  ?  1 With inability to tolerate any PO safely          Score:  Initial: 5 Most Recent: X (Date: -- )   Interpretation of Tool: The Dysphagia Outcome and Severity Scale (MIR) is a simple, easy-to-use, 7-point scale developed to systematically rate the functional severity of dysphagia based on objective assessment and make recommendations for diet level, independence level, and type of nutrition. Score 7 6 5 4 3 2 1   Modifier CH CI CJ CK CL CM CN     Payor[place] BLUE CR[place] / [place] [facility] / Product Type: PPO /     ________________________________________________________________________________________________    Safety:   After treatment position/precautions:  · Up in chair  · RN notified  . Progression/Medical Necessity:   · Patient is expected to demonstrate progress in diet tolerance  ·  to improve swallow safety, work toward diet advancement and decrease aspiration risk  · .  · Patient is expected to demonstrate progress in expressive communication, receptive ability and cognitive ability  ·  to decrease assistance required communication, increase independence with activities of daily living and increase communication with family/caregivers  · . Compliance with Program/Exercises: Will assess as treatment progresses   Reason for Continuation of Services/Other Comments:  · Patient continues to require skilled intervention due to dysphagia and cognitive-linguistic impairment   · . Recommendations/Intent for next treatment session: \"Treatment next visit will focus on advancements to more challenging activities and reduction in assistance provided\".     Total Treatment Duration:  Time In: 1046  Time Out: [address], MSP, CCC-SLP

## 2019-07-24 NOTE — PROGRESS NOTES
SPEECH PATHOLOGY NOTE:    Patient unavailable at time of ST attempt. Will follow up at later time.      Scott [address]ugh [address] CCC-SLP

## 2019-07-24 NOTE — PROGRESS NOTES
Progress Note    Patient: Evangelist Doyle MRN: [id number]  SSN: [id number]    YOB: 1975  Age: 37 y.o. Sex: male      Admit Date: 7/9/2019    LOS: 15 days     Subjective:   Pt with no acute neuro changes. EVD @ 5/open. Incision site CDI. Pt following commands.       Current Facility-Administered Medications   Medication Dose Route Frequency    acetaminophen (TYLENOL) tablet 650 mg  650 mg Oral Q4H PRN    lidocaine 4 % patch 1 Patch  1 Patch TransDERmal Q24H    fentaNYL citrate (PF) injection 50 mcg  50 mcg IntraVENous Q1H PRN    ferrous sulfate tablet 325 mg  1 Tab Oral TID WITH MEALS    levETIRAcetam (KEPPRA) tablet 500 mg  500 mg Oral Q12H    niMODipine (NIMOTOP) 30 mg/mL oral solution (compound) 60 mg  60 mg Oral Q4H    0.45% sodium chloride infusion  100 mL/hr IntraVENous CONTINUOUS    ascorbic acid (vitamin C) (VITAMIN C) tablet 500 mg  500 mg Oral TID    aspirin tablet 325 mg  325 mg Oral DAILY    atorvastatin (LIPITOR) tablet 40 mg  40 mg Oral QHS    clopidogrel (PLAVIX) tablet 75 mg  75 mg Oral DAILY    famotidine (PEPCID) tablet 20 mg  20 mg Oral DAILY    senna-docusate (PERICOLACE) 8.6-50 mg per tablet 2 Tab  2 Tab Oral QHS    magnesium hydroxide (MILK OF MAGNESIA) 400 mg/5 mL oral suspension 30 mL  30 mL Oral DAILY PRN    polyvinyl alcohol (LIQUIFILM TEARS) 1.4 % ophthalmic solution 1 Drop  1 Drop Both Eyes PRN    heparin (porcine) injection 5,000 Units  5,000 Units SubCUTAneous Q8H    sodium chloride (NS) flush 30 mL  30 mL InterCATHeter Q8H    heparin (porcine) pf 900 Units  900 Units InterCATHeter Q8H    sodium chloride (NS) flush 30 mL  30 mL InterCATHeter PRN    heparin (porcine) pf 900 Units  900 Units InterCATHeter PRN    nicotine (NICODERM CQ) 21 mg/24 hr patch 1 Patch  1 Patch TransDERmal Q24H    ondansetron (ZOFRAN) injection 4 mg  4 mg IntraVENous Q6H PRN    NUTRITIONAL SUPPORT ELECTROLYTE PRN ORDERS   Does Not Apply PRN    magnesium sulfate 4 g/100 mL IVPB  4 g IntraVENous DAILY PRN    magnesium sulfate 2 g/50 ml IVPB (premix or compounded)  2 g IntraVENous DAILY PRN       Objective:     Vitals:    07/24/19 0915 07/24/19 0930 07/24/19 0945 07/24/19 1000   BP:       Pulse: 85 83 89 83   Resp: 15 12 14 9   Temp:       SpO2: 98% 99% 100% 98%   Weight:       Height:             Intake and Output:  Current Shift: 07/24 0701 - 07/24 1900  In: -   Out: 676 [Urine:625; Drains:51]  Last 24 hr: 07/23 0701 - 07/24 0700  In: 3235.7 [P.O.:180; I.V.:3055.7]  Out: 5397 [Urine:5125; Drains:265]     IO: 2.1L/24hr  TOTAL OVERALL: -8L  EVD: 265/24hrs    Physical Exam:   General:  Alert, drowsy, cooperative, no distress. Eyes:   PERRL   Neck: Supple, symmetrical, trachea midline, no adenopathy, thyroid: no enlargment/tenderness/nodules, no carotid bruit and no JVD. Lungs:   Clear to auscultation bilaterally. Heart:  Regular rate and rhythm, S1, S2 normal, no murmur, click, rub or gallop. Abdomen:   Soft, non-tender. Bowel sounds normal. No masses,  No organomegaly. Extremities: Extremities normal, atraumatic, no cyanosis. 2+ edema RLE, 1+ edema LLE. Pulses: 2+ and symmetric all extremities. Skin: Skin color, texture, turgor normal. EVD intact. Neurologic:  pt aox3,follows commands, stovall. Drowsy at times, but interactive on exam. Cont monitor.         Lab/Data Review:    Recent Results (from the past 12 hour(s))   MAGNESIUM    Collection Time: 07/24/19  3:57 AM   Result Value Ref Range    Magnesium 2.3 1.8 - 2.4 mg/dL   CBC W/O DIFF    Collection Time: 07/24/19  3:57 AM   Result Value Ref Range    WBC 16.8 (H) 4.3 - 11.1 K/uL    RBC 2.58 (L) 4.23 - 5.6 M/uL    HGB 7.6 (L) 13.6 - 17.2 g/dL    HCT 23.3 (L) 41.1 - 50.3 %    MCV 90.3 79.6 - 97.8 FL    MCH 29.5 26.1 - 32.9 PG    MCHC 32.6 31.4 - 35.0 g/dL    RDW 13.5 11.9 - 14.6 %    PLATELET 397 843 - 821 K/uL    MPV 11.2 9.4 - 12.3 FL    ABSOLUTE NRBC 0.00 0.0 - 0.2 K/uL   METABOLIC PANEL, BASIC    Collection Time: 07/24/19  3:57 AM   Result Value Ref Range    Sodium 142 136 - 145 mmol/L    Potassium 3.4 (L) 3.5 - 5.1 mmol/L    Chloride 108 (H) 98 - 107 mmol/L    CO2 24 21 - 32 mmol/L    Anion gap 10 7 - 16 mmol/L    Glucose 87 65 - 100 mg/dL    BUN 25 (H) 6 - 23 MG/DL    Creatinine 3.11 (H) 0.8 - 1.5 MG/DL    GFR est AA 28 (L) >60 ml/min/1.73m2    GFR est non-AA 23 (L) >60 ml/min/1.73m2    Calcium 8.0 (L) 8.3 - 10.4 MG/DL       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)    Active Problems:    Tobacco abuse (7/10/2019)      Acute respiratory failure with hypoxia (HCC) (7/10/2019)      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 Q1 hour neuro checks. On SAH protocol - Mg, nimotop, zocor. PERRL +3. No MAP goal now. TCDs daily - they have been 180-220 in the left and right MCAs . Last CT head showed small ventricles. Last CTA head and neck showed no filling of the aneurysm and good filling of the PCAs. Vasospasm seen in the right MCA and both supraclinoid ICAs as well as the ACAs and the left MCA but to a lesser extent. CTP was normal. Will continue SAH protocol, PT/OT. OOB to chair today. /. Pt had EVD replaced 7/20 due to it getting pulled put. EVD with some dried blood drainage but working and still set at 5cm H20. Alert and following commands this morning. Patient may need a shunt. We will drain for several more days and then challenge the EVD. RESP: tolerating RA. No distress. cxr shows improving edema.    CV: NO MAP GOAL NOW.  2D Echo: 55-60%, trop peaked @ 0.8, trending down. SCD/SQ heparin. HEME: 7.6/23,  HIT panel negative. On oral iron. And Vit C. NEPH: bun/cr: 25/3.1 . UOP is good. -5.5L. Will give 2L 1/2 NS bolus. Monitor. GI: Pepcid. Senna. +BS. ID: Tm: 99.3. Sputum cx sent, pending final, showing gram + cocci -  Patient has MSSA pneumonia, abx completed. Blood cultures neg and UA neg. US neg for DVT. CSF cx neg for any organisms, protein 172. LINES: RUE PICC, SHANNAN phelps EVD. Tobacco abuse: On Nicoderm patch.         Signed By: Amy Ruiz NP     July 24, 2019

## 2019-07-24 NOTE — PROGRESS NOTES
Problem: Mobility Impaired (Adult and Pediatric)  Goal: *Acute Goals and Plan of Care (Insert Text)  Description  STG:  (1.)Mr. Breanne Randolph will move from supine to sit and sit to supine , scoot up and down and roll side to side with CONTACT GUARD ASSIST within 3 treatment day(s). (2.)Mr. Breanne Randolph will transfer from bed to chair and chair to bed with CONTACT GUARD ASSIST using the least restrictive device within 3 treatment day(s). (3.)Mr. Breanne Randolph will ambulate with MINIMAL ASSIST for 50+ feet with the least restrictive device within 3 treatment day(s). (4.)Mr. Breanne Randolph will perform standing static and dynamic balance activities x 15 minutes with MINIMAL ASSIST to improve safety within 3 day(s). (5.)Mr. Breanne Randolph will maintain stable vital signs throughout all functional mobility within 3 days. LTG:  (1.)Mr. Breanne Randolph will move from supine to sit and sit to supine , scoot up and down and roll side to side in bed with SUPERVISION within 7 treatment day(s). (2.)Mr. Breanne Randolph will transfer from bed to chair and chair to bed with STAND BY ASSIST using the least restrictive device within 7 treatment day(s). (3.)Mr. Breanne Randolph will ambulate with STAND BY ASSIST for 75+ feet with the least restrictive device within 7 treatment day(s). (4.)Mr. Breanne Randolph will perform standing static and dynamic balance activities x 15 minutes with STAND BY ASSIST to improve safety within 7 day(s).   ________________________________________________________________________________________________     Outcome: Progressing Towards Goal     PHYSICAL THERAPY: Daily Note and AM 7/24/2019  INPATIENT: PT Visit Days : 3  Payor: Jose Sauer / Plan: [facility] / Product Type: PPO /       NAME/AGE/GENDER: Evie Robles is a 37 y.o. 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 ASSESSMENT:     Mr. Liliya Wesley is a 37 y.o. Male admitted for subarachnoid hemorrhage in bed in ICU and agreeable to physical therapy treatment. Per RN pt continuing to get hourly neuro checks and appeared drowsy and slightly withdrawn. Pt noted to have worse sitting balance in recliner today with preferential lean to L side. Able to correct with verbal cues, however noted over correction. He also demonstrated impulsive tendencies. Pt able to stand with Emil x 2/RW. His standing balance is fair and he ambulated in pathak with Emil x 2/RW/gait belt and chair follow. Pt noted to have step-to gait pattern with decreased gait speed but was able to become more reciprocal with cuing. Pt also noted to have increased cervical flexion and was cued for upright posture. Pt progressed in ambulation and functional mobility today. He is progressing well towards therapy goals, however continues to have impulsive tendencies and decreased safety awareness. He continues to be well below his baseline and is at a high risk for falling. This section established at most recent assessment   PROBLEM LIST (Impairments causing functional limitations):  1. Decreased Strength  2. Decreased ADL/Functional Activities  3. Decreased Transfer Abilities  4. Decreased Ambulation Ability/Technique  5. Decreased Balance  6. Decreased Activity Tolerance  7. Decreased Pacing Skills  8. Increased Shortness of Breath  9. Decreased Knowledge of Precautions  10. Decreased Stewart with Home Exercise Program   INTERVENTIONS PLANNED: (Benefits and precautions of physical therapy have been discussed with the patient.)  1. Balance Exercise  2. Bed Mobility  3. Family Education  4. Gait Training  5. Home Exercise Program (HEP)  6. Therapeutic Activites  7.  Therapeutic Exercise/Strengthening  8. Transfer Training     TREATMENT PLAN: Frequency/Duration: 3 times a week for duration of hospital stay  Rehabilitation Potential For Stated Goals: Good     REHAB RECOMMENDATIONS (at time of discharge pending progress):    Placement: It is my opinion, based on this patient's performance to date, that Mr. Carmella Angelo may benefit from intensive therapy at an [address] [place] [address] after discharge due to a probable need for close medical supervision by a rehab physician, a probable need for 24 hour rehab nursing, a probable need for multiple therapy disciplines and potential to make ongoing and sustainable functional improvement that is of practical value. .  Equipment:    None at this time              HISTORY:   History of Present Injury/Illness (Reason for Referral):  Subarachnoid hemorrhage. Past Medical History/Comorbidities:   Mr. Carmella Angelo  has no past medical history on file. Mr. Carmella Angelo  has a past surgical history that includes ir emboli intracran lt (7/11/2019). Social History/Living Environment:   Home Environment: Private residence  One/Two Story Residence: Other (Comment)  Living Alone: No  Support Systems: Parent, Child(rohan)  Patient Expects to be Discharged to[de-identified] Unknown  Current DME Used/Available at Home: None  Prior Level of Function/Work/Activity:  Unsure, RN reports patient worked. Number of Personal Factors/Comorbidities that affect the Plan of Care: 1-2: MODERATE COMPLEXITY   EXAMINATION:   Most Recent Physical Functioning:   Gross Assessment:  AROM: Generally decreased, functional  PROM: Generally decreased, functional  Strength: Generally decreased, functional  Coordination: Generally decreased, functional               Posture:  Posture (WDL): Exceptions to WDL  Posture Assessment:  Forward head  Balance:  Sitting: Impaired  Sitting - Static: Good (unsupported)  Sitting - Dynamic: Fair (occasional)  Standing: Impaired  Standing - Static: Fair  Standing - Dynamic : Fair Bed Mobility:  Supine to Sit: Contact guard assistance  Scooting: Contact guard assistance  Interventions: Manual cues; Safety awareness training;Visual cues  Wheelchair Mobility:     Transfers:  Sit to Stand: Minimum assistance  Stand to Sit: Minimum assistance  Bed to Chair: Contact guard assistance;Minimum assistance  Interventions: Safety awareness training;Manual cues; Verbal cues  Gait:     Base of Support: Narrowed; Center of gravity altered  Speed/Aminata: Slow  Step Length: Right shortened;Left shortened  Gait Abnormalities: Path deviations;Trunk sway increased;Circumduction  Distance (ft): (amb 150 total requiring seated rest breaks and chair follow)  Assistive Device: Gait belt;Walker, rolling  Ambulation - Level of Assistance: Contact guard assistance;Minimal assistance  Interventions: Safety awareness training;Visual/Demos; Verbal cues      Body Structures Involved:  1. Nerves  2. Heart  3. Lungs  4. Muscles Body Functions Affected:  1. Sensory/Pain  2. Voice and Speech  3. Cardio  4. Respiratory  5. Neuromusculoskeletal  6. Movement Related Activities and Participation Affected:  1. Learning and Applying Knowledge  2. General Tasks and Demands  3. Communication  4. Mobility  5. Self Care  6. Domestic Life  7. Interpersonal Interactions and Relationships  8. Community, Social and [place]   Number of elements that affect the Plan of Care: 4+: HIGH COMPLEXITY   CLINICAL PRESENTATION:   Presentation: Evolving clinical presentation with changing clinical characteristics: MODERATE COMPLEXITY   CLINICAL DECISION MAKIN [facility] Mobility Inpatient Short Form  How much difficulty does the patient currently have. .. Unable A Lot A Little None   1. Turning over in bed (including adjusting bedclothes, sheets and blankets)? ? 1   ? 2   ? 3   ? 4   2. Sitting down on and standing up from a chair with arms ( e.g., wheelchair, bedside commode, etc.)   ?  1   ? 2 ? 3   ? 4   3. Moving from lying on back to sitting on the side of the bed?   ? 1   ? 2   ? 3   ? 4   How much help from another person does the patient currently need. .. Total A Lot A Little None   4. Moving to and from a bed to a chair (including a wheelchair)? ? 1   ? 2   ? 3   ? 4   5. Need to walk in hospital room? ? 1   ? 2   ? 3   ? 4   6. Climbing 3-5 steps with a railing? ? 1   ? 2   ? 3   ? 4   © 2007, Trustees of [address] [place] [address] [address] [id number], under license to [email or web address]. All rights reserved      Score:  Initial: 10 Most Recent: X (Date: -- )    Interpretation of Tool:  Represents activities that are increasingly more difficult (i.e. Bed mobility, Transfers, Gait). Medical Necessity:     · Patient demonstrates good  ·  rehab potential due to higher previous functional level. Reason for Services/Other Comments:  · Patient continues to require modification of therapeutic interventions to increase complexity of exercises  · . Use of outcome tool(s) and clinical judgement create a POC that gives a: Questionable prediction of patient's progress: MODERATE COMPLEXITY            TREATMENT:   (In addition to Assessment/Re-Assessment sessions the following treatments were rendered)   Pre-treatment Symptoms/Complaints:  none  Pain: Initial:   Pain Intensity 1: 0  Post Session:  0/10     Therapeutic Activity: (    24 minutes): Therapeutic activities including bed transfers, Chair transfers, ambulationo n level ground and sitting activities to improve mobility, strength, balance, coordination and activity tolerance . Required minimal assist to promote static and dynamic balance in standing and promote coordination of bilateral, lower extremity(s).         Date:  7/15/19 Date:   Date:     Activity/Exercise Parameters Parameters Parameters   Seated heel raises x15 B A     Seated toe raises x15 B A     Seated LAQ x15 B A                               Gait Training (   minutes):  Gait training to improve and/or restore physical functioning as related to mobility, strength, balance and coordination. Ambulated (amb 150 total requiring seated rest breaks and chair follow) with Contact guard assistance;Minimal assistance using a Gait belt;Walker, rolling and minimal Safety awareness training;Visual/Demos; Verbal cues related to their stride length and posture to promote proper body posture and promote proper body mechanics. Braces/Orthotics/Lines/Etc:   · IV  · phelps catheter  · drain EVD  · arterial line  ·    Treatment/Session Assessment:    · Response to Treatment:  Flat affect but cooperative. · Interdisciplinary Collaboration:   o Physical Therapist  o Registered Nurse  o Rehabilitation Attendant  · After treatment position/precautions:   o Up in chair  o Bed alarm/tab alert on  o Bed/Chair-wheels locked  o Bed in low position  o Call light within reach  o RN notified   · Compliance with Program/Exercises: Compliant all of the time  · Recommendations/Intent for next treatment session: \"Next visit will focus on advancements to more challenging activities and reduction in assistance provided\".   Total Treatment Duration:  PT Patient Time In/Time Out  Time In: 1300  Time Out: [address], PT, DPT

## 2019-07-24 NOTE — PROGRESS NOTES
A follow up visit was made to the patient. Emotional support, spiritual presence and   prayer were provided. The patient was alert and oriented.       [employer]

## 2019-07-25 LAB
ANION GAP SERPL CALC-SCNC: 11 MMOL/L (ref 7–16)
BUN SERPL-MCNC: 23 MG/DL (ref 6–23)
CALCIUM SERPL-MCNC: 8.2 MG/DL (ref 8.3–10.4)
CHLORIDE SERPL-SCNC: 109 MMOL/L (ref 98–107)
CO2 SERPL-SCNC: 23 MMOL/L (ref 21–32)
CREAT SERPL-MCNC: 3.05 MG/DL (ref 0.8–1.5)
ERYTHROCYTE [DISTWIDTH] IN BLOOD BY AUTOMATED COUNT: 13.9 % (ref 11.9–14.6)
GLUCOSE BLD STRIP.AUTO-MCNC: 119 MG/DL (ref 65–100)
GLUCOSE SERPL-MCNC: 92 MG/DL (ref 65–100)
HCT VFR BLD AUTO: 22.7 % (ref 41.1–50.3)
HGB BLD-MCNC: 7.5 G/DL (ref 13.6–17.2)
MAGNESIUM SERPL-MCNC: 2.3 MG/DL (ref 1.8–2.4)
MAGNESIUM SERPL-MCNC: 2.8 MG/DL (ref 1.8–2.4)
MCH RBC QN AUTO: 29.5 PG (ref 26.1–32.9)
MCHC RBC AUTO-ENTMCNC: 33 G/DL (ref 31.4–35)
MCV RBC AUTO: 89.4 FL (ref 79.6–97.8)
NRBC # BLD: 0 K/UL (ref 0–0.2)
PLATELET # BLD AUTO: 372 K/UL (ref 150–450)
PMV BLD AUTO: 10.2 FL (ref 9.4–12.3)
POTASSIUM SERPL-SCNC: 3.6 MMOL/L (ref 3.5–5.1)
RBC # BLD AUTO: 2.54 M/UL (ref 4.23–5.6)
SODIUM SERPL-SCNC: 143 MMOL/L (ref 136–145)
WBC # BLD AUTO: 15.9 K/UL (ref 4.3–11.1)

## 2019-07-25 PROCEDURE — 92507 TX SP LANG VOICE COMM INDIV: CPT

## 2019-07-25 PROCEDURE — 83735 ASSAY OF MAGNESIUM: CPT

## 2019-07-25 PROCEDURE — 36600 WITHDRAWAL OF ARTERIAL BLOOD: CPT

## 2019-07-25 PROCEDURE — 93886 INTRACRANIAL COMPLETE STUDY: CPT

## 2019-07-25 PROCEDURE — 74011250637 HC RX REV CODE- 250/637: Performed by: NURSE PRACTITIONER

## 2019-07-25 PROCEDURE — 74011250637 HC RX REV CODE- 250/637: Performed by: NEUROLOGICAL SURGERY

## 2019-07-25 PROCEDURE — 74011250636 HC RX REV CODE- 250/636: Performed by: NURSE PRACTITIONER

## 2019-07-25 PROCEDURE — 82962 GLUCOSE BLOOD TEST: CPT

## 2019-07-25 PROCEDURE — 99232 SBSQ HOSP IP/OBS MODERATE 35: CPT | Performed by: NEUROLOGICAL SURGERY

## 2019-07-25 PROCEDURE — 77030020257 HC SOL INJ SOD CL 0.45% 1000ML BG

## 2019-07-25 PROCEDURE — 85027 COMPLETE CBC AUTOMATED: CPT

## 2019-07-25 PROCEDURE — 74011000250 HC RX REV CODE- 250: Performed by: NURSE PRACTITIONER

## 2019-07-25 PROCEDURE — 74011000258 HC RX REV CODE- 258: Performed by: NURSE PRACTITIONER

## 2019-07-25 PROCEDURE — 80048 BASIC METABOLIC PNL TOTAL CA: CPT

## 2019-07-25 PROCEDURE — 74011250637 HC RX REV CODE- 250/637

## 2019-07-25 PROCEDURE — 65610000001 HC ROOM ICU GENERAL

## 2019-07-25 PROCEDURE — 92526 ORAL FUNCTION THERAPY: CPT

## 2019-07-25 RX ADMIN — SODIUM CHLORIDE 100 ML/HR: 450 INJECTION, SOLUTION INTRAVENOUS at 20:55

## 2019-07-25 RX ADMIN — Medication 30 ML: at 14:35

## 2019-07-25 RX ADMIN — Medication 30 ML: at 00:10

## 2019-07-25 RX ADMIN — FERROUS SULFATE TAB 325 MG (65 MG ELEMENTAL FE) 325 MG: 325 (65 FE) TAB at 12:18

## 2019-07-25 RX ADMIN — SODIUM CHLORIDE 1000 ML: 450 INJECTION, SOLUTION INTRAVENOUS at 15:45

## 2019-07-25 RX ADMIN — SODIUM CHLORIDE, PRESERVATIVE FREE 900 UNITS: 5 INJECTION INTRAVENOUS at 14:35

## 2019-07-25 RX ADMIN — ACETAMINOPHEN 650 MG: 325 TABLET, FILM COATED ORAL at 23:21

## 2019-07-25 RX ADMIN — NIMODIPINE 60 MG: 30 CAPSULE, LIQUID FILLED ORAL at 00:10

## 2019-07-25 RX ADMIN — LEVETIRACETAM 500 MG: 500 TABLET, FILM COATED ORAL at 20:04

## 2019-07-25 RX ADMIN — Medication 30 ML: at 21:06

## 2019-07-25 RX ADMIN — ASPIRIN 325 MG ORAL TABLET 325 MG: 325 PILL ORAL at 09:19

## 2019-07-25 RX ADMIN — SODIUM CHLORIDE 100 ML/HR: 450 INJECTION, SOLUTION INTRAVENOUS at 07:15

## 2019-07-25 RX ADMIN — ATORVASTATIN CALCIUM 40 MG: 40 TABLET, FILM COATED ORAL at 21:02

## 2019-07-25 RX ADMIN — NIMODIPINE 60 MG: 30 CAPSULE, LIQUID FILLED ORAL at 16:18

## 2019-07-25 RX ADMIN — SENNOSIDES, DOCUSATE SODIUM 2 TABLET: 50; 8.6 TABLET, FILM COATED ORAL at 21:02

## 2019-07-25 RX ADMIN — NIMODIPINE 60 MG: 30 CAPSULE, LIQUID FILLED ORAL at 03:59

## 2019-07-25 RX ADMIN — SODIUM CHLORIDE, PRESERVATIVE FREE 900 UNITS: 5 INJECTION INTRAVENOUS at 06:09

## 2019-07-25 RX ADMIN — Medication 30 ML: at 06:09

## 2019-07-25 RX ADMIN — FERROUS SULFATE TAB 325 MG (65 MG ELEMENTAL FE) 325 MG: 325 (65 FE) TAB at 09:19

## 2019-07-25 RX ADMIN — MAGNESIUM SULFATE HEPTAHYDRATE 2 G: 40 INJECTION, SOLUTION INTRAVENOUS at 06:09

## 2019-07-25 RX ADMIN — NIMODIPINE 60 MG: 30 CAPSULE, LIQUID FILLED ORAL at 20:55

## 2019-07-25 RX ADMIN — LEVETIRACETAM 500 MG: 500 TABLET, FILM COATED ORAL at 09:19

## 2019-07-25 RX ADMIN — HEPARIN SODIUM 5000 UNITS: 5000 INJECTION INTRAVENOUS; SUBCUTANEOUS at 21:05

## 2019-07-25 RX ADMIN — SODIUM CHLORIDE 1000 ML: 450 INJECTION, SOLUTION INTRAVENOUS at 16:30

## 2019-07-25 RX ADMIN — OXYCODONE HYDROCHLORIDE AND ACETAMINOPHEN 500 MG: 500 TABLET ORAL at 21:02

## 2019-07-25 RX ADMIN — OXYCODONE HYDROCHLORIDE AND ACETAMINOPHEN 500 MG: 500 TABLET ORAL at 16:18

## 2019-07-25 RX ADMIN — SODIUM CHLORIDE, PRESERVATIVE FREE 900 UNITS: 5 INJECTION INTRAVENOUS at 21:06

## 2019-07-25 RX ADMIN — NIMODIPINE 60 MG: 30 CAPSULE, LIQUID FILLED ORAL at 12:18

## 2019-07-25 RX ADMIN — CLOPIDOGREL BISULFATE 75 MG: 75 TABLET ORAL at 09:19

## 2019-07-25 RX ADMIN — NIMODIPINE 60 MG: 30 CAPSULE, LIQUID FILLED ORAL at 23:20

## 2019-07-25 RX ADMIN — NIMODIPINE 60 MG: 30 CAPSULE, LIQUID FILLED ORAL at 08:04

## 2019-07-25 RX ADMIN — ACETAMINOPHEN 650 MG: 325 TABLET, FILM COATED ORAL at 00:10

## 2019-07-25 RX ADMIN — HEPARIN SODIUM 5000 UNITS: 5000 INJECTION INTRAVENOUS; SUBCUTANEOUS at 06:09

## 2019-07-25 RX ADMIN — FAMOTIDINE 20 MG: 20 TABLET ORAL at 09:19

## 2019-07-25 RX ADMIN — FERROUS SULFATE TAB 325 MG (65 MG ELEMENTAL FE) 325 MG: 325 (65 FE) TAB at 16:18

## 2019-07-25 RX ADMIN — OXYCODONE HYDROCHLORIDE AND ACETAMINOPHEN 500 MG: 500 TABLET ORAL at 09:19

## 2019-07-25 RX ADMIN — HEPARIN SODIUM 5000 UNITS: 5000 INJECTION INTRAVENOUS; SUBCUTANEOUS at 14:35

## 2019-07-25 NOTE — PROGRESS NOTES
Progress Note    Patient: Rubén Guy MRN: [id number]  SSN: [id number]    YOB: 1975  Age: 37 y.o. Sex: male      Admit Date: 7/9/2019    LOS: 16 days     Subjective:   Pt with no acute neuro changes. EVD @ 5/open. Incision site CDI. Pt following commands.       Current Facility-Administered Medications   Medication Dose Route Frequency    acetaminophen (TYLENOL) tablet 650 mg  650 mg Oral Q4H PRN    lidocaine 4 % patch 1 Patch  1 Patch TransDERmal Q24H    fentaNYL citrate (PF) injection 50 mcg  50 mcg IntraVENous Q1H PRN    ferrous sulfate tablet 325 mg  1 Tab Oral TID WITH MEALS    levETIRAcetam (KEPPRA) tablet 500 mg  500 mg Oral Q12H    niMODipine (NIMOTOP) 30 mg/mL oral solution (compound) 60 mg  60 mg Oral Q4H    0.45% sodium chloride infusion  100 mL/hr IntraVENous CONTINUOUS    ascorbic acid (vitamin C) (VITAMIN C) tablet 500 mg  500 mg Oral TID    aspirin tablet 325 mg  325 mg Oral DAILY    atorvastatin (LIPITOR) tablet 40 mg  40 mg Oral QHS    clopidogrel (PLAVIX) tablet 75 mg  75 mg Oral DAILY    famotidine (PEPCID) tablet 20 mg  20 mg Oral DAILY    senna-docusate (PERICOLACE) 8.6-50 mg per tablet 2 Tab  2 Tab Oral QHS    magnesium hydroxide (MILK OF MAGNESIA) 400 mg/5 mL oral suspension 30 mL  30 mL Oral DAILY PRN    polyvinyl alcohol (LIQUIFILM TEARS) 1.4 % ophthalmic solution 1 Drop  1 Drop Both Eyes PRN    heparin (porcine) injection 5,000 Units  5,000 Units SubCUTAneous Q8H    sodium chloride (NS) flush 30 mL  30 mL InterCATHeter Q8H    heparin (porcine) pf 900 Units  900 Units InterCATHeter Q8H    sodium chloride (NS) flush 30 mL  30 mL InterCATHeter PRN    heparin (porcine) pf 900 Units  900 Units InterCATHeter PRN    nicotine (NICODERM CQ) 21 mg/24 hr patch 1 Patch  1 Patch TransDERmal Q24H    ondansetron (ZOFRAN) injection 4 mg  4 mg IntraVENous Q6H PRN    NUTRITIONAL SUPPORT ELECTROLYTE PRN ORDERS   Does Not Apply PRN    magnesium sulfate 4 g/100 mL IVPB  4 g IntraVENous DAILY PRN    magnesium sulfate 2 g/50 ml IVPB (premix or compounded)  2 g IntraVENous DAILY PRN       Objective:     Vitals:    07/25/19 1145 07/25/19 1200 07/25/19 1215 07/25/19 1300   BP:       Pulse: 76 78 (!) 101 (!) 101   Resp: 13 10 10 18   Temp:       SpO2: 100% 100%  100%   Weight:       Height:             Intake and Output:  Current Shift: 07/25 0701 - 07/25 1900  In: 120 [P.O.:120]  Out: 1040 [Urine:975; Drains:65]  Last 24 hr: 07/24 0701 - 07/25 0700  In: 5140.3 [I.V.:5140.3]  Out: 9489 [Urine:5850; Drains:322]     IO: -1031cc/24hr  TOTAL OVERALL: -14L  EVD: 322/24hrs    Physical Exam:   General:  Alert, drowsy, cooperative, no distress. Eyes:   PERRL   Neck: Supple, symmetrical, trachea midline, no adenopathy, thyroid: no enlargment/tenderness/nodules, no carotid bruit and no JVD. Lungs:   Clear to auscultation bilaterally. Heart:  Regular rate and rhythm, S1, S2 normal, no murmur, click, rub or gallop. Abdomen:   Soft, non-tender. Bowel sounds normal. No masses,  No organomegaly. Extremities: Extremities normal, atraumatic, no cyanosis. 2+ edema RLE, 1+ edema LLE. Pulses: 2+ and symmetric all extremities. Skin: Skin color, texture, turgor normal. EVD intact. Neurologic:  pt aox3,follows commands, stovall. Drowsy at times, but interactive on exam. Cont monitor.         Lab/Data Review:    Recent Results (from the past 12 hour(s))   MAGNESIUM    Collection Time: 07/25/19  4:00 AM   Result Value Ref Range    Magnesium 2.3 1.8 - 2.4 mg/dL   CBC W/O DIFF    Collection Time: 07/25/19  4:00 AM   Result Value Ref Range    WBC 15.9 (H) 4.3 - 11.1 K/uL    RBC 2.54 (L) 4.23 - 5.6 M/uL    HGB 7.5 (L) 13.6 - 17.2 g/dL    HCT 22.7 (L) 41.1 - 50.3 %    MCV 89.4 79.6 - 97.8 FL    MCH 29.5 26.1 - 32.9 PG    MCHC 33.0 31.4 - 35.0 g/dL    RDW 13.9 11.9 - 14.6 %    PLATELET 926 398 - 820 K/uL    MPV 10.2 9.4 - 12.3 FL    ABSOLUTE NRBC 0.00 0.0 - 0.2 K/uL   METABOLIC PANEL, BASIC Collection Time: 07/25/19  4:00 AM   Result Value Ref Range    Sodium 143 136 - 145 mmol/L    Potassium 3.6 3.5 - 5.1 mmol/L    Chloride 109 (H) 98 - 107 mmol/L    CO2 23 21 - 32 mmol/L    Anion gap 11 7 - 16 mmol/L    Glucose 92 65 - 100 mg/dL    BUN 23 6 - 23 MG/DL    Creatinine 3.05 (H) 0.8 - 1.5 MG/DL    GFR est AA 29 (L) >60 ml/min/1.73m2    GFR est non-AA 24 (L) >60 ml/min/1.73m2    Calcium 8.2 (L) 8.3 - 10.4 MG/DL   MAGNESIUM    Collection Time: 07/25/19 11:12 AM   Result Value Ref Range    Magnesium 2.8 (H) 1.8 - 2.4 mg/dL       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)    Active Problems:    Tobacco abuse (7/10/2019)      Acute respiratory failure with hypoxia ([facility]) (7/10/2019)      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 Q1 hour neuro checks. On SAH protocol - Mg, nimotop, zocor. PERRL +3. No MAP goal now. TCDs daily - they have been 180-220 in the left and right MCAs . Last CT head showed small ventricles. Last CTA head and neck showed no filling of the aneurysm and good filling of the PCAs. Vasospasm seen in the right MCA and both supraclinoid ICAs as well as the ACAs and the left MCA but to a lesser extent. CTP was normal. Will continue SAH protocol, PT/OT. OOB to chair today. /. Pt had EVD replaced 7/20 due to it getting pulled put. EVD with some dried blood drainage but working and still set at 5cm H20. Alert and following commands this morning. Will plan for  shunt next week, 8-1.  Asa/plavix responses in am. CT head/CTP/CTA Sunday. RESP: tolerating RA. No distress. cxr shows improving edema. CV: NO MAP GOAL NOW.  2D Echo: 55-60%, trop peaked @ 0.8, trending down. SCD/SQ heparin. HEME: 7.5/22,  HIT panel negative. On oral iron. And Vit C. NEPH: bun/cr: 25/3.1 . UOP is good. -14.5L. Will give 2L 1/2 NS bolus. Monitor. GI: Pepcid. Senna. +BS. ID: Tm: [id number]. Sputum cx sent, pending final, showing gram + cocci -  Patient has MSSA pneumonia, abx completed. Blood cultures neg and UA neg. US neg for DVT. CSF cx neg for any organisms, protein 172. LINES: KISHOR PICC, SHANNAN phelps. Tobacco abuse: On Nicoderm patch.         Signed By: Do Pearson NP     July 25, 2019

## 2019-07-25 NOTE — PROGRESS NOTES
Bedside and verbal shift report received from Alexandr Godfrey RN.     Dual neuro assessment performed at bedside.      Neuro: Pt eyes open to voice - drowsy. Orientation waxes and wanes. Able to state name and situation (in the hospital for issues related to his brain) -  disoriented to place (city), time (year). L pupil slightly oval shaped, both 4 mm and brisk to react. Able to follow commands and MCC[place]OUGH purposefully. EVD patent and draining 5 above tragus - serosanguineous. Cardiac: NSR - autoregulating BP. Resp: Room air. Clear upper / diminished lower breath sounds. GI: Regular diet. Active bowel sounds. : Patiño - draining and patent. Skin: CDI. EVD incision on scalp.

## 2019-07-25 NOTE — PROGRESS NOTES
A follow up visit was made to the patient. Emotional support, spiritual presence and   prayer were provided. The patient was sleeping.       [employer]

## 2019-07-25 NOTE — PROGRESS NOTES
Bedside shift change report given to [address] (oncoming nurse) by Danyell Arredondo RN (offgoing nurse).  Report included the following information SBAR, Kardex, Procedure Summary, Intake/Output, MAR, Accordion, Recent Results and Cardiac Rhythm SR.

## 2019-07-25 NOTE — PROGRESS NOTES
Problem: Dysphagia (Adult)  Goal: *Acute Goals and Plan of Care (Insert Text)  Description  LTG: Patient will tolerate least restrictive diet without overt signs or symptoms of airway compromise. MET 7/25/19  STG: Patient will tolerate po trials with speech therapy only without overt signs or symptoms of airway compromise. MET 7/25/19  STG: Patient will participate in modified barium swallow study as clinically indicated. NOT INDICATED     Outcome: Progressing Towards Goal     Problem: Neurolinguistics Impaired (Adult)  Goal: *Acute Goals and Plan of Care (Insert Text)  Description  STG: Patient will recall verbal information after a 3 minute delay with 80% accuracy and minimal assistance. STG: Patient will recall orientation information with 100% accuracy with minimal assistance. STG: Patient will participate in continued therapeutic assessment of cognitive-linguistic abilities. LTG: Patient will increase neuro-linguistic abilities to increase safety and awareness of deficits. Outcome: Progressing Towards Goal  SPEECH LANGUAGE PATHOLOGY: BEDSIDE SWALLOW AND SPEECH LANGUAGE NOTE: Daily Note 5    NAME/AGE/GENDER: Kal Gambino is a 37 y.o. male  DATE: 7/25/2019  PRIMARY DIAGNOSIS: [facility] [I60.9]      ICD-10: Treatment Diagnosis: R13.12 Oropharyngeal Dysphagia. J.27.622 Cognitive-Communication Deficit    INTERDISCIPLINARY COLLABORATION: Registered Nurse  ASSESSMENT:   Swallowing: Patient seen for diet tolerance with chewable textures and thin liquids via straw. Prolonged mastication- suspect fatigue impacting. Adequate oral clearing after the swallow. No overt s/sx of airway compromise with thin liquids. Dysphagia goals met. Speech Language-Cognitive: Patient more alert and responsive today. He followed 1-step commands with 100% accuracy without cues, but did require 1-3 seconds to respond. He identified similarities between 2 common objects with 70% accuracy.  Errored responses did related to both items (ex. Radio-television: \"watch the news\"), but did not give full consideration to both items. Moderate cues to increase accuracy to 100%. When asked to recall items from breakfast tray, confabulations noted. No improvement in recall despite moderate cues. Improved participation and performance during speech therapy session today. Patient will benefit from ongoing cognitive linguistic treatment to improve communication, safety, and independence. Will continue to follow. ?????? ? ? This section established at most recent assessment??????????  PROBLEM LIST (Impairments causing functional limitations):  1. Oropharyngeal dysphagia  2. Cognitive-linguistic impairments  REHABILITATION POTENTIAL FOR STATED GOALS: Good  PLAN OF CARE:   Patient will benefit from skilled intervention to address the following impairments. RECOMMENDATIONS AND PLANNED INTERVENTIONS (Benefits and precautions of therapy have been discussed with the patient.):  · PO:  Regular  · Liquids:  regular thin   · No straws  MEDICATIONS:  · With liquid  COMPENSATORY STRATEGIES/MODIFICATIONS INCLUDING:  · Small sips and bites  · Slow rate of intake   OTHER RECOMMENDATIONS (including follow up treatment recommendations): · Family training/education  · Patient education  RECOMMENDED DIET MODIFICATIONS DISCUSSED WITH:  · Nursing  · Patient  FREQUENCY/DURATION: Continue to follow patient 3 times a week for duration of hospital stay to address above goals. RECOMMENDED REHABILITATION/EQUIPMENT: (at time of discharge pending progress): Due to the probability of continued deficits (see above) this patient will likely need continued skilled speech therapy after discharge. SUBJECTIVE:   More alert, but continues to be fatigued. Delayed responses with all questions. History of Present Injury/Illness: Mr. Thong Snyder  has no past medical history on file. Renee Grant   He also  has a past surgical history that includes ir emboli intracran lt (7/11/2019). Present Symptoms: oropharyngeal dysphagia   Pain Intensity 1: 0  Pain Location 1: Head  Pain Orientation 1: Posterior  Pain Intervention(s) 1: Medication (see MAR)  Current Medications:   No current facility-administered medications on file prior to encounter. No current outpatient medications on file prior to encounter. Current Dietary Status:  MS/nectar  Social History/Home Situation:    Home Environment: Private residence  One/Two Story Residence: Other (Comment)  Living Alone: No  Support Systems: Parent, Child(rohan)  Patient Expects to be Discharged to[de-identified] Unknown  Current DME Used/Available at Home: None  OBJECTIVE:   Respiratory Status:  Room air       Oral Motor Structure/Speech:  Oral-Motor Structure/Motor Speech  Labial: No impairment  Dentition: Intact  Oral Hygiene: Adequate  Lingual: No impairment    Cognitive and Communication Status:  Neurologic State: Alert  Orientation Level: Oriented to person;Oriented to place  Cognition: Decreased attention/concentration     Perseveration: No perseveration noted  Safety/Judgement: Decreased insight into deficits    BEDSIDE SWALLOW EVALUATION  Oral Assessment:     P.O. Trials:  Patient Position: upright in bed    The patient was given tsp-small sip amounts of the following:   Consistency Presented: Thin liquid; Solid;Mechanical soft  How Presented: SLP-fed/presented;Spoon;Straw;Successive swallows    ORAL PHASE:  Bolus Acceptance: No impairment  Bolus Formation/Control: No impairment  Propulsion: No impairment  Type of Impairment: Delayed  Oral Residue: None    PHARYNGEAL PHASE:  Initiation of Swallow: No impairment  Laryngeal Elevation: Functional  Aspiration Signs/Symptoms: None  Vocal Quality: Low volume                OTHER OBSERVATIONS:  Rate/bite size: Questionable  Comments:   Endurance: Questionable  Comments:     SPEECH-LANGUAGE COGNITIVE TREATMENT       Auditory Comprehension:   Auditory Comprehension  Auditory Impairment: Yes  One-Step Basic Commands (%): 100 %(1-3 second delay to respond)                       Vocal Quality: Low volume                               Cognitive Skills Activities: Activities/Procedures listed utilized to improve and/or restore cognitive function as related to attention to tasks, problem solving skills, memory, sequencing and thought organization. Required minimal verbal and tactile cueing to improve improve recall of information and perform graded activities focusing on attention skills. Tool Used: Dysphagia Outcome and Severity Scale (MIR)    Score Comments   Normal Diet  ? 7 With no strategies or extra time needed       Functional Swallow  ? 6 May have mild oral or pharyngeal delay       Mild Dysphagia    ? 5 Which may require one diet consistency restricted (those who demonstrate penetration which is entirely cleared on MBS would be included)   Mild-Moderate Dysphagia  ? 4 With 1-2 diet consistencies restricted       Moderate Dysphagia  ? 3 With 2 or more diet consistencies restricted       Moderately Severe Dysphagia  ? 2 With partial PO strategies (trials with ST only)       Severe Dysphagia  ? 1 With inability to tolerate any PO safely          Score:  Initial: 5 Most Recent: X (Date: -- )   Interpretation of Tool: The Dysphagia Outcome and Severity Scale (MIR) is a simple, easy-to-use, 7-point scale developed to systematically rate the functional severity of dysphagia based on objective assessment and make recommendations for diet level, independence level, and type of nutrition. Score 7 6 5 4 3 2 1   Modifier CH CI CJ CK CL CM CN     Payor: BLUE CROSS / Plan: [place] [facility] / Product Type: PPO /     ________________________________________________________________________________________________    Safety:   After treatment position/precautions:  · Up in chair  · RN notified  .   Progression/Medical Necessity:   · Patient is expected to demonstrate progress in diet tolerance  ·  to improve swallow safety, work toward diet advancement and decrease aspiration risk  · .  · Patient is expected to demonstrate progress in expressive communication, receptive ability and cognitive ability  ·  to decrease assistance required communication, increase independence with activities of daily living and increase communication with family/caregivers  · . Compliance with Program/Exercises: Will assess as treatment progresses   Reason for Continuation of Services/Other Comments:  · Patient continues to require skilled intervention due to dysphagia and cognitive-linguistic impairment   · . Recommendations/Intent for next treatment session: \"Treatment next visit will focus on advancements to more challenging activities and reduction in assistance provided\".     Total Treatment Duration:  Time In: 3900  Time Out: DEMETRIUS Sky, CCC-SLP

## 2019-07-26 ENCOUNTER — APPOINTMENT (OUTPATIENT)
Dept: CT IMAGING | Age: 44
DRG: 020 | End: 2019-07-26
Attending: NURSE PRACTITIONER
Payer: COMMERCIAL

## 2019-07-26 LAB
ANION GAP SERPL CALC-SCNC: 10 MMOL/L (ref 7–16)
ASPIRIN TEST, ASPIRN: 419 ARU (ref 620–672)
BUN SERPL-MCNC: 26 MG/DL (ref 6–23)
CALCIUM SERPL-MCNC: 8.2 MG/DL (ref 8.3–10.4)
CHLORIDE SERPL-SCNC: 111 MMOL/L (ref 98–107)
CO2 SERPL-SCNC: 24 MMOL/L (ref 21–32)
CREAT SERPL-MCNC: 3.03 MG/DL (ref 0.8–1.5)
ERYTHROCYTE [DISTWIDTH] IN BLOOD BY AUTOMATED COUNT: 14.6 % (ref 11.9–14.6)
GLUCOSE SERPL-MCNC: 91 MG/DL (ref 65–100)
HCT VFR BLD AUTO: 22.2 % (ref 41.1–50.3)
HCT VFR BLD AUTO: 24.4 % (ref 41.1–50.3)
HGB BLD-MCNC: 7.1 G/DL (ref 13.6–17.2)
HGB BLD-MCNC: 7.7 G/DL (ref 13.6–17.2)
MAGNESIUM SERPL-MCNC: 2.3 MG/DL (ref 1.8–2.4)
MCH RBC QN AUTO: 29.5 PG (ref 26.1–32.9)
MCHC RBC AUTO-ENTMCNC: 32 G/DL (ref 31.4–35)
MCV RBC AUTO: 92.1 FL (ref 79.6–97.8)
NRBC # BLD: 0 K/UL (ref 0–0.2)
P2Y12 PLT RESPONSE,PPPR: 255 PRU
P2Y12 PLT RESPONSE,PPPR: 291 PRU
PLATELET # BLD AUTO: 370 K/UL (ref 150–450)
PMV BLD AUTO: 10.6 FL (ref 9.4–12.3)
POTASSIUM SERPL-SCNC: 3.8 MMOL/L (ref 3.5–5.1)
RBC # BLD AUTO: 2.41 M/UL (ref 4.23–5.6)
SODIUM SERPL-SCNC: 145 MMOL/L (ref 136–145)
WBC # BLD AUTO: 13.1 K/UL (ref 4.3–11.1)

## 2019-07-26 PROCEDURE — 74011250637 HC RX REV CODE- 250/637

## 2019-07-26 PROCEDURE — 74011250637 HC RX REV CODE- 250/637: Performed by: NEUROLOGICAL SURGERY

## 2019-07-26 PROCEDURE — 77030020257 HC SOL INJ SOD CL 0.45% 1000ML BG

## 2019-07-26 PROCEDURE — 74011250636 HC RX REV CODE- 250/636: Performed by: NURSE PRACTITIONER

## 2019-07-26 PROCEDURE — 74011250637 HC RX REV CODE- 250/637: Performed by: NURSE PRACTITIONER

## 2019-07-26 PROCEDURE — 77030010537 HC BG CSF DRN INLC -C

## 2019-07-26 PROCEDURE — 86923 COMPATIBILITY TEST ELECTRIC: CPT

## 2019-07-26 PROCEDURE — 85027 COMPLETE CBC AUTOMATED: CPT

## 2019-07-26 PROCEDURE — 86900 BLOOD TYPING SEROLOGIC ABO: CPT

## 2019-07-26 PROCEDURE — 65610000001 HC ROOM ICU GENERAL

## 2019-07-26 PROCEDURE — 85018 HEMOGLOBIN: CPT

## 2019-07-26 PROCEDURE — 85576 BLOOD PLATELET AGGREGATION: CPT

## 2019-07-26 PROCEDURE — 74011000250 HC RX REV CODE- 250: Performed by: NURSE PRACTITIONER

## 2019-07-26 PROCEDURE — 74011000258 HC RX REV CODE- 258: Performed by: NURSE PRACTITIONER

## 2019-07-26 PROCEDURE — 83735 ASSAY OF MAGNESIUM: CPT

## 2019-07-26 PROCEDURE — 93886 INTRACRANIAL COMPLETE STUDY: CPT

## 2019-07-26 PROCEDURE — 36415 COLL VENOUS BLD VENIPUNCTURE: CPT

## 2019-07-26 PROCEDURE — 70450 CT HEAD/BRAIN W/O DYE: CPT

## 2019-07-26 PROCEDURE — 93886 INTRACRANIAL COMPLETE STUDY: CPT | Performed by: PSYCHIATRY & NEUROLOGY

## 2019-07-26 PROCEDURE — 80048 BASIC METABOLIC PNL TOTAL CA: CPT

## 2019-07-26 RX ORDER — HYDROGEN PEROXIDE 3 %
SOLUTION, NON-ORAL MISCELLANEOUS AS NEEDED
Status: DISCONTINUED | OUTPATIENT
Start: 2019-07-26 | End: 2019-08-03

## 2019-07-26 RX ADMIN — FENTANYL CITRATE 50 MCG: 50 INJECTION INTRAMUSCULAR; INTRAVENOUS at 08:10

## 2019-07-26 RX ADMIN — HEPARIN SODIUM 5000 UNITS: 5000 INJECTION INTRAVENOUS; SUBCUTANEOUS at 05:07

## 2019-07-26 RX ADMIN — NIMODIPINE 60 MG: 30 CAPSULE, LIQUID FILLED ORAL at 04:55

## 2019-07-26 RX ADMIN — FERROUS SULFATE TAB 325 MG (65 MG ELEMENTAL FE) 325 MG: 325 (65 FE) TAB at 08:39

## 2019-07-26 RX ADMIN — Medication 30 ML: at 21:02

## 2019-07-26 RX ADMIN — LEVETIRACETAM 500 MG: 500 TABLET, FILM COATED ORAL at 08:39

## 2019-07-26 RX ADMIN — SENNOSIDES, DOCUSATE SODIUM 2 TABLET: 50; 8.6 TABLET, FILM COATED ORAL at 21:01

## 2019-07-26 RX ADMIN — OXYCODONE HYDROCHLORIDE AND ACETAMINOPHEN 500 MG: 500 TABLET ORAL at 15:38

## 2019-07-26 RX ADMIN — SODIUM CHLORIDE, PRESERVATIVE FREE 600 UNITS: 5 INJECTION INTRAVENOUS at 15:03

## 2019-07-26 RX ADMIN — LEVETIRACETAM 500 MG: 500 TABLET, FILM COATED ORAL at 21:01

## 2019-07-26 RX ADMIN — ATORVASTATIN CALCIUM 40 MG: 40 TABLET, FILM COATED ORAL at 21:01

## 2019-07-26 RX ADMIN — SODIUM CHLORIDE, PRESERVATIVE FREE 900 UNITS: 5 INJECTION INTRAVENOUS at 05:03

## 2019-07-26 RX ADMIN — FERROUS SULFATE TAB 325 MG (65 MG ELEMENTAL FE) 325 MG: 325 (65 FE) TAB at 17:10

## 2019-07-26 RX ADMIN — HEPARIN SODIUM 5000 UNITS: 5000 INJECTION INTRAVENOUS; SUBCUTANEOUS at 21:01

## 2019-07-26 RX ADMIN — CLOPIDOGREL BISULFATE 75 MG: 75 TABLET ORAL at 08:40

## 2019-07-26 RX ADMIN — SODIUM CHLORIDE 1000 ML: 450 INJECTION, SOLUTION INTRAVENOUS at 09:45

## 2019-07-26 RX ADMIN — FENTANYL CITRATE 50 MCG: 50 INJECTION INTRAMUSCULAR; INTRAVENOUS at 10:12

## 2019-07-26 RX ADMIN — Medication 30 ML: at 15:04

## 2019-07-26 RX ADMIN — OXYCODONE HYDROCHLORIDE AND ACETAMINOPHEN 500 MG: 500 TABLET ORAL at 21:01

## 2019-07-26 RX ADMIN — HEPARIN SODIUM 5000 UNITS: 5000 INJECTION INTRAVENOUS; SUBCUTANEOUS at 15:02

## 2019-07-26 RX ADMIN — NIMODIPINE 60 MG: 30 CAPSULE, LIQUID FILLED ORAL at 08:40

## 2019-07-26 RX ADMIN — ASPIRIN 325 MG ORAL TABLET 325 MG: 325 PILL ORAL at 08:40

## 2019-07-26 RX ADMIN — NIMODIPINE 60 MG: 30 CAPSULE, LIQUID FILLED ORAL at 15:38

## 2019-07-26 RX ADMIN — NIMODIPINE 60 MG: 30 CAPSULE, LIQUID FILLED ORAL at 12:23

## 2019-07-26 RX ADMIN — SODIUM CHLORIDE 100 ML/HR: 450 INJECTION, SOLUTION INTRAVENOUS at 09:47

## 2019-07-26 RX ADMIN — FERROUS SULFATE TAB 325 MG (65 MG ELEMENTAL FE) 325 MG: 325 (65 FE) TAB at 12:27

## 2019-07-26 RX ADMIN — Medication 30 ML: at 05:04

## 2019-07-26 RX ADMIN — ACETAMINOPHEN 650 MG: 325 TABLET, FILM COATED ORAL at 17:10

## 2019-07-26 RX ADMIN — FAMOTIDINE 20 MG: 20 TABLET ORAL at 08:40

## 2019-07-26 RX ADMIN — MAGNESIUM SULFATE HEPTAHYDRATE 2 G: 40 INJECTION, SOLUTION INTRAVENOUS at 05:07

## 2019-07-26 RX ADMIN — SODIUM CHLORIDE, PRESERVATIVE FREE 900 UNITS: 5 INJECTION INTRAVENOUS at 21:01

## 2019-07-26 RX ADMIN — OXYCODONE HYDROCHLORIDE AND ACETAMINOPHEN 500 MG: 500 TABLET ORAL at 08:39

## 2019-07-26 RX ADMIN — NIMODIPINE 60 MG: 30 CAPSULE, LIQUID FILLED ORAL at 20:00

## 2019-07-26 NOTE — PROGRESS NOTES
A follow up visit was made to the patient. Emotional support, spiritual presence and   prayer were provided. The patient was asleep.       [employer]

## 2019-07-26 NOTE — PROGRESS NOTES
EVD still not draining but passes drip test; oscillating. Hgb 7.7; NP notified. EVD marked at site per NP order. Will continue to monitor.

## 2019-07-26 NOTE — PROGRESS NOTES
PT Note:  Per chart pt with increased EVD drainage and spoke with EV RN Sandra. Recommend hold therapy for pt today. Will re-attempt pending schedule and pt status.   Thanks,  Timoteo Bourne, PT, DPT   Krzysztof Corbin, SPT

## 2019-07-26 NOTE — PROGRESS NOTES
A follow up visit was made to the patient. Emotional support, and spiritual presence  were provided. The patient's father and brother were present.  checked in with his family members to see how they were.       [employer]

## 2019-07-26 NOTE — PROGRESS NOTES
Bedside, Verbal and Written shift change report given to Kathleen Foster RN (oncoming nurse) by Shemar Justin RN (offgoing nurse). Report included the following information SBAR, Kardex, ED Summary, Intake/Output, MAR and Recent Results. Dual NIH performed with Shemar Justin RN.

## 2019-07-26 NOTE — PROGRESS NOTES
Vomited small to moderate amnt of coffee ground emesis at this time. Abdomen soft; BS hypoactive. Pt without any oral food intake this shift. No output from EVD during episode; drain WNL/ oscillating. Gown and linens changed. Dual neuro with Marline Chaves RN. NIH 0. Pt with increased alertness; oriented x4; able to move all extremities. Holly Magallon, NP notified. Please notify Holly Magallon with anymore episodes of vomiting. Anamika Lester, night shift RN notified and will continue to monitor.

## 2019-07-26 NOTE — PROGRESS NOTES
SPEECH PATHOLOGY NOTE:    Speech therapy attempt this AM, but patient more lethargic. He would briefly open eyes to verbal and tactile stim then close eyes again. He grunted x1 in response to question, but response was not intelligible. Will hold ST until later time/date when patient's alertness improves and as schedule permits.      Richie Dewey, CCC-SLP

## 2019-07-26 NOTE — PROGRESS NOTES
EVD column not oscillating and no drainage noted to drainage chamber. ICP waveform dampened on monitor. Patient's neurological status unchanged. Kaela Vazquez NP notified, and no new orders received.

## 2019-07-26 NOTE — PROGRESS NOTES
Increased drainage noted to EVD site. Mckay Morrison NP notified, and telephone orders received to obtain STAT CT head.

## 2019-07-26 NOTE — PROGRESS NOTES
EVD without output and unable to visualize oscillation due to drip at end of drain. Drip test performed; EVD patent at this time. NP notified. Will continue to monitor.

## 2019-07-26 NOTE — PROGRESS NOTES
Progress Note    Patient: Shemar Rubio MRN: [id number]  SSN: [id number]    YOB: 1975  Age: 37 y.o. Sex: male      Admit Date: 7/9/2019    LOS: 17 days     Subjective:   Pt with no acute neuro changes. EVD not draining this am, attempted to flush wo success. Dr. Mayuri Reyes pulled EVD back cm and now draining wo difficulty. Pt tolerated well. EVD secure and @10/open. Dr. Mayuri Reyes updated pt's family via phone. TCD pending.     Current Facility-Administered Medications   Medication Dose Route Frequency    hydrogen peroxide 3 %   Topical PRN    acetaminophen (TYLENOL) tablet 650 mg  650 mg Oral Q4H PRN    lidocaine 4 % patch 1 Patch  1 Patch TransDERmal Q24H    fentaNYL citrate (PF) injection 50 mcg  50 mcg IntraVENous Q1H PRN    ferrous sulfate tablet 325 mg  1 Tab Oral TID WITH MEALS    levETIRAcetam (KEPPRA) tablet 500 mg  500 mg Oral Q12H    niMODipine (NIMOTOP) 30 mg/mL oral solution (compound) 60 mg  60 mg Oral Q4H    0.45% sodium chloride infusion  100 mL/hr IntraVENous CONTINUOUS    ascorbic acid (vitamin C) (VITAMIN C) tablet 500 mg  500 mg Oral TID    aspirin tablet 325 mg  325 mg Oral DAILY    atorvastatin (LIPITOR) tablet 40 mg  40 mg Oral QHS    clopidogrel (PLAVIX) tablet 75 mg  75 mg Oral DAILY    famotidine (PEPCID) tablet 20 mg  20 mg Oral DAILY    senna-docusate (PERICOLACE) 8.6-50 mg per tablet 2 Tab  2 Tab Oral QHS    magnesium hydroxide (MILK OF MAGNESIA) 400 mg/5 mL oral suspension 30 mL  30 mL Oral DAILY PRN    polyvinyl alcohol (LIQUIFILM TEARS) 1.4 % ophthalmic solution 1 Drop  1 Drop Both Eyes PRN    heparin (porcine) injection 5,000 Units  5,000 Units SubCUTAneous Q8H    sodium chloride (NS) flush 30 mL  30 mL InterCATHeter Q8H    heparin (porcine) pf 900 Units  900 Units InterCATHeter Q8H    sodium chloride (NS) flush 30 mL  30 mL InterCATHeter PRN    heparin (porcine) pf 900 Units  900 Units InterCATHeter PRN    nicotine (NICODERM CQ) 21 mg/24 hr patch 1 Patch  1 Patch TransDERmal Q24H    ondansetron (ZOFRAN) injection 4 mg  4 mg IntraVENous Q6H PRN    NUTRITIONAL SUPPORT ELECTROLYTE PRN ORDERS   Does Not Apply PRN    magnesium sulfate 4 g/100 mL IVPB  4 g IntraVENous DAILY PRN    magnesium sulfate 2 g/50 ml IVPB (premix or compounded)  2 g IntraVENous DAILY PRN       Objective:     Vitals:    07/26/19 0620 07/26/19 0647 07/26/19 0652 07/26/19 0840   BP:    190/76   Pulse: 78 88 94 85   Resp: 11 15 14    Temp: 98.4 °F (36.9 °C)  98.2 °F (36.8 °C)    SpO2: 100% 96% 100%    Weight:       Height:             Intake and Output:  Current Shift: No intake/output data recorded. Last 24 hr: 07/25 0701 - 07/26 0700  In: 2666.7 [P.O.:360; I.V.:2306.7]  Out: 6057 [Urine:4000; Drains:247]     IO: -1580cc/24hr  TOTAL OVERALL: -20L  EVD: 247/24hrs    Physical Exam:   General:  Alert, drowsy, cooperative, no distress. Eyes:   PERRL   Neck: Supple, symmetrical, trachea midline, no adenopathy, thyroid: no enlargment/tenderness/nodules, no carotid bruit and no JVD. Lungs:   Clear to auscultation bilaterally. Heart:  Regular rate and rhythm, S1, S2 normal, no murmur, click, rub or gallop. Abdomen:   Soft, non-tender. Bowel sounds normal. No masses,  No organomegaly. Extremities: Extremities normal, atraumatic, no cyanosis. 2+ edema RLE, 1+ edema LLE. Pulses: 2+ and symmetric all extremities. Skin: Skin color, texture, turgor normal. EVD intact. Neurologic:  pt aox3,follows commands, stovall. Drowsy at times, but interactive on exam. Cont monitor.         Lab/Data Review:    Recent Results (from the past 12 hour(s))   MAGNESIUM    Collection Time: 07/26/19  3:56 AM   Result Value Ref Range    Magnesium 2.3 1.8 - 2.4 mg/dL   CBC W/O DIFF    Collection Time: 07/26/19  3:56 AM   Result Value Ref Range    WBC 13.1 (H) 4.3 - 11.1 K/uL    RBC 2.41 (L) 4.23 - 5.6 M/uL    HGB 7.1 (L) 13.6 - 17.2 g/dL    HCT 22.2 (L) 41.1 - 50.3 %    MCV 92.1 79.6 - 97.8 FL    MCH 29.5 26.1 - 32.9 PG    MCHC 32.0 31.4 - 35.0 g/dL    RDW 14.6 11.9 - 14.6 %    PLATELET 718 259 - 352 K/uL    MPV 10.6 9.4 - 12.3 FL    ABSOLUTE NRBC 0.00 0.0 - 0.2 K/uL   METABOLIC PANEL, BASIC    Collection Time: 07/26/19  3:56 AM   Result Value Ref Range    Sodium 145 136 - 145 mmol/L    Potassium 3.8 3.5 - 5.1 mmol/L    Chloride 111 (H) 98 - 107 mmol/L    CO2 24 21 - 32 mmol/L    Anion gap 10 7 - 16 mmol/L    Glucose 91 65 - 100 mg/dL    BUN 26 (H) 6 - 23 MG/DL    Creatinine 3.03 (H) 0.8 - 1.5 MG/DL    GFR est AA 29 (L) >60 ml/min/1.73m2    GFR est non-AA 24 (L) >60 ml/min/1.73m2    Calcium 8.2 (L) 8.3 - 10.4 MG/DL   ASPIRIN TEST    Collection Time: 07/26/19  3:56 AM   Result Value Ref Range    Aspirin test 419 (L) 620 - 672 ARU   PLATELET FUNCTION, VERIFY NOW P2Y12    Collection Time: 07/26/19  3:56 AM   Result Value Ref Range    P2Y12 Plt response 291 PRU   PLATELET FUNCTION, VERIFY NOW P2Y12    Collection Time: 07/26/19  7:41 AM   Result Value Ref Range    P2Y12 Plt response 255 PRU       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) (7/10/2019)      Seizure (HCC) (7/10/2019)      Elevated serum creatinine (7/10/2019)      Cerebral vasospasm (7/10/2019)      Pneumonia due to methicillin susceptible Staphylococcus aureus [facility]) ([facility] (7/15/2019)          NEURO: Pt admitted to ICU under SAH protocol, Hunt/Solis 3, Watts Grade 4 secondary to 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 Q1 hour neuro checks. On SAH protocol - Mg, nimotop, zocor. PERRL +3. No MAP goal now. TCDs daily - they have been 180-220 in the left and right MCAs . Last CT head showed small ventricles.  Last CTA head and neck showed no filling of the aneurysm and good filling of the PCAs. Vasospasm seen in the right MCA and both supraclinoid ICAs as well as the ACAs and the left MCA but to a lesser extent. CTP was normal. Will continue SAH protocol, PT/OT. OOB to chair today. . Pt had EVD replaced 7/20 due to it getting pulled put. EVD @ 10cm H20. Alert and following commands this morning. Will plan for  shunt next week, 8-1. AR:419/ TN:255  CT head/CTP/CTA Sunday. EVD  RESP: tolerating RA. No distress. cxr shows improving edema. CV: NO MAP GOAL NOW.  2D Echo: 55-60%, trop peaked @ 0.8, trending down. SCD/SQ heparin. HEME: 7.1/22,  HIT panel negative. On oral iron. And Vit C. NEPH: bun/cr: 26/3 . UOP is good. -20.5L. Will give 2L 1/2 NS bolus. Monitor. GI: Pepcid. Senna. +BS. ID: Tm: [id number]. Sputum cx sent, pending final, showing gram + cocci -  Patient has MSSA pneumonia, abx completed. Blood cultures neg and UA neg. US neg for DVT. CSF cx neg for any organisms, protein 172. LINES: KISHOR PICC, SHANNAN phelps. Tobacco abuse: On Nicoderm patch.         Signed By: Julienne Hill NP     July 26, 2019

## 2019-07-26 NOTE — PROGRESS NOTES
Nutrition - F/U  Assessment:  Diet: DIET REGULAR  DIET NUTRITIONAL SUPPLEMENTS Breakfast; Ensure Enlive  DIET NUTRITIONAL SUPPLEMENTS Lunch; Ensure Clear  DIET NUTRITIONAL SUPPLEMENTS Dinner; Magic Cups    edema - trace BLEs   Last 3 Recorded Weights in this Encounter    07/21/19 0327 07/22/19 0300 07/23/19 0428   Weight: 75.3 kg (166 lb 0.1 oz) 74 kg (163 lb 2.3 oz) 73.3 kg (161 lb 9.6 oz)   Admission wt (7/9/19): 68.7 kg, bed scale  Pt seen this morning-lethargic and did not eat much breakfast, per RN. It appears that his PO intake varies daily. He has been seen feeding himself breakfast each day this week. Macronutrient Needs: (69 kg)  Estimated calorie needs - 6194-6877 jayce/day (25-28 jayce/kg/day)   Estimated protein needs - 69-83 gm pro/day (1-1.2 gm pro/kgBW/day)   Max CHO/day - 266 gm CHO/day (55% jayce/day)   Fluid/day - 1.7-2 liters/day (1 ml/jayce/day)  Intake/Comparative Standards: Based off of RD breakfast rounds this week, pt eats 50-75% of breakfast daily. Seven recorded meals over the weekend averaging 59%, potentially meeting 50-80% EEN and 70-90% estimated PRO needs. Intervention:   Meals and Snacks: regular  Medical Food Supplement Therapy: continue ensure enlive 1/day, ensure clear 1/day, and magic cup 1/day. Mineral Supplement Therapy: Nutrition Support Orders/Electrolyte Management Replacement Protocols are active on the [facility] for potassium only. Coordination of Care: AM ICU rounds, Marixa SPENCER  Nutrition Discharge Plan: Too soon to determine.     Pat Houston Ramos [address], [address], [address] [place], [address]

## 2019-07-27 ENCOUNTER — APPOINTMENT (OUTPATIENT)
Dept: CT IMAGING | Age: 44
DRG: 020 | End: 2019-07-27
Attending: NURSE PRACTITIONER
Payer: COMMERCIAL

## 2019-07-27 LAB
ALBUMIN SERPL-MCNC: 2.4 G/DL (ref 3.5–5)
ALBUMIN/GLOB SERPL: 0.7 {RATIO} (ref 1.2–3.5)
ALP SERPL-CCNC: 71 U/L (ref 50–136)
ALT SERPL-CCNC: 9 U/L (ref 12–65)
ANION GAP SERPL CALC-SCNC: 10 MMOL/L (ref 7–16)
APPEARANCE FLD: NORMAL
ARTERIAL PATENCY WRIST A: ABNORMAL
AST SERPL-CCNC: 15 U/L (ref 15–37)
BACTERIA SPEC CULT: NORMAL
BASE DEFICIT BLD-SCNC: 1 MMOL/L
BDY SITE: ABNORMAL
BILIRUB DIRECT SERPL-MCNC: <0.1 MG/DL
BILIRUB SERPL-MCNC: 0.3 MG/DL (ref 0.2–1.1)
BODY TEMPERATURE: 98.6
BUN SERPL-MCNC: 23 MG/DL (ref 6–23)
CA-I BLD-MCNC: 1.26 MMOL/L (ref 1.12–1.32)
CALCIUM SERPL-MCNC: 8.8 MG/DL (ref 8.3–10.4)
CHLORIDE SERPL-SCNC: 111 MMOL/L (ref 98–107)
CO2 SERPL-SCNC: 25 MMOL/L (ref 21–32)
COLLECT TIME,HTIME: 735
COLOR FLD: NORMAL
CREAT SERPL-MCNC: 3.09 MG/DL (ref 0.8–1.5)
ERYTHROCYTE [DISTWIDTH] IN BLOOD BY AUTOMATED COUNT: 14.6 % (ref 11.9–14.6)
GAS FLOW.O2 O2 DELIVERY SYS: ABNORMAL L/MIN
GLOBULIN SER CALC-MCNC: 3.4 G/DL (ref 2.3–3.5)
GLUCOSE BLD STRIP.AUTO-MCNC: 114 MG/DL (ref 65–100)
GLUCOSE CSF-MCNC: 42 MG/DL (ref 40–70)
GLUCOSE SERPL-MCNC: 110 MG/DL (ref 65–100)
GRAM STN SPEC: NORMAL
GRAM STN SPEC: NORMAL
HCO3 BLD-SCNC: 23.7 MMOL/L (ref 22–26)
HCT VFR BLD AUTO: 28.7 % (ref 41.1–50.3)
HGB BLD-MCNC: 9.4 G/DL (ref 13.6–17.2)
INR PPP: 0.9
LYMPHOCYTES NFR BRONCH MANUAL: 45 %
MACROPHAGES NFR BRONCH MANUAL: 11 %
MAGNESIUM SERPL-MCNC: 2.5 MG/DL (ref 1.8–2.4)
MCH RBC QN AUTO: 30.1 PG (ref 26.1–32.9)
MCHC RBC AUTO-ENTMCNC: 32.8 G/DL (ref 31.4–35)
MCV RBC AUTO: 92 FL (ref 79.6–97.8)
NEUTROPHILS NFR BRONCH MANUAL: 44 %
NRBC # BLD: 0 K/UL (ref 0–0.2)
NUC CELL # FLD: 15 /CU MM
PCO2 BLD: 39.3 MMHG (ref 35–45)
PH BLD: 7.39 [PH] (ref 7.35–7.45)
PLATELET # BLD AUTO: 404 K/UL (ref 150–450)
PMV BLD AUTO: 10.2 FL (ref 9.4–12.3)
PO2 BLD: 81 MMHG (ref 75–100)
POTASSIUM BLD-SCNC: 3.6 MMOL/L (ref 3.5–5.1)
POTASSIUM SERPL-SCNC: 3.7 MMOL/L (ref 3.5–5.1)
PROT CSF-MCNC: 186 MG/DL (ref 15–45)
PROT SERPL-MCNC: 5.8 G/DL (ref 6.3–8.2)
PROTHROMBIN TIME: 12.3 SEC (ref 11.7–14.5)
RBC # BLD AUTO: 3.12 M/UL (ref 4.23–5.6)
RBC # FLD: NORMAL /CU MM
SAO2 % BLD: 96 % (ref 95–98)
SERVICE CMNT-IMP: ABNORMAL
SERVICE CMNT-IMP: NORMAL
SODIUM BLD-SCNC: 140 MMOL/L (ref 136–145)
SODIUM SERPL-SCNC: 146 MMOL/L (ref 136–145)
SPECIMEN SOURCE FLD: NORMAL
SPECIMEN TYPE: ABNORMAL
TUBE # CSF: 2
TUBE # CSF: 3
WBC # BLD AUTO: 15.1 K/UL (ref 4.3–11.1)

## 2019-07-27 PROCEDURE — 74011250636 HC RX REV CODE- 250/636

## 2019-07-27 PROCEDURE — 74011250637 HC RX REV CODE- 250/637: Performed by: NEUROLOGICAL SURGERY

## 2019-07-27 PROCEDURE — 83735 ASSAY OF MAGNESIUM: CPT

## 2019-07-27 PROCEDURE — 74011250636 HC RX REV CODE- 250/636: Performed by: NURSE PRACTITIONER

## 2019-07-27 PROCEDURE — 93886 INTRACRANIAL COMPLETE STUDY: CPT | Performed by: PSYCHIATRY & NEUROLOGY

## 2019-07-27 PROCEDURE — 74011250637 HC RX REV CODE- 250/637

## 2019-07-27 PROCEDURE — 70496 CT ANGIOGRAPHY HEAD: CPT

## 2019-07-27 PROCEDURE — 77010033678 HC OXYGEN DAILY

## 2019-07-27 PROCEDURE — 77030020257 HC SOL INJ SOD CL 0.45% 1000ML BG

## 2019-07-27 PROCEDURE — 84157 ASSAY OF PROTEIN OTHER: CPT

## 2019-07-27 PROCEDURE — 36600 WITHDRAWAL OF ARTERIAL BLOOD: CPT

## 2019-07-27 PROCEDURE — 77030002916 HC SUT ETHLN J&J -A

## 2019-07-27 PROCEDURE — 85027 COMPLETE CBC AUTOMATED: CPT

## 2019-07-27 PROCEDURE — 74011636320 HC RX REV CODE- 636/320: Performed by: NEUROLOGICAL SURGERY

## 2019-07-27 PROCEDURE — 80076 HEPATIC FUNCTION PANEL: CPT

## 2019-07-27 PROCEDURE — 82945 GLUCOSE OTHER FLUID: CPT

## 2019-07-27 PROCEDURE — 65610000001 HC ROOM ICU GENERAL

## 2019-07-27 PROCEDURE — 80048 BASIC METABOLIC PNL TOTAL CA: CPT

## 2019-07-27 PROCEDURE — 87205 SMEAR GRAM STAIN: CPT

## 2019-07-27 PROCEDURE — 82803 BLOOD GASES ANY COMBINATION: CPT

## 2019-07-27 PROCEDURE — 85610 PROTHROMBIN TIME: CPT

## 2019-07-27 PROCEDURE — 0042T CT PERF W CBF: CPT

## 2019-07-27 PROCEDURE — 82947 ASSAY GLUCOSE BLOOD QUANT: CPT

## 2019-07-27 PROCEDURE — 74011000258 HC RX REV CODE- 258: Performed by: NEUROLOGICAL SURGERY

## 2019-07-27 PROCEDURE — 74011000250 HC RX REV CODE- 250: Performed by: NURSE PRACTITIONER

## 2019-07-27 PROCEDURE — 93886 INTRACRANIAL COMPLETE STUDY: CPT

## 2019-07-27 PROCEDURE — 74011000258 HC RX REV CODE- 258: Performed by: NURSE PRACTITIONER

## 2019-07-27 PROCEDURE — 74011250637 HC RX REV CODE- 250/637: Performed by: NURSE PRACTITIONER

## 2019-07-27 PROCEDURE — 89050 BODY FLUID CELL COUNT: CPT

## 2019-07-27 PROCEDURE — 70450 CT HEAD/BRAIN W/O DYE: CPT

## 2019-07-27 RX ORDER — FENTANYL CITRATE 50 UG/ML
INJECTION, SOLUTION INTRAMUSCULAR; INTRAVENOUS
Status: COMPLETED
Start: 2019-07-27 | End: 2019-07-27

## 2019-07-27 RX ORDER — FENTANYL CITRATE 50 UG/ML
100 INJECTION, SOLUTION INTRAMUSCULAR; INTRAVENOUS ONCE
Status: COMPLETED | OUTPATIENT
Start: 2019-07-27 | End: 2019-07-27

## 2019-07-27 RX ORDER — SODIUM CHLORIDE 0.9 % (FLUSH) 0.9 %
10 SYRINGE (ML) INJECTION
Status: COMPLETED | OUTPATIENT
Start: 2019-07-27 | End: 2019-07-27

## 2019-07-27 RX ADMIN — ATORVASTATIN CALCIUM 40 MG: 40 TABLET, FILM COATED ORAL at 21:10

## 2019-07-27 RX ADMIN — SODIUM CHLORIDE 100 ML: 900 INJECTION, SOLUTION INTRAVENOUS at 07:03

## 2019-07-27 RX ADMIN — HEPARIN SODIUM 5000 UNITS: 5000 INJECTION INTRAVENOUS; SUBCUTANEOUS at 06:33

## 2019-07-27 RX ADMIN — SODIUM CHLORIDE, PRESERVATIVE FREE 900 UNITS: 5 INJECTION INTRAVENOUS at 21:10

## 2019-07-27 RX ADMIN — Medication 10 ML: at 07:03

## 2019-07-27 RX ADMIN — FERROUS SULFATE TAB 325 MG (65 MG ELEMENTAL FE) 325 MG: 325 (65 FE) TAB at 12:13

## 2019-07-27 RX ADMIN — SODIUM CHLORIDE, PRESERVATIVE FREE 900 UNITS: 5 INJECTION INTRAVENOUS at 06:30

## 2019-07-27 RX ADMIN — FENTANYL CITRATE 50 MCG: 50 INJECTION INTRAMUSCULAR; INTRAVENOUS at 04:31

## 2019-07-27 RX ADMIN — FENTANYL CITRATE 50 MCG: 50 INJECTION INTRAMUSCULAR; INTRAVENOUS at 00:28

## 2019-07-27 RX ADMIN — ONDANSETRON 4 MG: 2 INJECTION INTRAMUSCULAR; INTRAVENOUS at 08:40

## 2019-07-27 RX ADMIN — NIMODIPINE 60 MG: 30 CAPSULE, LIQUID FILLED ORAL at 12:13

## 2019-07-27 RX ADMIN — SODIUM CHLORIDE 100 ML/HR: 450 INJECTION, SOLUTION INTRAVENOUS at 06:35

## 2019-07-27 RX ADMIN — OXYCODONE HYDROCHLORIDE AND ACETAMINOPHEN 500 MG: 500 TABLET ORAL at 16:33

## 2019-07-27 RX ADMIN — OXYCODONE HYDROCHLORIDE AND ACETAMINOPHEN 500 MG: 500 TABLET ORAL at 21:10

## 2019-07-27 RX ADMIN — HEPARIN SODIUM 5000 UNITS: 5000 INJECTION INTRAVENOUS; SUBCUTANEOUS at 21:10

## 2019-07-27 RX ADMIN — Medication 30 ML: at 15:09

## 2019-07-27 RX ADMIN — OXYCODONE HYDROCHLORIDE AND ACETAMINOPHEN 500 MG: 500 TABLET ORAL at 10:16

## 2019-07-27 RX ADMIN — HEPARIN SODIUM 5000 UNITS: 5000 INJECTION INTRAVENOUS; SUBCUTANEOUS at 15:06

## 2019-07-27 RX ADMIN — FENTANYL CITRATE 100 MCG: 50 INJECTION, SOLUTION INTRAMUSCULAR; INTRAVENOUS at 07:01

## 2019-07-27 RX ADMIN — FENTANYL CITRATE 100 MCG: 50 INJECTION INTRAMUSCULAR; INTRAVENOUS at 07:01

## 2019-07-27 RX ADMIN — LEVETIRACETAM 500 MG: 500 TABLET, FILM COATED ORAL at 21:10

## 2019-07-27 RX ADMIN — SODIUM CHLORIDE 1000 ML: 450 INJECTION, SOLUTION INTRAVENOUS at 16:34

## 2019-07-27 RX ADMIN — NIMODIPINE 60 MG: 30 CAPSULE, LIQUID FILLED ORAL at 20:50

## 2019-07-27 RX ADMIN — IOPAMIDOL 90 ML: 755 INJECTION, SOLUTION INTRAVENOUS at 07:03

## 2019-07-27 RX ADMIN — Medication 30 ML: at 06:30

## 2019-07-27 RX ADMIN — CLOPIDOGREL BISULFATE 75 MG: 75 TABLET ORAL at 10:16

## 2019-07-27 RX ADMIN — NIMODIPINE 60 MG: 30 CAPSULE, LIQUID FILLED ORAL at 16:33

## 2019-07-27 RX ADMIN — SENNOSIDES, DOCUSATE SODIUM 2 TABLET: 50; 8.6 TABLET, FILM COATED ORAL at 21:10

## 2019-07-27 RX ADMIN — NIMODIPINE 60 MG: 30 CAPSULE, LIQUID FILLED ORAL at 04:20

## 2019-07-27 RX ADMIN — NIMODIPINE 60 MG: 30 CAPSULE, LIQUID FILLED ORAL at 23:53

## 2019-07-27 RX ADMIN — ASPIRIN 325 MG ORAL TABLET 325 MG: 325 PILL ORAL at 10:16

## 2019-07-27 RX ADMIN — SODIUM CHLORIDE, PRESERVATIVE FREE 600 UNITS: 5 INJECTION INTRAVENOUS at 15:06

## 2019-07-27 RX ADMIN — FERROUS SULFATE TAB 325 MG (65 MG ELEMENTAL FE) 325 MG: 325 (65 FE) TAB at 16:33

## 2019-07-27 RX ADMIN — FERROUS SULFATE TAB 325 MG (65 MG ELEMENTAL FE) 325 MG: 325 (65 FE) TAB at 10:16

## 2019-07-27 RX ADMIN — ACETAMINOPHEN 650 MG: 325 TABLET, FILM COATED ORAL at 16:33

## 2019-07-27 RX ADMIN — Medication 30 ML: at 21:11

## 2019-07-27 RX ADMIN — LEVETIRACETAM 500 MG: 500 TABLET, FILM COATED ORAL at 10:16

## 2019-07-27 RX ADMIN — ACETAMINOPHEN 650 MG: 325 TABLET, FILM COATED ORAL at 21:40

## 2019-07-27 RX ADMIN — FAMOTIDINE 20 MG: 20 TABLET ORAL at 10:16

## 2019-07-27 RX ADMIN — SODIUM CHLORIDE 100 ML/HR: 450 INJECTION, SOLUTION INTRAVENOUS at 17:40

## 2019-07-27 RX ADMIN — SODIUM CHLORIDE 1000 ML: 450 INJECTION, SOLUTION INTRAVENOUS at 13:50

## 2019-07-27 RX ADMIN — SODIUM CHLORIDE 1000 ML: 450 INJECTION, SOLUTION INTRAVENOUS at 12:51

## 2019-07-27 RX ADMIN — NIMODIPINE 60 MG: 30 CAPSULE, LIQUID FILLED ORAL at 10:16

## 2019-07-27 RX ADMIN — NIMODIPINE 60 MG: 30 CAPSULE, LIQUID FILLED ORAL at 00:32

## 2019-07-27 NOTE — PROCEDURES
Transcranial Doppler    Transcranial Doppler ultrasound studies were performed of the intracranial circulation via the transtemporal window and via the foramen magnum window flow velocities to continue to demonstrate no evidence of intracranial vascular spasm. There is on today's examination which is not been seen previously some suggestion of increased resistivity in the left carotid extracranial circulation. Impression no evidence of intracranial vascular spasm. Evidence of increased resistivity index which may be suggestive of changing intracranial hemodynamics.   Perfusion study may be of assistance for further clarification

## 2019-07-27 NOTE — PROCEDURES
Transcranial Doppler    Transcranial Doppler follow-up study was performed for evaluation of subarachnoid hemorrhage. Insonation was performed via the transtemporal window and via the foramen magnum window for the posterior fossa. Antegrade flow is noted throughout. Flow velocities in the internal carotid artery anterior middle and posterior cerebral arteries. The Lindegaard ratios are 2.9 and 1.73.     The velocities in the carotid siphon and all are improved from the previous examination of 7/26/2019    Impression    Transcranial Doppler study does not demonstrate any residual intracranial vascular spasm nor does it demonstrate embolic features

## 2019-07-27 NOTE — PROGRESS NOTES
Joo Hicks MD at bedside for neuro exam, pt able to verbalize name at this time, no other neuro changes noted.

## 2019-07-27 NOTE — PROGRESS NOTES
In CT with primary RN, Endo RN, & Howells/Outreach RN; Pt agitated and shaking head, kicking legs. Prior to IV PRN administration, Neuro exam: MCC[place]OUGH, does not follow commands, opens eyes to verbal and will look to voice.

## 2019-07-27 NOTE — PROGRESS NOTES
Patient found attempting to exit bed, and  assisted back to supine position in bed. Patient not following commands or responding to orientation questions. PERRL, and spontaneous movement noted to all extremities. [facility] [address] [facility], NP notified, and orders received to obtain STAT CT head.

## 2019-07-27 NOTE — PROGRESS NOTES
Progress Note    Patient: Christina Villegas MRN: [id number]  SSN: [id number]    YOB: 1975  Age: 37 y.o. Sex: male      Admit Date: 7/9/2019    LOS: 18 days     Subjective:   Pt with agitation overnight, attempting to get out of bed. Repeat CT shows hydrocephalus, EVD does drain when lowered, pulled off 15cc. CSF sent to lab, PT/INR, LFT's. Pt wd all pain, does not want to follow, but wd pain all extrems. ICP waveform intact, non-complaint.      Current Facility-Administered Medications   Medication Dose Route Frequency    fentaNYL citrate (PF) injection 100 mcg  100 mcg IntraVENous ONCE    hydrogen peroxide 3 %   Topical PRN    acetaminophen (TYLENOL) tablet 650 mg  650 mg Oral Q4H PRN    lidocaine 4 % patch 1 Patch  1 Patch TransDERmal Q24H    fentaNYL citrate (PF) injection 50 mcg  50 mcg IntraVENous Q1H PRN    ferrous sulfate tablet 325 mg  1 Tab Oral TID WITH MEALS    levETIRAcetam (KEPPRA) tablet 500 mg  500 mg Oral Q12H    niMODipine (NIMOTOP) 30 mg/mL oral solution (compound) 60 mg  60 mg Oral Q4H    0.45% sodium chloride infusion  100 mL/hr IntraVENous CONTINUOUS    ascorbic acid (vitamin C) (VITAMIN C) tablet 500 mg  500 mg Oral TID    aspirin tablet 325 mg  325 mg Oral DAILY    atorvastatin (LIPITOR) tablet 40 mg  40 mg Oral QHS    clopidogrel (PLAVIX) tablet 75 mg  75 mg Oral DAILY    famotidine (PEPCID) tablet 20 mg  20 mg Oral DAILY    senna-docusate (PERICOLACE) 8.6-50 mg per tablet 2 Tab  2 Tab Oral QHS    magnesium hydroxide (MILK OF MAGNESIA) 400 mg/5 mL oral suspension 30 mL  30 mL Oral DAILY PRN    polyvinyl alcohol (LIQUIFILM TEARS) 1.4 % ophthalmic solution 1 Drop  1 Drop Both Eyes PRN    heparin (porcine) injection 5,000 Units  5,000 Units SubCUTAneous Q8H    sodium chloride (NS) flush 30 mL  30 mL InterCATHeter Q8H    heparin (porcine) pf 900 Units  900 Units InterCATHeter Q8H    sodium chloride (NS) flush 30 mL  30 mL InterCATHeter PRN    heparin (porcine) pf 900 Units  900 Units InterCATHeter PRN    nicotine (NICODERM CQ) 21 mg/24 hr patch 1 Patch  1 Patch TransDERmal Q24H    ondansetron (ZOFRAN) injection 4 mg  4 mg IntraVENous Q6H PRN    NUTRITIONAL SUPPORT ELECTROLYTE PRN ORDERS   Does Not Apply PRN    magnesium sulfate 4 g/100 mL IVPB  4 g IntraVENous DAILY PRN    magnesium sulfate 2 g/50 ml IVPB (premix or compounded)  2 g IntraVENous DAILY PRN       Objective:     Vitals:    07/27/19 0630 07/27/19 0645 07/27/19 0647 07/27/19 0753   BP:       Pulse: (!) 101 (!) 103 (!) 103    Resp: 12 15 15    Temp: 98 °F (36.7 °C) 97.1 °F (36.2 °C)     SpO2: 100% 100% 100% 100%   Weight:       Height:             Intake and Output:  Current Shift: No intake/output data recorded. Last 24 hr: 07/26 0701 - 07/27 0700  In: 3389.3 [I.V.:3389.3]  Out: 0736 [Urine:5250; Drains:8]     IO: -1868cc/24hr  TOTAL OVERALL: -23L  EVD: 10cc/24hrs    Physical Exam:   General:  Drowsy, wd all extrems to pain, does not follow this am.    Eyes:   PERRL   Neck: Supple, symmetrical, trachea midline, no adenopathy, thyroid: no enlargment/tenderness/nodules, no carotid bruit and no JVD. Lungs:   Clear to auscultation bilaterally. Heart:  Regular rate and rhythm, S1, S2 normal, no murmur, click, rub or gallop. Abdomen:   Soft, non-tender. Bowel sounds normal. No masses,  No organomegaly. Extremities: Extremities normal, atraumatic, no cyanosis. Pulses: 2+ and symmetric all extremities. Skin: Skin color, texture, turgor normal. EVD intact. Neurologic:  drowsy, does not follow commands, wd pain all extrems. Pupils +3.         Lab/Data Review:    Recent Results (from the past 12 hour(s))   MAGNESIUM    Collection Time: 07/27/19  3:03 AM   Result Value Ref Range    Magnesium 2.5 (H) 1.8 - 2.4 mg/dL   CBC W/O DIFF    Collection Time: 07/27/19  3:03 AM   Result Value Ref Range    WBC 15.1 (H) 4.3 - 11.1 K/uL    RBC 3.12 (L) 4.23 - 5.6 M/uL    HGB 9.4 (L) 13.6 - 17.2 g/dL    HCT 28.7 (L) 41.1 - 50.3 %    MCV 92.0 79.6 - 97.8 FL    MCH 30.1 26.1 - 32.9 PG    MCHC 32.8 31.4 - 35.0 g/dL    RDW 14.6 11.9 - 14.6 %    PLATELET 926 583 - 826 K/uL    MPV 10.2 9.4 - 12.3 FL    ABSOLUTE NRBC 0.00 0.0 - 0.2 K/uL   METABOLIC PANEL, BASIC    Collection Time: 07/27/19  3:03 AM   Result Value Ref Range    Sodium 146 (H) 136 - 145 mmol/L    Potassium 3.7 3.5 - 5.1 mmol/L    Chloride 111 (H) 98 - 107 mmol/L    CO2 25 21 - 32 mmol/L    Anion gap 10 7 - 16 mmol/L    Glucose 110 (H) 65 - 100 mg/dL    BUN 23 6 - 23 MG/DL    Creatinine 3.09 (H) 0.8 - 1.5 MG/DL    GFR est AA 28 (L) >60 ml/min/1.73m2    GFR est non-AA 23 (L) >60 ml/min/1.73m2    Calcium 8.8 8.3 - 10.4 MG/DL   POC CG8I    Collection Time: 07/27/19  7:40 AM   Result Value Ref Range    Device: ROOM AIR      pH (POC) 7.388 7.35 - 7.45      pCO2 (POC) 39.3 35 - 45 MMHG    pO2 (POC) 81 75 - 100 MMHG    HCO3 (POC) 23.7 22 - 26 MMOL/L    sO2 (POC) 96 95 - 98 %    Base deficit (POC) 1 mmol/L    Allens test (POC) NOT APPLICABLE      Site DRAWN FROM ARTERIAL LINE      Patient temp.  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)      IVH (intraventricular hemorrhage) ([facility] (7/10/2019)      Seizure ([facility] (7/10/2019)      Elevated serum creatinine (7/10/2019)      Cerebral vasospasm (7/10/2019)      Pneumonia due to methicillin susceptible Staphylococcus aureus (MSSA) ([facility] (7/15/2019)          NEURO: Pt admitted to ICU under [facility] protocol, Guillermo/Solis 3, Watts Grade 4 secondary to 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 Q1 hour neuro checks. On SAH protocol - Mg, nimotop, zocor. PERRL +3. No MAP goal now. TCDs daily - they have been 180-220 in the left and right MCAs . Last CT head showed small ventricles. Last CTA head and neck showed no filling of the aneurysm and good filling of the PCAs. Vasospasm seen in the right MCA and both supraclinoid ICAs as well as the ACAs and the left MCA but to a lesser extent. CTP was normal. Will continue SAH protocol, PT/OT. OOB to chair today. . Pt had EVD replaced 7/20 due to it getting pulled put. EVD @ 10cm H20. Alert and following commands this morning. Will plan for  shunt next week, 8-1. AR:419/ AL:255  CT head/CTP/CTA Sunday. EVD  RESP: tolerating RA. No distress. cxr shows improving edema. CV: NO MAP GOAL NOW.  2D Echo: 55-60%, trop peaked @ 0.8, trending down. SCD/SQ heparin. HEME: 9.4/28,  HIT panel negative. On oral iron. And Vit C. NEPH: bun/cr: 23/30.9. UOP is good. Monitor. GI: Pepcid. Senna. +BS. ID: Tm: [id number]. Kathrine Robert Sputum cx sent, pending final, showing gram + cocci -  Patient has MSSA pneumonia, abx completed. Blood cultures neg and UA neg. US neg for DVT. CSF cx neg for any organisms, protein 172. LINES: KISHOR PICC, SHANNAN phelps EVD. Tobacco abuse: On Nicoderm patch. Discussed pt with Dr. Rocco Mercedes, he will see pt for further plan of care.      Signed By: Mahogany Khan NP     July 27, 2019

## 2019-07-27 NOTE — PROGRESS NOTES
Patient restless, crawling out of bed, and pulling at lines. Patient reoriented and denies pain. NIH 0. EVD patent and oscillating. [facility], NP notified. No new orders received.

## 2019-07-27 NOTE — PROGRESS NOTES
Bedside, Verbal and Written shift change report given to Casey Chris (oncoming nurse) by Katlin Franks RN (offgoing nurse). Report included the following information SBAR, Kardex, ED Summary, Intake/Output, MAR and Recent Results. Dual NIH performed with Katlin Franks RN.

## 2019-07-27 NOTE — PROGRESS NOTES
-339; systolic 982-536. Tachycardia resolved with BP. Restless; pulling at mittens; mumbling random words. Unable to answer orientation questions. x2 occurrences. No increase in EVD output. No neuro changes except with mumbling of random words, increased alertness, improved movement of legs. NP notified. Order for x2 1/2 NS boluses at this time. Will continue to monitor.

## 2019-07-28 ENCOUNTER — APPOINTMENT (OUTPATIENT)
Dept: CT IMAGING | Age: 44
DRG: 020 | End: 2019-07-28
Attending: NURSE PRACTITIONER
Payer: COMMERCIAL

## 2019-07-28 LAB
ANION GAP SERPL CALC-SCNC: 8 MMOL/L (ref 7–16)
BUN SERPL-MCNC: 24 MG/DL (ref 6–23)
CALCIUM SERPL-MCNC: 8.5 MG/DL (ref 8.3–10.4)
CHLORIDE SERPL-SCNC: 115 MMOL/L (ref 98–107)
CO2 SERPL-SCNC: 26 MMOL/L (ref 21–32)
CREAT SERPL-MCNC: 3.31 MG/DL (ref 0.8–1.5)
ERYTHROCYTE [DISTWIDTH] IN BLOOD BY AUTOMATED COUNT: 15.3 % (ref 11.9–14.6)
GLUCOSE SERPL-MCNC: 88 MG/DL (ref 65–100)
HCT VFR BLD AUTO: 22.6 % (ref 41.1–50.3)
HGB BLD-MCNC: 7.1 G/DL (ref 13.6–17.2)
MAGNESIUM SERPL-MCNC: 2.3 MG/DL (ref 1.8–2.4)
MCH RBC QN AUTO: 29.8 PG (ref 26.1–32.9)
MCHC RBC AUTO-ENTMCNC: 31.4 G/DL (ref 31.4–35)
MCV RBC AUTO: 95 FL (ref 79.6–97.8)
NRBC # BLD: 0 K/UL (ref 0–0.2)
PLATELET # BLD AUTO: 358 K/UL (ref 150–450)
PMV BLD AUTO: 10.3 FL (ref 9.4–12.3)
POTASSIUM SERPL-SCNC: 4.1 MMOL/L (ref 3.5–5.1)
RBC # BLD AUTO: 2.38 M/UL (ref 4.23–5.6)
SODIUM SERPL-SCNC: 149 MMOL/L (ref 136–145)
WBC # BLD AUTO: 12.1 K/UL (ref 4.3–11.1)

## 2019-07-28 PROCEDURE — 80048 BASIC METABOLIC PNL TOTAL CA: CPT

## 2019-07-28 PROCEDURE — 74011000258 HC RX REV CODE- 258: Performed by: NURSE PRACTITIONER

## 2019-07-28 PROCEDURE — 70450 CT HEAD/BRAIN W/O DYE: CPT

## 2019-07-28 PROCEDURE — 74011000250 HC RX REV CODE- 250: Performed by: NURSE PRACTITIONER

## 2019-07-28 PROCEDURE — 74011250637 HC RX REV CODE- 250/637: Performed by: NEUROLOGICAL SURGERY

## 2019-07-28 PROCEDURE — 99232 SBSQ HOSP IP/OBS MODERATE 35: CPT | Performed by: NURSE PRACTITIONER

## 2019-07-28 PROCEDURE — 93886 INTRACRANIAL COMPLETE STUDY: CPT

## 2019-07-28 PROCEDURE — 74011636320 HC RX REV CODE- 636/320: Performed by: NEUROLOGICAL SURGERY

## 2019-07-28 PROCEDURE — 74011250637 HC RX REV CODE- 250/637: Performed by: NURSE PRACTITIONER

## 2019-07-28 PROCEDURE — 83735 ASSAY OF MAGNESIUM: CPT

## 2019-07-28 PROCEDURE — 85027 COMPLETE CBC AUTOMATED: CPT

## 2019-07-28 PROCEDURE — 93886 INTRACRANIAL COMPLETE STUDY: CPT | Performed by: PSYCHIATRY & NEUROLOGY

## 2019-07-28 PROCEDURE — 74011250636 HC RX REV CODE- 250/636: Performed by: NURSE PRACTITIONER

## 2019-07-28 PROCEDURE — 74011250637 HC RX REV CODE- 250/637

## 2019-07-28 PROCEDURE — 65610000001 HC ROOM ICU GENERAL

## 2019-07-28 PROCEDURE — 77030020257 HC SOL INJ SOD CL 0.45% 1000ML BG

## 2019-07-28 PROCEDURE — 70496 CT ANGIOGRAPHY HEAD: CPT

## 2019-07-28 PROCEDURE — 74011000258 HC RX REV CODE- 258: Performed by: NEUROLOGICAL SURGERY

## 2019-07-28 PROCEDURE — 0042T CT PERF W CBF: CPT

## 2019-07-28 RX ORDER — SODIUM CHLORIDE 0.9 % (FLUSH) 0.9 %
10 SYRINGE (ML) INJECTION
Status: COMPLETED | OUTPATIENT
Start: 2019-07-28 | End: 2019-07-28

## 2019-07-28 RX ADMIN — Medication 30 ML: at 05:18

## 2019-07-28 RX ADMIN — NIMODIPINE 60 MG: 30 CAPSULE, LIQUID FILLED ORAL at 18:17

## 2019-07-28 RX ADMIN — SODIUM CHLORIDE, PRESERVATIVE FREE 900 UNITS: 5 INJECTION INTRAVENOUS at 22:23

## 2019-07-28 RX ADMIN — SODIUM CHLORIDE 100 ML: 900 INJECTION, SOLUTION INTRAVENOUS at 03:52

## 2019-07-28 RX ADMIN — CLOPIDOGREL BISULFATE 75 MG: 75 TABLET ORAL at 09:02

## 2019-07-28 RX ADMIN — SODIUM CHLORIDE 1000 ML: 450 INJECTION, SOLUTION INTRAVENOUS at 11:42

## 2019-07-28 RX ADMIN — Medication 10 ML: at 03:52

## 2019-07-28 RX ADMIN — SODIUM CHLORIDE 100 ML/HR: 450 INJECTION, SOLUTION INTRAVENOUS at 22:23

## 2019-07-28 RX ADMIN — FERROUS SULFATE TAB 325 MG (65 MG ELEMENTAL FE) 325 MG: 325 (65 FE) TAB at 09:01

## 2019-07-28 RX ADMIN — FERROUS SULFATE TAB 325 MG (65 MG ELEMENTAL FE) 325 MG: 325 (65 FE) TAB at 17:24

## 2019-07-28 RX ADMIN — Medication 30 ML: at 22:24

## 2019-07-28 RX ADMIN — HEPARIN SODIUM 5000 UNITS: 5000 INJECTION INTRAVENOUS; SUBCUTANEOUS at 14:08

## 2019-07-28 RX ADMIN — FERROUS SULFATE TAB 325 MG (65 MG ELEMENTAL FE) 325 MG: 325 (65 FE) TAB at 11:43

## 2019-07-28 RX ADMIN — NIMODIPINE 60 MG: 30 CAPSULE, LIQUID FILLED ORAL at 11:44

## 2019-07-28 RX ADMIN — IOPAMIDOL 100 ML: 755 INJECTION, SOLUTION INTRAVENOUS at 03:51

## 2019-07-28 RX ADMIN — NIMODIPINE 60 MG: 30 CAPSULE, LIQUID FILLED ORAL at 20:31

## 2019-07-28 RX ADMIN — FAMOTIDINE 20 MG: 20 TABLET ORAL at 09:02

## 2019-07-28 RX ADMIN — OXYCODONE HYDROCHLORIDE AND ACETAMINOPHEN 500 MG: 500 TABLET ORAL at 17:24

## 2019-07-28 RX ADMIN — FENTANYL CITRATE 50 MCG: 50 INJECTION INTRAMUSCULAR; INTRAVENOUS at 02:15

## 2019-07-28 RX ADMIN — LEVETIRACETAM 500 MG: 500 TABLET, FILM COATED ORAL at 20:31

## 2019-07-28 RX ADMIN — ASPIRIN 325 MG ORAL TABLET 325 MG: 325 PILL ORAL at 09:01

## 2019-07-28 RX ADMIN — SODIUM CHLORIDE 1000 ML: 450 INJECTION, SOLUTION INTRAVENOUS at 10:22

## 2019-07-28 RX ADMIN — LEVETIRACETAM 500 MG: 500 TABLET, FILM COATED ORAL at 09:02

## 2019-07-28 RX ADMIN — ATORVASTATIN CALCIUM 40 MG: 40 TABLET, FILM COATED ORAL at 22:23

## 2019-07-28 RX ADMIN — SODIUM CHLORIDE, PRESERVATIVE FREE 600 UNITS: 5 INJECTION INTRAVENOUS at 14:08

## 2019-07-28 RX ADMIN — ACETAMINOPHEN 650 MG: 325 TABLET, FILM COATED ORAL at 15:16

## 2019-07-28 RX ADMIN — HEPARIN SODIUM 5000 UNITS: 5000 INJECTION INTRAVENOUS; SUBCUTANEOUS at 05:34

## 2019-07-28 RX ADMIN — NIMODIPINE 60 MG: 30 CAPSULE, LIQUID FILLED ORAL at 04:26

## 2019-07-28 RX ADMIN — NIMODIPINE 60 MG: 30 CAPSULE, LIQUID FILLED ORAL at 07:43

## 2019-07-28 RX ADMIN — ACETAMINOPHEN 650 MG: 325 TABLET, FILM COATED ORAL at 05:34

## 2019-07-28 RX ADMIN — SODIUM CHLORIDE, PRESERVATIVE FREE 900 UNITS: 5 INJECTION INTRAVENOUS at 05:34

## 2019-07-28 RX ADMIN — SODIUM CHLORIDE 1000 ML: 450 INJECTION, SOLUTION INTRAVENOUS at 12:47

## 2019-07-28 RX ADMIN — HEPARIN SODIUM 5000 UNITS: 5000 INJECTION INTRAVENOUS; SUBCUTANEOUS at 22:23

## 2019-07-28 RX ADMIN — NIMODIPINE 60 MG: 30 CAPSULE, LIQUID FILLED ORAL at 23:28

## 2019-07-28 RX ADMIN — Medication 30 ML: at 14:00

## 2019-07-28 RX ADMIN — ACETAMINOPHEN 650 MG: 325 TABLET, FILM COATED ORAL at 22:23

## 2019-07-28 RX ADMIN — SODIUM CHLORIDE 100 ML/HR: 450 INJECTION, SOLUTION INTRAVENOUS at 13:47

## 2019-07-28 RX ADMIN — OXYCODONE HYDROCHLORIDE AND ACETAMINOPHEN 500 MG: 500 TABLET ORAL at 09:01

## 2019-07-28 NOTE — PROGRESS NOTES
Ambulated in hallway approximately 60 ft. with walker at this time; tolerated well. Rolling walker assist; x3 RN's present; safety socks on. Assisted back to bed; bed alarm on, bed L/L, SR up x3, call light/ personal items within reach. Will continue to monitor.

## 2019-07-28 NOTE — PROGRESS NOTES
Bedside and Verbal shift change report given to JOHANNA Reyes (oncoming nurse) by Lisbeth Conklin RN (offgoing nurse). Report included the following information SBAR, Kardex, Intake/Output, MAR, Recent Results and Cardiac Rhythm NSR/Sinus tach, rate 80s-100s. Dual neuro assessment performed, patient is oriented to self only. Follows commands, no drift any extremity. EVD in place @ 14cm, serosanguinous CSF output, ICP 7. Temp 98.9 via bladder monitor. Sinus tach 100s on monitor, /63, O2 sat 96% on room air, nasal cannula replaced, up to 100%. Lung sounds clear, bowel sounds active. Dayshift RN reports patient ate 50% lunch and 100% dinner. Patiño draining clear yellow urine. No pressure ulcer, skin intact. Patient is restless in bed, scratching at head, removing oxygen, and pulling on catheter; bilateral mitts replaced for patient safety. Will continue to monitor.

## 2019-07-28 NOTE — PROGRESS NOTES
Pt up to chair at this time. Remains alert and moving all extremities. Chair alarm on, chair locked. Cousin at bedside at this time. Will continue to monitor.

## 2019-07-28 NOTE — PROCEDURES
Transcranial Doppler    Transcranial Doppler studies were performed in this patient for the evaluation of status post subarachnoid hemorrhage    Insonation was performed via the transtemporal window and via the foramen magnum window stereo fossa. Antegrade flow was noted. No evidence of recurrent intracranial vascular spasm.   Flow velocities are stable    Impression    Transcranial Doppler study demonstrates antegrade flow and no evidence of intracranial vascular spasm at this point

## 2019-07-28 NOTE — PROGRESS NOTES
Progress Note    Patient: Faustino Koo MRN: [id number]  SSN: [id number]    YOB: 1975  Age: 37 y.o. Sex: male      Admit Date: 7/9/2019    LOS: 19 days     Subjective:   Pt with no acute neuro changes. Repeat HCT/CTP/CTA stable, IVF continues, EVD @5/open and draining wo difficulty.        Current Facility-Administered Medications   Medication Dose Route Frequency    hydrogen peroxide 3 %   Topical PRN    acetaminophen (TYLENOL) tablet 650 mg  650 mg Oral Q4H PRN    lidocaine 4 % patch 1 Patch  1 Patch TransDERmal Q24H    fentaNYL citrate (PF) injection 50 mcg  50 mcg IntraVENous Q1H PRN    ferrous sulfate tablet 325 mg  1 Tab Oral TID WITH MEALS    levETIRAcetam (KEPPRA) tablet 500 mg  500 mg Oral Q12H    niMODipine (NIMOTOP) 30 mg/mL oral solution (compound) 60 mg  60 mg Oral Q4H    0.45% sodium chloride infusion  100 mL/hr IntraVENous CONTINUOUS    ascorbic acid (vitamin C) (VITAMIN C) tablet 500 mg  500 mg Oral TID    aspirin tablet 325 mg  325 mg Oral DAILY    atorvastatin (LIPITOR) tablet 40 mg  40 mg Oral QHS    clopidogrel (PLAVIX) tablet 75 mg  75 mg Oral DAILY    famotidine (PEPCID) tablet 20 mg  20 mg Oral DAILY    senna-docusate (PERICOLACE) 8.6-50 mg per tablet 2 Tab  2 Tab Oral QHS    magnesium hydroxide (MILK OF MAGNESIA) 400 mg/5 mL oral suspension 30 mL  30 mL Oral DAILY PRN    polyvinyl alcohol (LIQUIFILM TEARS) 1.4 % ophthalmic solution 1 Drop  1 Drop Both Eyes PRN    heparin (porcine) injection 5,000 Units  5,000 Units SubCUTAneous Q8H    sodium chloride (NS) flush 30 mL  30 mL InterCATHeter Q8H    heparin (porcine) pf 900 Units  900 Units InterCATHeter Q8H    sodium chloride (NS) flush 30 mL  30 mL InterCATHeter PRN    heparin (porcine) pf 900 Units  900 Units InterCATHeter PRN    nicotine (NICODERM CQ) 21 mg/24 hr patch 1 Patch  1 Patch TransDERmal Q24H    ondansetron (ZOFRAN) injection 4 mg  4 mg IntraVENous Q6H PRN    NUTRITIONAL SUPPORT ELECTROLYTE PRN ORDERS   Does Not Apply PRN    magnesium sulfate 4 g/100 mL IVPB  4 g IntraVENous DAILY PRN    magnesium sulfate 2 g/50 ml IVPB (premix or compounded)  2 g IntraVENous DAILY PRN       Objective:     Vitals:    07/28/19 0600 07/28/19 0615 07/28/19 0720 07/28/19 0743   BP:    197/90   Pulse: 85 99  (!) 102   Resp: 9 10     Temp: 100.2 °F (37.9 °C) 100.2 °F (37.9 °C)     SpO2: 100% 100% 100%    Weight:       Height:             Intake and Output:  Current Shift: 07/28 0701 - 07/28 1900  In: 115 [P.O.:115]  Out: 256 [Urine:250; Drains:6]  Last 24 hr: 07/27 0701 - 07/28 0700  In: 5826.7 [P.O.:175; I.V.:5651.7]  Out: 0874 [Urine:5500; Drains:235]     IO: +91cc/24hr  TOTAL OVERALL: -23L  EVD: 235cc/24hrs    Physical Exam:   General:  Drowsy, wd all extrems to pain, will follow commands. Eyes:   PERRL   Neck: Supple, symmetrical, trachea midline, no adenopathy, thyroid: no enlargment/tenderness/nodules, no carotid bruit and no JVD. Lungs:   Clear to auscultation bilaterally. Heart:  Regular rate and rhythm, S1, S2 normal, no murmur, click, rub or gallop. Abdomen:   Soft, non-tender. Bowel sounds normal. No masses,  No organomegaly. Extremities: Extremities normal, atraumatic, no cyanosis. Pulses: 2+ and symmetric all extremities. Skin: Skin color, texture, turgor normal. EVD intact. Neurologic:  drowsy, but follows commands, stovall, wd pain all extrems.        Lab/Data Review:    Recent Results (from the past 12 hour(s))   CBC W/O DIFF    Collection Time: 07/28/19  3:40 AM   Result Value Ref Range    WBC 12.1 (H) 4.3 - 11.1 K/uL    RBC 2.38 (L) 4.23 - 5.6 M/uL    HGB 7.1 (L) 13.6 - 17.2 g/dL    HCT 22.6 (L) 41.1 - 50.3 %    MCV 95.0 79.6 - 97.8 FL    MCH 29.8 26.1 - 32.9 PG    MCHC 31.4 31.4 - 35.0 g/dL    RDW 15.3 (H) 11.9 - 14.6 %    PLATELET 625 094 - 817 K/uL    MPV 10.3 9.4 - 12.3 FL    ABSOLUTE NRBC 0.00 0.0 - 0.2 K/uL   METABOLIC PANEL, BASIC    Collection Time: 07/28/19  3:40 AM   Result Value Ref Range    Sodium 149 (H) 136 - 145 mmol/L    Potassium 4.1 3.5 - 5.1 mmol/L    Chloride 115 (H) 98 - 107 mmol/L    CO2 26 21 - 32 mmol/L    Anion gap 8 7 - 16 mmol/L    Glucose 88 65 - 100 mg/dL    BUN 24 (H) 6 - 23 MG/DL    Creatinine 3.31 (H) 0.8 - 1.5 MG/DL    GFR est AA 26 (L) >60 ml/min/1.73m2    GFR est non-AA 22 (L) >60 ml/min/1.73m2    Calcium 8.5 8.3 - 10.4 MG/DL   MAGNESIUM    Collection Time: 07/28/19  3:40 AM   Result Value Ref Range    Magnesium 2.3 1.8 - 2.4 mg/dL       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)    Active Problems:    Tobacco abuse (7/10/2019)      Acute respiratory failure with hypoxia ([facility]) (7/10/2019)      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 Q1 hour neuro checks. On SAH protocol - Mg, nimotop, zocor. PERRL +3. No MAP goal now. TCDs daily - they have been 180-220 in the left and right MCAs . Last CT head showed small ventricles. Last CTA head and neck showed no filling of the aneurysm and good filling of the PCAs. Vasospasm seen in the right MCA and both supraclinoid ICAs as well as the ACAs and the left MCA but to a lesser extent. CTP was normal. Will continue SAH protocol, PT/OT. OOB to chair today. . Pt had EVD replaced 7/20 due to it getting pulled put. EVD @ 5cm H20. Alert and following commands this morning. Will plan for  shunt next week, 8-1. AR:419/ OK:255 LFT wnl, AST 15, ALT 9.   RESP: tolerating RA. No distress. cxr shows improving edema.    CV: NO MAP GOAL NOW.  2D Echo: 55-60%, trop peaked @ 0.8, trending down. SCD/SQ heparin. HEME: 7.1/22,  HIT panel negative. On oral iron. And Vit C. NEPH: bun/cr: 24/3. 32. UOP is good. Monitor. GI: Pepcid. Senna. +BS. ID: Tm: 99. Magnus Mayberry Sputum cx sent, pending final, showing gram + cocci -  Patient has MSSA pneumonia, abx completed. Blood cultures neg and UA neg. US neg for DVT. CSF cx neg for any organisms, protein 186. LINES: RUE PICC, lenin, R EVJOHN. Tobacco abuse: On Nicoderm patch. Discussed pt with Dr. Schmidt Moment, he will see pt for further plan of care.      Signed By: Harleen Nina NP     July 28, 2019

## 2019-07-28 NOTE — PROGRESS NOTES
Problem: Falls - Risk of  Goal: *Absence of Falls  Description  Document [facility] Fall Risk and appropriate interventions in the flowsheet. Outcome: Progressing Towards Goal  Note:   Fall Risk Interventions:  Mobility Interventions: Bed/chair exit alarm, OT consult for ADLs, Patient to call before getting OOB, PT Consult for mobility concerns, PT Consult for assist device competence, Strengthening exercises (ROM-active/passive), Utilize walker, cane, or other assistive device    Mentation Interventions: Adequate sleep, hydration, pain control, Bed/chair exit alarm, Door open when patient unattended, Increase mobility, More frequent rounding, Reorient patient, Toileting rounds, Update white board    Medication Interventions: Bed/chair exit alarm, Patient to call before getting OOB, Teach patient to arise slowly    Elimination Interventions: Bed/chair exit alarm, Call light in reach, Patient to call for help with toileting needs, Stay With Me (per policy), Toilet paper/wipes in reach, Toileting schedule/hourly rounds              Problem: Patient Education: Go to Patient Education Activity  Goal: Patient/Family Education  Outcome: Progressing Towards Goal     Problem: Pressure Injury - Risk of  Goal: *Prevention of pressure injury  Description  Document Anselmo Scale and appropriate interventions in the flowsheet.   Outcome: Progressing Towards Goal  Note:   Pressure Injury Interventions:  Sensory Interventions: Assess changes in LOC, Check visual cues for pain, Discuss PT/OT consult with provider, Float heels, Keep linens dry and wrinkle-free, Maintain/enhance activity level, Minimize linen layers, Monitor skin under medical devices, Pressure redistribution bed/mattress (bed type)    Moisture Interventions: Absorbent underpads, Internal/External urinary devices, Minimize layers, Offer toileting Q_hr    Activity Interventions: Increase time out of bed, Pressure redistribution bed/mattress(bed type), PT/OT evaluation    Mobility Interventions: HOB 30 degrees or less, Pressure redistribution bed/mattress (bed type), PT/OT evaluation, Turn and reposition approx.  every two hours(pillow and wedges)    Nutrition Interventions: Document food/fluid/supplement intake, Offer support with meals,snacks and hydration    Friction and Shear Interventions: Apply protective barrier, creams and emollients, Foam dressings/transparent film/skin sealants, HOB 30 degrees or less, Minimize layers                Problem: Patient Education: Go to Patient Education Activity  Goal: Patient/Family Education  Outcome: Progressing Towards Goal     Problem: Subarachnoid Hemorrhage Stroke:Admission Day 5-Discharge  Goal: Activity/Safety  Outcome: Progressing Towards Goal  Goal: Diagnostic Test/Procedures  Outcome: Progressing Towards Goal  Goal: Nutrition/Diet  Outcome: Progressing Towards Goal  Goal: Medications  Outcome: Progressing Towards Goal  Goal: Patient maintains clear airway/absence of aspiration  Outcome: Progressing Towards Goal  Goal: Treatments/Interventions/Procedures  Outcome: Progressing Towards Goal  Goal: *Hemodynamically stable  Outcome: Progressing Towards Goal  Goal: *Absence of signs and symptoms of DVT  Outcome: Progressing Towards Goal

## 2019-07-28 NOTE — PROGRESS NOTES
Patient remains more alert tonight and able to follow commands, no drift. Intermittent restlessness. Does remain disoriented x3, only oriented to person. EVD drain with good output, serosanguinous. ICP average ~7. No changes noted.

## 2019-07-29 ENCOUNTER — ANESTHESIA EVENT (OUTPATIENT)
Dept: SURGERY | Age: 44
DRG: 020 | End: 2019-07-29
Payer: COMMERCIAL

## 2019-07-29 LAB
ANION GAP SERPL CALC-SCNC: 9 MMOL/L (ref 7–16)
BUN SERPL-MCNC: 24 MG/DL (ref 6–23)
CALCIUM SERPL-MCNC: 8.4 MG/DL (ref 8.3–10.4)
CHLORIDE SERPL-SCNC: 113 MMOL/L (ref 98–107)
CO2 SERPL-SCNC: 26 MMOL/L (ref 21–32)
CREAT SERPL-MCNC: 3.43 MG/DL (ref 0.8–1.5)
ERYTHROCYTE [DISTWIDTH] IN BLOOD BY AUTOMATED COUNT: 15.2 % (ref 11.9–14.6)
GLUCOSE SERPL-MCNC: 83 MG/DL (ref 65–100)
HCT VFR BLD AUTO: 22.6 % (ref 41.1–50.3)
HGB BLD-MCNC: 7 G/DL (ref 13.6–17.2)
MAGNESIUM SERPL-MCNC: 2 MG/DL (ref 1.8–2.4)
MCH RBC QN AUTO: 29.4 PG (ref 26.1–32.9)
MCHC RBC AUTO-ENTMCNC: 31 G/DL (ref 31.4–35)
MCV RBC AUTO: 95 FL (ref 79.6–97.8)
NRBC # BLD: 0 K/UL (ref 0–0.2)
PLATELET # BLD AUTO: 336 K/UL (ref 150–450)
PMV BLD AUTO: 10.2 FL (ref 9.4–12.3)
POTASSIUM SERPL-SCNC: 3.9 MMOL/L (ref 3.5–5.1)
RBC # BLD AUTO: 2.38 M/UL (ref 4.23–5.6)
SODIUM SERPL-SCNC: 148 MMOL/L (ref 136–145)
WBC # BLD AUTO: 10.8 K/UL (ref 4.3–11.1)

## 2019-07-29 PROCEDURE — 92526 ORAL FUNCTION THERAPY: CPT

## 2019-07-29 PROCEDURE — 83735 ASSAY OF MAGNESIUM: CPT

## 2019-07-29 PROCEDURE — 92507 TX SP LANG VOICE COMM INDIV: CPT

## 2019-07-29 PROCEDURE — 74011250637 HC RX REV CODE- 250/637: Performed by: NURSE PRACTITIONER

## 2019-07-29 PROCEDURE — 77030013794 HC KT TRNSDUC BLD EDWD -B

## 2019-07-29 PROCEDURE — 97530 THERAPEUTIC ACTIVITIES: CPT

## 2019-07-29 PROCEDURE — 77030010537 HC BG CSF DRN INLC -C

## 2019-07-29 PROCEDURE — 65610000001 HC ROOM ICU GENERAL

## 2019-07-29 PROCEDURE — 74011000250 HC RX REV CODE- 250: Performed by: NURSE PRACTITIONER

## 2019-07-29 PROCEDURE — 74011250636 HC RX REV CODE- 250/636: Performed by: NURSE PRACTITIONER

## 2019-07-29 PROCEDURE — 93886 INTRACRANIAL COMPLETE STUDY: CPT

## 2019-07-29 PROCEDURE — 80048 BASIC METABOLIC PNL TOTAL CA: CPT

## 2019-07-29 PROCEDURE — 77030020257 HC SOL INJ SOD CL 0.45% 1000ML BG

## 2019-07-29 PROCEDURE — 74011250637 HC RX REV CODE- 250/637

## 2019-07-29 PROCEDURE — 99231 SBSQ HOSP IP/OBS SF/LOW 25: CPT | Performed by: PHYSICAL MEDICINE & REHABILITATION

## 2019-07-29 PROCEDURE — 99232 SBSQ HOSP IP/OBS MODERATE 35: CPT | Performed by: NEUROLOGICAL SURGERY

## 2019-07-29 PROCEDURE — 93886 INTRACRANIAL COMPLETE STUDY: CPT | Performed by: PSYCHIATRY & NEUROLOGY

## 2019-07-29 PROCEDURE — 85027 COMPLETE CBC AUTOMATED: CPT

## 2019-07-29 PROCEDURE — 36430 TRANSFUSION BLD/BLD COMPNT: CPT

## 2019-07-29 PROCEDURE — P9016 RBC LEUKOCYTES REDUCED: HCPCS

## 2019-07-29 PROCEDURE — 77030039270 HC TU BLD FLTR CARD -A

## 2019-07-29 PROCEDURE — 74011000258 HC RX REV CODE- 258: Performed by: NURSE PRACTITIONER

## 2019-07-29 PROCEDURE — 77030020256 HC SOL INJ NACL 0.9%  500ML

## 2019-07-29 RX ORDER — SODIUM CHLORIDE 9 MG/ML
250 INJECTION, SOLUTION INTRAVENOUS AS NEEDED
Status: DISCONTINUED | OUTPATIENT
Start: 2019-07-29 | End: 2019-08-03

## 2019-07-29 RX ORDER — IBUPROFEN 200 MG
1 TABLET ORAL EVERY 24 HOURS
Status: DISCONTINUED | OUTPATIENT
Start: 2019-07-29 | End: 2019-08-05 | Stop reason: HOSPADM

## 2019-07-29 RX ADMIN — FERROUS SULFATE TAB 325 MG (65 MG ELEMENTAL FE) 325 MG: 325 (65 FE) TAB at 16:46

## 2019-07-29 RX ADMIN — NIMODIPINE 60 MG: 30 CAPSULE, LIQUID FILLED ORAL at 19:50

## 2019-07-29 RX ADMIN — Medication 30 ML: at 22:00

## 2019-07-29 RX ADMIN — OXYCODONE HYDROCHLORIDE AND ACETAMINOPHEN 500 MG: 500 TABLET ORAL at 21:57

## 2019-07-29 RX ADMIN — CLOPIDOGREL BISULFATE 75 MG: 75 TABLET ORAL at 09:40

## 2019-07-29 RX ADMIN — NIMODIPINE 60 MG: 30 CAPSULE, LIQUID FILLED ORAL at 16:46

## 2019-07-29 RX ADMIN — ACETAMINOPHEN 650 MG: 325 TABLET, FILM COATED ORAL at 06:41

## 2019-07-29 RX ADMIN — LEVETIRACETAM 500 MG: 500 TABLET, FILM COATED ORAL at 20:55

## 2019-07-29 RX ADMIN — FERROUS SULFATE TAB 325 MG (65 MG ELEMENTAL FE) 325 MG: 325 (65 FE) TAB at 09:39

## 2019-07-29 RX ADMIN — LEVETIRACETAM 500 MG: 500 TABLET, FILM COATED ORAL at 09:39

## 2019-07-29 RX ADMIN — ATORVASTATIN CALCIUM 40 MG: 40 TABLET, FILM COATED ORAL at 21:57

## 2019-07-29 RX ADMIN — OXYCODONE HYDROCHLORIDE AND ACETAMINOPHEN 500 MG: 500 TABLET ORAL at 16:46

## 2019-07-29 RX ADMIN — Medication 30 ML: at 06:00

## 2019-07-29 RX ADMIN — SODIUM CHLORIDE 100 ML/HR: 450 INJECTION, SOLUTION INTRAVENOUS at 10:58

## 2019-07-29 RX ADMIN — SODIUM CHLORIDE, PRESERVATIVE FREE 900 UNITS: 5 INJECTION INTRAVENOUS at 05:36

## 2019-07-29 RX ADMIN — OXYCODONE HYDROCHLORIDE AND ACETAMINOPHEN 500 MG: 500 TABLET ORAL at 09:39

## 2019-07-29 RX ADMIN — SODIUM CHLORIDE 100 ML/HR: 450 INJECTION, SOLUTION INTRAVENOUS at 20:55

## 2019-07-29 RX ADMIN — NIMODIPINE 60 MG: 30 CAPSULE, LIQUID FILLED ORAL at 12:09

## 2019-07-29 RX ADMIN — ASPIRIN 325 MG ORAL TABLET 325 MG: 325 PILL ORAL at 09:39

## 2019-07-29 RX ADMIN — ACETAMINOPHEN 650 MG: 325 TABLET, FILM COATED ORAL at 17:50

## 2019-07-29 RX ADMIN — SENNOSIDES, DOCUSATE SODIUM 2 TABLET: 50; 8.6 TABLET, FILM COATED ORAL at 21:56

## 2019-07-29 RX ADMIN — HEPARIN SODIUM 5000 UNITS: 5000 INJECTION INTRAVENOUS; SUBCUTANEOUS at 21:56

## 2019-07-29 RX ADMIN — FERROUS SULFATE TAB 325 MG (65 MG ELEMENTAL FE) 325 MG: 325 (65 FE) TAB at 12:08

## 2019-07-29 RX ADMIN — HEPARIN SODIUM 5000 UNITS: 5000 INJECTION INTRAVENOUS; SUBCUTANEOUS at 13:50

## 2019-07-29 RX ADMIN — NIMODIPINE 60 MG: 30 CAPSULE, LIQUID FILLED ORAL at 04:22

## 2019-07-29 RX ADMIN — SODIUM CHLORIDE, PRESERVATIVE FREE 900 UNITS: 5 INJECTION INTRAVENOUS at 21:56

## 2019-07-29 RX ADMIN — NIMODIPINE 60 MG: 30 CAPSULE, LIQUID FILLED ORAL at 09:38

## 2019-07-29 RX ADMIN — MAGNESIUM HYDROXIDE 30 ML: 400 SUSPENSION ORAL at 09:37

## 2019-07-29 RX ADMIN — HEPARIN SODIUM 5000 UNITS: 5000 INJECTION INTRAVENOUS; SUBCUTANEOUS at 05:36

## 2019-07-29 RX ADMIN — SODIUM CHLORIDE, PRESERVATIVE FREE 900 UNITS: 5 INJECTION INTRAVENOUS at 13:50

## 2019-07-29 RX ADMIN — Medication 30 ML: at 13:49

## 2019-07-29 RX ADMIN — ALTEPLASE 1 MG: KIT at 13:04

## 2019-07-29 RX ADMIN — FAMOTIDINE 20 MG: 20 TABLET ORAL at 09:39

## 2019-07-29 NOTE — PROGRESS NOTES
A follow up visit was made to the patient. Emotional support, and spiritual presence   were provided. His father and brother were visiting and brought some of the patient's pictures with his family to be in the patient's room.       [employer]

## 2019-07-29 NOTE — PROGRESS NOTES
EndoRN unclamped EVD 2hrs post intervention (see MAR); ICP waveform improved, pulsatility more brisk now; draining without issue.

## 2019-07-29 NOTE — PROGRESS NOTES
Progress Note    Patient: Corey Cassidy MRN: [id number]  SSN: [id number]    YOB: 1975  Age: 37 y.o. Sex: male      Admit Date: 7/9/2019    LOS: 20 days     Subjective:   Pt with no acute neuro changes. EVD 5/open and draining. Patient drowsy this morning (woke him up from sleeping to assess) but following commands. Came back to check on patient and PT was walking him in the hallway. EVD @ 5/open. Pt give 1mg Intrathecal tPA via EVD for IVH/clot. Will clamp drain x2 hours, open back up at 1500.       Current Facility-Administered Medications   Medication Dose Route Frequency    0.9% sodium chloride infusion 250 mL  250 mL IntraVENous PRN    hydrogen peroxide 3 %   Topical PRN    acetaminophen (TYLENOL) tablet 650 mg  650 mg Oral Q4H PRN    lidocaine 4 % patch 1 Patch  1 Patch TransDERmal Q24H    fentaNYL citrate (PF) injection 50 mcg  50 mcg IntraVENous Q1H PRN    ferrous sulfate tablet 325 mg  1 Tab Oral TID WITH MEALS    levETIRAcetam (KEPPRA) tablet 500 mg  500 mg Oral Q12H    niMODipine (NIMOTOP) 30 mg/mL oral solution (compound) 60 mg  60 mg Oral Q4H    0.45% sodium chloride infusion  100 mL/hr IntraVENous CONTINUOUS    ascorbic acid (vitamin C) (VITAMIN C) tablet 500 mg  500 mg Oral TID    aspirin tablet 325 mg  325 mg Oral DAILY    atorvastatin (LIPITOR) tablet 40 mg  40 mg Oral QHS    clopidogrel (PLAVIX) tablet 75 mg  75 mg Oral DAILY    famotidine (PEPCID) tablet 20 mg  20 mg Oral DAILY    senna-docusate (PERICOLACE) 8.6-50 mg per tablet 2 Tab  2 Tab Oral QHS    magnesium hydroxide (MILK OF MAGNESIA) 400 mg/5 mL oral suspension 30 mL  30 mL Oral DAILY PRN    polyvinyl alcohol (LIQUIFILM TEARS) 1.4 % ophthalmic solution 1 Drop  1 Drop Both Eyes PRN    heparin (porcine) injection 5,000 Units  5,000 Units SubCUTAneous Q8H    sodium chloride (NS) flush 30 mL  30 mL InterCATHeter Q8H    heparin (porcine) pf 900 Units  900 Units InterCATHeter Q8H    sodium chloride (NS) flush 30 mL  30 mL InterCATHeter PRN    heparin (porcine) pf 900 Units  900 Units InterCATHeter PRN    nicotine (NICODERM CQ) 21 mg/24 hr patch 1 Patch  1 Patch TransDERmal Q24H    ondansetron (ZOFRAN) injection 4 mg  4 mg IntraVENous Q6H PRN    NUTRITIONAL SUPPORT ELECTROLYTE PRN ORDERS   Does Not Apply PRN    magnesium sulfate 4 g/100 mL IVPB  4 g IntraVENous DAILY PRN    magnesium sulfate 2 g/50 ml IVPB (premix or compounded)  2 g IntraVENous DAILY PRN       Objective:     Vitals:    07/29/19 0645 07/29/19 0700 07/29/19 0715 07/29/19 0801   BP:       Pulse: 87 83 80    Resp: (!) 0 8 10    Temp: 99.3 °F (37.4 °C) 99.5 °F (37.5 °C) 99.5 °F (37.5 °C) 99.7 °F (37.6 °C)   SpO2: 99% 100% 99%    Weight:       Height:             Intake and Output:  Current Shift: 07/29 0701 - 07/29 1900  In: 120 [I.V.:120]  Out: 432 [Urine:400; Drains:32]  Last 24 hr: 07/28 0701 - 07/29 0700  In: 0859 [P.O.:115; I.V.:6315]  Out: 0248 [Urine:4515; Drains:278]     IO: +1637cc/24hr  TOTAL OVERALL: -25L  EVD: 278cc/24hrs     Physical Exam:   General:  More awake today, following commands. Stovall. Eyes:   PERRL   Neck: Supple, symmetrical, trachea midline, no adenopathy, thyroid: no enlargment/tenderness/nodules, no carotid bruit and no JVD. Lungs:   Clear to auscultation bilaterally. Heart:  Regular rate and rhythm, S1, S2 normal, no murmur, click, rub or gallop. Abdomen:   Soft, non-tender. Bowel sounds normal. No masses,  No organomegaly. Extremities: Extremities normal, atraumatic, no cyanosis. Pulses: 2+ and symmetric all extremities. Skin: Skin color, texture, turgor normal. EVD intact. Neurologic: follows commands, stovall, wd pain all extrems.        Lab/Data Review:    Recent Results (from the past 12 hour(s))   CBC W/O DIFF    Collection Time: 07/29/19  4:22 AM   Result Value Ref Range    WBC 10.8 4.3 - 11.1 K/uL    RBC 2.38 (L) 4.23 - 5.6 M/uL    HGB 7.0 (L) 13.6 - 17.2 g/dL    HCT 22.6 (L) 41.1 - 50.3 %    MCV 95.0 79.6 - 97.8 FL    MCH 29.4 26.1 - 32.9 PG    MCHC 31.0 (L) 31.4 - 35.0 g/dL    RDW 15.2 (H) 11.9 - 14.6 %    PLATELET 190 802 - 489 K/uL    MPV 10.2 9.4 - 12.3 FL    ABSOLUTE NRBC 0.00 0.0 - 0.2 K/uL   METABOLIC PANEL, BASIC    Collection Time: 07/29/19  4:22 AM   Result Value Ref Range    Sodium 148 (H) 136 - 145 mmol/L    Potassium 3.9 3.5 - 5.1 mmol/L    Chloride 113 (H) 98 - 107 mmol/L    CO2 26 21 - 32 mmol/L    Anion gap 9 7 - 16 mmol/L    Glucose 83 65 - 100 mg/dL    BUN 24 (H) 6 - 23 MG/DL    Creatinine 3.43 (H) 0.8 - 1.5 MG/DL    GFR est AA 25 (L) >60 ml/min/1.73m2    GFR est non-AA 21 (L) >60 ml/min/1.73m2    Calcium 8.4 8.3 - 10.4 MG/DL   MAGNESIUM    Collection Time: 07/29/19  4:22 AM   Result Value Ref Range    Magnesium 2.0 1.8 - 2.4 mg/dL       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)    Active Problems:    Tobacco abuse (7/10/2019)      Acute respiratory failure with hypoxia (HCC) (7/10/2019)      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 Q1 hour neuro checks. On SAH protocol - Mg, nimotop, zocor. PERRL +3. No MAP goal now. TCDs daily - they have been 120-180s in the left and right MCAs. Last CTA head and neck showed no filling of the aneurysm and good filling of the PCAs. Vasospasm is still seen in the MCAs and ACAs but better. CTP was normal. Will continue SAH protocol, PT/OT. OOB to chair today. Pt had EVD replaced 7/20 due to it getting pulled put. EVD @ 5cm H20. Will plan for  shunt this Thur,  8-1.  AR:419/ LA: 255. Following commands this morning. CT head this am which I reviewed shows the EVD is in a clot in the ventricle. It drains off and on. Intrathecal tpa 1mg given today. I discussed the risks and benefits and alternatives to giving the tpa to the patient including a 5% chance of having another IVH. He agreed to the drug. We will repeat head CT in am, post tPA via EVD. Patient remains on Keppra and has had no further seizure activity since his admission. RESP: Tolerating RA. No distress. CXR shows improving edema. CV: NO MAP GOAL NOW.  2D Echo: 55-60%, trop peaked @ 0.08, trending down. SCD/SQ heparin. HEME: 7.0/22.6,  HIT panel negative. On oral iron. And Vit C. Transfuse 2 PRBC today due to surgery Thursday. NEPH: bun/cr: 24//3.43. UOP is good. Monitor. GI: Pepcid. Senna. +BS. No documented BM since 7/13. Abdomen is not distended or hard. Will give MOM again. ID: Tm: 100. Sputum cx gram + cocci -  Patient had MSSA pneumonia, abx completed. Blood cultures neg and UA neg. US neg for DVT. CSF cx neg 7/22 and 7/27 for any organisms, protein 186. LINES: KISHOR MCDOWELL, SHANNAN phelps. Tobacco abuse: On Nicoderm patch.       Signed By: Hollie Davis     July 29, 2019

## 2019-07-29 NOTE — PROGRESS NOTES
PM&R Consult Progress Note      Patient: Joesph Alvarado  Admit Date: 7/9/2019  Admit Diagnosis: SAH (subarachnoid hemorrhage) ([facility] [address] [I60.9]  Recommendations: Continue Acute Rehab Program, Coordination of rehab/medical care, Counseling of PM & R care issues management, Hospital Inpatient Rehab  -HD #19. Debilitated. Appears more depressed and withdrawn. -EVD 5/open; plans for  shunt 8/1  -decreased overall endurance and tolerance to to prolonged bedridden status  -will benefit from an interdisciplinary team approach at [address]; PT/OT/ST/ psychology/rehab nursing/ recreational therapy involvement on a more consistent and aggressive basis  History/Subjective/Complaint:     Patient seen and examined. Records reviewed. Getting TCD at bedside.  Non verbal     Pain 1  Pain Scale 1: Numeric (0 - 10) (07/29/19 1154)  Pain Intensity 1: 0 (07/29/19 1154)  Patient Stated Pain Goal: 0 (07/29/19 0400)  Pain Reassessment 1: Patient resting w/respiratory rate greater than 10 (07/27/19 0800)  Pain Onset 1: since admit (07/09/19 2000)  Pain Location 1: Head (07/23/19 0053)  Pain Orientation 1: Posterior (07/20/19 1032)  Pain Description 1: Aching;Constant (07/23/19 0053)  Pain Intervention(s) 1: Medication (see MAR) (07/23/19 0053)     Objective:     Vitals:  Patient Vitals for the past 8 hrs:   BP Temp Pulse Resp SpO2 Weight   07/29/19 1209 (!) 211/85 -- 93 -- -- --   07/29/19 1101 -- 98.8 °F (37.1 °C) -- -- -- --   07/29/19 1046 -- 98.9 °F (37.2 °C) 92 11 99 % --   07/29/19 1045 -- 98.9 °F (37.2 °C) 93 16 98 % --   07/29/19 1030 -- 98.7 °F (37.1 °C) 83 (!) 7 100 % --   07/29/19 1015 -- 98.9 °F (37.2 °C) (!) 101 12 99 % --   07/29/19 1000 -- 98.9 °F (37.2 °C) 86 10 100 % --   07/29/19 0950 -- -- 95 9 98 % --   07/29/19 0940 -- -- 84 11 99 % --   07/29/19 0938 (!) 205/84 -- 86 -- -- --   07/29/19 0930 -- -- 84 8 100 % --   07/29/19 0915 -- -- 96 19 100 % --   07/29/19 0900 -- 98.4 °F (36.9 °C) 97 19 100 % --   07/29/19 0845 -- 96.4 °F (35.8 °C) 72 10 100 % --   07/29/19 0830 -- 98 °F (36.7 °C) 74 11 98 % --   07/29/19 0815 -- 99.1 °F (37.3 °C) 80 (!) 7 98 % --   07/29/19 0801 -- 99.7 °F (37.6 °C) -- -- -- --   07/29/19 0800 -- 99.5 °F (37.5 °C) 84 9 100 % --   07/29/19 0745 -- 99.5 °F (37.5 °C) 80 10 99 % --   07/29/19 0730 -- 99.5 °F (37.5 °C) 74 9 100 % --   07/29/19 0715 -- 99.5 °F (37.5 °C) 80 10 99 % --   07/29/19 0700 -- 99.5 °F (37.5 °C) 83 8 100 % --   07/29/19 0645 -- 99.3 °F (37.4 °C) 87 -- 99 % --   07/29/19 0644 -- -- -- -- -- 145 lb 8.1 oz (66 kg)   07/29/19 0630 -- 99.1 °F (37.3 °C) 83 (!) 6 100 % --   07/29/19 0615 -- 99.1 °F (37.3 °C) 81 (!) 31 100 % --   07/29/19 0600 -- 99.1 °F (37.3 °C) 76 12 100 % --   07/29/19 0545 -- 99.1 °F (37.3 °C) (!) 108 17 100 % --   07/29/19 0530 -- (!) 94.8 °F (34.9 °C) (!) 111 -- 98 % --   07/29/19 0515 -- 97.3 °F (36.3 °C) 100 11 100 % --   07/29/19 0500 -- 98.9 °F (37.2 °C) 83 12 100 % --   07/29/19 0445 -- 98.9 °F (37.2 °C) 83 10 99 % --   07/29/19 0430 -- 98.9 °F (37.2 °C) (!) 102 11 100 % --   07/29/19 0415 -- 98.4 °F (36.9 °C) 83 13 100 % --      Intake and Output:  07/27 1901 - 07/29 0700  In: 7906.7 [P.O.:175;  I.V.:7731.7]  Out: 2100 [YAVCQ:8300; Drains:424]    No Known Allergies  Current Facility-Administered Medications   Medication Dose Route Frequency    0.9% sodium chloride infusion 250 mL  250 mL IntraVENous PRN    nicotine (NICODERM CQ) 14 mg/24 hr patch 1 Patch  1 Patch TransDERmal Q24H    alteplase (CATHFLO) injection 1 mg  1 mg InterCATHeter ONCE    hydrogen peroxide 3 %   Topical PRN    acetaminophen (TYLENOL) tablet 650 mg  650 mg Oral Q4H PRN    lidocaine 4 % patch 1 Patch  1 Patch TransDERmal Q24H    fentaNYL citrate (PF) injection 50 mcg  50 mcg IntraVENous Q1H PRN    ferrous sulfate tablet 325 mg  1 Tab Oral TID WITH MEALS    levETIRAcetam (KEPPRA) tablet 500 mg  500 mg Oral Q12H    niMODipine (NIMOTOP) 30 mg/mL oral solution (compound) 60 mg  60 mg Oral Q4H    0.45% sodium chloride infusion  100 mL/hr IntraVENous CONTINUOUS    ascorbic acid (vitamin C) (VITAMIN C) tablet 500 mg  500 mg Oral TID    aspirin tablet 325 mg  325 mg Oral DAILY    atorvastatin (LIPITOR) tablet 40 mg  40 mg Oral QHS    clopidogrel (PLAVIX) tablet 75 mg  75 mg Oral DAILY    famotidine (PEPCID) tablet 20 mg  20 mg Oral DAILY    senna-docusate (PERICOLACE) 8.6-50 mg per tablet 2 Tab  2 Tab Oral QHS    magnesium hydroxide (MILK OF MAGNESIA) 400 mg/5 mL oral suspension 30 mL  30 mL Oral DAILY PRN    polyvinyl alcohol (LIQUIFILM TEARS) 1.4 % ophthalmic solution 1 Drop  1 Drop Both Eyes PRN    heparin (porcine) injection 5,000 Units  5,000 Units SubCUTAneous Q8H    sodium chloride (NS) flush 30 mL  30 mL InterCATHeter Q8H    heparin (porcine) pf 900 Units  900 Units InterCATHeter Q8H    sodium chloride (NS) flush 30 mL  30 mL InterCATHeter PRN    heparin (porcine) pf 900 Units  900 Units InterCATHeter PRN    ondansetron (ZOFRAN) injection 4 mg  4 mg IntraVENous Q6H PRN    NUTRITIONAL SUPPORT ELECTROLYTE PRN ORDERS   Does Not Apply PRN    magnesium sulfate 4 g/100 mL IVPB  4 g IntraVENous DAILY PRN    magnesium sulfate 2 g/50 ml IVPB (premix or compounded)  2 g IntraVENous DAILY PRN       Physical Exam:  Exam limited due to bedside TCD and RN putting in IV  Affected blunted but did smile briefly  Seems a bit more delayed and having difficulty with attn with multiple people in room  Follows one step commands   MYLA RAMON. MCCULLOUGH    Functional Assessment:  Gross Assessment  AROM: Generally decreased, functional(R LE) (07/22/19 1340)  PROM: Generally decreased, functional (07/12/19 1100)  Strength: Generally decreased, functional(R LE) (07/22/19 1340)  Coordination: Generally decreased, functional(B hands swelling) (07/12/19 1500)  Sensation: Intact(BUEs to light touch ) (07/12/19 1500)     Gait  Base of Support: Narrowed; Center of gravity altered (07/29/19 1100)  Speed/Aminata: Slow (07/29/19 1100)  Step Length: Right shortened;Left shortened (07/29/19 1100)  Gait Abnormalities: Decreased step clearance; Path deviations;Trunk sway increased (07/29/19 1100)  Ambulation - Level of Assistance: Contact guard assistance;Minimal assistance (07/29/19 1100)  Distance (ft): (frequent breaks required for rest, difficulty multi tasking) (07/29/19 1100)  Assistive Device: Gait belt;Walker, rolling (07/29/19 1100)  Interventions: Safety awareness training;Verbal cues;Manual cues (07/29/19 1100)     Bed Mobility  Supine to Sit: Stand-by assistance (07/29/19 1100)  Sit to Supine: Contact guard assistance (07/29/19 1100)  Scooting: Contact guard assistance (07/29/19 1100)     Balance  Sitting: Impaired (07/29/19 1100)  Sitting - Static: Good (unsupported) (07/29/19 1100)  Sitting - Dynamic: Fair (occasional) (07/29/19 1100)  Standing: Impaired (07/29/19 1100)  Standing - Static: Fair (07/29/19 1100)  Standing - Dynamic : Fair (07/29/19 1100)                       Bed/Mat Mobility  Supine to Sit: Stand-by assistance (07/29/19 1100)  Sit to Supine: Contact guard assistance (07/29/19 1100)  Sit to Stand: Minimum assistance (07/29/19 1100)  Stand to Sit: Minimum assistance (07/29/19 1100)  Bed to Chair: Contact guard assistance;Minimum assistance (07/24/19 1300)  Scooting: Contact guard assistance (07/29/19 1100)     Labs/Studies:  Recent Results (from the past 72 hour(s))   TYPE & SCREEN    Collection Time: 07/26/19  2:39 PM   Result Value Ref Range    Crossmatch Expiration 07/29/2019     ABO/Rh(D) O POSITIVE     Antibody screen NEG     Unit number [id number]     Blood component type  LR     Unit division 00     Status of unit ISSUED     Crossmatch result Compatible     Unit number [id number]     Blood component type  LR     Unit division 00     Status of unit ALLOCATED     Crossmatch result Compatible     Unit number [id number]     Blood component type  LR     Unit division 00     Status of unit ALLOCATED     Crossmatch result Compatible    HGB & HCT    Collection Time: 07/26/19  2:42 PM   Result Value Ref Range    HGB 7.7 (L) 13.6 - 17.2 g/dL    HCT 24.4 (L) 41.1 - 50.3 %   MAGNESIUM    Collection Time: 07/27/19  3:03 AM   Result Value Ref Range    Magnesium 2.5 (H) 1.8 - 2.4 mg/dL   CBC W/O DIFF    Collection Time: 07/27/19  3:03 AM   Result Value Ref Range    WBC 15.1 (H) 4.3 - 11.1 K/uL    RBC 3.12 (L) 4.23 - 5.6 M/uL    HGB 9.4 (L) 13.6 - 17.2 g/dL    HCT 28.7 (L) 41.1 - 50.3 %    MCV 92.0 79.6 - 97.8 FL    MCH 30.1 26.1 - 32.9 PG    MCHC 32.8 31.4 - 35.0 g/dL    RDW 14.6 11.9 - 14.6 %    PLATELET 321 041 - 222 K/uL    MPV 10.2 9.4 - 12.3 FL    ABSOLUTE NRBC 0.00 0.0 - 0.2 K/uL   METABOLIC PANEL, BASIC    Collection Time: 07/27/19  3:03 AM   Result Value Ref Range    Sodium 146 (H) 136 - 145 mmol/L    Potassium 3.7 3.5 - 5.1 mmol/L    Chloride 111 (H) 98 - 107 mmol/L    CO2 25 21 - 32 mmol/L    Anion gap 10 7 - 16 mmol/L    Glucose 110 (H) 65 - 100 mg/dL    BUN 23 6 - 23 MG/DL    Creatinine 3.09 (H) 0.8 - 1.5 MG/DL    GFR est AA 28 (L) >60 ml/min/1.73m2    GFR est non-AA 23 (L) >60 ml/min/1.73m2    Calcium 8.8 8.3 - 10.4 MG/DL   POC CG8I    Collection Time: 07/27/19  7:40 AM   Result Value Ref Range    Device: ROOM AIR      pH (POC) 7.388 7.35 - 7.45      pCO2 (POC) 39.3 35 - 45 MMHG    pO2 (POC) 81 75 - 100 MMHG    HCO3 (POC) 23.7 22 - 26 MMOL/L    sO2 (POC) 96 95 - 98 %    Base deficit (POC) 1 mmol/L    Allens test (POC) NOT APPLICABLE      Site DRAWN FROM ARTERIAL LINE      Patient temp.  98.6      Specimen type (POC) ARTERIAL      Sodium,  136 - 145 MMOL/L    Potassium, POC 3.6 3.5 - 5.1 MMOL/L    Glucose,  (H) 65 - 100 MG/DL    Calcium, ionized (POC) 1.26 1.12 - 1.32 mmol/L    Performed by Anthony     COLLECT TIME 735     HEPATIC FUNCTION PANEL    Collection Time: 07/27/19  7:54 AM   Result Value Ref Range    Protein, total 5.8 (L) 6.3 - 8.2 g/dL    Albumin 2.4 (L) 3.5 - 5.0 g/dL    Globulin 3.4 2.3 - 3.5 g/dL    A-G Ratio 0.7 (L) 1.2 - 3.5      Bilirubin, total 0.3 0.2 - 1.1 MG/DL    Bilirubin, direct <0.1 <0.4 MG/DL    Alk. phosphatase 71 50 - 136 U/L    AST (SGOT) 15 15 - 37 U/L    ALT (SGPT) 9 (L) 12 - 65 U/L   PROTHROMBIN TIME + INR    Collection Time: 07/27/19  7:54 AM   Result Value Ref Range    Prothrombin time 12.3 11.7 - 14.5 sec    INR 0.9     CULTURE, CSF W GRAM STAIN    Collection Time: 07/27/19  7:55 AM   Result Value Ref Range    Special Requests: TUBE 2, CULTURE AND SENSITIVTY AND GRAM STAIN.      GRAM STAIN 0 TO 8 WBC'S/OIF     GRAM STAIN NO DEFINITE ORGANISM SEEN      Culture result: NO GROWTH 2 DAYS     PROTEIN, CSF    Collection Time: 07/27/19  7:55 AM   Result Value Ref Range    Tube No. 2      Protein, (H) 15 - 45 MG/DL   GLUCOSE, CSF    Collection Time: 07/27/19  7:55 AM   Result Value Ref Range    Tube No. 3      Glucose,CSF 42 40 - 70 MG/DL   CELL COUNT, BODY FLUID    Collection Time: 07/27/19  7:55 AM   Result Value Ref Range    BODY FLUID TYPE CEREBROSPINAL FLUID      FLUID COLOR CLOUDY      FLUID APPEARANCE RED      FLUID RBC CT. 30,950 /cu mm    FLUID WBC COUNT 15 /cu mm    BRCH NEUTROPHIL 44 %    BRCH LYMPHS 45 %    BRCH MACROPHAGES 11 %   CBC W/O DIFF    Collection Time: 07/28/19  3:40 AM   Result Value Ref Range    WBC 12.1 (H) 4.3 - 11.1 K/uL    RBC 2.38 (L) 4.23 - 5.6 M/uL    HGB 7.1 (L) 13.6 - 17.2 g/dL    HCT 22.6 (L) 41.1 - 50.3 %    MCV 95.0 79.6 - 97.8 FL    MCH 29.8 26.1 - 32.9 PG    MCHC 31.4 31.4 - 35.0 g/dL    RDW 15.3 (H) 11.9 - 14.6 %    PLATELET 330 874 - 236 K/uL    MPV 10.3 9.4 - 12.3 FL    ABSOLUTE NRBC 0.00 0.0 - 0.2 K/uL   METABOLIC PANEL, BASIC    Collection Time: 07/28/19  3:40 AM   Result Value Ref Range    Sodium 149 (H) 136 - 145 mmol/L    Potassium 4.1 3.5 - 5.1 mmol/L    Chloride 115 (H) 98 - 107 mmol/L    CO2 26 21 - 32 mmol/L    Anion gap 8 7 - 16 mmol/L    Glucose 88 65 - 100 mg/dL    BUN 24 (H) 6 - 23 MG/DL Creatinine 3.31 (H) 0.8 - 1.5 MG/DL    GFR est AA 26 (L) >60 ml/min/1.73m2    GFR est non-AA 22 (L) >60 ml/min/1.73m2    Calcium 8.5 8.3 - 10.4 MG/DL   MAGNESIUM    Collection Time: 07/28/19  3:40 AM   Result Value Ref Range    Magnesium 2.3 1.8 - 2.4 mg/dL   CBC W/O DIFF    Collection Time: 07/29/19  4:22 AM   Result Value Ref Range    WBC 10.8 4.3 - 11.1 K/uL    RBC 2.38 (L) 4.23 - 5.6 M/uL    HGB 7.0 (L) 13.6 - 17.2 g/dL    HCT 22.6 (L) 41.1 - 50.3 %    MCV 95.0 79.6 - 97.8 FL    MCH 29.4 26.1 - 32.9 PG    MCHC 31.0 (L) 31.4 - 35.0 g/dL    RDW 15.2 (H) 11.9 - 14.6 %    PLATELET 948 544 - 902 K/uL    MPV 10.2 9.4 - 12.3 FL    ABSOLUTE NRBC 0.00 0.0 - 0.2 K/uL   METABOLIC PANEL, BASIC    Collection Time: 07/29/19  4:22 AM   Result Value Ref Range    Sodium 148 (H) 136 - 145 mmol/L    Potassium 3.9 3.5 - 5.1 mmol/L    Chloride 113 (H) 98 - 107 mmol/L    CO2 26 21 - 32 mmol/L    Anion gap 9 7 - 16 mmol/L    Glucose 83 65 - 100 mg/dL    BUN 24 (H) 6 - 23 MG/DL    Creatinine 3.43 (H) 0.8 - 1.5 MG/DL    GFR est AA 25 (L) >60 ml/min/1.73m2    GFR est non-AA 21 (L) >60 ml/min/1.73m2    Calcium 8.4 8.3 - 10.4 MG/DL   MAGNESIUM    Collection Time: 07/29/19  4:22 AM   Result Value Ref Range    Magnesium 2.0 1.8 - 2.4 mg/dL        Assessment:     Principal Problem:    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)    Active Problems:    Tobacco abuse (7/10/2019)      Acute respiratory failure with hypoxia (HCC) (7/10/2019)      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

## 2019-07-29 NOTE — PROGRESS NOTES
Chart reviewed and pt seen in ICU ambulating with PT/Speech/RN. Remains in ICU with EVD. Plans for possible shunt this week per MD notes. CM continues to follow for d/c POC. Dr. Yazmin Alexander following from [facility].

## 2019-07-29 NOTE — PROGRESS NOTES
Bedside shift report received from [facility] and dual neuro assessment performed.

## 2019-07-29 NOTE — PROGRESS NOTES
Problem: Mobility Impaired (Adult and Pediatric)  Goal: *Acute Goals and Plan of Care (Insert Text)  Description  STG:  (1.)Mr. Alina Patrick will move from supine to sit and sit to supine , scoot up and down and roll side to side with CONTACT GUARD ASSIST within 3 treatment day(s). (2.)Mr. Alina Patrick will transfer from bed to chair and chair to bed with CONTACT GUARD ASSIST using the least restrictive device within 3 treatment day(s). (3.)Mr. Alina Patrick will ambulate with MINIMAL ASSIST for 50+ feet with the least restrictive device within 3 treatment day(s). (4.)Mr. Alina Patrick will perform standing static and dynamic balance activities x 15 minutes with MINIMAL ASSIST to improve safety within 3 day(s). (5.)Mr. Alina Patrick will maintain stable vital signs throughout all functional mobility within 3 days. LTG:  (1.)Mr. Alina Patrick will move from supine to sit and sit to supine , scoot up and down and roll side to side in bed with SUPERVISION within 7 treatment day(s). (2.)Mr. Alina Patrick will transfer from bed to chair and chair to bed with STAND BY ASSIST using the least restrictive device within 7 treatment day(s). (3.)Mr. Alina Patrick will ambulate with STAND BY ASSIST for 75+ feet with the least restrictive device within 7 treatment day(s). (4.)Mr. Alina Patrick will perform standing static and dynamic balance activities x 15 minutes with STAND BY ASSIST to improve safety within 7 day(s).   ________________________________________________________________________________________________     Outcome: Progressing Towards Goal     PHYSICAL THERAPY: Daily Note and AM 7/29/2019  INPATIENT: PT Visit Days : 4  Payor: Toni Scott / Plan: [facility] / Product Type: PPO /       NAME/AGE/GENDER: Bryan Holloway is a 37 y.o. 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 ASSESSMENT:     Mr. Jane Mcwilliams is a 37 y.o. Male admitted for subarachnoid hemorrhage in bed in ICU and agreeable to physical therapy treatment. Per RN pt continuing to get hourly neuro checks and appeared drowsy and slightly withdrawn. He complains of being tired this AM, but is agreeable to PT treatment with encouragement. Seen with Speech Therapy this AM. He exhibited increased difficulty with maintaining balance and ambulation with introduction of cognitive challenges during gait this AM. He continues to require significant cues for posture during ambulation as well as to keep eyes open. When not dual tasking, he requires only CGA for ambulation. With dual tasking, requires minimal assistance. Pt noted to have step-to gait pattern with decreased gait speed but was able to become more reciprocal with cuing. Pt also noted to have increased cervical flexion and was cued for upright posture. Patient progressed in ambulation and functional mobility today. He is progressing well towards therapy goals, however continues to have impulsive tendencies and decreased safety awareness. With difficulty performing dual activities, he would have increased difficulty mobilizing independently in settings he will have to encounter for functional mobility. He continues to be well below his baseline and is at a high risk for falling. At this time, patient is appropriate for Co-treatment with occupational therapy due to patient's decreased overall endurance/tolerance levels, as well as need for high level assistance to complete functional transfers/mobility and functional tasks. [facility] is appropriate for a multidisciplinary co-treatment of PT and OT to address goals of both disciplines.      At this time, patient is appropriate for Co-treatment with speech therapy due to patient's decreased cognitive ability and difficulty following higher level commands as well as dual task activities to complete functional transfers/mobility and functional tasks. Ronald Momin is appropriate for a multidisciplinary co-treatment of PT and OT to address goals of both disciplines. This section established at most recent assessment   PROBLEM LIST (Impairments causing functional limitations):  1. Decreased Strength  2. Decreased ADL/Functional Activities  3. Decreased Transfer Abilities  4. Decreased Ambulation Ability/Technique  5. Decreased Balance  6. Decreased Activity Tolerance  7. Decreased Pacing Skills  8. Increased Shortness of Breath  9. Decreased Knowledge of Precautions  10. Decreased [place] with Home Exercise Program   INTERVENTIONS PLANNED: (Benefits and precautions of physical therapy have been discussed with the patient.)  1. Balance Exercise  2. Bed Mobility  3. Family Education  4. Gait Training  5. Home Exercise Program (HEP)  6. Therapeutic Activites  7. Therapeutic Exercise/Strengthening  8. Transfer Training     TREATMENT PLAN: Frequency/Duration: 3 times a week for duration of hospital stay  Rehabilitation Potential For Stated Goals: Good     REHAB RECOMMENDATIONS (at time of discharge pending progress):    Placement: It is my opinion, based on this patient's performance to date, that Mr. Karmen Townsend may benefit from intensive therapy at an [address] [place] [address] after discharge due to a probable need for close medical supervision by a rehab physician, a probable need for 24 hour rehab nursing, a probable need for multiple therapy disciplines and potential to make ongoing and sustainable functional improvement that is of practical value. .  Equipment:    None at this time              HISTORY:   History of Present Injury/Illness (Reason for Referral):  Subarachnoid hemorrhage.   Past Medical History/Comorbidities:   Mr. Karmen Townsend  has no past medical history on file.  Mr. Alina Patrick  has a past surgical history that includes ir emboli intracran lt (7/11/2019). Social History/Living Environment:   Home Environment: Private residence  One/Two Story Residence: Other (Comment)  Living Alone: No  Support Systems: Parent, Child(rohan)  Patient Expects to be Discharged to[de-identified] Unknown  Current DME Used/Available at Home: None  Prior Level of Function/Work/Activity:  Unsure, RN reports patient worked. Number of Personal Factors/Comorbidities that affect the Plan of Care: 1-2: MODERATE COMPLEXITY   EXAMINATION:   Most Recent Physical Functioning:   Gross Assessment:  AROM: Generally decreased, functional  PROM: Generally decreased, functional  Strength: Generally decreased, functional  Coordination: Generally decreased, functional               Posture:  Posture (WDL): Exceptions to WDL  Posture Assessment: Forward head  Balance:  Sitting: Impaired  Sitting - Static: Good (unsupported)  Sitting - Dynamic: Fair (occasional)  Standing: Impaired  Standing - Static: Fair  Standing - Dynamic : Fair Bed Mobility:  Supine to Sit: Stand-by assistance  Sit to Supine: Contact guard assistance  Scooting: Contact guard assistance  Interventions: Safety awareness training;Verbal cues  Duration: 30 Minutes  Wheelchair Mobility:     Transfers:  Sit to Stand: Minimum assistance  Stand to Sit: Minimum assistance  Interventions: Safety awareness training;Visual cues; Verbal cues  Gait:     Base of Support: Narrowed; Center of gravity altered  Speed/[place]minata: Slow  Step Length: Right shortened;Left shortened  Gait Abnormalities: Decreased step clearance; Path deviations;Trunk sway increased  Distance (ft): (frequent breaks required for rest, difficulty multi tasking)  Assistive Device: Gait belt;Walker, rolling  Ambulation - Level of Assistance: Contact guard assistance;Minimal assistance  Interventions: Safety awareness training;Verbal cues;Manual cues      Body Structures Involved:  1. Nerves  2. Heart  3. Lungs  4. Muscles Body Functions Affected:  1. Sensory/Pain  2. Voice and Speech  3. Cardio  4. Respiratory  5. Neuromusculoskeletal  6. Movement Related Activities and Participation Affected:  1. Learning and Applying Knowledge  2. General Tasks and Demands  3. Communication  4. Mobility  5. Self Care  6. Domestic Life  7. Interpersonal Interactions and Relationships  8. Community, Social and [place]   Number of elements that affect the Plan of Care: 4+: HIGH COMPLEXITY   CLINICAL PRESENTATION:   Presentation: Evolving clinical presentation with changing clinical characteristics: MODERATE COMPLEXITY   CLINICAL DECISION MAKIN [facility] Mobility Inpatient Short Form  How much difficulty does the patient currently have. .. Unable A Lot A Little None   1. Turning over in bed (including adjusting bedclothes, sheets and blankets)? ? 1   ? 2   ? 3   ? 4   2. Sitting down on and standing up from a chair with arms ( e.g., wheelchair, bedside commode, etc.)   ? 1   ? 2   ? 3   ? 4   3. Moving from lying on back to sitting on the side of the bed?   ? 1   ? 2   ? 3   ? 4   How much help from another person does the patient currently need. .. Total A Lot A Little None   4. Moving to and from a bed to a chair (including a wheelchair)? ? 1   ? 2   ? 3   ? 4   5. Need to walk in hospital room? ? 1   ? 2   ? 3   ? 4   6. Climbing 3-5 steps with a railing? ? 1   ? 2   ? 3   ? 4   © , Trustees of [address] [place] [address], under license to [employer] [employer]. All rights reserved      Score:  Initial: 10 Most Recent: X (Date: -- )    Interpretation of Tool:  Represents activities that are increasingly more difficult (i.e. Bed mobility, Transfers, Gait). Medical Necessity:     · Patient demonstrates good  ·  rehab potential due to higher previous functional level.   Reason for Services/Other Comments:  · Patient continues to require modification of therapeutic interventions to increase complexity of exercises  · . Use of outcome tool(s) and clinical judgement create a POC that gives a: Questionable prediction of patient's progress: MODERATE COMPLEXITY            TREATMENT:   (In addition to Assessment/Re-Assessment sessions the following treatments were rendered)   Pre-treatment Symptoms/Complaints:  none  Pain: Initial:   Pain Intensity 1: 0  Post Session:  0/10     Therapeutic Activity: (30 Minutes   24 ):  Therapeutic activities including bed transfers, Chair transfers, ambulation on level ground and sitting activities to improve mobility, strength, balance, coordination and activity tolerance . Required minimal assist to promote static and dynamic balance in standing and promote coordination of bilateral, lower extremity(s). Date:  7/15/19 Date:   Date:     Activity/Exercise Parameters Parameters Parameters   Seated heel raises x15 B A     Seated toe raises x15 B A     Seated LAQ x15 B A                               Gait Training (   minutes):  Gait training to improve and/or restore physical functioning as related to mobility, strength, balance and coordination. Ambulated (frequent breaks required for rest, difficulty multi tasking) with Contact guard assistance;Minimal assistance using a Gait belt;Walker, rolling and minimal Safety awareness training;Verbal cues;Manual cues related to their stride length and posture to promote proper body posture and promote proper body mechanics. Today's treatment session addressed Decreased Strength, Decreased ADL/Functional Activities, Decreased Transfer Abilities, Decreased Ambulation Ability/Technique, Decreased Balance and Decreased [place] with Home Exercise Program to progress towards achieving goal(s) 1, 2, 3, 4 and 5. During this session, speech therapy addressed cognition to progress towards their discipline specific goal(s).   Co-treatment was necessary to improve patient's cognitive participation, ability to follow higher level commands and ability to return to normal functional activity. Braces/Orthotics/Lines/Etc:   · IV  · phelps catheter  · drain EVD  · arterial line  ·    Treatment/Session Assessment:    · Response to Treatment:  Flat affect but cooperative. · Interdisciplinary Collaboration:   o Physical Therapist  o Speech Therapist  o Registered Nurse  · After treatment position/precautions:   o Supine in bed  o Bed alarm/tab alert on  o Bed/Chair-wheels locked  o Bed in low position  o Call light within reach  o RN notified  o Nurse at bedside   · Compliance with Program/Exercises: Compliant all of the time  · Recommendations/Intent for next treatment session: \"Next visit will focus on advancements to more challenging activities and reduction in assistance provided\".   Total Treatment Duration:  PT Patient Time In/Time Out  Time In: 1055  Time Out: 88 Gonsalo Castro, PT, DPT

## 2019-07-29 NOTE — PROGRESS NOTES
Bedside and Verbal shift change report given to [address] [place] [address] (oncoming nurse) by Soila Hayes RN (offgoing nurse). Report included the following information SBAR, Kardex, ED Summary, OR Summary, Procedure Summary, Intake/Output, MAR and Recent Results. Dual neuro exam performed.

## 2019-07-29 NOTE — PROGRESS NOTES
Problem: Neurolinguistics Impaired (Adult)  Goal: *Acute Goals and Plan of Care (Insert Text)  Description  STG: Patient will recall verbal information after a 3 minute delay with 80% accuracy and minimal assistance. STG: Patient will recall orientation information with 100% accuracy with minimal assistance. STG: Patient will participate in continued therapeutic assessment of cognitive-linguistic abilities. LTG: Patient will increase neuro-linguistic abilities to increase safety and awareness of deficits. Outcome: Progressing Towards Goal  SPEECH LANGUAGE PATHOLOGY:  SPEECH LANGUAGE NOTE: Daily Note 6    NAME/AGE/GENDER: Jada Lind is a 37 y.o. male  DATE: 7/29/2019  PRIMARY DIAGNOSIS: SAH (subarachnoid hemorrhage) ([facility] [address] [I60.9]      ICD-10: Treatment Diagnosis: R13.12 Oropharyngeal Dysphagia. R.41.841 Cognitive-Communication Deficit    INTERDISCIPLINARY COLLABORATION: Registered Nurse  ASSESSMENT:   Patient seen this AM for ongoing cognitive linguistic treatment. He was seen as part of co-treatment with PT to assess ability to follow functional commands and multi-task during mobility. Patient continues with response latency that ranges from 3-30 seconds with command following and verbal responses. He was 100% accurate when responding to questions or following commands, but no response on approximately 45% of questions. Response times were more impaired when dividing attention between talking and mobility. Automatic speech task attempted to increase attention and response time. He counted his step with 40% accuracy due to only counting numbers 6-10 with clinician model. Vocal quality remains only mouthing or a whisper despite ability to voice at appropriate volume. Verbal cues minimally effective in improving voicing. He attributes low volume to \"I am just too tired\". Educated on benefits of using appropriate voicing to avoid need for frequent repetition of responses.    At this time, patient is appropriate for Co-treatment with speech therapy due to patient's decreased overall endurance/tolerance levels, as well as need for high level assistance to cognitive linguistic and functional tasks. Lucia Villegas is appropriate for a multidisciplinary co-treatment of PT and ST to address goals of both disciplines. Patient will benefit from ongoing cognitive linguistic treatment to improve communication, safety, and independence. Will continue to follow. ?????? ? ? This section established at most recent assessment??????????  PROBLEM LIST (Impairments causing functional limitations):  1. Oropharyngeal dysphagia  2. Cognitive-linguistic impairments  REHABILITATION POTENTIAL FOR STATED GOALS: Good  PLAN OF CARE:   Patient will benefit from skilled intervention to address the following impairments. RECOMMENDATIONS AND PLANNED INTERVENTIONS (Benefits and precautions of therapy have been discussed with the patient.):  · PO:  Regular  · Liquids:  regular thin   · No straws  MEDICATIONS:  · With liquid  COMPENSATORY STRATEGIES/MODIFICATIONS INCLUDING:  · Small sips and bites  · Slow rate of intake   OTHER RECOMMENDATIONS (including follow up treatment recommendations): · Family training/education  · Patient education  RECOMMENDED DIET MODIFICATIONS DISCUSSED WITH:  · Nursing  · Patient  FREQUENCY/DURATION: Continue to follow patient 3 times a week for duration of hospital stay to address above goals. RECOMMENDED REHABILITATION/EQUIPMENT: (at time of discharge pending progress): Due to the probability of continued deficits (see above) this patient will likely need continued skilled speech therapy after discharge. SUBJECTIVE:   Seen during Co-treatment with PT. Responses remain delayed, but appropriate  History of Present Injury/Illness: Mr. Carmella Angelo  has no past medical history on file. Cesar Diop He also  has a past surgical history that includes ir emboli intracran lt (7/11/2019).    Present Symptoms: oropharyngeal dysphagia Pain Intensity 1: 0  Pain Location 1: Head  Pain Orientation 1: Posterior  Pain Intervention(s) 1: Medication (see MAR)  Current Medications:   No current facility-administered medications on file prior to encounter. No current outpatient medications on file prior to encounter. Current Dietary Status:  MS/nectar  Social History/Home Situation:    Home Environment: Private residence  One/Two Story Residence: Other (Comment)  Living Alone: No  Support Systems: Parent, Child(rohan)  Patient Expects to be Discharged to[de-identified] Unknown  Current DME Used/Available at Home: None  OBJECTIVE:   Respiratory Status:          Oral Motor Structure/Speech:  Oral-Motor Structure/Motor Speech  Labial: No impairment  Dentition: Intact  Oral Hygiene: Adequate  Lingual: No impairment    Cognitive and Communication Status:  Neurologic State: Alert;Eyes open to voice  Orientation Level: Oriented to person;Disoriented to time;Disoriented to situation;Disoriented to place                BEDSIDE SWALLOW EVALUATION  Oral Assessment:     P.O. Trials: The patient was given tsp-small sip amounts of the following:           ORAL PHASE:                   PHARYNGEAL PHASE:                            OTHER OBSERVATIONS:  Rate/bite size: Questionable  Comments:   Endurance: Questionable  Comments:     SPEECH-LANGUAGE COGNITIVE TREATMENT       Auditory Comprehension:   Auditory Comprehension  Auditory Impairment: No   Interfering Components: Processing speed                                                       Cognitive Skills Activities: Activities/Procedures listed utilized to improve and/or restore cognitive function as related to attention to tasks, problem solving skills, memory, sequencing and thought organization. Required minimal verbal and tactile cueing to improve improve recall of information and perform graded activities focusing on attention skills.     Tool Used: Dysphagia Outcome and Severity Scale (MIR)    Score Comments Normal Diet  ? 7 With no strategies or extra time needed       Functional Swallow  ? 6 May have mild oral or pharyngeal delay       Mild Dysphagia    ? 5 Which may require one diet consistency restricted (those who demonstrate penetration which is entirely cleared on MBS would be included)   Mild-Moderate Dysphagia  ? 4 With 1-2 diet consistencies restricted       Moderate Dysphagia  ? 3 With 2 or more diet consistencies restricted       Moderately Severe Dysphagia  ? 2 With partial PO strategies (trials with ST only)       Severe Dysphagia  ? 1 With inability to tolerate any PO safely          Score:  Initial: 5 Most Recent: X (Date: -- )   Interpretation of Tool: The Dysphagia Outcome and Severity Scale (MIR) is a simple, easy-to-use, 7-point scale developed to systematically rate the functional severity of dysphagia based on objective assessment and make recommendations for diet level, independence level, and type of nutrition. Score 7 6 5 4 3 2 1   Modifier CH CI CJ CK CL CM CN     Payor: BLUE CROSS / Plan: [place] BLUE CROSS [facility] / Product Type: PPO /     ________________________________________________________________________________________________    Safety:   After treatment position/precautions:  · Up in chair  · RN notified  . Progression/Medical Necessity:   · Patient is expected to demonstrate progress in diet tolerance  ·  to improve swallow safety, work toward diet advancement and decrease aspiration risk  · .  · Patient is expected to demonstrate progress in expressive communication, receptive ability and cognitive ability  ·  to decrease assistance required communication, increase independence with activities of daily living and increase communication with family/caregivers  · . Compliance with Program/Exercises:  Will assess as treatment progresses   Reason for Continuation of Services/Other Comments:  · Patient continues to require skilled intervention due to dysphagia and cognitive-linguistic impairment   · . Recommendations/Intent for next treatment session: \"Treatment next visit will focus on advancements to more challenging activities and reduction in assistance provided\".     Total Treatment Duration:  Time In: 1055  Time Out: [address] [facility] [address] [facility]

## 2019-07-29 NOTE — PROGRESS NOTES
Physical Therapy Note:    Attempted physical therapy treatment, patient having transcranial doppler performed at bedside. Will attempt later as patient available and schedule permits.     Thank you,  Pratima Darling, PT, DPT

## 2019-07-29 NOTE — PROGRESS NOTES
A follow up visit was made to the patient. Emotional support, spiritual presence and assurance of   prayer were provided. His therapists, and nurses assisted the patient as he walked with a walker in the ICU unit. They patient is talking some.        [employer]

## 2019-07-30 ENCOUNTER — APPOINTMENT (OUTPATIENT)
Dept: CT IMAGING | Age: 44
DRG: 020 | End: 2019-07-30
Attending: NURSE PRACTITIONER
Payer: COMMERCIAL

## 2019-07-30 LAB
ABO + RH BLD: NORMAL
ANION GAP SERPL CALC-SCNC: 9 MMOL/L (ref 7–16)
ASPIRIN TEST, ASPIRN: 546 ARU (ref 620–672)
BLD PROD TYP BPU: NORMAL
BLOOD GROUP ANTIBODIES SERPL: NORMAL
BPU ID: NORMAL
BUN SERPL-MCNC: 29 MG/DL (ref 6–23)
CALCIUM SERPL-MCNC: 8.7 MG/DL (ref 8.3–10.4)
CHLORIDE SERPL-SCNC: 110 MMOL/L (ref 98–107)
CO2 SERPL-SCNC: 25 MMOL/L (ref 21–32)
CREAT SERPL-MCNC: 3.58 MG/DL (ref 0.8–1.5)
CROSSMATCH RESULT,%XM: NORMAL
ERYTHROCYTE [DISTWIDTH] IN BLOOD BY AUTOMATED COUNT: 14.1 % (ref 11.9–14.6)
FIBRINOGEN PPP-MCNC: 448 MG/DL (ref 190–501)
GLUCOSE BLD STRIP.AUTO-MCNC: 126 MG/DL (ref 65–100)
GLUCOSE BLD STRIP.AUTO-MCNC: 94 MG/DL (ref 65–100)
GLUCOSE SERPL-MCNC: 86 MG/DL (ref 65–100)
HCT VFR BLD AUTO: 30.6 % (ref 41.1–50.3)
HGB BLD-MCNC: 10.2 G/DL (ref 13.6–17.2)
MAGNESIUM SERPL-MCNC: 1.9 MG/DL (ref 1.8–2.4)
MAGNESIUM SERPL-MCNC: 3 MG/DL (ref 1.8–2.4)
MCH RBC QN AUTO: 30.4 PG (ref 26.1–32.9)
MCHC RBC AUTO-ENTMCNC: 33.3 G/DL (ref 31.4–35)
MCV RBC AUTO: 91.1 FL (ref 79.6–97.8)
NRBC # BLD: 0 K/UL (ref 0–0.2)
P2Y12 PLT RESPONSE,PPPR: 244 PRU
PLATELET # BLD AUTO: 269 K/UL (ref 150–450)
PMV BLD AUTO: 9.8 FL (ref 9.4–12.3)
POTASSIUM SERPL-SCNC: 3.9 MMOL/L (ref 3.5–5.1)
RBC # BLD AUTO: 3.36 M/UL (ref 4.23–5.6)
SODIUM SERPL-SCNC: 144 MMOL/L (ref 136–145)
SPECIMEN EXP DATE BLD: NORMAL
STATUS OF UNIT,%ST: NORMAL
UNIT DIVISION, %UDIV: 0
WBC # BLD AUTO: 10.8 K/UL (ref 4.3–11.1)

## 2019-07-30 PROCEDURE — 92507 TX SP LANG VOICE COMM INDIV: CPT

## 2019-07-30 PROCEDURE — 74011250636 HC RX REV CODE- 250/636: Performed by: NURSE PRACTITIONER

## 2019-07-30 PROCEDURE — 85027 COMPLETE CBC AUTOMATED: CPT

## 2019-07-30 PROCEDURE — 80048 BASIC METABOLIC PNL TOTAL CA: CPT

## 2019-07-30 PROCEDURE — 83735 ASSAY OF MAGNESIUM: CPT

## 2019-07-30 PROCEDURE — 85576 BLOOD PLATELET AGGREGATION: CPT

## 2019-07-30 PROCEDURE — 65610000001 HC ROOM ICU GENERAL

## 2019-07-30 PROCEDURE — 74011250637 HC RX REV CODE- 250/637: Performed by: NURSE PRACTITIONER

## 2019-07-30 PROCEDURE — 74011250637 HC RX REV CODE- 250/637

## 2019-07-30 PROCEDURE — 85384 FIBRINOGEN ACTIVITY: CPT

## 2019-07-30 PROCEDURE — 93886 INTRACRANIAL COMPLETE STUDY: CPT

## 2019-07-30 PROCEDURE — 74011000250 HC RX REV CODE- 250: Performed by: NURSE PRACTITIONER

## 2019-07-30 PROCEDURE — 92526 ORAL FUNCTION THERAPY: CPT

## 2019-07-30 PROCEDURE — 99232 SBSQ HOSP IP/OBS MODERATE 35: CPT | Performed by: NEUROLOGICAL SURGERY

## 2019-07-30 PROCEDURE — 36592 COLLECT BLOOD FROM PICC: CPT

## 2019-07-30 PROCEDURE — 77030020257 HC SOL INJ SOD CL 0.45% 1000ML BG

## 2019-07-30 PROCEDURE — 70450 CT HEAD/BRAIN W/O DYE: CPT

## 2019-07-30 PROCEDURE — 74011000258 HC RX REV CODE- 258: Performed by: NURSE PRACTITIONER

## 2019-07-30 PROCEDURE — 99232 SBSQ HOSP IP/OBS MODERATE 35: CPT | Performed by: PHYSICAL MEDICINE & REHABILITATION

## 2019-07-30 PROCEDURE — 82962 GLUCOSE BLOOD TEST: CPT

## 2019-07-30 RX ADMIN — HEPARIN SODIUM 5000 UNITS: 5000 INJECTION INTRAVENOUS; SUBCUTANEOUS at 06:19

## 2019-07-30 RX ADMIN — HEPARIN SODIUM 5000 UNITS: 5000 INJECTION INTRAVENOUS; SUBCUTANEOUS at 21:25

## 2019-07-30 RX ADMIN — NIMODIPINE 60 MG: 30 CAPSULE, LIQUID FILLED ORAL at 17:54

## 2019-07-30 RX ADMIN — CLOPIDOGREL BISULFATE 75 MG: 75 TABLET ORAL at 08:29

## 2019-07-30 RX ADMIN — OXYCODONE HYDROCHLORIDE AND ACETAMINOPHEN 500 MG: 500 TABLET ORAL at 08:29

## 2019-07-30 RX ADMIN — ALTEPLASE 1 MG: KIT at 10:00

## 2019-07-30 RX ADMIN — FENTANYL CITRATE 50 MCG: 50 INJECTION INTRAMUSCULAR; INTRAVENOUS at 21:25

## 2019-07-30 RX ADMIN — NIMODIPINE 60 MG: 30 CAPSULE, LIQUID FILLED ORAL at 12:26

## 2019-07-30 RX ADMIN — SODIUM CHLORIDE, PRESERVATIVE FREE 900 UNITS: 5 INJECTION INTRAVENOUS at 06:18

## 2019-07-30 RX ADMIN — FAMOTIDINE 20 MG: 20 TABLET ORAL at 08:29

## 2019-07-30 RX ADMIN — SENNOSIDES, DOCUSATE SODIUM 2 TABLET: 50; 8.6 TABLET, FILM COATED ORAL at 21:25

## 2019-07-30 RX ADMIN — Medication 30 ML: at 22:00

## 2019-07-30 RX ADMIN — FENTANYL CITRATE 50 MCG: 50 INJECTION INTRAMUSCULAR; INTRAVENOUS at 01:59

## 2019-07-30 RX ADMIN — FERROUS SULFATE TAB 325 MG (65 MG ELEMENTAL FE) 325 MG: 325 (65 FE) TAB at 17:54

## 2019-07-30 RX ADMIN — ATORVASTATIN CALCIUM 40 MG: 40 TABLET, FILM COATED ORAL at 21:25

## 2019-07-30 RX ADMIN — SODIUM CHLORIDE, PRESERVATIVE FREE 900 UNITS: 5 INJECTION INTRAVENOUS at 21:25

## 2019-07-30 RX ADMIN — SODIUM CHLORIDE, PRESERVATIVE FREE 600 UNITS: 5 INJECTION INTRAVENOUS at 14:33

## 2019-07-30 RX ADMIN — Medication 30 ML: at 06:00

## 2019-07-30 RX ADMIN — NIMODIPINE 60 MG: 30 CAPSULE, LIQUID FILLED ORAL at 23:03

## 2019-07-30 RX ADMIN — ASPIRIN 325 MG ORAL TABLET 325 MG: 325 PILL ORAL at 08:29

## 2019-07-30 RX ADMIN — LEVETIRACETAM 500 MG: 500 TABLET, FILM COATED ORAL at 21:25

## 2019-07-30 RX ADMIN — LEVETIRACETAM 500 MG: 500 TABLET, FILM COATED ORAL at 08:29

## 2019-07-30 RX ADMIN — OXYCODONE HYDROCHLORIDE AND ACETAMINOPHEN 500 MG: 500 TABLET ORAL at 21:25

## 2019-07-30 RX ADMIN — OXYCODONE HYDROCHLORIDE AND ACETAMINOPHEN 500 MG: 500 TABLET ORAL at 17:54

## 2019-07-30 RX ADMIN — NIMODIPINE 60 MG: 30 CAPSULE, LIQUID FILLED ORAL at 08:29

## 2019-07-30 RX ADMIN — HEPARIN SODIUM 5000 UNITS: 5000 INJECTION INTRAVENOUS; SUBCUTANEOUS at 14:33

## 2019-07-30 RX ADMIN — Medication 20 ML: at 13:08

## 2019-07-30 RX ADMIN — FERROUS SULFATE TAB 325 MG (65 MG ELEMENTAL FE) 325 MG: 325 (65 FE) TAB at 12:26

## 2019-07-30 RX ADMIN — FERROUS SULFATE TAB 325 MG (65 MG ELEMENTAL FE) 325 MG: 325 (65 FE) TAB at 08:29

## 2019-07-30 RX ADMIN — NIMODIPINE 60 MG: 30 CAPSULE, LIQUID FILLED ORAL at 00:10

## 2019-07-30 RX ADMIN — SODIUM CHLORIDE 100 ML/HR: 450 INJECTION, SOLUTION INTRAVENOUS at 17:53

## 2019-07-30 RX ADMIN — SODIUM CHLORIDE 100 ML/HR: 450 INJECTION, SOLUTION INTRAVENOUS at 07:18

## 2019-07-30 RX ADMIN — MAGNESIUM SULFATE HEPTAHYDRATE 2 G: 40 INJECTION, SOLUTION INTRAVENOUS at 08:24

## 2019-07-30 RX ADMIN — ACETAMINOPHEN 650 MG: 325 TABLET, FILM COATED ORAL at 18:23

## 2019-07-30 RX ADMIN — NIMODIPINE 60 MG: 30 CAPSULE, LIQUID FILLED ORAL at 04:38

## 2019-07-30 RX ADMIN — NIMODIPINE 60 MG: 30 CAPSULE, LIQUID FILLED ORAL at 20:07

## 2019-07-30 NOTE — PROGRESS NOTES
Progress Note    Patient: Rabia Mike MRN: [id number]  SSN: [id number]    YOB: 1975  Age: 37 y.o. Sex: male      Admit Date: 7/9/2019    LOS: 21 days     Subjective:   Pt with no acute neuro changes. EVD 5/open and draining wo difficulty. Pt given 1mg tPA via EVD again this am, will repeat HCT in am. TCD in progress, improving. Pt drowsy, but will follow commands.     Current Facility-Administered Medications   Medication Dose Route Frequency    alteplase (CATHFLO) injection 1 mg  1 mg InterCATHeter ONCE    0.9% sodium chloride infusion 250 mL  250 mL IntraVENous PRN    nicotine (NICODERM CQ) 14 mg/24 hr patch 1 Patch  1 Patch TransDERmal Q24H    hydrogen peroxide 3 %   Topical PRN    acetaminophen (TYLENOL) tablet 650 mg  650 mg Oral Q4H PRN    lidocaine 4 % patch 1 Patch  1 Patch TransDERmal Q24H    fentaNYL citrate (PF) injection 50 mcg  50 mcg IntraVENous Q1H PRN    ferrous sulfate tablet 325 mg  1 Tab Oral TID WITH MEALS    levETIRAcetam (KEPPRA) tablet 500 mg  500 mg Oral Q12H    niMODipine (NIMOTOP) 30 mg/mL oral solution (compound) 60 mg  60 mg Oral Q4H    0.45% sodium chloride infusion  100 mL/hr IntraVENous CONTINUOUS    ascorbic acid (vitamin C) (VITAMIN C) tablet 500 mg  500 mg Oral TID    aspirin tablet 325 mg  325 mg Oral DAILY    atorvastatin (LIPITOR) tablet 40 mg  40 mg Oral QHS    clopidogrel (PLAVIX) tablet 75 mg  75 mg Oral DAILY    famotidine (PEPCID) tablet 20 mg  20 mg Oral DAILY    senna-docusate (PERICOLACE) 8.6-50 mg per tablet 2 Tab  2 Tab Oral QHS    magnesium hydroxide (MILK OF MAGNESIA) 400 mg/5 mL oral suspension 30 mL  30 mL Oral DAILY PRN    polyvinyl alcohol (LIQUIFILM TEARS) 1.4 % ophthalmic solution 1 Drop  1 Drop Both Eyes PRN    heparin (porcine) injection 5,000 Units  5,000 Units SubCUTAneous Q8H    sodium chloride (NS) flush 30 mL  30 mL InterCATHeter Q8H    heparin (porcine) pf 900 Units  900 Units InterCATHeter Q8H    sodium chloride (NS) flush 30 mL  30 mL InterCATHeter PRN    heparin (porcine) pf 900 Units  900 Units InterCATHeter PRN    ondansetron (ZOFRAN) injection 4 mg  4 mg IntraVENous Q6H PRN    NUTRITIONAL SUPPORT ELECTROLYTE PRN ORDERS   Does Not Apply PRN    magnesium sulfate 4 g/100 mL IVPB  4 g IntraVENous DAILY PRN    magnesium sulfate 2 g/50 ml IVPB (premix or compounded)  2 g IntraVENous DAILY PRN       Objective:     Vitals:    07/30/19 0904 07/30/19 0956 07/30/19 1000 07/30/19 1001   BP:       Pulse: 91 90 86 83   Resp: 13 11 (!) 7 11   Temp: 98.7 °F (37.1 °C) 98.7 °F (37.1 °C) 98.6 °F (37 °C) 98.6 °F (37 °C)   SpO2: 97% 98% 98% 99%   Weight:       Height:             Intake and Output:  Current Shift: 07/30 0701 - 07/30 1900  In: 480 [P.O.:480]  Out: 475 [Urine:450; Drains:25]  Last 24 hr: 07/29 0701 - 07/30 0700  In: 3327.5 [P.O.:300; I.V.:2348.3]  Out: 2084 [Urine:4225; Drains:319]     IO: -1216cc/24hr  TOTAL OVERALL: -23L  EVD: 319cc/24hrs     Physical Exam:   General:  More awake today, following commands. Stovall. Eyes:   PERRL   Neck: Supple, symmetrical, trachea midline, no adenopathy, thyroid: no enlargment/tenderness/nodules, no carotid bruit and no JVD. Lungs:   Clear to auscultation bilaterally. Heart:  Regular rate and rhythm, S1, S2 normal, no murmur, click, rub or gallop. Abdomen:   Soft, non-tender. Bowel sounds normal. No masses,  No organomegaly. Extremities: Extremities normal, atraumatic, no cyanosis. Pulses: 2+ and symmetric all extremities. Skin: Skin color, texture, turgor normal. EVD intact. Neurologic: follows commands, stovall, wd pain all extrems.        Lab/Data Review:    Recent Results (from the past 12 hour(s))   CBC W/O DIFF    Collection Time: 07/30/19  4:50 AM   Result Value Ref Range    WBC 10.8 4.3 - 11.1 K/uL    RBC 3.36 (L) 4.23 - 5.6 M/uL    HGB 10.2 (L) 13.6 - 17.2 g/dL    HCT 30.6 (L) 41.1 - 50.3 %    MCV 91.1 79.6 - 97.8 FL    MCH 30.4 26.1 - 32.9 PG    MCHC 33.3 31.4 - 35.0 g/dL    RDW 14.1 11.9 - 14.6 %    PLATELET 791 516 - 718 K/uL    MPV 9.8 9.4 - 12.3 FL    ABSOLUTE NRBC 0.00 0.0 - 0.2 K/uL   METABOLIC PANEL, BASIC    Collection Time: 07/30/19  4:50 AM   Result Value Ref Range    Sodium 144 136 - 145 mmol/L    Potassium 3.9 3.5 - 5.1 mmol/L    Chloride 110 (H) 98 - 107 mmol/L    CO2 25 21 - 32 mmol/L    Anion gap 9 7 - 16 mmol/L    Glucose 86 65 - 100 mg/dL    BUN 29 (H) 6 - 23 MG/DL    Creatinine 3.58 (H) 0.8 - 1.5 MG/DL    GFR est AA 24 (L) >60 ml/min/1.73m2    GFR est non-AA 20 (L) >60 ml/min/1.73m2    Calcium 8.7 8.3 - 10.4 MG/DL   MAGNESIUM    Collection Time: 07/30/19  4:50 AM   Result Value Ref Range    Magnesium 1.9 1.8 - 2.4 mg/dL   GLUCOSE, POC    Collection Time: 07/30/19  8:15 AM   Result Value Ref Range    Glucose (POC) 94 65 - 100 mg/dL   FIBRINOGEN    Collection Time: 07/30/19  9:50 AM   Result Value Ref Range    Fibrinogen 448 190 - 501 mg/dL   PLATELET FUNCTION, VERIFY NOW P2Y12    Collection Time: 07/30/19  9:50 AM   Result Value Ref Range    P2Y12 Plt response 244 PRU   ASPIRIN TEST    Collection Time: 07/30/19  9:50 AM   Result Value Ref Range    Aspirin test 546 (L) 620 - 672 ARU   MAGNESIUM    Collection Time: 07/30/19  9:50 AM   Result Value Ref Range    Magnesium 3.0 (H) 1.8 - 2.4 mg/dL       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)    Active Problems:    Tobacco abuse (7/10/2019)      Acute respiratory failure with hypoxia (HCC) (7/10/2019)      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 Q1 hour neuro checks. On SAH protocol - Mg, nimotop, zocor. PERRL +3. No MAP goal now. TCDs daily - they have been 120-180s in the left and right MCAs. Last CTA head and neck showed no filling of the aneurysm and good filling of the PCAs. Vasospasm is still seen in the MCAs and ACAs but better. CTP was normal. Will continue SAH protocol, PT/OT. OOB to chair today. Pt had EVD replaced 7/20 due to it getting pulled put. EVD @ 8cm H20. Will plan for  shunt this Thur,  8-1. AR:419/ MT:255. Following commands this morning. CT head this am which I reviewed shows the EVD is in a clot in the ventricle but the ventricles are smaller and the IVH improved. EVD is draining better. Intrathecal tpa 1mg given again today. I discussed the risks and benefits and alternatives to giving the tpa to the patient including a 5% chance of having another IVH. He agreed to the drug yesterday and again today. We will repeat head CT in am, post tPA via EVD. Patient remains on Keppra and has had no further seizure activity since his admission. RESP: Tolerating RA. No distress. CXR shows improving edema. CV: NO MAP GOAL NOW.  2D Echo: 55-60%, trop peaked @ 0.08, trending down. SCD/SQ heparin. HEME: 10/30,  HIT panel negative. On oral iron. And Vit C. Transfuse 2 PRBC yesterday and H/H responded. NEPH: bun/cr: 29//3.5. UOP is good. Monitor. GI: Pepcid. Senna. +BS. Abdomen is not distended or hard. Will give MOM again. ID: Tm: 100. Sputum cx gram + cocci -  Patient had MSSA pneumonia, abx completed. Blood cultures neg and UA neg. US neg for DVT. CSF cx neg 7/22 and 7/27 for any organisms, protein 186. LINES: RUE PICC, SHANNAN phelps EVD. Tobacco abuse: On Nicoderm patch.       Signed By: Samson Yañez NP     July 30, 2019

## 2019-07-30 NOTE — PROGRESS NOTES
A follow up visit was made to the patient. Emotional support, spiritual presence and   prayer were provided. He was sleeping.       [employer]

## 2019-07-30 NOTE — PROGRESS NOTES
PM&R Consult Progress Note      Patient: Criselda Maya  Admit Date: 7/9/2019  Admit Diagnosis: SAH (subarachnoid hemorrhage) ([facility] [address] [I60.9]  Recommendations: Continue Acute Rehab Program, Coordination of rehab/medical care, Counseling of PM & R care issues management, Hospital Inpatient Rehab  -plans for VPS tomorrow. My hope is that he will be ready for [facility] shortly after. Will need insurance precert. Rehab potential is very good  -pt less interactive, less talkative; appears depressed. Recommend SSRI. Will benefit from psychology eval at [address] PT/OT/ST  History/Subjective/Complaint:     Patient seen and examined. Records reviewed. Not verbalizing but mouthing few words. Denies pain, no headache. Affect blunted but will occasionally smile if comment made to cheer him up.      Pain 1  Pain Scale 1: Numeric (0 - 10) (07/30/19 1151)  Pain Intensity 1: 0 (07/30/19 1151)  Patient Stated Pain Goal: 0 (07/30/19 1151)  Pain Reassessment 1: Patient resting w/respiratory rate greater than 10 (07/27/19 0800)  Pain Onset 1: since admit (07/09/19 2000)  Pain Location 1: Head (07/23/19 0053)  Pain Orientation 1: Posterior (07/20/19 1032)  Pain Description 1: Aching;Constant (07/23/19 0053)  Pain Intervention(s) 1: Medication (see MAR) (07/30/19 0159)     Objective:     Vitals:  Patient Vitals for the past 8 hrs:   Temp Pulse Resp SpO2   07/30/19 1151 98.7 °F (37.1 °C) 88 20 93 %   07/30/19 1100 98.7 °F (37.1 °C) 79 (!) 7 98 %   07/30/19 1039 98.7 °F (37.1 °C) 82 11 97 %   07/30/19 1001 98.6 °F (37 °C) 83 11 99 %   07/30/19 1000 98.6 °F (37 °C) 86 (!) 7 98 %   07/30/19 0956 98.7 °F (37.1 °C) 90 11 98 %   07/30/19 0904 98.7 °F (37.1 °C) 91 13 97 %   07/30/19 0900 98.7 °F (37.1 °C) 88 13 97 %   07/30/19 0859 98.7 °F (37.1 °C) 90 8 96 %   07/30/19 0800 98.9 °F (37.2 °C) 80 11 100 %   07/30/19 0743 -- -- -- 100 %   07/30/19 0700 98.7 °F (37.1 °C) 75 10 100 %   07/30/19 0600 98.6 °F (37 °C) 90 10 100 %   07/30/19 0500 98.2 °F (36.8 °C) 90 14 100 %      Intake and Output:  07/28 1901 - 07/30 0700  In: 4652.5 [P.O.:300;  I.V.:3673.3]  Out: 1293 [SQTBJ:8938; Drains:494]    No Known Allergies  Current Facility-Administered Medications   Medication Dose Route Frequency    0.9% sodium chloride infusion 250 mL  250 mL IntraVENous PRN    nicotine (NICODERM CQ) 14 mg/24 hr patch 1 Patch  1 Patch TransDERmal Q24H    hydrogen peroxide 3 %   Topical PRN    acetaminophen (TYLENOL) tablet 650 mg  650 mg Oral Q4H PRN    lidocaine 4 % patch 1 Patch  1 Patch TransDERmal Q24H    fentaNYL citrate (PF) injection 50 mcg  50 mcg IntraVENous Q1H PRN    ferrous sulfate tablet 325 mg  1 Tab Oral TID WITH MEALS    levETIRAcetam (KEPPRA) tablet 500 mg  500 mg Oral Q12H    niMODipine (NIMOTOP) 30 mg/mL oral solution (compound) 60 mg  60 mg Oral Q4H    0.45% sodium chloride infusion  100 mL/hr IntraVENous CONTINUOUS    ascorbic acid (vitamin C) (VITAMIN C) tablet 500 mg  500 mg Oral TID    aspirin tablet 325 mg  325 mg Oral DAILY    atorvastatin (LIPITOR) tablet 40 mg  40 mg Oral QHS    clopidogrel (PLAVIX) tablet 75 mg  75 mg Oral DAILY    famotidine (PEPCID) tablet 20 mg  20 mg Oral DAILY    senna-docusate (PERICOLACE) 8.6-50 mg per tablet 2 Tab  2 Tab Oral QHS    magnesium hydroxide (MILK OF MAGNESIA) 400 mg/5 mL oral suspension 30 mL  30 mL Oral DAILY PRN    polyvinyl alcohol (LIQUIFILM TEARS) 1.4 % ophthalmic solution 1 Drop  1 Drop Both Eyes PRN    heparin (porcine) injection 5,000 Units  5,000 Units SubCUTAneous Q8H    sodium chloride (NS) flush 30 mL  30 mL InterCATHeter Q8H    heparin (porcine) pf 900 Units  900 Units InterCATHeter Q8H    sodium chloride (NS) flush 30 mL  30 mL InterCATHeter PRN    heparin (porcine) pf 900 Units  900 Units InterCATHeter PRN    ondansetron (ZOFRAN) injection 4 mg  4 mg IntraVENous Q6H PRN    NUTRITIONAL SUPPORT ELECTROLYTE PRN ORDERS   Does Not Apply PRN    magnesium sulfate 4 g/100 mL IVPB  4 g IntraVENous DAILY PRN    magnesium sulfate 2 g/50 ml IVPB (premix or compounded)  2 g IntraVENous DAILY PRN       Physical Exam:  Drowsy but awakens to name. Will mouth words but not vocalizing  TCD stable without spasm. No specific complaints   symm,  sens intact, delayed processing, dec participation but attends to examiner, sleepy  CV tachy, NSR  LUNGS cta b  ABD soft ntnd bs +  EXT no edema, no calf tenderness    Functional Assessment:  Gross Assessment  AROM: Generally decreased, functional(R LE) (07/22/19 1340)  PROM: Generally decreased, functional (07/12/19 1100)  Strength: Generally decreased, functional(R LE) (07/22/19 1340)  Coordination: Generally decreased, functional(B hands swelling) (07/12/19 1500)  Sensation: Intact(BUEs to light touch ) (07/12/19 1500)     Gait  Base of Support: Narrowed; Center of gravity altered (07/29/19 1100)  Speed/Aminata: Slow (07/29/19 1100)  Step Length: Right shortened;Left shortened (07/29/19 1100)  Gait Abnormalities: Decreased step clearance; Path deviations;Trunk sway increased (07/29/19 1100)  Ambulation - Level of Assistance: Contact guard assistance;Minimal assistance (07/29/19 1100)  Distance (ft): (frequent breaks required for rest, difficulty multi tasking) (07/29/19 1100)  Assistive Device: Gait belt;Walker, rolling (07/29/19 1100)  Interventions: Safety awareness training;Verbal cues;Manual cues (07/29/19 1100)     Bed Mobility  Supine to Sit: Stand-by assistance (07/29/19 1100)  Sit to Supine: Contact guard assistance (07/29/19 1100)  Scooting: Contact guard assistance (07/29/19 1100)     Balance  Sitting: Impaired (07/29/19 1100)  Sitting - Static: Good (unsupported) (07/29/19 1100)  Sitting - Dynamic: Fair (occasional) (07/29/19 1100)  Standing: Impaired (07/29/19 1100)  Standing - Static: Fair (07/29/19 1100)  Standing - Dynamic : Fair (07/29/19 1100)         Bed/Mat Mobility  Supine to Sit: Stand-by assistance (07/29/19 1100)  Sit to Supine: Contact guard assistance (07/29/19 1100)  Sit to Stand: Minimum assistance (07/29/19 1100)  Stand to Sit: Minimum assistance (07/29/19 1100)  Bed to Chair: Contact guard assistance;Minimum assistance (07/24/19 1300)  Scooting: Contact guard assistance (07/29/19 1100)     Labs/Studies:  Recent Results (from the past 72 hour(s))   CBC W/O DIFF    Collection Time: 07/28/19  3:40 AM   Result Value Ref Range    WBC 12.1 (H) 4.3 - 11.1 K/uL    RBC 2.38 (L) 4.23 - 5.6 M/uL    HGB 7.1 (L) 13.6 - 17.2 g/dL    HCT 22.6 (L) 41.1 - 50.3 %    MCV 95.0 79.6 - 97.8 FL    MCH 29.8 26.1 - 32.9 PG    MCHC 31.4 31.4 - 35.0 g/dL    RDW 15.3 (H) 11.9 - 14.6 %    PLATELET 591 400 - 926 K/uL    MPV 10.3 9.4 - 12.3 FL    ABSOLUTE NRBC 0.00 0.0 - 0.2 K/uL   METABOLIC PANEL, BASIC    Collection Time: 07/28/19  3:40 AM   Result Value Ref Range    Sodium 149 (H) 136 - 145 mmol/L    Potassium 4.1 3.5 - 5.1 mmol/L    Chloride 115 (H) 98 - 107 mmol/L    CO2 26 21 - 32 mmol/L    Anion gap 8 7 - 16 mmol/L    Glucose 88 65 - 100 mg/dL    BUN 24 (H) 6 - 23 MG/DL    Creatinine 3.31 (H) 0.8 - 1.5 MG/DL    GFR est AA 26 (L) >60 ml/min/1.73m2    GFR est non-AA 22 (L) >60 ml/min/1.73m2    Calcium 8.5 8.3 - 10.4 MG/DL   MAGNESIUM    Collection Time: 07/28/19  3:40 AM   Result Value Ref Range    Magnesium 2.3 1.8 - 2.4 mg/dL   CBC W/O DIFF    Collection Time: 07/29/19  4:22 AM   Result Value Ref Range    WBC 10.8 4.3 - 11.1 K/uL    RBC 2.38 (L) 4.23 - 5.6 M/uL    HGB 7.0 (L) 13.6 - 17.2 g/dL    HCT 22.6 (L) 41.1 - 50.3 %    MCV 95.0 79.6 - 97.8 FL    MCH 29.4 26.1 - 32.9 PG    MCHC 31.0 (L) 31.4 - 35.0 g/dL    RDW 15.2 (H) 11.9 - 14.6 %    PLATELET 707 529 - 685 K/uL    MPV 10.2 9.4 - 12.3 FL    ABSOLUTE NRBC 0.00 0.0 - 0.2 K/uL   METABOLIC PANEL, BASIC    Collection Time: 07/29/19  4:22 AM   Result Value Ref Range    Sodium 148 (H) 136 - 145 mmol/L    Potassium 3.9 3.5 - 5.1 mmol/L    Chloride 113 (H) 98 - 107 mmol/L    CO2 26 21 - 32 mmol/L    Anion gap 9 7 - 16 mmol/L    Glucose 83 65 - 100 mg/dL    BUN 24 (H) 6 - 23 MG/DL    Creatinine 3.43 (H) 0.8 - 1.5 MG/DL    GFR est AA 25 (L) >60 ml/min/1.73m2    GFR est non-AA 21 (L) >60 ml/min/1.73m2    Calcium 8.4 8.3 - 10.4 MG/DL   MAGNESIUM    Collection Time: 07/29/19  4:22 AM   Result Value Ref Range    Magnesium 2.0 1.8 - 2.4 mg/dL   CBC W/O DIFF    Collection Time: 07/30/19  4:50 AM   Result Value Ref Range    WBC 10.8 4.3 - 11.1 K/uL    RBC 3.36 (L) 4.23 - 5.6 M/uL    HGB 10.2 (L) 13.6 - 17.2 g/dL    HCT 30.6 (L) 41.1 - 50.3 %    MCV 91.1 79.6 - 97.8 FL    MCH 30.4 26.1 - 32.9 PG    MCHC 33.3 31.4 - 35.0 g/dL    RDW 14.1 11.9 - 14.6 %    PLATELET 000 865 - 438 K/uL    MPV 9.8 9.4 - 12.3 FL    ABSOLUTE NRBC 0.00 0.0 - 0.2 K/uL   METABOLIC PANEL, BASIC    Collection Time: 07/30/19  4:50 AM   Result Value Ref Range    Sodium 144 136 - 145 mmol/L    Potassium 3.9 3.5 - 5.1 mmol/L    Chloride 110 (H) 98 - 107 mmol/L    CO2 25 21 - 32 mmol/L    Anion gap 9 7 - 16 mmol/L    Glucose 86 65 - 100 mg/dL    BUN 29 (H) 6 - 23 MG/DL    Creatinine 3.58 (H) 0.8 - 1.5 MG/DL    GFR est AA 24 (L) >60 ml/min/1.73m2    GFR est non-AA 20 (L) >60 ml/min/1.73m2    Calcium 8.7 8.3 - 10.4 MG/DL   MAGNESIUM    Collection Time: 07/30/19  4:50 AM   Result Value Ref Range    Magnesium 1.9 1.8 - 2.4 mg/dL   GLUCOSE, POC    Collection Time: 07/30/19  8:15 AM   Result Value Ref Range    Glucose (POC) 94 65 - 100 mg/dL   FIBRINOGEN    Collection Time: 07/30/19  9:50 AM   Result Value Ref Range    Fibrinogen 448 190 - 501 mg/dL   PLATELET FUNCTION, VERIFY NOW P2Y12    Collection Time: 07/30/19  9:50 AM   Result Value Ref Range    P2Y12 Plt response 244 PRU   ASPIRIN TEST    Collection Time: 07/30/19  9:50 AM   Result Value Ref Range    Aspirin test 546 (L) 620 - 672 ARU   MAGNESIUM    Collection Time: 07/30/19  9:50 AM   Result Value Ref Range    Magnesium 3.0 (H) 1.8 - 2.4 mg/dL        Assessment:     Principal Problem:    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 ([facility] (7/10/2019)    Active Problems:    Tobacco abuse (7/10/2019)      Acute respiratory failure with hypoxia (HCC) (7/10/2019)      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)      IVH (intraventricular hemorrhage) ([facility] (7/10/2019)      Seizure ([facility] (7/10/2019)      Elevated serum creatinine (7/10/2019)      Cerebral vasospasm (7/10/2019)      Pneumonia due to methicillin susceptible Staphylococcus aureus (MSSA) ([facility] (7/15/2019)        Plan:     Recommendations: Continue Acute Rehab Program  Coordination of rehab/medical care  Counseling of PM & R care issues management  Monitoring and management of medical conditions per plan of care/orders  Discussion with Family/Caregiver/Staff  Reviewed Therapies/Labs/Medications/Records

## 2019-07-30 NOTE — ADT AUTH CERT NOTES
Stroke: Hemorrhagic - Care Day 21 (7/29/2019) by Genoveva Darling         Review Status Review Entered   Completed 7/29/2019 14:28       Criteria Review      Care Day: 21 Care Date: 7/29/2019 Level of Care:    Guideline Day 2    Level Of Care    (X) Stroke unit or ICU    Clinical Status    ( ) * Neurosurgical indication absent    (X) * Hemodynamic stability    7/29/2019 14:28:21 EDT by Solmon Show      75-/85    (X) * Anticoagulant effects absent or reversed    7/29/2019 14:28:21 EDT by Solmon Show      NO ANTICOAGS    (X) * Mechanical ventilation absent    7/29/2019 14:28:21 EDT by Solmon Show      RA    Activity    (X) * Up to chair    7/29/2019 14:28:21 EDT by Michael Lao W/ PT    Routes    (X) IV fluids, medications    7/29/2019 14:28:21 EDT by Solmon Show      1/2NS @ 100; ASA 325MG PO QD; LIPITOR 40MG PO QD; PLAVIX 75MG PO QD; PEPCID 20MG PO QD; HEPARIN 5000U SC Q8; KEPPRA 500MG PO BID; NIMOTOP 60MG PO Q4. Interventions    (X) Neurologic checks    7/29/2019 14:28:21 EDT by Solmon Show      Q1HR    (X) Possible cardiac monitoring    (X) DVT prophylaxis    7/29/2019 14:28:21 EDT by Solmon Show      HEPARIN Q8    * Milestone   Additional Notes   7/29:    Pt with no acute neuro changes.  EVD 5/open and draining. Patient drowsy this morning but following commands. Walking with PT in the hallway.     VS 98.6-75-/% RA    LABS: H/H 7/22.6; ; ; BUN 24; CREAT 3.43.     MEDS: 1/2NS @ 100; ASA 325MG PO QD; LIPITOR 40MG PO QD; PLAVIX 75MG PO QD; PEPCID 20MG PO QD; HEPARIN 5000U SC Q8; KEPPRA 500MG PO BID; NIMOTOP 60MG PO Q4.        Stroke: Hemorrhagic - Care Day 20 (7/28/2019) by Genoveva Darling         Review Status Review Entered   Completed 7/29/2019 14:28       Criteria Review      Care Day: 20 Care Date: 7/28/2019 Level of Care:    Guideline Day 2    Level Of Care    (X) Stroke unit or ICU    Clinical Status    ( ) * Neurosurgical indication absent    (X) * Hemodynamic stability    7/29/2019 14:28:21 EDT by Dong Nguyễn      102-/90    (X) * Anticoagulant effects absent or reversed    7/29/2019 14:28:21 EDT by Dong Nguyễn      NO ANTICOAGS    (X) * Mechanical ventilation absent    7/29/2019 14:28:21 EDT by Dong Nguyễn      ON RA    Activity    (X) * Up to chair    7/29/2019 14:28:21 EDT by Dong Nguyễn      UP W/ ASSIST    Routes    (X) IV fluids, medications    7/29/2019 14:28:21 EDT by Dong Nguyễn      1/2NS BOLUS 3200ML; 1/2NS @ 100; TYLENOL 650MG PO X1; ASA 325MG PO QD; LIPITOR 40MG PO QD; PLAVIX 75MG PO QD; PEPCID 20MG PO QD; HEPARIN 5000U SC Q8; KEPPRA 500MG PO BID; NIMOTOP 60MG PO Q4. Interventions    (X) Neurologic checks    7/29/2019 14:28:21 EDT by Dong Nguyễn      Q1    (X) Possible cardiac monitoring    7/29/2019 14:28:21 EDT by Dong Nguyễn      CONTINUOUS CP MONITORING    (X) DVT prophylaxis    7/29/2019 14:28:21 EDT by Jaden Vasques    * Milestone   Additional Notes   7/28: Pt with no acute neuro changes. Repeat HCT/CTP/CTA stable, IVF continues, EVD @5/open and draining wo difficulty.     vs 100.2-102-/% ra    LABS: WBC 12.1; H/H 7.1/22.6; ; ; BUN 24; CREAT 3.31.     MEDS: 1/2NS BOLUS 3200ML; 1/2NS @ 100; TYLENOL 650MG PO X1; ASA 325MG PO QD; LIPITOR 40MG PO QD; PLAVIX 75MG PO QD; PEPCID 20MG PO QD; HEPARIN 5000U SC Q8; KEPPRA 500MG PO BID; NIMOTOP 60MG PO Q4.       Stroke: Hemorrhagic - Care Day 19 (7/27/2019) by Kathy Hale         Review Status Review Entered   Completed 7/29/2019 14:28       Criteria Review      Care Day: 19 Care Date: 7/27/2019 Level of Care:    Guideline Day 2    Level Of Care    (X) Stroke unit or ICU    Clinical Status    ( ) * Neurosurgical indication absent    (X) * Hemodynamic stability    7/29/2019 14:28:21 EDT by Dong Nguyễn      76-/86    (X) * Anticoagulant effects absent or reversed    7/29/2019 14:28:21 EDT by Dave Zelaya      NO ANTICOAGS    (X) * Mechanical ventilation absent    7/29/2019 14:28:21 EDT by Dave Zelaya      ON RA    Activity    (X) * Up to chair    7/29/2019 14:28:21 EDT by Dave Zelaya      WORKING W/ PT    Routes    (X) IV fluids, medications    7/29/2019 14:28:21 EDT by Dave Zelaya      1/2NS BOLUS 3000ML; 1/2NS @ 100; TYLENOL 650MG PO X1; ASA 325MG PO QD; LIPITOR 40MG PO QD; PLAVIX 75MG PO QD; PEPCID 20MG PO QD; HEPARIN 5000U SC Q8; KEPPRA 500MG PO BID; NIMOTOP 60MG PO Q4. Interventions    (X) Neurologic checks    7/29/2019 14:28:21 EDT by Dave Zelaya      Q1    (X) Possible cardiac monitoring    7/29/2019 14:28:21 EDT by Dave Zelaya      CONTINUOUS CP MONITORING    (X) DVT prophylaxis    7/29/2019 14:28:21 EDT by Dave Zelaya      HEPARIN Q8    * Milestone   Additional Notes   7/27:    Pt with no acute neuro changes. EVD not draining this am, attempted to flush wo success. EVD pulled back cm and now draining wo difficulty. Pt tolerated well. EVD secure and @10/open. VS 98.4-78-/% RA    LABS: WBC 15.1; H/H 9.4/28.7; ; ; CREAT 3.09; MAG 2.5. 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 Q1 hour neuro checks. On SAH protocol - Mg, nimotop, zocor. PERRL +3. No MAP goal now. TCDs daily - they have been 180-220 in the left and right MCAs . Last CT head showed small ventricles. Last CTA head and neck showed no filling of the aneurysm and good filling of the PCAs. Vasospasm seen in the right MCA and both supraclinoid ICAs as well as the ACAs and the left MCA but to a lesser extent. CTP was normal. Will continue SAH protocol, PT/OT. OOB to chair today. . Pt had EVD replaced 7/20 due to it getting pulled put. EVD @ 10cm H20. Alert and following commands this morning.  Will plan for  shunt next week, 8-1. AR:419/ MA:255  CT head/CTP/CTA Sunday.  EVD    RESP: tolerating RA. No distress. cxr shows improving edema. CV: NO MAP GOAL NOW.  2D Echo: 55-60%, trop peaked @ 0.8, trending down.  SCD/SQ heparin. HEME: 7.1/22,  HIT panel negative. On oral iron. And Vit C. NEPH: bun/cr: 26/3 . UOP is good. -20.5L. Will give 2L 1/2 NS bolus. Monitor. GI: Pepcid. Senna. +BS. ID: Tm: 99.8. Sputum cx sent, pending final, showing gram + cocci -  Patient has MSSA pneumonia, abx completed. Blood cultures neg and UA neg. US neg for DVT. CSF cx neg for any organisms, protein 172.      LINES: RUE PICC, SHANNAN phelps EVD. Tobacco abuse: On Nicoderm patch. MEDS: 1/2NS BOLUS 3000ML; 1/2NS @ 100; TYLENOL 650MG PO X1; ASA 325MG PO QD; LIPITOR 40MG PO QD; PLAVIX 75MG PO QD; PEPCID 20MG PO QD; HEPARIN 5000U SC Q8; KEPPRA 500MG PO BID; NIMOTOP 60MG PO Q4.        Stroke: Hemorrhagic - Care Day 18 (7/26/2019) by Araceli Clayton         Review Status Review Entered   Completed 7/29/2019 14:28       Criteria Review      Care Day: 18 Care Date: 7/26/2019 Level of Care:    Guideline Day 2    Level Of Care    (X) Stroke unit or ICU    Clinical Status    ( ) * Neurosurgical indication absent    (X) * Hemodynamic stability    7/29/2019 14:28:21 EDT by Carloz Ruiz          (X) * Anticoagulant effects absent or reversed    7/29/2019 14:28:21 EDT by Carloz Ruiz      NO ANTICOAGS    (X) * Mechanical ventilation absent    7/29/2019 14:28:21 EDT by Carloz Ruiz      ON RA    Activity    (X) * Up to chair    7/29/2019 14:28:21 EDT by Carloz Ruiz      WORKING W/ PT    Routes    (X) IV fluids, medications    7/29/2019 14:28:21 EDT by Carloz Ruiz      MEDS: 1/2NS @ 100; TYLENOL 650MG PO X1; ASA 325MG PO QD; LIPITOR 40MG PO QD; PLAVIX 75MG PO QD; PEPCID 20MG PO QD; HEPARIN 5000U SC Q8; KEPPRA 500MG PO BID; NIMOTOP 60MG PO Q4; 1/2NS BOLUS 1000ML.     Interventions    (X) Neurologic checks 7/29/2019 14:28:21 EDT by Cherelle Quintero    (X) Possible cardiac monitoring    7/29/2019 14:28:21 EDT by Solmon Show      CONTINUOUS CP MONITORING    (X) DVT prophylaxis    7/29/2019 14:28:21 EDT by Solmon Show      HEPARIN Q8    * Milestone   Additional Notes   7/26:    Overnight some agitation. CTA/P head done. EO voice/pain    Intermittenly follows commands    Perrla 4>2    M[place]OUGH 5/5 no drift    EVD out 20cc    ICP 10-15    VS 97.0-476-% RA    LABS: WBC 13.1; H/H 7.1/22.2; ; BUN 26; CREAT 3.03; CA 8.2. 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 Q1 hour neuro checks. On SAH protocol - Mg, nimotop, zocor. PERRL +3. No MAP goal now. TCDs daily - they have been 180-220 in the left and right MCAs . Last CT head showed small ventricles. Last CTA head and neck showed no filling of the aneurysm and good filling of the PCAs. Vasospasm seen in the right MCA and both supraclinoid ICAs as well as the ACAs and the left MCA but to a lesser extent. CTP was normal. Will continue SAH protocol, PT/OT. OOB to chair today. . Pt had EVD replaced 7/20 due to it getting pulled put. EVD @ 10cm H20. Alert and following commands this morning. Will plan for  shunt next week, 8-1. AR:419/ CA:255  CT head/CTP/CTA Sunday. EVD    RESP: tolerating RA. No distress. cxr shows improving edema. CV: NO MAP GOAL NOW.  2D Echo: 55-60%, trop peaked @ 0.8, trending down.  SCD/SQ heparin. HEME: 9.4/28, PLT 404 HIT panel negative. On oral iron. And Vit C. NEPH: bun/cr: 23/30.9. UOP is good. Monitor. GI: Pepcid. Senna. +BS. ID: Tm: 99. Deshaun Chinchilla Sputum cx sent, pending final, showing gram + cocci -  Patient has MSSA pneumonia, abx completed. Blood cultures neg and UA neg. US neg for DVT. CSF cx neg for any organisms, protein 172.      LINES: KISHOR PICC, SHANNAN phelps. Tobacco abuse: On Nicoderm patch. MEDS: 1/2NS @ 100; TYLENOL 650MG PO X1; ASA 325MG PO QD; LIPITOR 40MG PO QD; PLAVIX 75MG PO QD; PEPCID 20MG PO QD; HEPARIN 5000U SC Q8; KEPPRA 500MG PO BID; NIMOTOP 60MG PO Q4; 1/2NS BOLUS 1000ML.     Stroke: Hemorrhagic - Care Day 17 (7/25/2019) by Krista Zapien         Review Status Review Entered   Completed 7/26/2019 12:58       Criteria Review      Care Day: 17 Care Date: 7/25/2019 Level of Care:    Guideline Day 2    Level Of Care    (X) Stroke unit or ICU    Clinical Status    ( ) * Neurosurgical indication absent    (X) * Hemodynamic stability    7/26/2019 12:58:27 EDT by [facility]      31-/76    (X) * Anticoagulant effects absent or reversed    7/26/2019 12:58:27 EDT by [facility]      NO ISSUES    (X) * Mechanical ventilation absent    7/26/2019 12:58:27 EDT by [facility]      ON NC    Activity    ( ) * Up to chair    Routes    (X) IV fluids, medications    7/26/2019 12:58:27 [facility]      MEDS: NS @ 100; ASA 325MG PO QD; LIPITOR 40MG PO QD; PLAVIX 75MG PO QD; PEPCID 20MG PO QD; HEPARIN 5000U SC Q8; KEPPRA 500MG PO BID; NIMOTOP 60MG PO Q4; KCL 20MEQ IV X1; 1/2NS BOLUS 2000ML. Interventions    (X) Neurologic checks    7/26/2019 12:58:27 EDT by [facility]      Q4    (X) Possible cardiac monitoring    7/26/2019 12:58:27 EDT by Florestine Medico      CONTINUOUS CP MONITORING    (X) DVT prophylaxis    7/26/2019 12:58:27 EDT by [facility]      HEPARIN SC Q8    * Milestone   Additional Notes   7/25:    Pt with no acute neuro changes. EVD @ 5/open. Incision site CDI. Pt following commands. 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 Q1 hour neuro checks. On SAH protocol - Mg, nimotop, zocor. PERRL +3. No MAP goal now. TCDs daily - they have been 180-220 in the left and right MCAs .  Last CT head showed small ventricles. Last CTA head and neck showed no filling of the aneurysm and good filling of the PCAs. Vasospasm seen in the right MCA and both supraclinoid ICAs as well as the ACAs and the left MCA but to a lesser extent. CTP was normal. Will continue SAH protocol, PT/OT. OOB to chair today. /. Pt had EVD replaced 7/20 due to it getting pulled put. EVD with some dried blood drainage but working and still set at 5cm H20. Alert and following commands this morning. Will plan for  shunt next week, 8-1. Asa/plavix responses in am. CT head/CTP/CTA Sunday. RESP: tolerating RA. No distress. cxr shows improving edema. CV: NO MAP GOAL NOW.  2D Echo: 55-60%, trop peaked @ 0.8, trending down.  SCD/SQ heparin. HEME: 7.5/22,  HIT panel negative. On oral iron. And Vit C. NEPH: bun/cr: 25/3.1 . UOP is good. -14.5L. Will give 2L 1/2 NS bolus. Monitor. GI: Pepcid. Senna. +BS. ID: Tm: 99.8. Sputum cx sent, pending final, showing gram + cocci -  Patient has MSSA pneumonia, abx completed. Blood cultures neg and UA neg. US neg for DVT. CSF cx neg for any organisms, protein 172.      LINES: RUE PICC, phelps, R EVD. Tobacco abuse: On Nicoderm patch.          VS 98.2-75-/% RA    LABS: WBC 15.9; H/H 7.5/22.7; ; CREAT 3.05; CA 8.2. MEDS: NS @ 100; ASA 325MG PO QD; LIPITOR 40MG PO QD; PLAVIX 75MG PO QD; PEPCID 20MG PO QD; HEPARIN 5000U SC Q8; KEPPRA 500MG PO BID; NIMOTOP 60MG PO Q4; KCL 20MEQ IV X1; 1/2NS BOLUS 2000ML.        Stroke: Hemorrhagic - Care Day 16 (7/24/2019) by Tri Kelly         Review Status Review Entered   Completed 7/26/2019 12:58       Criteria Review      Care Day: 16 Care Date: 7/24/2019 Level of Care:    Guideline Day 2    Level Of Care    (X) Stroke unit or ICU    Clinical Status    ( ) * Neurosurgical indication absent    (X) * Hemodynamic stability    7/26/2019 12:58:27 EDT by Amanda Simmons      HR 85    (X) * Anticoagulant effects absent or reversed    7/26/2019 12:58:27 EDT by Randy Jarvis      NO ISSUES    (X) * Mechanical ventilation absent    7/26/2019 12:58:27 EDT by Randy Jarvis      NC    Activity    ( ) * Up to chair    Routes    (X) IV fluids, medications    7/26/2019 12:58:27 EDT by Randy Javris      MEDS: NS @ 100; ASA 325MG PO QD; LIPITOR 40MG PO QD; PLAVIX 75MG PO QD; PEPCID 20MG PO QD; HEPARIN 5000U SC Q8; KEPPRA 500MG PO BID; NIMOTOP 60MG PO Q4; KCL 20MEQ IV X1; 1/2NS BOLUS 2000ML. Interventions    (X) Neurologic checks    7/26/2019 12:58:27 EDT by Randy Jarvis      Q4    (X) Possible cardiac monitoring    7/26/2019 12:58:27 EDT by Randy Jarvis      CONTINUOUS CP MONITORING    (X) DVT prophylaxis    7/26/2019 12:58:27 EDT by Randy Jarvis      HEPARIN SC Q8    * Milestone   Additional Notes   7/24: Pt with no acute neuro changes. EVD @ 5/open. Incision site CDI. Pt following commands. 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 Q1 hour neuro checks. On SAH protocol - Mg, nimotop, zocor. PERRL +3. No MAP goal now. TCDs daily - they have been 180-220 in the left and right MCAs . Last CT head showed small ventricles. Last CTA head and neck showed no filling of the aneurysm and good filling of the PCAs. Vasospasm seen in the right MCA and both supraclinoid ICAs as well as the ACAs and the left MCA but to a lesser extent. CTP was normal. Will continue SAH protocol, PT/OT. OOB to chair today. /. Pt had EVD replaced 7/20 due to it getting pulled put. EVD with some dried blood drainage but working and still set at 5cm H20. Alert and following commands this morning. Patient may need a shunt. We will drain for several more days and then challenge the EVD. RESP: tolerating RA. No distress. cxr shows improving edema. CV: NO MAP GOAL NOW.  2D Echo: 55-60%, trop peaked @ 0.8, trending down.  SCD/SQ heparin. HEME: 7.6/23,  HIT panel negative. On oral iron. And Vit C. NEPH: bun/cr: 25/3.1 . UOP is good. -5.5L. Will give 2L 1/2 NS bolus. Monitor. GI: Pepcid. Senna. +BS. ID: Tm: 99.3. Sputum cx sent, pending final, showing gram + cocci -  Patient has MSSA pneumonia, abx completed. Blood cultures neg and UA neg. US neg for DVT. CSF cx neg for any organisms, protein 172.      LINES: RUE PICC, phelps, R EVD. Tobacco abuse: On Nicoderm patch. LABS: WBC 16.8; H/H 7.6/23.3; K 3.4; ; BUN 25; CREAT 3.11; CA 8. MEDS: NS @ 100; ASA 325MG PO QD; LIPITOR 40MG PO QD; PLAVIX 75MG PO QD; PEPCID 20MG PO QD; HEPARIN 5000U SC Q8; KEPPRA 500MG PO BID; NIMOTOP 60MG PO Q4; KCL 20MEQ IV X1; 1/2NS BOLUS 2000ML.

## 2019-07-30 NOTE — PROGRESS NOTES
Interdisciplinary team rounds were held 7/30/2019 with the following team members:Nursing, Nurse Practitioner, Physician and Clinical Coordinator and the patient. Plan of care discussed. See clinical pathway and/or care plan for interventions and desired outcomes.

## 2019-07-30 NOTE — PROGRESS NOTES
EVD unclamped at 1200 per MD order after TPA admin. Patient alert, following commands, no acute neuro changes. Patient feeding self lunch currently. Drain leveled.

## 2019-07-30 NOTE — PROGRESS NOTES
Shift report received from Alma Agustin [facility] and dual neuro assessment performed. No acute changes per nursing report.

## 2019-07-30 NOTE — PROGRESS NOTES
Shift report given to Georgina Chavez RN and dual neuro assessment performed. Patient's brother at bedside.

## 2019-07-30 NOTE — PROGRESS NOTES
Problem: Neurolinguistics Impaired (Adult)  Goal: *Acute Goals and Plan of Care (Insert Text)  Description  STG: Patient will recall verbal information after a 3 minute delay with 80% accuracy and minimal assistance. STG: Patient will recall orientation information with 100% accuracy with minimal assistance. STG: Patient will participate in continued therapeutic assessment of cognitive-linguistic abilities. STG: Patient will respond to questions with appropriate volume on 40% of responses with moderate cues  STG: Patient will respond to questions with <10 second response latency with moderate cues on 80% of question. LTG: Patient will increase neuro-linguistic abilities to increase safety and awareness of deficits. Outcome: Progressing Towards Goal  SPEECH LANGUAGE PATHOLOGY:  SPEECH LANGUAGE NOTE: Daily Note 7    NAME/AGE/GENDER: Kal Gambino is a 37 y.o. male  DATE: 7/30/2019  PRIMARY DIAGNOSIS: SAH (subarachnoid hemorrhage) ([facility] [address] [I60.9]      ICD-10: Treatment Diagnosis: R13.12 Oropharyngeal Dysphagia. C.10.788 Cognitive-Communication Deficit    INTERDISCIPLINARY COLLABORATION: Registered Nurse  ASSESSMENT:   Patient seen for ongoing cognitive-linguistic treatment. Father at bedside. Patient initially alert, but closing eyes once clinician initiated treatment tasks. Improved orientation today- able to state location and year, but unable to provide information on situation. Mild improvement in engagement with clinician when discussing family and children. Mild errors in stating children's age and location based on father's confirmation. With therapeutic tasks addressing reasoning, patient naming category items with 80% accuracy, increasing to 90% with minimal cues. Response latency of >10 seconds on 90% of questions. Clinician educated patient and father on difficulty to fully assess his functional ability based on delayed response (? Related to fatigue, compressing speed, disengagement ?). Encouraged father to provide time for patient to respond to question, but to repeat and cue patient after 10 second pause. He verbalized understanding of information discussed. Per father report, plan for [facility] possibly next week     Patient will benefit from ongoing cognitive linguistic treatment to improve communication, safety, and independence. Will continue to follow. ?????? ? ? This section established at most recent assessment??????????  PROBLEM LIST (Impairments causing functional limitations):  1. Oropharyngeal dysphagia  2. Cognitive-linguistic impairments  REHABILITATION POTENTIAL FOR STATED GOALS: Good  PLAN OF CARE:   Patient will benefit from skilled intervention to address the following impairments. RECOMMENDATIONS AND PLANNED INTERVENTIONS (Benefits and precautions of therapy have been discussed with the patient.):  · PO:  Regular  · Liquids:  regular thin   · No straws  MEDICATIONS:  · With liquid  COMPENSATORY STRATEGIES/MODIFICATIONS INCLUDING:  · Small sips and bites  · Slow rate of intake   OTHER RECOMMENDATIONS (including follow up treatment recommendations): · Family training/education  · Patient education  RECOMMENDED DIET MODIFICATIONS DISCUSSED WITH:  · Nursing  · Patient  FREQUENCY/DURATION: Continue to follow patient 3 times a week for duration of hospital stay to address above goals. RECOMMENDED REHABILITATION/EQUIPMENT: (at time of discharge pending progress): Due to the probability of continued deficits (see above) this patient will likely need continued skilled speech therapy after discharge. SUBJECTIVE:   Continues with delayed responses and need for repetition of prompts. Father at bedside during session. History of Present Injury/Illness: Mr. Jane Mcwilliams  has no past medical history on file. Michellea January He also  has a past surgical history that includes ir emboli intracran lt (7/11/2019).    Present Symptoms: oropharyngeal dysphagia   Pain Intensity 1: 0  Pain Location 1: Head  Pain Orientation 1: Posterior  Pain Intervention(s) 1: Medication (see MAR)  Current Medications:   No current facility-administered medications on file prior to encounter. No current outpatient medications on file prior to encounter. Current Dietary Status:  MS/nectar  Social History/Home Situation:    Home Environment: Private residence  One/Two Story Residence: Other (Comment)  Living Alone: No  Support Systems: Parent, Child(rohan)  Patient Expects to be Discharged to[de-identified] Unknown  Current DME Used/Available at Home: None  OBJECTIVE:   Respiratory Status:  Room air       Oral Motor Structure/Speech:  Oral-Motor Structure/Motor Speech  Labial: No impairment  Dentition: Intact  Oral Hygiene: Adequate  Lingual: No impairment    Cognitive and Communication Status:  Neurologic State: Drowsy; Eyes open to voice  Orientation Level: Oriented to person;Oriented to place;Oriented to time;Disoriented to situation  Cognition: Follows commands;Decreased attention/concentration; Appropriate safety awareness             BEDSIDE SWALLOW EVALUATION  Oral Assessment:     P.O. Trials: The patient was given tsp-small sip amounts of the following:           ORAL PHASE:                   PHARYNGEAL PHASE:                            OTHER OBSERVATIONS:  Rate/bite size: Questionable  Comments:   Endurance: Questionable  Comments:     SPEECH-LANGUAGE COGNITIVE TREATMENT       Auditory Comprehension:                                                           Cognitive Skills Activities: Activities/Procedures listed utilized to improve and/or restore cognitive function as related to attention to tasks, problem solving skills, memory, sequencing and thought organization. Required minimal verbal and tactile cueing to improve improve recall of information and perform graded activities focusing on attention skills. Tool Used: Dysphagia Outcome and Severity Scale (MIR)    Score Comments   Normal Diet  ?  7 With no strategies or extra time needed       Functional Swallow  ? 6 May have mild oral or pharyngeal delay       Mild Dysphagia    ? 5 Which may require one diet consistency restricted (those who demonstrate penetration which is entirely cleared on MBS would be included)   Mild-Moderate Dysphagia  ? 4 With 1-2 diet consistencies restricted       Moderate Dysphagia  ? 3 With 2 or more diet consistencies restricted       Moderately Severe Dysphagia  ? 2 With partial PO strategies (trials with ST only)       Severe Dysphagia  ? 1 With inability to tolerate any PO safely          Score:  Initial: 5 Most Recent: X (Date: -- )   Interpretation of Tool: The Dysphagia Outcome and Severity Scale (MIR) is a simple, easy-to-use, 7-point scale developed to systematically rate the functional severity of dysphagia based on objective assessment and make recommendations for diet level, independence level, and type of nutrition. Score 7 6 5 4 3 2 1   Modifier CH CI CJ CK CL CM CN     Payor: BLUE CROSS / Plan: [place] BLUE CROSS [facility] / Product Type: PPO /     ________________________________________________________________________________________________    Safety:   After treatment position/precautions:  · Up in chair  · RN notified  . Progression/Medical Necessity:   · Patient is expected to demonstrate progress in diet tolerance  ·  to improve swallow safety, work toward diet advancement and decrease aspiration risk  · .  · Patient is expected to demonstrate progress in expressive communication, receptive ability and cognitive ability  ·  to decrease assistance required communication, increase independence with activities of daily living and increase communication with family/caregivers  · . Compliance with Program/Exercises:  Will assess as treatment progresses   Reason for Continuation of Services/Other Comments:  · Patient continues to require skilled intervention due to dysphagia and cognitive-linguistic impairment · .  Recommendations/Intent for next treatment session: \"Treatment next visit will focus on advancements to more challenging activities and reduction in assistance provided\".     Total Treatment Duration:  Time In: 1315  Time Out: [address] , MSP, CCC-SLP

## 2019-07-31 ENCOUNTER — APPOINTMENT (OUTPATIENT)
Dept: CT IMAGING | Age: 44
DRG: 020 | End: 2019-07-31
Attending: NURSE PRACTITIONER
Payer: COMMERCIAL

## 2019-07-31 LAB
ANION GAP SERPL CALC-SCNC: 11 MMOL/L (ref 7–16)
BUN SERPL-MCNC: 30 MG/DL (ref 6–23)
CALCIUM SERPL-MCNC: 8.8 MG/DL (ref 8.3–10.4)
CHLORIDE SERPL-SCNC: 108 MMOL/L (ref 98–107)
CO2 SERPL-SCNC: 23 MMOL/L (ref 21–32)
CREAT SERPL-MCNC: 3.41 MG/DL (ref 0.8–1.5)
ERYTHROCYTE [DISTWIDTH] IN BLOOD BY AUTOMATED COUNT: 14.2 % (ref 11.9–14.6)
GLUCOSE BLD STRIP.AUTO-MCNC: 97 MG/DL (ref 65–100)
GLUCOSE SERPL-MCNC: 95 MG/DL (ref 65–100)
HCT VFR BLD AUTO: 33 % (ref 41.1–50.3)
HGB BLD-MCNC: 10.9 G/DL (ref 13.6–17.2)
MAGNESIUM SERPL-MCNC: 2.3 MG/DL (ref 1.8–2.4)
MAGNESIUM SERPL-MCNC: 3.2 MG/DL (ref 1.8–2.4)
MCH RBC QN AUTO: 30.7 PG (ref 26.1–32.9)
MCHC RBC AUTO-ENTMCNC: 33 G/DL (ref 31.4–35)
MCV RBC AUTO: 93 FL (ref 79.6–97.8)
NRBC # BLD: 0 K/UL (ref 0–0.2)
PLATELET # BLD AUTO: 255 K/UL (ref 150–450)
PMV BLD AUTO: 10.3 FL (ref 9.4–12.3)
POTASSIUM SERPL-SCNC: 4 MMOL/L (ref 3.5–5.1)
RBC # BLD AUTO: 3.55 M/UL (ref 4.23–5.6)
SODIUM SERPL-SCNC: 142 MMOL/L (ref 136–145)
WBC # BLD AUTO: 10.7 K/UL (ref 4.3–11.1)

## 2019-07-31 PROCEDURE — 93886 INTRACRANIAL COMPLETE STUDY: CPT

## 2019-07-31 PROCEDURE — 74011250637 HC RX REV CODE- 250/637: Performed by: NURSE PRACTITIONER

## 2019-07-31 PROCEDURE — 80048 BASIC METABOLIC PNL TOTAL CA: CPT

## 2019-07-31 PROCEDURE — 93888 INTRACRANIAL LIMITED STUDY: CPT | Performed by: PSYCHIATRY & NEUROLOGY

## 2019-07-31 PROCEDURE — 74011250636 HC RX REV CODE- 250/636: Performed by: NURSE PRACTITIONER

## 2019-07-31 PROCEDURE — 70450 CT HEAD/BRAIN W/O DYE: CPT

## 2019-07-31 PROCEDURE — 99232 SBSQ HOSP IP/OBS MODERATE 35: CPT | Performed by: NEUROLOGICAL SURGERY

## 2019-07-31 PROCEDURE — 97535 SELF CARE MNGMENT TRAINING: CPT

## 2019-07-31 PROCEDURE — 83735 ASSAY OF MAGNESIUM: CPT

## 2019-07-31 PROCEDURE — 82962 GLUCOSE BLOOD TEST: CPT

## 2019-07-31 PROCEDURE — 85027 COMPLETE CBC AUTOMATED: CPT

## 2019-07-31 PROCEDURE — 92507 TX SP LANG VOICE COMM INDIV: CPT

## 2019-07-31 PROCEDURE — 99231 SBSQ HOSP IP/OBS SF/LOW 25: CPT | Performed by: PHYSICAL MEDICINE & REHABILITATION

## 2019-07-31 PROCEDURE — 77030020257 HC SOL INJ SOD CL 0.45% 1000ML BG

## 2019-07-31 PROCEDURE — 74011000250 HC RX REV CODE- 250: Performed by: NURSE PRACTITIONER

## 2019-07-31 PROCEDURE — 74011000258 HC RX REV CODE- 258: Performed by: NURSE PRACTITIONER

## 2019-07-31 PROCEDURE — 97530 THERAPEUTIC ACTIVITIES: CPT

## 2019-07-31 PROCEDURE — 65610000001 HC ROOM ICU GENERAL

## 2019-07-31 PROCEDURE — 74011250637 HC RX REV CODE- 250/637

## 2019-07-31 RX ORDER — HYDROMORPHONE HYDROCHLORIDE 2 MG/ML
0.5 INJECTION, SOLUTION INTRAMUSCULAR; INTRAVENOUS; SUBCUTANEOUS
Status: CANCELLED | OUTPATIENT
Start: 2019-07-31

## 2019-07-31 RX ORDER — LIDOCAINE HYDROCHLORIDE 10 MG/ML
0.3 INJECTION INFILTRATION; PERINEURAL ONCE
Status: CANCELLED | OUTPATIENT
Start: 2019-07-31 | End: 2019-07-31

## 2019-07-31 RX ORDER — CITALOPRAM 10 MG/1
10 TABLET ORAL DAILY
Status: DISCONTINUED | OUTPATIENT
Start: 2019-08-01 | End: 2019-08-05 | Stop reason: HOSPADM

## 2019-07-31 RX ORDER — OXYCODONE HYDROCHLORIDE 5 MG/1
10 TABLET ORAL
Status: CANCELLED | OUTPATIENT
Start: 2019-07-31 | End: 2019-08-01

## 2019-07-31 RX ORDER — SODIUM CHLORIDE, SODIUM LACTATE, POTASSIUM CHLORIDE, CALCIUM CHLORIDE 600; 310; 30; 20 MG/100ML; MG/100ML; MG/100ML; MG/100ML
100 INJECTION, SOLUTION INTRAVENOUS CONTINUOUS
Status: CANCELLED | OUTPATIENT
Start: 2019-07-31 | End: 2019-08-01

## 2019-07-31 RX ORDER — ADHESIVE BANDAGE
30 BANDAGE TOPICAL DAILY
Status: DISCONTINUED | OUTPATIENT
Start: 2019-08-01 | End: 2019-08-05 | Stop reason: HOSPADM

## 2019-07-31 RX ORDER — MIDAZOLAM HYDROCHLORIDE 1 MG/ML
2 INJECTION, SOLUTION INTRAMUSCULAR; INTRAVENOUS
Status: CANCELLED | OUTPATIENT
Start: 2019-07-31 | End: 2019-08-01

## 2019-07-31 RX ADMIN — OXYCODONE HYDROCHLORIDE AND ACETAMINOPHEN 500 MG: 500 TABLET ORAL at 09:11

## 2019-07-31 RX ADMIN — Medication 30 ML: at 06:00

## 2019-07-31 RX ADMIN — CLOPIDOGREL BISULFATE 75 MG: 75 TABLET ORAL at 09:11

## 2019-07-31 RX ADMIN — ATORVASTATIN CALCIUM 40 MG: 40 TABLET, FILM COATED ORAL at 21:14

## 2019-07-31 RX ADMIN — OXYCODONE HYDROCHLORIDE AND ACETAMINOPHEN 500 MG: 500 TABLET ORAL at 16:57

## 2019-07-31 RX ADMIN — SODIUM CHLORIDE 100 ML/HR: 450 INJECTION, SOLUTION INTRAVENOUS at 16:57

## 2019-07-31 RX ADMIN — FENTANYL CITRATE 50 MCG: 50 INJECTION INTRAMUSCULAR; INTRAVENOUS at 02:02

## 2019-07-31 RX ADMIN — MAGNESIUM SULFATE HEPTAHYDRATE 2 G: 40 INJECTION, SOLUTION INTRAVENOUS at 06:18

## 2019-07-31 RX ADMIN — OXYCODONE HYDROCHLORIDE AND ACETAMINOPHEN 500 MG: 500 TABLET ORAL at 21:13

## 2019-07-31 RX ADMIN — FENTANYL CITRATE 50 MCG: 50 INJECTION INTRAMUSCULAR; INTRAVENOUS at 03:04

## 2019-07-31 RX ADMIN — LEVETIRACETAM 500 MG: 500 TABLET, FILM COATED ORAL at 09:11

## 2019-07-31 RX ADMIN — FERROUS SULFATE TAB 325 MG (65 MG ELEMENTAL FE) 325 MG: 325 (65 FE) TAB at 13:17

## 2019-07-31 RX ADMIN — ACETAMINOPHEN 650 MG: 325 TABLET, FILM COATED ORAL at 09:11

## 2019-07-31 RX ADMIN — FENTANYL CITRATE 50 MCG: 50 INJECTION INTRAMUSCULAR; INTRAVENOUS at 00:10

## 2019-07-31 RX ADMIN — SENNOSIDES, DOCUSATE SODIUM 2 TABLET: 50; 8.6 TABLET, FILM COATED ORAL at 21:13

## 2019-07-31 RX ADMIN — Medication 30 ML: at 21:30

## 2019-07-31 RX ADMIN — ASPIRIN 325 MG ORAL TABLET 325 MG: 325 PILL ORAL at 09:11

## 2019-07-31 RX ADMIN — NIMODIPINE 60 MG: 30 CAPSULE, LIQUID FILLED ORAL at 17:00

## 2019-07-31 RX ADMIN — FERROUS SULFATE TAB 325 MG (65 MG ELEMENTAL FE) 325 MG: 325 (65 FE) TAB at 09:11

## 2019-07-31 RX ADMIN — HEPARIN SODIUM 5000 UNITS: 5000 INJECTION INTRAVENOUS; SUBCUTANEOUS at 13:17

## 2019-07-31 RX ADMIN — ONDANSETRON 4 MG: 2 INJECTION INTRAMUSCULAR; INTRAVENOUS at 17:31

## 2019-07-31 RX ADMIN — NIMODIPINE 60 MG: 30 CAPSULE, LIQUID FILLED ORAL at 03:06

## 2019-07-31 RX ADMIN — NIMODIPINE 60 MG: 30 CAPSULE, LIQUID FILLED ORAL at 13:00

## 2019-07-31 RX ADMIN — ACETAMINOPHEN 650 MG: 325 TABLET, FILM COATED ORAL at 16:57

## 2019-07-31 RX ADMIN — HEPARIN SODIUM 5000 UNITS: 5000 INJECTION INTRAVENOUS; SUBCUTANEOUS at 06:04

## 2019-07-31 RX ADMIN — SODIUM CHLORIDE, PRESERVATIVE FREE 900 UNITS: 5 INJECTION INTRAVENOUS at 21:30

## 2019-07-31 RX ADMIN — FERROUS SULFATE TAB 325 MG (65 MG ELEMENTAL FE) 325 MG: 325 (65 FE) TAB at 16:57

## 2019-07-31 RX ADMIN — NIMODIPINE 60 MG: 30 CAPSULE, LIQUID FILLED ORAL at 23:43

## 2019-07-31 RX ADMIN — NIMODIPINE 60 MG: 30 CAPSULE, LIQUID FILLED ORAL at 09:00

## 2019-07-31 RX ADMIN — SODIUM CHLORIDE 100 ML/HR: 450 INJECTION, SOLUTION INTRAVENOUS at 06:07

## 2019-07-31 RX ADMIN — FENTANYL CITRATE 50 MCG: 50 INJECTION INTRAMUSCULAR; INTRAVENOUS at 17:20

## 2019-07-31 RX ADMIN — HEPARIN SODIUM 5000 UNITS: 5000 INJECTION INTRAVENOUS; SUBCUTANEOUS at 21:14

## 2019-07-31 RX ADMIN — ACETAMINOPHEN 650 MG: 325 TABLET, FILM COATED ORAL at 03:04

## 2019-07-31 RX ADMIN — NIMODIPINE 60 MG: 30 CAPSULE, LIQUID FILLED ORAL at 20:56

## 2019-07-31 RX ADMIN — LEVETIRACETAM 500 MG: 500 TABLET, FILM COATED ORAL at 21:13

## 2019-07-31 RX ADMIN — SODIUM CHLORIDE, PRESERVATIVE FREE 900 UNITS: 5 INJECTION INTRAVENOUS at 13:19

## 2019-07-31 RX ADMIN — Medication 30 ML: at 13:19

## 2019-07-31 RX ADMIN — SODIUM CHLORIDE, PRESERVATIVE FREE 900 UNITS: 5 INJECTION INTRAVENOUS at 06:03

## 2019-07-31 RX ADMIN — FAMOTIDINE 20 MG: 20 TABLET ORAL at 09:11

## 2019-07-31 RX ADMIN — FENTANYL CITRATE 50 MCG: 50 INJECTION INTRAMUSCULAR; INTRAVENOUS at 22:17

## 2019-07-31 NOTE — ANESTHESIA PREPROCEDURE EVALUATION
Relevant Problems   No relevant active problems       Anesthetic History   No history of anesthetic complications            Review of Systems / Medical History  Patient summary reviewed and pertinent labs reviewed    Pulmonary        Sleep apnea           Neuro/Psych             Comments: SAH due to aneurysm rupture on 7/9 - lengthy hospital stay complicated by cerebral vasospasm and MSSA PNA. Cardiovascular                  Exercise tolerance: <4 METS  Comments: ECHO 7/19 - preserved EF   GI/Hepatic/Renal  Within defined limits              Endo/Other  Within defined limits           Other Findings            Physical Exam    Airway            Comments: Unable to obtain due to patient participation Cardiovascular    Rhythm: regular  Rate: normal         Dental         Pulmonary      Decreased breath sounds           Abdominal         Other Findings            Anesthetic Plan    ASA: 3  Anesthesia type: general          Induction: Intravenous  Anesthetic plan and risks discussed with: Patient      Patient apparently A&O x4 but very soft-spoken. Nothing of pertinence gained from the interview.

## 2019-07-31 NOTE — PROGRESS NOTES
Pt assisted back to bed with 2 RNs. Pt tolerated well, VSS, EVD in place and draining. Call light within reach. Bed alarm on.

## 2019-07-31 NOTE — PROGRESS NOTES
Bedside shift change report given to Carl Ludwig RN (oncoming nurse) by Faustino Kaur RN (offgoing nurse). Report included the following information SBAR, Intake/Output, Cardiac Rhythm NS/ST and Alarm Parameters . Joint neuro assessment completed by oncoming and offgoing RNs- Both RNs in agreement of findings.

## 2019-07-31 NOTE — PROGRESS NOTES
Problem: Mobility Impaired (Adult and Pediatric)  Goal: *Acute Goals and Plan of Care (Insert Text)  Description  STG:  (1.)Mr. Jd Gonzales will move from supine to sit and sit to supine , scoot up and down and roll side to side with CONTACT GUARD ASSIST within 3 treatment day(s). GOAL MET 7/31/2019   (2.)Mr. Jd Gonzales will transfer from bed to chair and chair to bed with CONTACT GUARD ASSIST using the least restrictive device within 3 treatment day(s). (3.)Mr. Jd Gonzales will ambulate with MINIMAL ASSIST for 50+ feet with the least restrictive device within 3 treatment day(s). GOAL MET 7/31/2019  (4.)Mr. Jd Gonzales will perform standing static and dynamic balance activities x 15 minutes with MINIMAL ASSIST to improve safety within 3 day(s). (5.)Mr. Jd Gonzales will maintain stable vital signs throughout all functional mobility within 3 days. GOAL MET 7/31/2019    LTG:  (1.)Mr. Jd Gonzales will move from supine to sit and sit to supine , scoot up and down and roll side to side in bed with SUPERVISION within 7 treatment day(s). (2.)Mr. Jd Gonzales will transfer from bed to chair and chair to bed with STAND BY ASSIST using the least restrictive device within 7 treatment day(s). (3.)Mr. Jd Gonzales will ambulate with STAND BY ASSIST for 75+ feet with the least restrictive device within 7 treatment day(s). (4.)Mr. Jd Gonzales will perform standing static and dynamic balance activities x 15 minutes with STAND BY ASSIST to improve safety within 7 day(s).   ________________________________________________________________________________________________     Outcome: Progressing Towards Goal     PHYSICAL THERAPY: Daily Note and AM 7/31/2019  INPATIENT: PT Visit Days : 5  Payor: Charly Ward / Plan: [facility] / Product Type: PPO /       NAME/AGE/GENDER: Corky Mack is a 37 y.o. 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 ASSESSMENT:     Mr. Ca Oro is supine on arrival, eyes closed, open to voice but required stimulation to stay open. Pt with minimal conversation, making no attempts to verbalize words. Pt appears drowsy and withdrawn throughout treatment. Pt seen with OT this am, co treatment appropriate at this time due to patient's decreased endurance and activity tolerance levels, as well as high level of assistance for functional mobility, transfers and overall general tasks. Pt required CGA to [facility]  For bed mobility, transfers and ambulation. Pt required CGA to MIN A x 2 for ambulation, gait belt, close contact and HHA x 2. Pt demo fair static standing balance, unsteady with gait. Pt required cues for posture, to keep eyes open with ambulation, several rest breaks required. PT attempted to engage pt in making decisions on direction and using voice; however, pt minimally interactive. Pt noted to have slight L foot drag at times, decreased swing phase. Pt is working toward therapy goals, has met several STG this date. Pt continues to function well below baseline level of activity, safety concerns and high risk for falls. Pt will benefit from [facility] at discharge. Per initial:  37 y.o. Male admitted for subarachnoid hemorrhage in bed in ICU. Per RN pt continuing to get hourly neuro checks     At this time, patient is appropriate for Co-treatment with occupational therapy due to patient's decreased overall endurance/tolerance levels, as well as need for high level assistance to complete functional transfers/mobility and functional tasks. Hiren Hancock is appropriate for a multidisciplinary co-treatment of PT and OT to address goals of both disciplines.        This section established at most recent assessment   PROBLEM LIST (Impairments causing functional limitations):  1. Decreased Strength  2. Decreased ADL/Functional Activities  3. Decreased Transfer Abilities  4. Decreased Ambulation Ability/Technique  5. Decreased Balance  6. Decreased Activity Tolerance  7. Decreased Pacing Skills  8. Increased Shortness of Breath  9. Decreased Knowledge of Precautions  10. Decreased [place] with Home Exercise Program   INTERVENTIONS PLANNED: (Benefits and precautions of physical therapy have been discussed with the patient.)  1. Balance Exercise  2. Bed Mobility  3. Family Education  4. Gait Training  5. Home Exercise Program (HEP)  6. Therapeutic Activites  7. Therapeutic Exercise/Strengthening  8. Transfer Training     TREATMENT PLAN: Frequency/Duration: 3 times a week for duration of hospital stay  Rehabilitation Potential For Stated Goals: Good     REHAB RECOMMENDATIONS (at time of discharge pending progress):    Placement: It is my opinion, based on this patient's performance to date, that Mr. Rony Whaley may benefit from intensive therapy at an [address] [place] [address] after discharge due to a probable need for close medical supervision by a rehab physician, a probable need for 24 hour rehab nursing, a probable need for multiple therapy disciplines and potential to make ongoing and sustainable functional improvement that is of practical value. .  Equipment:    None at this time              HISTORY:   History of Present Injury/Illness (Reason for Referral):  Subarachnoid hemorrhage. Past Medical History/Comorbidities:   Mr. Rony Whaley  has no past medical history on file. Mr. Rony Whaley  has a past surgical history that includes ir emboli intracran lt (7/11/2019).   Social History/Living Environment:   Home Environment: Private residence  One/Two Story Residence: Other (Comment)  Living Alone: No  Support Systems: Parent, Child(rohan)  Patient Expects to be Discharged to[de-identified] Unknown  Current DME Used/Available at Home: None  Prior Level of Function/Work/Activity:  Unsure, RN reports patient worked. Number of Personal Factors/Comorbidities that affect the Plan of Care: 1-2: MODERATE COMPLEXITY   EXAMINATION:   Most Recent Physical Functioning:   Gross Assessment:                  Posture:  Posture Assessment: Forward head, Rounded shoulders  Balance:  Sitting: Impaired  Sitting - Static: Good (unsupported)  Sitting - Dynamic: Fair (occasional)  Standing: Impaired  Standing - Static: Fair  Standing - Dynamic : Constant support; Fair Bed Mobility:  Supine to Sit: Contact guard assistance  Sit to Supine: (left up in chair)  Scooting: Contact guard assistance  Wheelchair Mobility:     Transfers:  Sit to Stand: Contact guard assistance;Minimum assistance  Stand to Sit: Minimum assistance  Bed to Chair: Contact guard assistance;Minimum assistance  Gait:     Base of Support: Center of gravity altered;Narrowed  Speed/[place]minata: Shuffled; Slow  Step Length: Left shortened;Right shortened  Swing Pattern: Left asymmetrical  Gait Abnormalities: Decreased step clearance; Path deviations; Shuffling gait(decreased swing phase L foot)  Distance (ft): 150 Feet (ft)(several standing breaks, cues)  Assistive Device: Gait belt(HHA x 2)  Ambulation - Level of Assistance: Contact guard assistance;Minimal assistance;Assist x2  Interventions: Safety awareness training;Verbal cues      Body Structures Involved:  1. Nerves  2. Heart  3. Lungs  4. Muscles Body Functions Affected:  1. Sensory/Pain  2. Voice and Speech  3. Cardio  4. Respiratory  5. Neuromusculoskeletal  6. Movement Related Activities and Participation Affected:  1. Learning and Applying Knowledge  2. General Tasks and Demands  3. Communication  4. Mobility  5. Self Care  6. Domestic Life  7. Interpersonal Interactions and Relationships  8.  Community, Social and [place]   Number of elements that affect the Plan of Care: 4+: HIGH COMPLEXITY   CLINICAL PRESENTATION:   Presentation: Evolving clinical presentation with changing clinical characteristics: MODERATE COMPLEXITY   CLINICAL DECISION MAKIN[address] [place] [address] AM-PAC 6 Clicks   Basic Mobility Inpatient Short Form  How much difficulty does the patient currently have. .. Unable A Lot A Little None   1. Turning over in bed (including adjusting bedclothes, sheets and blankets)? ? 1   ? 2   ? 3   ? 4   2. Sitting down on and standing up from a chair with arms ( e.g., wheelchair, bedside commode, etc.)   ? 1   ? 2   ? 3   ? 4   3. Moving from lying on back to sitting on the side of the bed?   ? 1   ? 2   ? 3   ? 4   How much help from another person does the patient currently need. .. Total A Lot A Little None   4. Moving to and from a bed to a chair (including a wheelchair)? ? 1   ? 2   ? 3   ? 4   5. Need to walk in hospital room? ? 1   ? 2   ? 3   ? 4   6. Climbing 3-5 steps with a railing? ? 1   ? 2   ? 3   ? 4   © , Trustees of [address] [place] [address], under license to [employer]. All rights reserved      Score:  Initial: 10 Most Recent: X (Date: -- )    Interpretation of Tool:  Represents activities that are increasingly more difficult (i.e. Bed mobility, Transfers, Gait). Medical Necessity:     · Patient demonstrates good  ·  rehab potential due to higher previous functional level. Reason for Services/Other Comments:  · Patient continues to require modification of therapeutic interventions to increase complexity of exercises  · . Use of outcome tool(s) and clinical judgement create a POC that gives a: Questionable prediction of patient's progress: MODERATE COMPLEXITY            TREATMENT:   (In addition to Assessment/Re-Assessment sessions the following treatments were rendered)   Pre-treatment Symptoms/Complaints:  Drowsy, constant cues to stay awake  Pain: Initial:   Pain Intensity 1: 0  Post Session:  010     Therapeutic Activity: (    30 ):   Therapeutic activities including bed transfers, Chair transfers, ambulation on level ground, posture, static standing balance and sitting activities to improve mobility, strength, balance, coordination and activity tolerance . Required minimal assist to promote static and dynamic balance in standing and promote coordination of bilateral, lower extremity(s). Date:  7/15/19 Date:   Date:     Activity/Exercise Parameters Parameters Parameters   Seated heel raises x15 B A     Seated toe raises x15 B A     Seated LAQ x15 B A                               Gait Training (   minutes):  Gait training to improve and/or restore physical functioning as related to mobility, strength, balance and coordination. Ambulated 150 Feet (ft)(several standing breaks, cues) with Contact guard assistance;Minimal assistance;Assist x2 using a Gait belt(HHA x 2) and minimal Safety awareness training;Verbal cues related to their stride length and posture to promote proper body posture and promote proper body mechanics. Today's treatment session addressed Decreased Strength, Decreased ADL/Functional Activities, Decreased Transfer Abilities, Decreased Ambulation Ability/Technique, Decreased Balance and Decreased [place] with Home Exercise Program to progress towards achieving goal(s) 1, 2, 3, 4 and 5. During this session, occupational therapy addressed ADLs and self care, neuro reeducation to progress towards their discipline specific goal(s). Co-treatment was necessary to improve patient's cognitive participation, ability to follow higher level commands and ability to return to normal functional activity. Braces/Orthotics/Lines/Etc:   · IV  · phelps catheter  · drain EVD  · arterial line  ·    Treatment/Session Assessment:    · Response to Treatment:  Flat affect but cooperative.   · Interdisciplinary Collaboration:   o Physical Therapist  o Occupational Therapist  o Registered Nurse  · After treatment position/precautions:   o Up in chair  o Bed alarm/tab alert on  o Bed/Chair-wheels locked  o Bed in low position  o Call light within reach  o RN notified  o Nurse at bedside   · Compliance with Program/Exercises: Compliant all of the time  · Recommendations/Intent for next treatment session: \"Next visit will focus on advancements to more challenging activities and reduction in assistance provided\".   Total Treatment Duration:  PT Patient Time In/Time Out  Time In: 1046  Time Out: 5500 Robel Pace, PT

## 2019-07-31 NOTE — INTERDISCIPLINARY ROUNDS
Interdisciplinary team rounds were held 7/31/2019 with the following team members:Nursing, Nurse Practitioner, Nutrition, Pharmacy, Physician and Clinical Coordinator     Plan of care discussed. See clinical pathway and/or care plan for interventions and desired outcomes.

## 2019-07-31 NOTE — PROGRESS NOTES
Progress Note    Patient: Winifred Fuentes MRN: [id number]  SSN: [id number]    YOB: 1975  Age: 37 y.o. Sex: male      Admit Date: 7/9/2019    LOS: 22 days     Subjective:   Pt with no acute neuro changes. EVD @5/open.       Current Facility-Administered Medications   Medication Dose Route Frequency    0.9% sodium chloride infusion 250 mL  250 mL IntraVENous PRN    nicotine (NICODERM CQ) 14 mg/24 hr patch 1 Patch  1 Patch TransDERmal Q24H    hydrogen peroxide 3 %   Topical PRN    acetaminophen (TYLENOL) tablet 650 mg  650 mg Oral Q4H PRN    lidocaine 4 % patch 1 Patch  1 Patch TransDERmal Q24H    fentaNYL citrate (PF) injection 50 mcg  50 mcg IntraVENous Q1H PRN    ferrous sulfate tablet 325 mg  1 Tab Oral TID WITH MEALS    levETIRAcetam (KEPPRA) tablet 500 mg  500 mg Oral Q12H    niMODipine (NIMOTOP) 30 mg/mL oral solution (compound) 60 mg  60 mg Oral Q4H    0.45% sodium chloride infusion  100 mL/hr IntraVENous CONTINUOUS    ascorbic acid (vitamin C) (VITAMIN C) tablet 500 mg  500 mg Oral TID    aspirin tablet 325 mg  325 mg Oral DAILY    atorvastatin (LIPITOR) tablet 40 mg  40 mg Oral QHS    clopidogrel (PLAVIX) tablet 75 mg  75 mg Oral DAILY    famotidine (PEPCID) tablet 20 mg  20 mg Oral DAILY    senna-docusate (PERICOLACE) 8.6-50 mg per tablet 2 Tab  2 Tab Oral QHS    magnesium hydroxide (MILK OF MAGNESIA) 400 mg/5 mL oral suspension 30 mL  30 mL Oral DAILY PRN    polyvinyl alcohol (LIQUIFILM TEARS) 1.4 % ophthalmic solution 1 Drop  1 Drop Both Eyes PRN    heparin (porcine) injection 5,000 Units  5,000 Units SubCUTAneous Q8H    sodium chloride (NS) flush 30 mL  30 mL InterCATHeter Q8H    heparin (porcine) pf 900 Units  900 Units InterCATHeter Q8H    sodium chloride (NS) flush 30 mL  30 mL InterCATHeter PRN    heparin (porcine) pf 900 Units  900 Units InterCATHeter PRN    ondansetron (ZOFRAN) injection 4 mg  4 mg IntraVENous Q6H PRN    NUTRITIONAL SUPPORT ELECTROLYTE PRN ORDERS   Does Not Apply PRN    magnesium sulfate 4 g/100 mL IVPB  4 g IntraVENous DAILY PRN    magnesium sulfate 2 g/50 ml IVPB (premix or compounded)  2 g IntraVENous DAILY PRN       Objective:     Vitals:    07/31/19 0725 07/31/19 0800 07/31/19 0900 07/31/19 1000   BP:       Pulse:  (!) 107 (!) 111 (!) 102   Resp:  20 24 12   Temp:  99.5 °F (37.5 °C) 99.3 °F (37.4 °C) 99.1 °F (37.3 °C)   SpO2:  100% 96% 99%   Weight: 145 lb 8.1 oz (66 kg)      Height:             Intake and Output:  Current Shift: 07/31 0701 - 07/31 1900  In: -   Out: 295 [Urine:250; Drains:45]  Last 24 hr: 07/30 0701 - 07/31 0700  In: 3261.7 [P.O.:960; I.V.:2301.7]  Out: 5567 [Urine:3450; Drains:289]     IO: -477cc/24hr  TOTAL OVERALL: -24L  EVD: 289cc/24hrs     Physical Exam:   General:  More awake today, following commands. [place]. Eyes:   PERRL   Neck: Supple, symmetrical, trachea midline, no adenopathy, thyroid: no enlargment/tenderness/nodules, no carotid bruit and no JVD. Lungs:   Clear to auscultation bilaterally. Heart:  Regular rate and rhythm, S1, S2 normal, no murmur, click, rub or gallop. Abdomen:   Soft, non-tender. Bowel sounds normal. No masses,  No organomegaly. Extremities: Extremities normal, atraumatic, no cyanosis. Pulses: 2+ and symmetric all extremities. Skin: Skin color, texture, turgor normal. EVD intact. Neurologic: follows commands, stvoall, wd pain all extrems.        Lab/Data Review:    Recent Results (from the past 12 hour(s))   MAGNESIUM    Collection Time: 07/31/19  5:02 AM   Result Value Ref Range    Magnesium 2.3 1.8 - 2.4 mg/dL   METABOLIC PANEL, BASIC    Collection Time: 07/31/19  5:02 AM   Result Value Ref Range    Sodium 142 136 - 145 mmol/L    Potassium 4.0 3.5 - 5.1 mmol/L    Chloride 108 (H) 98 - 107 mmol/L    CO2 23 21 - 32 mmol/L    Anion gap 11 7 - 16 mmol/L    Glucose 95 65 - 100 mg/dL    BUN 30 (H) 6 - 23 MG/DL    Creatinine 3.41 (H) 0.8 - 1.5 MG/DL    GFR est AA 25 (L) >60 ml/min/1.73m2 GFR est non-AA 21 (L) >60 ml/min/1.73m2    Calcium 8.8 8.3 - 10.4 MG/DL   CBC W/O DIFF    Collection Time: 07/31/19  5:02 AM   Result Value Ref Range    WBC 10.7 4.3 - 11.1 K/uL    RBC 3.55 (L) 4.23 - 5.6 M/uL    HGB 10.9 (L) 13.6 - 17.2 g/dL    HCT 33.0 (L) 41.1 - 50.3 %    MCV 93.0 79.6 - 97.8 FL    MCH 30.7 26.1 - 32.9 PG    MCHC 33.0 31.4 - 35.0 g/dL    RDW 14.2 11.9 - 14.6 %    PLATELET 711 033 - 554 K/uL    MPV 10.3 9.4 - 12.3 FL    ABSOLUTE NRBC 0.00 0.0 - 0.2 K/uL   MAGNESIUM    Collection Time: 07/31/19  9:27 AM   Result Value Ref Range    Magnesium 3.2 (H) 1.8 - 2.4 mg/dL       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)    Active Problems:    Tobacco abuse (7/10/2019)      Acute respiratory failure with hypoxia (HCC) (7/10/2019)      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 Q1 hour neuro checks. On SAH protocol - Mg, nimotop, zocor. PERRL +3. No MAP goal now. TCDs daily - they have been 100s in the left and right MCAs. Last CTA head and neck showed no filling of the aneurysm and good filling of the PCAs. Vasospasm is still seen in the MCAs and ACAs but better. CTP was normal. Will continue SAH protocol, PT/OT. OOB to chair today. Pt had EVD replaced 7/20 due to it getting pulled put. EVD @ 5cm H20. Will plan for  shunt this Thur, 8-1. [place]:419/ [place]:255. Following commands this morning. CT head this am which I reviewed shows the ventricles are smaller and the IVH much improved.  Patient remains on Keppra and has had no further seizure activity since his admission. Consent for the shunt is done. RESP: Tolerating RA. No distress. CV: NO MAP GOAL NOW.  2D Echo: 55-60%, trop peaked @ 0.08. SCD/SQ heparin. HEME: HH: 10/33 - stable after transfusion ,   HIT panel negative. On oral iron. And Vit C. NEPH: bun/cr: 30/3.4. UOP is good. Monitor. GI: Pepcid. Senna. +BS. Abdomen is not distended or hard. Will give MOM again. NPO after midnight for  shunt placement in am.  ID: Tm: 100. Sputum cx gram + cocci -  Patient had MSSA pneumonia, abx completed. Blood cultures neg and UA neg. US neg for DVT. CSF cx neg 7/22 and 7/27 for any organisms, protein 186. LINES: RUE PICC, SHANNAN phelps. Tobacco abuse: On Nicoderm patch.       Signed By: Trip Cota NP     July 31, 2019

## 2019-07-31 NOTE — PROGRESS NOTES
A follow up visit was made to the patient. Emotional support, spiritual presence and   prayer were provided.  observed the therapists and his nurse walking with the patient later in the morning.       [employer]

## 2019-07-31 NOTE — PROGRESS NOTES
Problem: Delirium Treatment  Goal: *Level of consciousness restored to baseline  Outcome: Progressing Towards Goal- Pt is still confused but recognizes family and follows commands most of the time. Problem: Falls - Risk of  Goal: *Absence of Falls  Description  Document Keith Rai Fall Risk and appropriate interventions in the flowsheet. Outcome: Progressing Towards Goal  Note:   Fall Risk Interventions:  Mobility Interventions: Bed/chair exit alarm, Communicate number of staff needed for ambulation/transfer,Strengthening exercises (ROM-active/passive)    Mentation Interventions: Hydration, pain control, Door open when patient unattended  Increase mobility, More frequent rounding, Reorient patient.      Medication Interventions: Patient to call before getting OOB, Teach patient to arise slowly    Elimination Interventions: Call light in reach, Patient to call for help with toileting needs, Toileting schedule/hourly rounds        Problem: Subarachnoid Hemorrhage Stroke:Admission Day 5-Discharge  Goal: Activity/Safety  Outcome: Progressing Towards Goal  Goal: Nutrition/Diet  Outcome: Maintaining- Appetite is fair  Goal: Patient maintains clear airway/absence of aspiration  Outcome: Maintaining  Goal: *Hemodynamically stable  Outcome: Progressing Towards Goal  Goal: *Absence of signs and symptoms of DVT  Outcome: Progressing Towards Goal     Problem: Breathing Pattern - Ineffective  Goal: *Absence of hypoxia  Outcome: Progressing Towards Goal on RA- SpO2- 99%     Problem: Infection - Risk of, Urinary Catheter-Associated Urinary Tract Infection  Goal: *Absence of infection signs and symptoms  Outcome: Maintaining     Problem: Delirium Treatment  Goal: *Emotional stability restored to baseline  Outcome: Not Progressing Towards Goal

## 2019-07-31 NOTE — PROGRESS NOTES
Problem: Neurolinguistics Impaired (Adult)  Goal: *Acute Goals and Plan of Care (Insert Text)  Description  STG: Patient will recall verbal information after a 3 minute delay with 80% accuracy and minimal assistance. STG: Patient will recall orientation information with 100% accuracy with minimal assistance. STG: Patient will participate in continued therapeutic assessment of cognitive-linguistic abilities. STG: Patient will respond to questions with appropriate volume on 40% of responses with moderate cues  STG: Patient will respond to questions with <10 second response latency with moderate cues on 80% of question. LTG: Patient will increase neuro-linguistic abilities to increase safety and awareness of deficits. Outcome: Progressing Towards Goal  SPEECH LANGUAGE PATHOLOGY:  SPEECH LANGUAGE NOTE: Daily Note 8  NAME/AGE/GENDER: Konrad Santos is a 37 y.o. male  DATE: 7/31/2019  PRIMARY DIAGNOSIS: SAH (subarachnoid hemorrhage) ([facility] [I60.9]      ICD-10: Treatment Diagnosis: R13.12 Oropharyngeal Dysphagia. I.09.434 Cognitive-Communication Deficit    INTERDISCIPLINARY COLLABORATION: Registered Nurse  ASSESSMENT:   Ongoing cognitive-linguistic treatment today. Initially patient's eyes open and slightly smiling. He reports he is having a \"good day\" and had just worked with other therapies. Unable to recall names of therapists or RN. Once clinician initiated speech therapy tasks, he became less engaged. He closed his eyes and would not open despite encouragement. Delayed responses and only mouthing words- no voicing. Attempted to engage patient with unstructured conversation and discussion regarding family, but no responses. Patient tasked with identifying problems in pictured situations. Moderate encouragement for patient to open eyes and look at pictures. When provided with verbal cues and extended time to respond he identified problem on 1/4 card presented.  Accuracy increased to 75% with additional cues. No response on final trial. Attempted to have patient identify solution to each pictured problem, but no response with him closing his eyes    Spoke with patient regarding role of speech therapy. It remains difficult to fully assess his functional abilities given delayed responses or no responses to tasks. Suspect current tasks are not complex enough to challenge or engage patient, but unable to determine most appropriate functional tasks with patient is not responding. Encouraged patient to attempt task in attempt to further assess his abilities. He nodded head in agreement, but no improvement in participation. Unclear if patient is unable to participate or choosing not to. Patient will benefit from ongoing cognitive linguistic treatment to improve communication, safety, and independence. Will continue to follow. ?????? ? ? This section established at most recent assessment??????????  PROBLEM LIST (Impairments causing functional limitations):  1. Oropharyngeal dysphagia  2. Cognitive-linguistic impairments  REHABILITATION POTENTIAL FOR STATED GOALS: Good  PLAN OF CARE:   Patient will benefit from skilled intervention to address the following impairments. RECOMMENDATIONS AND PLANNED INTERVENTIONS (Benefits and precautions of therapy have been discussed with the patient.):  · PO:  Regular  · Liquids:  regular thin   · No straws  MEDICATIONS:  · With liquid  COMPENSATORY STRATEGIES/MODIFICATIONS INCLUDING:  · Small sips and bites  · Slow rate of intake   OTHER RECOMMENDATIONS (including follow up treatment recommendations): · Family training/education  · Patient education  RECOMMENDED DIET MODIFICATIONS DISCUSSED WITH:  · Nursing  · Patient  FREQUENCY/DURATION: Continue to follow patient 3 times a week for duration of hospital stay to address above goals.    RECOMMENDED REHABILITATION/EQUIPMENT: (at time of discharge pending progress): Due to the probability of continued deficits (see above) this patient will likely need continued skilled speech therapy after discharge. SUBJECTIVE:   Limited participation in session today   History of Present Injury/Illness: Mr. Hemanth Canchola  has no past medical history on file. Nick Falconr He also  has a past surgical history that includes ir emboli intracran lt (7/11/2019). Present Symptoms: oropharyngeal dysphagia   Pain Intensity 1: 0  Pain Location 1: Neck, Head  Pain Orientation 1: Mid, Lower  Pain Intervention(s) 1: Medication (see MAR)  Current Medications:   No current facility-administered medications on file prior to encounter. No current outpatient medications on file prior to encounter. Current Dietary Status:  MS/nectar  Social History/Home Situation:    Home Environment: Private residence  One/Two Story Residence: Other (Comment)  Living Alone: No  Support Systems: Parent, Child(rohan)  Patient Expects to be Discharged to[de-identified] Unknown  Current DME Used/Available at Home: None  OBJECTIVE:   Respiratory Status:          Oral Motor Structure/Speech:  Oral-Motor Structure/Motor Speech  Labial: No impairment  Dentition: Intact  Oral Hygiene: Adequate  Lingual: No impairment    Cognitive and Communication Status:  Neurologic State: Eyes open to voice  Orientation Level: Disoriented to situation;Disoriented to time;Oriented to person;Oriented to place  Cognition: Follows commands;Decreased attention/concentration           Neuro-Linguistics:                 Verbal Problem Solving Exercises  ID of Unsafe Situation in Picture Accuracy (%): (25)                               Cognitive Skills Activities: Activities/Procedures listed utilized to improve and/or restore cognitive function as related to attention to tasks, problem solving skills, memory, sequencing and thought organization. Required minimal verbal and tactile cueing to improve improve recall of information and perform graded activities focusing on attention skills.     Tool Used: Dysphagia Outcome and Severity Scale (MIR) Score Comments   Normal Diet  ? 7 With no strategies or extra time needed       Functional Swallow  ? 6 May have mild oral or pharyngeal delay       Mild Dysphagia    ? 5 Which may require one diet consistency restricted (those who demonstrate penetration which is entirely cleared on MBS would be included)   Mild-Moderate Dysphagia  ? 4 With 1-2 diet consistencies restricted       Moderate Dysphagia  ? 3 With 2 or more diet consistencies restricted       Moderately Severe Dysphagia  ? 2 With partial PO strategies (trials with ST only)       Severe Dysphagia  ? 1 With inability to tolerate any PO safely          Score:  Initial: 5 Most Recent: X (Date: -- )   Interpretation of Tool: The Dysphagia Outcome and Severity Scale (MIR) is a simple, easy-to-use, 7-point scale developed to systematically rate the functional severity of dysphagia based on objective assessment and make recommendations for diet level, independence level, and type of nutrition. Score 7 6 5 4 3 2 1   Modifier CH CI CJ CK CL CM CN     Payor: BLUE CROSS / Plan: [place] BLUE CR[place] [facility] / Product Type: PPO /     ________________________________________________________________________________________________    Safety:   After treatment position/precautions:  · Up in chair  · RN notified  . Progression/Medical Necessity:   · Patient is expected to demonstrate progress in diet tolerance  ·  to improve swallow safety, work toward diet advancement and decrease aspiration risk  · .  · Patient is expected to demonstrate progress in expressive communication, receptive ability and cognitive ability  ·  to decrease assistance required communication, increase independence with activities of daily living and increase communication with family/caregivers  · . Compliance with Program/Exercises:  Will assess as treatment progresses   Reason for Continuation of Services/Other Comments:  · Patient continues to require skilled intervention due to dysphagia and cognitive-linguistic impairment   · . Recommendations/Intent for next treatment session: \"Treatment next visit will focus on advancements to more challenging activities and reduction in assistance provided\".     Total Treatment Duration:  Time In: 1128  Time Out: [address], [address] CCC-SLP

## 2019-07-31 NOTE — PROGRESS NOTES
Nutrition F/U:  Assessment:  Weight 66 kg (ICU bed 7/31/19), edema - none. The patient's intake remains variable \"depending on the day\". Some days he feeds himself and other days he needs to be fed since his hand-eye coordination is poor. Noted plans for VPS tomorrow. Macronutrient Needs: (69 kg)  Estimated calorie needs - 9838-0852 jayce/day (25-28 jayce/kg/day)   Estimated protein needs - 69-83 gm pro/day (1-1.2 gm pro/kgBW/day)   Max CHO/day - 266 gm CHO/day (55% jayce/day)   Fluid/day - 1.7-2 liters/day (1 ml/jayce/day)  Intake/Comparative Standards: This patient's average meal intake of regular diet for the past 10 recorded days/21 meals: 69%. This potentially meets 90% of calorie and 93% of protein needs. No supplement intake is recorded. Intervention:   Meals and Snacks: Regular. Medical Food Supplement Therapy: Commercial Beverage: Continue supplements as ordered with meals. Mineral Supplement Therapy: Nutrition Support Orders/Electrolyte Management Replacement Protocols are active on the [facility] for potassium and magnesium. Feeding Assistance: When needed to ensure adequate intake. Coordination of Nutrition Care by a Nutrition Professional: AM ICU rounds. Nutrition Discharge Plan: Too soon to determine. Suzette Teague.  [facility]  [phone]

## 2019-07-31 NOTE — PROGRESS NOTES
Problem: Self Care Deficits Care Plan (Adult)  Goal: *Acute Goals and Plan of Care (Insert Text)  Description  1. Patient will complete total body bathing and dressing with supervision and adaptive equipment as needed. 2. Patient will complete toileting with supervision and adaptive equipment as needed. 3. Patient will tolerate 20 minutes of OT treatment with up to minimal rest breaks to increase activity tolerance for ADLs. 4. Patient will complete functional transfers with supervision and adaptive equipment as needed. 5. Patient will demonstrate modified independence with therapeutic exercise HEP to decrease edema in bilateral hands for increased coordination and independence with functional transfers. 6. Patient will complete functional mobility for ADLs with stand by assistance and adaptive equipment as needed. Timeframe: 7 visits      Outcome: Progressing Towards Goal     OCCUPATIONAL THERAPY: Daily Note and PM 7/31/2019  INPATIENT: OT Visit Days: 4  Payor: Sophy Owen / Plan: [place] [employer] [facility] / Product Type: PPO /      NAME/AGE/GENDER: Faustino Koo is a 37 y.o. male   PRIMARY DIAGNOSIS:  SAH (subarachnoid hemorrhage) (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         ICD-10: Treatment Diagnosis:    · Other lack of cordination (R27.8)   Precautions/Allergies:    Patient has no known allergies. ASSESSMENT:     Mr. Deanna Cox is a 37 y.o. 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 Seen today in ICU with external ventricular drain and arterial line, pt intubated. Per father at bedside/pt with appropriate nodding to yes/no questions: at baseline pt and his two children live with pt's father, pt is independent with ADLs, ambulation, working a physically demanding job. No recent falls. 7/31/2019:  Patient supine in bed; RN clamped drain.  PROM/AAROM completed to B UEs. Patient sat on edge of bed, and he stood with minimal assist x 1, and patient performed oral care with set up assistance. Encouraged patient to verbalize, and the most he will do is whisper with prompting. Patient stood with minimal assist x & CGA x 1, and ambulated in hallway. Patient with Physical Therapy when Occupational Therapist finished with patient. Slow, steady progress noted. Still difficult with trying to get patient to verbalize. Cont OT per tx plan. This section established at most recent assessment   PROBLEM LIST (Impairments causing functional limitations):  1. Decreased ADL/Functional Activities  2. Decreased Transfer Abilities  3. Decreased Ambulation Ability/Technique  4. Decreased Balance  5. Decreased Activity Tolerance  6. Increased Fatigue  7. Increased Shortness of Breath  8. Decreased Flexibility/Joint Mobility  9. Edema/Girth  10. Decreased Knowledge of Precautions  11. Decreased Skin Integrity/Hygeine  12. Decreased [place] with Home Exercise Program   INTERVENTIONS PLANNED: (Benefits and precautions of occupational therapy have been discussed with the patient.)  1. Activities of daily living training  2. Adaptive equipment training  3. Balance training  4. Clothing management  5. Donning&doffing training  6. Hygiene training  7. Neuromuscular re-eduation  8. Re-evaluation  9. Therapeutic activity  10. Therapeutic exercise     TREATMENT PLAN: Frequency/Duration: Follow patient 3x/week to address above goals. Rehabilitation Potential For Stated Goals: Good     REHAB RECOMMENDATIONS (at time of discharge pending progress):    Placement:   It is my opinion, based on this patient's performance to date, that Mr. Karlee Gar may benefit from intensive therapy at an [address] [place] [address] after discharge due to a probable need for close medical supervision by a rehab physician, a probable need for 24 hour rehab nursing, a probable need for multiple therapy disciplines and potential to make ongoing and sustainable functional improvement that is of practical value. .  Equipment:    TBD               OCCUPATIONAL PROFILE AND HISTORY:   History of Present Injury/Illness (Reason for Referral):  See H&P. Past Medical History/Comorbidities:   Mr. Carmella Angelo  has no past medical history on file. Mr. Carmella Angelo  has a past surgical history that includes ir emboli intracran lt (7/11/2019). Social History/Living Environment:   Home Environment: Private residence  One/Two Story Residence: Other (Comment)  Living Alone: No  Support Systems: Parent, Child(rohan)  Patient Expects to be Discharged to[de-identified] Unknown  Current DME Used/Available at Home: None  Prior Level of Function/Work/Activity:  Per father at bedside/pt with appropriate nodding to yes/no questions: at baseline pt and his two children live with pt's father, pt is independent with ADLs, ambulation, working a physically demanding job. No recent falls. Dominant Side:         RIGHT    Personal Factors:          Sex:  male        Age:  37 y.o. Other factors that influence how disability is experienced by the patient:  [facility]    Number of Personal Factors/Comorbidities that affect the Plan of Care: Expanded review of therapy/medical records (1-2):  MODERATE COMPLEXITY   ASSESSMENT OF OCCUPATIONAL PERFORMANCE[de-identified]   Activities of Daily Living:   Basic ADLs (From Assessment) Complex ADLs (From Assessment)   Feeding: Total assistance(Pt intubated)  Oral Facial Hygiene/Grooming: Minimum assistance(d/t lines)  Bathing: Minimum assistance  Upper Body Dressing: Moderate assistance(d/t lines)  Lower Body Dressing: Minimum assistance  Toileting: Minimum assistance Instrumental ADL  Meal Preparation: Total assistance  Homemaking:  Total assistance  Medication Management: Total assistance  Financial Management: Total assistance   Grooming/Bathing/Dressing Activities of Daily Living   Grooming  Brushing Teeth: Set-up(using green sponge) Cognitive Retraining  Safety/Judgement: Fall prevention                       Bed/Mat Mobility  Supine to Sit: Contact guard assistance  Sit to Supine: (left up in chair)  Sit to Stand: Contact guard assistance;Minimum assistance  Stand to Sit: Minimum assistance  Bed to Chair: Contact guard assistance;Minimum assistance  Scooting: Contact guard assistance     Most Recent Physical Functioning:   Gross Assessment:                  Posture:  Posture (WDL): Exceptions to WDL  Posture Assessment: Forward head, Rounded shoulders  Balance:  Sitting: Impaired  Sitting - Static: Good (unsupported)  Sitting - Dynamic: Fair (occasional)  Standing: Impaired  Standing - Static: Fair  Standing - Dynamic : Constant support; Fair Bed Mobility:  Supine to Sit: Contact guard assistance  Sit to Supine: (left up in chair)  Scooting: Contact guard assistance  Wheelchair Mobility:     Transfers:  Sit to Stand: Contact guard assistance;Minimum assistance  Stand to Sit: Minimum assistance  Bed to Chair: Contact guard assistance;Minimum assistance            Patient Vitals for the past 6 hrs:   SpO2 Pulse   07/31/19 1000 99 % (!) 102   07/31/19 1100 99 % 99   07/31/19 1200 99 % 95   07/31/19 1300 98 % 87   07/31/19 1400 100 % 100   07/31/19 1500 100 % 92       Mental Status  Neurologic State: Drowsy, Eyes open to voice  Orientation Level: Oriented to person, Disoriented to situation, Disoriented to place, Disoriented to time  Cognition: Follows commands(delayed response)  Perception: Appears intact  Perseveration: No perseveration noted  Safety/Judgement: Fall prevention                          Physical Skills Involved:  1. Range of Motion  2. Balance  3. Activity Tolerance  4. Fine Motor Control  5. Edema Cognitive Skills Affected (resulting in the inability to perform in a timely and safe manner):  1. None  Psychosocial Skills Affected:  1. Habits/Routines  2. Environmental Adaptation  3. Social Interaction  4.  Emotional Regulation  5. Self-Awareness  6. Awareness of Others  7. Social Roles   Number of elements that affect the Plan of Care: 5+:  HIGH COMPLEXITY   CLINICAL DECISION MAKING:   [facility] [place] [facility] AM-PAC 6 Clicks   Daily Activity Inpatient Short Form  How much help from another person does the patient currently need. .. Total A Lot A Little None   1. Putting on and taking off regular lower body clothing? ? 1   ? 2   ? 3   ? 4   2. Bathing (including washing, rinsing, drying)? ? 1   ? 2   ? 3   ? 4   3. Toileting, which includes using toilet, bedpan or urinal?   ? 1   ? 2   ? 3   ? 4   4. Putting on and taking off regular upper body clothing? ? 1   ? 2   ? 3   ? 4   5. Taking care of personal grooming such as brushing teeth? ? 1   ? 2   ? 3   ? 4   6. Eating meals? ? 1   ? 2   ? 3   ? 4   © 2007, Trustees of [facility] [place] [facility], under license to [employer]. All rights reserved      Score:  Initial: 15 7/12/19 Most Recent: X (Date: -- )    Interpretation of Tool:  Represents activities that are increasingly more difficult (i.e. Bed mobility, Transfers, Gait). Medical Necessity:     · Patient demonstrates good  ·  rehab potential due to higher previous functional level. Reason for Services/Other Comments:  · Patient continues to require skilled intervention due to inability to complete ADLs at prior level of independence   · . Use of outcome tool(s) and clinical judgement create a POC that gives a: MODERATE COMPLEXITY         TREATMENT:   (In addition to Assessment/Re-Assessment sessions the following treatments were rendered)     Pre-treatment Symptoms/Complaints:    Pain: Initial:   Pain Intensity 1: 0  Post Session:  same     Self Care: (8 minutes): Procedure(s) (per grid) utilized to improve and/or restore self-care/home management as related to grooming. Required minimal verbal cueing to facilitate activities of daily living skills.        Date:  7/12/19 Date:   Date:     Activity/Exercise Parameters Parameters Parameters   BUE hand pumps 2x15 reps                                             Braces/Orthotics/Lines/Etc:   · IV  · phelps catheter  · drain ventricular   · arterial line  · ICU monitoring   · O2 Device: Ventilator  Treatment/Session Assessment:    · Response to Treatment:  Tolerated well   · Interdisciplinary Collaboration:   o Physical Therapist  o Occupational Therapist  o Registered Nurse  o Rehabilitation Attendant  · After treatment position/precautions:   o Supine in bed  o Bed alarm/tab alert on  o Bed/Chair-wheels locked  o Bed in low position  o Call light within reach  o RN notified  o Family at bedside  o Side rails x 3   · Compliance with Program/Exercises: Compliant all of the time, Will assess as treatment progresses. · Recommendations/Intent for next treatment session: \"Next visit will focus on advancements to more challenging activities and reduction in assistance provided\".   Total Treatment Duration:  OT Patient Time In/Time Out  Time In: 1033  Time Out: [address], OT

## 2019-07-31 NOTE — PROGRESS NOTES
PT note: Patient eating breakfast presently. Will attempt PT treatment later as time and schedule allow. Thank you.   Eulalio Clements, PT  7/31/2019

## 2019-07-31 NOTE — PROGRESS NOTES
The patient was not awake. His father and another family member were visiting.  talked with his family members and shared that the patient had walked with the therapists and his nurse today.       [employer]

## 2019-07-31 NOTE — PROGRESS NOTES
PM&R Consult Progress Note      Patient: Braden Avilez  Admit Date: 7/9/2019  Admit Diagnosis: SA[facility] [I60.9]  Recommendations: Continue Acute Rehab Program, Coordination of rehab/medical care, Counseling of PM & R care issues management, Hospital Inpatient Rehab  -plans for VPS tomorrow 8/1. Getting TCD done at time of visit. Remains quite flat with depressed mood. I think once he gets to rehab and has more activity, he will brighten up. May consider Provigil and an SSRI. -need to determine solid dc plan, as he had been living alone.  -has more cognitive deficits than early on in stay. Debility due to prolonged bedridden status. Cont PT/OT efforts and OOB to chair. History/Subjective/Complaint:     Patient seen . Records reviewed.     Pain 1  Pain Scale 1: Numeric (0 - 10) (07/31/19 1147)  Pain Intensity 1: 0 (07/31/19 1147)  Patient Stated Pain Goal: 0 (07/31/19 0715)  Pain Reassessment 1: Yes (07/31/19 0400)  Pain Onset 1: ongoing (07/31/19 0300)  Pain Location 1: Neck;Head (07/31/19 0300)  Pain Orientation 1: Mid;Lower (07/30/19 2124)  Pain Description 1: Aching (07/31/19 0300)  Pain Intervention(s) 1: Medication (see MAR) (07/31/19 0300)     Objective:     Vitals:  Patient Vitals for the past 8 hrs:   Temp Pulse Resp SpO2 Weight   07/31/19 1000 99.1 °F (37.3 °C) (!) 102 12 99 % --   07/31/19 0900 99.3 °F (37.4 °C) (!) 111 24 96 % --   07/31/19 0800 99.5 °F (37.5 °C) (!) 107 20 100 % --   07/31/19 0725 -- -- -- -- 145 lb 8.1 oz (66 kg)   07/31/19 0700 99.5 °F (37.5 °C) 96 11 99 % --   07/31/19 0600 99.6 °F (37.6 °C) 97 22 99 % --   07/31/19 0500 99.1 °F (37.3 °C) 97 17 98 % --   07/31/19 0400 98.6 °F (37 °C) 99 10 100 % --      Intake and Output:  07/29 1901 - 07/31 0700  In: 4625 [P.O.:960; I.V.:3665]  Out: 3372 [Urine:5875; Drains:458]    No Known Allergies  Current Facility-Administered Medications   Medication Dose Route Frequency    [START ON 8/1/2019] magnesium hydroxide (MILK OF MAGNESIA) 400 mg/5 mL oral suspension 30 mL  30 mL Oral DAILY    0.9% sodium chloride infusion 250 mL  250 mL IntraVENous PRN    nicotine (NICODERM CQ) 14 mg/24 hr patch 1 Patch  1 Patch TransDERmal Q24H    hydrogen peroxide 3 %   Topical PRN    acetaminophen (TYLENOL) tablet 650 mg  650 mg Oral Q4H PRN    lidocaine 4 % patch 1 Patch  1 Patch TransDERmal Q24H    fentaNYL citrate (PF) injection 50 mcg  50 mcg IntraVENous Q1H PRN    ferrous sulfate tablet 325 mg  1 Tab Oral TID WITH MEALS    levETIRAcetam (KEPPRA) tablet 500 mg  500 mg Oral Q12H    niMODipine (NIMOTOP) 30 mg/mL oral solution (compound) 60 mg  60 mg Oral Q4H    0.45% sodium chloride infusion  100 mL/hr IntraVENous CONTINUOUS    ascorbic acid (vitamin C) (VITAMIN C) tablet 500 mg  500 mg Oral TID    aspirin tablet 325 mg  325 mg Oral DAILY    atorvastatin (LIPITOR) tablet 40 mg  40 mg Oral QHS    clopidogrel (PLAVIX) tablet 75 mg  75 mg Oral DAILY    famotidine (PEPCID) tablet 20 mg  20 mg Oral DAILY    senna-docusate (PERICOLACE) 8.6-50 mg per tablet 2 Tab  2 Tab Oral QHS    magnesium hydroxide (MILK OF MAGNESIA) 400 mg/5 mL oral suspension 30 mL  30 mL Oral DAILY PRN    polyvinyl alcohol (LIQUIFILM TEARS) 1.4 % ophthalmic solution 1 Drop  1 Drop Both Eyes PRN    heparin (porcine) injection 5,000 Units  5,000 Units SubCUTAneous Q8H    sodium chloride (NS) flush 30 mL  30 mL InterCATHeter Q8H    heparin (porcine) pf 900 Units  900 Units InterCATHeter Q8H    sodium chloride (NS) flush 30 mL  30 mL InterCATHeter PRN    heparin (porcine) pf 900 Units  900 Units InterCATHeter PRN    ondansetron (ZOFRAN) injection 4 mg  4 mg IntraVENous Q6H PRN    NUTRITIONAL SUPPORT ELECTROLYTE PRN ORDERS   Does Not Apply PRN    magnesium sulfate 4 g/100 mL IVPB  4 g IntraVENous DAILY PRN    magnesium sulfate 2 g/50 ml IVPB (premix or compounded)  2 g IntraVENous DAILY PRN       Physical Exam:  No significant changes    Incision(s)/Wound(s):           Functional Assessment:  Gross Assessment  AROM: Generally decreased, functional(R LE) (07/22/19 1340)  PROM: Generally decreased, functional (07/12/19 1100)  Strength: Generally decreased, functional(R LE) (07/22/19 1340)  Coordination: Generally decreased, functional(B hands swelling) (07/12/19 1500)  Sensation: Intact(BUEs to light touch ) (07/12/19 1500)     Gait  Base of Support: Narrowed; Center of gravity altered (07/29/19 1100)  Speed/Aminata: Slow (07/29/19 1100)  Step Length: Right shortened;Left shortened (07/29/19 1100)  Gait Abnormalities: Decreased step clearance; Path deviations;Trunk sway increased (07/29/19 1100)  Ambulation - Level of Assistance: Contact guard assistance;Minimal assistance (07/29/19 1100)  Distance (ft): (frequent breaks required for rest, difficulty multi tasking) (07/29/19 1100)  Assistive Device: Gait belt;Walker, rolling (07/29/19 1100)  Interventions: Safety awareness training;Verbal cues;Manual cues (07/29/19 1100)     Bed Mobility  Supine to Sit: Stand-by assistance (07/29/19 1100)  Sit to Supine: Contact guard assistance (07/29/19 1100)  Scooting: Contact guard assistance (07/29/19 1100)     Balance  Sitting: Impaired (07/29/19 1100)  Sitting - Static: Good (unsupported) (07/29/19 1100)  Sitting - Dynamic: Fair (occasional) (07/29/19 1100)  Standing: Impaired (07/29/19 1100)  Standing - Static: Fair (07/29/19 1100)  Standing - Dynamic : Fair (07/29/19 1100)                       Bed/Mat Mobility  Supine to Sit: Stand-by assistance (07/29/19 1100)  Sit to Supine: Contact guard assistance (07/29/19 1100)  Sit to Stand: Minimum assistance (07/29/19 1100)  Stand to Sit: Minimum assistance (07/29/19 1100)  Bed to Chair: Contact guard assistance;Minimum assistance (07/24/19 1300)  Scooting: Contact guard assistance (07/29/19 1100)     Labs/Studies:  Recent Results (from the past 72 hour(s))   CBC W/O DIFF    Collection Time: 07/29/19  4:22 AM   Result Value Ref Range    WBC 10.8 4.3 - 11.1 K/uL    RBC 2.38 (L) 4.23 - 5.6 M/uL    HGB 7.0 (L) 13.6 - 17.2 g/dL    HCT 22.6 (L) 41.1 - 50.3 %    MCV 95.0 79.6 - 97.8 FL    MCH 29.4 26.1 - 32.9 PG    MCHC 31.0 (L) 31.4 - 35.0 g/dL    RDW 15.2 (H) 11.9 - 14.6 %    PLATELET 372 251 - 716 K/uL    MPV 10.2 9.4 - 12.3 FL    ABSOLUTE NRBC 0.00 0.0 - 0.2 K/uL   METABOLIC PANEL, BASIC    Collection Time: 07/29/19  4:22 AM   Result Value Ref Range    Sodium 148 (H) 136 - 145 mmol/L    Potassium 3.9 3.5 - 5.1 mmol/L    Chloride 113 (H) 98 - 107 mmol/L    CO2 26 21 - 32 mmol/L    Anion gap 9 7 - 16 mmol/L    Glucose 83 65 - 100 mg/dL    BUN 24 (H) 6 - 23 MG/DL    Creatinine 3.43 (H) 0.8 - 1.5 MG/DL    GFR est AA 25 (L) >60 ml/min/1.73m2    GFR est non-AA 21 (L) >60 ml/min/1.73m2    Calcium 8.4 8.3 - 10.4 MG/DL   MAGNESIUM    Collection Time: 07/29/19  4:22 AM   Result Value Ref Range    Magnesium 2.0 1.8 - 2.4 mg/dL   CBC W/O DIFF    Collection Time: 07/30/19  4:50 AM   Result Value Ref Range    WBC 10.8 4.3 - 11.1 K/uL    RBC 3.36 (L) 4.23 - 5.6 M/uL    HGB 10.2 (L) 13.6 - 17.2 g/dL    HCT 30.6 (L) 41.1 - 50.3 %    MCV 91.1 79.6 - 97.8 FL    MCH 30.4 26.1 - 32.9 PG    MCHC 33.3 31.4 - 35.0 g/dL    RDW 14.1 11.9 - 14.6 %    PLATELET 879 710 - 659 K/uL    MPV 9.8 9.4 - 12.3 FL    ABSOLUTE NRBC 0.00 0.0 - 0.2 K/uL   METABOLIC PANEL, BASIC    Collection Time: 07/30/19  4:50 AM   Result Value Ref Range    Sodium 144 136 - 145 mmol/L    Potassium 3.9 3.5 - 5.1 mmol/L    Chloride 110 (H) 98 - 107 mmol/L    CO2 25 21 - 32 mmol/L    Anion gap 9 7 - 16 mmol/L    Glucose 86 65 - 100 mg/dL    BUN 29 (H) 6 - 23 MG/DL    Creatinine 3.58 (H) 0.8 - 1.5 MG/DL    GFR est AA 24 (L) >60 ml/min/1.73m2    GFR est non-AA 20 (L) >60 ml/min/1.73m2    Calcium 8.7 8.3 - 10.4 MG/DL   MAGNESIUM    Collection Time: 07/30/19  4:50 AM   Result Value Ref Range    Magnesium 1.9 1.8 - 2.4 mg/dL   GLUCOSE, POC    Collection Time: 07/30/19  8:15 AM Result Value Ref Range    Glucose (POC) 94 65 - 100 mg/dL   FIBRINOGEN    Collection Time: 07/30/19  9:50 AM   Result Value Ref Range    Fibrinogen 448 190 - 501 mg/dL   PLATELET FUNCTION, VERIFY NOW P2Y12    Collection Time: 07/30/19  9:50 AM   Result Value Ref Range    P2Y12 Plt response 244 PRU   ASPIRIN TEST    Collection Time: 07/30/19  9:50 AM   Result Value Ref Range    Aspirin test 546 (L) 620 - 672 ARU   MAGNESIUM    Collection Time: 07/30/19  9:50 AM   Result Value Ref Range    Magnesium 3.0 (H) 1.8 - 2.4 mg/dL   GLUCOSE, POC    Collection Time: 07/30/19  2:29 PM   Result Value Ref Range    Glucose (POC) 126 (H) 65 - 100 mg/dL   MAGNESIUM    Collection Time: 07/31/19  5:02 AM   Result Value Ref Range    Magnesium 2.3 1.8 - 2.4 mg/dL   METABOLIC PANEL, BASIC    Collection Time: 07/31/19  5:02 AM   Result Value Ref Range    Sodium 142 136 - 145 mmol/L    Potassium 4.0 3.5 - 5.1 mmol/L    Chloride 108 (H) 98 - 107 mmol/L    CO2 23 21 - 32 mmol/L    Anion gap 11 7 - 16 mmol/L    Glucose 95 65 - 100 mg/dL    BUN 30 (H) 6 - 23 MG/DL    Creatinine 3.41 (H) 0.8 - 1.5 MG/DL    GFR est AA 25 (L) >60 ml/min/1.73m2    GFR est non-AA 21 (L) >60 ml/min/1.73m2    Calcium 8.8 8.3 - 10.4 MG/DL   CBC W/O DIFF    Collection Time: 07/31/19  5:02 AM   Result Value Ref Range    WBC 10.7 4.3 - 11.1 K/uL    RBC 3.55 (L) 4.23 - 5.6 M/uL    HGB 10.9 (L) 13.6 - 17.2 g/dL    HCT 33.0 (L) 41.1 - 50.3 %    MCV 93.0 79.6 - 97.8 FL    MCH 30.7 26.1 - 32.9 PG    MCHC 33.0 31.4 - 35.0 g/dL    RDW 14.2 11.9 - 14.6 %    PLATELET 324 776 - 604 K/uL    MPV 10.3 9.4 - 12.3 FL    ABSOLUTE NRBC 0.00 0.0 - 0.2 K/uL   MAGNESIUM    Collection Time: 07/31/19  9:27 AM   Result Value Ref Range    Magnesium 3.2 (H) 1.8 - 2.4 mg/dL        Assessment:     Principal Problem:    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)    Active Problems:    Tobacco abuse (7/10/2019)      Acute respiratory failure with hypoxia ([facility]) (7/10/2019)      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)      IVH (intraventricular hemorrhage) ([facility] (7/10/2019)      Seizure ([facility] (7/10/2019)      Elevated serum creatinine (7/10/2019)      Cerebral vasospasm (7/10/2019)      Pneumonia due to methicillin susceptible Staphylococcus aureus (MSSA) ([facility] (7/15/2019)        Plan:     Recommendations: Continue Acute Rehab Program  Coordination of rehab/medical care  Counseling of PM & R care issues management  Monitoring and management of medical conditions per plan of care/orders  Discussion with Family/Caregiver/Staff  Reviewed Therapies/Labs/Medications/Records

## 2019-08-01 ENCOUNTER — ANESTHESIA (OUTPATIENT)
Dept: SURGERY | Age: 44
DRG: 020 | End: 2019-08-01
Payer: COMMERCIAL

## 2019-08-01 LAB
ABO + RH BLD: NORMAL
ASPIRIN TEST, ASPIRN: 410 ARU (ref 620–672)
BACTERIA SPEC CULT: NORMAL
BLOOD GROUP ANTIBODIES SERPL: NORMAL
GLUCOSE BLD STRIP.AUTO-MCNC: 102 MG/DL (ref 65–100)
GRAM STN SPEC: NORMAL
GRAM STN SPEC: NORMAL
INR PPP: 0.9
MAGNESIUM SERPL-MCNC: 2.8 MG/DL (ref 1.8–2.4)
P2Y12 PLT RESPONSE,PPPR: 256 PRU
PROTHROMBIN TIME: 11.7 SEC (ref 11.7–14.5)
SERVICE CMNT-IMP: NORMAL
SPECIMEN EXP DATE BLD: NORMAL

## 2019-08-01 PROCEDURE — C1729 CATH, DRAINAGE: HCPCS | Performed by: NEUROLOGICAL SURGERY

## 2019-08-01 PROCEDURE — 77030003029 HC SUT VCRL J&J -B: Performed by: NEUROLOGICAL SURGERY

## 2019-08-01 PROCEDURE — 74011000250 HC RX REV CODE- 250

## 2019-08-01 PROCEDURE — 92507 TX SP LANG VOICE COMM INDIV: CPT

## 2019-08-01 PROCEDURE — 74011250636 HC RX REV CODE- 250/636

## 2019-08-01 PROCEDURE — 74011000250 HC RX REV CODE- 250: Performed by: NURSE PRACTITIONER

## 2019-08-01 PROCEDURE — 77030040356 HC CORD BPLR FRCP COVD -A: Performed by: NEUROLOGICAL SURGERY

## 2019-08-01 PROCEDURE — 00160J6 BYPASS CEREBRAL VENTRICLE TO PERITONEAL CAVITY WITH SYNTHETIC SUBSTITUTE, OPEN APPROACH: ICD-10-PCS | Performed by: NEUROLOGICAL SURGERY

## 2019-08-01 PROCEDURE — 77030030163 HC BN WAX J&J -A: Performed by: NEUROLOGICAL SURGERY

## 2019-08-01 PROCEDURE — 86900 BLOOD TYPING SEROLOGIC ABO: CPT

## 2019-08-01 PROCEDURE — 77030035048 HC TRCR ENDOSC OPTCL COVD -B: Performed by: NEUROLOGICAL SURGERY

## 2019-08-01 PROCEDURE — 99024 POSTOP FOLLOW-UP VISIT: CPT | Performed by: NEUROLOGICAL SURGERY

## 2019-08-01 PROCEDURE — 74011000258 HC RX REV CODE- 258: Performed by: NURSE PRACTITIONER

## 2019-08-01 PROCEDURE — 74011250636 HC RX REV CODE- 250/636: Performed by: NEUROLOGICAL SURGERY

## 2019-08-01 PROCEDURE — 74011250637 HC RX REV CODE- 250/637: Performed by: NURSE PRACTITIONER

## 2019-08-01 PROCEDURE — P9035 PLATELET PHERES LEUKOREDUCED: HCPCS

## 2019-08-01 PROCEDURE — 74011250637 HC RX REV CODE- 250/637

## 2019-08-01 PROCEDURE — 77030003028 HC SUT VCRL J&J -A: Performed by: NEUROLOGICAL SURGERY

## 2019-08-01 PROCEDURE — 74011000250 HC RX REV CODE- 250: Performed by: NEUROLOGICAL SURGERY

## 2019-08-01 PROCEDURE — 77030025860: Performed by: NEUROLOGICAL SURGERY

## 2019-08-01 PROCEDURE — 83735 ASSAY OF MAGNESIUM: CPT

## 2019-08-01 PROCEDURE — 77030014007 HC SPNG HEMSTAT J&J -B: Performed by: NEUROLOGICAL SURGERY

## 2019-08-01 PROCEDURE — 99232 SBSQ HOSP IP/OBS MODERATE 35: CPT | Performed by: PHYSICAL MEDICINE & REHABILITATION

## 2019-08-01 PROCEDURE — 76060000036 HC ANESTHESIA 2.5 TO 3 HR: Performed by: NEUROLOGICAL SURGERY

## 2019-08-01 PROCEDURE — 77030016151 HC PROTCTR LNS DFOG COVD -B: Performed by: NEUROLOGICAL SURGERY

## 2019-08-01 PROCEDURE — C1892 INTRO/SHEATH,FIXED,PEEL-AWAY: HCPCS | Performed by: NEUROLOGICAL SURGERY

## 2019-08-01 PROCEDURE — 77030008606 HC TRCR ENDOSC KII AMR -B: Performed by: NEUROLOGICAL SURGERY

## 2019-08-01 PROCEDURE — 77030020257 HC SOL INJ SOD CL 0.45% 1000ML BG

## 2019-08-01 PROCEDURE — 65610000001 HC ROOM ICU GENERAL

## 2019-08-01 PROCEDURE — 74011000258 HC RX REV CODE- 258

## 2019-08-01 PROCEDURE — 77030020829: Performed by: NEUROLOGICAL SURGERY

## 2019-08-01 PROCEDURE — 77030002996 HC SUT SLK J&J -A: Performed by: NEUROLOGICAL SURGERY

## 2019-08-01 PROCEDURE — 77030040106 HC FCPS BIPOLAR DISP INLC -D: Performed by: NEUROLOGICAL SURGERY

## 2019-08-01 PROCEDURE — 74011250636 HC RX REV CODE- 250/636: Performed by: NURSE PRACTITIONER

## 2019-08-01 PROCEDURE — 85576 BLOOD PLATELET AGGREGATION: CPT

## 2019-08-01 PROCEDURE — 77030039266 HC ADH SKN EXOFIN S2SG -A: Performed by: NEUROLOGICAL SURGERY

## 2019-08-01 PROCEDURE — 77030035051: Performed by: NEUROLOGICAL SURGERY

## 2019-08-01 PROCEDURE — 77030008468 HC STPLR SKN VISIS TELE -A: Performed by: NEUROLOGICAL SURGERY

## 2019-08-01 PROCEDURE — 77030002933 HC SUT MCRYL J&J -A: Performed by: NEUROLOGICAL SURGERY

## 2019-08-01 PROCEDURE — 77030008462 HC STPLR SKN PROX J&J -A: Performed by: NEUROLOGICAL SURGERY

## 2019-08-01 PROCEDURE — 82962 GLUCOSE BLOOD TEST: CPT

## 2019-08-01 PROCEDURE — 77030008522 HC TBNG INSUF LAPRO STRY -B: Performed by: NEUROLOGICAL SURGERY

## 2019-08-01 PROCEDURE — 85610 PROTHROMBIN TIME: CPT

## 2019-08-01 PROCEDURE — 62223 ESTABLISH BRAIN CAVITY SHUNT: CPT | Performed by: NEUROLOGICAL SURGERY

## 2019-08-01 PROCEDURE — 74011000258 HC RX REV CODE- 258: Performed by: NEUROLOGICAL SURGERY

## 2019-08-01 PROCEDURE — 76010000172 HC OR TIME 2.5 TO 3 HR INTENSV-TIER 1: Performed by: NEUROLOGICAL SURGERY

## 2019-08-01 PROCEDURE — 77030018673: Performed by: NEUROLOGICAL SURGERY

## 2019-08-01 DEVICE — CODMAN® CERTAS® PLUS PROGRAMMABLE VALVE INLINE VALVE ONLY
Type: IMPLANTABLE DEVICE | Site: ABDOMEN | Status: FUNCTIONAL
Brand: CODMAN CERTAS® PLUS

## 2019-08-01 DEVICE — CATHETER EVD L35CM LUMN ID1.5MM DEPTH MRK 3-15CM FOR EXT: Type: IMPLANTABLE DEVICE | Site: ABDOMEN | Status: FUNCTIONAL

## 2019-08-01 RX ORDER — LIDOCAINE HYDROCHLORIDE 20 MG/ML
INJECTION, SOLUTION EPIDURAL; INFILTRATION; INTRACAUDAL; PERINEURAL AS NEEDED
Status: DISCONTINUED | OUTPATIENT
Start: 2019-08-01 | End: 2019-08-01 | Stop reason: HOSPADM

## 2019-08-01 RX ORDER — NEOSTIGMINE METHYLSULFATE 1 MG/ML
INJECTION, SOLUTION INTRAVENOUS AS NEEDED
Status: DISCONTINUED | OUTPATIENT
Start: 2019-08-01 | End: 2019-08-01 | Stop reason: HOSPADM

## 2019-08-01 RX ORDER — HYDRALAZINE HYDROCHLORIDE 20 MG/ML
INJECTION INTRAMUSCULAR; INTRAVENOUS AS NEEDED
Status: DISCONTINUED | OUTPATIENT
Start: 2019-08-01 | End: 2019-08-01 | Stop reason: HOSPADM

## 2019-08-01 RX ORDER — LEVETIRACETAM 5 MG/ML
500 INJECTION INTRAVASCULAR EVERY 12 HOURS
Status: DISCONTINUED | OUTPATIENT
Start: 2019-08-01 | End: 2019-08-01 | Stop reason: SDUPTHER

## 2019-08-01 RX ORDER — FENTANYL CITRATE 50 UG/ML
INJECTION, SOLUTION INTRAMUSCULAR; INTRAVENOUS AS NEEDED
Status: DISCONTINUED | OUTPATIENT
Start: 2019-08-01 | End: 2019-08-01 | Stop reason: HOSPADM

## 2019-08-01 RX ORDER — SODIUM CHLORIDE, SODIUM LACTATE, POTASSIUM CHLORIDE, CALCIUM CHLORIDE 600; 310; 30; 20 MG/100ML; MG/100ML; MG/100ML; MG/100ML
INJECTION, SOLUTION INTRAVENOUS
Status: DISCONTINUED | OUTPATIENT
Start: 2019-08-01 | End: 2019-08-01 | Stop reason: HOSPADM

## 2019-08-01 RX ORDER — SODIUM CHLORIDE 9 MG/ML
250 INJECTION, SOLUTION INTRAVENOUS AS NEEDED
Status: DISCONTINUED | OUTPATIENT
Start: 2019-08-01 | End: 2019-08-03

## 2019-08-01 RX ORDER — ONDANSETRON 2 MG/ML
INJECTION INTRAMUSCULAR; INTRAVENOUS AS NEEDED
Status: DISCONTINUED | OUTPATIENT
Start: 2019-08-01 | End: 2019-08-01 | Stop reason: HOSPADM

## 2019-08-01 RX ORDER — ROCURONIUM BROMIDE 10 MG/ML
INJECTION, SOLUTION INTRAVENOUS AS NEEDED
Status: DISCONTINUED | OUTPATIENT
Start: 2019-08-01 | End: 2019-08-01 | Stop reason: HOSPADM

## 2019-08-01 RX ORDER — DEXAMETHASONE SODIUM PHOSPHATE 100 MG/10ML
INJECTION INTRAMUSCULAR; INTRAVENOUS AS NEEDED
Status: DISCONTINUED | OUTPATIENT
Start: 2019-08-01 | End: 2019-08-01 | Stop reason: HOSPADM

## 2019-08-01 RX ORDER — PROPOFOL 10 MG/ML
INJECTION, EMULSION INTRAVENOUS AS NEEDED
Status: DISCONTINUED | OUTPATIENT
Start: 2019-08-01 | End: 2019-08-01 | Stop reason: HOSPADM

## 2019-08-01 RX ORDER — BACITRACIN 50000 [IU]/1
INJECTION, POWDER, FOR SOLUTION INTRAMUSCULAR AS NEEDED
Status: DISCONTINUED | OUTPATIENT
Start: 2019-08-01 | End: 2019-08-01 | Stop reason: HOSPADM

## 2019-08-01 RX ORDER — GLYCOPYRROLATE 0.2 MG/ML
INJECTION INTRAMUSCULAR; INTRAVENOUS AS NEEDED
Status: DISCONTINUED | OUTPATIENT
Start: 2019-08-01 | End: 2019-08-01 | Stop reason: HOSPADM

## 2019-08-01 RX ADMIN — PROPOFOL 100 MG: 10 INJECTION, EMULSION INTRAVENOUS at 12:26

## 2019-08-01 RX ADMIN — Medication 30 ML: at 06:05

## 2019-08-01 RX ADMIN — SODIUM CHLORIDE 500 MG: 900 INJECTION, SOLUTION INTRAVENOUS at 21:43

## 2019-08-01 RX ADMIN — LIDOCAINE HYDROCHLORIDE 100 MG: 20 INJECTION, SOLUTION EPIDURAL; INFILTRATION; INTRACAUDAL; PERINEURAL at 12:26

## 2019-08-01 RX ADMIN — FENTANYL CITRATE 50 MCG: 50 INJECTION, SOLUTION INTRAMUSCULAR; INTRAVENOUS at 12:26

## 2019-08-01 RX ADMIN — FERROUS SULFATE TAB 325 MG (65 MG ELEMENTAL FE) 325 MG: 325 (65 FE) TAB at 16:21

## 2019-08-01 RX ADMIN — OXYCODONE HYDROCHLORIDE AND ACETAMINOPHEN 500 MG: 500 TABLET ORAL at 21:10

## 2019-08-01 RX ADMIN — HYDRALAZINE HYDROCHLORIDE 10 MG: 20 INJECTION INTRAMUSCULAR; INTRAVENOUS at 14:25

## 2019-08-01 RX ADMIN — Medication 30 ML: at 21:48

## 2019-08-01 RX ADMIN — ATORVASTATIN CALCIUM 40 MG: 40 TABLET, FILM COATED ORAL at 21:10

## 2019-08-01 RX ADMIN — ASPIRIN 325 MG ORAL TABLET 325 MG: 325 PILL ORAL at 15:26

## 2019-08-01 RX ADMIN — SODIUM CHLORIDE, PRESERVATIVE FREE 900 UNITS: 5 INJECTION INTRAVENOUS at 21:48

## 2019-08-01 RX ADMIN — Medication 30 ML: at 14:00

## 2019-08-01 RX ADMIN — NIMODIPINE 60 MG: 30 CAPSULE, LIQUID FILLED ORAL at 21:10

## 2019-08-01 RX ADMIN — GLYCOPYRROLATE 0.6 MG: 0.2 INJECTION INTRAMUSCULAR; INTRAVENOUS at 13:52

## 2019-08-01 RX ADMIN — CLOPIDOGREL BISULFATE 75 MG: 75 TABLET ORAL at 15:26

## 2019-08-01 RX ADMIN — ONDANSETRON 4 MG: 2 INJECTION INTRAMUSCULAR; INTRAVENOUS at 13:55

## 2019-08-01 RX ADMIN — FENTANYL CITRATE 50 MCG: 50 INJECTION, SOLUTION INTRAMUSCULAR; INTRAVENOUS at 13:08

## 2019-08-01 RX ADMIN — NICARDIPINE HYDROCHLORIDE 10 MG/HR: 25 INJECTION INTRAVENOUS at 19:57

## 2019-08-01 RX ADMIN — SODIUM CHLORIDE, PRESERVATIVE FREE 900 UNITS: 5 INJECTION INTRAVENOUS at 06:05

## 2019-08-01 RX ADMIN — PROPOFOL 100 MG: 10 INJECTION, EMULSION INTRAVENOUS at 12:27

## 2019-08-01 RX ADMIN — FENTANYL CITRATE 50 MCG: 50 INJECTION, SOLUTION INTRAMUSCULAR; INTRAVENOUS at 13:56

## 2019-08-01 RX ADMIN — ROCURONIUM BROMIDE 10 MG: 10 INJECTION, SOLUTION INTRAVENOUS at 12:48

## 2019-08-01 RX ADMIN — PROPOFOL 50 MG: 10 INJECTION, EMULSION INTRAVENOUS at 13:40

## 2019-08-01 RX ADMIN — SODIUM CHLORIDE 100 ML/HR: 450 INJECTION, SOLUTION INTRAVENOUS at 04:14

## 2019-08-01 RX ADMIN — DEXAMETHASONE SODIUM PHOSPHATE 10 MG: 100 INJECTION INTRAMUSCULAR; INTRAVENOUS at 13:55

## 2019-08-01 RX ADMIN — SODIUM CHLORIDE, SODIUM LACTATE, POTASSIUM CHLORIDE, CALCIUM CHLORIDE: 600; 310; 30; 20 INJECTION, SOLUTION INTRAVENOUS at 12:50

## 2019-08-01 RX ADMIN — OXYCODONE HYDROCHLORIDE AND ACETAMINOPHEN 500 MG: 500 TABLET ORAL at 16:21

## 2019-08-01 RX ADMIN — NIMODIPINE 60 MG: 30 CAPSULE, LIQUID FILLED ORAL at 16:21

## 2019-08-01 RX ADMIN — HEPARIN SODIUM 5000 UNITS: 5000 INJECTION INTRAVENOUS; SUBCUTANEOUS at 15:42

## 2019-08-01 RX ADMIN — NICARDIPINE HYDROCHLORIDE 12.5 MG/HR: 25 INJECTION INTRAVENOUS at 16:45

## 2019-08-01 RX ADMIN — ACETAMINOPHEN 650 MG: 325 TABLET, FILM COATED ORAL at 00:35

## 2019-08-01 RX ADMIN — HEPARIN SODIUM 5000 UNITS: 5000 INJECTION INTRAVENOUS; SUBCUTANEOUS at 21:11

## 2019-08-01 RX ADMIN — SENNOSIDES, DOCUSATE SODIUM 2 TABLET: 50; 8.6 TABLET, FILM COATED ORAL at 21:10

## 2019-08-01 RX ADMIN — NIMODIPINE 60 MG: 30 CAPSULE, LIQUID FILLED ORAL at 03:54

## 2019-08-01 RX ADMIN — NEOSTIGMINE METHYLSULFATE 4 MG: 1 INJECTION, SOLUTION INTRAVENOUS at 13:52

## 2019-08-01 RX ADMIN — SODIUM CHLORIDE 1000 MG: 900 INJECTION, SOLUTION INTRAVENOUS at 08:10

## 2019-08-01 RX ADMIN — HEPARIN SODIUM 5000 UNITS: 5000 INJECTION INTRAVENOUS; SUBCUTANEOUS at 06:04

## 2019-08-01 RX ADMIN — ROCURONIUM BROMIDE 30 MG: 10 INJECTION, SOLUTION INTRAVENOUS at 12:27

## 2019-08-01 RX ADMIN — SODIUM CHLORIDE, PRESERVATIVE FREE 900 UNITS: 5 INJECTION INTRAVENOUS at 15:43

## 2019-08-01 RX ADMIN — ACETAMINOPHEN 650 MG: 325 TABLET, FILM COATED ORAL at 21:42

## 2019-08-01 RX ADMIN — ROCURONIUM BROMIDE 20 MG: 10 INJECTION, SOLUTION INTRAVENOUS at 13:13

## 2019-08-01 NOTE — BRIEF OP NOTE
BRIEF OPERATIVE NOTE    Date of Procedure: 8/1/2019   Preoperative Diagnosis: Hydrocephalus, unspecified type [G91.9]  Postoperative Diagnosis: Hydrocephalus, unspecified type [G91.9]    Procedure(s):  SHUNT VENTRICULAR-PERITONEAL LAPAROSCOPIC   Surgeon(s) and Role:     Abi Cash MD - Primary     * Raquel Frey MD - Assisting         Surgical Assistant: Paula Dumas NP    Surgical Staff:  Circ-1: Edward Saucedo RN  Scrub Tech-1: Piyush Sandoval  Scrub Tech-2: Akash Ponce  Nurse Practitioner: Felicia Ramirez NP  Event Time In Time Out   Incision Start 08/01/2019 1305    Incision Close       Anesthesia: General   Estimated Blood Loss: 50cc  Specimens: * No specimens in log *   Findings: Hydrocephalus   Complications: None  Implants:   Implant Name Type Inv.  Item Serial No.  Lot No. LRB No. Used Action   CATH DRAIN VENTRICULAR --  - [id number]  CATH DRAIN VENTRICULAR --   [employer]ODMAN [id number]  1 Implanted   VALVE SHNT PRGM W/CATH -- CERTAS PLUS CODMAN - [id number]  VALVE SHNT PRGM W/CATH -- CERTAS PLUS CODMAN  [employer] [facility] [id number]  1 Implanted

## 2019-08-01 NOTE — PROGRESS NOTES
A follow up visit was made to the patient. Emotional support, spiritual presence and   prayer were provided. The patient was alert with his eyes open. He smiled during the visit. His father was present and he shared that his son was having a procedure today.  prayed for the procedure, the patient and his father.       [employer]

## 2019-08-01 NOTE — ANESTHESIA POSTPROCEDURE EVALUATION
Procedure(s):  SHUNT VENTRICULAR-PERITONEAL LAPAROSCOPIC . general    Anesthesia Post Evaluation      Multimodal analgesia: multimodal analgesia used between 6 hours prior to anesthesia start to PACU discharge  Patient location during evaluation: ICU  Note status: marginally participated. Post-procedure mental status: at baseline. Pain management: adequate  Airway patency: patent  Anesthetic complications: no  Cardiovascular status: acceptable  Respiratory status: acceptable  Hydration status: acceptable  Post anesthesia nausea and vomiting:  none      Vitals Value Taken Time   BP     Temp 36 °C (96.8 °F) 8/1/2019  5:18 PM   Pulse 105 8/1/2019  5:18 PM   Resp 15 8/1/2019  5:18 PM   SpO2 100 % 8/1/2019  5:18 PM   Vitals shown include unvalidated device data.

## 2019-08-01 NOTE — PROGRESS NOTES
Progress Note    Patient: Winifred Fuentes MRN: [id number]  SSN: [id number]    YOB: 1975  Age: 37 y.o. Sex: male      Admit Date: 7/9/2019    LOS: 23 days     Subjective:   Pt with no acute neuro changes. EVD @5/open. Pt NPO for  shunt placement today @1200. Family and pt updated at bedside.        Current Facility-Administered Medications   Medication Dose Route Frequency    0.9% sodium chloride infusion 250 mL  250 mL IntraVENous PRN    0.9% sodium chloride infusion 250 mL  250 mL IntraVENous PRN    magnesium hydroxide (MILK OF MAGNESIA) 400 mg/5 mL oral suspension 30 mL  30 mL Oral DAILY    citalopram (CELEXA) tablet 10 mg  10 mg Oral DAILY    0.9% sodium chloride infusion 250 mL  250 mL IntraVENous PRN    nicotine (NICODERM CQ) 14 mg/24 hr patch 1 Patch  1 Patch TransDERmal Q24H    hydrogen peroxide 3 %   Topical PRN    acetaminophen (TYLENOL) tablet 650 mg  650 mg Oral Q4H PRN    lidocaine 4 % patch 1 Patch  1 Patch TransDERmal Q24H    fentaNYL citrate (PF) injection 50 mcg  50 mcg IntraVENous Q1H PRN    ferrous sulfate tablet 325 mg  1 Tab Oral TID WITH MEALS    levETIRAcetam (KEPPRA) tablet 500 mg  500 mg Oral Q12H    niMODipine (NIMOTOP) 30 mg/mL oral solution (compound) 60 mg  60 mg Oral Q4H    0.45% sodium chloride infusion  100 mL/hr IntraVENous CONTINUOUS    ascorbic acid (vitamin C) (VITAMIN C) tablet 500 mg  500 mg Oral TID    aspirin tablet 325 mg  325 mg Oral DAILY    atorvastatin (LIPITOR) tablet 40 mg  40 mg Oral QHS    clopidogrel (PLAVIX) tablet 75 mg  75 mg Oral DAILY    famotidine (PEPCID) tablet 20 mg  20 mg Oral DAILY    senna-docusate (PERICOLACE) 8.6-50 mg per tablet 2 Tab  2 Tab Oral QHS    magnesium hydroxide (MILK OF MAGNESIA) 400 mg/5 mL oral suspension 30 mL  30 mL Oral DAILY PRN    polyvinyl alcohol (LIQUIFILM TEARS) 1.4 % ophthalmic solution 1 Drop  1 Drop Both Eyes PRN    heparin (porcine) injection 5,000 Units  5,000 Units SubCUTAneous Q8H  sodium chloride (NS) flush 30 mL  30 mL InterCATHeter Q8H    heparin (porcine) pf 900 Units  900 Units InterCATHeter Q8H    sodium chloride (NS) flush 30 mL  30 mL InterCATHeter PRN    heparin (porcine) pf 900 Units  900 Units InterCATHeter PRN    ondansetron (ZOFRAN) injection 4 mg  4 mg IntraVENous Q6H PRN    NUTRITIONAL SUPPORT ELECTROLYTE PRN ORDERS   Does Not Apply PRN    magnesium sulfate 4 g/100 mL IVPB  4 g IntraVENous DAILY PRN    magnesium sulfate 2 g/50 ml IVPB (premix or compounded)  2 g IntraVENous DAILY PRN       Objective:     Vitals:    08/01/19 1045 08/01/19 1100 08/01/19 1115 08/01/19 1130   BP:       Pulse: (!) 101 99 93 96   Resp: 14 26 8 13   Temp:       SpO2: 98% 98% 98% 98%   Weight:       Height:             Intake and Output:  Current Shift: 08/01 0701 - 08/01 1900  In: -   Out: 550 [Urine:550]  Last 24 hr: 07/31 0701 - 08/01 0700  In: 2341.7 [I.V.:2341.7]  Out: 7578 [Urine:2815; Drains:242]     IO: -477cc/24hr  TOTAL OVERALL: -24L  EVD: 289cc/24hrs     Physical Exam:   General:   AOX3,  following commands. Stovall. Eyes:   PERRL   Neck: Supple, symmetrical, trachea midline, no adenopathy, thyroid: no enlargment/tenderness/nodules, no carotid bruit and no JVD. Lungs:   Clear to auscultation bilaterally. Heart:  Regular rate and rhythm, S1, S2 normal, no murmur, click, rub or gallop. Abdomen:   Soft, non-tender. Bowel sounds normal. No masses,  No organomegaly. Extremities: Extremities normal, atraumatic, no cyanosis. Pulses: 2+ and symmetric all extremities. Skin: Skin color, texture, turgor normal. EVD intact. Neurologic: follows commands, stovall, wd pain all extrems.        Lab/Data Review:    Recent Results (from the past 12 hour(s))   GLUCOSE, POC    Collection Time: 07/31/19 11:48 PM   Result Value Ref Range    Glucose (POC) 97 65 - 100 mg/dL   MAGNESIUM    Collection Time: 08/01/19  4:07 AM   Result Value Ref Range    Magnesium 2.8 (H) 1.8 - 2.4 mg/dL   PLATELET FUNCTION, VERIFY NOW P2Y12    Collection Time: 08/01/19  4:07 AM   Result Value Ref Range    P2Y12 Plt response 256 PRU   ASPIRIN TEST    Collection Time: 08/01/19  4:07 AM   Result Value Ref Range    Aspirin test 410 (L) 620 - 672 ARU   PROTHROMBIN TIME + INR    Collection Time: 08/01/19  5:00 AM   Result Value Ref Range    Prothrombin time 11.7 11.7 - 14.5 sec    INR 0.9     GLUCOSE, POC    Collection Time: 08/01/19  6:16 AM   Result Value Ref Range    Glucose (POC) 102 (H) 65 - 100 mg/dL   TYPE & SCREEN    Collection Time: 08/01/19  6:36 AM   Result Value Ref Range    Crossmatch Expiration 08/04/2019     ABO/Rh(D) Anna Jennifer POSITIVE     Antibody screen NEG    PLATELETS, ALLOCATE    Collection Time: 08/01/19  7:45 AM   Result Value Ref Range    Unit number [id number]     Blood component type PLTPH LR,2     Unit division 00     Status of unit ALLOCATED     Unit number [id number]     Blood component type PLTPH, LR     Unit division 00     Status of unit ALLOCATED        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)    Active Problems:    Tobacco abuse (7/10/2019)      Acute respiratory failure with hypoxia (HCC) (7/10/2019)      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 Q1 hour neuro checks. On SAH protocol - Mg, nimotop, zocor. Last CTA head and neck showed no filling of the aneurysm and good filling of the PCAs. Vasospasm is still seen in the MCAs and ACAs but better. CTP was normal. Will continue SAH protocol, PT/OT. OOB to chair today. AR:410/ MO:256. Following commands this morning. Patient remains on Keppra and has had no further seizure activity since his admission. Shunt placed without issues. MAP goal 100-110 post op. Head CT in am. Plan to get him to rehab this week. RESP: Tolerating RA. No distress. CV: -110. 2D Echo: 55-60%, trop peaked @ 0.08. SCD/SQ heparin. HEME: HH: 9.4/28 - stable after transfusion , ,  HIT panel negative. On oral iron. And Vit C. NEPH: bun/cr: 29/3.5. UOP is good. Monitor. GI: Pepcid. Senna. +BS. Abdomen is not distended or hard. Will give MOM again. NPO for shunt today. ID: Tm: [id number]. Sputum cx gram + cocci -  Patient had MSSA pneumonia, abx completed. Blood cultures neg and UA neg. US neg for DVT. CSF cx neg 7/22 and 7/27 for any organisms, protein 186. LINES: RUE PICC, phelps. Tobacco abuse: On Nicoderm patch.       Signed By: Gorge Jaramillo NP     August 1, 2019

## 2019-08-01 NOTE — PROGRESS NOTES
Received pt back from OR post shunt placement. Dual neuro assessment performed with JOHANNA Denton. Pt is drowsy and confused post anesthesia - oriented to person. L pupil oval shaped 4 mm, R pupil 4 mm and round - both brisk to react. Able to MCCULLOUGH purposefully and follow commands. Pt swallows without difficulty. Pt now on cardene gtt for goal - 110.

## 2019-08-01 NOTE — PROGRESS NOTES
TRANSFER - OUT REPORT:    Verbal report given to TRAN Montes De Oca)  on Everet Copper  being transferred to OR  for ordered procedure       Report consisted of patients Situation, Background, Assessment and   Recommendations(SBAR). Information from the following report(s) SBAR, Kardex, ED Summary, Procedure Summary, Intake/Output, MAR, Recent Results, Alarm Parameters  and Quality Measures was reviewed with the receiving nurse. Opportunity for questions and clarification was provided.       Patient transported with:   Monitor  Registered Nurse (Corrie CHOUDHARY- RN), Ronald Marino RN  EVD drain, ART line, Patiño

## 2019-08-01 NOTE — PROGRESS NOTES
Bedside and verbal report received from Mereta, [facility]. Dual neuro assessment performed at bedside.

## 2019-08-01 NOTE — PROGRESS NOTES
talked with the patient's father and brother in the patient's room. They were awaiting the return of the patient from a surgical procedure this afternoon.     [employer]

## 2019-08-01 NOTE — PERIOP NOTES
1109: To PACU post EGD/EUS. Oral airway in place.  1131: Oral airway dc'd, breathing is spontaneous and unlabored. Denies pain or nauseas at this time.  1145: HR running in the 40's-50's, will monitor.  1205: HR continues in the 40's-50's-60's. Will administer glycopyrolate per Dr. Viera.  1229: HR maintaining 60's SR. Meets criteria for stage ll.   TRANSFER - OUT REPORT:    Verbal report given to [facility] on Corey Cassidy  being transferred to ICU for routine progression of care       Report consisted of patients Situation, Background, Assessment and   Recommendations(SBAR). Information from the following report(s) OR Summary was reviewed with the receiving nurse. Lines:   PICC Triple Lumen 27/37/80 Right;Basilic (Active)   Central Line Being Utilized Yes 8/1/2019  8:58 AM   Criteria for Appropriate Use Long term IV/antibiotic administration 8/1/2019  8:58 AM   Site Assessment Clean, dry, & intact 8/1/2019  8:58 AM   Phlebitis Assessment 0 8/1/2019  8:58 AM   Infiltration Assessment 0 8/1/2019  8:58 AM   Arm Circumference (cm) 34 cm 7/12/2019  9:18 AM   Date of Last Dressing Change 07/28/19 8/1/2019  8:58 AM   Dressing Status Clean, dry, & intact 8/1/2019  8:58 AM   External Catheter Length (cm) 0 centimeters 7/28/2019  4:00 PM   Dressing Type Disk with Chlorhexadine gluconate (CHG); Transparent;Stabilization/securement device 8/1/2019  8:58 AM   Action Taken Other (comment) 8/1/2019  8:58 AM   Hub Color/Line Status Red;Patent; Flushed 8/1/2019  7:00 AM   Positive Blood Return (Site #1) Yes 8/1/2019  7:00 AM   Hub Color/Line Status White;Patent; Flushed 8/1/2019  7:00 AM   Positive Blood Return (Site #2) Yes 8/1/2019  7:00 AM   Hub Color/Line Status Purple;Patent; Infusing 8/1/2019  7:00 AM   Positive Blood Return (Site #3) Yes 8/1/2019  7:00 AM   Alcohol Cap Used No 8/1/2019  8:58 AM       Peripheral IV 08/01/19 Left Foot (Active)       Arterial Line 07/09/19 Left Radial artery (Active)   Site Assessment Clean, dry, & intact 8/1/2019  7:00 AM   Dressing Status Clean, dry, & intact 8/1/2019  7:00 AM   Dressing Type Disk with Chlorhexadine gluconate (CHG); Transparent;Tape 8/1/2019  7:00 AM   Line Status Intact and in place 8/1/2019  7:00 AM   Treatment Zeroed or re-zeroed 8/1/2019  7:00 AM   Affected Extremity/Extremities Color distal to insertion site pink (or appropriate for race); Pulses palpable;Range of motion performed 8/1/2019  7:00 AM        Opportunity for questions and clarification was provided.       Patient transported with:   Monitor  O2 @ 3 liters  Registered Nurse   CRNA

## 2019-08-01 NOTE — PROGRESS NOTES
PM&R Consult Progress Note      Patient: Lucia Villegas  Admit Date: 7/9/2019  Admit Diagnosis: [facility] [I60.9]  Recommendations: Continue Acute Rehab Program, Coordination of rehab/medical care, Counseling of PM & R care issues management, [address] Lauren scheduled for today. Will begin precert for [facility] for admission as early as tomorrow.   -agree with antidepressant. Affect flat, less verbal, less attentive. I also think a change of scenery on rehab will benefit him greatly. -excellent rehab potential  History/Subjective/Complaint:     Patient seen and examined. Records reviewed. Nods y/n. When asked why he wont speak , he whispered, \" Im just tired. \"    Pain 1  Pain Scale 1: Numeric (0 - 10) (08/01/19 0700)  Pain Intensity 1: 0 (08/01/19 0700)  Patient Stated Pain Goal: 0 (08/01/19 0700)  Pain Reassessment 1: Yes (07/31/19 2310)  Pain Onset 1: Intermittent (07/31/19 1720)  Pain Location 1: Head;Neck (07/31/19 2210)  Pain Orientation 1: Posterior (07/31/19 2210)  Pain Description 1: Aching;Constant (07/31/19 2210)  Pain Intervention(s) 1: Medication (see MAR) (07/31/19 2210)     Objective:     Vitals:  Patient Vitals for the past 8 hrs:   BP Temp Pulse Resp SpO2   08/01/19 0815 -- 98.7 °F (37.1 °C) 90 13 99 %   08/01/19 0800 -- 98.7 °F (37.1 °C) 87 12 99 %   08/01/19 0745 -- 98.6 °F (37 °C) 87 10 99 %   08/01/19 0730 -- 98.4 °F (36.9 °C) 97 (!) 41 100 %   08/01/19 0715 -- 98.4 °F (36.9 °C) 87 11 99 %   08/01/19 0700 -- 98.4 °F (36.9 °C) 85 12 100 %   08/01/19 0645 -- 98.4 °F (36.9 °C) 92 13 98 %   08/01/19 0630 -- 98.4 °F (36.9 °C) (!) 103 8 99 %   08/01/19 0615 -- 98.4 °F (36.9 °C) (!) 102 10 99 %   08/01/19 0600 -- 98.6 °F (37 °C) 95 13 99 %   08/01/19 0501 -- 98.2 °F (36.8 °C) 91 8 99 %   08/01/19 0500 -- 98.2 °F (36.8 °C) 91 10 100 %   08/01/19 0401 -- 98.2 °F (36.8 °C) 96 8 99 %   08/01/19 0400 -- 98.2 °F (36.8 °C) 97 14 98 %   08/01/19 0354 (!) 208/92 -- 92 -- -- 08/01/19 0330 -- 98.9 °F (37.2 °C) 93 8 98 %   08/01/19 0300 185/85 99.1 °F (37.3 °C) 93 11 98 %   08/01/19 0230 -- 98.6 °F (37 °C) 100 23 98 %   08/01/19 0200 -- 99.5 °F (37.5 °C) 92 -- 99 %      Intake and Output:  07/30 1901 - 08/01 0700  In: 3781.7 [P.O.:240;  I.V.:3541.7]  Out: 4980 [Urine:4540; Drains:440]    No Known Allergies  Current Facility-Administered Medications   Medication Dose Route Frequency    0.9% sodium chloride infusion 250 mL  250 mL IntraVENous PRN    0.9% sodium chloride infusion 250 mL  250 mL IntraVENous PRN    magnesium hydroxide (MILK OF MAGNESIA) 400 mg/5 mL oral suspension 30 mL  30 mL Oral DAILY    citalopram (CELEXA) tablet 10 mg  10 mg Oral DAILY    0.9% sodium chloride infusion 250 mL  250 mL IntraVENous PRN    nicotine (NICODERM CQ) 14 mg/24 hr patch 1 Patch  1 Patch TransDERmal Q24H    hydrogen peroxide 3 %   Topical PRN    acetaminophen (TYLENOL) tablet 650 mg  650 mg Oral Q4H PRN    lidocaine 4 % patch 1 Patch  1 Patch TransDERmal Q24H    fentaNYL citrate (PF) injection 50 mcg  50 mcg IntraVENous Q1H PRN    ferrous sulfate tablet 325 mg  1 Tab Oral TID WITH MEALS    levETIRAcetam (KEPPRA) tablet 500 mg  500 mg Oral Q12H    niMODipine (NIMOTOP) 30 mg/mL oral solution (compound) 60 mg  60 mg Oral Q4H    0.45% sodium chloride infusion  100 mL/hr IntraVENous CONTINUOUS    ascorbic acid (vitamin C) (VITAMIN C) tablet 500 mg  500 mg Oral TID    aspirin tablet 325 mg  325 mg Oral DAILY    atorvastatin (LIPITOR) tablet 40 mg  40 mg Oral QHS    clopidogrel (PLAVIX) tablet 75 mg  75 mg Oral DAILY    famotidine (PEPCID) tablet 20 mg  20 mg Oral DAILY    senna-docusate (PERICOLACE) 8.6-50 mg per tablet 2 Tab  2 Tab Oral QHS    magnesium hydroxide (MILK OF MAGNESIA) 400 mg/5 mL oral suspension 30 mL  30 mL Oral DAILY PRN    polyvinyl alcohol (LIQUIFILM TEARS) 1.4 % ophthalmic solution 1 Drop  1 Drop Both Eyes PRN    heparin (porcine) injection 5,000 Units  5,000 Units SubCUTAneous Q8H    sodium chloride (NS) flush 30 mL  30 mL InterCATHeter Q8H    heparin (porcine) pf 900 Units  900 Units InterCATHeter Q8H    sodium chloride (NS) flush 30 mL  30 mL InterCATHeter PRN    heparin (porcine) pf 900 Units  900 Units InterCATHeter PRN    ondansetron (ZOFRAN) injection 4 mg  4 mg IntraVENous Q6H PRN    NUTRITIONAL SUPPORT ELECTROLYTE PRN ORDERS   Does Not Apply PRN    magnesium sulfate 4 g/100 mL IVPB  4 g IntraVENous DAILY PRN    magnesium sulfate 2 g/50 ml IVPB (premix or compounded)  2 g IntraVENous DAILY PRN       Physical Exam:  No significant changes  EVD intact; clamped  Opens eyes to voice. Oriented to person and \"hospital for my brain. \" but not time or year or [facility] ANKIT EOMI  Follows commands  [place], right inattn, right  slt less than left; ? Effort    CV rrr no m  LUNGS cta but dec at bases  ABD soft ntnd bs+  EXT no edema; RUE PICC, L radial arterial line c/d/i          Functional Assessment:  Gross Assessment  AROM: Generally decreased, functional(R LE) (07/22/19 1340)  PROM: Generally decreased, functional (07/12/19 1100)  Strength: Generally decreased, functional(R LE) (07/22/19 1340)  Coordination: Generally decreased, functional(B hands swelling) (07/12/19 1500)  Sensation: Intact(BUEs to light touch ) (07/12/19 1500)     Gait  Base of Support: Center of gravity altered;Narrowed (07/31/19 1046)  Speed/Aminata: Shuffled; Slow (07/31/19 1046)  Step Length: Left shortened;Right shortened (07/31/19 1046)  Swing Pattern: Left asymmetrical (07/31/19 1046)  Gait Abnormalities: Decreased step clearance; Path deviations; Shuffling gait(decreased swing phase L foot) (07/31/19 1046)  Ambulation - Level of Assistance: Contact guard assistance;Minimal assistance;Assist x2 (07/31/19 1046)  Distance (ft): 150 Feet (ft)(several standing breaks, cues) (07/31/19 1046)  Assistive Device: Gait belt(HHA x 2) (07/31/19 1046)  Interventions: Safety awareness training;Verbal cues (07/31/19 1046)     Bed Mobility  Supine to Sit: Contact guard assistance (07/31/19 1046)  Sit to Supine: (left up in chair) (07/31/19 1046)  Scooting: Contact guard assistance (07/31/19 1046)     Balance  Sitting: Impaired (07/31/19 1046)  Sitting - Static: Good (unsupported) (07/31/19 1046)  Sitting - Dynamic: Fair (occasional) (07/31/19 1046)  Standing: Impaired (07/31/19 1046)  Standing - Static: Fair (07/31/19 1046)  Standing - Dynamic : Constant support; Fair (07/31/19 1046)     Grooming  Brushing Teeth: Set-up(using green sponge) (07/31/19 1500)                 Bed/Mat Mobility  Supine to Sit: Contact guard assistance (07/31/19 1046)  Sit to Supine: (left up in chair) (07/31/19 1046)  Sit to Stand: Contact guard assistance;Minimum assistance (07/31/19 1046)  Stand to Sit: Minimum assistance (07/31/19 1046)  Bed to Chair: Contact guard assistance;Minimum assistance (07/31/19 1046)  Scooting: Contact guard assistance (07/31/19 1046)     Labs/Studies:  Recent Results (from the past 72 hour(s))   CBC W/O DIFF    Collection Time: 07/30/19  4:50 AM   Result Value Ref Range    WBC 10.8 4.3 - 11.1 K/uL    RBC 3.36 (L) 4.23 - 5.6 M/uL    HGB 10.2 (L) 13.6 - 17.2 g/dL    HCT 30.6 (L) 41.1 - 50.3 %    MCV 91.1 79.6 - 97.8 FL    MCH 30.4 26.1 - 32.9 PG    MCHC 33.3 31.4 - 35.0 g/dL    RDW 14.1 11.9 - 14.6 %    PLATELET 791 033 - 513 K/uL    MPV 9.8 9.4 - 12.3 FL    ABSOLUTE NRBC 0.00 0.0 - 0.2 K/uL   METABOLIC PANEL, BASIC    Collection Time: 07/30/19  4:50 AM   Result Value Ref Range    Sodium 144 136 - 145 mmol/L    Potassium 3.9 3.5 - 5.1 mmol/L    Chloride 110 (H) 98 - 107 mmol/L    CO2 25 21 - 32 mmol/L    Anion gap 9 7 - 16 mmol/L    Glucose 86 65 - 100 mg/dL    BUN 29 (H) 6 - 23 MG/DL    Creatinine 3.58 (H) 0.8 - 1.5 MG/DL    GFR est AA 24 (L) >60 ml/min/1.73m2    GFR est non-AA 20 (L) >60 ml/min/1.73m2    Calcium 8.7 8.3 - 10.4 MG/DL   MAGNESIUM    Collection Time: 07/30/19  4:50 AM   Result Value Ref Range Magnesium 1.9 1.8 - 2.4 mg/dL   GLUCOSE, POC    Collection Time: 07/30/19  8:15 AM   Result Value Ref Range    Glucose (POC) 94 65 - 100 mg/dL   FIBRINOGEN    Collection Time: 07/30/19  9:50 AM   Result Value Ref Range    Fibrinogen 448 190 - 501 mg/dL   PLATELET FUNCTION, VERIFY NOW P2Y12    Collection Time: 07/30/19  9:50 AM   Result Value Ref Range    P2Y12 Plt response 244 PRU   ASPIRIN TEST    Collection Time: 07/30/19  9:50 AM   Result Value Ref Range    Aspirin test 546 (L) 620 - 672 ARU   MAGNESIUM    Collection Time: 07/30/19  9:50 AM   Result Value Ref Range    Magnesium 3.0 (H) 1.8 - 2.4 mg/dL   GLUCOSE, POC    Collection Time: 07/30/19  2:29 PM   Result Value Ref Range    Glucose (POC) 126 (H) 65 - 100 mg/dL   MAGNESIUM    Collection Time: 07/31/19  5:02 AM   Result Value Ref Range    Magnesium 2.3 1.8 - 2.4 mg/dL   METABOLIC PANEL, BASIC    Collection Time: 07/31/19  5:02 AM   Result Value Ref Range    Sodium 142 136 - 145 mmol/L    Potassium 4.0 3.5 - 5.1 mmol/L    Chloride 108 (H) 98 - 107 mmol/L    CO2 23 21 - 32 mmol/L    Anion gap 11 7 - 16 mmol/L    Glucose 95 65 - 100 mg/dL    BUN 30 (H) 6 - 23 MG/DL    Creatinine 3.41 (H) 0.8 - 1.5 MG/DL    GFR est AA 25 (L) >60 ml/min/1.73m2    GFR est non-AA 21 (L) >60 ml/min/1.73m2    Calcium 8.8 8.3 - 10.4 MG/DL   CBC W/O DIFF    Collection Time: 07/31/19  5:02 AM   Result Value Ref Range    WBC 10.7 4.3 - 11.1 K/uL    RBC 3.55 (L) 4.23 - 5.6 M/uL    HGB 10.9 (L) 13.6 - 17.2 g/dL    HCT 33.0 (L) 41.1 - 50.3 %    MCV 93.0 79.6 - 97.8 FL    MCH 30.7 26.1 - 32.9 PG    MCHC 33.0 31.4 - 35.0 g/dL    RDW 14.2 11.9 - 14.6 %    PLATELET 841 135 - 809 K/uL    MPV 10.3 9.4 - 12.3 FL    ABSOLUTE NRBC 0.00 0.0 - 0.2 K/uL   MAGNESIUM    Collection Time: 07/31/19  9:27 AM   Result Value Ref Range    Magnesium 3.2 (H) 1.8 - 2.4 mg/dL   GLUCOSE, POC    Collection Time: 07/31/19 11:48 PM   Result Value Ref Range    Glucose (POC) 97 65 - 100 mg/dL   MAGNESIUM    Collection Time: 08/01/19  4:07 AM   Result Value Ref Range    Magnesium 2.8 (H) 1.8 - 2.4 mg/dL   PLATELET FUNCTION, VERIFY NOW P2Y12    Collection Time: 08/01/19  4:07 AM   Result Value Ref Range    P2Y12 Plt response 256 PRU   ASPIRIN TEST    Collection Time: 08/01/19  4:07 AM   Result Value Ref Range    Aspirin test 410 (L) 620 - 672 ARU   PROTHROMBIN TIME + INR    Collection Time: 08/01/19  5:00 AM   Result Value Ref Range    Prothrombin time 11.7 11.7 - 14.5 sec    INR 0.9     GLUCOSE, POC    Collection Time: 08/01/19  6:16 AM   Result Value Ref Range    Glucose (POC) 102 (H) 65 - 100 mg/dL   TYPE & SCREEN    Collection Time: 08/01/19  6:36 AM   Result Value Ref Range    Crossmatch Expiration 08/04/2019     ABO/Rh(D) Neita Nasuti POSITIVE     Antibody screen NEG         Assessment:     Principal Problem:    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)    Active Problems:    Tobacco abuse (7/10/2019)      Acute respiratory failure with hypoxia (HCC) (7/10/2019)      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)      IVH (intraventricular hemorrhage) (HCC) (7/10/2019)      Seizure (HCC) (7/10/2019)      Elevated serum creatinine (7/10/2019)      Cerebral vasospasm (7/10/2019)      Pneumonia due to methicillin susceptible Staphylococcus aureus (MSSA) ([facility] (7/15/2019)        Plan:     Recommendations: Continue Acute Rehab Program  Coordination of rehab/medical care  Counseling of PM & R care issues management  Monitoring and management of medical conditions per plan of care/orders  Discussion with Family/Caregiver/Staff  Reviewed Therapies/Labs/Medications/Records

## 2019-08-01 NOTE — PROGRESS NOTES
Neuro: Pt eyes open to voice - drowsy. Orientation waxes and wanes. Able to state name and situation (in the hospital for issues related to his brain) -  disoriented to place (city), time (year). L pupil slightly oval shaped, both 4 mm and brisk to react. Able to follow commands and MCCULLOUGH purposefully. EVD clamped since 0630 in prep for shunt today. Cardiac: NSR - autoregulating BP. Resp: Room air. Clear upper / diminished lower breath sounds. GI: NPO for surgery today. Active bowel sounds and passing flatus. : Patiño - draining and patent. Skin: CDI. RUE PICC and L radial ART line patent and intact.

## 2019-08-01 NOTE — PROGRESS NOTES
Spiritual Care visit. Initial Visit, Pre Surgery Consult. Attempted to visit this patient before Procedure. Patient was not in the main pre op;  was told to go to IR. Went to IR, and was told patient was in main. Explained that Main had referred me to IR. IR called ICU, and learned that patient was still there.  called Dasia Ascencio in ICU, and he had seen and charted visit.     Visit by Alyx Anthony M.Ed., Th.B. ,Staff

## 2019-08-01 NOTE — PROGRESS NOTES
Chart reviewed. Pt remains in ICU. Currently off unit for shunt today. Dr. Binh Bain states possibly start precert with insurance for [facility] approval tomorrow. CM continues to follow for any assist and d/c POC.

## 2019-08-01 NOTE — PROGRESS NOTES
Problem: Neurolinguistics Impaired (Adult)  Goal: *Acute Goals and Plan of Care (Insert Text)  Description  STG: Patient will recall verbal information after a 3 minute delay with 80% accuracy and minimal assistance. STG: Patient will recall orientation information with 100% accuracy with minimal assistance. STG: Patient will participate in continued therapeutic assessment of cognitive-linguistic abilities. STG: Patient will respond to questions with appropriate volume on 40% of responses with moderate cues  STG: Patient will respond to questions with <10 second response latency with moderate cues on 80% of question. LTG: Patient will increase neuro-linguistic abilities to increase safety and awareness of deficits. Outcome: Progressing Towards Goal    SPEECH LANGUAGE PATHOLOGY: SPEECH-LANGUAGE/COGNITION: Daily Note 9    NAME/AGE/GENDER: Haritha Cuevas is a 37 y.o. male  DATE: 8/1/2019  PRIMARY DIAGNOSIS: SAH (subarachnoid hemorrhage) ([facility] [address] [I60.9]  Procedure(s) (LRB):  SHUNT VENTRICULAR-PERITONEAL LAPAROSCOPIC ROOM 3107 (N/A) Day of Surgery  ICD-10: Treatment Diagnosis: F80.1 Expressive Language Disorder  R41.841 Cognitive-Communication Deficit    INTERDISCIPLINARY COLLABORATION: Registered Nurse and Physician  PRECAUTIONS/ALLERGIES: Patient has no known allergies. SUBJECTIVE   Patient more alert today with brighter affect. Dad at bedside. Plans for shunt placement today. Problem List:  (Impairments causing functional limitations):  1. Initiation  2. Cognitive-communication deficit    Orientation: Person  Place  Time     Pain: Pain Scale 1: Numeric (0 - 10)  Pain Intensity 1: 0     OBJECTIVE   Patient seen for ongoing cognitive-linguistic treatment this morning. Eyes open with improved eye contact with clinician. Improved voicing also noted, but he continues with low volume of speech. He independently recalled plans for surgery later today. Accurate with recalling surgery time.    Mild confusion regarding catheter/need to use restroom. However, improved ability to express wants and needs today. He initiated appropriate requests for assistance from clinician. He continues to require moderate verbal cues to increase voicing, but he increase volume on command today. Picture description task attempted via cookie theft picture. He identified problem in picture, but decreased recognition of items/events as he reported items being dropped. Verbal and visual cues provided, but patient requesting to discontinue session by stating \"I just don' t feel like thinking right now\"      ASSESSMENT   While patient continues with impaired cognitive-linguistic deficits characterized by impaired initiation, attention, and engagement in session, he did demonstrate functional improvements today. Improved affect with him independently making requests. Improved eye contact. He continues to present with poor tolerance for tasks, but is making functional gains. Continued speech therapy is recommended to address cognitive-linguistic deficits       Tool Used: MODIFIED TAYLER SCALE (mRS)   Score   No Symptoms  [] 0   No significant disability despite symptoms; able to carry out all usual duties and activities  [] 1   Slight disability; unable to carry out all previous activities but able to look after own affairs without assistance. [] 2   Moderate disability; requiring some help but able to walk without assistance  [] 3   Moderately severe disability; unable to walk without assistance and unable to attend to own bodily needs without assistance  [] 4   Severe disability; bedridden, incontinent, and requiring constant nursing care and attention  [] 5      Score:  Initial: 3 Most Recent: x (Date 08/01/19 )   Interpretation of Tool: The Modified Meagher Scale is a 7-point scaled used to quantify level of disability as it relates to a patient's functional abilities.        PLAN    FREQUENCY/DURATION: Continue to follow patient 3 times a week for duration of hospital stay to address above goals. - Recommendations for next treatment session: Next treatment will address cognitive-linguistic deficits   REHABILITATION POTENTIAL FOR STATED GOALS: Good       SAFETY:  After treatment position/precautions:  · Supine in bed  · Rn notified  · Family at bedside      RECOMMENDATIONS   STRATEGIES: Extended time for processing; Verbal repetition of instructions; verbal cues to increase voice for intelligibility     EDUCATION:  · Recommendations discussed with Nursing  · Family  · Patient     RECOMMENDATIONS for CONTINUED SPEECH THERAPY: YES: Continued need for speech therapy is anticipated at next level of care     Compliance with Program/Exercises: Compliant most of the time  Continuation of Skilled Services/Medical Necessity:   Patient is expected to demonstrate progress in cognitive ability to decrease assistance required communication, increase independence with activities of daily living and increase communication with family/caregivers.  Patient continues to require skilled intervention due to cognitive-linguistic deficits.  .         Total Treatment Duration:   Time In: 1018  Time Out: [place] 30, Scott Hughes 43., CCC-SLP

## 2019-08-02 ENCOUNTER — APPOINTMENT (OUTPATIENT)
Dept: CT IMAGING | Age: 44
DRG: 020 | End: 2019-08-02
Attending: NURSE PRACTITIONER
Payer: COMMERCIAL

## 2019-08-02 LAB
ANION GAP SERPL CALC-SCNC: 11 MMOL/L (ref 7–16)
BLD PROD TYP BPU: NORMAL
BLD PROD TYP BPU: NORMAL
BPU ID: NORMAL
BPU ID: NORMAL
BUN SERPL-MCNC: 29 MG/DL (ref 6–23)
CALCIUM SERPL-MCNC: 8.8 MG/DL (ref 8.3–10.4)
CHLORIDE SERPL-SCNC: 110 MMOL/L (ref 98–107)
CO2 SERPL-SCNC: 21 MMOL/L (ref 21–32)
CREAT SERPL-MCNC: 3.42 MG/DL (ref 0.8–1.5)
ERYTHROCYTE [DISTWIDTH] IN BLOOD BY AUTOMATED COUNT: 14 % (ref 11.9–14.6)
GLUCOSE SERPL-MCNC: 83 MG/DL (ref 65–100)
HCT VFR BLD AUTO: 30 % (ref 41.1–50.3)
HGB BLD-MCNC: 9.9 G/DL (ref 13.6–17.2)
MAGNESIUM SERPL-MCNC: 2.2 MG/DL (ref 1.8–2.4)
MCH RBC QN AUTO: 30.4 PG (ref 26.1–32.9)
MCHC RBC AUTO-ENTMCNC: 33 G/DL (ref 31.4–35)
MCV RBC AUTO: 92 FL (ref 79.6–97.8)
NRBC # BLD: 0 K/UL (ref 0–0.2)
PLATELET # BLD AUTO: 244 K/UL (ref 150–450)
PMV BLD AUTO: 10.3 FL (ref 9.4–12.3)
POTASSIUM SERPL-SCNC: 4.1 MMOL/L (ref 3.5–5.1)
RBC # BLD AUTO: 3.26 M/UL (ref 4.23–5.6)
SODIUM SERPL-SCNC: 142 MMOL/L (ref 136–145)
STATUS OF UNIT,%ST: NORMAL
STATUS OF UNIT,%ST: NORMAL
UNIT DIVISION, %UDIV: 0
UNIT DIVISION, %UDIV: 0
WBC # BLD AUTO: 10.8 K/UL (ref 4.3–11.1)

## 2019-08-02 PROCEDURE — 74011250637 HC RX REV CODE- 250/637: Performed by: NURSE PRACTITIONER

## 2019-08-02 PROCEDURE — 77030020257 HC SOL INJ SOD CL 0.45% 1000ML BG

## 2019-08-02 PROCEDURE — 74011250637 HC RX REV CODE- 250/637: Performed by: PHYSICAL MEDICINE & REHABILITATION

## 2019-08-02 PROCEDURE — 74011000258 HC RX REV CODE- 258: Performed by: NEUROLOGICAL SURGERY

## 2019-08-02 PROCEDURE — 85027 COMPLETE CBC AUTOMATED: CPT

## 2019-08-02 PROCEDURE — 74011250637 HC RX REV CODE- 250/637

## 2019-08-02 PROCEDURE — 83735 ASSAY OF MAGNESIUM: CPT

## 2019-08-02 PROCEDURE — 97530 THERAPEUTIC ACTIVITIES: CPT

## 2019-08-02 PROCEDURE — 97535 SELF CARE MNGMENT TRAINING: CPT

## 2019-08-02 PROCEDURE — 99024 POSTOP FOLLOW-UP VISIT: CPT | Performed by: NEUROLOGICAL SURGERY

## 2019-08-02 PROCEDURE — 65610000001 HC ROOM ICU GENERAL

## 2019-08-02 PROCEDURE — 74011250636 HC RX REV CODE- 250/636: Performed by: NURSE PRACTITIONER

## 2019-08-02 PROCEDURE — 65270000029 HC RM PRIVATE

## 2019-08-02 PROCEDURE — 74011000258 HC RX REV CODE- 258: Performed by: NURSE PRACTITIONER

## 2019-08-02 PROCEDURE — 74011000250 HC RX REV CODE- 250: Performed by: NURSE PRACTITIONER

## 2019-08-02 PROCEDURE — 80048 BASIC METABOLIC PNL TOTAL CA: CPT

## 2019-08-02 PROCEDURE — 70450 CT HEAD/BRAIN W/O DYE: CPT

## 2019-08-02 PROCEDURE — 74011250636 HC RX REV CODE- 250/636: Performed by: NEUROLOGICAL SURGERY

## 2019-08-02 RX ORDER — MODAFINIL 100 MG/1
200 TABLET ORAL DAILY
Status: DISCONTINUED | OUTPATIENT
Start: 2019-08-03 | End: 2019-08-05 | Stop reason: HOSPADM

## 2019-08-02 RX ORDER — KETOROLAC TROMETHAMINE 15 MG/ML
15 INJECTION, SOLUTION INTRAMUSCULAR; INTRAVENOUS
Status: DISCONTINUED | OUTPATIENT
Start: 2019-08-02 | End: 2019-08-05 | Stop reason: HOSPADM

## 2019-08-02 RX ORDER — MODAFINIL 100 MG/1
200 TABLET ORAL ONCE
Status: COMPLETED | OUTPATIENT
Start: 2019-08-02 | End: 2019-08-02

## 2019-08-02 RX ADMIN — HEPARIN SODIUM 5000 UNITS: 5000 INJECTION INTRAVENOUS; SUBCUTANEOUS at 21:18

## 2019-08-02 RX ADMIN — ATORVASTATIN CALCIUM 40 MG: 40 TABLET, FILM COATED ORAL at 21:18

## 2019-08-02 RX ADMIN — Medication 30 ML: at 21:19

## 2019-08-02 RX ADMIN — FERROUS SULFATE TAB 325 MG (65 MG ELEMENTAL FE) 325 MG: 325 (65 FE) TAB at 16:12

## 2019-08-02 RX ADMIN — MAGNESIUM HYDROXIDE 30 ML: 400 SUSPENSION ORAL at 08:49

## 2019-08-02 RX ADMIN — FAMOTIDINE 20 MG: 20 TABLET ORAL at 08:48

## 2019-08-02 RX ADMIN — MODAFINIL 200 MG: 100 TABLET ORAL at 11:40

## 2019-08-02 RX ADMIN — SODIUM CHLORIDE, PRESERVATIVE FREE 900 UNITS: 5 INJECTION INTRAVENOUS at 13:54

## 2019-08-02 RX ADMIN — ACETAMINOPHEN 650 MG: 325 TABLET, FILM COATED ORAL at 07:37

## 2019-08-02 RX ADMIN — OXYCODONE HYDROCHLORIDE AND ACETAMINOPHEN 500 MG: 500 TABLET ORAL at 16:12

## 2019-08-02 RX ADMIN — NIMODIPINE 60 MG: 30 CAPSULE, LIQUID FILLED ORAL at 16:12

## 2019-08-02 RX ADMIN — NIMODIPINE 60 MG: 30 CAPSULE, LIQUID FILLED ORAL at 20:14

## 2019-08-02 RX ADMIN — NIMODIPINE 60 MG: 30 CAPSULE, LIQUID FILLED ORAL at 03:56

## 2019-08-02 RX ADMIN — OXYCODONE HYDROCHLORIDE AND ACETAMINOPHEN 500 MG: 500 TABLET ORAL at 21:18

## 2019-08-02 RX ADMIN — SODIUM CHLORIDE, PRESERVATIVE FREE 900 UNITS: 5 INJECTION INTRAVENOUS at 21:18

## 2019-08-02 RX ADMIN — NIMODIPINE 60 MG: 30 CAPSULE, LIQUID FILLED ORAL at 11:42

## 2019-08-02 RX ADMIN — CLOPIDOGREL BISULFATE 75 MG: 75 TABLET ORAL at 08:48

## 2019-08-02 RX ADMIN — Medication 30 ML: at 13:54

## 2019-08-02 RX ADMIN — SODIUM CHLORIDE 500 MG: 900 INJECTION, SOLUTION INTRAVENOUS at 08:49

## 2019-08-02 RX ADMIN — SENNOSIDES, DOCUSATE SODIUM 2 TABLET: 50; 8.6 TABLET, FILM COATED ORAL at 21:18

## 2019-08-02 RX ADMIN — CITALOPRAM HYDROBROMIDE 10 MG: 20 TABLET ORAL at 08:48

## 2019-08-02 RX ADMIN — Medication 30 ML: at 05:58

## 2019-08-02 RX ADMIN — FERROUS SULFATE TAB 325 MG (65 MG ELEMENTAL FE) 325 MG: 325 (65 FE) TAB at 08:48

## 2019-08-02 RX ADMIN — ASPIRIN 325 MG ORAL TABLET 325 MG: 325 PILL ORAL at 08:48

## 2019-08-02 RX ADMIN — FERROUS SULFATE TAB 325 MG (65 MG ELEMENTAL FE) 325 MG: 325 (65 FE) TAB at 11:40

## 2019-08-02 RX ADMIN — SODIUM CHLORIDE 100 ML/HR: 450 INJECTION, SOLUTION INTRAVENOUS at 05:58

## 2019-08-02 RX ADMIN — HEPARIN SODIUM 5000 UNITS: 5000 INJECTION INTRAVENOUS; SUBCUTANEOUS at 05:58

## 2019-08-02 RX ADMIN — NICARDIPINE HYDROCHLORIDE 2.5 MG/HR: 25 INJECTION INTRAVENOUS at 03:57

## 2019-08-02 RX ADMIN — SODIUM CHLORIDE 500 MG: 900 INJECTION, SOLUTION INTRAVENOUS at 20:14

## 2019-08-02 RX ADMIN — OXYCODONE HYDROCHLORIDE AND ACETAMINOPHEN 500 MG: 500 TABLET ORAL at 08:48

## 2019-08-02 RX ADMIN — NIMODIPINE 60 MG: 30 CAPSULE, LIQUID FILLED ORAL at 08:49

## 2019-08-02 RX ADMIN — HEPARIN SODIUM 5000 UNITS: 5000 INJECTION INTRAVENOUS; SUBCUTANEOUS at 13:54

## 2019-08-02 RX ADMIN — SODIUM CHLORIDE, PRESERVATIVE FREE 900 UNITS: 5 INJECTION INTRAVENOUS at 05:58

## 2019-08-02 RX ADMIN — NIMODIPINE 60 MG: 30 CAPSULE, LIQUID FILLED ORAL at 00:13

## 2019-08-02 RX ADMIN — MAGNESIUM SULFATE HEPTAHYDRATE 2 G: 40 INJECTION, SOLUTION INTRAVENOUS at 08:49

## 2019-08-02 NOTE — PROGRESS NOTES
Problem: Mobility Impaired (Adult and Pediatric)  Goal: *Acute Goals and Plan of Care (Insert Text)  Description  STG:  (1.)Mr. Rony Whaley will move from supine to sit and sit to supine , scoot up and down and roll side to side with CONTACT GUARD ASSIST within 3 treatment day(s). GOAL MET 7/31/2019   (2.)Mr. Rony Whaley will transfer from bed to chair and chair to bed with CONTACT GUARD ASSIST using the least restrictive device within 3 treatment day(s). (3.)Mr. Rony Whaley will ambulate with MINIMAL ASSIST for 50+ feet with the least restrictive device within 3 treatment day(s). GOAL MET 7/31/2019  (4.)Mr. Rony Whaley will perform standing static and dynamic balance activities x 15 minutes with MINIMAL ASSIST to improve safety within 3 day(s). (5.)Mr. Rony Whaley will maintain stable vital signs throughout all functional mobility within 3 days. GOAL MET 7/31/2019    LTG:  (1.)Mr. Rony Whaley will move from supine to sit and sit to supine , scoot up and down and roll side to side in bed with SUPERVISION within 7 treatment day(s). (2.)Mr. Rony Whaley will transfer from bed to chair and chair to bed with STAND BY ASSIST using the least restrictive device within 7 treatment day(s). (3.)Mr. Rony Whaley will ambulate with STAND BY ASSIST for 75+ feet with the least restrictive device within 7 treatment day(s). (4.)Mr. Rony Whaley will perform standing static and dynamic balance activities x 15 minutes with STAND BY ASSIST to improve safety within 7 day(s).   ________________________________________________________________________________________________     Outcome: Progressing Towards Goal     PHYSICAL THERAPY: Daily Note and AM 8/2/2019  INPATIENT: PT Visit Days : 6  Payor: Flaquita Collins / Plan: [id number] / Product Type: PPO /       NAME/AGE/GENDER: Haritha Cuevas is a 37 y.o. 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 ASSESSMENT:   Per initial:  37 y.o. Male admitted for subarachnoid hemorrhage in bed in ICU. Per RN pt continuing to get hourly neuro checks     Mr. Jane Mcwilliams is sitting in recliner on arrival, eyes closed, open to voice but required stimulation to stay open. Pt with minimal conversation, making no attempts to verbalize words. Pt appears drowsy and withdrawn throughout treatment. Patient stood with CGA with increased time (for processing?). Pt required MIN A x 2 for ambulation with facilitation at pelvis for increased step length and weight shift. Noted hyperextension R knee during stance phase. Pt. pushing pole with L UE. Pt required cues for posture, to keep eyes open with ambulation. Patient very fatigued after ambulation. Worked on standing balance cueing patient to rotate trunk with feet on floor, but he kept taking small pivoting steps in direction trunk rotated. Worked on LE exercises in sit. Pt is working toward therapy goals, has demonstrated progress with sit to stand. Pt continues to function well below baseline level of activity, safety concerns and high risk for falls. Pt will benefit from [facility] at discharge. At this time, patient is appropriate for Co-treatment with occupational therapy due to patient's decreased overall endurance/tolerance levels, as well as need for high level assistance to complete functional transfers/mobility and functional tasks. [facility] is appropriate for a multidisciplinary co-treatment of PT and OT to address goals of both disciplines. This section established at most recent assessment   PROBLEM LIST (Impairments causing functional limitations):  1. Decreased Strength  2. Decreased ADL/Functional Activities  3.  Decreased Transfer Abilities  4. Decreased Ambulation Ability/Technique  5. Decreased Balance  6. Decreased Activity Tolerance  7. Decreased Pacing Skills  8. Increased Shortness of Breath  9. Decreased Knowledge of Precautions  10. Decreased [place] with Home Exercise Program   INTERVENTIONS PLANNED: (Benefits and precautions of physical therapy have been discussed with the patient.)  1. Balance Exercise  2. Bed Mobility  3. Family Education  4. Gait Training  5. Home Exercise Program (HEP)  6. Therapeutic Activites  7. Therapeutic Exercise/Strengthening  8. Transfer Training     TREATMENT PLAN: Frequency/Duration: 3 times a week for duration of hospital stay  Rehabilitation Potential For Stated Goals: Good     REHAB RECOMMENDATIONS (at time of discharge pending progress):    Placement: It is my opinion, based on this patient's performance to date, that Mr. Karlee Gar may benefit from intensive therapy at an [address] [place] [address] after discharge due to a probable need for close medical supervision by a rehab physician, a probable need for 24 hour rehab nursing, a probable need for multiple therapy disciplines and potential to make ongoing and sustainable functional improvement that is of practical value. .  Equipment:    None at this time              HISTORY:   History of Present Injury/Illness (Reason for Referral):  Subarachnoid hemorrhage. Past Medical History/Comorbidities:   Mr. Karlee Gar  has no past medical history on file. Mr. Karlee Gar  has a past surgical history that includes ir emboli intracran lt (7/11/2019). Social History/Living Environment:   Home Environment: Private residence  One/Two Story Residence: Other (Comment)  Living Alone: No  Support Systems: Parent, Child(rohan)  Patient Expects to be Discharged to[de-identified] Unknown  Current DME Used/Available at Home: None  Prior Level of Function/Work/Activity:  Unsure, RN reports patient worked.      Number of Personal Factors/Comorbidities that affect the Plan of Care: 1-2: MODERATE COMPLEXITY   EXAMINATION:   Most Recent Physical Functioning:   Gross Assessment:                  Posture:     Balance:  Sitting: Impaired  Sitting - Static: Good (unsupported)  Sitting - Dynamic: Prop sitting  Standing: Impaired  Standing - Static: Fair  Standing - Dynamic : Poor Bed Mobility:     Wheelchair Mobility:     Transfers:  Sit to Stand: Contact guard assistance; Additional time  Stand to Sit: Minimum assistance; Additional time  Gait:     Base of Support: Center of gravity altered;Narrowed  Speed/Aminata: Slow;Delayed  Step Length: Right shortened;Left shortened  Swing Pattern: Left asymmetrical;Right asymmetrical  Stance: (R knee hyperextends stance phase)  Gait Abnormalities: Altered arm swing;Decreased step clearance; Other(neck flexed)  Distance (ft): 100 Feet (ft)  Assistive Device: Gait belt; Other (comment)(pushing pole)  Ambulation - Level of Assistance: Minimal assistance;Assist x2  Interventions: Tactile cues; Verbal cues;Manual cues      Body Structures Involved:  1. Nerves  2. Heart  3. Lungs  4. Muscles Body Functions Affected:  1. Sensory/Pain  2. Voice and Speech  3. Cardio  4. Respiratory  5. Neuromusculoskeletal  6. Movement Related Activities and Participation Affected:  1. Learning and Applying Knowledge  2. General Tasks and Demands  3. Communication  4. Mobility  5. Self Care  6. Domestic Life  7. Interpersonal Interactions and Relationships  8. Community, Social and [address] [place] [address]   Number of elements that affect the Plan of Care: 4+: HIGH COMPLEXITY   CLINICAL PRESENTATION:   Presentation: Evolving clinical presentation with changing clinical characteristics: MODERATE COMPLEXITY   CLINICAL DECISION MAKIN [facility] Inpatient Short Form  How much difficulty does the patient currently have. .. Unable A Lot A Little None   1. Turning over in bed (including adjusting bedclothes, sheets and blankets)?    ? 1   ? 2   ? 3   ? 4 2.  Sitting down on and standing up from a chair with arms ( e.g., wheelchair, bedside commode, etc.)   ? 1   ? 2   ? 3   ? 4   3. Moving from lying on back to sitting on the side of the bed?   ? 1   ? 2   ? 3   ? 4   How much help from another person does the patient currently need. .. Total A Lot A Little None   4. Moving to and from a bed to a chair (including a wheelchair)? ? 1   ? 2   ? 3   ? 4   5. Need to walk in hospital room? ? 1   ? 2   ? 3   ? 4   6. Climbing 3-5 steps with a railing? ? 1   ? 2   ? 3   ? 4   © 2007, Trustees of [address] [place] [address] [address] [id number], under license to [employer]. All rights reserved      Score:  Initial: 10 Most Recent: X (Date: -- )    Interpretation of Tool:  Represents activities that are increasingly more difficult (i.e. Bed mobility, Transfers, Gait). Medical Necessity:     · Patient demonstrates good  ·  rehab potential due to higher previous functional level. Reason for Services/Other Comments:  · Patient continues to require modification of therapeutic interventions to increase complexity of exercises  · . Use of outcome tool(s) and clinical judgement create a POC that gives a: Questionable prediction of patient's progress: MODERATE COMPLEXITY            TREATMENT:   (In addition to Assessment/Re-Assessment sessions the following treatments were rendered)   Pre-treatment Symptoms/Complaints:  Drowsy, constant cues to stay awake  Pain: Initial:   Pain Intensity 1: 0  Post Session:  0/10     Therapeutic Activity: (    30 ): Therapeutic activities including bed transfers, Chair transfers, ambulation on level ground, posture,  standing balance activities and sitting exercises to improve mobility, strength, balance, coordination and activity tolerance . Required moderate cueing to promote static and dynamic balance in standing and promote coordination of bilateral, lower extremity(s).         Date:  7/15/19 Date:  08/02/19 Date:     Activity/Exercise Parameters Parameters Parameters   Seated heel raises x15 B A     Seated toe raises x15 B A     Seated LAQ x15 B A x10 AB    Seated hip flexion  x10 AB                            Braces/Orthotics/Lines/Etc:   · phelps catheter  · arterial line  Treatment/Session Assessment:    · Response to Treatment:  Flat affect but cooperative. · Interdisciplinary Collaboration:   o Physical Therapist  o Registered Nurse  o Rehabilitation Attendant  · After treatment position/precautions:   o Up in chair  o Bed/Chair-wheels locked  o Call light within reach  o RN notified  o Family at bedside   · Compliance with Program/Exercises: Compliant all of the time  · Recommendations/Intent for next treatment session: \"Next visit will focus on advancements to more challenging activities and reduction in assistance provided\".   Total Treatment Duration:  PT Patient Time In/Time Out  Time In: 1059  Time Out: 2500 Sanjana Ayon, PT, DPT

## 2019-08-02 NOTE — PROGRESS NOTES
Problem: Self Care Deficits Care Plan (Adult)  Goal: *Acute Goals and Plan of Care (Insert Text)  Description  1. Patient will complete total body bathing and dressing with supervision and adaptive equipment as needed. 2. Patient will complete toileting with supervision and adaptive equipment as needed. 3. Patient will tolerate 20 minutes of OT treatment with up to minimal rest breaks to increase activity tolerance for ADLs. 4. Patient will complete functional transfers with supervision and adaptive equipment as needed. 5. Patient will demonstrate modified independence with therapeutic exercise HEP to decrease edema in bilateral hands for increased coordination and independence with functional transfers. 6. Patient will complete functional mobility for ADLs with stand by assistance and adaptive equipment as needed. Timeframe: 7 visits      Outcome: Progressing Towards Goal     OCCUPATIONAL THERAPY: Daily Note and PM 8/2/2019  INPATIENT: OT Visit Days: 5  Payor: Vinnie Raya / Plan: [place] [employer] [facility] / Product Type: PPO /      NAME/AGE/GENDER: Hiren Hancock is a 37 y.o. 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 ASSESSMENT:     Mr. Ca Oro is a 37 y.o. 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 Seen today in ICU with external ventricular drain and arterial line, pt intubated. Per father at bedside/pt with appropriate nodding to yes/no questions: at baseline pt and his two children live with pt's father, pt is independent with ADLs, ambulation, working a physically demanding job. No recent falls. 8/2/2019:  Patient supine in bed asleep.   Patient lethargic, and opened his eyes briefly after sternal rub. Patient answered simple questions with one word answer today with a stronger voice than last therapy session, though he has flat affect. Patient sat on edge of bed with moderate assist x 1 due to lethargy. Patient opened eyes after sitting up on edge of bed. Patient stood with minimal assistance x 2, and he transferred to chair with minimal assist x 2 handheld. Patient then wiped his face with s/u assist and verbal cues to wipe L side of face. Patient performed oral hygiene with s/u assist.  Cont OT per tx plan. This section established at most recent assessment   PROBLEM LIST (Impairments causing functional limitations):  1. Decreased ADL/Functional Activities  2. Decreased Transfer Abilities  3. Decreased Ambulation Ability/Technique  4. Decreased Balance  5. Decreased Activity Tolerance  6. Increased Fatigue  7. Increased Shortness of Breath  8. Decreased Flexibility/Joint Mobility  9. Edema/Girth  10. Decreased Knowledge of Precautions  11. Decreased Skin Integrity/Hygeine  12. Decreased [place] with Home Exercise Program   INTERVENTIONS PLANNED: (Benefits and precautions of occupational therapy have been discussed with the patient.)  1. Activities of daily living training  2. Adaptive equipment training  3. Balance training  4. Clothing management  5. Donning&doffing training  6. Hygiene training  7. Neuromuscular re-eduation  8. Re-evaluation  9. Therapeutic activity  10. Therapeutic exercise     TREATMENT PLAN: Frequency/Duration: Follow patient 3x/week to address above goals. Rehabilitation Potential For Stated Goals: Good     REHAB RECOMMENDATIONS (at time of discharge pending progress):    Placement:   It is my opinion, based on this patient's performance to date, that Mr. Vane Ennis may benefit from intensive therapy at an [address] [place] [address] after discharge due to a probable need for close medical supervision by a rehab physician, a probable need for 24 hour rehab nursing, a probable need for multiple therapy disciplines and potential to make ongoing and sustainable functional improvement that is of practical value. .  Equipment:    TBD               OCCUPATIONAL PROFILE AND HISTORY:   History of Present Injury/Illness (Reason for Referral):  See H&P. Past Medical History/Comorbidities:   Mr. Jane Mcwilliams  has no past medical history on file. Mr. Jane Mcwilliams  has a past surgical history that includes ir emboli intracran lt (7/11/2019). Social History/Living Environment:   Home Environment: Private residence  One/Two Story Residence: Other (Comment)  Living Alone: No  Support Systems: Parent, Child(rohan)  Patient Expects to be Discharged to[de-identified] Unknown  Current DME Used/Available at Home: None  Prior Level of Function/Work/Activity:  Per father at bedside/pt with appropriate nodding to yes/no questions: at baseline pt and his two children live with pt's father, pt is independent with ADLs, ambulation, working a physically demanding job. No recent falls. Dominant Side:         RIGHT    Personal Factors:          Sex:  male        Age:  37 y.o. Other factors that influence how disability is experienced by the patient:  [address]    Number of Personal Factors/Comorbidities that affect the Plan of Care: Expanded review of therapy/medical records (1-2):  MODERATE COMPLEXITY   ASSESSMENT OF OCCUPATIONAL PERFORMANCE[de-identified]   Activities of Daily Living:   Basic ADLs (From Assessment) Complex ADLs (From Assessment)   Feeding: Total assistance(Pt intubated)  Oral Facial Hygiene/Grooming: Minimum assistance(d/t lines)  Bathing: Minimum assistance  Upper Body Dressing: Moderate assistance(d/t lines)  Lower Body Dressing: Minimum assistance  Toileting: Minimum assistance Instrumental ADL  Meal Preparation: Total assistance  Homemaking:  Total assistance  Medication Management: Total assistance  Financial Management: Total assistance   Grooming/Bathing/Dressing Activities of Daily Living   Grooming  Grooming Assistance: Set-up  Washing Face: Set-up(w verbal cues to wipe L side of face)  Brushing Teeth: Set-up(using green sponge w verbal cues)                               Most Recent Physical Functioning:   Gross Assessment:                  Posture:  Posture (WDL): Exceptions to WDL  Posture Assessment: Forward head, Rounded shoulders  Balance:    Bed Mobility:     Wheelchair Mobility:     Transfers:               Patient Vitals for the past 6 hrs:   BP Patient Position SpO2 Pulse   08/02/19 0445 -- 99 % (!) 107   08/02/19 0500 -- 100 % 98   08/02/19 0515 -- 99 % 100   08/02/19 0530 -- 99 % (!) 102   08/02/19 0546 -- 100 % 98   08/02/19 0559 -- 100 % (!) 101   08/02/19 0616 -- 100 % 100   08/02/19 0632 -- 100 % (!) 115   08/02/19 0645 -- 100 % (!) 104   08/02/19 0700 At rest;Head of bed elevated (Comment degrees) 100 % (!) 111   08/02/19 0715 -- 99 % (!) 101   08/02/19 0730 -- 100 % (!) 114   08/02/19 0745 -- 100 % (!) 122   08/02/19 0800 -- 96 % (!) 104   08/02/19 0815 -- 94 % 98   08/02/19 0830 -- 96 % 100   08/02/19 0845 -- 100 % 96   08/02/19 0900 -- 100 % (!) 113   08/02/19 0915 -- 100 % 95   08/02/19 0945 -- 100 % 97   08/02/19 1000 -- 100 % 96   08/02/19 1015 -- 100 % 97       Mental Status  Neurologic State: Drowsy, Eyes open to voice  Orientation Level: Oriented to person, Oriented to place, Oriented to time, Disoriented to situation  Cognition: Decreased attention/concentration, Follows commands, Poor safety awareness  Perception: Appears intact  Perseveration: No perseveration noted  Safety/Judgement: Fall prevention                          Physical Skills Involved:  1. Range of Motion  2. Balance  3. Activity Tolerance  4. Fine Motor Control  5. Edema Cognitive Skills Affected (resulting in the inability to perform in a timely and safe manner):  1. None  Psychosocial Skills Affected:  1. Habits/Routines  2. Environmental Adaptation  3.  Social Interaction  4. Emotional Regulation  5. Self-Awareness  6. Awareness of Others  7. Social Roles   Number of elements that affect the Plan of Care: 5+:  HIGH COMPLEXITY   CLINICAL DECISION MAKIN[address] [place] [id number] AM-PAC 6 Clicks   Daily Activity Inpatient Short Form  How much help from another person does the patient currently need. .. Total A Lot A Little None   1. Putting on and taking off regular lower body clothing? ? 1   ? 2   ? 3   ? 4   2. Bathing (including washing, rinsing, drying)? ? 1   ? 2   ? 3   ? 4   3. Toileting, which includes using toilet, bedpan or urinal?   ? 1   ? 2   ? 3   ? 4   4. Putting on and taking off regular upper body clothing? ? 1   ? 2   ? 3   ? 4   5. Taking care of personal grooming such as brushing teeth? ? 1   ? 2   ? 3   ? 4   6. Eating meals? ? 1   ? 2   ? 3   ? 4   © , Trustees of [address] [place] [id number], under license to [employer]. All rights reserved      Score:  Initial: 15 19 Most Recent: X (Date: -- )    Interpretation of Tool:  Represents activities that are increasingly more difficult (i.e. Bed mobility, Transfers, Gait). Medical Necessity:     · Patient demonstrates good  ·  rehab potential due to higher previous functional level. Reason for Services/Other Comments:  · Patient continues to require skilled intervention due to inability to complete ADLs at prior level of independence   · . Use of outcome tool(s) and clinical judgement create a POC that gives a: MODERATE COMPLEXITY         TREATMENT:   (In addition to Assessment/Re-Assessment sessions the following treatments were rendered)     Pre-treatment Symptoms/Complaints:    Pain: Initial:   Pain Intensity 1: 0  Pain Orientation 1: Posterior  Pain Intervention(s) 1: Repositioned  Post Session:  same     Self Care: (10 minutes): Procedure(s) (per grid) utilized to improve and/or restore self-care/home management as related to grooming.  Required minimal verbal cueing to facilitate activities of daily living skills. Date:  7/12/19 Date:   Date:     Activity/Exercise Parameters Parameters Parameters   BUE hand pumps 2x15 reps                                             Braces/Orthotics/Lines/Etc:   · IV  · phelps catheter  · arterial line  · ICU monitoring  · O2 Device: Ventilator  Treatment/Session Assessment:    · Response to Treatment:  Tolerated well   · Interdisciplinary Collaboration:   o Physical Therapist  o Occupational Therapist  o Registered Nurse  o Rehabilitation Attendant  · After treatment position/precautions:   o Up in chair  o Bed/Chair-wheels locked  o Bed in low position  o Call light within reach  o RN notified  o Nurse at bedside   · Compliance with Program/Exercises: Compliant all of the time, Will assess as treatment progresses. · Recommendations/Intent for next treatment session: \"Next visit will focus on advancements to more challenging activities and reduction in assistance provided\".   Total Treatment Duration:  OT Patient Time In/Time Out  Time In: [facility]  Time Out: [place] [place] [address], OT

## 2019-08-02 NOTE — PROGRESS NOTES
Bedside and verbal shift report received from [place], [facility]. Dual neuro assessment performed at bedside. Neuro: Pt eyes open to voice - drowsy. Orientation waxes and wanes. Able to state name, age and situation (in the hospital for issues related to his brain) - able to verbalize month after several cues. L pupil slightly oval shaped, both 4 mm and brisk to react. Able to follow commands and [place] purposefully. Cardiac: NSR - MAP goal 100 - 110. Jaylyn Srinivasan RN ordered to turn off cardene gtt. Resp: Room air. Clear upper / diminished lower breath sounds. GI: Regular diet. Active bowel sounds and passing flatus. : Patiño - draining and patent. Skin: Head dressing removed per Jaylyn Srinivasan RN. Surgical site CDI and open to air. RUE PICC and L radial ART line patent and intact.

## 2019-08-02 NOTE — PROGRESS NOTES
Pt discussed with Jenna Gonzalez, liaison, with [facility]. States she has started precert with [employer] and completed pre-screen. Awaiting authorization and MD wanting to wait till Monday currently. DECO here to screen pt again to see if might qualify for EMY as well. Family at bedside.  CM continues to follow for any assist.

## 2019-08-02 NOTE — PROCEDURES
Transcranial Doppler    Transcranial Doppler studies were obtained on this patient for the evaluation of subarachnoid hemorrhage and intracranial vascular spasm. Insonation was performed via the transtemporal window and via the foramen magnum window for the posterior fossa. Compared with yesterday's study there has been some increase in flow velocities in the left middle cerebral artery although these remain within normal range. There has been an increase in the left Lindegaard ratio. Antegrade flow continues to be present in all of the intracranial vasculature. The study is otherwise unchanged with antegrade flow in the right middle posterior anterior and posterior circulation.     Flow velocities in the internal carotid arteries are unremarkable    Impression    While the study remains within normal limits there have been some increase as noted in the intracranial velocities compared with the previous studies, and the Lindegaard ratio is now 4 3 and 3.08 for the left and right side respectively

## 2019-08-02 NOTE — OP NOTES
[address] [facility]  OPERATIVE REPORT    Name:  Nasreen Redd  MR#:  [id number]  :  1975  ACCOUNT #:  [de-identified]  DATE OF SERVICE:  2019    PREOPERATIVE DIAGNOSIS:  Hydrocephalus from subarachnoid hemorrhage. POSTOPERATIVE DIAGNOSIS:  Hydrocephalus from subarachnoid hemorrhage. PROCEDURE PERFORMED:  Placement of ventriculoperitoneal shunt with a programmable Certas valve set at 3. SURGEON:  Benson Murray MD    SECOND SURGEON:  Elver Armstrong. Neda Villalta MD who laparoscopically placed the distal catheter into the abdomen. He will dictate his portion. ASSISTANT:  Do Pearson NP who was present for the entire procedure including positioning of the patient, opening and closure of the wound, and first assisted throughout the case. ANESTHESIA:  General endotracheal.    COMPLICATIONS:  None. SPECIMENS REMOVED:  none. IMPLANTS:   shunt    ESTIMATED BLOOD LOSS:  50cc. INDICATIONS:  The patient is a 78-year-old male who presented with a subarachnoid hemorrhage from a ruptured large basilar tip aneurysm. He developed communicating hydrocephalus from the large amount of subarachnoid hemorrhage and intraventricular hemorrhage. An external ventricular drain was placed. The drain was attempted to be weaned multiple times, but the patient was dependent upon the external ventricular drain. After discussing the risks and benefits with the patient and his family, they elected to have him undergo placement of a ventriculoperitoneal shunt. PROCEDURE:  The patient was properly identified and brought into the OR on 2019 and placed in the supine position on the operating room table. The patient was then intubated by Anesthesia. The patient was then turned to 90 degrees. The patient was then appropriately padded and positioned. The patient's entire head was then shaved. The patient was then prepped and draped in the usual sterile fashion.   The sutures from the old procedure were removed and then the incision opened. A long hemostat was then used to make a pocket for the valve and then to tunnel down posterior to the ear. An incision was then made right over the end of the hemostat. The distal peritoneal catheter was then tunneled from the intermediate incision up to the cranial incision. Hemostasis was obtained with bipolar electrocautery and Gelfoam with thrombin. A tunneler was then used to tunnel from the intermediate incision down to the abdomen. Dr. Berto Granda then made the incision over the tunneler and the peritoneal catheter was then placed through the tunneler down to where the abdominal incision was. The catheter was then placed into the abdomen with a pull-away sheath laparoscopically. The proximal end of the distal catheter was then attached to the Certas valve that had already been preset at 3. They were then attached using a 2-0 silk suture. The valve was then placed into the pocket. The valve and distal peroneal catheter had been flushed multiple times with sterile saline. The external ventricular drain was then removed and a new ventricular drain placed. It was placed to 6 cm and brisk CSF flow was noted. The catheter was then cut to the appropriate length and then attached to the valve. It was then secured with a 2-0 silk suture. The valve was then pumped and good flow was noted coming out the distal end of the peritoneal catheter with the camera being used for the laparoscopic portion. The wounds were then copiously irrigated. Final hemostasis was obtained with bipolar electrocautery and Gelfoam with thrombin. The abdominal incisions were then closed with 4-0 Monocryl and Dermabond. The intermediate and cranial incisions were closed with interrupted inverted 3-0 Vicryl suture for the subcutaneous tissue. The skin was closed with a running 4-0 Rapide suture. Bacitracin, Xeroform, 4x4s and Kerlix were used for the head wrap.   All sponge and instrument counts were correct. No complications occurred. The patient was then awakened and extubated by Anesthesia.   The patient was then taken back to the ICU in stable condition      Carolin Drake MD      [id number]  D:  08/02/2019 10:43  T:  08/02/2019 10:53  JOB #:  [id number]

## 2019-08-02 NOTE — PROGRESS NOTES
SPEECH PATHOLOGY NOTE:    Speech therapy attempt x2 this afternoon. Patient politely requesting to hold therapy and stating \"maybe another time\". Will continue to follow.      Scott Pugh [address] CCC-SLP

## 2019-08-02 NOTE — PROGRESS NOTES
Progress Note    Patient: Rubén Guy MRN: [id number]  SSN: [id number]    YOB: 1975  Age: 37 y.o. Sex: male      Admit Date: 7/9/2019    LOS: 24 days     Subjective:   Pt POD1 for right  shunt placement. Incision CDI. Pt will follow commands. No neuro changes  HCT stable this am. Certas valve  shunt set @3.      Current Facility-Administered Medications   Medication Dose Route Frequency    ketorolac (TORADOL) injection 15 mg  15 mg IntraVENous Q8H PRN    [START ON 8/3/2019] modafinil (PROVIGIL) tablet 200 mg  200 mg Oral DAILY    0.9% sodium chloride infusion 250 mL  250 mL IntraVENous PRN    0.9% sodium chloride infusion 250 mL  250 mL IntraVENous PRN    levETIRAcetam (KEPPRA) 500 mg in 0.9% sodium chloride 100 mL IVPB  500 mg IntraVENous Q12H    magnesium hydroxide (MILK OF MAGNESIA) 400 mg/5 mL oral suspension 30 mL  30 mL Oral DAILY    citalopram (CELEXA) tablet 10 mg  10 mg Oral DAILY    0.9% sodium chloride infusion 250 mL  250 mL IntraVENous PRN    nicotine (NICODERM CQ) 14 mg/24 hr patch 1 Patch  1 Patch TransDERmal Q24H    hydrogen peroxide 3 %   Topical PRN    acetaminophen (TYLENOL) tablet 650 mg  650 mg Oral Q4H PRN    lidocaine 4 % patch 1 Patch  1 Patch TransDERmal Q24H    fentaNYL citrate (PF) injection 50 mcg  50 mcg IntraVENous Q1H PRN    ferrous sulfate tablet 325 mg  1 Tab Oral TID WITH MEALS    niMODipine (NIMOTOP) 30 mg/mL oral solution (compound) 60 mg  60 mg Oral Q4H    ascorbic acid (vitamin C) (VITAMIN C) tablet 500 mg  500 mg Oral TID    aspirin tablet 325 mg  325 mg Oral DAILY    atorvastatin (LIPITOR) tablet 40 mg  40 mg Oral QHS    clopidogrel (PLAVIX) tablet 75 mg  75 mg Oral DAILY    famotidine (PEPCID) tablet 20 mg  20 mg Oral DAILY    senna-docusate (PERICOLACE) 8.6-50 mg per tablet 2 Tab  2 Tab Oral QHS    magnesium hydroxide (MILK OF MAGNESIA) 400 mg/5 mL oral suspension 30 mL  30 mL Oral DAILY PRN    polyvinyl alcohol (LIQUIFILM TEARS) 1.4 % ophthalmic solution 1 Drop  1 Drop Both Eyes PRN    heparin (porcine) injection 5,000 Units  5,000 Units SubCUTAneous Q8H    sodium chloride (NS) flush 30 mL  30 mL InterCATHeter Q8H    heparin (porcine) pf 900 Units  900 Units InterCATHeter Q8H    sodium chloride (NS) flush 30 mL  30 mL InterCATHeter PRN    heparin (porcine) pf 900 Units  900 Units InterCATHeter PRN    ondansetron (ZOFRAN) injection 4 mg  4 mg IntraVENous Q6H PRN    NUTRITIONAL SUPPORT ELECTROLYTE PRN ORDERS   Does Not Apply PRN    magnesium sulfate 4 g/100 mL IVPB  4 g IntraVENous DAILY PRN    magnesium sulfate 2 g/50 ml IVPB (premix or compounded)  2 g IntraVENous DAILY PRN       Objective:     Vitals:    08/02/19 1101 08/02/19 1137 08/02/19 1145 08/02/19 1200   BP:       Pulse: (!) 114 99 (!) 104 (!) 103   Resp: 18 11 10 15   Temp: 98.7 °F (37.1 °C) 98.4 °F (36.9 °C) 98.4 °F (36.9 °C) 98.2 °F (36.8 °C)   SpO2: 100% 100% 100% 100%   Weight:       Height:             Intake and Output:  Current Shift: 08/02 0701 - 08/02 1900  In: -   Out: 625 [Urine:625]  Last 24 hr: 08/01 0701 - 08/02 0700  In: 3628.8 [I.V.:3148.8]  Out: 3400 [Urine:3350]     IO: +288cc/24hr  TOTAL OVERALL: -20L    Physical Exam:   General:   AOX3,  following commands. Stovall. Eyes:   PERRL   Neck: Supple, symmetrical, trachea midline, no adenopathy, thyroid: no enlargment/tenderness/nodules, no carotid bruit and no JVD. Lungs:   Clear to auscultation bilaterally. Heart:  Regular rate and rhythm, S1, S2 normal, no murmur, click, rub or gallop. Abdomen:   Soft, non-tender. Bowel sounds normal. No masses,  No organomegaly. Extremities: Extremities normal, atraumatic, no cyanosis. Pulses: 2+ and symmetric all extremities. Skin: Skin color, texture, turgor normal. EVD intact. Neurologic: follows commands, stovall, wd pain all extrems.        Lab/Data Review:    Recent Results (from the past 12 hour(s))   MAGNESIUM    Collection Time: 08/02/19  3:14 AM Result Value Ref Range    Magnesium 2.2 1.8 - 2.4 mg/dL   METABOLIC PANEL, BASIC    Collection Time: 08/02/19  3:14 AM   Result Value Ref Range    Sodium 142 136 - 145 mmol/L    Potassium 4.1 3.5 - 5.1 mmol/L    Chloride 110 (H) 98 - 107 mmol/L    CO2 21 21 - 32 mmol/L    Anion gap 11 7 - 16 mmol/L    Glucose 83 65 - 100 mg/dL    BUN 29 (H) 6 - 23 MG/DL    Creatinine 3.42 (H) 0.8 - 1.5 MG/DL    GFR est AA 25 (L) >60 ml/min/1.73m2    GFR est non-AA 21 (L) >60 ml/min/1.73m2    Calcium 8.8 8.3 - 10.4 MG/DL   CBC W/O DIFF    Collection Time: 08/02/19  3:14 AM   Result Value Ref Range    WBC 10.8 4.3 - 11.1 K/uL    RBC 3.26 (L) 4.23 - 5.6 M/uL    HGB 9.9 (L) 13.6 - 17.2 g/dL    HCT 30.0 (L) 41.1 - 50.3 %    MCV 92.0 79.6 - 97.8 FL    MCH 30.4 26.1 - 32.9 PG    MCHC 33.0 31.4 - 35.0 g/dL    RDW 14.0 11.9 - 14.6 %    PLATELET 689 609 - 389 K/uL    MPV 10.3 9.4 - 12.3 FL    ABSOLUTE NRBC 0.00 0.0 - 0.2 K/uL       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)    Active Problems:    Tobacco abuse (7/10/2019)      Acute respiratory failure with hypoxia (HCC) (7/10/2019)      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 Q1 hour neuro checks. On SAH protocol - Mg, nimotop, zocor. Last CTA head and neck showed no filling of the aneurysm and good filling of the PCAs. Vasospasm is still seen in the MCAs and ACAs but better. CTP was normal. Will continue SAH protocol, PT/OT. OOB to chair today. AR:410/ WY:256. Following commands this morning.  Patient remains on Keppra and has had no further seizure activity since his admission. Shunt placed without issues. MAP goal 100-110 post op. Head CT stable this am.  Plan to get him to rehab this week. Transfer to 7th floor today. RESP: Tolerating RA. No distress. CV: -110. 2D Echo: 55-60%, trop peaked @ 0.08. SCD/SQ heparin. HEME: HH: 9.9/30 - stable after transfusion , , On oral iron. And Vit C. NEPH: bun/cr: 29/3.4. UOP is good. Monitor. GI: Pepcid. Senna. +BS. .  ID: Tm: 99. Sputum cx gram + cocci -  Patient had MSSA pneumonia, abx completed. Blood cultures neg and UA neg. US neg for DVT. CSF cx neg 7/22 and 7/27 for any organisms, protein 186. LINES: RUE PICC, phelps. Tobacco abuse: On Nicoderm patch.       Signed By: Sharath Martines, NP     August 2, 2019

## 2019-08-02 NOTE — PROGRESS NOTES
Interdisciplinary team rounds were held 8/2/2019 with the following team members:Nursing, Nurse Practitioner, Nutrition, Palliative Care, Pastoral Care, Patient Relations, Pharmacy, Physical Therapy, Physician, Respiratory Therapy and Clinical Coordinator and the patient. Plan of care discussed. See clinical pathway and/or care plan for interventions and desired outcomes.

## 2019-08-02 NOTE — PROGRESS NOTES
PM&R Consult Progress Note      Patient: Ronald Momin  Admit Date: 7/9/2019  Admit Diagnosis: [facility] [I60.9]  Recommendations: Continue Acute Rehab Program, Coordination of rehab/medical care, Counseling of PM & R care issues management, Hospital Inpatient Rehab  -off Cardene gtt,  at time of exam, DBP in the 80s  -significant depression and apathy; cont Celexa; add Provigil 200mg qam for stimulation; monitor effects  -Cont ASA, Lipitor, Plavix  -cont keppra for sz prophylaxis  -cont Nimotop  -IRC pending insurance precert (initiated early afternoon 8/1); needs to be able to actively participate in 3hrs of therapy/d  History/Subjective/Complaint:     Patient seen and examined. Records reviewed. Juan Diego Courser needs maximal encouragement to open eyes and respond to questions. Appears to be purposeful in his non performance.      Pain 1  Pain Scale 1: Numeric (0 - 10) (08/02/19 0300)  Pain Intensity 1: 0 (08/02/19 0300)  Patient Stated Pain Goal: 0 (08/02/19 0300)  Pain Reassessment 1: Yes (07/31/19 2310)  Pain Onset 1: Intermittent (07/31/19 1720)  Pain Location 1: Head;Neck (07/31/19 2210)  Pain Orientation 1: Posterior (07/31/19 2210)  Pain Description 1: Aching;Constant (07/31/19 2210)  Pain Intervention(s) 1: Medication (see MAR) (07/31/19 2210)     Objective:     Vitals:  Patient Vitals for the past 8 hrs:   BP Temp Pulse Resp SpO2   08/02/19 0915 -- 98.7 °F (37.1 °C) 95 10 100 %   08/02/19 0900 -- 98.9 °F (37.2 °C) (!) 113 10 100 %   08/02/19 0845 -- 98.7 °F (37.1 °C) 96 11 100 %   08/02/19 0830 -- 98.7 °F (37.1 °C) 100 12 96 %   08/02/19 0815 -- 98.7 °F (37.1 °C) 98 10 94 %   08/02/19 0800 -- 98.6 °F (37 °C) (!) 104 11 96 %   08/02/19 0745 -- 98.7 °F (37.1 °C) (!) 122 15 100 %   08/02/19 0730 -- 98.7 °F (37.1 °C) (!) 114 (!) 31 100 %   08/02/19 0715 -- 98.7 °F (37.1 °C) (!) 101 12 99 %   08/02/19 0700 -- 98.6 °F (37 °C) (!) 111 12 100 %   08/02/19 0645 -- 98.7 °F (37.1 °C) (!) 104 10 100 %   08/02/19 0632 -- 98.6 °F (37 °C) (!) 115 19 100 %   08/02/19 0616 -- 98.7 °F (37.1 °C) 100 11 100 %   08/02/19 0559 -- 98.6 °F (37 °C) (!) 101 9 100 %   08/02/19 0546 -- 98.6 °F (37 °C) 98 10 100 %   08/02/19 0530 -- 98.4 °F (36.9 °C) (!) 102 12 99 %   08/02/19 0515 -- 98.4 °F (36.9 °C) 100 11 99 %   08/02/19 0500 -- 98.2 °F (36.8 °C) 98 11 100 %   08/02/19 0445 -- 98.4 °F (36.9 °C) (!) 107 13 99 %   08/02/19 0430 -- 98.2 °F (36.8 °C) 97 12 100 %   08/02/19 0414 -- 98 °F (36.7 °C) 99 11 100 %   08/02/19 0400 -- 98.2 °F (36.8 °C) (!) 105 13 99 %   08/02/19 0357 192/66 -- 100 -- --   08/02/19 0356 197/67 -- (!) 101 -- --   08/02/19 0343 -- 98 °F (36.7 °C) (!) 104 -- 99 %   08/02/19 0315 -- 98.4 °F (36.9 °C) 99 11 100 %   08/02/19 0300 189/71 98.4 °F (36.9 °C) (!) 107 11 100 %   08/02/19 0245 -- 98.2 °F (36.8 °C) (!) 101 12 100 %   08/02/19 0230 -- 98 °F (36.7 °C) 98 11 100 %   08/02/19 0215 -- 98 °F (36.7 °C) (!) 101 13 100 %   08/02/19 0200 -- 98.2 °F (36.8 °C) (!) 106 13 99 %   08/02/19 0145 -- 98.6 °F (37 °C) 94 11 100 %   08/02/19 0138 (!) 217/78 -- 92 -- --   08/02/19 0130 -- 98.6 °F (37 °C) 94 11 100 %      Intake and Output:  07/31 1901 - 08/02 0700  In: 4770.4 [I.V.:4290.4]  Out: 5118 [Urine:4925; Drains:143]    No Known Allergies  Current Facility-Administered Medications   Medication Dose Route Frequency    ketorolac (TORADOL) injection 15 mg  15 mg IntraVENous Q8H PRN    modafinil (PROVIGIL) tablet 200 mg  200 mg Oral ONCE    [START ON 8/3/2019] modafinil (PROVIGIL) tablet 200 mg  200 mg Oral DAILY    0.9% sodium chloride infusion 250 mL  250 mL IntraVENous PRN    0.9% sodium chloride infusion 250 mL  250 mL IntraVENous PRN    niCARdipine in Saline (CARDENE) 25 MG/250 mL infusion kit  0-15 mg/hr IntraVENous TITRATE    levETIRAcetam (KEPPRA) 500 mg in 0.9% sodium chloride 100 mL IVPB  500 mg IntraVENous Q12H    magnesium hydroxide (MILK OF MAGNESIA) 400 mg/5 mL oral suspension 30 mL  30 mL Oral DAILY    citalopram (CELEXA) tablet 10 mg  10 mg Oral DAILY    0.9% sodium chloride infusion 250 mL  250 mL IntraVENous PRN    nicotine (NICODERM CQ) 14 mg/24 hr patch 1 Patch  1 Patch TransDERmal Q24H    hydrogen peroxide 3 %   Topical PRN    acetaminophen (TYLENOL) tablet 650 mg  650 mg Oral Q4H PRN    lidocaine 4 % patch 1 Patch  1 Patch TransDERmal Q24H    fentaNYL citrate (PF) injection 50 mcg  50 mcg IntraVENous Q1H PRN    ferrous sulfate tablet 325 mg  1 Tab Oral TID WITH MEALS    niMODipine (NIMOTOP) 30 mg/mL oral solution (compound) 60 mg  60 mg Oral Q4H    0.45% sodium chloride infusion  100 mL/hr IntraVENous CONTINUOUS    ascorbic acid (vitamin C) (VITAMIN C) tablet 500 mg  500 mg Oral TID    aspirin tablet 325 mg  325 mg Oral DAILY    atorvastatin (LIPITOR) tablet 40 mg  40 mg Oral QHS    clopidogrel (PLAVIX) tablet 75 mg  75 mg Oral DAILY    famotidine (PEPCID) tablet 20 mg  20 mg Oral DAILY    senna-docusate (PERICOLACE) 8.6-50 mg per tablet 2 Tab  2 Tab Oral QHS    magnesium hydroxide (MILK OF MAGNESIA) 400 mg/5 mL oral suspension 30 mL  30 mL Oral DAILY PRN    polyvinyl alcohol (LIQUIFILM TEARS) 1.4 % ophthalmic solution 1 Drop  1 Drop Both Eyes PRN    heparin (porcine) injection 5,000 Units  5,000 Units SubCUTAneous Q8H    sodium chloride (NS) flush 30 mL  30 mL InterCATHeter Q8H    heparin (porcine) pf 900 Units  900 Units InterCATHeter Q8H    sodium chloride (NS) flush 30 mL  30 mL InterCATHeter PRN    heparin (porcine) pf 900 Units  900 Units InterCATHeter PRN    ondansetron (ZOFRAN) injection 4 mg  4 mg IntraVENous Q6H PRN    NUTRITIONAL SUPPORT ELECTROLYTE PRN ORDERS   Does Not Apply PRN    magnesium sulfate 4 g/100 mL IVPB  4 g IntraVENous DAILY PRN    magnesium sulfate 2 g/50 ml IVPB (premix or compounded)  2 g IntraVENous DAILY PRN       Physical Exam:  Cannot assess orientation, wont answer my questions x finally did state his children's names.  Attends briefly to examiner but will open eyes only with mild sternal rub by nursing. Affect flat  Head inc ; glued, c/d/i  [place],   L pupil oval 4mm, R 4mm round, brisk rxn bilaterally  EOMI  CV tachy, nl rhythm   LUNGS cta b  ABD soft nt bs -, inc c/d/i  EXT; no edema  NEURO; as above  Patellar reflexes brisk, otherwise 2 + and symm    Incision(s)/Wound(s): Wound Abdomen Right (Active)   Dressing Status Clean, dry, and intact 8/1/2019  7:30 PM   Dressing Type Topical skin adhesive/glue 8/1/2019  7:30 PM   Number of days: 1       Wound Head Right (Active)   Dressing Status Clean, dry, and intact 8/1/2019  7:30 PM   Dressing Type Gauze wrap (ila); Topical skin adhesive/glue; Xeroform 8/1/2019  7:30 PM   Number of days: 1          Functional Assessment:  Gross Assessment  AROM: Generally decreased, functional(R LE) (07/22/19 1340)  PROM: Generally decreased, functional (07/12/19 1100)  Strength: Generally decreased, functional(R LE) (07/22/19 1340)  Coordination: Generally decreased, functional(B hands swelling) (07/12/19 1500)  Sensation: Intact(BUEs to light touch ) (07/12/19 1500)     Gait  Base of Support: Center of gravity altered;Narrowed (07/31/19 1046)  Speed/Aminata: Shuffled; Slow (07/31/19 1046)  Step Length: Left shortened;Right shortened (07/31/19 1046)  Swing Pattern: Left asymmetrical (07/31/19 1046)  Gait Abnormalities: Decreased step clearance; Path deviations; Shuffling gait(decreased swing phase L foot) (07/31/19 1046)  Ambulation - Level of Assistance: Contact guard assistance;Minimal assistance;Assist x2 (07/31/19 1046)  Distance (ft): 150 Feet (ft)(several standing breaks, cues) (07/31/19 1046)  Assistive Device: Gait belt(HHA x 2) (07/31/19 1046)  Interventions: Safety awareness training;Verbal cues (07/31/19 1046)     Bed Mobility  Supine to Sit: Contact guard assistance (07/31/19 1046)  Sit to Supine: (left up in chair) (07/31/19 1046)  Scooting: Contact guard assistance (07/31/19 1046) Balance  Sitting: Impaired (07/31/19 1046)  Sitting - Static: Good (unsupported) (07/31/19 1046)  Sitting - Dynamic: Fair (occasional) (07/31/19 1046)  Standing: Impaired (07/31/19 1046)  Standing - Static: Fair (07/31/19 1046)  Standing - Dynamic : Constant support; Fair (07/31/19 1046)     Grooming  Brushing Teeth: Set-up(using green sponge) (07/31/19 1500)                 Bed/Mat Mobility  Supine to Sit: Contact guard assistance (07/31/19 1046)  Sit to Supine: (left up in chair) (07/31/19 1046)  Sit to Stand: Contact guard assistance;Minimum assistance (07/31/19 1046)  Stand to Sit: Minimum assistance (07/31/19 1046)  Bed to Chair: Contact guard assistance;Minimum assistance (07/31/19 1046)  Scooting: Contact guard assistance (07/31/19 1046)     Labs/Studies:  Recent Results (from the past 72 hour(s))   FIBRINOGEN    Collection Time: 07/30/19  9:50 AM   Result Value Ref Range    Fibrinogen 448 190 - 501 mg/dL   PLATELET FUNCTION, VERIFY NOW P2Y12    Collection Time: 07/30/19  9:50 AM   Result Value Ref Range    P2Y12 Plt response 244 PRU   ASPIRIN TEST    Collection Time: 07/30/19  9:50 AM   Result Value Ref Range    Aspirin test 546 (L) 620 - 672 ARU   MAGNESIUM    Collection Time: 07/30/19  9:50 AM   Result Value Ref Range    Magnesium 3.0 (H) 1.8 - 2.4 mg/dL   GLUCOSE, POC    Collection Time: 07/30/19  2:29 PM   Result Value Ref Range    Glucose (POC) 126 (H) 65 - 100 mg/dL   MAGNESIUM    Collection Time: 07/31/19  5:02 AM   Result Value Ref Range    Magnesium 2.3 1.8 - 2.4 mg/dL   METABOLIC PANEL, BASIC    Collection Time: 07/31/19  5:02 AM   Result Value Ref Range    Sodium 142 136 - 145 mmol/L    Potassium 4.0 3.5 - 5.1 mmol/L    Chloride 108 (H) 98 - 107 mmol/L    CO2 23 21 - 32 mmol/L    Anion gap 11 7 - 16 mmol/L    Glucose 95 65 - 100 mg/dL    BUN 30 (H) 6 - 23 MG/DL    Creatinine 3.41 (H) 0.8 - 1.5 MG/DL    GFR est AA 25 (L) >60 ml/min/1.73m2    GFR est non-AA 21 (L) >60 ml/min/1.73m2    Calcium 8.8 8.3 - 10.4 MG/DL   CBC W/O DIFF    Collection Time: 07/31/19  5:02 AM   Result Value Ref Range    WBC 10.7 4.3 - 11.1 K/uL    RBC 3.55 (L) 4.23 - 5.6 M/uL    HGB 10.9 (L) 13.6 - 17.2 g/dL    HCT 33.0 (L) 41.1 - 50.3 %    MCV 93.0 79.6 - 97.8 FL    MCH 30.7 26.1 - 32.9 PG    MCHC 33.0 31.4 - 35.0 g/dL    RDW 14.2 11.9 - 14.6 %    PLATELET 212 354 - 107 K/uL    MPV 10.3 9.4 - 12.3 FL    ABSOLUTE NRBC 0.00 0.0 - 0.2 K/uL   MAGNESIUM    Collection Time: 07/31/19  9:27 AM   Result Value Ref Range    Magnesium 3.2 (H) 1.8 - 2.4 mg/dL   GLUCOSE, POC    Collection Time: 07/31/19 11:48 PM   Result Value Ref Range    Glucose (POC) 97 65 - 100 mg/dL   MAGNESIUM    Collection Time: 08/01/19  4:07 AM   Result Value Ref Range    Magnesium 2.8 (H) 1.8 - 2.4 mg/dL   PLATELET FUNCTION, VERIFY NOW P2Y12    Collection Time: 08/01/19  4:07 AM   Result Value Ref Range    P2Y12 Plt response 256 PRU   ASPIRIN TEST    Collection Time: 08/01/19  4:07 AM   Result Value Ref Range    Aspirin test 410 (L) 620 - 672 ARU   PROTHROMBIN TIME + INR    Collection Time: 08/01/19  5:00 AM   Result Value Ref Range    Prothrombin time 11.7 11.7 - 14.5 sec    INR 0.9     GLUCOSE, POC    Collection Time: 08/01/19  6:16 AM   Result Value Ref Range    Glucose (POC) 102 (H) 65 - 100 mg/dL   TYPE & SCREEN    Collection Time: 08/01/19  6:36 AM   Result Value Ref Range    Crossmatch Expiration 08/04/2019     ABO/Rh(D) O POSITIVE     Antibody screen NEG    PLATELETS, ALLOCATE    Collection Time: 08/01/19  7:45 AM   Result Value Ref Range    Unit number [id number]     Blood component type PLTPH LR,2     Unit division 00     Status of unit TRANSFUSED     Unit number [id number]     Blood component type PLTPH, LR     Unit division 00     Status of unit TRANSFUSED    MAGNESIUM    Collection Time: 08/02/19  3:14 AM   Result Value Ref Range    Magnesium 2.2 1.8 - 2.4 mg/dL   METABOLIC PANEL, BASIC    Collection Time: 08/02/19  3:14 AM   Result Value Ref Range Sodium 142 136 - 145 mmol/L    Potassium 4.1 3.5 - 5.1 mmol/L    Chloride 110 (H) 98 - 107 mmol/L    CO2 21 21 - 32 mmol/L    Anion gap 11 7 - 16 mmol/L    Glucose 83 65 - 100 mg/dL    BUN 29 (H) 6 - 23 MG/DL    Creatinine 3.42 (H) 0.8 - 1.5 MG/DL    GFR est AA 25 (L) >60 ml/min/1.73m2    GFR est non-AA 21 (L) >60 ml/min/1.73m2    Calcium 8.8 8.3 - 10.4 MG/DL   CBC W/O DIFF    Collection Time: 08/02/19  3:14 AM   Result Value Ref Range    WBC 10.8 4.3 - 11.1 K/uL    RBC 3.26 (L) 4.23 - 5.6 M/uL    HGB 9.9 (L) 13.6 - 17.2 g/dL    HCT 30.0 (L) 41.1 - 50.3 %    MCV 92.0 79.6 - 97.8 FL    MCH 30.4 26.1 - 32.9 PG    MCHC 33.0 31.4 - 35.0 g/dL    RDW 14.0 11.9 - 14.6 %    PLATELET 168 627 - 667 K/uL    MPV 10.3 9.4 - 12.3 FL    ABSOLUTE NRBC 0.00 0.0 - 0.2 K/uL        Assessment:     Principal Problem:    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)    Active Problems:    Tobacco abuse (7/10/2019)      Acute respiratory failure with hypoxia ([facility]) (7/10/2019)      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)      IVH (intraventricular hemorrhage) ([facility]) (7/10/2019)      Seizure (HCC) (7/10/2019)      Elevated serum creatinine (7/10/2019)      Cerebral vasospasm (7/10/2019)      Pneumonia due to methicillin susceptible St[facility] aureus [facility] (7/15/2019)        Plan:     Recommendations: Continue Acute Rehab Program  Coordination of rehab/medical care  Counseling of PM & R care issues management  Monitoring and management of medical conditions per plan of care/orders  Discussion with Family/Caregiver/Staff  Reviewed Therapies/Labs/Medications/Records

## 2019-08-02 NOTE — OP NOTES
[facility]  OPERATIVE REPORT    Name:  Naty Becker  MR#:  [id number]  :  1975  ACCOUNT #:  [de-identified]  DATE OF SERVICE:  2019    PREOPERATIVE DIAGNOSIS:  Hydrocephalus. POSTOPERATIVE DIAGNOSIS:  Hydrocephalus. PROCEDURE PERFORMED:  Placement of  shunt, abdominal portion. SURGEON:  Anil Fleming MD    CO-SURGEON:  Coty Willis MD who performed the cranial portion. ASSISTANT:  SENA Diaz    ANESTHESIA:  General.    COMPLICATIONS:  None. COUNTS:  Correct. SPECIMENS REMOVED:  None. IMPLANTS:   shunt. ESTIMATED BLOOD LOSS:  Minimal.    PROCEDURE:  We were called in the room once Dr. Cayla Landry was ready. The abdomen had been prepped and draped in the sterile fashion. We did carry out a timeout and all were in agreement. A small incision was made to the right side of the umbilicus and 5 mm OptiView was utilized to gain entry to the intraabdominal cavity followed by pneumoperitoneum. Once this was done, I made a small incision over the trocar that had been placed by Dr. Cayla Landry from above. Then, I brought the  shunt tubing out through this end of the metal trocar and then removed the metal trocar sliding it, removing it from this incision. Once this was done, I used a 5 mm trocar to gain access of the abdominal cavity under direct visualization. I was then able to put a trocar with a dilator through this. The trocar was removed. We then placed the shunt through the dilator into the abdominal cavity. The sheath was then peeled away. A 4-0 subcuticular Monocryl was used to close the skin and glue was then applied. At this point, we viewed the tip of the catheter until Dr. Cayla Landry was happy that this was functioning and was putting out fluid. I then let the pneumoperitoneum out. I pulled the trocar out and closed this in the same fashion with 4-0 subcuticular Monocryl and glue. The patient tolerated the procedure well.     Erica Cole was present and scrubbed during the entire procedure and was utilized in placement of the superior trocar and passing the catheter into the abdominal cavity.       MD CONSTANTINE [email or web address]/  D:  08/01/2019 14:05  T:  08/02/2019 0:30  JOB #:  [id number]

## 2019-08-02 NOTE — PROGRESS NOTES
Problem: Delirium Treatment  Goal: *Level of consciousness restored to baseline  Outcome: Progressing Towards Goal  Goal: *Level of environmental perceptions restored to baseline  Outcome: Progressing Towards Goal  Goal: *Sensory perception restored to baseline  Outcome: Progressing Towards Goal  Goal: *Emotional stability restored to baseline  Outcome: Progressing Towards Goal  Goal: *Functional assessment restored to baseline  Outcome: Progressing Towards Goal  Goal: *Absence of falls  Outcome: Progressing Towards Goal  Goal: *Will remain free of delirium, CAM Score negative  Outcome: Progressing Towards Goal  Goal: *Cognitive status will be restored to baseline  Outcome: Progressing Towards Goal  Goal: Interventions  Outcome: Progressing Towards Goal     Problem: Patient Education: Go to Patient Education Activity  Goal: Patient/Family Education  Outcome: Progressing Towards Goal     Problem: Falls - Risk of  Goal: *Absence of Falls  Description  Document Shefali Malloy Fall Risk and appropriate interventions in the flowsheet.   Outcome: Progressing Towards Goal  Note:   Fall Risk Interventions:  Mobility Interventions: Bed/chair exit alarm, Communicate number of staff needed for ambulation/transfer, OT consult for ADLs, Patient to call before getting OOB, PT Consult for mobility concerns, PT Consult for assist device competence, Strengthening exercises (ROM-active/passive)    Mentation Interventions: Adequate sleep, hydration, pain control, Bed/chair exit alarm, Door open when patient unattended, Evaluate medications/consider consulting pharmacy, More frequent rounding, Reorient patient, Room close to nurse's station, Update white board    Medication Interventions: Bed/chair exit alarm, Evaluate medications/consider consulting pharmacy, Patient to call before getting OOB    Elimination Interventions: Bed/chair exit alarm, Call light in reach, Patient to call for help with toileting needs, Toilet paper/wipes in reach, Toileting schedule/hourly rounds    History of Falls Interventions: Bed/chair exit alarm, Door open when patient unattended, Evaluate medications/consider consulting pharmacy, Investigate reason for fall, Room close to nurse's station         Problem: Patient Education: Go to Patient Education Activity  Goal: Patient/Family Education  Outcome: Progressing Towards Goal     Problem: Pressure Injury - Risk of  Goal: *Prevention of pressure injury  Description  Document Anselmo Scale and appropriate interventions in the flowsheet.   Outcome: Progressing Towards Goal  Note:   Pressure Injury Interventions:  Sensory Interventions: Assess changes in LOC, Assess need for specialty bed, Avoid rigorous massage over bony prominences, Discuss PT/OT consult with provider, Check visual cues for pain, Minimize linen layers, Pressure redistribution bed/mattress (bed type)    Moisture Interventions: Absorbent underpads, Internal/External urinary devices, Minimize layers, Check for incontinence Q2 hours and as needed    Activity Interventions: Assess need for specialty bed, Increase time out of bed, Pressure redistribution bed/mattress(bed type), PT/OT evaluation    Mobility Interventions: Assess need for specialty bed, HOB 30 degrees or less, Pressure redistribution bed/mattress (bed type), PT/OT evaluation    Nutrition Interventions: Document food/fluid/supplement intake, Discuss nutritional consult with provider    Friction and Shear Interventions: Apply protective barrier, creams and emollients, HOB 30 degrees or less, Lift sheet, Minimize layers                Problem: Patient Education: Go to Patient Education Activity  Goal: Patient/Family Education  Outcome: Progressing Towards Goal     Problem: Patient Education:  Go to Education Activity  Goal: Patient/Family Education  Outcome: Progressing Towards Goal     Problem: Subarachnoid Hemorrhage Stroke:Admission Day 2  Goal: Treatments/Interventions/Procedures  Outcome: Progressing Towards Goal  Goal: Psychosocial  Outcome: Progressing Towards Goal  Goal: *Hemodynamically stable  Outcome: Progressing Towards Goal     Problem: Subarachnoid Hemorrhage Stroke:Admission Day 5-Discharge  Goal: Off Pathway (Use only if patient is Off Pathway)  Outcome: Progressing Towards Goal  Goal: Activity/Safety  Outcome: Progressing Towards Goal  Goal: Diagnostic Test/Procedures  Outcome: Progressing Towards Goal  Goal: Nutrition/Diet  Outcome: Progressing Towards Goal  Goal: Medications  Outcome: Progressing Towards Goal  Goal: Patient maintains clear airway/absence of aspiration  Outcome: Progressing Towards Goal  Goal: Treatments/Interventions/Procedures  Outcome: Progressing Towards Goal  Goal: Psychosocial  Outcome: Progressing Towards Goal  Goal: *Hemodynamically stable  Outcome: Progressing Towards Goal  Goal: *Absence of signs and symptoms of DVT  Outcome: Progressing Towards Goal

## 2019-08-02 NOTE — PROGRESS NOTES
Bedside shift change report given to [address] (oncoming nurse) by [place] Duty (offgoing nurse). Report included the following information SBAR, Kardex, ED Summary, OR Summary, Procedure Summary, Intake/Output, MAR, Accordion, Recent Results, Med Rec Status, Cardiac Rhythm ST and Alarm Parameters .

## 2019-08-03 LAB
ANION GAP SERPL CALC-SCNC: 9 MMOL/L (ref 7–16)
BUN SERPL-MCNC: 33 MG/DL (ref 6–23)
CALCIUM SERPL-MCNC: 9.1 MG/DL (ref 8.3–10.4)
CHLORIDE SERPL-SCNC: 114 MMOL/L (ref 98–107)
CO2 SERPL-SCNC: 24 MMOL/L (ref 21–32)
CREAT SERPL-MCNC: 3.74 MG/DL (ref 0.8–1.5)
ERYTHROCYTE [DISTWIDTH] IN BLOOD BY AUTOMATED COUNT: 14.4 % (ref 11.9–14.6)
GLUCOSE BLD STRIP.AUTO-MCNC: 103 MG/DL (ref 65–100)
GLUCOSE SERPL-MCNC: 87 MG/DL (ref 65–100)
HCT VFR BLD AUTO: 30.7 % (ref 41.1–50.3)
HGB BLD-MCNC: 9.9 G/DL (ref 13.6–17.2)
MAGNESIUM SERPL-MCNC: 2.7 MG/DL (ref 1.8–2.4)
MCH RBC QN AUTO: 30.7 PG (ref 26.1–32.9)
MCHC RBC AUTO-ENTMCNC: 32.2 G/DL (ref 31.4–35)
MCV RBC AUTO: 95.3 FL (ref 79.6–97.8)
NRBC # BLD: 0 K/UL (ref 0–0.2)
PLATELET # BLD AUTO: 206 K/UL (ref 150–450)
PMV BLD AUTO: 10.2 FL (ref 9.4–12.3)
POTASSIUM SERPL-SCNC: 4 MMOL/L (ref 3.5–5.1)
RBC # BLD AUTO: 3.22 M/UL (ref 4.23–5.6)
SODIUM SERPL-SCNC: 147 MMOL/L (ref 136–145)
WBC # BLD AUTO: 9.2 K/UL (ref 4.3–11.1)

## 2019-08-03 PROCEDURE — 74011250637 HC RX REV CODE- 250/637

## 2019-08-03 PROCEDURE — 74011000258 HC RX REV CODE- 258: Performed by: NURSE PRACTITIONER

## 2019-08-03 PROCEDURE — 74011250636 HC RX REV CODE- 250/636: Performed by: NURSE PRACTITIONER

## 2019-08-03 PROCEDURE — 99024 POSTOP FOLLOW-UP VISIT: CPT | Performed by: NURSE PRACTITIONER

## 2019-08-03 PROCEDURE — 74011250637 HC RX REV CODE- 250/637: Performed by: NURSE PRACTITIONER

## 2019-08-03 PROCEDURE — 65270000029 HC RM PRIVATE

## 2019-08-03 PROCEDURE — 82962 GLUCOSE BLOOD TEST: CPT

## 2019-08-03 PROCEDURE — 74011000250 HC RX REV CODE- 250: Performed by: NURSE PRACTITIONER

## 2019-08-03 PROCEDURE — 80048 BASIC METABOLIC PNL TOTAL CA: CPT

## 2019-08-03 PROCEDURE — 74011250637 HC RX REV CODE- 250/637: Performed by: PHYSICAL MEDICINE & REHABILITATION

## 2019-08-03 PROCEDURE — 85027 COMPLETE CBC AUTOMATED: CPT

## 2019-08-03 PROCEDURE — 83735 ASSAY OF MAGNESIUM: CPT

## 2019-08-03 PROCEDURE — 99024 POSTOP FOLLOW-UP VISIT: CPT | Performed by: NEUROLOGICAL SURGERY

## 2019-08-03 RX ADMIN — OXYCODONE HYDROCHLORIDE AND ACETAMINOPHEN 500 MG: 500 TABLET ORAL at 16:00

## 2019-08-03 RX ADMIN — ATORVASTATIN CALCIUM 40 MG: 40 TABLET, FILM COATED ORAL at 22:11

## 2019-08-03 RX ADMIN — SODIUM CHLORIDE, PRESERVATIVE FREE 900 UNITS: 5 INJECTION INTRAVENOUS at 06:13

## 2019-08-03 RX ADMIN — SODIUM CHLORIDE, PRESERVATIVE FREE 900 UNITS: 5 INJECTION INTRAVENOUS at 13:30

## 2019-08-03 RX ADMIN — FAMOTIDINE 20 MG: 20 TABLET ORAL at 09:16

## 2019-08-03 RX ADMIN — Medication 30 ML: at 13:31

## 2019-08-03 RX ADMIN — ASPIRIN 325 MG ORAL TABLET 325 MG: 325 PILL ORAL at 09:16

## 2019-08-03 RX ADMIN — OXYCODONE HYDROCHLORIDE AND ACETAMINOPHEN 500 MG: 500 TABLET ORAL at 22:11

## 2019-08-03 RX ADMIN — SODIUM CHLORIDE 500 MG: 900 INJECTION, SOLUTION INTRAVENOUS at 20:35

## 2019-08-03 RX ADMIN — HEPARIN SODIUM 5000 UNITS: 5000 INJECTION INTRAVENOUS; SUBCUTANEOUS at 22:11

## 2019-08-03 RX ADMIN — HEPARIN SODIUM 5000 UNITS: 5000 INJECTION INTRAVENOUS; SUBCUTANEOUS at 06:13

## 2019-08-03 RX ADMIN — HEPARIN SODIUM 5000 UNITS: 5000 INJECTION INTRAVENOUS; SUBCUTANEOUS at 13:30

## 2019-08-03 RX ADMIN — Medication 30 ML: at 22:11

## 2019-08-03 RX ADMIN — Medication 30 ML: at 06:13

## 2019-08-03 RX ADMIN — NIMODIPINE 60 MG: 30 CAPSULE, LIQUID FILLED ORAL at 20:35

## 2019-08-03 RX ADMIN — NIMODIPINE 60 MG: 30 CAPSULE, LIQUID FILLED ORAL at 00:07

## 2019-08-03 RX ADMIN — FERROUS SULFATE TAB 325 MG (65 MG ELEMENTAL FE) 325 MG: 325 (65 FE) TAB at 16:00

## 2019-08-03 RX ADMIN — OXYCODONE HYDROCHLORIDE AND ACETAMINOPHEN 500 MG: 500 TABLET ORAL at 09:16

## 2019-08-03 RX ADMIN — NIMODIPINE 60 MG: 30 CAPSULE, LIQUID FILLED ORAL at 12:23

## 2019-08-03 RX ADMIN — SODIUM CHLORIDE, PRESERVATIVE FREE 900 UNITS: 5 INJECTION INTRAVENOUS at 22:11

## 2019-08-03 RX ADMIN — NIMODIPINE 60 MG: 30 CAPSULE, LIQUID FILLED ORAL at 16:00

## 2019-08-03 RX ADMIN — SENNOSIDES, DOCUSATE SODIUM 2 TABLET: 50; 8.6 TABLET, FILM COATED ORAL at 22:11

## 2019-08-03 RX ADMIN — FERROUS SULFATE TAB 325 MG (65 MG ELEMENTAL FE) 325 MG: 325 (65 FE) TAB at 12:01

## 2019-08-03 RX ADMIN — SODIUM CHLORIDE 500 MG: 900 INJECTION, SOLUTION INTRAVENOUS at 09:17

## 2019-08-03 RX ADMIN — CITALOPRAM HYDROBROMIDE 10 MG: 20 TABLET ORAL at 09:16

## 2019-08-03 RX ADMIN — CLOPIDOGREL BISULFATE 75 MG: 75 TABLET ORAL at 09:16

## 2019-08-03 RX ADMIN — FERROUS SULFATE TAB 325 MG (65 MG ELEMENTAL FE) 325 MG: 325 (65 FE) TAB at 07:30

## 2019-08-03 RX ADMIN — NIMODIPINE 60 MG: 30 CAPSULE, LIQUID FILLED ORAL at 07:30

## 2019-08-03 RX ADMIN — MAGNESIUM HYDROXIDE 30 ML: 400 SUSPENSION ORAL at 09:16

## 2019-08-03 RX ADMIN — NIMODIPINE 60 MG: 30 CAPSULE, LIQUID FILLED ORAL at 03:57

## 2019-08-03 RX ADMIN — MODAFINIL 200 MG: 100 TABLET ORAL at 07:30

## 2019-08-03 NOTE — PROGRESS NOTES
Progress Note    Patient: Winifred Fuentes MRN: [id number]  SSN: [id number]    YOB: 1975  Age: 37 y.o. Sex: male      Admit Date: 7/9/2019    LOS: 25 days     Subjective:   POD 2, R  shunt placement. Incisions are CDI. Pt somber this am, but will wake up and follow commands, talks to me when I ask him to. On celexa x2 days and started on provigil. Will cont monitor. Plan for rehab next week on 9th floor. Pt to be OOB daily.        Current Facility-Administered Medications   Medication Dose Route Frequency    ketorolac (TORADOL) injection 15 mg  15 mg IntraVENous Q8H PRN    modafinil (PROVIGIL) tablet 200 mg  200 mg Oral DAILY    levETIRAcetam (KEPPRA) 500 mg in 0.9% sodium chloride 100 mL IVPB  500 mg IntraVENous Q12H    magnesium hydroxide (MILK OF MAGNESIA) 400 mg/5 mL oral suspension 30 mL  30 mL Oral DAILY    citalopram (CELEXA) tablet 10 mg  10 mg Oral DAILY    nicotine (NICODERM CQ) 14 mg/24 hr patch 1 Patch  1 Patch TransDERmal Q24H    acetaminophen (TYLENOL) tablet 650 mg  650 mg Oral Q4H PRN    lidocaine 4 % patch 1 Patch  1 Patch TransDERmal Q24H    ferrous sulfate tablet 325 mg  1 Tab Oral TID WITH MEALS    niMODipine (NIMOTOP) 30 mg/mL oral solution (compound) 60 mg  60 mg Oral Q4H    ascorbic acid (vitamin C) (VITAMIN C) tablet 500 mg  500 mg Oral TID    aspirin tablet 325 mg  325 mg Oral DAILY    atorvastatin (LIPITOR) tablet 40 mg  40 mg Oral QHS    clopidogrel (PLAVIX) tablet 75 mg  75 mg Oral DAILY    famotidine (PEPCID) tablet 20 mg  20 mg Oral DAILY    senna-docusate (PERICOLACE) 8.6-50 mg per tablet 2 Tab  2 Tab Oral QHS    magnesium hydroxide (MILK OF MAGNESIA) 400 mg/5 mL oral suspension 30 mL  30 mL Oral DAILY PRN    polyvinyl alcohol (LIQUIFILM TEARS) 1.4 % ophthalmic solution 1 Drop  1 Drop Both Eyes PRN    heparin (porcine) injection 5,000 Units  5,000 Units SubCUTAneous Q8H    sodium chloride (NS) flush 30 mL  30 mL InterCATHeter Q8H    heparin (porcine) pf 900 Units  900 Units InterCATHeter Q8H    heparin (porcine) pf 900 Units  900 Units InterCATHeter PRN    ondansetron (ZOFRAN) injection 4 mg  4 mg IntraVENous Q6H PRN       Objective:     Vitals:    08/03/19 0459 08/03/19 0559 08/03/19 0659 08/03/19 0902   BP: 156/87 (!) 160/99 (!) 169/94 138/86   Pulse: 96 (!) 106 95 75   Resp: 12 9 10 17   Temp:    98.8 °F (37.1 °C)   SpO2: 100% 100% 100% 99%   Weight:       Height:             Intake and Output:  Current Shift: No intake/output data recorded. Last 24 hr: 08/02 0701 - 08/03 0700  In: 2970 [I.V.:1230]  Out: 2525 [Urine:2525]     Physical Exam:   General:   AOX3,  following commands. Stovall. Eyes:   PERRL   Neck: Supple, symmetrical, trachea midline, no adenopathy, thyroid: no enlargment/tenderness/nodules, no carotid bruit and no JVD. Lungs:   Clear to auscultation bilaterally. Heart:  Regular rate and rhythm, S1, S2 normal, no murmur, click, rub or gallop. Abdomen:   Soft, non-tender. Bowel sounds normal. No masses,  No organomegaly. Extremities: Extremities normal, atraumatic, no cyanosis. Pulses: 2+ and symmetric all extremities. Skin: Skin color, texture, turgor normal. EVD intact. Neurologic: follows commands, stovall, wd pain all extrems.        Lab/Data Review:    Recent Results (from the past 12 hour(s))   GLUCOSE, POC    Collection Time: 08/03/19 12:13 AM   Result Value Ref Range    Glucose (POC) 103 (H) 65 - 100 mg/dL   MAGNESIUM    Collection Time: 08/03/19  3:59 AM   Result Value Ref Range    Magnesium 2.7 (H) 1.8 - 2.4 mg/dL   METABOLIC PANEL, BASIC    Collection Time: 08/03/19  3:59 AM   Result Value Ref Range    Sodium 147 (H) 136 - 145 mmol/L    Potassium 4.0 3.5 - 5.1 mmol/L    Chloride 114 (H) 98 - 107 mmol/L    CO2 24 21 - 32 mmol/L    Anion gap 9 7 - 16 mmol/L    Glucose 87 65 - 100 mg/dL    BUN 33 (H) 6 - 23 MG/DL    Creatinine 3.74 (H) 0.8 - 1.5 MG/DL    GFR est AA 23 (L) >60 ml/min/1.73m2    GFR est non-AA 19 (L) >60 ml/min/1.73m2    Calcium 9.1 8.3 - 10.4 MG/DL   CBC W/O DIFF    Collection Time: 08/03/19  3:59 AM   Result Value Ref Range    WBC 9.2 4.3 - 11.1 K/uL    RBC 3.22 (L) 4.23 - 5.6 M/uL    HGB 9.9 (L) 13.6 - 17.2 g/dL    HCT 30.7 (L) 41.1 - 50.3 %    MCV 95.3 79.6 - 97.8 FL    MCH 30.7 26.1 - 32.9 PG    MCHC 32.2 31.4 - 35.0 g/dL    RDW 14.4 11.9 - 14.6 %    PLATELET 078 631 - 994 K/uL    MPV 10.2 9.4 - 12.3 FL    ABSOLUTE NRBC 0.00 0.0 - 0.2 K/uL       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 ([facility] (7/10/2019)    Active Problems:    Tobacco abuse (7/10/2019)      Acute respiratory failure with hypoxia ([facility] (7/10/2019)      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 Pt to be OOB daily, cont work with PT/OT/ST- do not let pt decline therapy, call me if any issues. (thx)  2. Rehab next week. 3. Call if any acute neuro changes/concerns.     Signed By: Cullen Baron NP     August 3, 2019

## 2019-08-03 NOTE — PROGRESS NOTES
08/03/19 1905   NIH Stroke Scale   Interval Other (comment)  (shift change with Shruthi, RN)   LOC 0   LOC Questions 1   LOC Commands 0   Best Gaze 0   Visual 0   Facial Palsy 0   Motor Right Arm 0   Motor Left Arm 0   Motor Right Leg 0   Motor Left Leg 0   Limb Ataxia 0   Sensory 0   Best Language 0   Dysarthria 0   Extinction and Inattention 0   Total 1

## 2019-08-03 NOTE — PROGRESS NOTES
[address] CRITICAL CARE OUTREACH NURSE PROGRESS REPORT      SUBJECTIVE: Called to assess patient secondary to critical care outreach protocol. MEWS Score: 1 (08/03/19 1201)  Vitals:    08/03/19 0559 08/03/19 0659 08/03/19 0902 08/03/19 1201   BP: (!) 160/99 (!) 169/94 138/86 150/88   Pulse: (!) 106 95 75 82   Resp: 9 10 17 18   Temp:   98.8 °F (37.1 °C) 98.6 °F (37 °C)   SpO2: 100% 100% 99% 97%   Weight:       Height:            LAB DATA:    Recent Labs     08/03/19  0359 08/02/19  0314 08/01/19  0407   * 142  --    K 4.0 4.1  --    * 110*  --    CO2 24 21  --    AGAP 9 11  --    GLU 87 83  --    BUN 33* 29*  --    CREA 3.74* 3.42*  --    GFRAA 23* 25*  --    GFRNA 19* 21*  --    CA 9.1 8.8  --    MG 2.7* 2.2 2.8*        Recent Labs     08/03/19  0359 08/02/19  0314   WBC 9.2 10.8   HGB 9.9* 9.9*   HCT 30.7* 30.0*    244          OBJECTIVE: On arrival to room, I found patient to be in recliner with father at bedside. Patient finishing eating. Pain Assessment  Pain Intensity 1: 0 (08/03/19 0853)  Pain Location 1: Head, Neck  Pain Intervention(s) 1: Repositioned  Patient Stated Pain Goal: 0        ASSESSMENT:  Patient alert. Oriented to person and place. Delayed responses. PERRL. No drift in any extremity noted. Follows commands. O2 sat 98% on RA. . Abdomen soft, non tender. Patient states he ate lunch. Head inc with staples, WENDY, intact. No new complaints. No needs expressed at this time. PLAN:  Will continue to follow per critical care outreach protocol.

## 2019-08-03 NOTE — PROGRESS NOTES
Bedside and verbal shift report received from Justo Gann [place].     Dual neuro assessment performed at bedside.      Neuro: Pt eyes open to voice - drowsy. Orientation waxes and wanes. Able to state name, age and situation (in the hospital for issues related to his brain) - able to verbalize month after several cues. L pupil slightly oval shaped, both 4 mm and brisk to react. Able to follow commands and MCCULLOUGH purposefully. Cardiac: NSR to ST - no BP goals. Resp: Room air. Clear upper / diminished lower breath sounds. GI: Regular diet. Active bowel sounds and passing flatus. BM this AM.   : Up to void. Skin: Surgical site CDI and open to air.  RUE PICC patent and intact.

## 2019-08-03 NOTE — PROGRESS NOTES
Bedside, Verbal and Written shift change report given to Farshad Velez RN (oncoming nurse) by Demian Cardoso RN (offgoing nurse). Report included the following information SBAR, ED Summary, Intake/Output, MAR, Recent Results, Med Rec Status and Cardiac Rhythm st.       Dual NIH performed.

## 2019-08-03 NOTE — PROGRESS NOTES
TRANSFER - IN REPORT:    Verbal report received from Michelle Ac RN(name) on Corky Mack  being received from   [address]107(unit) for routine progression of care      Report consisted of patients Situation, Background, Assessment and   Recommendations(SBAR). Information from the following report(s) SBAR, Kardex, ED Summary, OR Summary, Procedure Summary, Intake/Output, MAR, Accordion, Recent Results and Med Rec Status was reviewed with the receiving nurse. Opportunity for questions and clarification was provided. Assessment completed upon patients arrival to unit and care assumed.

## 2019-08-03 NOTE — PROGRESS NOTES
TRANSFER - OUT REPORT:    Verbal report given to Dahiana Delgado RN  on Mae Cruz  being transferred to   for routine progression of care       Report consisted of patients Situation, Background, Assessment and   Recommendations(SBAR). Information from the following report(s) SBAR, Kardex, ED Summary, Procedure Summary, Intake/Output, MAR, Recent Results, Alarm Parameters  and Quality Measures was reviewed with the receiving nurse. Opportunity for questions and clarification was provided.       Patient transported with:   Registered Nurse, pt belongings, Lidocaine patch, Keppra gtt, pt chart

## 2019-08-03 NOTE — PROGRESS NOTES
08/03/19 0853   Dual Skin Pressure Injury Assessment   Dual Skin Pressure Injury Assessment WDL   Second Care Provider (Based on [address] [place] [address]) Herb Penaloza RN   Skin Integumentary   Skin Integumentary (WDL) X   Skin Condition/Temp Dry; Warm   Skin Color Appropriate for ethnicity; Ecchymosis (comment)  (abd; scalp)   Skin Integrity Incision (comment)  (abd; scalp)    Pressure  Injury Documentation No Pressure Injury Noted-Pressure Ulcer Prevention Initiated   Wound Prevention and Protection Methods   Orientation of Wound Prevention Posterior   Location of Wound Prevention Sacrum/Coccyx   Wound Offloading (Prevention Methods) Bed, pressure reduction mattress; Foam silicone;Pillows;Repositioning

## 2019-08-04 LAB
ANION GAP SERPL CALC-SCNC: 9 MMOL/L (ref 7–16)
BUN SERPL-MCNC: 33 MG/DL (ref 6–23)
CALCIUM SERPL-MCNC: 9.2 MG/DL (ref 8.3–10.4)
CHLORIDE SERPL-SCNC: 110 MMOL/L (ref 98–107)
CO2 SERPL-SCNC: 26 MMOL/L (ref 21–32)
CREAT SERPL-MCNC: 3.84 MG/DL (ref 0.8–1.5)
ERYTHROCYTE [DISTWIDTH] IN BLOOD BY AUTOMATED COUNT: 14 % (ref 11.9–14.6)
GLUCOSE SERPL-MCNC: 85 MG/DL (ref 65–100)
HCT VFR BLD AUTO: 30.5 % (ref 41.1–50.3)
HGB BLD-MCNC: 9.9 G/DL (ref 13.6–17.2)
MAGNESIUM SERPL-MCNC: 2.4 MG/DL (ref 1.8–2.4)
MCH RBC QN AUTO: 30.9 PG (ref 26.1–32.9)
MCHC RBC AUTO-ENTMCNC: 32.5 G/DL (ref 31.4–35)
MCV RBC AUTO: 95.3 FL (ref 79.6–97.8)
NRBC # BLD: 0 K/UL (ref 0–0.2)
PLATELET # BLD AUTO: 182 K/UL (ref 150–450)
PMV BLD AUTO: 10.5 FL (ref 9.4–12.3)
POTASSIUM SERPL-SCNC: 3.9 MMOL/L (ref 3.5–5.1)
RBC # BLD AUTO: 3.2 M/UL (ref 4.23–5.6)
SODIUM SERPL-SCNC: 145 MMOL/L (ref 136–145)
WBC # BLD AUTO: 9.2 K/UL (ref 4.3–11.1)

## 2019-08-04 PROCEDURE — 85027 COMPLETE CBC AUTOMATED: CPT

## 2019-08-04 PROCEDURE — 65270000029 HC RM PRIVATE

## 2019-08-04 PROCEDURE — 99024 POSTOP FOLLOW-UP VISIT: CPT | Performed by: NEUROLOGICAL SURGERY

## 2019-08-04 PROCEDURE — 74011250637 HC RX REV CODE- 250/637: Performed by: NURSE PRACTITIONER

## 2019-08-04 PROCEDURE — 74011250636 HC RX REV CODE- 250/636: Performed by: NURSE PRACTITIONER

## 2019-08-04 PROCEDURE — 80048 BASIC METABOLIC PNL TOTAL CA: CPT

## 2019-08-04 PROCEDURE — 74011250637 HC RX REV CODE- 250/637

## 2019-08-04 PROCEDURE — 83735 ASSAY OF MAGNESIUM: CPT

## 2019-08-04 PROCEDURE — 74011000258 HC RX REV CODE- 258: Performed by: NURSE PRACTITIONER

## 2019-08-04 RX ADMIN — NIMODIPINE 60 MG: 30 CAPSULE, LIQUID FILLED ORAL at 16:21

## 2019-08-04 RX ADMIN — CLOPIDOGREL BISULFATE 75 MG: 75 TABLET ORAL at 09:09

## 2019-08-04 RX ADMIN — OXYCODONE HYDROCHLORIDE AND ACETAMINOPHEN 500 MG: 500 TABLET ORAL at 16:21

## 2019-08-04 RX ADMIN — FERROUS SULFATE TAB 325 MG (65 MG ELEMENTAL FE) 325 MG: 325 (65 FE) TAB at 16:21

## 2019-08-04 RX ADMIN — NIMODIPINE 60 MG: 30 CAPSULE, LIQUID FILLED ORAL at 19:42

## 2019-08-04 RX ADMIN — NIMODIPINE 60 MG: 30 CAPSULE, LIQUID FILLED ORAL at 09:19

## 2019-08-04 RX ADMIN — NIMODIPINE 60 MG: 30 CAPSULE, LIQUID FILLED ORAL at 04:31

## 2019-08-04 RX ADMIN — HEPARIN SODIUM 5000 UNITS: 5000 INJECTION INTRAVENOUS; SUBCUTANEOUS at 14:23

## 2019-08-04 RX ADMIN — Medication 30 ML: at 05:56

## 2019-08-04 RX ADMIN — SODIUM CHLORIDE 500 MG: 900 INJECTION, SOLUTION INTRAVENOUS at 09:20

## 2019-08-04 RX ADMIN — Medication 30 ML: at 14:24

## 2019-08-04 RX ADMIN — CITALOPRAM HYDROBROMIDE 10 MG: 20 TABLET ORAL at 09:09

## 2019-08-04 RX ADMIN — FERROUS SULFATE TAB 325 MG (65 MG ELEMENTAL FE) 325 MG: 325 (65 FE) TAB at 11:34

## 2019-08-04 RX ADMIN — FAMOTIDINE 20 MG: 20 TABLET ORAL at 09:09

## 2019-08-04 RX ADMIN — SODIUM CHLORIDE, PRESERVATIVE FREE 900 UNITS: 5 INJECTION INTRAVENOUS at 14:23

## 2019-08-04 RX ADMIN — HEPARIN SODIUM 5000 UNITS: 5000 INJECTION INTRAVENOUS; SUBCUTANEOUS at 21:46

## 2019-08-04 RX ADMIN — NIMODIPINE 60 MG: 30 CAPSULE, LIQUID FILLED ORAL at 00:08

## 2019-08-04 RX ADMIN — SODIUM CHLORIDE, PRESERVATIVE FREE 900 UNITS: 5 INJECTION INTRAVENOUS at 05:56

## 2019-08-04 RX ADMIN — SODIUM CHLORIDE 500 MG: 900 INJECTION, SOLUTION INTRAVENOUS at 21:30

## 2019-08-04 RX ADMIN — ATORVASTATIN CALCIUM 40 MG: 40 TABLET, FILM COATED ORAL at 21:46

## 2019-08-04 RX ADMIN — OXYCODONE HYDROCHLORIDE AND ACETAMINOPHEN 500 MG: 500 TABLET ORAL at 09:09

## 2019-08-04 RX ADMIN — NIMODIPINE 60 MG: 30 CAPSULE, LIQUID FILLED ORAL at 11:35

## 2019-08-04 RX ADMIN — Medication 30 ML: at 21:47

## 2019-08-04 RX ADMIN — SENNOSIDES, DOCUSATE SODIUM 2 TABLET: 50; 8.6 TABLET, FILM COATED ORAL at 21:46

## 2019-08-04 RX ADMIN — HEPARIN SODIUM 5000 UNITS: 5000 INJECTION INTRAVENOUS; SUBCUTANEOUS at 05:56

## 2019-08-04 RX ADMIN — OXYCODONE HYDROCHLORIDE AND ACETAMINOPHEN 500 MG: 500 TABLET ORAL at 21:46

## 2019-08-04 RX ADMIN — FERROUS SULFATE TAB 325 MG (65 MG ELEMENTAL FE) 325 MG: 325 (65 FE) TAB at 09:09

## 2019-08-04 RX ADMIN — SODIUM CHLORIDE, PRESERVATIVE FREE 900 UNITS: 5 INJECTION INTRAVENOUS at 21:47

## 2019-08-04 RX ADMIN — ASPIRIN 325 MG ORAL TABLET 325 MG: 325 PILL ORAL at 09:09

## 2019-08-04 RX ADMIN — MODAFINIL 200 MG: 100 TABLET ORAL at 09:09

## 2019-08-04 NOTE — PROGRESS NOTES
08/04/19 0748   NIH Stroke Scale   Interval Other (comment)  (modified )   LOC 0   LOC Questions 1   LOC Commands 0   Best Gaze 0   Visual 0   Facial Palsy 0   Motor Right Arm 0   Motor Left Arm 0   Motor Right Leg 0   Motor Left Leg 0   Limb Ataxia 0   Sensory 0   Best Language 0   Dysarthria 0   Extinction and Inattention 0   Total 1

## 2019-08-04 NOTE — PROGRESS NOTES
[facility] CRITICAL CARE OUTREACH NURSE PROGRESS REPORT      SUBJECTIVE: Called to assess patient secondary to critical care outreach protocol. MEWS Score: 1 (08/04/19 0800)  Vitals:    08/03/19 2000 08/04/19 0000 08/04/19 0400 08/04/19 0800   BP: 162/89 (!) 173/98 147/75 149/77   Pulse: 93 97 99 93   Resp: 17 17 17 16   Temp: 98.6 °F (37 °C) 98.8 °F (37.1 °C) 98.6 °F (37 °C) 98.2 °F (36.8 °C)   SpO2: 100% 99% 99% 99%   Weight:       Height:            LAB DATA:    Recent Labs     08/04/19  0537 08/03/19  0359 08/02/19  0314    147* 142   K 3.9 4.0 4.1   * 114* 110*   CO2 26 24 21   AGAP 9 9 11   GLU 85 87 83   BUN 33* 33* 29*   CREA 3.84* 3.74* 3.42*   GFRAA 22* 23* 25*   GFRNA 18* 19* 21*   CA 9.2 9.1 8.8   MG 2.4 2.7* 2.2        Recent Labs     08/04/19  0537 08/03/19  0359 08/02/19  0314   WBC 9.2 9.2 10.8   HGB 9.9* 9.9* 9.9*   HCT 30.5* 30.7* 30.0*    206 244          OBJECTIVE: On arrival to room, I found patient to be resting quietly in bed, sleeping. Pain Assessment  Pain Intensity 1: 0 (08/03/19 0853)  Pain Location 1: Head, Neck  Pain Intervention(s) 1: Repositioned  Patient Stated Pain Goal: 0      ASSESSMENT:  Patient responds to voice. No acute neuro change this morning. Oriented to person and place. Delayed responses and flat affect. PERRL, 4mm. Purposeful movement of all extremities. Sensation present in all extremities. No drift noted. Afebrile. O2 sat 99% on RA. Lungs clear. HR 80. Abdomen soft, intact. Does not appear patient has eaten any of his breakfast. Head inc c/d/i. No needs expressed. PLAN:  Will continue to monitor per outreach protocol.

## 2019-08-04 NOTE — PROGRESS NOTES
08/04/19 1911   NIH Stroke Scale   Interval Other (comment)   LOC 0   LOC Questions 1   LOC Commands 0   Best Gaze 0   Visual 0   Facial Palsy 0   Motor Right Arm 0   Motor Left Arm 0   Motor Right Leg 0   Motor Left Leg 0   Limb Ataxia 0   Sensory 0   Best Language 0   Dysarthria 0   Extinction and Inattention 0   Total 1

## 2019-08-04 NOTE — PROGRESS NOTES
Progress Note    Patient: Corky Mack MRN: [id number]  SSN: [id number]    YOB: 1975  Age: 37 y.o. Sex: male      Admit Date: 7/9/2019    LOS: 26 days     Subjective:   POD 3, R  shunt placement. Incisions are CDI. Pt somber this am, but will wake up and follow commands, talks to me when I ask him to. On celexa x2 days and started on provigil. Will cont monitor. Plan for rehab next week on 9th floor. Pt to be OOB daily.        Current Facility-Administered Medications   Medication Dose Route Frequency    ketorolac (TORADOL) injection 15 mg  15 mg IntraVENous Q8H PRN    modafinil (PROVIGIL) tablet 200 mg  200 mg Oral DAILY    levETIRAcetam (KEPPRA) 500 mg in 0.9% sodium chloride 100 mL IVPB  500 mg IntraVENous Q12H    magnesium hydroxide (MILK OF MAGNESIA) 400 mg/5 mL oral suspension 30 mL  30 mL Oral DAILY    citalopram (CELEXA) tablet 10 mg  10 mg Oral DAILY    nicotine (NICODERM CQ) 14 mg/24 hr patch 1 Patch  1 Patch TransDERmal Q24H    acetaminophen (TYLENOL) tablet 650 mg  650 mg Oral Q4H PRN    lidocaine 4 % patch 1 Patch  1 Patch TransDERmal Q24H    ferrous sulfate tablet 325 mg  1 Tab Oral TID WITH MEALS    niMODipine (NIMOTOP) 30 mg/mL oral solution (compound) 60 mg  60 mg Oral Q4H    ascorbic acid (vitamin C) (VITAMIN C) tablet 500 mg  500 mg Oral TID    aspirin tablet 325 mg  325 mg Oral DAILY    atorvastatin (LIPITOR) tablet 40 mg  40 mg Oral QHS    clopidogrel (PLAVIX) tablet 75 mg  75 mg Oral DAILY    famotidine (PEPCID) tablet 20 mg  20 mg Oral DAILY    senna-docusate (PERICOLACE) 8.6-50 mg per tablet 2 Tab  2 Tab Oral QHS    magnesium hydroxide (MILK OF MAGNESIA) 400 mg/5 mL oral suspension 30 mL  30 mL Oral DAILY PRN    polyvinyl alcohol (LIQUIFILM TEARS) 1.4 % ophthalmic solution 1 Drop  1 Drop Both Eyes PRN    heparin (porcine) injection 5,000 Units  5,000 Units SubCUTAneous Q8H    sodium chloride (NS) flush 30 mL  30 mL InterCATHeter Q8H    heparin (porcine) pf 900 Units  900 Units InterCATHeter Q8H    heparin (porcine) pf 900 Units  900 Units InterCATHeter PRN    ondansetron (ZOFRAN) injection 4 mg  4 mg IntraVENous Q6H PRN       Objective:     Vitals:    08/03/19 2000 08/04/19 0000 08/04/19 0400 08/04/19 0800   BP: 162/89 (!) 173/98 147/75 149/77   Pulse: 93 97 99 93   Resp: 17 17 17 16   Temp: 98.6 °F (37 °C) 98.8 °F (37.1 °C) 98.6 °F (37 °C) 98.2 °F (36.8 °C)   SpO2: 100% 99% 99% 99%   Weight:       Height:             Intake and Output:  Current Shift: No intake/output data recorded. Last 24 hr: 08/03 0701 - 08/04 0700  In: -   Out: 400 [Urine:400]     Physical Exam:   General:   AOX3,  following commands. Stovall. Eyes:   PERRL   Neck: Supple, symmetrical, trachea midline, no adenopathy, thyroid: no enlargment/tenderness/nodules, no carotid bruit and no JVD. Lungs:   Clear to auscultation bilaterally. Heart:  Regular rate and rhythm, S1, S2 normal, no murmur, click, rub or gallop. Abdomen:   Soft, non-tender. Bowel sounds normal. No masses,  No organomegaly. Extremities: Extremities normal, atraumatic, no cyanosis. Pulses: 2+ and symmetric all extremities. Skin: Skin color, texture, turgor normal. EVD intact. Neurologic: follows commands, stovall, wd pain all extrems.        Lab/Data Review:    Recent Results (from the past 12 hour(s))   MAGNESIUM    Collection Time: 08/04/19  5:37 AM   Result Value Ref Range    Magnesium 2.4 1.8 - 2.4 mg/dL   METABOLIC PANEL, BASIC    Collection Time: 08/04/19  5:37 AM   Result Value Ref Range    Sodium 145 136 - 145 mmol/L    Potassium 3.9 3.5 - 5.1 mmol/L    Chloride 110 (H) 98 - 107 mmol/L    CO2 26 21 - 32 mmol/L    Anion gap 9 7 - 16 mmol/L    Glucose 85 65 - 100 mg/dL    BUN 33 (H) 6 - 23 MG/DL    Creatinine 3.84 (H) 0.8 - 1.5 MG/DL    GFR est AA 22 (L) >60 ml/min/1.73m2    GFR est non-AA 18 (L) >60 ml/min/1.73m2    Calcium 9.2 8.3 - 10.4 MG/DL   CBC W/O DIFF    Collection Time: 08/04/19  5:37 AM Result Value Ref Range    WBC 9.2 4.3 - 11.1 K/uL    RBC 3.20 (L) 4.23 - 5.6 M/uL    HGB 9.9 (L) 13.6 - 17.2 g/dL    HCT 30.5 (L) 41.1 - 50.3 %    MCV 95.3 79.6 - 97.8 FL    MCH 30.9 26.1 - 32.9 PG    MCHC 32.5 31.4 - 35.0 g/dL    RDW 14.0 11.9 - 14.6 %    PLATELET 681 458 - 624 K/uL    MPV 10.5 9.4 - 12.3 FL    ABSOLUTE NRBC 0.00 0.0 - 0.2 K/uL       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 Pt to be OOB daily, cont work with PT/OT/ST- do not let pt decline therapy, call me if any issues. (thx)  2. Rehab next week. 3. Call if any acute neuro changes/concerns.     Signed By: Cullen Baron NP     August 4, 2019

## 2019-08-04 NOTE — PROGRESS NOTES
[facility] CRITICAL CARE OUTREACH NURSE PROGRESS REPORT      SUBJECTIVE: Called to assess patient secondary to transfer from ICU. MEWS Score: 1 (08/03/19 1600)  Vitals:    08/03/19 1201 08/03/19 1600 08/03/19 2000 08/04/19 0000   BP: 150/88 146/87 162/89 (!) 173/98   Pulse: 82 98 93 97   Resp: 18 17 17 17   Temp: 98.6 °F (37 °C) 98.6 °F (37 °C) 98.6 °F (37 °C) 98.8 °F (37.1 °C)   SpO2: 97% 100% 100% 99%   Weight:       Height:            LAB DATA:    Recent Labs     08/03/19  0359 08/02/19  0314 08/01/19  0407   * 142  --    K 4.0 4.1  --    * 110*  --    CO2 24 21  --    AGAP 9 11  --    GLU 87 83  --    BUN 33* 29*  --    CREA 3.74* 3.42*  --    GFRAA 23* 25*  --    GFRNA 19* 21*  --    CA 9.1 8.8  --    MG 2.7* 2.2 2.8*        Recent Labs     08/03/19  0359 08/02/19  0314   WBC 9.2 10.8   HGB 9.9* 9.9*   HCT 30.7* 30.0*    244          OBJECTIVE: On arrival to room, I found patient to be sleeping in bed, family members at bedside. Pain Assessment  Pain Intensity 1: 0 (08/03/19 0853)  Pain Location 1: Head, Neck  Pain Intervention(s) 1: Repositioned  Patient Stated Pain Goal: 0                                 ASSESSMENT:  Patient resting quietly in bed. Flat affect, but will vocalize and follow commands when prompted. Oriented to person and place, delayed responses. Pupils brisk, round, equal, 4 mm.  strong and equal. No drift noted in any extremities. Sensation present in all extremities. SpO2 100% on room air. HR 99. Patient denies pain. Family members at bedside, all questions answered. Patient discussed with primary RN- no questions/concerns. PLAN:  Continue to follow per critical care outreach protocol.

## 2019-08-04 NOTE — PROGRESS NOTES
Date of Outreach Update:  Rabia Mike was seen and assessed. MEWS Score: 1 (08/04/19 0000)  Vitals:    08/03/19 1201 08/03/19 1600 08/03/19 2000 08/04/19 0000   BP: 150/88 146/87 162/89 (!) 173/98   Pulse: 82 98 93 97   Resp: 18 17 17 17   Temp: 98.6 °F (37 °C) 98.6 °F (37 °C) 98.6 °F (37 °C) 98.8 °F (37.1 °C)   SpO2: 97% 100% 100% 99%   Weight:       Height:             Pain Assessment  Pain Intensity 1: 0 (08/03/19 0853)  Pain Location 1: Head, Neck  Pain Intervention(s) 1: Repositioned  Patient Stated Pain Goal: 0      Previous Outreach assessment has been reviewed. There have been no significant clinical changes since the completion of the last dated Outreach assessment. Will continue to follow up per outreach protocol.     Signed By:   Keily Grace    August 4, 2019 3:18 AM

## 2019-08-05 ENCOUNTER — HOSPITAL ENCOUNTER (INPATIENT)
Age: 44
LOS: 2 days | Discharge: HOME | DRG: 065 | End: 2019-08-07
Attending: PHYSICAL MEDICINE & REHABILITATION | Admitting: PHYSICAL MEDICINE & REHABILITATION
Payer: COMMERCIAL

## 2019-08-05 VITALS
RESPIRATION RATE: 18 BRPM | SYSTOLIC BLOOD PRESSURE: 153 MMHG | OXYGEN SATURATION: 97 % | HEART RATE: 95 BPM | TEMPERATURE: 98.8 F | DIASTOLIC BLOOD PRESSURE: 88 MMHG | HEIGHT: 72 IN | WEIGHT: 145.5 LBS | BODY MASS INDEX: 19.71 KG/M2

## 2019-08-05 DIAGNOSIS — N18.9 ANEMIA DUE TO CHRONIC KIDNEY DISEASE, UNSPECIFIED CKD STAGE: ICD-10-CM

## 2019-08-05 DIAGNOSIS — D63.1 ANEMIA DUE TO CHRONIC KIDNEY DISEASE, UNSPECIFIED CKD STAGE: ICD-10-CM

## 2019-08-05 DIAGNOSIS — I67.848 CEREBRAL VASOSPASM: ICD-10-CM

## 2019-08-05 DIAGNOSIS — I60.4 SUBARACHNOID HEMORRHAGE FROM BASILAR ARTERY ANEURYSM (HCC): ICD-10-CM

## 2019-08-05 DIAGNOSIS — R79.89 ELEVATED SERUM CREATININE: ICD-10-CM

## 2019-08-05 DIAGNOSIS — Z72.0 TOBACCO ABUSE: ICD-10-CM

## 2019-08-05 DIAGNOSIS — J15.211 PNEUMONIA OF LEFT LOWER LOBE DUE TO METHICILLIN SUSCEPTIBLE STAPHYLOCOCCUS AUREUS (MSSA) (HCC): ICD-10-CM

## 2019-08-05 DIAGNOSIS — G91.0 COMMUNICATING HYDROCEPHALUS (HCC): ICD-10-CM

## 2019-08-05 DIAGNOSIS — R56.9 SEIZURE (HCC): ICD-10-CM

## 2019-08-05 DIAGNOSIS — J96.01 ACUTE RESPIRATORY FAILURE WITH HYPOXIA (HCC): ICD-10-CM

## 2019-08-05 DIAGNOSIS — I60.9 SAH (SUBARACHNOID HEMORRHAGE) (HCC): ICD-10-CM

## 2019-08-05 DIAGNOSIS — I61.5 IVH (INTRAVENTRICULAR HEMORRHAGE) (HCC): ICD-10-CM

## 2019-08-05 LAB
ANION GAP SERPL CALC-SCNC: 9 MMOL/L (ref 7–16)
BUN SERPL-MCNC: 32 MG/DL (ref 6–23)
CALCIUM SERPL-MCNC: 9.3 MG/DL (ref 8.3–10.4)
CHLORIDE SERPL-SCNC: 109 MMOL/L (ref 98–107)
CO2 SERPL-SCNC: 26 MMOL/L (ref 21–32)
CREAT SERPL-MCNC: 3.78 MG/DL (ref 0.8–1.5)
ERYTHROCYTE [DISTWIDTH] IN BLOOD BY AUTOMATED COUNT: 13.7 % (ref 11.9–14.6)
GLUCOSE SERPL-MCNC: 82 MG/DL (ref 65–100)
HCT VFR BLD AUTO: 33.6 % (ref 41.1–50.3)
HGB BLD-MCNC: 11.1 G/DL (ref 13.6–17.2)
MAGNESIUM SERPL-MCNC: 2.2 MG/DL (ref 1.8–2.4)
MCH RBC QN AUTO: 30.7 PG (ref 26.1–32.9)
MCHC RBC AUTO-ENTMCNC: 33 G/DL (ref 31.4–35)
MCV RBC AUTO: 93.1 FL (ref 79.6–97.8)
NRBC # BLD: 0 K/UL (ref 0–0.2)
PLATELET # BLD AUTO: 170 K/UL (ref 150–450)
PMV BLD AUTO: 10.7 FL (ref 9.4–12.3)
POTASSIUM SERPL-SCNC: 3.9 MMOL/L (ref 3.5–5.1)
RBC # BLD AUTO: 3.61 M/UL (ref 4.23–5.6)
SODIUM SERPL-SCNC: 144 MMOL/L (ref 136–145)
WBC # BLD AUTO: 8.7 K/UL (ref 4.3–11.1)

## 2019-08-05 PROCEDURE — 74011000258 HC RX REV CODE- 258: Performed by: NURSE PRACTITIONER

## 2019-08-05 PROCEDURE — 74011250637 HC RX REV CODE- 250/637

## 2019-08-05 PROCEDURE — 65310000000 HC RM PRIVATE REHAB

## 2019-08-05 PROCEDURE — 99238 HOSP IP/OBS DSCHRG MGMT 30/<: CPT | Performed by: NURSE PRACTITIONER

## 2019-08-05 PROCEDURE — 83735 ASSAY OF MAGNESIUM: CPT

## 2019-08-05 PROCEDURE — 74011250636 HC RX REV CODE- 250/636: Performed by: NURSE PRACTITIONER

## 2019-08-05 PROCEDURE — 74011250637 HC RX REV CODE- 250/637: Performed by: NURSE PRACTITIONER

## 2019-08-05 PROCEDURE — 74011250636 HC RX REV CODE- 250/636: Performed by: PHYSICAL MEDICINE & REHABILITATION

## 2019-08-05 PROCEDURE — 85027 COMPLETE CBC AUTOMATED: CPT

## 2019-08-05 PROCEDURE — 99223 1ST HOSP IP/OBS HIGH 75: CPT | Performed by: PHYSICAL MEDICINE & REHABILITATION

## 2019-08-05 PROCEDURE — 74011000250 HC RX REV CODE- 250: Performed by: NURSE PRACTITIONER

## 2019-08-05 PROCEDURE — 74011000258 HC RX REV CODE- 258: Performed by: PHYSICAL MEDICINE & REHABILITATION

## 2019-08-05 PROCEDURE — 80048 BASIC METABOLIC PNL TOTAL CA: CPT

## 2019-08-05 PROCEDURE — 74011250637 HC RX REV CODE- 250/637: Performed by: PHYSICAL MEDICINE & REHABILITATION

## 2019-08-05 RX ORDER — CITALOPRAM 20 MG/1
10 TABLET, FILM COATED ORAL DAILY
Status: DISCONTINUED | OUTPATIENT
Start: 2019-08-06 | End: 2019-08-05

## 2019-08-05 RX ORDER — POLYVINYL ALCOHOL 14 MG/ML
1 SOLUTION/ DROPS OPHTHALMIC AS NEEDED
Status: CANCELLED | OUTPATIENT
Start: 2019-08-05

## 2019-08-05 RX ORDER — CITALOPRAM 10 MG/1
10 TABLET ORAL DAILY
Status: CANCELLED | OUTPATIENT
Start: 2019-08-06

## 2019-08-05 RX ORDER — CITALOPRAM 10 MG/1
10 TABLET ORAL DAILY
Qty: 90 TAB | Refills: 1 | Status: SHIPPED | OUTPATIENT
Start: 2019-08-06 | End: 2019-08-28

## 2019-08-05 RX ORDER — LIDOCAINE 4 G/100G
1 PATCH TOPICAL EVERY 24 HOURS
Status: CANCELLED | OUTPATIENT
Start: 2019-08-06

## 2019-08-05 RX ORDER — HEPARIN SODIUM 5000 [USP'U]/ML
5000 INJECTION, SOLUTION INTRAVENOUS; SUBCUTANEOUS EVERY 8 HOURS
Status: CANCELLED | OUTPATIENT
Start: 2019-08-05

## 2019-08-05 RX ORDER — LANOLIN ALCOHOL/MO/W.PET/CERES
1 CREAM (GRAM) TOPICAL
Status: DISCONTINUED | OUTPATIENT
Start: 2019-08-05 | End: 2019-08-06

## 2019-08-05 RX ORDER — SODIUM CHLORIDE 0.9 % (FLUSH) 0.9 %
30 SYRINGE (ML) INJECTION EVERY 8 HOURS
Status: DISCONTINUED | OUTPATIENT
Start: 2019-08-05 | End: 2019-08-07 | Stop reason: HOSPADM

## 2019-08-05 RX ORDER — ONDANSETRON 4 MG/1
4 TABLET, ORALLY DISINTEGRATING ORAL
Status: DISCONTINUED | OUTPATIENT
Start: 2019-08-05 | End: 2019-08-07 | Stop reason: HOSPADM

## 2019-08-05 RX ORDER — CLOPIDOGREL BISULFATE 75 MG/1
75 TABLET ORAL DAILY
Status: DISCONTINUED | OUTPATIENT
Start: 2019-08-06 | End: 2019-08-07 | Stop reason: HOSPADM

## 2019-08-05 RX ORDER — ASPIRIN 325 MG
325 TABLET ORAL DAILY
Status: CANCELLED | OUTPATIENT
Start: 2019-08-06

## 2019-08-05 RX ORDER — ACETAMINOPHEN 325 MG/1
650 TABLET ORAL
Status: DISCONTINUED | OUTPATIENT
Start: 2019-08-05 | End: 2019-08-07 | Stop reason: HOSPADM

## 2019-08-05 RX ORDER — FAMOTIDINE 20 MG/1
20 TABLET, FILM COATED ORAL DAILY
Qty: 90 TAB | Refills: 1 | Status: SHIPPED | OUTPATIENT
Start: 2019-08-06 | End: 2019-08-28

## 2019-08-05 RX ORDER — ASPIRIN 325 MG
325 TABLET ORAL DAILY
Status: DISCONTINUED | OUTPATIENT
Start: 2019-08-06 | End: 2019-08-07 | Stop reason: HOSPADM

## 2019-08-05 RX ORDER — POLYVINYL ALCOHOL 14 MG/ML
1 SOLUTION/ DROPS OPHTHALMIC AS NEEDED
Status: DISCONTINUED | OUTPATIENT
Start: 2019-08-05 | End: 2019-08-07 | Stop reason: HOSPADM

## 2019-08-05 RX ORDER — ONDANSETRON 2 MG/ML
4 INJECTION INTRAMUSCULAR; INTRAVENOUS
Status: DISCONTINUED | OUTPATIENT
Start: 2019-08-05 | End: 2019-08-07 | Stop reason: HOSPADM

## 2019-08-05 RX ORDER — LEVETIRACETAM 500 MG/1
500 TABLET ORAL 2 TIMES DAILY
Qty: 60 TAB | Refills: 5 | Status: SHIPPED
Start: 2019-08-05 | End: 2019-08-13

## 2019-08-05 RX ORDER — ATORVASTATIN CALCIUM 40 MG/1
40 TABLET, FILM COATED ORAL
Status: CANCELLED | OUTPATIENT
Start: 2019-08-05

## 2019-08-05 RX ORDER — ADHESIVE BANDAGE
30 BANDAGE TOPICAL DAILY PRN
Status: CANCELLED | OUTPATIENT
Start: 2019-08-05

## 2019-08-05 RX ORDER — ASCORBIC ACID 500 MG
500 TABLET ORAL 3 TIMES DAILY
Status: DISCONTINUED | OUTPATIENT
Start: 2019-08-05 | End: 2019-08-07 | Stop reason: HOSPADM

## 2019-08-05 RX ORDER — SERTRALINE HYDROCHLORIDE 50 MG/1
25 TABLET, FILM COATED ORAL DAILY
Status: DISCONTINUED | OUTPATIENT
Start: 2019-08-06 | End: 2019-08-07 | Stop reason: HOSPADM

## 2019-08-05 RX ORDER — CLOPIDOGREL BISULFATE 75 MG/1
75 TABLET ORAL DAILY
Qty: 90 TAB | Refills: 1 | Status: ON HOLD | OUTPATIENT
Start: 2019-08-06 | End: 2019-08-27 | Stop reason: SDUPTHER

## 2019-08-05 RX ORDER — CLOPIDOGREL BISULFATE 75 MG/1
75 TABLET ORAL DAILY
Status: CANCELLED | OUTPATIENT
Start: 2019-08-06

## 2019-08-05 RX ORDER — ATORVASTATIN CALCIUM 40 MG/1
40 TABLET, FILM COATED ORAL
Qty: 90 TAB | Refills: 1 | Status: ON HOLD
Start: 2019-08-05 | End: 2019-08-27 | Stop reason: SDUPTHER

## 2019-08-05 RX ORDER — IBUPROFEN 200 MG
1 TABLET ORAL EVERY 24 HOURS
Qty: 30 PATCH | Refills: 0 | Status: SHIPPED | OUTPATIENT
Start: 2019-08-05 | End: 2019-08-28

## 2019-08-05 RX ORDER — KETOROLAC TROMETHAMINE 15 MG/ML
15 INJECTION, SOLUTION INTRAMUSCULAR; INTRAVENOUS
Status: DISCONTINUED | OUTPATIENT
Start: 2019-08-05 | End: 2019-08-06

## 2019-08-05 RX ORDER — HEPARIN 100 UNIT/ML
900 SYRINGE INTRAVENOUS EVERY 8 HOURS
Status: DISCONTINUED | OUTPATIENT
Start: 2019-08-05 | End: 2019-08-07 | Stop reason: HOSPADM

## 2019-08-05 RX ORDER — AMOXICILLIN 250 MG
2 CAPSULE ORAL
Status: CANCELLED | OUTPATIENT
Start: 2019-08-05

## 2019-08-05 RX ORDER — ONDANSETRON 4 MG/1
4 TABLET, ORALLY DISINTEGRATING ORAL
Status: CANCELLED | OUTPATIENT
Start: 2019-08-05

## 2019-08-05 RX ORDER — LANOLIN ALCOHOL/MO/W.PET/CERES
1 CREAM (GRAM) TOPICAL
Status: CANCELLED | OUTPATIENT
Start: 2019-08-05

## 2019-08-05 RX ORDER — MODAFINIL 100 MG/1
200 TABLET ORAL DAILY
Status: DISCONTINUED | OUTPATIENT
Start: 2019-08-06 | End: 2019-08-07 | Stop reason: HOSPADM

## 2019-08-05 RX ORDER — ONDANSETRON 2 MG/ML
4 INJECTION INTRAMUSCULAR; INTRAVENOUS
Status: CANCELLED | OUTPATIENT
Start: 2019-08-05

## 2019-08-05 RX ORDER — HEPARIN SODIUM 5000 [USP'U]/ML
5000 INJECTION, SOLUTION INTRAVENOUS; SUBCUTANEOUS EVERY 8 HOURS
Status: DISCONTINUED | OUTPATIENT
Start: 2019-08-05 | End: 2019-08-07 | Stop reason: SDUPTHER

## 2019-08-05 RX ORDER — ASCORBIC ACID 500 MG
500 TABLET ORAL 3 TIMES DAILY
Status: CANCELLED | OUTPATIENT
Start: 2019-08-05

## 2019-08-05 RX ORDER — SODIUM CHLORIDE 0.9 % (FLUSH) 0.9 %
30 SYRINGE (ML) INJECTION EVERY 8 HOURS
Status: CANCELLED | OUTPATIENT
Start: 2019-08-05

## 2019-08-05 RX ORDER — MODAFINIL 100 MG/1
200 TABLET ORAL DAILY
Status: CANCELLED | OUTPATIENT
Start: 2019-08-06

## 2019-08-05 RX ORDER — HEPARIN 100 UNIT/ML
900 SYRINGE INTRAVENOUS AS NEEDED
Status: DISCONTINUED | OUTPATIENT
Start: 2019-08-05 | End: 2019-08-07 | Stop reason: HOSPADM

## 2019-08-05 RX ORDER — HEPARIN 100 UNIT/ML
900 SYRINGE INTRAVENOUS AS NEEDED
Status: CANCELLED | OUTPATIENT
Start: 2019-08-05

## 2019-08-05 RX ORDER — ATORVASTATIN CALCIUM 40 MG/1
40 TABLET, FILM COATED ORAL
Status: DISCONTINUED | OUTPATIENT
Start: 2019-08-05 | End: 2019-08-07 | Stop reason: HOSPADM

## 2019-08-05 RX ORDER — FAMOTIDINE 20 MG/1
20 TABLET, FILM COATED ORAL DAILY
Status: DISCONTINUED | OUTPATIENT
Start: 2019-08-06 | End: 2019-08-06

## 2019-08-05 RX ORDER — ADHESIVE BANDAGE
30 BANDAGE TOPICAL DAILY
Status: DISCONTINUED | OUTPATIENT
Start: 2019-08-06 | End: 2019-08-07 | Stop reason: HOSPADM

## 2019-08-05 RX ORDER — LIDOCAINE 4 G/100G
1 PATCH TOPICAL EVERY 24 HOURS
Status: DISCONTINUED | OUTPATIENT
Start: 2019-08-06 | End: 2019-08-07 | Stop reason: HOSPADM

## 2019-08-05 RX ORDER — HEPARIN 100 UNIT/ML
900 SYRINGE INTRAVENOUS EVERY 8 HOURS
Status: CANCELLED | OUTPATIENT
Start: 2019-08-05

## 2019-08-05 RX ORDER — AMOXICILLIN 250 MG
2 CAPSULE ORAL
Status: DISCONTINUED | OUTPATIENT
Start: 2019-08-05 | End: 2019-08-07 | Stop reason: HOSPADM

## 2019-08-05 RX ORDER — IBUPROFEN 200 MG
1 TABLET ORAL EVERY 24 HOURS
Status: DISCONTINUED | OUTPATIENT
Start: 2019-08-06 | End: 2019-08-07 | Stop reason: HOSPADM

## 2019-08-05 RX ORDER — MODAFINIL 200 MG/1
200 TABLET ORAL DAILY
Qty: 30 TAB | Refills: 0 | Status: SHIPPED | OUTPATIENT
Start: 2019-08-06 | End: 2019-08-28

## 2019-08-05 RX ORDER — ASPIRIN 325 MG
325 TABLET ORAL DAILY
Qty: 90 TAB | Refills: 1 | Status: ON HOLD
Start: 2019-08-06 | End: 2019-08-27 | Stop reason: SDUPTHER

## 2019-08-05 RX ORDER — ADHESIVE BANDAGE
30 BANDAGE TOPICAL DAILY PRN
Status: DISCONTINUED | OUTPATIENT
Start: 2019-08-05 | End: 2019-08-07 | Stop reason: HOSPADM

## 2019-08-05 RX ORDER — IBUPROFEN 200 MG
1 TABLET ORAL EVERY 24 HOURS
Status: CANCELLED | OUTPATIENT
Start: 2019-08-05

## 2019-08-05 RX ORDER — ADHESIVE BANDAGE
30 BANDAGE TOPICAL DAILY
Status: CANCELLED | OUTPATIENT
Start: 2019-08-06

## 2019-08-05 RX ORDER — ACETAMINOPHEN 325 MG/1
650 TABLET ORAL
Status: CANCELLED | OUTPATIENT
Start: 2019-08-05

## 2019-08-05 RX ORDER — FAMOTIDINE 20 MG/1
20 TABLET, FILM COATED ORAL DAILY
Status: CANCELLED | OUTPATIENT
Start: 2019-08-06

## 2019-08-05 RX ORDER — KETOROLAC TROMETHAMINE 15 MG/ML
15 INJECTION, SOLUTION INTRAMUSCULAR; INTRAVENOUS
Status: CANCELLED | OUTPATIENT
Start: 2019-08-05 | End: 2019-08-07

## 2019-08-05 RX ADMIN — CITALOPRAM HYDROBROMIDE 10 MG: 20 TABLET ORAL at 09:10

## 2019-08-05 RX ADMIN — OXYCODONE HYDROCHLORIDE AND ACETAMINOPHEN 500 MG: 500 TABLET ORAL at 09:10

## 2019-08-05 RX ADMIN — SODIUM CHLORIDE, PRESERVATIVE FREE 900 UNITS: 5 INJECTION INTRAVENOUS at 22:19

## 2019-08-05 RX ADMIN — HEPARIN SODIUM 5000 UNITS: 5000 INJECTION INTRAVENOUS; SUBCUTANEOUS at 06:08

## 2019-08-05 RX ADMIN — SODIUM CHLORIDE, PRESERVATIVE FREE 900 UNITS: 5 INJECTION INTRAVENOUS at 15:00

## 2019-08-05 RX ADMIN — SENNOSIDES, DOCUSATE SODIUM 2 TABLET: 50; 8.6 TABLET, FILM COATED ORAL at 22:12

## 2019-08-05 RX ADMIN — HEPARIN SODIUM 5000 UNITS: 5000 INJECTION INTRAVENOUS; SUBCUTANEOUS at 17:17

## 2019-08-05 RX ADMIN — FAMOTIDINE 20 MG: 20 TABLET ORAL at 09:10

## 2019-08-05 RX ADMIN — Medication 30 ML: at 06:09

## 2019-08-05 RX ADMIN — SODIUM CHLORIDE 500 MG: 900 INJECTION, SOLUTION INTRAVENOUS at 09:10

## 2019-08-05 RX ADMIN — ASPIRIN 325 MG ORAL TABLET 325 MG: 325 PILL ORAL at 09:10

## 2019-08-05 RX ADMIN — SODIUM CHLORIDE 500 MG: 900 INJECTION, SOLUTION INTRAVENOUS at 22:22

## 2019-08-05 RX ADMIN — ATORVASTATIN CALCIUM 40 MG: 40 TABLET, FILM COATED ORAL at 22:16

## 2019-08-05 RX ADMIN — OXYCODONE HYDROCHLORIDE AND ACETAMINOPHEN 500 MG: 500 TABLET ORAL at 22:12

## 2019-08-05 RX ADMIN — ACETAMINOPHEN 650 MG: 325 TABLET, FILM COATED ORAL at 13:13

## 2019-08-05 RX ADMIN — NIMODIPINE 60 MG: 30 CAPSULE, LIQUID FILLED ORAL at 00:01

## 2019-08-05 RX ADMIN — CLOPIDOGREL BISULFATE 75 MG: 75 TABLET ORAL at 09:10

## 2019-08-05 RX ADMIN — MODAFINIL 200 MG: 100 TABLET ORAL at 09:10

## 2019-08-05 RX ADMIN — NIMODIPINE 60 MG: 30 CAPSULE, LIQUID FILLED ORAL at 12:05

## 2019-08-05 RX ADMIN — SODIUM CHLORIDE, PRESERVATIVE FREE 900 UNITS: 5 INJECTION INTRAVENOUS at 06:09

## 2019-08-05 RX ADMIN — FERROUS SULFATE TAB 325 MG (65 MG ELEMENTAL FE) 325 MG: 325 (65 FE) TAB at 12:05

## 2019-08-05 RX ADMIN — NIMODIPINE 60 MG: 30 CAPSULE, LIQUID FILLED ORAL at 09:26

## 2019-08-05 RX ADMIN — NIMODIPINE 60 MG: 30 CAPSULE, LIQUID FILLED ORAL at 04:21

## 2019-08-05 RX ADMIN — Medication 30 ML: at 17:16

## 2019-08-05 RX ADMIN — Medication 30 ML: at 22:16

## 2019-08-05 RX ADMIN — FERROUS SULFATE TAB 325 MG (65 MG ELEMENTAL FE) 325 MG: 325 (65 FE) TAB at 09:10

## 2019-08-05 RX ADMIN — ONDANSETRON 4 MG: 2 INJECTION INTRAMUSCULAR; INTRAVENOUS at 17:31

## 2019-08-05 NOTE — DISCHARGE SUMMARY
Discharge Summary     Patient: Bryan Holloway MRN: [id number]  SSN: [id number]    YOB: 1975  Age: 37 y.o. Sex: male       Admit Date: 7/9/2019    Discharge Date: 8/5/2019      Admission Diagnoses: SAH (subarachnoid hemorrhage) ([facility] [I60.9]    Discharge Diagnoses:   Problem List as of 8/5/2019 Date Reviewed: 7/10/2019          Codes Class Noted - Resolved    RESOLVED: SAH (subarachnoid hemorrhage) ([facility] ICD-10-CM: I60.9  ICD-9-CM: 430  7/9/2019 - 7/10/2019        Pneumonia due to methicillin susceptible Staphylococcus aureus (MSSA) ([facility] ICD-10-CM: J15.211  ICD-9-CM: 482.41  7/15/2019 - Present        Tobacco abuse ICD-10-CM: Z72.0  ICD-9-CM: 305.1  7/10/2019 - Present        Acute respiratory failure with hypoxia ([facility] ICD-10-CM: J96.01  ICD-9-CM: 518.81  7/10/2019 - Present        * (Principal) 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  ICD-9-CM: 331.3  7/10/2019 - Present        IVH (intraventricular hemorrhage) ([facility] ICD-10-CM: I61.5  ICD-9-CM: 100  7/10/2019 - Present        Seizure ([facility] ICD-10-CM: R56.9  ICD-9-CM: 780.39  7/10/2019 - Present        Elevated serum creatinine ICD-10-CM: R79.89  ICD-9-CM: 790.99  7/10/2019 - Present        Cerebral vasospasm ICD-10-CM: I67.848  ICD-9-CM: 435.9  7/10/2019 - Present        RESOLVED: Endotracheally intubated ICD-10-CM: Z97.8  ICD-9-CM: V49.89  7/10/2019 - 7/10/2019               Discharge Condition: Stable    Hospital Course: pt was transferred from [facility] via [facility] flight team on 7-9-19 for treatment of his SAH, secondary to ruptured basilar aneurysm. The pt was admitted to our ICU until under [address] protocol and on 7-10-19 he became altered and required an EVD placement due to increased hydrocephalus. The pt had stent and coiling of his aneurysm on 7-10-19 with Dr. Nancy Alvarado.  The pt has been seen by our PT/OT/ST and rehab teams during his admit and deemed to be a good candidate for inpt rehab on our 9th floor IRC. The pt is alert, VS are stable and he will be transferred today. Pt was treated for pneumonia during admit, and it is resolved upon dc. 1. SAH: pt with large basilar tip aneurysm rupture, Guillermo Solis III / Watts Grade IV. : stent/coiled 7-10-19. Pt had had daily TCD's during his admit and did show vasospasm, but pt was able to autoregulate and no IA verapamil was required. The pt had EVD placed on admit due to hydrocephalus and was unable to remove due to altered mental status. Pt required ventricular tPA x2 for IVH clot prior to  shunt placement on 8-2-19. The pt is alert and follows commands today, but he does have a flat effect. He was started on celexa 10mg po daily during admit. Pt will remain on his asa 325mg po daily, Plavix 75mg po daily for his stent/coil of basilar aneurysm. He completed 21days of nimotop prior to dc today. 2. LDL: 154 this admit, was started on lipitor 40mg po daily and will continue on dc.     3. TOBACCO ABUSE: pt was smoker on admit, has been on nicotine patches during admit, will continue in rehab and continue efforts to abstain from tobacco.    4. HYDROCEPHALUS: pt had  shunt placed 9-1-65, Certas Plus  valve set at 3. Resolved on dc today. Pt repeat head CT stable. 5. NIHSS on DC: 1, intermittent confusion, but overall intact AOX3. 6. MRS on DC:  3    7. Follow up with Dr. Rufino Campbell in 4 weeks, our office will make appointment date and time. 8. DC time 25 minutes for dc education, med recs, logistics.      Consults: Physical Medicine and Rehabilitation    Significant Diagnostic Studies: daily labs, 2D echo, cerebral angiogram, CTA head and neck, CT chest, TCD's    Disposition: Rehab: 9th floor IRC    Discharge Medications:   Current Discharge Medication List      START taking these medications    Details   aspirin (ASPIRIN) 325 mg tablet Take 1 Tab by mouth daily. 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 Qty: 90 Tab, Refills: 1    Associated Diagnoses: SAH (subarachnoid hemorrhage) (HCC)      citalopram (CELEXA) 10 mg tablet Take 1 Tab by mouth daily. Qty: 90 Tab, Refills: 1      clopidogrel (PLAVIX) 75 mg tab Take 1 Tab by mouth daily. 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 Qty: 90 Tab, Refills: 1    Associated Diagnoses: SAH (subarachnoid hemorrhage) (HCC)      levETIRAcetam (KEPPRA) 500 mg tablet Take 1 Tab by mouth two (2) times a day. Qty: 60 Tab, Refills: 5      modafinil (PROVIGIL) 200 mg tablet Take 1 Tab by mouth daily. Max Daily Amount: 200 mg. Qty: 30 Tab, Refills: 0    Associated Diagnoses: SAH (subarachnoid hemorrhage) ([address] Seizure ([address] Tobacco abuse; Cerebral vasospasm      nicotine (NICODERM CQ) 14 mg/24 hr patch 1 Patch by TransDERmal route every twenty-four (24) hours for 30 days. Qty: 30 Patch, Refills: 0    Associated Diagnoses: SAH (subarachnoid hemorrhage) ([address] Tobacco abuse             Activity: Activity as tolerated  Diet: Regular Diet  Wound Care: Incisions post R  shunt placement are CDI upon dc. Sutures will dissolve on their own. Call if any concerns for skin breakdown, infection, drainage. Follow-up Appointments   Procedures    FOLLOW UP VISIT Appointment in: Other (Specify) 4 weeks with Dr. Ammon Toscano     4 weeks with Dr. Ammon Toscano     Standing Status:   Standing     Number of Occurrences:   1     Order Specific Question:   Appointment in     Answer:    Other (Specify)       Signed By: Trip Cota NP     August 5, 2019

## 2019-08-05 NOTE — ROUTINE PROCESS
TRANSFER - OUT REPORT:    Verbal report given to Joyce Sanchez on Lucia Villegas  being transferred to 9th Floor for routine progression of care       Report consisted of patients Situation, Background, Assessment and   Recommendations(SBAR). Information from the following report(s) SBAR, Kardex, ED Summary, Procedure Summary, Intake/Output, MAR and Recent Results was reviewed with the receiving nurse. Lines:   PICC Triple Lumen 42/06/08 Right;Basilic (Active)   Central Line Being Utilized Yes 8/5/2019  8:00 AM   Criteria for Appropriate Use Long term IV/antibiotic administration 8/5/2019  8:00 AM   Site Assessment Clean, dry, & intact 8/5/2019  8:00 AM   Phlebitis Assessment 0 8/5/2019  8:00 AM   Infiltration Assessment 0 8/5/2019  8:00 AM   Arm Circumference (cm) 28 cm 8/2/2019  9:11 AM   Date of Last Dressing Change 08/02/19 8/5/2019  8:00 AM   Dressing Status Clean, dry, & intact 8/5/2019  8:00 AM   External Catheter Length (cm) 0 centimeters 8/2/2019  9:11 AM   Dressing Type Disk with Chlorhexadine gluconate (CHG); Transparent 8/5/2019  8:00 AM   Action Taken Dressing changed 8/2/2019  9:11 AM   Hub Color/Line Status Patent 8/5/2019  8:00 AM   Positive Blood Return (Site #1) Yes 8/5/2019  8:00 AM   Hub Color/Line Status Patent 8/5/2019  8:00 AM   Positive Blood Return (Site #2) Yes 8/5/2019  8:00 AM   Hub Color/Line Status Patent 8/5/2019  8:00 AM   Positive Blood Return (Site #3) Yes 8/5/2019  8:00 AM   Alcohol Cap Used No 8/2/2019  7:06 PM        Opportunity for questions and clarification was provided.       Patient transported with:  Transport

## 2019-08-05 NOTE — PROGRESS NOTES
08/05/19 0754   NIH Stroke Scale   Interval Other (comment)  (bedside NIH with Annita Martinez RN )   LOC 0   LOC Questions 1   LOC Commands 0   Best Gaze 0   Visual 0   Facial Palsy 0   Motor Right Arm 0   Motor Left Arm 0   Motor Right Leg 0   Motor Left Leg 0   Limb Ataxia 0   Sensory 0   Best Language 0   Dysarthria 0   Extinction and Inattention 0   Total 1

## 2019-08-05 NOTE — PROGRESS NOTES
Critical Care Outreach Nurse Progress Report:    Subjective: In to assess pt secondary to recent ICU admission    MEWS Score: 1 (08/04/19 1600)    Vitals:    08/04/19 0800 08/04/19 1200 08/04/19 1600 08/04/19 2000   BP: 149/77 (!) 160/95 (!) 173/92 156/84   Pulse: 93 (!) 104 91 (!) 103   Resp: 16 16 16 16   Temp: 98.2 °F (36.8 °C) 98.5 °F (36.9 °C) 98.6 °F (37 °C) 98.6 °F (37 °C)   SpO2: 99% 98% 100% 99%   Weight:       Height:            LAB DATA:    Recent Labs     08/04/19  0537 08/03/19  0359 08/02/19  0314    147* 142   K 3.9 4.0 4.1   * 114* 110*   CO2 26 24 21   AGAP 9 9 11   GLU 85 87 83   BUN 33* 33* 29*   CREA 3.84* 3.74* 3.42*   GFRAA 22* 23* 25*   GFRNA 18* 19* 21*   CA 9.2 9.1 8.8   MG 2.4 2.7* 2.2        Recent Labs     08/04/19  0537 08/03/19  0359 08/02/19  0314   WBC 9.2 9.2 10.8   HGB 9.9* 9.9* 9.9*   HCT 30.5* 30.7* 30.0*    206 244        Objective:     Pain Intensity 1: 0 (08/04/19 0909)  Pain Location 1: Head, Neck  Pain Intervention(s) 1: Repositioned  Patient Stated Pain Goal: 0    Assessment: Upon entering room, patient found to be lying in bed. Primary RN at bedside. A&O x3. Delayed responses. Pupils 4mm/brisk. Moving all extremities.  L > R. Denies any numbness or tingling. Denies HA/vision changes. No needs voiced at this time. HR 99, /84, SpO2 98% on RA. Plan: Will continue to follow patient per outreach protocol.

## 2019-08-05 NOTE — H&P
HISTORY AND PHYSICAL  IRC       Admit Date: 8/5/2019  Endovascular Neurosurgeon: Dr Nevaeh Tai  Primary Care Physician: Other, MD Catherine    Chief Complaint : Gait dysfunction secondary to below. Admit Diagnosis: SAH (subarachnoid hemorrhage) (HCC) [I60.9]  IVH s/p EVD/ subsequent VPS  Respiratory failure requiring intubation      Acute Rehab Dx:  Gait impairment/ gait dysfunction  Debility    deconditioning  Mobility and ambulation deficits  Self Care/ADL deficits    Medical Dx:No past medical history on file. Date of Evaluation:  August 5, 2019    HPI: Guy Duke is a 37 y.o. male patient at [address] who was admitted on 8/5/2019  by Maryann Chang MD with below mentioned medical history ,is being seen for Physical Medicine and Rehabilitation. From my initial consultation 7/7/19 \"Hardik Jordan is a  Right handed functionally independent 37 y. o. male who originally reported to [facility] ED via EMS after found with altered mental status and possible seizure like activity. Per brother, pt was at [address] and had witnessed syncopal event with possible seizure like activity. Pt had CT head and CTA head and neck and found to have [address] secondary to Basilar tip aneurysm rupture. Pt transferred to [address] [place] [address] ED via [facility] team. Pt with eyes closed and drowsy, but will open eyes to voice and follow commands.  Pt GCS E3, V5, M6: 14, PERRL +3 with EOM's intact. NIHSS of 2 for drowsiness and altered mental status. Pt admitted to our ICU under [address] protocol. Labs from [facility] show bun/cr 46/3.4, will give pt IVF's x2 L bolus and reeval. Pt states he smokes about 9 cig a day, denies any ETOH or drugs, denies any family hx of SAH. Pt unsure of what happened. Arterial line placed on admit, pt with IV x2., phelps. \"     Viridiana Floyd , unfortunately, had a decline in status overnoc and required intubation for airway protection. F/u HCT showed extensive/diffuse SAH and increased ICP.  An EVD was placed into the 3rd ventricle and head Ct repeated with dec in ventricular size. Pts creatinine has improved to 2.83 with a BUN of 37 this a.m. troponins elevated at 0.08 and now 0.07. 2D ECHO pending. Na 154 earlier this a.m, now 149. Leukocytosis improved from 118.2 to 13.8. He is on Nimotop to prevent vasospasm and Keppra for sz control. 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 : stent/coiled 7-10-19. Pt had had daily TCD's during his admit and did show vasospasm, but pt was able to autoregulate and no IA verapamil was required. The pt had EVD placed on admit due to hydrocephalus and was unable to remove due to altered mental status. Pt required ventricular tPA x2 for IVH clot prior to  shunt placement on 8-2-19. The pt is alert and follows commands today, but he does have a flat effect. He was started on celexa 10mg po daily during admit. Pt will remain on his asa 325mg po daily, Plavix 75mg po daily for his stent/coil of basilar aneurysm. He completed 21days of nimotop prior to dc to [facility] today  , thus on Lipitor. Nicoderm patches have been used to due admitted tobacco abuse. His VPS of note, Certas Plus with valve set at 3. Repeat Head CT has shown resolution of HC and IVH    Physical therapy was initiated and patient was starting to mobilize. However, Patient shows significant functional deficits due to prolonged immobility and hospitalization due to [facility]. Our service was consulted for rehab needs and we recommended inpatient hospital rehabilitation is reasonable and necessary due to ongoing acute medical issues which have not changed since initial pre-admission evaluation. I reviewed the preadmission screening and have approved for admission to the [facility]. The patient was cleared for transfer to rehab today.  Patient continues to have ongoing  medical issues outlined above requiring physician medical supervision and functional deficits which would benefit from continued intensive therapies. Current Level of Function: (evaluated by acute therapy staff, with bed mobility, transfers, balance personally observed post-admission in the IRF setting minutes prior to submission of document) STS CGA, transfers min assist, amb 100ft gait belt pushing IV pole with min assist of two. Min assist grooming, bathing, LE dressing; mod assist UE dressing due to IV lines, min assist with toileting    Prior Level of Function/Home Situation:   Home Environment: Private residence  One/Two Story Residence: Other (Comment)  Living Alone: No  Support Systems: Parent, Child(rohan)  Patient Expects to be Discharged to[de-identified] Unknown  Current DME Used/Available at Home: None  Prior Level of Function/Work/Activity:  Per father at bedside/pt with appropriate nodding to yes/no questions: at baseline pt and his two children live with pt's father, pt is independent with ADLs, ambulation, working a physically demanding job as a . No recent falls. Dominant Side:         RIGHT    No past medical history on file. Past Surgical History:   Procedure Laterality Date    IR EMBOLI INTRACRAN LT  7/11/2019     No Known Allergies   No family history on file.    Social History     Tobacco Use    Smoking status: Not on file   Substance Use Topics    Alcohol use: Not on file      Current Facility-Administered Medications   Medication Dose Route Frequency    acetaminophen (TYLENOL) tablet 650 mg  650 mg Oral Q4H PRN    ascorbic acid (vitamin C) (VITAMIN C) tablet 500 mg  500 mg Oral TID    [START ON 8/6/2019] aspirin tablet 325 mg  325 mg Oral DAILY    [START ON 8/6/2019] clopidogrel (PLAVIX) tablet 75 mg  75 mg Oral DAILY    [START ON 8/6/2019] famotidine (PEPCID) tablet 20 mg  20 mg Oral DAILY    ferrous sulfate tablet 325 mg  1 Tab Oral TID WITH MEALS    heparin (porcine) injection 5,000 Units  5,000 Units SubCUTAneous Q8H    heparin (porcine) pf 900 Units  900 Units InterCATHeter Q8H  heparin (porcine) pf 900 Units  900 Units InterCATHeter PRN    ketorolac (TORADOL) injection 15 mg  15 mg IntraVENous Q8H PRN    levETIRAcetam (KEPPRA) 500 mg in 0.9% sodium chloride 100 mL IVPB  500 mg IntraVENous Q12H    [START ON 8/6/2019] lidocaine 4 % patch 1 Patch  1 Patch TransDERmal Q24H    [START ON 8/6/2019] magnesium hydroxide (MILK OF MAGNESIA) 400 mg/5 mL oral suspension 30 mL  30 mL Oral DAILY    magnesium hydroxide (MILK OF MAGNESIA) 400 mg/5 mL oral suspension 30 mL  30 mL Oral DAILY PRN    [START ON 8/6/2019] modafinil (PROVIGIL) tablet 200 mg  200 mg Oral DAILY    [START ON 8/6/2019] nicotine (NICODERM CQ) 14 mg/24 hr patch 1 Patch  1 Patch TransDERmal Q24H    niMODipine (NIMOTOP) 30 mg/mL oral solution (compound) 60 mg  60 mg Oral Q4H    ondansetron (ZOFRAN ODT) tablet 4 mg  4 mg Oral Q6H PRN    polyvinyl alcohol (LIQUIFILM TEARS) 1.4 % ophthalmic solution 1 Drop  1 Drop Both Eyes PRN    sodium chloride (NS) flush 30 mL  30 mL InterCATHeter Q8H    atorvastatin (LIPITOR) tablet 40 mg  40 mg Oral QHS    ondansetron (ZOFRAN) injection 4 mg  4 mg IntraVENous Q6H PRN    senna-docusate (PERICOLACE) 8.6-50 mg per tablet 2 Tab  2 Tab Oral QHS    [START ON 8/6/2019] sertraline (ZOLOFT) tablet 25 mg  25 mg Oral DAILY       Review of Systems:  Very difficult to obtain; minimally verbal. Delayed responses. Inattentive. Currently denies headache, cp, sob, nausea. Denies blurred vision. Endorses feeling \"weak all over. \" frustrated and is tearful. Objective: There were no vitals taken for this visit. Intake and Output:  No intake/output data recorded. Physical Exam:   Psych: Patient was oriented to person, place, year, not time Without hallucinations, or abnormal behaviors during the examination. affect is flat   General:   Alert, appears stated age, No acute distress. HEENT:  Normocephalic, EOMI, facial symmetry  Intact. Oral mucosa pink and moist. No nasal discharge.  Right crani inc c/d/i ; sutures in place   Lungs:  CTA Bilaterally,Respiration even and unlabored   No apparent use of accessory muscles for respiration. No nasal alar flaring. Heart:  Regular rate and rhythm, S1, S2, No obvious murmur, click, rub or gallop. Genitourinary: defered   Abdomen:  Soft, non-tender to palpation in all four quadrants. Bowel sounds present. No obvious masses palpated. Neuromuscular:  PERRL, EOMI  [place] but not cooperating with MMT; generalized non focal weakness, inattentive, poor eye contact, delayed responses, follows commands often times req vcs.   Sensation intact to pain  Whispers, barely audible at times   Skin:  Intact, dry, good skin turgor, age related changes present   Edema: none              Lab Review:    Recent Results (from the past 72 hour(s))   GLUCOSE, POC    Collection Time: 08/03/19 12:13 AM   Result Value Ref Range    Glucose (POC) 103 (H) 65 - 100 mg/dL   MAGNESIUM    Collection Time: 08/03/19  3:59 AM   Result Value Ref Range    Magnesium 2.7 (H) 1.8 - 2.4 mg/dL   METABOLIC PANEL, BASIC    Collection Time: 08/03/19  3:59 AM   Result Value Ref Range    Sodium 147 (H) 136 - 145 mmol/L    Potassium 4.0 3.5 - 5.1 mmol/L    Chloride 114 (H) 98 - 107 mmol/L    CO2 24 21 - 32 mmol/L    Anion gap 9 7 - 16 mmol/L    Glucose 87 65 - 100 mg/dL    BUN 33 (H) 6 - 23 MG/DL    Creatinine 3.74 (H) 0.8 - 1.5 MG/DL    GFR est AA 23 (L) >60 ml/min/1.73m2    GFR est non-AA 19 (L) >60 ml/min/1.73m2    Calcium 9.1 8.3 - 10.4 MG/DL   CBC W/O DIFF    Collection Time: 08/03/19  3:59 AM   Result Value Ref Range    WBC 9.2 4.3 - 11.1 K/uL    RBC 3.22 (L) 4.23 - 5.6 M/uL    HGB 9.9 (L) 13.6 - 17.2 g/dL    HCT 30.7 (L) 41.1 - 50.3 %    MCV 95.3 79.6 - 97.8 FL    MCH 30.7 26.1 - 32.9 PG    MCHC 32.2 31.4 - 35.0 g/dL    RDW 14.4 11.9 - 14.6 %    PLATELET 845 450 - 075 K/uL    MPV 10.2 9.4 - 12.3 FL    ABSOLUTE NRBC 0.00 0.0 - 0.2 K/uL   MAGNESIUM    Collection Time: 08/04/19  5:37 AM   Result Value Ref Range    Magnesium 2.4 1.8 - 2.4 mg/dL   METABOLIC PANEL, BASIC    Collection Time: 08/04/19  5:37 AM   Result Value Ref Range    Sodium 145 136 - 145 mmol/L    Potassium 3.9 3.5 - 5.1 mmol/L    Chloride 110 (H) 98 - 107 mmol/L    CO2 26 21 - 32 mmol/L    Anion gap 9 7 - 16 mmol/L    Glucose 85 65 - 100 mg/dL    BUN 33 (H) 6 - 23 MG/DL    Creatinine 3.84 (H) 0.8 - 1.5 MG/DL    GFR est AA 22 (L) >60 ml/min/1.73m2    GFR est non-AA 18 (L) >60 ml/min/1.73m2    Calcium 9.2 8.3 - 10.4 MG/DL   CBC W/O DIFF    Collection Time: 08/04/19  5:37 AM   Result Value Ref Range    WBC 9.2 4.3 - 11.1 K/uL    RBC 3.20 (L) 4.23 - 5.6 M/uL    HGB 9.9 (L) 13.6 - 17.2 g/dL    HCT 30.5 (L) 41.1 - 50.3 %    MCV 95.3 79.6 - 97.8 FL    MCH 30.9 26.1 - 32.9 PG    MCHC 32.5 31.4 - 35.0 g/dL    RDW 14.0 11.9 - 14.6 %    PLATELET 673 824 - 627 K/uL    MPV 10.5 9.4 - 12.3 FL    ABSOLUTE NRBC 0.00 0.0 - 0.2 K/uL   MAGNESIUM    Collection Time: 08/05/19  6:04 AM   Result Value Ref Range    Magnesium 2.2 1.8 - 2.4 mg/dL   METABOLIC PANEL, BASIC    Collection Time: 08/05/19  6:04 AM   Result Value Ref Range    Sodium 144 136 - 145 mmol/L    Potassium 3.9 3.5 - 5.1 mmol/L    Chloride 109 (H) 98 - 107 mmol/L    CO2 26 21 - 32 mmol/L    Anion gap 9 7 - 16 mmol/L    Glucose 82 65 - 100 mg/dL    BUN 32 (H) 6 - 23 MG/DL    Creatinine 3.78 (H) 0.8 - 1.5 MG/DL    GFR est AA 22 (L) >60 ml/min/1.73m2    GFR est non-AA 19 (L) >60 ml/min/1.73m2    Calcium 9.3 8.3 - 10.4 MG/DL   CBC W/O DIFF    Collection Time: 08/05/19  6:04 AM   Result Value Ref Range    WBC 8.7 4.3 - 11.1 K/uL    RBC 3.61 (L) 4.23 - 5.6 M/uL    HGB 11.1 (L) 13.6 - 17.2 g/dL    HCT 33.6 (L) 41.1 - 50.3 %    MCV 93.1 79.6 - 97.8 FL    MCH 30.7 26.1 - 32.9 PG    MCHC 33.0 31.4 - 35.0 g/dL    RDW 13.7 11.9 - 14.6 %    PLATELET 316 803 - 308 K/uL    MPV 10.7 9.4 - 12.3 FL    ABSOLUTE NRBC 0.00 0.0 - 0.2 K/uL         Condition on Admission: Good      Assessment: 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 The Patient is a good candidate for acute inpatient rehabilitation. Nothing since the Pre-admission screen has changed that determination. Rehabilitation Plan  The patient has shown the ability to tolerate and benefit from 3 hours of therapy daily and is being admitted to a comprehensive acute inpatient rehabilitation program consisting of at least 3 hours of combined physical and occupational therapies. Begin intensive Physical Therapy for a minimum of 1.5 hours a day, at least 5 out of 7 days per week to address bed mobility, transfers, ambulation, strengthening, balance, and endurance. Begin intensive Occupational Therapy for a minimum of 1.5 hours a day, at least 5 out of 7 days per week to address ADL ( bathing, LE dressing, toileting) and adaptive equipment as needed. The patient will also require 24-hour skilled rehabilitation nursing for bowel and bladder management, skin care for decubitus ulcer prevention , pain management and ongoing medication administration     The patient may benefit from a psychology consult for depression, adjustment disorder. Continue ST for: dysarthria, impaired communication skills, vital stimulation for L facial weakness. Continue 24-hour skilled rehabilitation nursing for bowel and bladder management, skin care for decubitus ulcer prevention , pain management and ongoing medication administration     The patient may benefit from a psychology consult for depression, anxiety and adjustment disorder.     Continue daily physician medical management:  Pneumonia prophylaxis- Incentive spirometer every hour while awake; s/p MSSA pneumonia; completed abx    SAH s/p stent /coiling; cont ASA , Plavix and lipitor    DVT risk / DVT Prophylaxis- Will require daily physician exam to assess for signs and symptoms as patient is at increased risk for of thromboembolism. Mobilization as tolerated. Intermittent pneumatic compression devices when in bed Thigh-high or knee-high thromboembolic deterrent hose when out of bed. Sc Heparin subq twice daily. Flat affect; stimulation; cont Provigil. Change celexa to Zoloft. Sz; cont keppra, no reoccurence. Cont sz prophylaxis    Vasospasm; can dc Nimotop/ cont mag supplement    Anemia; ? Due to CKD. Cont iron supplement, vit C; improved; 11.1 from 9.9 8/4. S/p prior blood transfusion for hgb 7.0 . monitor    NING on CKD? Creat 3.15 on 7/9 on admission. Baseline unknown. Lowest creat 2.46 to date. Currently 8/5 creat 3.78; will d/w family. Consider nephrology consult. Pain Control: stable, mild-to-moderate joint symptoms intermittently, reasonably well controlled by PRN meds. Will require regular pain assessment and comprenhensive pain management, Lidoderm patch , toradol. Tobacco abuse; cont Nicoderm patch; education and stress cessation    Wound Care: Monitor wound status daily per staff and physician. At risk for failure. Will require 24/7 rehab nursing. Keep wound clean and dry and dc sutures when healed POD 10 , POD #4 VPS    Hypertension - BP uncontrolled, fluctuating, managed medically. Permissive to dec r/o vasospasm      Urinary retention/ neurogenic bladder - schedule voids q6-8 hrs. Check post-void residual as needed; In and Out catheterize if post-void residual is more than 400 cc.    bowel program - cont pericolace with prn bowel program    GERD - resume PPI. At times may need additional antacids, Maalox prn. The patient's prognosis for significant practical improvement within a reasonable period of time appears good and the estimated length of stay is 14 days and patient is expected to return home with parents and continued rehabilitation with home health therapy.      Given the patient's complex neurologic/medical condition and the risk of further medical complications including: DVT, PE, skin breakdown, pneumonia due to decreased mobility,   infection at surgical site , CVA, MI, cardiac arrhythmias due to HTN, increased risk of thromboembolism secondary to decreased blood volume and increased energy expenditure in a patient with known acute blood loss could potentially impact the IRF program with decreased cardiovascular efficiency, postural hypotension and cardiac arrhythmia. For these ongoing medical issues, rehabilitation services could not be safely provided at a lower level of care such as a skilled nursing facility or nursing home.         Signed By: Barbara Canela MD     August 5, 2019

## 2019-08-05 NOTE — DISCHARGE INSTRUCTIONS
Patient Education   1. Follow up with Dr. Scarlett Rodgers in 4 weeks, our office will call you to set up appointment date and time. 2. No returning to work or driving until follow up with Dr. Scarlett Rodgers in 4 weeks, if any questions call our office asap. 3. You will need to ensure your primary provider is aware of your admit for your subarachnoid hemorrhage and will need follow up with Nephrology concerning your kidney function, creatinine level >3.   4. Call our office for any concerns or questions. Learning About a Hemorrhagic Stroke  What is a hemorrhagic stroke? When you have a hemorrhagic (say \"xhm-yum-PD-darnell\") stroke, it means that a blood vessel in the brain has burst open or has started to leak. When the blood spills into the space inside and around the brain, it damages nearby nerve cells. This is different from an ischemic (say \"wqo-NXL-vkbp\") stroke, which happens when a blood clot blocks a blood vessel in the brain. The brain damage from a stroke starts within minutes. Quick treatment can help limit damage to the brain and make recovery more likely. People who have had a stroke may have a hard time talking, understanding things, and making decisions. They may have to relearn daily activities, such as how to eat, bathe, and dress. How well someone recovers from a stroke depends on how quickly the person gets to the hospital, where in the brain the stroke happened, and how severe it was. Stroke rehabilitation, which includes training and therapy, also helps people recover. What are the symptoms? If you have any of these symptoms, call 911 or other emergency services right away. · You have symptoms of a stroke. These may include:  ? Sudden numbness, tingling, weakness, or loss of movement in your face, arm, or leg, especially on only one side of your body. ? Sudden vision changes. ? Sudden trouble speaking. ? Sudden confusion or trouble understanding simple statements.   ? Sudden problems with walking or balance. ? A sudden, severe headache that is different from past headaches. See your doctor if you have symptoms that seem like a stroke, even if they go away quickly. You may have had a transient ischemic attack (TIA), sometimes called a mini-stroke. A TIA is a warning that a stroke may happen soon. Getting early treatment for a TIA can help prevent a stroke. What causes a hemorrhagic stroke? A hemorrhagic stroke happens to blood vessels that have been weakened. The most common causes of weakened blood vessels in the brain are:  · A brain aneurysm. This is a bulging, weak part of a blood vessel. It can put pressure on nerves, or it can bleed or break open (rupture). · A brain AVM. This is an abnormal knot of weak blood vessels that some people are born with. · Head injury. · Chronic uncontrolled high blood pressure. Blood pressure that is too high over the long term increases your risk for stroke. · Very high blood pressure. Very high blood pressure that comes on suddenly is dangerous. It is a medical emergency. Anticoagulant and antiplatelet medicines can also cause bleeding in the brain. These medicines, also called blood thinners, increase the time it takes for a blood clot to form. How is hemorrhagic stroke treated? Emergency treatment is done to stop the bleeding and prevent damage to the brain. · You may need surgery to repair an aneurysm or to remove the blood that has built up inside the brain. · You may be given medicine to stop the bleeding. · You will be closely watched for signs of increased pressure on the brain. These signs include restlessness, confusion, trouble following commands, and headache. · You may take medicine to manage high blood pressure. Ask your doctor if a stroke rehab program is right for you. Rehab increases your chances of getting back some of the abilities you lost.  How can you prevent another stroke?   · Work with your doctor to treat health problems, such as high blood pressure, that raise your chances of having another stroke. · Be safe with medicines. Take your medicine exactly as prescribed. Call your doctor if you think you are having a problem with your medicine. · Have a healthy lifestyle. ? Do not smoke or allow others to smoke around you. If you need help quitting, talk to your doctor about stop-smoking programs and medicines. These can increase your chances of quitting for good. Smoking makes a stroke more likely. ? Limit alcohol to 2 drinks a day for men and 1 drink a day for women. ? Be active. Ask your doctor what type and level of activity is safe for you.  ? Eat heart-healthy foods, like fruits, vegetables, and high-fiber foods. ? Stay at a healthy weight. Lose weight if you need to. Follow-up care is a key part of your treatment and safety. Be sure to make and go to all appointments, and call your doctor if you are having problems. It's also a good idea to know your test results and keep a list of the medicines you take. Where can you learn more? Go to http://deanne-zaheer.info/. Enter R416 in the search box to learn more about \"Learning About a Hemorrhagic Stroke. \"  Current as of: September 26, 2018  Content Version: 12.1  © 6744-6612 Healthwise, Incorporated. Care instructions adapted under license by [employer] (which disclaims liability or warranty for this information). If you have questions about a medical condition or this instruction, always ask your healthcare professional. [place] [address] any warranty or liability for your use of this information. Stroke: After Your Visit     Your Care Instructions     You have had a stroke. Risk factors for stroke include being overweight, smoking, and sedentary lifestyle. This means that the blood flow to a part of your brain was blocked for some time, which damages the nerve cells in that part of the brain.  The part of your body controlled by that part of your brain may not function properly now. The brain is an amazing organ that can heal itself to some degree. The stroke you had damaged part of your brain, but other parts of your brain may take over in some way for the damaged areas. You have already started this process. Going home may be hard for you and your family. The more you can try to do for yourself, the better. Remember to take each day one at a time. Follow-up care is a key part of your treatment and safety. Be sure to make and go to all appointments, and call your doctor if you are having problems. Its also a good idea to know your test results and keep a list of the medicines you take. How can you care for yourself at home? Enter a stroke rehabilitation (rehab) program, if your doctor recommends it. Physical, speech, and occupational therapies can help you manage bathing, dressing, eating, and other basics of daily living. Eat a heart-healthy diet that is low in cholesterol, saturated fat, and salt. Eat lots of fresh fruits and vegetables and foods high in fiber. Increase your activities slowly. Take short rest breaks when you get tired. Gradually increase the amount you walk. Start out by walking a little more than you did the day before. Do not drive until your doctor says it is okay. It is normal to feel sad or depressed after a stroke. If the blues last, talk to your doctor. If you are having problems with urine leakage, go to the bathroom at regular times, including when you first wake up and at bedtime. Also, limit fluids after dinner. If you are constipated, drink plenty of fluids, enough so that your urine is light yellow or clear like water. If you have kidney, heart, or liver disease and have to limit fluids, talk with your doctor before you increase the amount of fluids you drink. Set up a regular time for using the toilet.  If you continue to have constipation, your doctor may suggest using a bulking agent, such as Metamucil, or a stool softener, laxative, or enema. Medicines  Take your medicines exactly as prescribed. Call your doctor if you think you are having a problem with your medicine. You may be taking several medicines. ACE (angiotensin-converting enzyme) inhibitors, angiotensin II receptor blockers (ARBs), beta-blockers, diuretics (water pills), and calcium channel blockers control your blood pressure. Statins help lower cholesterol. Your doctor may also prescribe medicines for depression, pain, sleep problems, anxiety, or agitation. If your doctor has given you medicine that prevents blood clots, such as warfarin (Coumadin), aspirin combined with extended-release dipyridamole (Aggrenox), clopidogrel (Plavix), or aspirin to prevent another stroke, you should:  Tell your dentist, pharmacist, and other health professionals that you take these medicines. Watch for unusual bruising or bleeding, such as blood in your urine, red or black stools, or bleeding from your nose or gums. Get regular blood tests to check your clotting time if you are taking Coumadin. Wear medical alert jewelry that says you take blood thinners. You can buy this at most [employer]. Do not take any over-the-counter medicines or herbal products without talking to your doctor first.  If you take birth control pills or hormone replacement therapy, talk to your doctor about whether they are right for you. For family members and caregivers  Make the home safe. Set up a room so that your loved one does not have to climb stairs. Be sure the bathroom is on the same floor. Move throw rugs and furniture that could cause falls, and make sure that the lighting is good. Put grab bars and seats in tubs and showers. Find out what your loved one can do and what he or she needs help with. Try not to do things for your loved one that your loved one can do on his or her own. Help him or her learn and practice new skills.   Visit and talk with your loved one often. Try doing activities together that you both enjoy, such as playing cards or board games. Keep in touch with your loved one's friends as much as you can, and encourage them to visit. Take care of yourself. Do not try to do everything yourself. Ask other family members to help. Eat well, get enough rest, and take time to do things that you enjoy. Keep up with your own doctor visits, and make sure to take your medicines regularly. Get out of the house as much as you can. Join a local support group. Find out if you qualify for home health care visits to help with rehab or for adult day care. When should you call for help? Call 911 anytime you think you may need emergency care. For example, call if:  You have signs of another stroke. These may include:  Sudden numbness, paralysis, or weakness in your face, arm, or leg, especially on only one side of your body. New problems with walking or balance. Sudden vision changes. Drooling or slurred speech. New problems speaking or understanding simple statements, or you feel confused. A sudden, severe headache that is different from past headaches. Call 911 even if these symptoms go away in a few minutes. You cough up blood. You vomit blood or what looks like coffee grounds. You pass maroon or very bloody stools. Call your doctor now or seek immediate medical care if:  You have new bruises or blood spots under your skin. You have a nosebleed. Your gums bleed when you brush your teeth. You have blood in your urine. Your stools are black and tarlike or have streaks of blood. You have vaginal bleeding when you are not having your period, or heavy period bleeding. You have new symptoms that may be related to your stroke, such as falls or trouble swallowing. Watch closely for changes in your health, and be sure to contact your doctor if you have any problems. Where can you learn more?     Go to DealExplorer.be    Enter Q204  in the search box to learn more about \"Stroke: After Your Visit\". © 1076-5925 Healthwise, Incorporated. Care instructions adapted under license by [facility] (which disclaims liability or warranty for this information). This care instruction is for use with your licensed healthcare professional. If you have questions about a medical condition or this instruction, always ask your healthcare professional. [place] any warranty or liability for your use of this information.

## 2019-08-05 NOTE — PROGRESS NOTES
Date of Outreach Update:  Marquis Christensen was seen and assessed. MEWS Score: 1 (08/05/19 0000)    Vitals:    08/04/19 1600 08/04/19 2000 08/05/19 0000 08/05/19 0400   BP: (!) 173/92 156/84 155/86 168/90   Pulse: 91 (!) 103 97 96   Resp: 16 16 16 16   Temp: 98.6 °F (37 °C) 98.6 °F (37 °C) 98.7 °F (37.1 °C) 98.6 °F (37 °C)   SpO2: 100% 99% 99% 99%   Weight:       Height:            Pain Intensity 1: 0 (08/04/19 0909)  Pain Location 1: Head, Neck  Pain Intervention(s) 1: Repositioned  Patient Stated Pain Goal: 0    Previous Outreach assessment has been reviewed. There have been no significant clinical changes since the completion of the last dated Outreach assessment. Will continue to follow up per outreach protocol.     Signed By:   Luisa Mckeon    August 5, 2019 4:45 AM

## 2019-08-05 NOTE — PROGRESS NOTES
Pt transferred to [phone] from 7th floor. Pt is accompanied by brother and father. Vital signs stable and admission assessment completed. Pt alert but very soft spoken. Bed alarm is on for safety. Dual skin assessment completed with Camille Vega RN. Sutures noted on right side of scalp. Abdomen with skin glue from procedure. No breakdown noted. Call light given to pt and instructed to call before getting out of bed. Pt verbalized understanding. Pt helped to bathroom with one assist but very unsteady. Will monitor closely.

## 2019-08-05 NOTE — PROGRESS NOTES
Pt medically ready for DC to [facility], accepted and insurance has approved confirmed with Teresa Cheney RN with [facility], pending MD to place DC orders.

## 2019-08-06 ENCOUNTER — APPOINTMENT (OUTPATIENT)
Dept: CT IMAGING | Age: 44
DRG: 065 | End: 2019-08-06
Attending: NURSE PRACTITIONER
Payer: COMMERCIAL

## 2019-08-06 ENCOUNTER — APPOINTMENT (OUTPATIENT)
Dept: ULTRASOUND IMAGING | Age: 44
DRG: 065 | End: 2019-08-06
Attending: INTERNAL MEDICINE
Payer: COMMERCIAL

## 2019-08-06 VITALS
HEART RATE: 89 BPM | SYSTOLIC BLOOD PRESSURE: 172 MMHG | TEMPERATURE: 98.5 F | OXYGEN SATURATION: 100 % | RESPIRATION RATE: 18 BRPM | DIASTOLIC BLOOD PRESSURE: 99 MMHG

## 2019-08-06 LAB
ANION GAP SERPL CALC-SCNC: 10 MMOL/L (ref 7–16)
APPEARANCE UR: CLEAR
BACTERIA URNS QL MICRO: 0 /HPF
BILIRUB UR QL: NEGATIVE
BUN SERPL-MCNC: 33 MG/DL (ref 6–23)
CALCIUM SERPL-MCNC: 9.4 MG/DL (ref 8.3–10.4)
CASTS URNS QL MICRO: ABNORMAL /LPF
CHLORIDE SERPL-SCNC: 109 MMOL/L (ref 98–107)
CO2 SERPL-SCNC: 24 MMOL/L (ref 21–32)
COLOR UR: YELLOW
CREAT SERPL-MCNC: 3.85 MG/DL (ref 0.8–1.5)
CREAT UR-MCNC: 106 MG/DL
EPI CELLS #/AREA URNS HPF: ABNORMAL /HPF
GLUCOSE SERPL-MCNC: 79 MG/DL (ref 65–100)
GLUCOSE UR STRIP.AUTO-MCNC: NEGATIVE MG/DL
HGB UR QL STRIP: ABNORMAL
KETONES UR QL STRIP.AUTO: ABNORMAL MG/DL
LEUKOCYTE ESTERASE UR QL STRIP.AUTO: NEGATIVE
MAGNESIUM SERPL-MCNC: 2.1 MG/DL (ref 1.8–2.4)
NITRITE UR QL STRIP.AUTO: NEGATIVE
PH UR STRIP: 5.5 [PH] (ref 5–9)
PHOSPHATE SERPL-MCNC: 4.5 MG/DL (ref 2.5–4.5)
POTASSIUM SERPL-SCNC: 4.3 MMOL/L (ref 3.5–5.1)
PROT UR STRIP-MCNC: 300 MG/DL
PROT UR-MCNC: 479 MG/DL
PROT/CREAT UR-RTO: 4.5
RBC #/AREA URNS HPF: ABNORMAL /HPF
SODIUM SERPL-SCNC: 143 MMOL/L (ref 136–145)
SP GR UR REFRACTOMETRY: 1.01 (ref 1–1.02)
UROBILINOGEN UR QL STRIP.AUTO: 0.2 EU/DL (ref 0.2–1)
WBC URNS QL MICRO: ABNORMAL /HPF

## 2019-08-06 PROCEDURE — 81001 URINALYSIS AUTO W/SCOPE: CPT

## 2019-08-06 PROCEDURE — 97110 THERAPEUTIC EXERCISES: CPT

## 2019-08-06 PROCEDURE — 0042T CT PERF W CBF: CPT

## 2019-08-06 PROCEDURE — 74011636320 HC RX REV CODE- 636/320: Performed by: PHYSICAL MEDICINE & REHABILITATION

## 2019-08-06 PROCEDURE — 70496 CT ANGIOGRAPHY HEAD: CPT

## 2019-08-06 PROCEDURE — 97530 THERAPEUTIC ACTIVITIES: CPT

## 2019-08-06 PROCEDURE — 83735 ASSAY OF MAGNESIUM: CPT

## 2019-08-06 PROCEDURE — 76770 US EXAM ABDO BACK WALL COMP: CPT

## 2019-08-06 PROCEDURE — 84100 ASSAY OF PHOSPHORUS: CPT

## 2019-08-06 PROCEDURE — 70450 CT HEAD/BRAIN W/O DYE: CPT

## 2019-08-06 PROCEDURE — 84156 ASSAY OF PROTEIN URINE: CPT

## 2019-08-06 PROCEDURE — 74011250637 HC RX REV CODE- 250/637: Performed by: PHYSICAL MEDICINE & REHABILITATION

## 2019-08-06 PROCEDURE — 80048 BASIC METABOLIC PNL TOTAL CA: CPT

## 2019-08-06 PROCEDURE — 74011250636 HC RX REV CODE- 250/636: Performed by: PHYSICAL MEDICINE & REHABILITATION

## 2019-08-06 PROCEDURE — 99232 SBSQ HOSP IP/OBS MODERATE 35: CPT | Performed by: PHYSICAL MEDICINE & REHABILITATION

## 2019-08-06 PROCEDURE — 97116 GAIT TRAINING THERAPY: CPT

## 2019-08-06 PROCEDURE — 74011000250 HC RX REV CODE- 250: Performed by: PHYSICAL MEDICINE & REHABILITATION

## 2019-08-06 PROCEDURE — 74011250636 HC RX REV CODE- 250/636: Performed by: INTERNAL MEDICINE

## 2019-08-06 PROCEDURE — 97162 PT EVAL MOD COMPLEX 30 MIN: CPT

## 2019-08-06 PROCEDURE — 65310000000 HC RM PRIVATE REHAB

## 2019-08-06 PROCEDURE — 97166 OT EVAL MOD COMPLEX 45 MIN: CPT

## 2019-08-06 PROCEDURE — 74011000258 HC RX REV CODE- 258: Performed by: PHYSICAL MEDICINE & REHABILITATION

## 2019-08-06 PROCEDURE — 92523 SPEECH SOUND LANG COMPREHEN: CPT

## 2019-08-06 PROCEDURE — 97535 SELF CARE MNGMENT TRAINING: CPT

## 2019-08-06 RX ORDER — PROCHLORPERAZINE MALEATE 5 MG
5 TABLET ORAL
Status: DISCONTINUED | OUTPATIENT
Start: 2019-08-06 | End: 2019-08-07 | Stop reason: HOSPADM

## 2019-08-06 RX ORDER — PANTOPRAZOLE SODIUM 40 MG/1
40 TABLET, DELAYED RELEASE ORAL
Status: DISCONTINUED | OUTPATIENT
Start: 2019-08-07 | End: 2019-08-07 | Stop reason: HOSPADM

## 2019-08-06 RX ORDER — SODIUM CHLORIDE 0.9 % (FLUSH) 0.9 %
10 SYRINGE (ML) INJECTION
Status: COMPLETED | OUTPATIENT
Start: 2019-08-07 | End: 2019-08-06

## 2019-08-06 RX ORDER — LORAZEPAM 2 MG/ML
1 INJECTION INTRAMUSCULAR ONCE
Status: COMPLETED | OUTPATIENT
Start: 2019-08-07 | End: 2019-08-06

## 2019-08-06 RX ADMIN — CLOPIDOGREL BISULFATE 75 MG: 75 TABLET ORAL at 08:43

## 2019-08-06 RX ADMIN — FAMOTIDINE 20 MG: 20 TABLET ORAL at 08:43

## 2019-08-06 RX ADMIN — SENNOSIDES, DOCUSATE SODIUM 2 TABLET: 50; 8.6 TABLET, FILM COATED ORAL at 22:47

## 2019-08-06 RX ADMIN — HEPARIN SODIUM 5000 UNITS: 5000 INJECTION INTRAVENOUS; SUBCUTANEOUS at 18:33

## 2019-08-06 RX ADMIN — SODIUM CHLORIDE, PRESERVATIVE FREE 900 UNITS: 5 INJECTION INTRAVENOUS at 22:49

## 2019-08-06 RX ADMIN — OXYCODONE HYDROCHLORIDE AND ACETAMINOPHEN 500 MG: 500 TABLET ORAL at 22:47

## 2019-08-06 RX ADMIN — BENZALKONIUM CHLORIDE, SODIUM PHOSPHATE, DIBASIC, EDETATE DISODIUM,SODIUM PHOSPHATE, MONOBASIC, SODIUM CHLORIDE, WATER, PHOSPHORIC ACID, SODIUM HYDROXIDE 1 DROP: 14 SOLUTION INTRAOCULAR at 22:47

## 2019-08-06 RX ADMIN — HEPARIN SODIUM 5000 UNITS: 5000 INJECTION INTRAVENOUS; SUBCUTANEOUS at 00:47

## 2019-08-06 RX ADMIN — SERTRALINE HYDROCHLORIDE 25 MG: 50 TABLET ORAL at 08:43

## 2019-08-06 RX ADMIN — LORAZEPAM 1 MG: 2 INJECTION INTRAMUSCULAR; INTRAVENOUS at 23:25

## 2019-08-06 RX ADMIN — SODIUM CHLORIDE 500 MG: 900 INJECTION, SOLUTION INTRAVENOUS at 22:46

## 2019-08-06 RX ADMIN — SODIUM CHLORIDE, PRESERVATIVE FREE 900 UNITS: 5 INJECTION INTRAVENOUS at 14:00

## 2019-08-06 RX ADMIN — Medication 30 ML: at 18:33

## 2019-08-06 RX ADMIN — Medication 10 ML: at 23:46

## 2019-08-06 RX ADMIN — HEPARIN SODIUM 5000 UNITS: 5000 INJECTION INTRAVENOUS; SUBCUTANEOUS at 23:17

## 2019-08-06 RX ADMIN — ASPIRIN 325 MG ORAL TABLET 325 MG: 325 PILL ORAL at 08:43

## 2019-08-06 RX ADMIN — SODIUM CHLORIDE 100 ML: 900 INJECTION, SOLUTION INTRAVENOUS at 23:46

## 2019-08-06 RX ADMIN — ONDANSETRON 4 MG: 4 TABLET, ORALLY DISINTEGRATING ORAL at 12:54

## 2019-08-06 RX ADMIN — FERROUS SULFATE TAB 325 MG (65 MG ELEMENTAL FE) 325 MG: 325 (65 FE) TAB at 12:54

## 2019-08-06 RX ADMIN — IOPAMIDOL 100 ML: 755 INJECTION, SOLUTION INTRAVENOUS at 23:46

## 2019-08-06 RX ADMIN — MODAFINIL 200 MG: 100 TABLET ORAL at 08:43

## 2019-08-06 RX ADMIN — ATORVASTATIN CALCIUM 40 MG: 40 TABLET, FILM COATED ORAL at 22:47

## 2019-08-06 RX ADMIN — Medication 30 ML: at 05:55

## 2019-08-06 RX ADMIN — OXYCODONE HYDROCHLORIDE AND ACETAMINOPHEN 500 MG: 500 TABLET ORAL at 08:43

## 2019-08-06 RX ADMIN — HEPARIN SODIUM 5000 UNITS: 5000 INJECTION INTRAVENOUS; SUBCUTANEOUS at 08:45

## 2019-08-06 RX ADMIN — OXYCODONE HYDROCHLORIDE AND ACETAMINOPHEN 500 MG: 500 TABLET ORAL at 18:32

## 2019-08-06 RX ADMIN — Medication 30 ML: at 22:46

## 2019-08-06 RX ADMIN — FERROUS SULFATE TAB 325 MG (65 MG ELEMENTAL FE) 325 MG: 325 (65 FE) TAB at 08:43

## 2019-08-06 RX ADMIN — SODIUM CHLORIDE, PRESERVATIVE FREE 900 UNITS: 5 INJECTION INTRAVENOUS at 05:55

## 2019-08-06 RX ADMIN — SODIUM CHLORIDE 500 MG: 900 INJECTION, SOLUTION INTRAVENOUS at 08:39

## 2019-08-06 NOTE — PROGRESS NOTES
PHYSICAL THERAPY EXAMINATION    Time in: 1116  Time out: 1140    Patient Name: Ronald Momin  Patient Age: 37 y.o. Past Medical History: No past medical history on file. Medical Diagnosis:  SAH (subarachnoid hemorrhage) (HCC) [I60.9] SAH (subarachnoid hemorrhage) (HCC)    Precautions at Admission: Other (comment)(fall risk)    Therapy Diagnosis:   Difficulty with bed mobility  [x]     Difficulty with functional transfers  [x]     Difficulty with ambulation  [x]     Difficulty with stair negotiations  [x]       Problem List:    Decreased strength B LE  [x]     Decreased strength trunk/core  [x]     Decreased AROM   [x]     Decreased PROM  [x]    Decreased endurance  [x]     Decreased balance sitting  [x]     Decreased balance standing  [x]     Pain   []     Slow ambulation velocity  [x]    Decreased coordination  [x]    Decreased safety awareness  [x]      Functional Limitations:   Decreased independence with bed mobility  [x]     Decreased independence with functional transfers  [x]     Decreased independence with ambulation  [x]     Decreased independence with stair negotiation  [x]       Previous Functional Level: Patient was previously independent with self care, ambulating without assitive device independently, working as , driving    Home Environment: Home Environment: Private residence  # Steps to Enter: 2  Rails to Enter: Yes  Hand Rails : Bilateral  Wheelchair Ramp: No  One/Two Story Residence: Other (Comment)  Living Alone: No  Support Systems: Family member(s)  Patient Expects to be Discharged to[de-identified] Private residence  Current DME Used/Available at Home: None         Outcome Measures: Vital Signs:   Patient Vitals for the past 8 hrs:   Temp Pulse Resp BP SpO2   08/06/19 0904 -- -- -- (!) 141/97 --   08/06/19 0750 98.6 °F (37 °C) 96 18 (!) 164/107 100 %         Pain level: No pain reported.   Pain location: n/a  Pain interventions: n/a    Patient education: Oriented patient to [facility] and daily schedule. Interdisciplinary Communication: Communicated with Sobia Rouse regarding patient's POC. Cognition:  Decreased attention/concentration, verbal cues for attending to task. MMT Initial Asssessment   Right Lower Extremity Left Lower Extremity   Hip Flexion 4 4   Knee Extension 4 4   Knee Flexion 4 4   Ankle Dorsiflexion 4+ 4+   0/5 No palpable muscle contraction  1/5 Palpable muscle contraction, no joint movement  2-/5 Less than full range of motion in gravity eliminated position  2/5 Able to complete full range of motion in gravity eliminated position  2+/5 Able to initiate movement against gravity  3-/5 More than half but not full range of motion against gravity  3/5 Able to complete full range of motion against gravity  3+/5 Completes full range of motion against gravity with minimal resistance  4-/5 Completes full range of motion against gravity with minimal-moderate resistance  4/5 Completes full range of motion against gravity with moderate resistance  4+/5 Completes full range of motion against gravity with moderate-maximum resistance  5/5 Completes full range of motion against gravity with maximum resistance     AROM: Generally decreased, functional    FIM SCORES Initial Assessment   Bed/Chair/Wheelchair Transfers 4   Wheelchair Mobility 0(Patient's primary mode of locomotion is ambulation)   Walking [place] 2   Steps/Stairs 0   PRIMARY MODE OF LOCOMOTION: Ambulation  Please see IRC Interdisciplinary Eval: Coordination/Balance Section for details regarding FIM score description.     BED/CHAIR/WHEELCHAIR TRANSFERS Initial Assessment   Rolling Right 4 (Minimal assistance)   Rolling Left 4 (Minimal assistance)   Supine to Sit 5 (Stand-by assistance)   Sit to Stand Minimal assistance   Sit to Supine 4 (Minimal assistance)   Transfer Assist Score 4   Transfer Type SPT without device   Comments HHA x 1 for balance, decreased step length and clearance   Car Transfer Not tested(Secondary to safety concerns)   Car Type         WHEELCHAIR MOBILITY/MANAGEMENT Initial Assessment   Able to Propel     Functional Level 0(Patient's primary mode of locomotion is ambulation)   Curbs/ramps assistance required 0 (Not tested)   Wheelchair set up assistance required 0 (Not tested)   Wheelchair management         WALKING INDEPENDENCE Initial Assessment   Assistive device Gait belt, Other (comment)(HHA x 1)   Ambulation assistance - level surface 4 (Minimal assistance)   Distance 100 Feet (ft)   Functional Level 2   Comments Patient ambulated with slow partial step thtrough gait pattern with mild forward head posture, decrerased step length and clearance of B LE   Ambulation assistance - unlevel surface 0 (Not tested)(Secondary to safety concerns)       STEPS/STAIRS Initial Assessment   Steps/Stairs ambulated 0   Rail Use     Functional Level 0   Comments Secondary to safety concerns   Curbs/Ramps 0 (Not tested)(Secondary to safety concerns)       QUALITY INDICATOR ASSIST COMMENTS   Walk 10 feet 3: Partial/Moderate A With HHA x1   Walk 50 feet with 2 turns 3: Partial/Moderate A With HHA x1   Walk 150 feet Not Tested: Not attempted due to safety concerns    Walk 10 feet on uneven  Not Tested: Not attempted due to safety concerns    1 step/curb Not Tested: Not attempted due to safety concerns    4 steps Not Tested: Not attempted due to safety concerns    12 steps Not Tested: Not attempted due to safety concerns     object Not Tested: Not attempted due to safety concerns    Wheel 48' w/2 turns Not Tested: Not applicable secondary to pt not completing activity previously    Wheel 150' Not Tested: Not applicable secondary to pt not completing activity previously      Patient taken by transport for Ultrasound. PHYSICAL THERAPY PLAN OF CARE    Therapy Diagnosis:   Please see table above    Order received from MD for physical therapy services and chart reviewed.   Pt to be seen at least 5 times per week for at least 1.5 hours of physical therapy per day for 2 weeks. Thank you for the referral.    LTGs:  STG 1. Patient transfer supine<>sit with supervision assist in 1 week  LTG 1. Patient transfer supine<>sit with modified independence  in 2 weeks  STG 2. Patient transfer sit<>stand and perform stand pivot transfer with LRAD and contact guard assist in 1 week  LTG 2. Patient transfer sit<>stand and perform stand pivot transfer with LRAD and supervision assist in 2 weeks  STG 3. Patient ambulate 150 feet with LRAD and MIN assist in 1 week  LTG 3. Patient ambulate 150 feet with LRAD and supervision assist in 2 weeks  STG 4. Patient ambulate up/down 4 steps with B rails and MIN assist in 1 week  LTG 4. Patient ambulate up/down 12 steps with B rails and stand by assist in 2 weeks  LTG 5. Patient perform HEP with supervision in 2 weeks    Pt would benefit from skilled physical therapy in order to improve independent functional mobility within the home. Interventions may include range of motion (AROM, PROM B LE/trunk), motor function (B LE/trunk strengthening/coordination), activity tolerance (vitals, oxygen saturation levels), bed mobility training, balance activities, gait training (progressive ambulation program), and functional transfer training. Please see IRC; Interdisciplinary Eval, Care Plan, and Patient Education for further information regarding physical therapy examination and plan of care.      Georgian New  8/6/2019

## 2019-08-06 NOTE — PROGRESS NOTES
Pt refusing breakfast and lunch stating his stomach \"feels funny\" and nauseous.   Zofran given for nausea

## 2019-08-06 NOTE — PROGRESS NOTES
CM met with patient/father at bedside to complete intervention. Father states that patient lives with him in a one level home with no steps to enter into the home. States that before this happen patient independent with his ADL's and has no DME's in the home. 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 Father states patient can return back home. CM will continue to monitor. Care Management Interventions  PCP Verified by CM: No  Mode of Transport at Discharge:  Other (see comment)(Family)  Transition of Care Consult (CM Consult): Discharge Planning  Discharge Durable Medical Equipment: No  Physical Therapy Consult: Yes  Occupational Therapy Consult: Yes  Speech Therapy Consult: Yes  Current Support Network: Relative's Home(Patient lives with his father)  Confirm Follow Up Transport: Family  Plan discussed with Pt/Family/Caregiver: Yes  Freedom of Choice Offered: Yes   Resource Information Provided?: No  Discharge Location  Discharge Placement: Home

## 2019-08-06 NOTE — PROGRESS NOTES
Nancylee Litten, MD,   Medical Director  [address] [facility], [address] [facility]  Tel: [phone]       SFD PROGRESS NOTE    Dasha Martin  Admit Date: 8/5/2019  Admit Diagnosis: SAH (subarachnoid hemorrhage) ([address] [I60.9]    Subjective     Doing fine. Minimally verbal. Only answering y/n questions; ambulating to bathroom with CGA.  Per Nursing, had n/v last pm that resolved with zofran    Objective:     Current Facility-Administered Medications   Medication Dose Route Frequency    acetaminophen (TYLENOL) tablet 650 mg  650 mg Oral Q4H PRN    ascorbic acid (vitamin C) (VITAMIN C) tablet 500 mg  500 mg Oral TID    aspirin tablet 325 mg  325 mg Oral DAILY    clopidogrel (PLAVIX) tablet 75 mg  75 mg Oral DAILY    famotidine (PEPCID) tablet 20 mg  20 mg Oral DAILY    ferrous sulfate tablet 325 mg  1 Tab Oral TID WITH MEALS    heparin (porcine) injection 5,000 Units  5,000 Units SubCUTAneous Q8H    heparin (porcine) pf 900 Units  900 Units InterCATHeter Q8H    heparin (porcine) pf 900 Units  900 Units InterCATHeter PRN    ketorolac (TORADOL) injection 15 mg  15 mg IntraVENous Q8H PRN    levETIRAcetam (KEPPRA) 500 mg in 0.9% sodium chloride 100 mL IVPB  500 mg IntraVENous Q12H    lidocaine 4 % patch 1 Patch  1 Patch TransDERmal Q24H    magnesium hydroxide (MILK OF MAGNESIA) 400 mg/5 mL oral suspension 30 mL  30 mL Oral DAILY    magnesium hydroxide (MILK OF MAGNESIA) 400 mg/5 mL oral suspension 30 mL  30 mL Oral DAILY PRN    modafinil (PROVIGIL) tablet 200 mg  200 mg Oral DAILY    nicotine (NICODERM CQ) 14 mg/24 hr patch 1 Patch  1 Patch TransDERmal Q24H    ondansetron (ZOFRAN ODT) tablet 4 mg  4 mg Oral Q6H PRN    polyvinyl alcohol (LIQUIFILM TEARS) 1.4 % ophthalmic solution 1 Drop  1 Drop Both Eyes PRN    sodium chloride (NS) flush 30 mL  30 mL InterCATHeter Q8H    atorvastatin (LIPITOR) tablet 40 mg  40 mg Oral QHS    ondansetron (ZOFRAN) injection 4 mg  4 mg IntraVENous Q6H PRN    senna-docusate (PERICOLACE) 8.6-50 mg per tablet 2 Tab  2 Tab Oral QHS    sertraline (ZOLOFT) tablet 25 mg  25 mg Oral DAILY     Review of Systems:Denies chest pain, shortness of breath, cough, headache, visual problems, abdominal pain, dysurea, calf pain. Pertinent positives are as noted in the medical records and unremarkable otherwise. Visit Vitals  /87   Pulse 94   Temp 98.6 °F (37 °C)   Resp 18   SpO2 100%        Physical Exam:   General: Alert , not attentive, only brief eye contact, affect flat  No acute cardio respiratory distress. HEENT: Normocephalic,no scleral icterus  Oral mucosa moist without cyanosis   Lungs: Clear to auscultation  bilaterally. Respiration even and unlabored   Heart: Regular rate and rhythm, S1, S2   No  murmurs, clicks, rub or gallops   Abdomen: Soft, non-tender, nondistended. Bowel sounds present. No organomegaly. Incision right of umbilicus c/d/i   Genitourinary: defer   Neuromuscular:      Grossly no focal motor deficits noted. Moves ankles. Will not vocalize this a.m; follows commands with mult vcs, one step only   Skin/extremity: No rashes, no erythema. No calf tenderness BLE  Scalp wound with sutures intact, no erythema, dry                                                                              Shaina Fall Risk Assessment:  Lisa Vogel Fall Risk  Mobility: Ambulates or transfers with assist devices or assistance (08/05/19 2329)  Mobility Interventions: Bed/chair exit alarm;Communicate number of staff needed for ambulation/transfer;OT consult for ADLs; Patient to call before getting OOB;PT Consult for mobility concerns;PT Consult for assist device competence;Strengthening exercises (ROM-active/passive); Utilize walker, cane, or other assistive device (08/05/19 2329)  Mentation: Periodic confusion (08/05/19 2329)  Mentation Interventions: Adequate sleep, hydration, pain control;Bed/chair exit alarm; Door open when patient unattended;Evaluate medications/consider consulting pharmacy; Familiar objects from home; Increase mobility;More frequent rounding;Reorient patient; Toileting rounds;Update white board (08/05/19 2329)  Medication: Patient receiving anticonvulsants, sedatives(tranquilizers), psychotropics or hypnotics, hypoglycemics, narcotics, sleep aids, antihypertensives, laxatives, or diuretics (08/05/19 2329)  Medication Interventions: Bed/chair exit alarm;Evaluate medications/consider consulting pharmacy; Patient to call before getting OOB; Teach patient to arise slowly (08/05/19 2329)  Elimination: Needs assistance with toileting (08/05/19 2329)  Elimination Interventions: Bed/chair exit alarm;Call light in reach;Elevated toilet seat;Urinal in reach (08/05/19 2329)  Prior Fall History: Before admission in past 12 months _home or previous inpatient care) (08/05/19 2329)  History of Falls Interventions: Bed/chair exit alarm; Consult care management for discharge planning;Door open when patient unattended;Evaluate medications/consider consulting pharmacy; Investigate reason for fall (08/05/19 2329)  Total Score: 5 (08/05/19 2329)  Standard Fall Precautions: Yes (08/05/19 2329)  High Fall Risk: Yes (08/05/19 2329)         Labs/Studies:  Recent Results (from the past 72 hour(s))   MAGNESIUM    Collection Time: 08/04/19  5:37 AM   Result Value Ref Range    Magnesium 2.4 1.8 - 2.4 mg/dL   METABOLIC PANEL, BASIC    Collection Time: 08/04/19  5:37 AM   Result Value Ref Range    Sodium 145 136 - 145 mmol/L    Potassium 3.9 3.5 - 5.1 mmol/L    Chloride 110 (H) 98 - 107 mmol/L    CO2 26 21 - 32 mmol/L    Anion gap 9 7 - 16 mmol/L    Glucose 85 65 - 100 mg/dL    BUN 33 (H) 6 - 23 MG/DL    Creatinine 3.84 (H) 0.8 - 1.5 MG/DL    GFR est AA 22 (L) >60 ml/min/1.73m2    GFR est non-AA 18 (L) >60 ml/min/1.73m2    Calcium 9.2 8.3 - 10.4 MG/DL   CBC W/O DIFF    Collection Time: 08/04/19  5:37 AM   Result Value Ref Range    WBC 9.2 4.3 - 11.1 K/uL    RBC 3.20 (L) 4.23 - 5.6 M/uL    HGB 9.9 (L) 13.6 - 17.2 g/dL    HCT 30.5 (L) 41.1 - 50.3 %    MCV 95.3 79.6 - 97.8 FL    MCH 30.9 26.1 - 32.9 PG    MCHC 32.5 31.4 - 35.0 g/dL    RDW 14.0 11.9 - 14.6 %    PLATELET 754 875 - 199 K/uL    MPV 10.5 9.4 - 12.3 FL    ABSOLUTE NRBC 0.00 0.0 - 0.2 K/uL   MAGNESIUM    Collection Time: 08/05/19  6:04 AM   Result Value Ref Range    Magnesium 2.2 1.8 - 2.4 mg/dL   METABOLIC PANEL, BASIC    Collection Time: 08/05/19  6:04 AM   Result Value Ref Range    Sodium 144 136 - 145 mmol/L    Potassium 3.9 3.5 - 5.1 mmol/L    Chloride 109 (H) 98 - 107 mmol/L    CO2 26 21 - 32 mmol/L    Anion gap 9 7 - 16 mmol/L    Glucose 82 65 - 100 mg/dL    BUN 32 (H) 6 - 23 MG/DL    Creatinine 3.78 (H) 0.8 - 1.5 MG/DL    GFR est AA 22 (L) >60 ml/min/1.73m2    GFR est non-AA 19 (L) >60 ml/min/1.73m2    Calcium 9.3 8.3 - 10.4 MG/DL   CBC W/O DIFF    Collection Time: 08/05/19  6:04 AM   Result Value Ref Range    WBC 8.7 4.3 - 11.1 K/uL    RBC 3.61 (L) 4.23 - 5.6 M/uL    HGB 11.1 (L) 13.6 - 17.2 g/dL    HCT 33.6 (L) 41.1 - 50.3 %    MCV 93.1 79.6 - 97.8 FL    MCH 30.7 26.1 - 32.9 PG    MCHC 33.0 31.4 - 35.0 g/dL    RDW 13.7 11.9 - 14.6 %    PLATELET 218 495 - 109 K/uL    MPV 10.7 9.4 - 12.3 FL    ABSOLUTE NRBC 0.00 0.0 - 0.2 K/uL       Assessment:     Problem List as of 8/6/2019 Date Reviewed: 7/10/2019          Codes Class Noted - Resolved    SAH (subarachnoid hemorrhage) ([facility] ICD-10-CM: I60.9  ICD-9-CM: 644  8/5/2019 - Present        Pneumonia due to methicillin susceptible Staphylococcus aureus (MSSA) ([facility] ICD-10-CM: E23.420  ICD-9-CM: 482.41  7/15/2019 - Present        Tobacco abuse ICD-10-CM: Z72.0  ICD-9-CM: 305.1  7/10/2019 - Present        Acute respiratory failure with hypoxia (HCC) ICD-10-CM: J96.01  ICD-9-CM: 518.81  7/10/2019 - Present        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  ICD-9-CM: 331.3  7/10/2019 - Present        IVH (intraventricular hemorrhage) ([facility] ICD-10-CM: I61.5  ICD-9-CM: 723  7/10/2019 - Present        Seizure ([facility] ICD-10-CM: R56.9  ICD-9-CM: 780.39  7/10/2019 - Present        Elevated serum creatinine ICD-10-CM: R79.89  ICD-9-CM: 790.99  7/10/2019 - Present        Cerebral vasospasm ICD-10-CM: I67.848  ICD-9-CM: 435.9  7/10/2019 - Present        RESOLVED: Endotracheally intubated ICD-10-CM: Z97.8  ICD-9-CM: V49.89  7/10/2019 - 7/10/2019        RESOLVED: SAH (subarachnoid hemorrhage) ([facility] ICD-10-CM: I60.9  ICD-9-CM: 430  7/9/2019 - 7/10/2019            Assessment: 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 Mobilization as tolerated. Intermittent pneumatic compression devices when in bed Thigh-high or knee-high thromboembolic deterrent hose when out of bed. Sc Heparin subq twice daily. HIT panel neg     Flat affect; stimulation; cont Provigil. Change celexa to Zoloft.      Sz; cont keppra, no reoccurence. Cont sz prophylaxis     Vasospasm; can dc Nimotop/ cont mag supplement     Anemia; ? Due to CKD. Cont iron supplement, vit C; improved; 11.1 from 9.9 8/4. S/p prior blood transfusion for hgb 7.0 . monitor     NING on CKD? Creat 3.15 on 7/9 on admission. Baseline unknown. Lowest creat 2.46 to date. Currently 8/5 creat 3.78; will d/w family. Consider nephrology consult.   -8/6 labs BUN 33/creat 3.85 . Pt does not know hx re; HTN; UOP is good; strict I/O; will consult Nephrology  ; do not believe renal US done     Pain Control: stable, mild-to-moderate joint symptoms intermittently, reasonably well controlled by PRN meds.  Will require regular pain assessment and comprenhensive pain management, Lidoderm patch , toradol.     Tobacco abuse; cont Nicoderm patch; education and stress cessation     Wound Care: Monitor wound status daily per staff and physician. At risk for failure. Will require 24/7 rehab nursing. Keep wound clean and dry and dc sutures when healed POD 10 , POD #5 VPS  -sutures appear to be dissolvable      Hypertension - BP uncontrolled, fluctuating, managed medically. Permissive to dec r/o vasospasm  -suspect untreated htn as outpt  -MAP goal in ICU was hi; 100-110; will d/w Dr Renae Deutsch; goal at this time        Urinary retention/ neurogenic bladder - schedule voids q6-8 hrs. Check post-void residual as needed; In and Out catheterize if post-void residual is more than 400 cc. - continent per nursing of urine     bowel program - cont pericolace with prn bowel program     GERD - resume PPI. At times may need additional antacids, Maalox prn. Time spent was 25 minutes with over 1/2 in direct patient care/examination, consultation and coordination of care.      Signed By: Ana Morrison MD     August 6, 2019

## 2019-08-06 NOTE — PROGRESS NOTES
[facility] OCCUPATIONAL THERAPY INITIAL EVALUATION    Time In: 5078  Time Out: 1002    Precautions: Falls, Bed Alarm, Poor Safety Awareness, Confused and seizure    Vitals:   Patient Vitals for the past 8 hrs:   Temp Pulse Resp BP SpO2   08/06/19 0904 -- -- -- (!) 141/97 --   08/06/19 0750 98.6 °F (37 °C) 96 18 (!) 164/107 100 %         Pain: Patient rated pain 5 out of 10 located at head. Patient was repositioned and was given a shower with patient reporting a decrease in pain after intervention. History of Presenting Illness (copied per previous reports): From my initial consultation 7/7/19 \"Hardik Jordan is a  Right handed functionally independent 37 y. o. male who originally reported to [facility] ED via EMS after found with altered mental status and possible seizure like activity. Per brother, pt was at [address] and had witnessed syncopal event with possible seizure like activity. Pt had CT head and CTA head and neck and found to have [facility] secondary to Basilar tip aneurysm rupture. Pt transferred to [facility] ED via [facility] team. Pt with eyes closed and drowsy, but will open eyes to voice and follow commands.  Pt GCS E3, V5, M6: 14, PERRL +3 with EOM's intact. NIHSS of 2 for drowsiness and altered mental status. Pt admitted to our ICU under [facility] protocol. Labs from [facility] show bun/cr 46/3.4, will give pt IVF's x2 L bolus and reeval. Pt states he smokes about 9 cig a day, denies any ETOH or drugs, denies any family hx of SAH. Pt unsure of what happened.  Arterial line placed on admit, pt with IV x2., phelps. \"     Sim Power , unfortunately, had a decline in status overnoc and required intubation for airway protection. F/u HCT showed extensive/diffuse SAH and increased ICP. An EVD was placed into the 3rd ventricle and head Ct repeated with dec in ventricular size. Pts creatinine has improved to 2.83 with a BUN of 37 this a.m. troponins elevated at 0.08 and now 0.07. 2D ECHO pending.  Na 154 earlier this a.m, now 149. Leukocytosis improved from 118.2 to 13.8. He is on Nimotop to prevent vasospasm and Keppra for sz control. 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 : stent/coiled 7-10-19. Pt had had daily TCD's during his admit and did show vasospasm, but pt was able to autoregulate and no IA verapamil was required. The pt had EVD placed on admit due to hydrocephalus and was unable to remove due to altered mental status. Pt required ventricular tPA x2 for IVH clot prior to  shunt placement on 8-2-19. The pt is alert and follows commands today, but he does have a flat effect. He was started on celexa 10mg po daily during admit. Pt will remain on his asa 325mg po daily, Plavix 75mg po daily for his stent/coil of basilar aneurysm. He completed 21days of nimotop prior to dc to [facility] today  , thus on Lipitor. Nicoderm patches have been used to due admitted tobacco abuse. His VPS of note, Certas Plus with valve set at 3. Repeat Head CT has shown resolution of HC and IVH       Past Medical History/ Co-morbidities: No past medical history on file. Patient's Goal: \"The best I can be. \"     Previous Level of Function: Per patient report, patient previously completed all ADL/IADL tasks independently, drove, worked. Patient ambulated without AE. Home Situation    Environment Comments   House Situation Apartment(possibly a [facility]) Patient may not be a reliable historian.     Lives Alone No    Support Systems Parent    Shower Situation Tub/Shower combination(with curtain )    Current DME None    Lift Chair      Stairs to Satiety 4       Rails         W/C Ramp      Interior Steps        Upper Extremity Function   REJI IVERSON   FMC  Impaired  Impaired   GMC  Impaired  Impaired   Light Touch       Sharp/Dull Discrimination       Hot/Cold       Proprioception       Stereognosis       9 Hole Peg Test       General Comments        REJI IVERSON   General Evalutaion       AROM Within defined limits Within defined limits   Shoulder Flexion 4- 4-   Shoulder Extension  4- 4-   Shoulder Abduction 4- 4-   Shoulder Adduction 4- 4-   Elbow Flexion 4 4   Elbow Extension  4 4   Wrist Flexion/Extension        4- 4-   General Comments     0/5 No palpable muscle contraction  1/5 Palpable muscle contraction, no joint movement  2-/5 Less than full range of motion in gravity eliminated position  2/5 Able to complete full range of motion in gravity eliminated position  2+/5 Able to initiate movement against gravity  3-/5 More than half but not full range of motion against gravity  3/5 Able to complete full range of motion against gravity  3+/5 Completes full range of motion against gravity with minimal resistance  4-/5 Completes full range of motion against gravity with minimal-moderate resistance  4/5 Completes full range of motion against gravity with moderate resistance  4+/5 Completes full range of motion against gravity with moderate-maximum resistance  5/5 Completes full range of motion against gravity with maximum resistance      Functional Mobility   Performance Comments   Sitting: Static Good (unsupported)    Sitting: Dynamic Fair (occasional)    Standing: Static Fair    Standing: Dynamic Poor (constant support)    Supine to Sit 5 (Stand-by assistance)    Sit to Stand Assistance Minimal assistance    Transfer Assist Score 4    Transfer Type SPT without device      Cognition   Performance Comments   Orientation Level Disoriented to situation, Disoriented to time    Comprehension Level 3 Mode: Auditory  Hearing Aid:None  Glasses:    Expression (Native Language) 2 Mode: Verbal  very low volume, difficult to understand and has to repeat almost everything he says    Social Interaction/Pragmatics 3 flat affect, limited eye contact, does not initiate contact    Problem Solving 1 requires 1:1 supervision for routine daily tasks    Memory 2 executes 1/2 step requests, recalled 3/3 words immediate, 0/3 words less than 2 minute delay    Comments       Activities of Daily Living    Score Comments   Self-Feeding   refused to eat breakfast    Grooming 5 Tasks completed by patient: Washed face, Washed hands      Bathing 4 Body Parts Bathe: Abdomen, Arm, left, Arm, right, Buttocks, Chest, Thigh, left, [place] area, Thigh, right  seated on tub transfer bench; grab bars    Tub/Shower Transfer Marshall 1 Type of Shower: Shower  Adaptive  Equipment:Tub transfer bench, Grab bars and Walker   Upper Body  Dressing/Undressing 4 Items Applied:Pullover (4 steps)  CGA in standing    Lower Body Dressing/Undressing 3 Items Applied:Elastic waist pants (3 steps), Sock, left (1 step), Sock, right (1 step), Underpants (3 steps)       Shoes: 7: Patient refused  Socks: 2: Substantial/maximum assist   Toileting 4 CGA   Toilet Transfer 1 using RW        Vision/Perception: Formal testing needs to be completed. Instrumental Activities of Daily Living   Performance Comments   Meal Preperation Total assistance    Homemaking Total assistance    Medication Management Total assistance    Financial Management Total assistance        Session: Patient supine in bed on arrival to room. Agreeable to OT evaluation. Required encouragement to participate; patient kept stating he was very tired but agreed to complete ADL with encouragement. Completed brief BUE assessment; formal vision and cognitive testing needs to be completed. Patient stated it is 2013, July, and Wednesday. Patient recalled 3/3 words immediate recall and 1/3 words after cue. Patient tolerated session well with complaint of pain as stated above but no headache following shower, and was left semi-saleem's in bed with call light/all needs in reach. Bed alarm activated.      Interdisciplinary Communication: with PT regarding estimated LOS and functional status, with nurse regarding functional status     Patient/Family Education: Patient was/were educated On the role of OT, On POC, On IRC expectations and yellow sock policy, use of call bell . Problem List: [facility] [address] [place], Activity Tolerance, Visual Perceptual , Safety Awareness, Strength, Pain, Sitting Balance, Standing Balance and Cognition    Functional Limitations: ADL, IADL, Functional Transfers and Functional Mobility    Goals: Please see Care Plan    OT order received and chart reviewed. OT orders have been acknowledged. Patient will benefit from skilled OT services to address ADL, functional transfers, UE strength, UE coordination, balance, cognition and activity tolerance to maximize functional performance with daily self-care tasks and functional mobility. Treatment is likely to include ADL, Balance, Strength, Activity tolerance, Neuro-muscular re-education, Visual perceptual, Cognitive, DME, AE, Family  and Safety awareness training to increase independence with self-care. Patient will be seen for 1.5-2 hours of skilled OT services 5-6 days a week.      Gracia Egan MS, OTR/L  8/6/2019

## 2019-08-06 NOTE — PROGRESS NOTES
SPEECH PATHOLOGY NOTE:     19 1521   Time Spent With Patient   Time In 8577   Time Out 1508   Mental Status   Neurologic State Drowsy; Lethargic   Orientation Level Oriented to person   Cognition Follows commands   Perseveration No perseveration noted   Psychosocial   Patient Behaviors Flat affect; Lethargic        19 1522   Verbal Expression   Initiation Impaired (%)   Repetition No impairment   Naming Impaired   Divergent (%)   (8 items)   Responsive (%)   (7/8)   Pictures (%)   (6/8)   Oral-Motor Structure/Motor Speech   Apraxic Characteristics None   Dysarthric Characteristics None   Neuro-Linguistics/Cognitive Function   Reasoning  Divergent thinking;Executive function;Mental flexibility   Memory  Short-term;Immediate   Attention  Sustained attention   Pragmatics   Pragmatics Impairment Abnormal affect; Inconsistent eye contact      19 6630   Comprehension Mode   Primary Mode of Comprehension Auditory   Score 6   Expression (Native Language)   Primary Mode of Expression Verbal   Score 2   Social Interaction/Pragmatics   Score 2   Problem Solving   Score 2   Memory   Score 3     Patient seen for initial cognitive linguistic evaluation. Dysphagia evaluation deferred as patient refused po intake. Portions of Cognistat completed as outlined below:  Orientation:  (severe)  Attention:  (mild-moderate)  Language:    Comprehension:  (WFL)   Repetition: (WFL)   Namin/8 [facility])  Memory: immediate: 3/4, short term memory:  (mod-severe)  Reasonin item similarity  [facility])      Relative strengths include basic expressive and receptive language. Impaired speech intelligibility due to patient whispering despite cueing. Patient demonstrates moderate cognitive linguistic deficits impacting functional communication. Delayed responses requiring increased time and occasional verbal prompting to answer. Working memory/attention and short term memory impaired.  With 4 trials and prompt to list all 4 words, patient only able to immediately repeat back 3/4 words. Patient stating concrete responses rather than abstract category during 2 item similarity task. Patient nodded head \"no\" when asked if there is anything specific he wishes to improve in speech therapy. Patient will benefit from ongoing cognitive linguistic treatment. Anticipate need for higher level therapeutic assessment to further guide plan of care.        [address] CCC-SLP

## 2019-08-06 NOTE — PROGRESS NOTES
PHYSICAL THERAPY DAILY NOTE  Time In: 3506  Time Out: 7496  Patient Seen For: PM;Balance activities;Gait training;Patient education; Therapeutic exercise;Transfer training    Subjective: \"I need to use the bathroom. \" Patient agreeable to therapy. Objective: Vital Signs:   Patient Vitals for the past 8 hrs:   BP   08/06/19 0904 (!) 141/97       Pain level: No pain reported. Pain location: n/a  Pain interventions: n/a    Patient education: Educated patient on benefits of physical therapy. Interdisciplinary Communication: Communicated with David Mojica RN regarding patient's POC. Other (comment)(fall risk)  GROSS ASSESSMENT Daily Assessment            COGNITION Daily Assessment    Poor initiation. BED/MAT MOBILITY Daily Assessment    Supine to Sit : 4 (Minimal assistance)  Sit to Supine : 5 (Supervision)       TRANSFERS Daily Assessment   Required for safety. Patient transferred on/off toilet seat with use of grab bars. Patient unable to void at this time, though felt he had the urge to go. Transfer Type: Other  Other: HHA x 1  Transfer Assistance : 4 (Minimal assistance)  Sit to Stand Assistance: Minimal assistance  Car Transfers: Not tested       GAIT Daily Assessment   Patient ambulated with slow partial step through gait pattern. Decreased step clearance and length of B LE. Amount of Assistance: 4 (Minimal assistance)  Distance (ft): 125 Feet (ft)(x1, 25' x 2)  Assistive Device: Gait belt; Other (comment)(HHA x 1)       STEPS or STAIRS Daily Assessment    Steps/Stairs Ambulated (#): 0  Level of Assist : 0 (Not tested)       BALANCE Daily Assessment    Sitting - Static: Good (unsupported)  Sitting - Dynamic: Fair (occasional)  Standing - Static: Fair  Standing - Dynamic : Impaired         LOWER EXTREMITY EXERCISES Daily Assessment    Extremity: Both  Exercise Type #1: Other (comment)(B LE motomed x 10 mins, Level 1)  Level of Assist: Supervision     Patient returned to room lying supine in bed with all needs in reach, bed alarm on. Assessment: Patient required encouragement for participation with PT. Patient takes extra time and effort with all mobility. Plan of Care: Continue with POC and progress as tolerated.        Jose Donahue  8/6/2019

## 2019-08-07 ENCOUNTER — HOSPITAL ENCOUNTER (INPATIENT)
Age: 44
LOS: 6 days | Discharge: NURSING, REHAB OR LONG-TERM CARE | DRG: 071 | End: 2019-08-13
Attending: NEUROLOGICAL SURGERY | Admitting: NEUROLOGICAL SURGERY
Payer: COMMERCIAL

## 2019-08-07 DIAGNOSIS — G40.909 SEIZURE CEREBRAL (HCC): ICD-10-CM

## 2019-08-07 DIAGNOSIS — I67.83 POSTERIOR REVERSIBLE ENCEPHALOPATHY SYNDROME: ICD-10-CM

## 2019-08-07 DIAGNOSIS — I60.4 SUBARACHNOID HEMORRHAGE FROM BASILAR ARTERY ANEURYSM (HCC): ICD-10-CM

## 2019-08-07 DIAGNOSIS — I60.9 SAH (SUBARACHNOID HEMORRHAGE) (HCC): ICD-10-CM

## 2019-08-07 DIAGNOSIS — R56.9 SEIZURE (HCC): Primary | ICD-10-CM

## 2019-08-07 DIAGNOSIS — Z72.0 TOBACCO ABUSE: ICD-10-CM

## 2019-08-07 DIAGNOSIS — J96.01 ACUTE RESPIRATORY FAILURE WITH HYPOXIA (HCC): ICD-10-CM

## 2019-08-07 DIAGNOSIS — G91.0 COMMUNICATING HYDROCEPHALUS (HCC): ICD-10-CM

## 2019-08-07 DIAGNOSIS — I67.848 CEREBRAL VASOSPASM: ICD-10-CM

## 2019-08-07 LAB
ANION GAP SERPL CALC-SCNC: 9 MMOL/L (ref 7–16)
BASOPHILS # BLD: 0.1 K/UL (ref 0–0.2)
BASOPHILS NFR BLD: 1 % (ref 0–2)
BUN SERPL-MCNC: 35 MG/DL (ref 6–23)
CALCIUM SERPL-MCNC: 9.1 MG/DL (ref 8.3–10.4)
CALCIUM SERPL-MCNC: 9.2 MG/DL (ref 8.3–10.4)
CHLORIDE SERPL-SCNC: 108 MMOL/L (ref 98–107)
CHOLEST SERPL-MCNC: 145 MG/DL
CO2 SERPL-SCNC: 24 MMOL/L (ref 21–32)
CREAT SERPL-MCNC: 3.81 MG/DL (ref 0.8–1.5)
DIFFERENTIAL METHOD BLD: ABNORMAL
EOSINOPHIL # BLD: 0.2 K/UL (ref 0–0.8)
EOSINOPHIL NFR BLD: 2 % (ref 0.5–7.8)
ERYTHROCYTE [DISTWIDTH] IN BLOOD BY AUTOMATED COUNT: 13.7 % (ref 11.9–14.6)
FERRITIN SERPL-MCNC: 497 NG/ML (ref 8–388)
GLUCOSE SERPL-MCNC: 92 MG/DL (ref 65–100)
HCT VFR BLD AUTO: 29.6 % (ref 41.1–50.3)
HDLC SERPL-MCNC: 54 MG/DL (ref 40–60)
HDLC SERPL: 2.7 {RATIO}
HGB BLD-MCNC: 9.7 G/DL (ref 13.6–17.2)
IMM GRANULOCYTES # BLD AUTO: 0 K/UL (ref 0–0.5)
IMM GRANULOCYTES NFR BLD AUTO: 0 % (ref 0–5)
IRON SATN MFR SERPL: 42 %
IRON SERPL-MCNC: 76 UG/DL (ref 35–150)
LDLC SERPL CALC-MCNC: 67.6 MG/DL
LIPID PROFILE,FLP: ABNORMAL
LYMPHOCYTES # BLD: 1.3 K/UL (ref 0.5–4.6)
LYMPHOCYTES NFR BLD: 13 % (ref 13–44)
MAGNESIUM SERPL-MCNC: 2 MG/DL (ref 1.8–2.4)
MAGNESIUM SERPL-MCNC: 2.2 MG/DL (ref 1.8–2.4)
MAGNESIUM SERPL-MCNC: 2.9 MG/DL (ref 1.8–2.4)
MCH RBC QN AUTO: 30.6 PG (ref 26.1–32.9)
MCHC RBC AUTO-ENTMCNC: 32.8 G/DL (ref 31.4–35)
MCV RBC AUTO: 93.4 FL (ref 79.6–97.8)
MONOCYTES # BLD: 0.9 K/UL (ref 0.1–1.3)
MONOCYTES NFR BLD: 9 % (ref 4–12)
NEUTS SEG # BLD: 7.4 K/UL (ref 1.7–8.2)
NEUTS SEG NFR BLD: 75 % (ref 43–78)
NRBC # BLD: 0 K/UL (ref 0–0.2)
PLATELET # BLD AUTO: 145 K/UL (ref 150–450)
PMV BLD AUTO: 10.5 FL (ref 9.4–12.3)
POTASSIUM SERPL-SCNC: 3.9 MMOL/L (ref 3.5–5.1)
PTH-INTACT SERPL-MCNC: 66.9 PG/ML (ref 18.5–88)
RBC # BLD AUTO: 3.17 M/UL (ref 4.23–5.6)
SODIUM SERPL-SCNC: 141 MMOL/L (ref 136–145)
TIBC SERPL-MCNC: 182 UG/DL (ref 250–450)
TRIGL SERPL-MCNC: 117 MG/DL (ref 35–150)
VLDLC SERPL CALC-MCNC: 23.4 MG/DL (ref 6–23)
WBC # BLD AUTO: 10 K/UL (ref 4.3–11.1)

## 2019-08-07 PROCEDURE — 36620 INSERTION CATHETER ARTERY: CPT | Performed by: NURSE PRACTITIONER

## 2019-08-07 PROCEDURE — 99239 HOSP IP/OBS DSCHRG MGMT >30: CPT | Performed by: PHYSICAL MEDICINE & REHABILITATION

## 2019-08-07 PROCEDURE — 85025 COMPLETE CBC W/AUTO DIFF WBC: CPT

## 2019-08-07 PROCEDURE — 74011250636 HC RX REV CODE- 250/636: Performed by: NURSE PRACTITIONER

## 2019-08-07 PROCEDURE — 84165 PROTEIN E-PHORESIS SERUM: CPT

## 2019-08-07 PROCEDURE — 77030020263 HC SOL INJ SOD CL0.9% LFCR 1000ML

## 2019-08-07 PROCEDURE — 86334 IMMUNOFIX E-PHORESIS SERUM: CPT

## 2019-08-07 PROCEDURE — 77030020256 HC SOL INJ NACL 0.9%  500ML

## 2019-08-07 PROCEDURE — 86038 ANTINUCLEAR ANTIBODIES: CPT

## 2019-08-07 PROCEDURE — 36592 COLLECT BLOOD FROM PICC: CPT

## 2019-08-07 PROCEDURE — 86335 IMMUNFIX E-PHORSIS/URINE/CSF: CPT

## 2019-08-07 PROCEDURE — 83735 ASSAY OF MAGNESIUM: CPT

## 2019-08-07 PROCEDURE — 87389 HIV-1 AG W/HIV-1&-2 AB AG IA: CPT

## 2019-08-07 PROCEDURE — 74011000258 HC RX REV CODE- 258: Performed by: NURSE PRACTITIONER

## 2019-08-07 PROCEDURE — 83520 IMMUNOASSAY QUANT NOS NONAB: CPT

## 2019-08-07 PROCEDURE — 36600 WITHDRAWAL OF ARTERIAL BLOOD: CPT

## 2019-08-07 PROCEDURE — 86160 COMPLEMENT ANTIGEN: CPT

## 2019-08-07 PROCEDURE — 84156 ASSAY OF PROTEIN URINE: CPT

## 2019-08-07 PROCEDURE — 82728 ASSAY OF FERRITIN: CPT

## 2019-08-07 PROCEDURE — 03HY32Z INSERTION OF MONITORING DEVICE INTO UPPER ARTERY, PERCUTANEOUS APPROACH: ICD-10-PCS | Performed by: NURSE PRACTITIONER

## 2019-08-07 PROCEDURE — 80074 ACUTE HEPATITIS PANEL: CPT

## 2019-08-07 PROCEDURE — 83883 ASSAY NEPHELOMETRY NOT SPEC: CPT

## 2019-08-07 PROCEDURE — 77030005402 HC CATH RAD ART LN KT TELE -B

## 2019-08-07 PROCEDURE — 80048 BASIC METABOLIC PNL TOTAL CA: CPT

## 2019-08-07 PROCEDURE — 74011250636 HC RX REV CODE- 250/636: Performed by: NEUROLOGICAL SURGERY

## 2019-08-07 PROCEDURE — 99024 POSTOP FOLLOW-UP VISIT: CPT | Performed by: NEUROLOGICAL SURGERY

## 2019-08-07 PROCEDURE — 65620000000 HC RM CCU GENERAL

## 2019-08-07 PROCEDURE — 80061 LIPID PANEL: CPT

## 2019-08-07 PROCEDURE — 83970 ASSAY OF PARATHORMONE: CPT

## 2019-08-07 PROCEDURE — 74011000250 HC RX REV CODE- 250: Performed by: NURSE PRACTITIONER

## 2019-08-07 PROCEDURE — 74011000250 HC RX REV CODE- 250

## 2019-08-07 PROCEDURE — 83540 ASSAY OF IRON: CPT

## 2019-08-07 RX ORDER — ONDANSETRON 2 MG/ML
4 INJECTION INTRAMUSCULAR; INTRAVENOUS
Status: DISCONTINUED | OUTPATIENT
Start: 2019-08-07 | End: 2019-08-13 | Stop reason: HOSPADM

## 2019-08-07 RX ORDER — SODIUM CHLORIDE 0.9 % (FLUSH) 0.9 %
5-40 SYRINGE (ML) INJECTION AS NEEDED
Status: DISCONTINUED | OUTPATIENT
Start: 2019-08-07 | End: 2019-08-09

## 2019-08-07 RX ORDER — LABETALOL HYDROCHLORIDE 5 MG/ML
INJECTION, SOLUTION INTRAVENOUS
Status: COMPLETED
Start: 2019-08-07 | End: 2019-08-07

## 2019-08-07 RX ORDER — SODIUM CHLORIDE 9 MG/ML
1000 INJECTION, SOLUTION INTRAVENOUS ONCE
Status: COMPLETED | OUTPATIENT
Start: 2019-08-07 | End: 2019-08-07

## 2019-08-07 RX ORDER — ASPIRIN 325 MG
325 TABLET ORAL DAILY
Status: DISCONTINUED | OUTPATIENT
Start: 2019-08-07 | End: 2019-08-07 | Stop reason: SDUPTHER

## 2019-08-07 RX ORDER — MAGNESIUM SULFATE HEPTAHYDRATE 40 MG/ML
4 INJECTION, SOLUTION INTRAVENOUS ONCE
Status: COMPLETED | OUTPATIENT
Start: 2019-08-07 | End: 2019-08-07

## 2019-08-07 RX ORDER — LABETALOL HYDROCHLORIDE 5 MG/ML
10 INJECTION, SOLUTION INTRAVENOUS ONCE
Status: COMPLETED | OUTPATIENT
Start: 2019-08-07 | End: 2019-08-07

## 2019-08-07 RX ORDER — HYDROCODONE BITARTRATE AND ACETAMINOPHEN 7.5; 325 MG/1; MG/1
1 TABLET ORAL
Status: DISCONTINUED | OUTPATIENT
Start: 2019-08-07 | End: 2019-08-13 | Stop reason: HOSPADM

## 2019-08-07 RX ORDER — CLOPIDOGREL BISULFATE 75 MG/1
75 TABLET ORAL DAILY
Status: DISCONTINUED | OUTPATIENT
Start: 2019-08-07 | End: 2019-08-07 | Stop reason: SDUPTHER

## 2019-08-07 RX ORDER — HEPARIN SODIUM 5000 [USP'U]/ML
5000 INJECTION, SOLUTION INTRAVENOUS; SUBCUTANEOUS EVERY 8 HOURS
Status: DISCONTINUED | OUTPATIENT
Start: 2019-08-07 | End: 2019-08-13 | Stop reason: HOSPADM

## 2019-08-07 RX ORDER — SODIUM CHLORIDE 9 MG/ML
125 INJECTION, SOLUTION INTRAVENOUS CONTINUOUS
Status: DISCONTINUED | OUTPATIENT
Start: 2019-08-07 | End: 2019-08-09

## 2019-08-07 RX ORDER — FENTANYL CITRATE 50 UG/ML
50 INJECTION, SOLUTION INTRAMUSCULAR; INTRAVENOUS
Status: DISCONTINUED | OUTPATIENT
Start: 2019-08-07 | End: 2019-08-09

## 2019-08-07 RX ADMIN — NICARDIPINE HYDROCHLORIDE 10 MG/HR: 25 INJECTION INTRAVENOUS at 10:02

## 2019-08-07 RX ADMIN — SODIUM CHLORIDE 1000 MG: 900 INJECTION, SOLUTION INTRAVENOUS at 12:02

## 2019-08-07 RX ADMIN — SODIUM CHLORIDE 1000 ML: 900 INJECTION, SOLUTION INTRAVENOUS at 10:15

## 2019-08-07 RX ADMIN — LABETALOL HYDROCHLORIDE 10 MG: 5 INJECTION INTRAVENOUS at 10:53

## 2019-08-07 RX ADMIN — LABETALOL HYDROCHLORIDE 10 MG: 5 INJECTION, SOLUTION INTRAVENOUS at 10:38

## 2019-08-07 RX ADMIN — SODIUM NITROPRUSSIDE 4 MCG/KG/MIN: 50 INJECTION INTRAVENOUS at 23:25

## 2019-08-07 RX ADMIN — SODIUM CHLORIDE 1000 ML: 900 INJECTION, SOLUTION INTRAVENOUS at 02:16

## 2019-08-07 RX ADMIN — SODIUM NITROPRUSSIDE 2 MCG/KG/MIN: 50 INJECTION INTRAVENOUS at 11:21

## 2019-08-07 RX ADMIN — SODIUM CHLORIDE 1000 MG: 900 INJECTION, SOLUTION INTRAVENOUS at 01:22

## 2019-08-07 RX ADMIN — HEPARIN SODIUM 5000 UNITS: 5000 INJECTION INTRAVENOUS; SUBCUTANEOUS at 10:11

## 2019-08-07 RX ADMIN — SODIUM CHLORIDE 125 ML/HR: 900 INJECTION, SOLUTION INTRAVENOUS at 19:44

## 2019-08-07 RX ADMIN — LABETALOL HYDROCHLORIDE 10 MG: 5 INJECTION INTRAVENOUS at 10:38

## 2019-08-07 RX ADMIN — MAGNESIUM SULFATE HEPTAHYDRATE 4 G: 40 INJECTION, SOLUTION INTRAVENOUS at 10:15

## 2019-08-07 RX ADMIN — HEPARIN SODIUM 5000 UNITS: 5000 INJECTION INTRAVENOUS; SUBCUTANEOUS at 18:09

## 2019-08-07 RX ADMIN — SODIUM CHLORIDE 125 ML/HR: 900 INJECTION, SOLUTION INTRAVENOUS at 02:16

## 2019-08-07 RX ADMIN — SODIUM NITROPRUSSIDE 3 MCG/KG/MIN: 50 INJECTION INTRAVENOUS at 16:45

## 2019-08-07 RX ADMIN — SODIUM CHLORIDE 125 ML/HR: 900 INJECTION, SOLUTION INTRAVENOUS at 11:20

## 2019-08-07 RX ADMIN — SODIUM NITROPRUSSIDE 7 MCG/KG/MIN: 50 INJECTION INTRAVENOUS at 20:30

## 2019-08-07 RX ADMIN — LABETALOL HYDROCHLORIDE 10 MG: 5 INJECTION INTRAVENOUS at 11:02

## 2019-08-07 NOTE — PROGRESS NOTES
1000 - Dr. Ricky Frausto and Nuha Daniel NP at bedside to see pt and eval.  CT/CTA results reviewed with pt's dad at bedside. Plan of care discussed. Orders received. Kalie Jalloh NP to place arterial line for closer BP monitoring  - 1L NS bolus now then continue IVF as ordered  - Draw Mg level now. Give Mg 4g IVPB now. F/u level after.  - Goal SBP <140. Will start cardene gtt. - q1h neuros/NIH    1023 - Cardene gtt started and now maxed at 15 mg/hr. SBP remains >150s. Nipride gtt ordered per NP in addition to maintain goal bp.  1053 - Labetalol 10 mg given IVP x3 doses for goal SBP <140 while awaiting nipride gtt.

## 2019-08-07 NOTE — PROGRESS NOTES
[facility] PROGRESS NOTE    Follow up for: NING over ckd     Subjective:   Patient seen and examined. Chart, notes, labs, imaging, results all reviewed.      Seen in icu after an episode of seizure in rehab     On Cardene and nipride drip     On IVF at 125 ml/hr       ROS:  Gen - no fever, no chills, appetite okay  CV - no chest pain, no orthopnea  Lung - no shortness of breath, no cough  Abd - no tenderness, no nausea, no vomiting  Ext - no edema    Objective:   Exam:  Vitals:    08/07/19 1008 08/07/19 1014 08/07/19 1015 08/07/19 1038   BP: 164/81 149/81  189/79   Pulse: (!) 112 (!) 114 (!) 112 (!) 117   Resp:  13 19    Temp:       SpO2: 100% 100% 100%    Weight:             Intake/Output Summary (Last 24 hours) at 8/7/2019 1117  Last data filed at 8/7/2019 0713  Gross per 24 hour   Intake 0 ml   Output 1050 ml   Net -1050 ml       Current Facility-Administered Medications   Medication Dose Route Frequency    sodium chloride (NS) flush 5-40 mL  5-40 mL IntraVENous PRN    HYDROcodone-acetaminophen (NORCO) 7.5-325 mg per tablet 1 Tab  1 Tab Oral Q4H PRN    ondansetron (ZOFRAN) injection 4 mg  4 mg IntraVENous Q4H PRN    heparin (porcine) injection 5,000 Units  5,000 Units SubCUTAneous Q8H    levETIRAcetam (KEPPRA) 1,000 mg in 0.9% sodium chloride 100 mL IVPB  1,000 mg IntraVENous Q12H    0.9% sodium chloride infusion  125 mL/hr IntraVENous CONTINUOUS    magnesium sulfate 4 g/100 mL IVPB  4 g IntraVENous ONCE    niCARdipine in Saline (CARDENE) 25 MG/250 mL infusion kit  0-15 mg/hr IntraVENous TITRATE    nitroPRUSSide (NIPRIDE) 50 mg in dextrose 5% 250 mL infusion  0-10 mcg/kg/min IntraVENous TITRATE       EXAM  GEN - Alert, oriented, in no distress  CV - S1, S2, RRR, no rub, murmur, or gallop  Lung - clear to auscultation bilaterally  Abd - soft, nontender, BS present  Ext - no edema    Recent Labs     08/07/19  0439 08/05/19  0604   WBC 10.0 8.7   HGB 9.7* 11.1*   HCT 29.6* 33.6*   * 170 Recent Labs     08/07/19  1010 08/07/19  0440 08/07/19  0439 08/07/19  0438 08/06/19  0555 08/05/19  0604   NA  --   --  141  --  143 144   K  --   --  3.9  --  4.3 3.9   CL  --   --  108*  --  109* 109*   CO2  --   --  24  --  24 26   BUN  --   --  35*  --  33* 32*   CREA  --   --  3.81*  --  3.85* 3.78*   CA  --  9.2 9.1  --  9.4 9.3   GLU  --   --  92  --  79 82   MG 2.0  --   --  2.2 2.1 2.2   PHOS  --   --   --   --  4.5  --        Assessment and Plan:     NING over CKD stage IV  - NING - Possible pre renal - on IVf now after bolus   - US kidney - some cysts and chronic changes   - reviewed Ua - proteinuria present , no active sediments     Nephrotic syndrome 4.5 gms   All work up ordered      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 No family h/o of PCKD known so far .     Discussed with ICU nurse   Padma Cabezas MD

## 2019-08-07 NOTE — DISCHARGE SUMMARY
REHABILITATION DISCHARGE SUMMARY     Date of admission; Discharge Date: 8/7/2019    Endovascular Neurosurgeon: Dr. Suzette Solis  Consultants; Nephrology    Admission Condition: good  Discharged Condition: stable    Hospital Course: The patient was admitted to [facility] on 8/7/2019 for interdisciplinary rehab course     HPI; Diane White a  Right handed functionally independent 37 y. o. male who originally reported to [facility] ED via EMS after found with altered mental status and possible seizure like activity. Per brother, pt was at [address] and had witnessed syncopal event with possible seizure like activity. Pt had CT head and CTA head and neck and found to have [facility] secondary to Basilar tip aneurysm rupture. Pt transferred to [facility] ED via [facility] team. Pt with eyes closed and drowsy, but will open eyes to voice and follow commands.  Pt GCS E3, V5, M6: 14, PERRL +3 with EOM's intact. NIHSS of 2 for drowsiness and altered mental status. Pt admitted to our ICU under [facility] protocol. Labs from [facility] show bun/cr 46/3.4, will give pt IVF's x2 L bolus and reeval. Pt states he smokes about 9 cig a day, denies any ETOH or drugs, denies any family hx of SAH. Pt unsure of what happened.  Arterial line placed on admit, pt with IV x2., phelps. \"     Eduardo Segura , unfortunately, had a decline in status overnoc and required intubation for airway protection. F/u HCT showed extensive/diffuse SAH and increased ICP. An EVD was placed into the 3rd ventricle and head Ct repeated with dec in ventricular size. Pts creatinine has improved to 2.83 with a BUN of 37 this a.m. troponins elevated at 0.08 and now 0.07. 2D ECHO pending. Na 154 earlier this a.m, now 149. Leukocytosis improved from 118.2 to 13.8. He is on Nimotop to prevent vasospasm and Keppra for sz control. 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 : stent/coiled 7-10-19.  Pt had had daily TCD's during his admit and did show vasospasm, but pt was able to autoregulate and no IA verapamil was required. The pt had EVD placed on admit due to hydrocephalus and was unable to remove due to altered mental status. Pt required ventricular tPA x2 for IVH clot prior to  shunt placement on 8-2-19. The pt is alert and follows commands today, but he does have a flat effect. He was started on celexa 10mg po daily during admit. Pt will remain on his asa 325mg po daily, Plavix 75mg po daily for his stent/coil of basilar aneurysm. He completed 21days of nimotop prior to dc to [facility] today  , thus on Lipitor. Nicoderm patches have been used to due admitted tobacco abuse. His VPS of note, Certas Plus with valve set at 3. 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 Mobilization as tolerated. Intermittent pneumatic compression devices when in bed Thigh-high or knee-high thromboembolic deterrent hose when out of bed. Sc Heparin subq twice daily. HIT panel neg     Flat affect; stimulation; cont Provigil. Change celexa to Zoloft.      Sz; cont keppra, no reoccurence. Cont sz prophylaxis     Vasospasm; can dc Nimotop/ cont mag supplement     Anemia; ? Due to CKD. Cont iron supplement, vit C; improved; 11.1 from 9.9 8/4. S/p prior blood transfusion for hgb 7.0 . monitor     NING on CKD? Creat 3.15 on 7/9 on admission. Baseline unknown. Lowest creat 2.46 to date. Currently 8/5 creat 3.78; will d/w family. Consider nephrology consult.   -8/6 labs BUN 33/creat 3.85 .  Pt does not know hx re; HTN; UOP is good; strict I/O; will consult Nephrology  ; do not believe renal US done     Pain Control: stable, mild-to-moderate joint symptoms intermittently, reasonably well controlled by PRN meds. Will require regular pain assessment and comprenhensive pain management, Lidoderm patch , toradol.     Tobacco abuse; cont Nicoderm patch; education and stress cessation     Wound Care: Monitor wound status daily per staff and physician. At risk for failure. Will require 24/7 rehab nursing. Keep wound clean and dry and dc sutures when healed POD 10 , POD #5 VPS  -sutures appear to be dissolvable      Hypertension - BP uncontrolled, fluctuating, managed medically. Permissive to dec r/o vasospasm  -suspect untreated htn as outpt  -MAP goal in ICU was hi; 100-110; will d/w Dr Dionna Reddy; goal at this time  UPDATED: pt was seen by Dr Willy Parr of nephrology due to NING on suspected CKD. Thought to be to HTN. Renal US was performed, urine prot/creat and serologies ordered. Was considering IVFs due to poor intake. Renal US showed echogenic kidneys suggestive of chronic renal dz. No obstruction. Pt was participating in therapies but difficult to engage. His po intake was poor and had been prior to [facility] admission. On the night of 8/6 the pt had a tonic clonic sz followed by a grand mal sz lasting about 5 min. He has been on prophylactic Keppra. A RR was called and pt was given ativan and was post ictal. He was seen by Dr Kailyn Salas and a head CT and CT head/neck with perfusion were ordered. Upon return from radiology, pt beginnig to follow slight commands. Voice quality is weak, but able to answer name/location. Moving extremities. NIH 15. Head CT concerning for acute bilateral parieto-occipital ischemia. No hemorrhage. Bruce Burton was transferred to ICU    No past medical history on file. Past Surgical History:   Procedure Laterality Date    IR EMBOLI INTRACRAN LT  7/11/2019      No family history on file.    Social History     Tobacco Use    Smoking status: Not on file   Substance Use Topics    Alcohol use: Not on file     Prior to Admission medications    Medication Sig Start Date End Date Taking? Authorizing Provider   aspirin (ASPIRIN) 325 mg tablet Take 1 Tab by mouth daily. 8/6/19   Mull, Junette Phoenix, NP   atorvastatin (LIPITOR) 40 mg tablet Take 1 Tab by mouth nightly. 8/5/19   Mull, Junette Phoenix, NP   citalopram (CELEXA) 10 mg tablet Take 1 Tab by mouth daily. 8/6/19   Mull, Junette Phoenix, NP   clopidogrel (PLAVIX) 75 mg tab Take 1 Tab by mouth daily. 8/6/19   Mull, Junette Phoenix, NP   famotidine (PEPCID) 20 mg tablet Take 1 Tab by mouth daily. 8/6/19   Mull, Junette Phoenix, NP   levETIRAcetam (KEPPRA) 500 mg tablet Take 1 Tab by mouth two (2) times a day. 8/5/19   Mull, Junette Phoenix, NP   modafinil (PROVIGIL) 200 mg tablet Take 1 Tab by mouth daily. Max Daily Amount: 200 mg. 8/6/19   Mull, Junette Phoenix, NP   nicotine (NICODERM CQ) 14 mg/24 hr patch 1 Patch by TransDERmal route every twenty-four (24) hours for 30 days.  8/5/19 9/4/19  Donny Roblero NP     No Known Allergies     Lab Review:   Recent Results (from the past 72 hour(s))   MAGNESIUM    Collection Time: 08/05/19  6:04 AM   Result Value Ref Range    Magnesium 2.2 1.8 - 2.4 mg/dL   METABOLIC PANEL, BASIC    Collection Time: 08/05/19  6:04 AM   Result Value Ref Range    Sodium 144 136 - 145 mmol/L    Potassium 3.9 3.5 - 5.1 mmol/L    Chloride 109 (H) 98 - 107 mmol/L    CO2 26 21 - 32 mmol/L    Anion gap 9 7 - 16 mmol/L    Glucose 82 65 - 100 mg/dL    BUN 32 (H) 6 - 23 MG/DL    Creatinine 3.78 (H) 0.8 - 1.5 MG/DL    GFR est AA 22 (L) >60 ml/min/1.73m2    GFR est non-AA 19 (L) >60 ml/min/1.73m2    Calcium 9.3 8.3 - 10.4 MG/DL   CBC W/O DIFF    Collection Time: 08/05/19  6:04 AM   Result Value Ref Range    WBC 8.7 4.3 - 11.1 K/uL    RBC 3.61 (L) 4.23 - 5.6 M/uL    HGB 11.1 (L) 13.6 - 17.2 g/dL    HCT 33.6 (L) 41.1 - 50.3 %    MCV 93.1 79.6 - 97.8 FL    MCH 30.7 26.1 - 32.9 PG    MCHC 33.0 31.4 - 35.0 g/dL    RDW 13.7 11.9 - 14.6 %    PLATELET 285 998 - 992 K/uL    MPV 10.7 9.4 - 12.3 FL    ABSOLUTE NRBC 0.00 0.0 - 0.2 K/uL   MAGNESIUM    Collection Time: 08/06/19  5:55 AM   Result Value Ref Range    Magnesium 2.1 1.8 - 2.4 mg/dL   METABOLIC PANEL, BASIC    Collection Time: 08/06/19  5:55 AM   Result Value Ref Range    Sodium 143 136 - 145 mmol/L    Potassium 4.3 3.5 - 5.1 mmol/L    Chloride 109 (H) 98 - 107 mmol/L    CO2 24 21 - 32 mmol/L    Anion gap 10 7 - 16 mmol/L    Glucose 79 65 - 100 mg/dL    BUN 33 (H) 6 - 23 MG/DL    Creatinine 3.85 (H) 0.8 - 1.5 MG/DL    GFR est AA 22 (L) >60 ml/min/1.73m2    GFR est non-AA 18 (L) >60 ml/min/1.73m2    Calcium 9.4 8.3 - 10.4 MG/DL   PHOSPHORUS    Collection Time: 08/06/19  5:55 AM   Result Value Ref Range    Phosphorus 4.5 2.5 - 4.5 MG/DL   PROTEIN/CREATININE RATIO, URINE    Collection Time: 08/06/19  1:46 PM   Result Value Ref Range    Protein, urine random 479 (H) <11.9 mg/dL    Creatinine, urine 106.00 mg/dL    Protein/Creat.  urine Ratio 4.5     URINALYSIS W/ RFLX MICROSCOPIC    Collection Time: 08/06/19  1:46 PM   Result Value Ref Range    Color YELLOW      Appearance CLEAR      Specific gravity 1.015 1.001 - 1.023      pH (UA) 5.5 5.0 - 9.0      Protein 300 (A) NEG mg/dL    Glucose NEGATIVE  mg/dL    Ketone TRACE (A) NEG mg/dL    Bilirubin NEGATIVE  NEG      Blood SMALL (A) NEG      Urobilinogen 0.2 0.2 - 1.0 EU/dL    Nitrites NEGATIVE  NEG      Leukocyte Esterase NEGATIVE  NEG      WBC 5-10 0 /hpf    RBC 0-3 0 /hpf    Epithelial cells 0-3 0 /hpf    Bacteria 0 0 /hpf    Casts 0-3 0 /lpf   MAGNESIUM    Collection Time: 08/07/19  4:38 AM   Result Value Ref Range    Magnesium 2.2 1.8 - 2.4 mg/dL   METABOLIC PANEL, BASIC    Collection Time: 08/07/19  4:39 AM   Result Value Ref Range    Sodium 141 136 - 145 mmol/L    Potassium 3.9 3.5 - 5.1 mmol/L    Chloride 108 (H) 98 - 107 mmol/L    CO2 24 21 - 32 mmol/L    Anion gap 9 7 - 16 mmol/L    Glucose 92 65 - 100 mg/dL BUN 35 (H) 6 - 23 MG/DL    Creatinine 3.81 (H) 0.8 - 1.5 MG/DL    GFR est AA 22 (L) >60 ml/min/1.73m2    GFR est non-AA 18 (L) >60 ml/min/1.73m2    Calcium 9.1 8.3 - 10.4 MG/DL   CBC WITH AUTOMATED DIFF    Collection Time: 08/07/19  4:39 AM   Result Value Ref Range    WBC 10.0 4.3 - 11.1 K/uL    RBC 3.17 (L) 4.23 - 5.6 M/uL    HGB 9.7 (L) 13.6 - 17.2 g/dL    HCT 29.6 (L) 41.1 - 50.3 %    MCV 93.4 79.6 - 97.8 FL    MCH 30.6 26.1 - 32.9 PG    MCHC 32.8 31.4 - 35.0 g/dL    RDW 13.7 11.9 - 14.6 %    PLATELET 540 (L) 554 - 450 K/uL    MPV 10.5 9.4 - 12.3 FL    ABSOLUTE NRBC 0.00 0.0 - 0.2 K/uL    DF AUTOMATED      NEUTROPHILS 75 43 - 78 %    LYMPHOCYTES 13 13 - 44 %    MONOCYTES 9 4.0 - 12.0 %    EOSINOPHILS 2 0.5 - 7.8 %    BASOPHILS 1 0.0 - 2.0 %    IMMATURE GRANULOCYTES 0 0.0 - 5.0 %    ABS. NEUTROPHILS 7.4 1.7 - 8.2 K/UL    ABS. LYMPHOCYTES 1.3 0.5 - 4.6 K/UL    ABS. MONOCYTES 0.9 0.1 - 1.3 K/UL    ABS. EOSINOPHILS 0.2 0.0 - 0.8 K/UL    ABS. BASOPHILS 0.1 0.0 - 0.2 K/UL    ABS. IMM. GRANS. 0.0 0.0 - 0.5 K/UL   FERRITIN    Collection Time: 08/07/19  4:39 AM   Result Value Ref Range    Ferritin 497 (H) 8 - 388 NG/ML   TRANSFERRIN SATURATION    Collection Time: 08/07/19  4:39 AM   Result Value Ref Range    Iron 76 35 - 150 ug/dL    TIBC 182 (L) 250 - 450 ug/dL    Transferrin Saturation 42 >20 %   PTH INTACT    Collection Time: 08/07/19  4:40 AM   Result Value Ref Range    Calcium PENDING MG/DL    PTH, Intact 66.9 18.5 - 88.0 pg/mL       Carolyn Adams Fall Risk Assessment:  Carolyn Adams Fall Risk  Mobility: Ambulates or transfers with assist devices or assistance (08/07/19 0214)  Mobility Interventions: Bed/chair exit alarm;Communicate number of staff needed for ambulation/transfer;OT consult for ADLs; Patient to call before getting OOB;PT Consult for mobility concerns;PT Consult for assist device competence;Strengthening exercises (ROM-active/passive) (08/07/19 0214)  Mentation: Periodic confusion (08/07/19 0214)  Mentation Interventions: Adequate sleep, hydration, pain control;Bed/chair exit alarm; Door open when patient unattended;Evaluate medications/consider consulting pharmacy; Increase mobility;More frequent rounding;Reorient patient;Room close to nurse's station; Toileting rounds;Update white board (08/07/19 0214)  Medication: Patient receiving anticonvulsants, sedatives(tranquilizers), psychotropics or hypnotics, hypoglycemics, narcotics, sleep aids, antihypertensives, laxatives, or diuretics (08/07/19 0214)  Medication Interventions: Assess postural VS orthostatic hypotension;Bed/chair exit alarm;Evaluate medications/consider consulting pharmacy; Patient to call before getting OOB (08/07/19 0214)  Elimination: Needs assistance with toileting (08/07/19 0214)  Elimination Interventions: Bed/chair exit alarm;Call light in reach; Patient to call for help with toileting needs; Toileting schedule/hourly rounds; Toilet paper/wipes in reach (08/07/19 0214)  Prior Fall History: Before admission in past 12 months _home or previous inpatient care) (08/07/19 0214)  History of Falls Interventions: Bed/chair exit alarm; Door open when patient unattended;Evaluate medications/consider consulting pharmacy; Investigate reason for fall;Room close to nurse's station (08/07/19 0214)  Total Score: 5 (08/07/19 0214)  Standard Fall Precautions: Yes (08/07/19 0214)  High Fall Risk: Yes (08/07/19 0214)       Visit Vitals  /86   Pulse (!) 109   Temp 98.3 °F (36.8 °C)   Resp 12   Wt 135 lb 14.4 oz (61.6 kg)   SpO2 99%   BMI 18.43 kg/m²      Intake and Output:    No intake/output data recorded.       Problem List as of 8/7/2019 Date Reviewed: 7/10/2019          Codes Class Noted - Resolved    Seizure cerebral ICD-10-CM: I67.89  ICD-9-CM: 617  8/7/2019 - Present        SAH (subarachnoid hemorrhage) ([facility] ICD-10-CM: I60.9  ICD-9-CM: 832  8/5/2019 - Present        Pneumonia due to methicillin susceptible Staphylococcus aureus (MSSA) ([address] ICD-10-CM: C73.467  ICD-9-CM: 482.41  7/15/2019 - Present        Tobacco abuse ICD-10-CM: Z72.0  ICD-9-CM: 305.1  7/10/2019 - Present        Acute respiratory failure with hypoxia (HCC) ICD-10-CM: J96.01  ICD-9-CM: 518.81  7/10/2019 - Present        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  ICD-9-CM: 331.3  7/10/2019 - Present        IVH (intraventricular hemorrhage) ([facility] ICD-10-CM: I61.5  ICD-9-CM: 516  7/10/2019 - Present        Seizure ([facility] ICD-10-CM: R56.9  ICD-9-CM: 780.39  7/10/2019 - Present        Elevated serum creatinine ICD-10-CM: R79.89  ICD-9-CM: 790.99  7/10/2019 - Present        Cerebral vasospasm ICD-10-CM: I67.848  ICD-9-CM: 435.9  7/10/2019 - Present        RESOLVED: Endotracheally intubated ICD-10-CM: Z97.8  ICD-9-CM: V49.89  7/10/2019 - 7/10/2019        RESOLVED: SAH (subarachnoid hemorrhage) ([facility] ICD-10-CM: I60.9  ICD-9-CM: 430  7/9/2019 - 7/10/2019            Current Discharge Medication List      CONTINUE these medications which have NOT CHANGED    Details   aspirin (ASPIRIN) 325 mg tablet Take 1 Tab by mouth daily. 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 Qty: 90 Tab, Refills: 1    Associated Diagnoses: SAH (subarachnoid hemorrhage) (HCC)      citalopram (CELEXA) 10 mg tablet Take 1 Tab by mouth daily. Qty: 90 Tab, Refills: 1      clopidogrel (PLAVIX) 75 mg tab Take 1 Tab by mouth daily. 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 Qty: 90 Tab, Refills: 1    Associated Diagnoses: SAH (subarachnoid hemorrhage) (HCC)      levETIRAcetam (KEPPRA) 500 mg tablet Take 1 Tab by mouth two (2) times a day.   Qty: 60 Tab, Refills: 5      modafinil (PROVIGIL) 200 mg tablet Take 1 Tab by mouth daily. Max Daily Amount: 200 mg. Qty: 30 Tab, Refills: 0    Associated Diagnoses: SAH (subarachnoid hemorrhage) ([address] Seizure ([address] Tobacco abuse; Cerebral vasospasm      nicotine (NICODERM CQ) 14 mg/24 hr patch 1 Patch by TransDERmal route every twenty-four (24) hours for 30 days. Qty: 30 Patch, Refills: 0    Associated Diagnoses: SAH (subarachnoid hemorrhage) ([address] Tobacco abuse           Disposition: ICU under care of Dr Millie Rowe.      Nasir Black MD

## 2019-08-07 NOTE — PROGRESS NOTES
Patient received from 9th floor with RN at the bedside. A dual neuro assessment performed with Outreach RN. Patient drowsy but arouses with repeated stimulation and able to follow some commands. NIH 19. All VSS. Patient bathed with CHG wipes and a dual skin assessment performed with Jean Rojas RN. Incision to head open to air, clean and intact with sutures. Abd incision open to air with surgical glue intact. Patient sacrum clear, an allevyn placed for protection.

## 2019-08-07 NOTE — PROGRESS NOTES
2325: Rapid response called for new onset seizure activity. MD Linda Bowser at bedside. Per 9th floor nursing staff patient had tonic clonic seizure x1 and now appears to be post ictal, Patient recently received his scheduled 500 mg keppra. Orders to give ativan 1 mg x 1. Arielle Carr, NP called and orders given to perform CT head, Ct perf, and CTA head. Before IV Ativan was removed from Pyxis patient had another tonic clonic seizure lasting approx 2-3 minutes. IV Ativan given and seizure activity ceased. 2355: Patient slowly beginning to come out of post ictal state. Whispering appropriate responses to questions and moving all extremities. Some occasional response to command following.  Pupils equal, reactive

## 2019-08-07 NOTE — PROGRESS NOTES
Pt will need precert when ready to return to [facility] per inez Gonzalez. CM will follow.

## 2019-08-07 NOTE — PROGRESS NOTES
ICU response to rapid response due to seizure activity. Pt with witnessed convulsive seizure by primary RN. Seizure activity ceased without intervention. Upon arrival to room, pt with eyes open, but unresponsive, non-verbal, and no command following. PERRL. NIH 31. Vitals unremarkable with exception to sinus tachycardia with . Airway patent. Dr. Tereza Lockhart at bedside. Phone call placed to Brandon Blanc NP, to update with pt condition. Orders received for STAT CT, CTA, CT perfusion. While preparing for transport, pt with second seizure receiving 1mg ativan. Transported to CT accompanied by Mike Taylor RN, without event. Upon return from radiology, pt beginnig to follow slight commands. Voice quality is weak, but able to answer name/location. Moving extremities. NIH 15.  [facility], NP, updated with pt condition. Orders received to transfer to critical care; 1g keppra and 1L NS bolus now. Mike Taylor RN, to remain at bedside while awaiting transfer.

## 2019-08-07 NOTE — PROGRESS NOTES
Rapid response called with new onset seizure activity, ICU nursing Mary Myers and Reza Mahmood arrived to bedside, patient per 9th floor nursing had tonic clonic seizure activity and is now appearing post ictal, receiving his scheduled 500 mg keppra currently, ordered ativan 1 mg once, Clearence Fillers phoned to Holly Magallon NP of neuro-interventional and received orders for CT head, CTA head/ neck with perfusion.  Patient remains postictal, per nursing Dr. Vick and Dr. Heather Talley caring for patient and will let me know if need further hospitalist assistance  Delfino Abarca MD

## 2019-08-07 NOTE — PROGRESS NOTES
Kathrine Robert TRANSFER - OUT REPORT:    Verbal report given to JOHANNA Quezada(name) on Criselda Maya  being transferred to CCU(unit) for change in patient condition(rapid response)       Report consisted of patients Situation, Background, Assessment and   Recommendations(SBAR). Information from the following report(s) SBAR was reviewed with the receiving nurse. Lines:   PICC Triple Lumen 97/18/89 Right;Basilic (Active)   Central Line Being Utilized Yes 8/7/2019  2:14 AM   Criteria for Appropriate Use Limited/no vessel suitable for conventional peripheral access 8/7/2019  2:14 AM   Site Assessment Clean, dry, & intact 8/7/2019  2:14 AM   Phlebitis Assessment 0 8/7/2019  2:14 AM   Infiltration Assessment 0 8/7/2019  2:14 AM   Arm Circumference (cm) 28 cm 8/2/2019  9:11 AM   Date of Last Dressing Change 08/02/19 8/6/2019  7:30 AM   Dressing Status Clean, dry, & intact 8/7/2019  2:14 AM   External Catheter Length (cm) 0 centimeters 8/2/2019  9:11 AM   Dressing Type Disk with Chlorhexadine gluconate (CHG); Stabilization/securement device;Transparent;Tape 8/7/2019  2:14 AM   Action Taken Other (comment) 8/5/2019  3:58 PM   Hub Color/Line Status Red; Infusing 8/7/2019  2:14 AM   Positive Blood Return (Site #1) Yes 8/7/2019  2:14 AM   Hub Color/Line Status White;Flushed;Capped 8/7/2019  2:14 AM   Positive Blood Return (Site #2) Yes 8/7/2019  2:14 AM   Hub Color/Line Status Purple;Flushed;Capped 8/7/2019  2:14 AM   Positive Blood Return (Site #3) Yes 8/7/2019  2:14 AM   Alcohol Cap Used No 8/5/2019  3:58 PM        Opportunity for questions and clarification was provided.       Patient transported with:   Registered Nurse

## 2019-08-07 NOTE — PROGRESS NOTES
AN initial visit was made to the patient. Emotional support, spiritual presence and   prayer were provided.  talked with the patient's father and had prayer with him in the CCU waiting room. His father reaffirmed his and his son's kyree in [de-identified].       [employer]

## 2019-08-07 NOTE — PROGRESS NOTES
Bedside and Verbal shift change report given to Risa Hester RN (oncoming nurse) by Lucero Jimenez RN (offgoing nurse). Report included the following information SBAR, Kardex, Intake/Output, MAR, Recent Results and Cardiac Rhythm NSR. Dual skin assessment reviewed. Pt resting quietly in bed with eyes closed. Awakens to voice/stimulus. Opens eyes and will focus but drowsy and not tracking well. Pupils 4mm and reactive. No visual disturbances. Speech clear, responses delayed. Oriented to person and knows he's in the \"hospital.\"  Follows commands.  unequal, L>R. Right arm weak with some effort against gravity. RLE weaker than LLE. Sensation intact. Denies pain. Continuing to monitor.

## 2019-08-07 NOTE — PROGRESS NOTES
TRANSFER - IN REPORT:    Verbal report received from Kristen SPENCER (name) on [facility] [place]  being received from [id number] (unit) for change in patient condition(new onset seizures)      Report consisted of patients Situation, Background, Assessment and   Recommendations(SBAR). Information from the following report(s) SBAR, Kardex, ED Summary, OR Summary, Procedure Summary, Intake/Output, MAR, Accordion, Recent Results, Med Rec Status, Cardiac Rhythm NSR and Alarm Parameters  was reviewed with the receiving nurse. Opportunity for questions and clarification was provided. Assessment completed upon patients arrival to unit and care assumed.

## 2019-08-07 NOTE — PROCEDURES
ARTERIAL LINE (A-Line) PLACEMENT  Date: 8-7-19  Time: 1100  Indication: Hemodynamic monitoring    Attending: Dr. Magalie Vieira. A time-out was completed verifying correct patient, procedure, site, positioning, and special equipment if applicable. Adrians test was performed to ensure adequate perfusion. The patients /left wrist was prepped and draped in sterile fashion. 1% Lidocaine was used to anesthetize the area. A 20G Arrow arterial line was introduced into the radial artery. The catheter was threaded over the guide wire a nd the needle was removed with appropriate pulsatile blood return. The catheter was then sutured in place to the skin and a sterile dressing applied. Perfusion to the extremity distal to the point of catheter insertion was checked and found to be adequate. Dr. Airam Villalobos was in agreement with plan of care and available at all times during procedure.     The patient tolerated the procedure well and there were no complications

## 2019-08-07 NOTE — PROGRESS NOTES
PT Note:    Participated in interdisciplinary rounds in ICU/CCU and chart reviewed. Patient is experiencing decrease in function from baseline. Patient would benefit from skilled acute therapy to increase strength, endurance, and functional mobility. Recommend PT consult when medically stable and MD agrees.     Thank you for your consideration,  Josi Gandhi, SPT  Chrystal Libman, PT, DPT

## 2019-08-07 NOTE — PROGRESS NOTES
Chart reviewed after readmission to ICU s/p seizure at [facility]. Awaiting MD to round this am, but per RN, may tx or d/c back to 9th. Call to [facility], liaison to verify ok to return regarding insusrance. CM following for d/c POC. Care Management Interventions  Transition of Care Consult (CM Consult): Discharge Planning, Other(admitted from [facility]/9th floor)  Current Support Network:  Other  Discharge Location  Discharge Placement: Rehab hospital/unit acute

## 2019-08-07 NOTE — PROGRESS NOTES
RN was giving pt medications. Pt stated he needed to go to the BR. Explained pt is connected to IV would assist with the urinal.  Assisted pt to stand. Pt urinate and when assisting pt back to bed, pt was staring out and would not sit down. Pt started shaking and nurse assisted pt to bed. Pt with grand mal seizure x 5 min. Rapid response called. .  Dr Pramod Breaux here. Pt had a 2nd seizure for 4 min. Ativan 1mg slow IVP given by Obey Gonzalez. Pt taken to CT by Obey Gonzalez. Alesha Wyatt

## 2019-08-07 NOTE — H&P
History and Physical    Patient: Hiren Hancock MRN: [id number]  SSN: [id number]    YOB: 1975  Age: 37 y.o. Sex: male      Subjective:      Hiren Hancock is a 37 y.o. male who is well known to our service with recent [facility] secondary to basilar tip aneurysm rupture. Pt with Stent/Coil of basilar aneurysm and treated in our ICU under [facility] protocol, transferred to [facility], for inpt rehab on 8-5-19. I was called this am with report the pt had seizure like activity and a CODE S was called, CTA/CTP head obtained and showed no new ICH, IVH but did show bilat occipital lobe edema/ischemia that was read by RAD as possible bilat stroke, but images and pt presentation and findings were most consistent with PRES. Pt seen in ICU this am, he is drowsy, but he will wake up and follow commands, he can tell me his name, he stovall but left is greater than right. PERRL+4 bilat, no nystagmus. Stonewall placed and pt BP controlled for SBP goal <140. Pt started on cardene, but not responsive, he was given labetalol total of 30mg IV and started on Nipride gtt and tolerating well. Pt also given Mag 4gm IV x1, mag level 2, goal >2.5. Pt and family updated this am.     SEE prior HP and DC summary for pt history. Past Surgical History:   Procedure Laterality Date    IR EMBOLI INTRACRAN LT  7/11/2019           Prior to Admission medications    Medication Sig Start Date End Date Taking? Authorizing Provider   aspirin (ASPIRIN) 325 mg tablet Take 1 Tab by mouth daily. 8/6/19   Mull, Merlin Stank, NP   atorvastatin (LIPITOR) 40 mg tablet Take 1 Tab by mouth nightly. 8/5/19   Mull, Merlin Stank, NP   citalopram (CELEXA) 10 mg tablet Take 1 Tab by mouth daily. 8/6/19   Mull, Merlin Stank, NP   clopidogrel (PLAVIX) 75 mg tab Take 1 Tab by mouth daily. 8/6/19   Mull, Merlin Stank, NP   famotidine (PEPCID) 20 mg tablet Take 1 Tab by mouth daily.  8/6/19   Mull, Merlin Stank, NP   levETIRAcetam (KEPPRA) 500 mg tablet Take 1 Tab by mouth two (2) times a day. 8/5/19   Edmundo Beckett, NP   modafinil (PROVIGIL) 200 mg tablet Take 1 Tab by mouth daily. Max Daily Amount: 200 mg. 8/6/19   Edmundo Beckett, DEANNE   nicotine (NICODERM CQ) 14 mg/24 hr patch 1 Patch by TransDERmal route every twenty-four (24) hours for 30 days. 8/5/19 9/4/19  Keon Matteo, NP        No Known Allergies    Review of Systems:  Unable to assess secondary to pt condition. Per report pt unresponsive with seizure like acti\vity. Objective: There were no vitals filed for this visit. Physical Exam:  General:  Droswy, will follow commands, stovall. Airway patent. HEENT: Supple, symmetrical, trachea midline, no adenopathy, thyroid: no enlargment/tenderness/nodules, no carotid bruit and no JVD. Lungs:   Clear to auscultation bilaterally. Heart:  ST, no ectopy, no MGR   Abdomen:   Soft, non-tender. Bowel sounds normal. No masses,  No organomegaly. Extremities: Extremities normal, atraumatic, no cyanosis or edema. Pulses: 2+ and symmetric all extremities. Skin: Skin color, texture, turgor normal. No rashes or lesions. scalp lesions are CDI. Neurologic: Pt drowsy, but can tell me his name, he will follow some commands. Airway patent. No obvious trauma.  No obvious nystagmus on my exam.        Assessment:     Hospital Problems  Date Reviewed: 7/10/2019          Codes Class Noted POA    Seizure cerebral ICD-10-CM: I67.89  ICD-9-CM: 436  8/7/2019 Unknown            Recent Results (from the past 24 hour(s))   MAGNESIUM    Collection Time: 08/07/19  4:38 AM   Result Value Ref Range    Magnesium 2.2 1.8 - 2.4 mg/dL   METABOLIC PANEL, BASIC    Collection Time: 08/07/19  4:39 AM   Result Value Ref Range    Sodium 141 136 - 145 mmol/L    Potassium 3.9 3.5 - 5.1 mmol/L    Chloride 108 (H) 98 - 107 mmol/L    CO2 24 21 - 32 mmol/L    Anion gap 9 7 - 16 mmol/L    Glucose 92 65 - 100 mg/dL    BUN 35 (H) 6 - 23 MG/DL    Creatinine 3.81 (H) 0.8 - 1.5 MG/DL    GFR est AA 22 (L) >60 ml/min/1.73m2    GFR est non-AA 18 (L) >60 ml/min/1.73m2    Calcium 9.1 8.3 - 10.4 MG/DL   CBC WITH AUTOMATED DIFF    Collection Time: 08/07/19  4:39 AM   Result Value Ref Range    WBC 10.0 4.3 - 11.1 K/uL    RBC 3.17 (L) 4.23 - 5.6 M/uL    HGB 9.7 (L) 13.6 - 17.2 g/dL    HCT 29.6 (L) 41.1 - 50.3 %    MCV 93.4 79.6 - 97.8 FL    MCH 30.6 26.1 - 32.9 PG    MCHC 32.8 31.4 - 35.0 g/dL    RDW 13.7 11.9 - 14.6 %    PLATELET 131 (L) 994 - 450 K/uL    MPV 10.5 9.4 - 12.3 FL    ABSOLUTE NRBC 0.00 0.0 - 0.2 K/uL    DF AUTOMATED      NEUTROPHILS 75 43 - 78 %    LYMPHOCYTES 13 13 - 44 %    MONOCYTES 9 4.0 - 12.0 %    EOSINOPHILS 2 0.5 - 7.8 %    BASOPHILS 1 0.0 - 2.0 %    IMMATURE GRANULOCYTES 0 0.0 - 5.0 %    ABS. NEUTROPHILS 7.4 1.7 - 8.2 K/UL    ABS. LYMPHOCYTES 1.3 0.5 - 4.6 K/UL    ABS. MONOCYTES 0.9 0.1 - 1.3 K/UL    ABS. EOSINOPHILS 0.2 0.0 - 0.8 K/UL    ABS. BASOPHILS 0.1 0.0 - 0.2 K/UL    ABS. IMM. GRANS. 0.0 0.0 - 0.5 K/UL   FERRITIN    Collection Time: 08/07/19  4:39 AM   Result Value Ref Range    Ferritin 497 (H) 8 - 388 NG/ML   TRANSFERRIN SATURATION    Collection Time: 08/07/19  4:39 AM   Result Value Ref Range    Iron 76 35 - 150 ug/dL    TIBC 182 (L) 250 - 450 ug/dL    Transferrin Saturation 42 >20 %   PTH INTACT    Collection Time: 08/07/19  4:40 AM   Result Value Ref Range    Calcium 9.2 8.3 - 10.4 MG/DL    PTH, Intact 66.9 18.5 - 88.0 pg/mL   MAGNESIUM    Collection Time: 08/07/19 10:10 AM   Result Value Ref Range    Magnesium 2.0 1.8 - 2.4 mg/dL   LIPID PANEL    Collection Time: 08/07/19  2:17 PM   Result Value Ref Range    LIPID PROFILE          Cholesterol, total 145 <200 MG/DL    Triglyceride 117 35 - 150 MG/DL    HDL Cholesterol 54 40 - 60 MG/DL    LDL, calculated 67.6 <100 MG/DL    VLDL, calculated 23.4 (H) 6.0 - 23.0 MG/DL    CHOL/HDL Ratio 2.7         Plan:     NEURO: Pt admitted back to ICU for seizure activity and PRES symptoms. Pt and family updated.  Pt given keppra 100mg IV x1 this am post seizure, will increase daily to 1000mg po bid. CT head shows bilateral occipital changes which are indicative of PRES. Mg given and SBP goal now <140. CTA was normal and CTP was normal.  RESP: tolerating RA, 02 sat 100%. CV: SBP goal <140, will cont asa/plavix, heparin and cont monitor. HEME: HH: 9.7/29,   NEPH: bun/cr: 35/3.8. ( pt with hx of kidney disease on admit, does not see Nephrology), will ensure follow up post dc. Has been consulted while in rehab. GI: advance diet as tolerated. Senna, IVF\"s  ID: afebrile, no abx  LINES: lenin unger R PICC.      Signed By: Candice Luque NP     August 7, 2019

## 2019-08-08 PROBLEM — I67.83 POSTERIOR REVERSIBLE ENCEPHALOPATHY SYNDROME: Status: ACTIVE | Noted: 2019-08-08

## 2019-08-08 LAB
ALBUMIN SERPL ELPH-MCNC: 2.61 G/DL (ref 3.2–5.6)
ALBUMIN UR ELPH-MCNC: 136.5 MG/DL
ALBUMIN/GLOB SERPL: 1.1 {RATIO}
ALBUMIN/GLOB SERPL: 2.2 {RATIO}
ALPHA1 GLOB 24H UR ELPH-MCNC: 9.9 MG/DL
ALPHA1 GLOB SERPL ELPH-MCNC: 0.21 G/DL (ref 0.1–0.4)
ALPHA2 GLOB SERPL ELPH-MCNC: 0.64 G/DL (ref 0.4–1.2)
ALPHA2 GLOB SERPL ELPH-MCNC: 14.1 MG/DL
ANA SER QL: NEGATIVE
ANION GAP SERPL CALC-SCNC: 8 MMOL/L (ref 7–16)
B-GLOBULIN SERPL QL ELPH: 0.72 G/DL (ref 0.6–1.3)
B-GLOBULIN UR QL ELPH: 21.4 MG/DL
BASOPHILS # BLD: 0.1 K/UL (ref 0–0.2)
BASOPHILS NFR BLD: 1 % (ref 0–2)
BUN SERPL-MCNC: 27 MG/DL (ref 6–23)
C-ANCA TITR SER IF: NORMAL TITER
C3 SERPL-MCNC: 125 MG/DL (ref 82–167)
C4 SERPL-MCNC: 48 MG/DL (ref 14–44)
CALCIUM SERPL-MCNC: 8.4 MG/DL (ref 8.3–10.4)
CHLORIDE SERPL-SCNC: 111 MMOL/L (ref 98–107)
CO2 SERPL-SCNC: 23 MMOL/L (ref 21–32)
CREAT SERPL-MCNC: 3.4 MG/DL (ref 0.8–1.5)
DIFFERENTIAL METHOD BLD: ABNORMAL
EOSINOPHIL # BLD: 0.3 K/UL (ref 0–0.8)
EOSINOPHIL NFR BLD: 3 % (ref 0.5–7.8)
ERYTHROCYTE [DISTWIDTH] IN BLOOD BY AUTOMATED COUNT: 14.3 % (ref 11.9–14.6)
GAMMA GLOB MFR SERPL ELPH: 0.72 G/DL (ref 0.5–1.6)
GAMMA GLOB MFR UR ELPH: 18.1 MG/DL
GLUCOSE SERPL-MCNC: 102 MG/DL (ref 65–100)
HAV IGM SERPL QL IA: NEGATIVE
HBV CORE IGM SERPL QL IA: NEGATIVE
HBV SURFACE AG SERPL QL IA: NEGATIVE
HCT VFR BLD AUTO: 25.3 % (ref 41.1–50.3)
HCV AB S/CO SERPL IA: <0.1 S/CO RATIO (ref 0–0.9)
HGB BLD-MCNC: 8.2 G/DL (ref 13.6–17.2)
HIV 1+2 AB+HIV1 P24 AG SERPL QL IA: NON REACTIVE
IGA SERPL-MCNC: 208 MG/DL (ref 85–499)
IGG SERPL-MCNC: 639 MG/DL (ref 610–1616)
IGM SERPL-MCNC: 95 MG/DL (ref 35–242)
IMM GRANULOCYTES # BLD AUTO: 0 K/UL (ref 0–0.5)
IMM GRANULOCYTES NFR BLD AUTO: 0 % (ref 0–5)
KAPPA LC FREE SER-MCNC: 97.93 MG/L (ref 3.3–19.4)
KAPPA LC FREE/LAMBDA FREE SER: 2.15 {RATIO} (ref 0.26–1.65)
LAMBDA LC FREE SERPL-MCNC: 45.61 MG/L (ref 5.71–26.3)
LYMPHOCYTES # BLD: 1.2 K/UL (ref 0.5–4.6)
LYMPHOCYTES NFR BLD: 14 % (ref 13–44)
M PROTEIN SERPL ELPH-MCNC: ABNORMAL G/DL
M PROTEIN UR-MCNC: ABNORMAL MG/DL
MAGNESIUM SERPL-MCNC: 2.7 MG/DL (ref 1.8–2.4)
MCH RBC QN AUTO: 30.5 PG (ref 26.1–32.9)
MCHC RBC AUTO-ENTMCNC: 32.4 G/DL (ref 31.4–35)
MCV RBC AUTO: 94.1 FL (ref 79.6–97.8)
MONOCYTES # BLD: 0.9 K/UL (ref 0.1–1.3)
MONOCYTES NFR BLD: 10 % (ref 4–12)
MYELOPEROXIDASE AB SER IA-ACNC: <9 U/ML (ref 0–9)
NEUTS SEG # BLD: 6.2 K/UL (ref 1.7–8.2)
NEUTS SEG NFR BLD: 72 % (ref 43–78)
NRBC # BLD: 0 K/UL (ref 0–0.2)
P-ANCA ATYPICAL TITR SER IF: NORMAL TITER
P-ANCA TITR SER IF: NORMAL TITER
PLATELET # BLD AUTO: 150 K/UL (ref 150–450)
PMV BLD AUTO: 11 FL (ref 9.4–12.3)
POTASSIUM SERPL-SCNC: 3.7 MMOL/L (ref 3.5–5.1)
PROT PATTERN SERPL ELPH-IMP: ABNORMAL
PROT PATTERN SPEC IFE-IMP: ABNORMAL
PROT PATTERN SPEC IFE-IMP: ABNORMAL
PROT PATTERN UR ELPH-IMP: ABNORMAL
PROT SERPL-MCNC: 4.9 G/DL (ref 6.3–8.2)
PROT UR-MCNC: 200 MG/DL
PROTEINASE3 AB SER IA-ACNC: <3.5 U/ML (ref 0–3.5)
RBC # BLD AUTO: 2.69 M/UL (ref 4.23–5.6)
SODIUM SERPL-SCNC: 142 MMOL/L (ref 136–145)
WBC # BLD AUTO: 8.7 K/UL (ref 4.3–11.1)

## 2019-08-08 PROCEDURE — 74011250636 HC RX REV CODE- 250/636: Performed by: NURSE PRACTITIONER

## 2019-08-08 PROCEDURE — 77030020263 HC SOL INJ SOD CL0.9% LFCR 1000ML

## 2019-08-08 PROCEDURE — 74011000258 HC RX REV CODE- 258: Performed by: NURSE PRACTITIONER

## 2019-08-08 PROCEDURE — 74011250637 HC RX REV CODE- 250/637: Performed by: NURSE PRACTITIONER

## 2019-08-08 PROCEDURE — 74011000250 HC RX REV CODE- 250: Performed by: NURSE PRACTITIONER

## 2019-08-08 PROCEDURE — 99024 POSTOP FOLLOW-UP VISIT: CPT | Performed by: NEUROLOGICAL SURGERY

## 2019-08-08 PROCEDURE — 80048 BASIC METABOLIC PNL TOTAL CA: CPT

## 2019-08-08 PROCEDURE — 74011250636 HC RX REV CODE- 250/636: Performed by: NEUROLOGICAL SURGERY

## 2019-08-08 PROCEDURE — 36600 WITHDRAWAL OF ARTERIAL BLOOD: CPT

## 2019-08-08 PROCEDURE — 65620000000 HC RM CCU GENERAL

## 2019-08-08 PROCEDURE — 85025 COMPLETE CBC W/AUTO DIFF WBC: CPT

## 2019-08-08 PROCEDURE — 74011250637 HC RX REV CODE- 250/637: Performed by: NEUROLOGICAL SURGERY

## 2019-08-08 PROCEDURE — 99231 SBSQ HOSP IP/OBS SF/LOW 25: CPT | Performed by: PHYSICAL MEDICINE & REHABILITATION

## 2019-08-08 PROCEDURE — 83735 ASSAY OF MAGNESIUM: CPT

## 2019-08-08 RX ORDER — HYDRALAZINE HYDROCHLORIDE 10 MG/1
10 TABLET, FILM COATED ORAL 3 TIMES DAILY
Status: DISCONTINUED | OUTPATIENT
Start: 2019-08-08 | End: 2019-08-08

## 2019-08-08 RX ORDER — HYDRALAZINE HYDROCHLORIDE 25 MG/1
25 TABLET, FILM COATED ORAL 3 TIMES DAILY
Status: DISCONTINUED | OUTPATIENT
Start: 2019-08-08 | End: 2019-08-13 | Stop reason: HOSPADM

## 2019-08-08 RX ORDER — FAMOTIDINE 20 MG/1
20 TABLET, FILM COATED ORAL DAILY
Status: DISCONTINUED | OUTPATIENT
Start: 2019-08-08 | End: 2019-08-13 | Stop reason: HOSPADM

## 2019-08-08 RX ORDER — ASPIRIN 325 MG
325 TABLET ORAL DAILY
Status: DISCONTINUED | OUTPATIENT
Start: 2019-08-08 | End: 2019-08-13 | Stop reason: HOSPADM

## 2019-08-08 RX ORDER — METOPROLOL TARTRATE 50 MG/1
100 TABLET ORAL 2 TIMES DAILY
Status: DISCONTINUED | OUTPATIENT
Start: 2019-08-08 | End: 2019-08-13 | Stop reason: HOSPADM

## 2019-08-08 RX ORDER — ALPRAZOLAM 0.5 MG/1
0.5 TABLET ORAL
Status: DISCONTINUED | OUTPATIENT
Start: 2019-08-08 | End: 2019-08-08

## 2019-08-08 RX ORDER — ALPRAZOLAM 0.5 MG/1
0.5 TABLET ORAL EVERY 8 HOURS
Status: DISCONTINUED | OUTPATIENT
Start: 2019-08-08 | End: 2019-08-09

## 2019-08-08 RX ORDER — CITALOPRAM 10 MG/1
10 TABLET ORAL DAILY
Status: DISCONTINUED | OUTPATIENT
Start: 2019-08-08 | End: 2019-08-13 | Stop reason: HOSPADM

## 2019-08-08 RX ORDER — IBUPROFEN 200 MG
1 TABLET ORAL EVERY 24 HOURS
Status: DISCONTINUED | OUTPATIENT
Start: 2019-08-08 | End: 2019-08-13 | Stop reason: HOSPADM

## 2019-08-08 RX ORDER — MODAFINIL 100 MG/1
200 TABLET ORAL DAILY
Status: DISCONTINUED | OUTPATIENT
Start: 2019-08-08 | End: 2019-08-13 | Stop reason: HOSPADM

## 2019-08-08 RX ORDER — CLOPIDOGREL BISULFATE 75 MG/1
75 TABLET ORAL DAILY
Status: DISCONTINUED | OUTPATIENT
Start: 2019-08-08 | End: 2019-08-13 | Stop reason: HOSPADM

## 2019-08-08 RX ADMIN — HEPARIN SODIUM 5000 UNITS: 5000 INJECTION INTRAVENOUS; SUBCUTANEOUS at 08:33

## 2019-08-08 RX ADMIN — SODIUM CHLORIDE 1000 ML: 900 INJECTION, SOLUTION INTRAVENOUS at 09:27

## 2019-08-08 RX ADMIN — SODIUM NITROPRUSSIDE 2 MCG/KG/MIN: 50 INJECTION INTRAVENOUS at 18:44

## 2019-08-08 RX ADMIN — HEPARIN SODIUM 5000 UNITS: 5000 INJECTION INTRAVENOUS; SUBCUTANEOUS at 17:26

## 2019-08-08 RX ADMIN — ALPRAZOLAM 0.5 MG: 0.5 TABLET ORAL at 15:58

## 2019-08-08 RX ADMIN — SODIUM CHLORIDE 125 ML/HR: 900 INJECTION, SOLUTION INTRAVENOUS at 03:54

## 2019-08-08 RX ADMIN — SODIUM NITROPRUSSIDE 3 MCG/KG/MIN: 50 INJECTION INTRAVENOUS at 15:48

## 2019-08-08 RX ADMIN — ASPIRIN 325 MG ORAL TABLET 325 MG: 325 PILL ORAL at 08:31

## 2019-08-08 RX ADMIN — HYDROCODONE BITARTRATE AND ACETAMINOPHEN 1 TABLET: 7.5; 325 TABLET ORAL at 08:53

## 2019-08-08 RX ADMIN — SODIUM CHLORIDE 1000 MG: 900 INJECTION, SOLUTION INTRAVENOUS at 01:30

## 2019-08-08 RX ADMIN — HEPARIN SODIUM 5000 UNITS: 5000 INJECTION INTRAVENOUS; SUBCUTANEOUS at 01:30

## 2019-08-08 RX ADMIN — SODIUM CHLORIDE 1000 ML: 900 INJECTION, SOLUTION INTRAVENOUS at 11:51

## 2019-08-08 RX ADMIN — HYDRALAZINE HYDROCHLORIDE 10 MG: 10 TABLET, FILM COATED ORAL at 13:57

## 2019-08-08 RX ADMIN — CITALOPRAM HYDROBROMIDE 10 MG: 20 TABLET ORAL at 08:31

## 2019-08-08 RX ADMIN — SODIUM NITROPRUSSIDE 3 MCG/KG/MIN: 50 INJECTION INTRAVENOUS at 10:14

## 2019-08-08 RX ADMIN — ALPRAZOLAM 0.5 MG: 0.5 TABLET ORAL at 21:51

## 2019-08-08 RX ADMIN — MODAFINIL 200 MG: 100 TABLET ORAL at 08:32

## 2019-08-08 RX ADMIN — FAMOTIDINE 20 MG: 20 TABLET ORAL at 10:55

## 2019-08-08 RX ADMIN — SODIUM CHLORIDE 1000 MG: 900 INJECTION, SOLUTION INTRAVENOUS at 12:50

## 2019-08-08 RX ADMIN — CLOPIDOGREL BISULFATE 75 MG: 75 TABLET ORAL at 08:32

## 2019-08-08 RX ADMIN — DEXTROSE MONOHYDRATE 2 MCG/KG/MIN: 5 INJECTION, SOLUTION INTRAVENOUS at 20:33

## 2019-08-08 RX ADMIN — SODIUM CHLORIDE 125 ML/HR: 900 INJECTION, SOLUTION INTRAVENOUS at 21:17

## 2019-08-08 RX ADMIN — SODIUM NITROPRUSSIDE 2 MCG/KG/MIN: 50 INJECTION INTRAVENOUS at 03:54

## 2019-08-08 RX ADMIN — METOPROLOL TARTRATE 100 MG: 50 TABLET ORAL at 08:30

## 2019-08-08 RX ADMIN — METOPROLOL TARTRATE 100 MG: 50 TABLET ORAL at 17:25

## 2019-08-08 RX ADMIN — SODIUM CHLORIDE 125 ML/HR: 900 INJECTION, SOLUTION INTRAVENOUS at 12:50

## 2019-08-08 RX ADMIN — HYDRALAZINE HYDROCHLORIDE 25 MG: 50 TABLET, FILM COATED ORAL at 21:51

## 2019-08-08 NOTE — PROGRESS NOTES
Pt becoming agitated sitting in chair  Wanted to go back to bed, father at bedside.   Assisted pt back to bed w/o incident

## 2019-08-08 NOTE — PROGRESS NOTES
Problem: Self Care Deficits Care Plan (Adult)  Goal: *Therapy Goal (Edit Goal, Insert Text)  Description  STG 1: Patient will complete bathing with CGA using AE/DME as necessary within 1 week. LTG 1: Patient will complete bathing with supervision using AE/DME as necessary within 2 weeks. 8/8/2019 1512 by Naif Chu OT  Outcome: Not Met  8/8/2019 1511 by Monica Love OT  Outcome: Resolved/Not Met  Goal: *Therapy Goal (Edit Goal, Insert Text)  Description  STG 2: Patient will complete dressing with minimum assistance using AE/DME as necessary within 1 week. LTG 2: Patient will complete dressing with supervision using AE/DME as necessary within 2 weeks. 8/8/2019 1512 by Margarita KWON OT  Outcome: Not Met  8/8/2019 1511 by Monica Love OT  Outcome: Resolved/Not Met  Goal: *Therapy Goal (Edit Goal, Insert Text)  Description  STG 3: Patient will complete toileting with SBA using AE/DME as necessary within 1 week. LTG 3: Patient will complete toileting with supervision using AE/DME as necessary within 2 weeks. 8/8/2019 1512 by Margarita KWON OT  Outcome: Not Met  8/8/2019 1511 by Monica Love OT  Outcome: Resolved/Not Met  Goal: *Therapy Goal (Edit Goal, Insert Text)  Description  STG 4: Patient will complete functional transfers with SBA using AE/DME as necessary within 1 week. LTG 4: Patient will complete functional transfers with supervision using AE/DME as necessary within 2 weeks.       8/8/2019 1512 by Naif Chu, OT  Outcome: Not Met  8/8/2019 1511 by Sonja Love OT  Outcome: Resolved/Not Met  Goal: *Therapy Goal (Edit Goal, Insert Text)  Description  LTG 5: Pt/caregiver will demonstrate and verbalize good understanding of OT recommendations regarding ADL status, functional transfer status, home safety, DME, AE, energy conservation techniques, SAH education, and follow-up therapy for increasing safety with ADLs and functional tasks upon discharge. 8/8/2019 1512 by Shane Lu OT  Outcome: Not Met  8/8/2019 1511 by Monica Love OT  Outcome: Resolved/Not Met       Goals not met due to unanticipated discharge off floor to ICU.

## 2019-08-08 NOTE — PROGRESS NOTES
Problem: Mobility Impaired (Adult and Pediatric)  Goal: *Therapy Goal (Edit Goal, Insert Text)  Description  STG 1. Patient transfer supine<>sit with supervision assist in 1 week  LTG 1. Patient transfer supine<>sit with modified independence  in 2 weeks     Outcome: Not Met  Variance Patient Condition  Note:   Not met secondary transfer back to ICU  Goal: *Therapy Goal (Edit Goal, Insert Text)  Description  STG 2. Patient transfer sit<>stand and perform stand pivot transfer with LRAD and contact guard assist in 1 week  LTG 2. Patient transfer sit<>stand and perform stand pivot transfer with LRAD and supervision assist in 2 weeks     Outcome: Not Met  Variance Patient Condition  Note:   Not met secondary to transfer back to ICU  Goal: *Therapy Goal (Edit Goal, Insert Text)  Description  STG 3. Patient ambulate 150 feet with LRAD and MIN assist in 1 week  LTG 3. Patient ambulate 150 feet with LRAD and supervision assist in 2 weeks     Outcome: Not Met  Variance Patient Condition  Note:   Not met secondary to transfer back to ICU  Goal: *Therapy Goal (Edit Goal, Insert Text)  Description  STG 4. Patient ambulate up/down 4 steps with B rails and MIN assist in 1 week  LTG 4. Patient ambulate up/down 12 steps with B rails and stand by assist in 2 weeks     Outcome: Not Met  Variance Patient Condition  Note:   Not met secondary to transfer back to ICU  Goal: *Therapy Goal (Edit Goal, Insert Text)  Description  LTG 5. Patient perform HEP with supervision in 2 weeks   Outcome: Not Met  Variance Patient Condition  Note:   Not met secondary to transfer back to ICU     Problem: Patient Education: Go to Patient Education Activity  Goal: Patient/Family Education  Description  Patient / [employer] family will verbalize understanding of PT safety recommendations, demonstrate appropriate assist for current functional mobility status, safety, and home exercise program by time of discharge.      Outcome: Not Met  Variance Patient Condition  Note:   Not met secondary to transfer back to ICU

## 2019-08-08 NOTE — CDMP QUERY
Patient admitted with seizure/PRES. Patient noted to have BP up to 191/105 requiring Cardene and Nipride. If possible, please document in progress notes and d/c summary if you are evaluating and /or treating any of the following: Hypertensive Crisis Hypertensive Emergency Hypertensive Urgency Other, please specify Clinically unable to determine The medical record reflects the following: 
  Risk Factors: HTN, recent [address] w/coil and stent,  shunt for hydrocephalus Clinical Indicators: BP up to 191/105, seizure Treatment: Cardene, Nipride, Labetalol, CCU monitoring Thanks, Ivery Goodell, RN, BSN, CDS Clinical Documentation Improvement 
[phone]

## 2019-08-08 NOTE — PROGRESS NOTES
PM&R Consult Progress Note      Patient: Chico Diza  Admit Date: 8/7/2019  Admit Diagnosis: Seizure cerebral [I67.89]  Recommendations: Continue Acute Rehab Program, Coordination of rehab/medical care, Counseling of PM & R care issues management, Shy Medina [id number]  -weaning nipride, adding oral BB. Goal SBP< 140; pt with Posterior Reversible Encephalopathy with sz. No further szs  -will need to RE Certify thru insurance to return to [facility]; will begin in a.m if stable and cleared by Dr Katiana Jennings. -anemia; hgb 8.2/ NING on CKD creat now 3.4; w/u pending per nephrology  -cont PT/OT/ST; will need recent evals to precert  History/Subjective/Complaint:     Patient seen and examined. Records reviewed. Complains of feeling cold. Mild headache    Pain 1  Pain Scale 1: Visual (08/08/19 1100)  Pain Intensity 1: 0 (08/08/19 1100)  Patient Stated Pain Goal: 0 (08/08/19 1100)  Pain Reassessment 1: Patient resting w/respiratory rate greater than 10 (08/08/19 1000)  Pain Intervention(s) 1: Medication (see MAR); Rest;Emotional support (08/08/19 0856)     Objective:     Vitals:  Patient Vitals for the past 8 hrs:   BP Temp Pulse Resp SpO2   08/08/19 1357 140/62 -- 84 -- --   08/08/19 1330 -- -- 79 -- 100 %   08/08/19 1329 -- -- 78 -- 100 %   08/08/19 1315 -- -- 79 9 100 %   08/08/19 1300 -- -- 78 9 100 %   08/08/19 1230 -- -- 90 13 99 %   08/08/19 1215 -- -- 91 13 99 %   08/08/19 1200 -- -- 77 8 100 %   08/08/19 1152 -- -- 86 12 98 %   08/08/19 1145 -- -- 80 (!) 7 98 %   08/08/19 1141 -- -- 83 11 99 %   08/08/19 1130 -- -- 80 8 98 %   08/08/19 1115 -- -- 80 8 98 %   08/08/19 1100 -- 98.4 °F (36.9 °C) 89 13 100 %   08/08/19 1045 -- -- 81 -- 99 %   08/08/19 1030 -- -- 80 9 100 %   08/08/19 1015 -- -- 80 9 99 %   08/08/19 1000 -- -- 78 9 99 %   08/08/19 0946 -- -- 79 8 99 %   08/08/19 0945 -- -- 79 11 98 %   08/08/19 0944 -- -- 81 (!) 7 99 %   08/08/19 0931 -- -- 91 10 100 %   08/08/19 0930 -- -- 91 21 99 %   08/08/19 0929 -- -- 93 19 100 %   08/08/19 0917 -- -- 88 10 99 %   08/08/19 0915 -- -- 90 9 99 %   08/08/19 0914 -- -- 91 10 99 %   08/08/19 0910 -- -- 100 12 99 %   08/08/19 0900 -- -- (!) 104 8 98 %   08/08/19 0858 -- -- (!) 109 15 99 %   08/08/19 0856 -- -- (!) 114 11 100 %   08/08/19 0850 -- -- (!) 127 24 99 %   08/08/19 0830 145/68 -- (!) 119 -- --   08/08/19 0800 -- -- 100 9 98 %   08/08/19 0758 -- -- (!) 101 9 98 %   08/08/19 0751 -- -- (!) 101 10 98 %   08/08/19 0723 -- 98.4 °F (36.9 °C) 99 21 98 %   08/08/19 0700 -- -- 94 9 99 %   08/08/19 0647 -- -- 97 10 98 %   08/08/19 0632 -- -- (!) 111 12 98 %      Intake and Output:  08/06 1901 - 08/08 0700  In: 4016.7 [P.O.:120;  I.V.:3896.7]  Out: 3120 [Urine:3120]    No Known Allergies  Current Facility-Administered Medications   Medication Dose Route Frequency    aspirin tablet 325 mg  325 mg Oral DAILY    metoprolol tartrate (LOPRESSOR) tablet 100 mg  100 mg Oral BID    clopidogrel (PLAVIX) tablet 75 mg  75 mg Oral DAILY    citalopram (CELEXA) tablet 10 mg  10 mg Oral DAILY    modafinil (PROVIGIL) tablet 200 mg  200 mg Oral DAILY    nicotine (NICODERM CQ) 14 mg/24 hr patch 1 Patch  1 Patch TransDERmal Q24H    famotidine (PEPCID) tablet 20 mg  20 mg Oral DAILY    hydrALAZINE (APRESOLINE) tablet 10 mg  10 mg Oral TID    sodium chloride (NS) flush 5-40 mL  5-40 mL IntraVENous PRN    HYDROcodone-acetaminophen (NORCO) 7.5-325 mg per tablet 1 Tab  1 Tab Oral Q4H PRN    ondansetron (ZOFRAN) injection 4 mg  4 mg IntraVENous Q4H PRN    heparin (porcine) injection 5,000 Units  5,000 Units SubCUTAneous Q8H    levETIRAcetam (KEPPRA) 1,000 mg in 0.9% sodium chloride 100 mL IVPB  1,000 mg IntraVENous Q12H    0.9% sodium chloride infusion  125 mL/hr IntraVENous CONTINUOUS    nitroPRUSSide (NIPRIDE) 50 mg in dextrose 5% 250 mL infusion  0-10 mcg/kg/min IntraVENous TITRATE    fentaNYL citrate (PF) injection 50 mcg  50 mcg IntraVENous Q2H PRN       Physical Exam:  No significant changes    Incision(s)/Wound(s): Wound Abdomen Right (Active)   Dressing Status Clean, dry, and intact 8/8/2019  7:23 AM   Dressing Type Topical skin adhesive/glue 8/8/2019  7:23 AM   Incision Site Well Approximated Yes 8/8/2019  7:23 AM   Number of days: 7       Wound Head Right (Active)   Dressing Status Clean, dry, and intact 8/8/2019  7:23 AM   Dressing Type Open to air; Sutures 8/8/2019  7:23 AM   Incision Site Well Approximated Yes 8/8/2019  7:23 AM   Drainage Amount None 8/8/2019  7:23 AM   Number of days: 7          Functional Assessment:                                                  Labs/Studies:  Recent Results (from the past 72 hour(s))   MAGNESIUM    Collection Time: 08/06/19  5:55 AM   Result Value Ref Range    Magnesium 2.1 1.8 - 2.4 mg/dL   METABOLIC PANEL, BASIC    Collection Time: 08/06/19  5:55 AM   Result Value Ref Range    Sodium 143 136 - 145 mmol/L    Potassium 4.3 3.5 - 5.1 mmol/L    Chloride 109 (H) 98 - 107 mmol/L    CO2 24 21 - 32 mmol/L    Anion gap 10 7 - 16 mmol/L    Glucose 79 65 - 100 mg/dL    BUN 33 (H) 6 - 23 MG/DL    Creatinine 3.85 (H) 0.8 - 1.5 MG/DL    GFR est AA 22 (L) >60 ml/min/1.73m2    GFR est non-AA 18 (L) >60 ml/min/1.73m2    Calcium 9.4 8.3 - 10.4 MG/DL   PHOSPHORUS    Collection Time: 08/06/19  5:55 AM   Result Value Ref Range    Phosphorus 4.5 2.5 - 4.5 MG/DL   PROTEIN/CREATININE RATIO, URINE    Collection Time: 08/06/19  1:46 PM   Result Value Ref Range    Protein, urine random 479 (H) <11.9 mg/dL    Creatinine, urine 106.00 mg/dL    Protein/Creat.  urine Ratio 4.5     URINALYSIS W/ RFLX MICROSCOPIC    Collection Time: 08/06/19  1:46 PM   Result Value Ref Range    Color YELLOW      Appearance CLEAR      Specific gravity 1.015 1.001 - 1.023      pH (UA) 5.5 5.0 - 9.0      Protein 300 (A) NEG mg/dL    Glucose NEGATIVE  mg/dL    Ketone TRACE (A) NEG mg/dL    Bilirubin NEGATIVE  NEG      Blood SMALL (A) NEG      Urobilinogen 0.2 0.2 - 1.0 EU/dL    Nitrites NEGATIVE  NEG Leukocyte Esterase NEGATIVE  NEG      WBC 5-10 0 /hpf    RBC 0-3 0 /hpf    Epithelial cells 0-3 0 /hpf    Bacteria 0 0 /hpf    Casts 0-3 0 /lpf   MAGNESIUM    Collection Time: 08/07/19  4:38 AM   Result Value Ref Range    Magnesium 2.2 1.8 - 2.4 mg/dL   METABOLIC PANEL, BASIC    Collection Time: 08/07/19  4:39 AM   Result Value Ref Range    Sodium 141 136 - 145 mmol/L    Potassium 3.9 3.5 - 5.1 mmol/L    Chloride 108 (H) 98 - 107 mmol/L    CO2 24 21 - 32 mmol/L    Anion gap 9 7 - 16 mmol/L    Glucose 92 65 - 100 mg/dL    BUN 35 (H) 6 - 23 MG/DL    Creatinine 3.81 (H) 0.8 - 1.5 MG/DL    GFR est AA 22 (L) >60 ml/min/1.73m2    GFR est non-AA 18 (L) >60 ml/min/1.73m2    Calcium 9.1 8.3 - 10.4 MG/DL   CBC WITH AUTOMATED DIFF    Collection Time: 08/07/19  4:39 AM   Result Value Ref Range    WBC 10.0 4.3 - 11.1 K/uL    RBC 3.17 (L) 4.23 - 5.6 M/uL    HGB 9.7 (L) 13.6 - 17.2 g/dL    HCT 29.6 (L) 41.1 - 50.3 %    MCV 93.4 79.6 - 97.8 FL    MCH 30.6 26.1 - 32.9 PG    MCHC 32.8 31.4 - 35.0 g/dL    RDW 13.7 11.9 - 14.6 %    PLATELET 390 (L) 022 - 450 K/uL    MPV 10.5 9.4 - 12.3 FL    ABSOLUTE NRBC 0.00 0.0 - 0.2 K/uL    DF AUTOMATED      NEUTROPHILS 75 43 - 78 %    LYMPHOCYTES 13 13 - 44 %    MONOCYTES 9 4.0 - 12.0 %    EOSINOPHILS 2 0.5 - 7.8 %    BASOPHILS 1 0.0 - 2.0 %    IMMATURE GRANULOCYTES 0 0.0 - 5.0 %    ABS. NEUTROPHILS 7.4 1.7 - 8.2 K/UL    ABS. LYMPHOCYTES 1.3 0.5 - 4.6 K/UL    ABS. MONOCYTES 0.9 0.1 - 1.3 K/UL    ABS. EOSINOPHILS 0.2 0.0 - 0.8 K/UL    ABS. BASOPHILS 0.1 0.0 - 0.2 K/UL    ABS. IMM.  GRANS. 0.0 0.0 - 0.5 K/UL   FERRITIN    Collection Time: 08/07/19  4:39 AM   Result Value Ref Range    Ferritin 497 (H) 8 - 388 NG/ML   TRANSFERRIN SATURATION    Collection Time: 08/07/19  4:39 AM   Result Value Ref Range    Iron 76 35 - 150 ug/dL    TIBC 182 (L) 250 - 450 ug/dL    Transferrin Saturation 42 >20 %   PTH INTACT    Collection Time: 08/07/19  4:40 AM   Result Value Ref Range    Calcium 9.2 8.3 - 10.4 MG/DL    PTH, Intact 66.9 18.5 - 88.0 pg/mL   MAGNESIUM    Collection Time: 08/07/19 10:10 AM   Result Value Ref Range    Magnesium 2.0 1.8 - 2.4 mg/dL   COMPLEMENT, C3    Collection Time: 08/07/19  2:17 PM   Result Value Ref Range    Complement C3 125 82 - 167 mg/dL   COMPLEMENT, C4    Collection Time: 08/07/19  2:17 PM   Result Value Ref Range    Complement C4 48 (H) 14 - 44 mg/dL   HIV 1/2 AG/AB, 4TH GENERATION,W RFLX CONFIRM    Collection Time: 08/07/19  2:17 PM   Result Value Ref Range    HIV Screen, 4th gen Non Reactive Non Reactive   HEPATITIS PANEL, ACUTE    Collection Time: 08/07/19  2:17 PM   Result Value Ref Range    Hepatitis A Ab, IgM NEGATIVE  NEGATIVE      Hep B surface Ag screen NEGATIVE  NEGATIVE      Hep B Core Ab, IgM NEGATIVE  NEGATIVE      Hep C Virus Ab <0.1 0.0 - 0.9 s/co ratio   FREE LIGHT CHAINS, KAPPA/LAMBDA, QT    Collection Time: 08/07/19  2:17 PM   Result Value Ref Range    Kappa Free Light Chain 97.93 (H) 3.30 - 19.40 mg/L    Lambda Free Light Chain 45.61 (H) 5.71 - 26.30 mg/L    Kappa/Lambda Ratio 2.15 (H) 0.26 - 1.65     LIPID PANEL    Collection Time: 08/07/19  2:17 PM   Result Value Ref Range    LIPID PROFILE          Cholesterol, total 145 <200 MG/DL    Triglyceride 117 35 - 150 MG/DL    HDL Cholesterol 54 40 - 60 MG/DL    LDL, calculated 67.6 <100 MG/DL    VLDL, calculated 23.4 (H) 6.0 - 23.0 MG/DL    CHOL/HDL Ratio 2.7     PROTEIN ELEC WITH RAOUL, SERUM    Collection Time: 08/07/19  2:25 PM   Result Value Ref Range    Protein, total 4.9 (L) 6.3 - 8.2 g/dL    A-G Ratio PENDING      ALBUMIN PENDING g/dL    Alpha-1 globulin PENDING g/dL    ALPHA 2 PENDING g/dL    Beta-globulin PENDING g/dL    Gamma-globulin PENDING 10 - 12 g/dL    M-Ezequiel PENDING 0 g/dL    Immunoglobulin G 639 610 - 1,616 mg/dL    Immunoglobulin A 208 85 - 499 mg/dL    Immunoglobulin M 95 35 - 242 mg/dL    PEP Interpretation PENDING     RAOUL Interpretation PENDING    MAGNESIUM    Collection Time: 08/07/19  8:21 PM Result Value Ref Range    Magnesium 2.9 (H) 1.8 - 2.4 mg/dL   PROTEIN ELEC WITH RAOUL, URINE RANDOM    Collection Time: 08/07/19  8:33 PM   Result Value Ref Range    Protein, urine random 200 (H) <11.9 mg/dL    A-G Ratio PENDING      Albumin, urine PENDING mg/dL    ALPHA 1 PENDING mg/dL    ALPHA 2 PENDING mg/dL    BETA PENDING mg/dL    GAMMA PENDING mg/dL    M-Ezequiel, mg/dL PENDING 0 mg/dL    PEP Interpretation PENDING     RAOUL Interpretation PENDING    METABOLIC PANEL, BASIC    Collection Time: 08/08/19  3:53 AM   Result Value Ref Range    Sodium 142 136 - 145 mmol/L    Potassium 3.7 3.5 - 5.1 mmol/L    Chloride 111 (H) 98 - 107 mmol/L    CO2 23 21 - 32 mmol/L    Anion gap 8 7 - 16 mmol/L    Glucose 102 (H) 65 - 100 mg/dL    BUN 27 (H) 6 - 23 MG/DL    Creatinine 3.40 (H) 0.8 - 1.5 MG/DL    GFR est AA 25 (L) >60 ml/min/1.73m2    GFR est non-AA 21 (L) >60 ml/min/1.73m2    Calcium 8.4 8.3 - 10.4 MG/DL   CBC WITH AUTOMATED DIFF    Collection Time: 08/08/19  3:53 AM   Result Value Ref Range    WBC 8.7 4.3 - 11.1 K/uL    RBC 2.69 (L) 4.23 - 5.6 M/uL    HGB 8.2 (L) 13.6 - 17.2 g/dL    HCT 25.3 (L) 41.1 - 50.3 %    MCV 94.1 79.6 - 97.8 FL    MCH 30.5 26.1 - 32.9 PG    MCHC 32.4 31.4 - 35.0 g/dL    RDW 14.3 11.9 - 14.6 %    PLATELET 038 459 - 245 K/uL    MPV 11.0 9.4 - 12.3 FL    ABSOLUTE NRBC 0.00 0.0 - 0.2 K/uL    DF AUTOMATED      NEUTROPHILS 72 43 - 78 %    LYMPHOCYTES 14 13 - 44 %    MONOCYTES 10 4.0 - 12.0 %    EOSINOPHILS 3 0.5 - 7.8 %    BASOPHILS 1 0.0 - 2.0 %    IMMATURE GRANULOCYTES 0 0.0 - 5.0 %    ABS. NEUTROPHILS 6.2 1.7 - 8.2 K/UL    ABS. LYMPHOCYTES 1.2 0.5 - 4.6 K/UL    ABS. MONOCYTES 0.9 0.1 - 1.3 K/UL    ABS. EOSINOPHILS 0.3 0.0 - 0.8 K/UL    ABS. BASOPHILS 0.1 0.0 - 0.2 K/UL    ABS. IMM.  GRANS. 0.0 0.0 - 0.5 K/UL   MAGNESIUM    Collection Time: 08/08/19  3:53 AM   Result Value Ref Range    Magnesium 2.7 (H) 1.8 - 2.4 mg/dL        Assessment:     Principal Problem:    Posterior reversible encephalopathy syndrome (8/8/2019)    Active Problems:    Seizure cerebral (8/7/2019)        Plan:     Recommendations: Continue Acute Rehab Program  Coordination of rehab/medical care  Counseling of PM & R care issues management  Monitoring and management of medical conditions per plan of care/orders  Discussion with Family/Caregiver/Staff  Reviewed Therapies/Labs/Medications/Records

## 2019-08-08 NOTE — PROGRESS NOTES
Progress Note    Patient: Hiren Hancock MRN: [id number]  SSN: [id number]    YOB: 1975  Age: 37 y.o. Sex: male      Admit Date: 8/7/2019    LOS: 1 day     Subjective:   Pt looks much better today, weaning off Nipride, started on metoprolol 100mg bid. Goal to keep SBP <140. Will go back to rehab this week if no acute changes. Current Facility-Administered Medications   Medication Dose Route Frequency    aspirin tablet 325 mg  325 mg Oral DAILY    metoprolol tartrate (LOPRESSOR) tablet 100 mg  100 mg Oral BID    clopidogrel (PLAVIX) tablet 75 mg  75 mg Oral DAILY    citalopram (CELEXA) tablet 10 mg  10 mg Oral DAILY    modafinil (PROVIGIL) tablet 200 mg  200 mg Oral DAILY    nicotine (NICODERM CQ) 14 mg/24 hr patch 1 Patch  1 Patch TransDERmal Q24H    famotidine (PEPCID) tablet 20 mg  20 mg Oral DAILY    sodium chloride 0.9 % bolus infusion 1,000 mL  1,000 mL IntraVENous ONCE    sodium chloride (NS) flush 5-40 mL  5-40 mL IntraVENous PRN    HYDROcodone-acetaminophen (NORCO) 7.5-325 mg per tablet 1 Tab  1 Tab Oral Q4H PRN    ondansetron (ZOFRAN) injection 4 mg  4 mg IntraVENous Q4H PRN    heparin (porcine) injection 5,000 Units  5,000 Units SubCUTAneous Q8H    levETIRAcetam (KEPPRA) 1,000 mg in 0.9% sodium chloride 100 mL IVPB  1,000 mg IntraVENous Q12H    0.9% sodium chloride infusion  125 mL/hr IntraVENous CONTINUOUS    nitroPRUSSide (NIPRIDE) 50 mg in dextrose 5% 250 mL infusion  0-10 mcg/kg/min IntraVENous TITRATE    fentaNYL citrate (PF) injection 50 mcg  50 mcg IntraVENous Q2H PRN       Objective:     Vitals:    08/08/19 1115 08/08/19 1130 08/08/19 1141 08/08/19 1152   BP:       Pulse: 80 80 83 86   Resp: 8 8 11 12   Temp:       SpO2: 98% 98% 99% 98%   Weight:             Intake and Output:  Current Shift: 08/08 0701 - 08/08 1900  In: 3368.8 [I.V.:3368.8]  Out: 525 [Urine:525]  Last 24 hr: 08/07 0701 - 08/08 0700  In: 4016.7 [P.O.:120;  I.V.:3896.7]  Out: 6030 [KMZWE:1160]    Physical Exam:   General:  More awake today, headache better. Eyes:  PERRL+3, no nystagmus. Neck: Supple, symmetrical, trachea midline, no adenopathy, thyroid: no enlargment/tenderness/nodules, no carotid bruit and no JVD. Lungs:   Clear to auscultation bilaterally. Heart:  Regular rate and rhythm, S1, S2 normal, no murmur, click, rub or gallop. Abdomen:   Soft, non-tender. Bowel sounds normal. No masses,  No organomegaly. Extremities: Extremities normal, atraumatic, no cyanosis or edema. Pulses: 2+ and symmetric all extremities. Skin: Skin color, texture, turgor normal. No rashes or lesions   Neurologic: CNII-XII intact. Normal strength, sensation and reflexes throughout. Lab/Data Review:    Recent Results (from the past 12 hour(s))   METABOLIC PANEL, BASIC    Collection Time: 08/08/19  3:53 AM   Result Value Ref Range    Sodium 142 136 - 145 mmol/L    Potassium 3.7 3.5 - 5.1 mmol/L    Chloride 111 (H) 98 - 107 mmol/L    CO2 23 21 - 32 mmol/L    Anion gap 8 7 - 16 mmol/L    Glucose 102 (H) 65 - 100 mg/dL    BUN 27 (H) 6 - 23 MG/DL    Creatinine 3.40 (H) 0.8 - 1.5 MG/DL    GFR est AA 25 (L) >60 ml/min/1.73m2    GFR est non-AA 21 (L) >60 ml/min/1.73m2    Calcium 8.4 8.3 - 10.4 MG/DL   CBC WITH AUTOMATED DIFF    Collection Time: 08/08/19  3:53 AM   Result Value Ref Range    WBC 8.7 4.3 - 11.1 K/uL    RBC 2.69 (L) 4.23 - 5.6 M/uL    HGB 8.2 (L) 13.6 - 17.2 g/dL    HCT 25.3 (L) 41.1 - 50.3 %    MCV 94.1 79.6 - 97.8 FL    MCH 30.5 26.1 - 32.9 PG    MCHC 32.4 31.4 - 35.0 g/dL    RDW 14.3 11.9 - 14.6 %    PLATELET 453 745 - 097 K/uL    MPV 11.0 9.4 - 12.3 FL    ABSOLUTE NRBC 0.00 0.0 - 0.2 K/uL    DF AUTOMATED      NEUTROPHILS 72 43 - 78 %    LYMPHOCYTES 14 13 - 44 %    MONOCYTES 10 4.0 - 12.0 %    EOSINOPHILS 3 0.5 - 7.8 %    BASOPHILS 1 0.0 - 2.0 %    IMMATURE GRANULOCYTES 0 0.0 - 5.0 %    ABS. NEUTROPHILS 6.2 1.7 - 8.2 K/UL    ABS. LYMPHOCYTES 1.2 0.5 - 4.6 K/UL    ABS.  MONOCYTES 0.9 0.1 - 1.3 K/UL    ABS. EOSINOPHILS 0.3 0.0 - 0.8 K/UL    ABS. BASOPHILS 0.1 0.0 - 0.2 K/UL    ABS. IMM. GRANS. 0.0 0.0 - 0.5 K/UL   MAGNESIUM    Collection Time: 08/08/19  3:53 AM   Result Value Ref Range    Magnesium 2.7 (H) 1.8 - 2.4 mg/dL       Assessment/ Plan:     Principal Problem:    Posterior reversible encephalopathy syndrome (8/8/2019)    Active Problems:    Seizure cerebral (8/7/2019)      NEURO: Pt admitted back to ICU for seizure activity and PRES. Pt looks much better today. Wean off Nipride, keep BP <140, continue PT/OT/ST with goal to return to Rehab this week unless acute changes. Will repeat CT head in am.   RESP: tolerating RA, 02 sat 100%. CV: SBP goal <140, will cont asa/plavix, heparin and cont monitor. Metoprolol and hydralazine started for BP. HEME: HH: 8.2/25,   NEPH: bun/cr: 27/3.4. ( pt with hx of kidney disease on admit- Nephrology following. GI: advance diet as tolerated. Senna, IVF\"s. Will give 2L NS bolus today. ID: afebrile, no abx  LINES: lenin unger R PICC.    Signed By: Maria R Leonard NP     August 8, 2019

## 2019-08-08 NOTE — PROGRESS NOTES
Bedside rounds with Dr. Azra Cesar and Milad Durand NP. Updated on pt. Verbal orders for 1liter NS bolus now.

## 2019-08-08 NOTE — PROGRESS NOTES
sbp maintaining greater than 140. Increased nipride to 3mcg  Hr 82  Neuro unchanged. Less agitated since getting back to bed. Father remains at bedside  PICC team in to change dressing.

## 2019-08-08 NOTE — PROGRESS NOTES
Bedside shift change report given to [phone] (oncoming nurse) by Lay Noel RN (offgoing nurse). Report included the following information SBAR, Kardex, ED Summary, OR Summary, Procedure Summary, Intake/Output, MAR, Accordion, Recent Results, Med Rec Status, Cardiac Rhythm NSR/ST and Alarm Parameters .

## 2019-08-08 NOTE — PROGRESS NOTES
Pt asking for his phone  Father at bedside, told pt phone was at his house. Pt and father speaking German. Pt engaged in conversation.

## 2019-08-08 NOTE — PROGRESS NOTES
A follow up visit was made to the patient. Emotional support, spiritual presence and   prayer were provided.  talked with the patient's father outside the patient's room later in the day. Patient seemed to have very little response in regards to eating, talking, activity.       [employer]

## 2019-08-08 NOTE — PROGRESS NOTES
Bedside report with Jeffery Lanza RN with dual neuro assessment. Brother at bedside  Nipride continues at 2mcg  bp seems to trend less than 140 when pt is lying on right side and will elevate greater than 140 when pt lies on right side. This observation passed on to oncoming nurse.

## 2019-08-08 NOTE — PROGRESS NOTES
Bedside report from Yvonne Costello, [address] [place] [address] with dual neuro assessment. Pt awake, alert to person, states place is [place], reoriented. Speech is clear with delayed response. Vasques, R<L, drift noted to RUE with ataxia present. Pupils brisk, reactive. Pt denies pain or needs. Calm, follows commands with delay. Arterial pressure 127/56 map 79 with nipride at 2mcg/kg/min, hr 97 SR, 98% on room air. Bed alarm on, s/r up x 2. Will continue care and assessment.

## 2019-08-08 NOTE — PROGRESS NOTES
SBP >140 for over 15 min. Eloisa , NP called.  Orders received to titrate Nipride drip with goal SBP <160

## 2019-08-08 NOTE — PROGRESS NOTES
[facility] PROGRESS NOTE    Follow up for: NING over ckd     Subjective:   Patient seen and examined. Chart, notes, labs, imaging, results all reviewed.      Seen in icu after an episode of seizure in rehab     On Cardene and nipride drip     On IVF at 125 ml/hr       ROS:  Gen - no fever, no chills, appetite okay  CV - no chest pain, no orthopnea  Lung - no shortness of breath, no cough  Abd - no tenderness, no nausea, no vomiting  Ext - no edema    Objective:   Exam:  Vitals:    08/08/19 0900 08/08/19 0910 08/08/19 0914 08/08/19 0917   BP:       Pulse: (!) 104 100 91 88   Resp: 8 12 10 10   Temp:       SpO2: 98% 99% 99% 99%   Weight:             Intake/Output Summary (Last 24 hours) at 8/8/2019 0925  Last data filed at 8/8/2019 0900  Gross per 24 hour   Intake 5993.57 ml   Output 2295 ml   Net 3698.57 ml       Current Facility-Administered Medications   Medication Dose Route Frequency    aspirin tablet 325 mg  325 mg Oral DAILY    metoprolol tartrate (LOPRESSOR) tablet 100 mg  100 mg Oral BID    clopidogrel (PLAVIX) tablet 75 mg  75 mg Oral DAILY    citalopram (CELEXA) tablet 10 mg  10 mg Oral DAILY    modafinil (PROVIGIL) tablet 200 mg  200 mg Oral DAILY    nicotine (NICODERM CQ) 14 mg/24 hr patch 1 Patch  1 Patch TransDERmal Q24H    sodium chloride 0.9 % bolus infusion 1,000 mL  1,000 mL IntraVENous ONCE    famotidine (PEPCID) tablet 20 mg  20 mg Oral DAILY    sodium chloride (NS) flush 5-40 mL  5-40 mL IntraVENous PRN    HYDROcodone-acetaminophen (NORCO) 7.5-325 mg per tablet 1 Tab  1 Tab Oral Q4H PRN    ondansetron (ZOFRAN) injection 4 mg  4 mg IntraVENous Q4H PRN    heparin (porcine) injection 5,000 Units  5,000 Units SubCUTAneous Q8H    levETIRAcetam (KEPPRA) 1,000 mg in 0.9% sodium chloride 100 mL IVPB  1,000 mg IntraVENous Q12H    0.9% sodium chloride infusion  125 mL/hr IntraVENous CONTINUOUS    niCARdipine in Saline (CARDENE) 25 MG/250 mL infusion kit  0-15 mg/hr IntraVENous TITRATE  nitroPRUSSide (NIPRIDE) 50 mg in dextrose 5% 250 mL infusion  0-10 mcg/kg/min IntraVENous TITRATE    fentaNYL citrate (PF) injection 50 mcg  50 mcg IntraVENous Q2H PRN       EXAM  GEN - Alert, oriented, in no distress  CV - S1, S2, RRR, no rub, murmur, or gallop  Lung - clear to auscultation bilaterally  Abd - soft, nontender, BS present  Ext - no edema    Recent Labs     08/08/19 0353 08/07/19  0439   WBC 8.7 10.0   HGB 8.2* 9.7*   HCT 25.3* 29.6*    145*        Recent Labs     08/08/19  0353 08/07/19 2021 08/07/19  1010 08/07/19  0440 08/07/19  0439  08/06/19  0555     --   --   --  141  --  143   K 3.7  --   --   --  3.9  --  4.3   *  --   --   --  108*  --  109*   CO2 23  --   --   --  24  --  24   BUN 27*  --   --   --  35*  --  33*   CREA 3.40*  --   --   --  3.81*  --  3.85*   CA 8.4  --   --  9.2 9.1  --  9.4   *  --   --   --  92  --  79   MG 2.7* 2.9* 2.0  --   --    < > 2.1   PHOS  --   --   --   --   --   --  4.5    < > = values in this interval not displayed. 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 No family h/o of PCKD known so far .     Discussed with ICU nurse     Kiarra Holman MD

## 2019-08-09 ENCOUNTER — APPOINTMENT (OUTPATIENT)
Dept: CT IMAGING | Age: 44
DRG: 071 | End: 2019-08-09
Attending: NURSE PRACTITIONER
Payer: COMMERCIAL

## 2019-08-09 LAB
ANION GAP SERPL CALC-SCNC: 7 MMOL/L (ref 7–16)
BUN SERPL-MCNC: 19 MG/DL (ref 6–23)
CALCIUM SERPL-MCNC: 8.2 MG/DL (ref 8.3–10.4)
CHLORIDE SERPL-SCNC: 115 MMOL/L (ref 98–107)
CO2 SERPL-SCNC: 21 MMOL/L (ref 21–32)
CREAT SERPL-MCNC: 3.07 MG/DL (ref 0.8–1.5)
ERYTHROCYTE [DISTWIDTH] IN BLOOD BY AUTOMATED COUNT: 14.3 % (ref 11.9–14.6)
GLUCOSE SERPL-MCNC: 112 MG/DL (ref 65–100)
HCT VFR BLD AUTO: 24.4 % (ref 41.1–50.3)
HGB BLD-MCNC: 7.8 G/DL (ref 13.6–17.2)
MAGNESIUM SERPL-MCNC: 2.1 MG/DL (ref 1.8–2.4)
MCH RBC QN AUTO: 30.7 PG (ref 26.1–32.9)
MCHC RBC AUTO-ENTMCNC: 32 G/DL (ref 31.4–35)
MCV RBC AUTO: 96.1 FL (ref 79.6–97.8)
NRBC # BLD: 0 K/UL (ref 0–0.2)
PLATELET # BLD AUTO: 167 K/UL (ref 150–450)
PMV BLD AUTO: 10.6 FL (ref 9.4–12.3)
POTASSIUM SERPL-SCNC: 3.3 MMOL/L (ref 3.5–5.1)
RBC # BLD AUTO: 2.54 M/UL (ref 4.23–5.6)
SODIUM SERPL-SCNC: 143 MMOL/L (ref 136–145)
WBC # BLD AUTO: 7.2 K/UL (ref 4.3–11.1)

## 2019-08-09 PROCEDURE — 74011250636 HC RX REV CODE- 250/636: Performed by: NURSE PRACTITIONER

## 2019-08-09 PROCEDURE — 77030020263 HC SOL INJ SOD CL0.9% LFCR 1000ML

## 2019-08-09 PROCEDURE — 97166 OT EVAL MOD COMPLEX 45 MIN: CPT

## 2019-08-09 PROCEDURE — 92526 ORAL FUNCTION THERAPY: CPT

## 2019-08-09 PROCEDURE — 74011250637 HC RX REV CODE- 250/637: Performed by: NEUROLOGICAL SURGERY

## 2019-08-09 PROCEDURE — 65270000029 HC RM PRIVATE

## 2019-08-09 PROCEDURE — 99024 POSTOP FOLLOW-UP VISIT: CPT | Performed by: NEUROLOGICAL SURGERY

## 2019-08-09 PROCEDURE — 83735 ASSAY OF MAGNESIUM: CPT

## 2019-08-09 PROCEDURE — 97164 PT RE-EVAL EST PLAN CARE: CPT

## 2019-08-09 PROCEDURE — 80048 BASIC METABOLIC PNL TOTAL CA: CPT

## 2019-08-09 PROCEDURE — 85027 COMPLETE CBC AUTOMATED: CPT

## 2019-08-09 PROCEDURE — 74011250636 HC RX REV CODE- 250/636: Performed by: NEUROLOGICAL SURGERY

## 2019-08-09 PROCEDURE — 74011000258 HC RX REV CODE- 258: Performed by: NURSE PRACTITIONER

## 2019-08-09 PROCEDURE — 99232 SBSQ HOSP IP/OBS MODERATE 35: CPT | Performed by: PHYSICAL MEDICINE & REHABILITATION

## 2019-08-09 PROCEDURE — 74011250637 HC RX REV CODE- 250/637: Performed by: NURSE PRACTITIONER

## 2019-08-09 PROCEDURE — 97530 THERAPEUTIC ACTIVITIES: CPT

## 2019-08-09 PROCEDURE — 74011000250 HC RX REV CODE- 250: Performed by: NURSE PRACTITIONER

## 2019-08-09 PROCEDURE — 92507 TX SP LANG VOICE COMM INDIV: CPT

## 2019-08-09 PROCEDURE — 70450 CT HEAD/BRAIN W/O DYE: CPT

## 2019-08-09 PROCEDURE — 97112 NEUROMUSCULAR REEDUCATION: CPT

## 2019-08-09 RX ORDER — MAGNESIUM SULFATE HEPTAHYDRATE 40 MG/ML
2 INJECTION, SOLUTION INTRAVENOUS ONCE
Status: COMPLETED | OUTPATIENT
Start: 2019-08-09 | End: 2019-08-09

## 2019-08-09 RX ORDER — ONDANSETRON 2 MG/ML
INJECTION INTRAMUSCULAR; INTRAVENOUS
Status: DISCONTINUED
Start: 2019-08-09 | End: 2019-08-09

## 2019-08-09 RX ORDER — LEVETIRACETAM 500 MG/1
1000 TABLET ORAL 2 TIMES DAILY
Status: DISCONTINUED | OUTPATIENT
Start: 2019-08-09 | End: 2019-08-13 | Stop reason: HOSPADM

## 2019-08-09 RX ADMIN — LEVETIRACETAM 1000 MG: 500 TABLET, FILM COATED ORAL at 17:01

## 2019-08-09 RX ADMIN — METOPROLOL TARTRATE 100 MG: 50 TABLET ORAL at 08:23

## 2019-08-09 RX ADMIN — HEPARIN SODIUM 5000 UNITS: 5000 INJECTION INTRAVENOUS; SUBCUTANEOUS at 08:24

## 2019-08-09 RX ADMIN — MODAFINIL 200 MG: 100 TABLET ORAL at 08:23

## 2019-08-09 RX ADMIN — ASPIRIN 325 MG ORAL TABLET 325 MG: 325 PILL ORAL at 08:23

## 2019-08-09 RX ADMIN — FAMOTIDINE 20 MG: 20 TABLET ORAL at 08:23

## 2019-08-09 RX ADMIN — HYDRALAZINE HYDROCHLORIDE 25 MG: 50 TABLET, FILM COATED ORAL at 16:42

## 2019-08-09 RX ADMIN — CITALOPRAM HYDROBROMIDE 10 MG: 20 TABLET ORAL at 08:22

## 2019-08-09 RX ADMIN — HEPARIN SODIUM 5000 UNITS: 5000 INJECTION INTRAVENOUS; SUBCUTANEOUS at 16:42

## 2019-08-09 RX ADMIN — MAGNESIUM SULFATE HEPTAHYDRATE 2 G: 40 INJECTION, SOLUTION INTRAVENOUS at 06:04

## 2019-08-09 RX ADMIN — METOPROLOL TARTRATE 100 MG: 50 TABLET ORAL at 17:01

## 2019-08-09 RX ADMIN — CLOPIDOGREL BISULFATE 75 MG: 75 TABLET ORAL at 08:23

## 2019-08-09 RX ADMIN — DEXTROSE MONOHYDRATE 5.5 MCG/KG/MIN: 5 INJECTION, SOLUTION INTRAVENOUS at 02:41

## 2019-08-09 RX ADMIN — ONDANSETRON 4 MG: 2 INJECTION INTRAMUSCULAR; INTRAVENOUS at 01:53

## 2019-08-09 RX ADMIN — HYDROCODONE BITARTRATE AND ACETAMINOPHEN 1 TABLET: 7.5; 325 TABLET ORAL at 01:48

## 2019-08-09 RX ADMIN — SODIUM CHLORIDE 125 ML/HR: 900 INJECTION, SOLUTION INTRAVENOUS at 06:08

## 2019-08-09 RX ADMIN — FENTANYL CITRATE 50 MCG: 50 INJECTION INTRAMUSCULAR; INTRAVENOUS at 03:07

## 2019-08-09 RX ADMIN — ALPRAZOLAM 0.5 MG: 0.5 TABLET ORAL at 06:04

## 2019-08-09 RX ADMIN — HYDRALAZINE HYDROCHLORIDE 25 MG: 50 TABLET, FILM COATED ORAL at 22:37

## 2019-08-09 RX ADMIN — HEPARIN SODIUM 5000 UNITS: 5000 INJECTION INTRAVENOUS; SUBCUTANEOUS at 00:26

## 2019-08-09 RX ADMIN — HYDRALAZINE HYDROCHLORIDE 25 MG: 50 TABLET, FILM COATED ORAL at 08:23

## 2019-08-09 RX ADMIN — SODIUM CHLORIDE 1000 MG: 900 INJECTION, SOLUTION INTRAVENOUS at 00:31

## 2019-08-09 NOTE — PROGRESS NOTES
Problem: Self Care Deficits Care Plan (Adult)  Goal: *Acute Goals and Plan of Care (Insert Text)  Description  1. Patient will complete standing activity at midline for 10 minutes to improve standing posture during ADL. 2. Patient will complete visual tasks with minimal cues to improve awareness of the environment for functional tasks. 3. Patient will complete strengthening/coordination tasks with the UEs with minimal cues to improve independence with functional transfer/ADL. 4. Patient will complete functional mobility for household distances with CGA and minimal cues for safety. 5. Patient will complete toileting tasks with CGA to improve independence with self-care. 6. Patient will tolerate 30 minutes with 2-3 rest breaks to demonstrate improved activity tolerance for daily tasks. Timeframe: 7 days      Outcome: Progressing Towards Goal       OCCUPATIONAL THERAPY: Initial Assessment, Daily Note and AM 8/9/2019  INPATIENT: OT Visit Days: 1  Payor: Regnio Chinchilla / Plan: [place] [employer] [facility] / Product Type: PPO /      NAME/AGE/GENDER: Criselda Maya is a 37 y.o. male   PRIMARY DIAGNOSIS:  Seizure cerebral [I67.89] Posterior reversible encephalopathy syndrome   Posterior reversible encephalopathy syndrome          ICD-10: Treatment Diagnosis:    · Generalized Muscle Weakness (M62.81)  · Other lack of cordination (R27.8)   Precautions/Allergies:     Patient has no known allergies. ASSESSMENT:     Mr. Loi Saul presents to the hospital with seizure and posterior reversible encephalopathy syndrome. Pt was at inpatient rehab prior to admission. Pt with hx of recent [facility] followed by Dr. Rafat Cavazos. Pt is sitting up in the chair upon arrival. Pt has a flat affect but does verbalize some and smiles occasionally. Pt requires some additional time for processing. Pt appears disoriented to place and situation currently with pt reporting he has \"a lot to do today! \".  Pt stood with minimal assistance and requires minimal assistance x 2 with hand-held assistance for completing functional mobility with safety. Pt squeezing tightly with L  and leaning to the L with mobility. Pt encouraged to press more through R UE and for more midline orientation. Pt's base of support very narrowed leading to increased unsteadiness. Pt's gaze tends to be down to the floor with pt cued for more forward midline gaze with activity. Pt returned to the chair and worked on identifying number of fingers held up and reading off the board, which patient was able to accurately complete. Pt did however have difficulty shifting gaze upwards for dynamic reaching of B UE. Pt's R UE appears slightly weaker. Pt is R handed and reports he was able to feed himself this am with his R UE. Pt is currently functioning below baseline for ADL/functional mobility. Pt will be appropriate to return to inpatient rehab once medically appropriate. Pt to continue with OT services to address stated goals and plan of care. This section established at most recent assessment   PROBLEM LIST (Impairments causing functional limitations):  1. Decreased Strength  2. Decreased ADL/Functional Activities  3. Decreased Transfer Abilities  4. Decreased Ambulation Ability/Technique  5. Decreased Balance  6. Decreased Activity Tolerance  7. Increased Fatigue  8. Decreased Flexibility/Joint Mobility  9. Decreased [place] with Home Exercise Program  10. Decreased Cognition   INTERVENTIONS PLANNED: (Benefits and precautions of occupational therapy have been discussed with the patient.)  1. Activities of daily living training  2. Adaptive equipment training  3. Balance training  4. Clothing management  5. Cognitive training  6. Donning&doffing training  7. Neuromuscular re-eduation  8. Therapeutic activity  9. Therapeutic exercise     TREATMENT PLAN: Frequency/Duration: Follow patient 3 times per week to address above goals.   Rehabilitation Potential For Stated Goals: Good REHAB RECOMMENDATIONS (at time of discharge pending progress):    Placement: It is my opinion, based on this patient's performance to date, that Mr. Jamse Severance may benefit from intensive therapy at an [address] [place] [address] after discharge due to potential to make ongoing and sustainable functional improvement that is of practical value. .  Equipment:    TBD               OCCUPATIONAL PROFILE AND HISTORY:   History of Present Injury/Illness (Reason for Referral):  See H&P  Past Medical History/Comorbidities:   Mr. Jamse Severance  has no past medical history on file. Mr. Jamse Severance  has a past surgical history that includes ir emboli intracran lt (7/11/2019). Social History/Living Environment:   Home Environment: Rehabilitation facility  One/Two Story Residence: Two story  Living Alone: No  Support Systems: Family member(s)  Patient Expects to be Discharged to[de-identified] Rehabilitation facility  Current DME Used/Available at Home: None  Prior Level of Function/Work/Activity:  Pt is independent at baseline. Pt arrived from inpatient rehab. Personal Factors:          Past/Current Experience:  recent admission for [facility]; only at rehab for brief period before returning to hospital due to seizures        Overall Behavior:  flat affect; disoriented to place/situation    Number of Personal Factors/Comorbidities that affect the Plan of Care: Extensive review of physical, cognitive, and psychosocial performance (3+):  HIGH COMPLEXITY   ASSESSMENT OF OCCUPATIONAL PERFORMANCE[de-identified]   Activities of Daily Living:   Basic ADLs (From Assessment) Complex ADLs (From Assessment)   Feeding: Minimum assistance  Oral Facial Hygiene/Grooming: Minimum assistance  Bathing: Moderate assistance  Upper Body Dressing: Minimum assistance  Lower Body Dressing: Moderate assistance  Toileting: Moderate assistance Instrumental ADL  Meal Preparation: Total assistance  Homemaking:  Total assistance   Grooming/Bathing/Dressing Activities of Daily Living Cognitive Retraining  Safety/Judgement: Decreased awareness of need for safety;Decreased insight into deficits; Fall prevention                       Bed/Mat Mobility  Supine to Sit: (in chair on arrival)  Sit to Supine: (left up in chair)  Sit to Stand: Minimum assistance  Stand to Sit: Minimum assistance  Scooting: Contact guard assistance     Most Recent Physical Functioning:   Gross Assessment:  AROM: Generally decreased, functional(decreased in the R UE)  PROM: Within functional limits(B UE)  Strength: Generally decreased, functional(weaker R  and R UE)  Coordination: Generally decreased, functional  Sensation: Intact( BUE)               Posture:  Posture Assessment: Forward head, Rounded shoulders  Balance:  Sitting: Impaired  Sitting - Static: Good (unsupported)  Sitting - Dynamic: Fair (occasional)  Standing: Impaired  Standing - Static: Constant support; Fair  Standing - Dynamic : Constant support;Poor Bed Mobility:  Supine to Sit: (in chair on arrival)  Sit to Supine: (left up in chair)  Scooting: Contact guard assistance  Wheelchair Mobility:     Transfers:  Sit to Stand: Minimum assistance  Stand to Sit: Minimum assistance            Patient Vitals for the past 6 hrs:   BP BP Patient Position SpO2 Pulse   08/09/19 0730 -- -- -- 76   08/09/19 0731 -- -- 100 % --   08/09/19 0745 -- -- 100 % 73   08/09/19 0800 -- -- 100 % 71   08/09/19 0814 -- -- 100 % 72   08/09/19 0821 -- -- 100 % 71   08/09/19 0823 160/82 -- -- 71   08/09/19 0830 -- -- 100 % 81   08/09/19 0845 -- -- 100 % 92   08/09/19 0900 -- -- 100 % 69   08/09/19 0939 138/76 -- 98 % --   08/09/19 0944 -- -- 100 % 68   08/09/19 0948 -- -- 100 % 66   08/09/19 1000 -- -- 100 % 71   08/09/19 1050 (!) 167/94 Sitting 99 % 69   08/09/19 1100 -- -- 100 % 69   08/09/19 1102 (!) 166/96 -- -- --   08/09/19 1104 -- -- 100 % 72   08/09/19 1105 133/83 At rest;Sitting -- --   08/09/19 1159 134/85 At rest;Sitting 100 % 67       Mental Status  Neurologic State: Alert, Confused  Orientation Level: Disoriented to place, Oriented to person  Cognition: Decreased attention/concentration, Decreased command following  Perception: Tactile, Visual  Perseveration: No perseveration noted  Safety/Judgement: Decreased awareness of need for safety, Decreased insight into deficits, Fall prevention                          Physical Skills Involved:  1. Range of Motion  2. Balance  3. Strength  4. Activity Tolerance  5. Fine Motor Control  6. Gross Motor Control  7. Vision Cognitive Skills Affected (resulting in the inability to perform in a timely and safe manner):  1. Perception  2. Executive Function  3. Short Term Recall  4. Sustained Attention  5. Divided Attention  6. Comprehension  7. Expression Psychosocial Skills Affected:  1. Habits/Routines  2. Environmental Adaptation  3. Social Interaction  4. Self-Awareness   Number of elements that affect the Plan of Care: 5+:  HIGH COMPLEXITY   CLINICAL DECISION MAKIN[address] [place] [id number] AM-PAC 6 Clicks   Daily Activity Inpatient Short Form  How much help from another person does the patient currently need. .. Total A Lot A Little None   1. Putting on and taking off regular lower body clothing? ? 1   ? 2   ? 3   ? 4   2. Bathing (including washing, rinsing, drying)? ? 1   ? 2   ? 3   ? 4   3. Toileting, which includes using toilet, bedpan or urinal?   ? 1   ? 2   ? 3   ? 4   4. Putting on and taking off regular upper body clothing? ? 1   ? 2   ? 3   ? 4   5. Taking care of personal grooming such as brushing teeth? ? 1   ? 2   ? 3   ? 4   6. Eating meals? ? 1   ? 2   ? 3   ? 4   © 2007, Trustees of [address] [place] [id number], under license to [employer]. All rights reserved      Score:  Initial: 15 Most Recent: X (Date: -- )    Interpretation of Tool:  Represents activities that are increasingly more difficult (i.e. Bed mobility, Transfers, Gait).     Medical Necessity:     · Patient demonstrates good  ·  rehab potential due to higher previous functional level. Reason for Services/Other Comments:  · Patient continues to require skilled intervention due to decreased independence and safety with ADL/functional transfers   · . Use of outcome tool(s) and clinical judgement create a POC that gives a: MODERATE COMPLEXITY         TREATMENT:   (In addition to Assessment/Re-Assessment sessions the following treatments were rendered)     Pre-treatment Symptoms/Complaints:    Pain: Initial:     0/10 Post Session:  0/10     Today's treatment session addressed Decreased Strength, Decreased ADL/Functional Activities, Decreased Transfer Abilities, Decreased Ambulation Ability/Technique, Decreased Balance, Increased Pain, Decreased Activity Tolerance, Decreased Flexibility/Joint Mobility, Decreased [place] with Home Exercise Program and Decreased Cognition to progress towards achieving all goal(s). During this session,  Physical Therapy addressed  Functional Transfers to progress towards their discipline specific goal(s). Co-treatment was necessary to improve patient's cognitive participation, ability to follow higher level commands and ability to increase activity demands. Therapeutic Activity: (10 minutes): Therapeutic activities including Chair transfers, Ambulation on level ground and visual tasks with activity to include improving midline orientation with vision while participating in functional tasks, dynamic reaching to target to improve more upward gaze with tasks, and to improve midline orientation and more effort with R side to improve mobility, strength, balance and coordination. Required moderate Safety awareness training; Tactile cues; Verbal cues to promote dynamic balance in standing and promote motor control of right, upper extremity(s), lower extremity(s).      N/a     Braces/Orthotics/Lines/Etc:   · IV  · O2 Device: Room air  Treatment/Session Assessment:    · Response to Treatment:  Pt tolerated well with some confusion/flat affect. · Interdisciplinary Collaboration:   o Physical Therapist  o Occupational Therapist  o Registered Nurse  · After treatment position/precautions:   o Up in chair  o Bed alarm/tab alert on  o Bed/Chair-wheels locked  o Call light within reach  o RN notified   · Compliance with Program/Exercises: Will assess as treatment progresses. · Recommendations/Intent for next treatment session: \"Next visit will focus on advancements to more challenging activities and reduction in assistance provided\".   Total Treatment Duration:  OT Patient Time In/Time Out  Time In: 0919  Time Out: [facility], OT        '

## 2019-08-09 NOTE — PROGRESS NOTES
Bedside report with dual neuro assessment. Pt oriented to person and place, states year is 2017. Reoriented to situation and time. No new changes to neuro assessment. Continues to with RUE drift and ataxia. Art line positional, retaped with good wave form. Bed alarm on s/r x 2.

## 2019-08-09 NOTE — PROGRESS NOTES
Arterial line removed from left arm. Pressure held to site  No bleeding, no hematoma, dry dressing applied with tape. Radial pulse palpable  Pt denies pain  Dr Patricia Benitez at bedside talking with pt.

## 2019-08-09 NOTE — PROGRESS NOTES
TRANSFER - OUT REPORT:    Verbal report given to Xena Galdamez RN on Jocelyn Sykes  being transferred to [id number] for routine progression of care       Report consisted of patients Situation, Background, Assessment and   Recommendations(SBAR). Information from the following report(s) SBAR, Kardex, OR Summary, Procedure Summary, Intake/Output, MAR, Recent Results, Cardiac Rhythm SR and Alarm Parameters  was reviewed with the receiving nurse. Lines:   PICC Triple Lumen 00/52/73 Right;Basilic (Active)   Central Line Being Utilized Yes 8/9/2019  7:00 AM   Criteria for Appropriate Use Limited/no vessel suitable for conventional peripheral access 8/9/2019  7:00 AM   Site Assessment Clean, dry, & intact 8/9/2019  7:00 AM   Phlebitis Assessment 0 8/9/2019  7:00 AM   Infiltration Assessment 0 8/9/2019  7:00 AM   Arm Circumference (cm) 28 cm 8/8/2019  3:54 PM   Date of Last Dressing Change 08/08/19 8/9/2019  7:00 AM   Dressing Status Clean, dry, & intact 8/9/2019  7:00 AM   External Catheter Length (cm) 0 centimeters 8/8/2019  3:54 PM   Dressing Type Disk with Chlorhexadine gluconate (CHG); Transparent 8/9/2019  7:00 AM   Action Taken Dressing changed 8/8/2019  3:54 PM   Hub Color/Line Status White; Infusing;Patent 8/9/2019  7:00 AM   Positive Blood Return (Site #1) Yes 8/9/2019  7:00 AM   Hub Color/Line Status Gray;Flushed;Patent 8/9/2019  7:00 AM   Positive Blood Return (Site #2) Yes 8/9/2019  7:00 AM   Hub Color/Line Status Red; Infusing;Patent 8/9/2019  7:00 AM   Positive Blood Return (Site #3) Yes 8/9/2019  7:00 AM   Alcohol Cap Used No 8/9/2019  7:00 AM        Opportunity for questions and clarification was provided. Patient will transported with:   Registered Nurse and all personal belongings from room with family accompanying. Will complete dual neuro assessment upon arrival to floor with receiving nurse.

## 2019-08-09 NOTE — PROGRESS NOTES
Per Sharath Agrawalr, liaison, precert has been started for re-admission to [facility]. Hopefully hear today. CM following.

## 2019-08-09 NOTE — PROGRESS NOTES
LTG: Patient will increase receptive/expressive language skills demonstrated by the ability to communicate basic wants/needs across environments   STG: Patient will name 8 items to given category with 80 accuracy given minimal cueing  STG: Patient will complete basic problem solving/reasoning tasks with 80 accuracy given moderate cueing   STG: Patient will participate in moderate level word finding task with 80% accuracy  STG: Patient will demonstrate attention by attending salient task details with 80 accuracy given minimal cueing  STG: Patient will demonstrate sustained attention by maintaining focus during a task for 5 minutes with minimal assistance  STG: Patient will utilize memory compensatory strategies to improve recall of functional list/message with 75accuracy given moderate assistance    SPEECH LANGUAGE PATHOLOGY: DYSPHAGIA AND SPEECH-LANGUAGE/COGNITION: Initial Assessment    NAME/AGE/GENDER: Bryan Holloway is a 37 y.o. male  DATE: 8/9/2019  PRIMARY DIAGNOSIS: Seizure cerebral [I67.89]      ICD-10: Treatment Diagnosis: R41.841 Cognitive-Communication Deficit    INTERDISCIPLINARY COLLABORATION: Registered Nurse  PRECAUTIONS/ALLERGIES: Patient has no known allergies. SUBJECTIVE   Patient seen sitting upright in bedside chair after PT/OT session. Eyes closed during majority of session, but increased verbal responses. Current Diet Regular/thin     History of Present Injury/Illness: Mr. Alina Patrick  has no past medical history on file. Sanya Lala He also  has a past surgical history that includes ir emboli intracran lt (7/11/2019). Previous Speech Therapy: YES During recent acute care stay- targeting cognitive linguistic ability    Problem List:  (Impairments causing functional limitations):  1. Attention'  2. Memory  3. Speech    Orientation:   Person  Able to stated place via yes/no questions, stated year as 2013.  Unable to recall reason for hospitalization     Pain: Pain Scale 1: Numeric (0 - 10)  Pain Intensity 1: 0  Pain Location 1: Head  Pain Intervention(s) 1: Medication (see MAR)         OBJECTIVE   Oral Motor Assessment:  · Labial: No impairment  · Dentition: Intact  · Oral Hygiene: Adequate  · Lingual: No impairment     Swallow assessment:   Patient presented with thin liquids via cup and straw, mixed, and solid textures. He attempted to feed self with moderate encouragement, but some confusion noted as he attempted to drink from spoon. He was able to drink from straw and eat cracker independently without overt s/sx of airway compromise. He required assistance with mixed consistencies, but mastication and swallow were within normal limits. Cognitive Linguistic Assessment :  Portions of Head Trauma Cognitive assessment administered. Immediate recall of 3 words: 100%  Delayed recall (2 minutes) 0% accuracy, no improvements with cues. Unable to recall walking with PT/OT prior to session. Basic convergent naming 100%,  1-step commands 100%- continues to require increased processing time, but improved from prior admission. He required multiple cues to participate in evaluation as his eyes were closed and delayed responses noted Increased confusion as he expressed need to leave hospital to  his daughter. Moderate assistance for functional reasoning situations. ASSESSMENT   Patient presents with swallow function that is within normal limits. Dysphagia treatment is not warranted. He continues with moderate cognitive-linguistic deficits characterized by decreased processing speed, impaired attention and memory, poor initiation, and decreased reasoning abilities. Overall, he is improved from prior admission, but responses remain delayed. Increased verbal responses. Volume remains soft, but improved intelligibility. Patient will benefit from skilled speech therapy services to address above mentioned deficits to improve functional abilities and safety.           Tool Used: Dysphagia Outcome and Severity Scale (MIR)    Score Comments   Normal Diet  [] 7 With no strategies or extra time needed   Functional Swallow  [] 6 May have mild oral or pharyngeal delay   Mild Dysphagia  [] 5 Which may require one diet consistency restricted    Mild-Moderate Dysphagia  [] 4 With 1-2 diet consistencies restricted   Moderate Dysphagia  [] 3 With 2 or more diet consistencies restricted   Moderate-Severe Dysphagia  [] 2 With partial PO strategies (trials with ST only)   Severe Dysphagia  [] 1 With inability to tolerate any PO safely      Score:  Initial: 7 Most Recent: x (Date 08/09/19 )   Interpretation of Tool: The Dysphagia Outcome and Severity Scale (MIR) is a simple, easy-to-use, 7-point scale developed to systematically rate the functional severity of dysphagia based on objective assessment and make recommendations for diet level, independence level, and type of nutrition. Tool Used: MODIFIED TAYLER SCALE (mRS)   Score   No Symptoms  [] 0   No significant disability despite symptoms; able to carry out all usual duties and activities  [] 1   Slight disability; unable to carry out all previous activities but able to look after own affairs without assistance. [] 2   Moderate disability; requiring some help but able to walk without assistance  [] 3   Moderately severe disability; unable to walk without assistance and unable to attend to own bodily needs without assistance  [] 4   Severe disability; bedridden, incontinent, and requiring constant nursing care and attention  [] 5      Score:  Initial: 3    Interpretation of Tool: The Modified [place] Scale is a 7-point scaled used to quantify level of disability as it relates to a patient's functional abilities. Current Medications:   No current facility-administered medications on file prior to encounter.       Current Outpatient Medications on File Prior to Encounter   Medication Sig Dispense Refill    aspirin (ASPIRIN) 325 mg tablet Take 1 Tab by mouth daily. 90 Tab 1    atorvastatin (LIPITOR) 40 mg tablet Take 1 Tab by mouth nightly. 90 Tab 1    citalopram (CELEXA) 10 mg tablet Take 1 Tab by mouth daily. 90 Tab 1    clopidogrel (PLAVIX) 75 mg tab Take 1 Tab by mouth daily. 90 Tab 1    famotidine (PEPCID) 20 mg tablet Take 1 Tab by mouth daily. 90 Tab 1    levETIRAcetam (KEPPRA) 500 mg tablet Take 1 Tab by mouth two (2) times a day. 60 Tab 5    modafinil (PROVIGIL) 200 mg tablet Take 1 Tab by mouth daily. Max Daily Amount: 200 mg. 30 Tab 0    nicotine (NICODERM CQ) 14 mg/24 hr patch 1 Patch by TransDERmal route every twenty-four (24) hours for 30 days. 30 Patch 0       PLAN    FREQUENCY/DURATION: Continue to follow patient 3 times a week for duration of hospital stay to address above goals. - Recommendations for next treatment session: Next treatment will address cognitive-lingusitic abilities     REHABILITATION POTENTIAL FOR STATED GOALS: Good     COMPLIANCE WITH PROGRAM/EXERCISES: Will assess as treatment progresses    CONTINUATION OF SKILLED SERVICES/MEDICAL NECESSITY:   Patient is expected to demonstrate progress in expressive communication, receptive ability and cognitive ability to decrease assistance required communication, increase independence with activities of daily living, increase communication with family/caregivers and return to work.  Patient continues to require skilled intervention due to cognitive-linguistic deficits  . RECOMMENDATIONS   DIET:    continue prescribed diet    MEDICATIONS: With liquid     ASPIRATION PRECAUTIONS  · Slow rate of intake  · Small bites/sips  · Upright at 90 degrees during meal     COMPENSATORY STRATEGIES/MODIFICATIONS  · None     EDUCATION:  · Recommendations discussed with Nursing  · Patient     RECOMMENDATIONS for CONTINUED SPEECH THERAPY: YES: Anticipate need for ongoing speech therapy during this hospitalization at next level of care.        SAFETY:  After treatment position/precautions:  · Up in chair  · RN notified    Total Treatment Duration:   Time In: 3549  Time Out: 201 [facility] [address] [facility]

## 2019-08-09 NOTE — PROGRESS NOTES
PM&R Consult Progress Note      Patient: Corey Cassidy  Admit Date: 8/7/2019  Admit Diagnosis: Seizure cerebral [I67.89]  Recommendations: Continue Acute Rehab Program, Coordination of rehab/medical care, Counseling of PM & R care issues management, Shy Medina [phone]  -could not start insurance precert until pt reassessed by PT and OT I appreciate their willingness to make this happen. Hopefully will get response from [facility] on Monday. Has declined functionally but continues to have excellent rehab potential. Please cont Celexa and Provigil  -Plavix/ASA;  ?? Restart Lipitor 40mg daily  -no further szs. Cont Keppra. Dose increased to 1000mg bid. History/Subjective/Complaint:     Patient seen and examined. Records reviewed. Delayed responses, poor eye contact. Cousin visiting at bedside.   Pain 1  Pain Scale 1: Numeric (0 - 10) (08/09/19 1159)  Pain Intensity 1: 0 (08/09/19 1159)  Patient Stated Pain Goal: 0 (08/09/19 1159)  Pain Reassessment 1: Yes (08/09/19 0400)  Pain Onset 1: acute (08/09/19 0150)  Pain Location 1: Head (08/09/19 0307)  Pain Description 1: Aching (08/09/19 0307)  Pain Intervention(s) 1: Medication (see MAR) (08/09/19 0307)     Objective:     Vitals:  Patient Vitals for the past 8 hrs:   BP Temp Pulse Resp SpO2   08/09/19 1556 (!) 154/99 97.5 °F (36.4 °C) 75 16 96 %   08/09/19 1343 142/86 -- -- -- --   08/09/19 1256 (!) 167/96 98.1 °F (36.7 °C) 68 15 95 %   08/09/19 1159 134/85 -- 67 12 100 %   08/09/19 1105 133/83 -- -- -- --   08/09/19 1104 -- -- 72 12 100 %   08/09/19 1103 -- -- -- 22 --   08/09/19 1102 (!) 166/96 -- -- -- --   08/09/19 1100 -- -- 69 -- 100 %   08/09/19 1050 (!) 167/94 -- 69 11 99 %      Intake and Output:  08/07 1901 - 08/09 0700  In: 8814 [P.O.:180; I.V.:8634]  Out: 4100 [Urine:4100]    No Known Allergies  Current Facility-Administered Medications   Medication Dose Route Frequency    levETIRAcetam (KEPPRA) tablet 1,000 mg  1,000 mg Oral BID    aspirin tablet 325 mg  325 mg Oral DAILY    metoprolol tartrate (LOPRESSOR) tablet 100 mg  100 mg Oral BID    clopidogrel (PLAVIX) tablet 75 mg  75 mg Oral DAILY    citalopram (CELEXA) tablet 10 mg  10 mg Oral DAILY    modafinil (PROVIGIL) tablet 200 mg  200 mg Oral DAILY    nicotine (NICODERM CQ) 14 mg/24 hr patch 1 Patch  1 Patch TransDERmal Q24H    famotidine (PEPCID) tablet 20 mg  20 mg Oral DAILY    hydrALAZINE (APRESOLINE) tablet 25 mg  25 mg Oral TID    HYDROcodone-acetaminophen (NORCO) 7.5-325 mg per tablet 1 Tab  1 Tab Oral Q4H PRN    ondansetron (ZOFRAN) injection 4 mg  4 mg IntraVENous Q4H PRN    heparin (porcine) injection 5,000 Units  5,000 Units SubCUTAneous Q8H       Physical Exam:  Ill appearing. Weak. Effort marginal at best.   CV rrr no m  LUNGS cta b  ABD soft ntnd bs +  EXT no edema, no calf tenderness    Incision(s)/Wound(s): Wound Abdomen Right (Active)   Dressing Status Changed per order 8/9/2019 12:50 PM   Dressing Type Topical skin adhesive/glue 8/9/2019 12:50 PM   Incision Site Well Approximated Yes 8/9/2019  7:00 AM   Number of days: 8       Wound Head Right (Active)   Dressing Status Clean, dry, and intact 8/9/2019 12:50 PM   Dressing Type Open to air 8/9/2019 12:50 PM   Incision Site Well Approximated Yes 8/9/2019  7:00 AM   Drainage Amount None 8/9/2019  7:00 AM   Number of days: 8          Functional Assessment:  Gross Assessment  AROM: Generally decreased, functional(decreased in the R UE) (08/09/19 1200)  PROM: Within functional limits(B UE) (08/09/19 1200)  Strength: Generally decreased, functional(weaker R  and R UE) (08/09/19 1200)  Coordination: Generally decreased, functional (08/09/19 1200)  Sensation: Intact( BUE) (08/09/19 1200)     Gait  Base of Support: Center of gravity altered;Narrowed (08/09/19 7491)  Speed/Aminata: Shuffled; Slow (08/09/19 0919)  Step Length: Left shortened;Right shortened (08/09/19 0919)  Swing Pattern: Left asymmetrical;Right asymmetrical (08/09/19 0919)  Gait Abnormalities: Altered arm swing;Decreased step clearance;Shuffling gait (08/09/19 0919)  Ambulation - Level of Assistance: Minimal assistance (08/09/19 0919)  Distance (ft): 125 Feet (ft)(leaning to L at times, unsteady, fall risk) (08/09/19 0919)  Assistive Device: Gait belt(HHA x 2, close contact) (08/09/19 0919)  Interventions: Safety awareness training; Tactile cues; Verbal cues (08/09/19 0919)     Bed Mobility  Supine to Sit: (in chair on arrival) (08/09/19 0919)  Sit to Supine: (left up in chair) (08/09/19 0919)  Scooting: Contact guard assistance (08/09/19 1200)     Balance  Sitting: Impaired (08/09/19 1200)  Sitting - Static: Good (unsupported) (08/09/19 1200)  Sitting - Dynamic: Fair (occasional) (08/09/19 1200)  Standing: Impaired (08/09/19 1200)  Standing - Static: Constant support; Fair (08/09/19 1200)  Standing - Dynamic : Constant support;Poor (08/09/19 1200)                       Bed/Mat Mobility  Supine to Sit: (in chair on arrival) (08/09/19 0919)  Sit to Supine: (left up in chair) (08/09/19 0919)  Sit to Stand: Minimum assistance (08/09/19 1200)  Stand to Sit: Minimum assistance (08/09/19 1200)  Scooting: Contact guard assistance (08/09/19 1200)     Labs/Studies:  Recent Results (from the past 72 hour(s))   MAGNESIUM    Collection Time: 08/07/19  4:38 AM   Result Value Ref Range    Magnesium 2.2 1.8 - 2.4 mg/dL   METABOLIC PANEL, BASIC    Collection Time: 08/07/19  4:39 AM   Result Value Ref Range    Sodium 141 136 - 145 mmol/L    Potassium 3.9 3.5 - 5.1 mmol/L    Chloride 108 (H) 98 - 107 mmol/L    CO2 24 21 - 32 mmol/L    Anion gap 9 7 - 16 mmol/L    Glucose 92 65 - 100 mg/dL    BUN 35 (H) 6 - 23 MG/DL    Creatinine 3.81 (H) 0.8 - 1.5 MG/DL    GFR est AA 22 (L) >60 ml/min/1.73m2    GFR est non-AA 18 (L) >60 ml/min/1.73m2    Calcium 9.1 8.3 - 10.4 MG/DL   CBC WITH AUTOMATED DIFF    Collection Time: 08/07/19  4:39 AM   Result Value Ref Range    WBC 10.0 4.3 - 11.1 K/uL    RBC 3.17 (L) 4.23 - 5.6 M/uL HGB 9.7 (L) 13.6 - 17.2 g/dL    HCT 29.6 (L) 41.1 - 50.3 %    MCV 93.4 79.6 - 97.8 FL    MCH 30.6 26.1 - 32.9 PG    MCHC 32.8 31.4 - 35.0 g/dL    RDW 13.7 11.9 - 14.6 %    PLATELET 086 (L) 507 - 450 K/uL    MPV 10.5 9.4 - 12.3 FL    ABSOLUTE NRBC 0.00 0.0 - 0.2 K/uL    DF AUTOMATED      NEUTROPHILS 75 43 - 78 %    LYMPHOCYTES 13 13 - 44 %    MONOCYTES 9 4.0 - 12.0 %    EOSINOPHILS 2 0.5 - 7.8 %    BASOPHILS 1 0.0 - 2.0 %    IMMATURE GRANULOCYTES 0 0.0 - 5.0 %    ABS. NEUTROPHILS 7.4 1.7 - 8.2 K/UL    ABS. LYMPHOCYTES 1.3 0.5 - 4.6 K/UL    ABS. MONOCYTES 0.9 0.1 - 1.3 K/UL    ABS. EOSINOPHILS 0.2 0.0 - 0.8 K/UL    ABS. BASOPHILS 0.1 0.0 - 0.2 K/UL    ABS. IMM.  GRANS. 0.0 0.0 - 0.5 K/UL   FERRITIN    Collection Time: 08/07/19  4:39 AM   Result Value Ref Range    Ferritin 497 (H) 8 - 388 NG/ML   TRANSFERRIN SATURATION    Collection Time: 08/07/19  4:39 AM   Result Value Ref Range    Iron 76 35 - 150 ug/dL    TIBC 182 (L) 250 - 450 ug/dL    Transferrin Saturation 42 >20 %   PTH INTACT    Collection Time: 08/07/19  4:40 AM   Result Value Ref Range    Calcium 9.2 8.3 - 10.4 MG/DL    PTH, Intact 66.9 18.5 - 88.0 pg/mL   MAGNESIUM    Collection Time: 08/07/19 10:10 AM   Result Value Ref Range    Magnesium 2.0 1.8 - 2.4 mg/dL   COMPLEMENT, C3    Collection Time: 08/07/19  2:17 PM   Result Value Ref Range    Complement C3 125 82 - 167 mg/dL   COMPLEMENT, C4    Collection Time: 08/07/19  2:17 PM   Result Value Ref Range    Complement C4 48 (H) 14 - 44 mg/dL   JOSÉ, DIRECT, W/REFLEX    Collection Time: 08/07/19  2:17 PM   Result Value Ref Range    JOSÉ, Direct NEGATIVE  NEGATIVE     ANCA PANEL    Collection Time: 08/07/19  2:17 PM   Result Value Ref Range    Myeloperoxidase (MPO) Ab <9.0 0.0 - 9.0 U/mL    Proteinase 3 (FL-3) Ab <3.5 0.0 - 3.5 U/mL    Cytoplasmic (C-ANCA) Ab <1:20 Neg:<1:20 titer    Perinuclear (P-ANCA) <1:20 Neg:<1:20 titer    Atypical pANCA <1:20 Neg:<1:20 titer   HIV 1/2 AG/AB, 4TH GENERATION,W RFLX CONFIRM Collection Time: 08/07/19  2:17 PM   Result Value Ref Range    HIV Screen, 4th gen Non Reactive Non Reactive   HEPATITIS PANEL, ACUTE    Collection Time: 08/07/19  2:17 PM   Result Value Ref Range    Hepatitis A Ab, IgM NEGATIVE  NEGATIVE      Hep B surface Ag screen NEGATIVE  NEGATIVE      Hep B Core Ab, IgM NEGATIVE  NEGATIVE      Hep C Virus Ab <0.1 0.0 - 0.9 s/co ratio   FREE LIGHT CHAINS, KAPPA/LAMBDA, QT    Collection Time: 08/07/19  2:17 PM   Result Value Ref Range    Kappa Free Light Chain 97.93 (H) 3.30 - 19.40 mg/L    Lambda Free Light Chain 45.61 (H) 5.71 - 26.30 mg/L    Kappa/Lambda Ratio 2.15 (H) 0.26 - 1.65     LIPID PANEL    Collection Time: 08/07/19  2:17 PM   Result Value Ref Range    LIPID PROFILE          Cholesterol, total 145 <200 MG/DL    Triglyceride 117 35 - 150 MG/DL    HDL Cholesterol 54 40 - 60 MG/DL    LDL, calculated 67.6 <100 MG/DL    VLDL, calculated 23.4 (H) 6.0 - 23.0 MG/DL    CHOL/HDL Ratio 2.7     PROTEIN ELEC WITH RAOUL, SERUM    Collection Time: 08/07/19  2:25 PM   Result Value Ref Range    Protein, total 4.9 (L) 6.3 - 8.2 g/dL    A-G Ratio 1.1      ALBUMIN 2.61 (L) 3.20 - 5.60 g/dL    Alpha-1 globulin 0.21 0.10 - 0.40 g/dL    ALPHA 2 0.64 0.40 - 1.20 g/dL    Beta-globulin 0.72 0.60 - 1.30 g/dL    Gamma-globulin 0.72 0.50 - 1.60 g/dL    M-Ezequiel Not Observed 0 g/dL    Immunoglobulin G 639 610 - 1,616 mg/dL    Immunoglobulin A 208 85 - 499 mg/dL    Immunoglobulin M 95 35 - 242 mg/dL    PEP Interpretation Hypoalbuminemia      RAOUL Interpretation        No definite bands are seen, suggest repeat if clinically indicated.    MAGNESIUM    Collection Time: 08/07/19  8:21 PM   Result Value Ref Range    Magnesium 2.9 (H) 1.8 - 2.4 mg/dL   PROTEIN ELEC WITH RAOUL, URINE RANDOM    Collection Time: 08/07/19  8:33 PM   Result Value Ref Range    Protein, urine random 200 (H) <11.9 mg/dL    A-G Ratio 2.2      Albumin, urine 136.5 mg/dL    ALPHA 1 9.9 mg/dL    ALPHA 2 14.1 mg/dL    BETA 21.4 mg/dL GAMMA 18.1 mg/dL    M-Ezequiel, mg/dL Not Observed 0 mg/dL    PEP Interpretation (NOTE)     RAOUL Interpretation (NOTE)    METABOLIC PANEL, BASIC    Collection Time: 08/08/19  3:53 AM   Result Value Ref Range    Sodium 142 136 - 145 mmol/L    Potassium 3.7 3.5 - 5.1 mmol/L    Chloride 111 (H) 98 - 107 mmol/L    CO2 23 21 - 32 mmol/L    Anion gap 8 7 - 16 mmol/L    Glucose 102 (H) 65 - 100 mg/dL    BUN 27 (H) 6 - 23 MG/DL    Creatinine 3.40 (H) 0.8 - 1.5 MG/DL    GFR est AA 25 (L) >60 ml/min/1.73m2    GFR est non-AA 21 (L) >60 ml/min/1.73m2    Calcium 8.4 8.3 - 10.4 MG/DL   CBC WITH AUTOMATED DIFF    Collection Time: 08/08/19  3:53 AM   Result Value Ref Range    WBC 8.7 4.3 - 11.1 K/uL    RBC 2.69 (L) 4.23 - 5.6 M/uL    HGB 8.2 (L) 13.6 - 17.2 g/dL    HCT 25.3 (L) 41.1 - 50.3 %    MCV 94.1 79.6 - 97.8 FL    MCH 30.5 26.1 - 32.9 PG    MCHC 32.4 31.4 - 35.0 g/dL    RDW 14.3 11.9 - 14.6 %    PLATELET 233 392 - 755 K/uL    MPV 11.0 9.4 - 12.3 FL    ABSOLUTE NRBC 0.00 0.0 - 0.2 K/uL    DF AUTOMATED      NEUTROPHILS 72 43 - 78 %    LYMPHOCYTES 14 13 - 44 %    MONOCYTES 10 4.0 - 12.0 %    EOSINOPHILS 3 0.5 - 7.8 %    BASOPHILS 1 0.0 - 2.0 %    IMMATURE GRANULOCYTES 0 0.0 - 5.0 %    ABS. NEUTROPHILS 6.2 1.7 - 8.2 K/UL    ABS. LYMPHOCYTES 1.2 0.5 - 4.6 K/UL    ABS. MONOCYTES 0.9 0.1 - 1.3 K/UL    ABS. EOSINOPHILS 0.3 0.0 - 0.8 K/UL    ABS. BASOPHILS 0.1 0.0 - 0.2 K/UL    ABS. IMM.  GRANS. 0.0 0.0 - 0.5 K/UL   MAGNESIUM    Collection Time: 08/08/19  3:53 AM   Result Value Ref Range    Magnesium 2.7 (H) 1.8 - 2.4 mg/dL   MAGNESIUM    Collection Time: 08/09/19  3:37 AM   Result Value Ref Range    Magnesium 2.1 1.8 - 2.4 mg/dL   METABOLIC PANEL, BASIC    Collection Time: 08/09/19  3:37 AM   Result Value Ref Range    Sodium 143 136 - 145 mmol/L    Potassium 3.3 (L) 3.5 - 5.1 mmol/L    Chloride 115 (H) 98 - 107 mmol/L    CO2 21 21 - 32 mmol/L    Anion gap 7 7 - 16 mmol/L    Glucose 112 (H) 65 - 100 mg/dL    BUN 19 6 - 23 MG/DL Creatinine 3.07 (H) 0.8 - 1.5 MG/DL    GFR est AA 28 (L) >60 ml/min/1.73m2    GFR est non-AA 24 (L) >60 ml/min/1.73m2    Calcium 8.2 (L) 8.3 - 10.4 MG/DL   CBC W/O DIFF    Collection Time: 08/09/19  3:37 AM   Result Value Ref Range    WBC 7.2 4.3 - 11.1 K/uL    RBC 2.54 (L) 4.23 - 5.6 M/uL    HGB 7.8 (L) 13.6 - 17.2 g/dL    HCT 24.4 (L) 41.1 - 50.3 %    MCV 96.1 79.6 - 97.8 FL    MCH 30.7 26.1 - 32.9 PG    MCHC 32.0 31.4 - 35.0 g/dL    RDW 14.3 11.9 - 14.6 %    PLATELET 963 104 - 561 K/uL    MPV 10.6 9.4 - 12.3 FL    ABSOLUTE NRBC 0.00 0.0 - 0.2 K/uL        Assessment:     Principal Problem:    Posterior reversible encephalopathy syndrome (8/8/2019)    Active Problems:    Seizure cerebral (8/7/2019)        Plan:     Recommendations: Continue Acute Rehab Program  Coordination of rehab/medical care  Counseling of PM & R care issues management  Monitoring and management of medical conditions per plan of care/orders  Discussion with Family/Caregiver/Staff  Reviewed Therapies/Labs/Medications/Records

## 2019-08-09 NOTE — PROGRESS NOTES
Progress Note    Patient: Winifred Fuentes MRN: [id number]  SSN: [id number]    YOB: 1975  Age: 37 y.o. Sex: male      Admit Date: 8/7/2019    LOS: 2 days     Subjective:   Pt looks good this am, OOB eating bfst. Interacting and smiling this am. SBP goal <160, off nipride gtts. Will transfer back to floor and pending re-admit to rehab. PT/OT/ST to see pt while pending rehab. Mag 2.1, given 2gm IV this am.     Current Facility-Administered Medications   Medication Dose Route Frequency    ondansetron (ZOFRAN) 4 mg/2 mL injection        aspirin tablet 325 mg  325 mg Oral DAILY    metoprolol tartrate (LOPRESSOR) tablet 100 mg  100 mg Oral BID    clopidogrel (PLAVIX) tablet 75 mg  75 mg Oral DAILY    citalopram (CELEXA) tablet 10 mg  10 mg Oral DAILY    modafinil (PROVIGIL) tablet 200 mg  200 mg Oral DAILY    nicotine (NICODERM CQ) 14 mg/24 hr patch 1 Patch  1 Patch TransDERmal Q24H    famotidine (PEPCID) tablet 20 mg  20 mg Oral DAILY    hydrALAZINE (APRESOLINE) tablet 25 mg  25 mg Oral TID    ALPRAZolam (XANAX) tablet 0.5 mg  0.5 mg Oral Q8H    sodium chloride (NS) flush 5-40 mL  5-40 mL IntraVENous PRN    HYDROcodone-acetaminophen (NORCO) 7.5-325 mg per tablet 1 Tab  1 Tab Oral Q4H PRN    ondansetron (ZOFRAN) injection 4 mg  4 mg IntraVENous Q4H PRN    heparin (porcine) injection 5,000 Units  5,000 Units SubCUTAneous Q8H    levETIRAcetam (KEPPRA) 1,000 mg in 0.9% sodium chloride 100 mL IVPB  1,000 mg IntraVENous Q12H    fentaNYL citrate (PF) injection 50 mcg  50 mcg IntraVENous Q2H PRN       Objective:     Vitals:    08/09/19 0946 08/09/19 0948 08/09/19 0955 08/09/19 1000   BP:       Pulse:  66  71   Resp: 10  10    Temp:       SpO2:  100%  100%   Weight:             Intake and Output:  Current Shift: 08/09 0701 - 08/09 1900  In: 647.9 [P.O.:120;  I.V.:527.9]  Out: 350 [Urine:350]  Last 24 hr: 08/08 0701 - 08/09 0700  In: 7962.4 [P.O.:180; I.V.:7782.4]  Out: 3100 [Urine:3100] Physical Exam:   General:  More awake today, headache better. Eyes:  PERRL+3, no nystagmus. Neck: Supple, symmetrical, trachea midline, no adenopathy, thyroid: no enlargment/tenderness/nodules, no carotid bruit and no JVD. Lungs:   Clear to auscultation bilaterally. Heart:  Regular rate and rhythm, S1, S2 normal, no murmur, click, rub or gallop. Abdomen:   Soft, non-tender. Bowel sounds normal. No masses,  No organomegaly. Extremities: Extremities normal, atraumatic, no cyanosis or edema. Pulses: 2+ and symmetric all extremities. Skin: Skin color, texture, turgor normal. No rashes or lesions   Neurologic: CNII-XII intact. Normal strength, sensation and reflexes throughout.        Lab/Data Review:    Recent Results (from the past 12 hour(s))   MAGNESIUM    Collection Time: 08/09/19  3:37 AM   Result Value Ref Range    Magnesium 2.1 1.8 - 2.4 mg/dL   METABOLIC PANEL, BASIC    Collection Time: 08/09/19  3:37 AM   Result Value Ref Range    Sodium 143 136 - 145 mmol/L    Potassium 3.3 (L) 3.5 - 5.1 mmol/L    Chloride 115 (H) 98 - 107 mmol/L    CO2 21 21 - 32 mmol/L    Anion gap 7 7 - 16 mmol/L    Glucose 112 (H) 65 - 100 mg/dL    BUN 19 6 - 23 MG/DL    Creatinine 3.07 (H) 0.8 - 1.5 MG/DL    GFR est AA 28 (L) >60 ml/min/1.73m2    GFR est non-AA 24 (L) >60 ml/min/1.73m2    Calcium 8.2 (L) 8.3 - 10.4 MG/DL   CBC W/O DIFF    Collection Time: 08/09/19  3:37 AM   Result Value Ref Range    WBC 7.2 4.3 - 11.1 K/uL    RBC 2.54 (L) 4.23 - 5.6 M/uL    HGB 7.8 (L) 13.6 - 17.2 g/dL    HCT 24.4 (L) 41.1 - 50.3 %    MCV 96.1 79.6 - 97.8 FL    MCH 30.7 26.1 - 32.9 PG    MCHC 32.0 31.4 - 35.0 g/dL    RDW 14.3 11.9 - 14.6 %    PLATELET 572 066 - 333 K/uL    MPV 10.6 9.4 - 12.3 FL    ABSOLUTE NRBC 0.00 0.0 - 0.2 K/uL       Assessment/ Plan:     Principal Problem:    Posterior reversible encephalopathy syndrome (8/8/2019)    Active Problems:    Seizure cerebral (8/7/2019)      NEURO: Pt admitted back to ICU for seizure activity and PRES. Pt looks much better today. Keep BP <160. On metoprolol and hydralazine. PT/OT/ST with goal to return to Rehab this week unless acute changes. CT head this am looks good, PRES resolving. Transfer to floor today. RESP: tolerating RA, 02 sat 100%. CV: SBP goal <160, will cont asa/plavix, heparin and cont monitor. Metoprolol and hydralazine for BP. HEME: HH: 7.8/24,   NEPH: bun/cr: 19/3. ( pt with hx of kidney disease on admit- Nephrology following. GI: advance diet as tolerated. Senna, IVF\"s. ID: afebrile, no abx  LINES: lenin unger R PICC. Discussed pt with Dr. Maryjane Caruso, will start precert to return to Rehab on 9th floor. Pt and family updated.      Signed By: Tracy Jackson NP     August 9, 2019

## 2019-08-09 NOTE — PROGRESS NOTES
Pt moved to the 7th floor today, per notes pt is pending auth with insurnance to return to [facility] for rehab. CM will continue to follow for DC needs.

## 2019-08-09 NOTE — PROGRESS NOTES
Ambulated unit with PT x 2, nad. PT assisted back to chair with bed alarm on. Pt denies needs, states \"thank you for all your help\".

## 2019-08-09 NOTE — PROGRESS NOTES
A follow up visit was made to the patient. Emotional support, spiritual presence and   prayer were provided. The patient's nurse shared that she thought the patient was depressed.  talked to the patient's uncle while the patient was dozing off to sleep. The hospital therapists had the patient walking again today.       [employer]

## 2019-08-09 NOTE — PROGRESS NOTES
08/09/19 1911   NIH Stroke Scale   Interval Other (comment)  (shift change JOHANNA Ridley )   LOC 0   LOC Questions 0   LOC Commands 0   Best Gaze 0   Visual 0   Facial Palsy 0   Motor Right Arm 1   Motor Left Arm 0   Motor Right Leg 0   Motor Left Leg 0   Limb Ataxia 1   Sensory 0   Best Language 0   Dysarthria 0   Extinction and Inattention 0   Total 2

## 2019-08-09 NOTE — PROGRESS NOTES
4157- Increasingly restless, now complaining of HA 8/10. Turning from side to side, repeatedly removing monitoring equipment. 50 mcg Fentanyl administered. Neuro status unchanged. 0400- Pt now resting comfortably. Denies pain.  Able to wean Nipride drip further

## 2019-08-09 NOTE — PROGRESS NOTES
Problem: Delirium Treatment  Goal: *Level of consciousness restored to baseline  Outcome: Progressing Towards Goal  Goal: *Level of environmental perceptions restored to baseline  Outcome: Progressing Towards Goal     Problem: Pressure Injury - Risk of  Goal: *Prevention of pressure injury  Description  Document Anselmo Scale and appropriate interventions in the flowsheet. Outcome: Progressing Towards Goal  Note:   Pressure Injury Interventions:  Sensory Interventions: Assess changes in LOC    Moisture Interventions: Absorbent underpads, Apply protective barrier, creams and emollients, Moisture barrier    Activity Interventions: Pressure redistribution bed/mattress(bed type), Increase time out of bed    Mobility Interventions: Pressure redistribution bed/mattress (bed type), HOB 30 degrees or less, PT/OT evaluation    Nutrition Interventions: Offer support with meals,snacks and hydration    Friction and Shear Interventions: Foam dressings/transparent film/skin sealants, HOB 30 degrees or less                Problem: Falls - Risk of  Goal: *Absence of Falls  Description  Document Shaina Fall Risk and appropriate interventions in the flowsheet. Outcome: Progressing Towards Goal  Note:   Fall Risk Interventions:  Mobility Interventions: Assess mobility with egress test, Bed/chair exit alarm    Mentation Interventions: Adequate sleep, hydration, pain control, Bed/chair exit alarm, Door open when patient unattended    Medication Interventions: Patient to call before getting OOB, Evaluate medications/consider consulting pharmacy, Bed/chair exit alarm    Elimination Interventions:  Toileting schedule/hourly rounds, Urinal in reach    History of Falls Interventions: Bed/chair exit alarm, Door open when patient unattended, Room close to nurse's station

## 2019-08-09 NOTE — PROGRESS NOTES
08/09/19 1250   Dual Skin Pressure Injury Assessment   Dual Skin Pressure Injury Assessment WDL   Second Care Provider (Based on [address] [place] [address]) Rohit Kaur RN    Skin Integumentary   Skin Integumentary (WDL) X   Skin Color Appropriate for ethnicity   Skin Condition/Temp Warm;Dry   Skin Integrity Incision (comment)  (head, abdomen )   Turgor Non-tenting   Hair Growth Present    Pressure  Injury Documentation No Pressure Injury Noted-Pressure Ulcer Prevention Initiated   Wound Prevention and Protection Methods   Orientation of Wound Prevention Posterior   Location of Wound Prevention Sacrum/Coccyx   Dressing Present  Yes   Dressing Status Intact   Wound Offloading (Prevention Methods) Bed, pressure reduction mattress; Foam silicone

## 2019-08-09 NOTE — PROGRESS NOTES
08/09/19 1248   NIH Stroke Scale   Interval Other (comment)  (admission with Mei Dee RN )   LOC 0   LOC Questions 0   LOC Commands 0   Best Gaze 0   Visual 0   Facial Palsy 0   Motor Right Arm 1   Motor Left Arm 0   Motor Right Leg 0   Motor Left Leg 0   Limb Ataxia 1   Sensory 0   Best Language 0   Dysarthria 0   Extinction and Inattention 0   Total 2

## 2019-08-09 NOTE — PROGRESS NOTES
[facility] CRITICAL CARE OUTREACH NURSE PROGRESS REPORT      SUBJECTIVE: Called to assess patient secondary to transfer from ICU. MEWS Score: 0 (08/08/19 1900)  Vitals:    08/09/19 1104 08/09/19 1105 08/09/19 1159 08/09/19 1256   BP:  133/83 134/85 (!) 167/96   Pulse: 72  67 68   Resp: 12  12 15   Temp:    98.1 °F (36.7 °C)   SpO2: 100%  100% 95%   Weight:          LAB DATA:    Recent Labs     08/09/19 0337 08/08/19 0353 08/07/19 2033 08/07/19 2021 08/07/19  1425  08/07/19  0440 08/07/19  0439    142  --   --   --   --   --  141   K 3.3* 3.7  --   --   --   --   --  3.9   * 111*  --   --   --   --   --  108*   CO2 21 23  --   --   --   --   --  24   AGAP 7 8  --   --   --   --   --  9   * 102*  --   --   --   --   --  92   BUN 19 27*  --   --   --   --   --  35*   CREA 3.07* 3.40*  --   --   --   --   --  3.81*   GFRAA 28* 25*  --   --   --   --   --  22*   GFRNA 24* 21*  --   --   --   --   --  18*   CA 8.2* 8.4  --   --   --   --  9.2 9.1   MG 2.1 2.7*  --  2.9*  --    < >  --   --    TP  --   --   --   --  4.9*  --   --   --    AGRAT  --   --  2.2  --  1.1  --   --   --     < > = values in this interval not displayed. Recent Labs     08/09/19 0337 08/08/19 0353 08/07/19  0439   WBC 7.2 8.7 10.0   HGB 7.8* 8.2* 9.7*   HCT 24.4* 25.3* 29.6*    150 145*          OBJECTIVE: On arrival to room, I found patient to be resting in bed. Pain Assessment  Pain Intensity 1: 0 (08/09/19 6939)  Pain Location 1: Head  Pain Intervention(s) 1: Medication (see MAR)  Patient Stated Pain Goal: 0    ASSESSMENT:  Patient wakes to voice. Oriented to self. States that he is in a rehab facility and that the year is 2014. Mild right sided weakness remains. Flat affect. Respirations even and unlabored. Lungs CTA. VS, labs, and progress notes reviewed. PLAN:  Will continue to follow per outreach protocol.

## 2019-08-09 NOTE — PROGRESS NOTES
Very restless, 6/10 HA- medicated per MAR for HA and nausea. Neuro status unchanged. Monitoring closely.

## 2019-08-10 LAB
ANION GAP SERPL CALC-SCNC: 9 MMOL/L (ref 7–16)
BUN SERPL-MCNC: 18 MG/DL (ref 6–23)
CALCIUM SERPL-MCNC: 8.9 MG/DL (ref 8.3–10.4)
CHLORIDE SERPL-SCNC: 115 MMOL/L (ref 98–107)
CO2 SERPL-SCNC: 22 MMOL/L (ref 21–32)
CREAT SERPL-MCNC: 3.55 MG/DL (ref 0.8–1.5)
ERYTHROCYTE [DISTWIDTH] IN BLOOD BY AUTOMATED COUNT: 14 % (ref 11.9–14.6)
GLUCOSE SERPL-MCNC: 87 MG/DL (ref 65–100)
HCT VFR BLD AUTO: 27.1 % (ref 41.1–50.3)
HGB BLD-MCNC: 8.7 G/DL (ref 13.6–17.2)
MAGNESIUM SERPL-MCNC: 2.4 MG/DL (ref 1.8–2.4)
MCH RBC QN AUTO: 30.5 PG (ref 26.1–32.9)
MCHC RBC AUTO-ENTMCNC: 32.1 G/DL (ref 31.4–35)
MCV RBC AUTO: 95.1 FL (ref 79.6–97.8)
NRBC # BLD: 0 K/UL (ref 0–0.2)
PLATELET # BLD AUTO: 189 K/UL (ref 150–450)
PMV BLD AUTO: 11 FL (ref 9.4–12.3)
POTASSIUM SERPL-SCNC: 3.7 MMOL/L (ref 3.5–5.1)
RBC # BLD AUTO: 2.85 M/UL (ref 4.23–5.6)
SODIUM SERPL-SCNC: 146 MMOL/L (ref 136–145)
WBC # BLD AUTO: 7.3 K/UL (ref 4.3–11.1)

## 2019-08-10 PROCEDURE — 74011000258 HC RX REV CODE- 258: Performed by: INTERNAL MEDICINE

## 2019-08-10 PROCEDURE — 83735 ASSAY OF MAGNESIUM: CPT

## 2019-08-10 PROCEDURE — 80048 BASIC METABOLIC PNL TOTAL CA: CPT

## 2019-08-10 PROCEDURE — 77030020257 HC SOL INJ SOD CL 0.45% 1000ML BG

## 2019-08-10 PROCEDURE — 85027 COMPLETE CBC AUTOMATED: CPT

## 2019-08-10 PROCEDURE — 74011250636 HC RX REV CODE- 250/636: Performed by: NURSE PRACTITIONER

## 2019-08-10 PROCEDURE — 74011250637 HC RX REV CODE- 250/637: Performed by: NURSE PRACTITIONER

## 2019-08-10 PROCEDURE — 65660000000 HC RM CCU STEPDOWN

## 2019-08-10 PROCEDURE — 99024 POSTOP FOLLOW-UP VISIT: CPT | Performed by: NURSE PRACTITIONER

## 2019-08-10 RX ORDER — HEPARIN 100 UNIT/ML
300 SYRINGE INTRAVENOUS EVERY 8 HOURS
Status: DISCONTINUED | OUTPATIENT
Start: 2019-08-10 | End: 2019-08-10

## 2019-08-10 RX ORDER — HEPARIN 100 UNIT/ML
900 SYRINGE INTRAVENOUS EVERY 8 HOURS
Status: DISCONTINUED | OUTPATIENT
Start: 2019-08-10 | End: 2019-08-13 | Stop reason: HOSPADM

## 2019-08-10 RX ORDER — SODIUM CHLORIDE 0.9 % (FLUSH) 0.9 %
10-30 SYRINGE (ML) INJECTION AS NEEDED
Status: DISCONTINUED | OUTPATIENT
Start: 2019-08-10 | End: 2019-08-13 | Stop reason: HOSPADM

## 2019-08-10 RX ORDER — SODIUM CHLORIDE 0.9 % (FLUSH) 0.9 %
10-30 SYRINGE (ML) INJECTION EVERY 8 HOURS
Status: DISCONTINUED | OUTPATIENT
Start: 2019-08-10 | End: 2019-08-13 | Stop reason: HOSPADM

## 2019-08-10 RX ORDER — SODIUM CHLORIDE 450 MG/100ML
100 INJECTION, SOLUTION INTRAVENOUS CONTINUOUS
Status: DISCONTINUED | OUTPATIENT
Start: 2019-08-10 | End: 2019-08-13 | Stop reason: HOSPADM

## 2019-08-10 RX ORDER — HEPARIN 100 UNIT/ML
300-900 SYRINGE INTRAVENOUS AS NEEDED
Status: DISCONTINUED | OUTPATIENT
Start: 2019-08-10 | End: 2019-08-13 | Stop reason: HOSPADM

## 2019-08-10 RX ADMIN — METOPROLOL TARTRATE 100 MG: 50 TABLET ORAL at 08:11

## 2019-08-10 RX ADMIN — FAMOTIDINE 20 MG: 20 TABLET ORAL at 08:29

## 2019-08-10 RX ADMIN — SODIUM CHLORIDE, PRESERVATIVE FREE 900 UNITS: 5 INJECTION INTRAVENOUS at 08:37

## 2019-08-10 RX ADMIN — HEPARIN SODIUM 5000 UNITS: 5000 INJECTION INTRAVENOUS; SUBCUTANEOUS at 00:52

## 2019-08-10 RX ADMIN — SODIUM CHLORIDE, PRESERVATIVE FREE 900 UNITS: 5 INJECTION INTRAVENOUS at 22:12

## 2019-08-10 RX ADMIN — METOPROLOL TARTRATE 100 MG: 50 TABLET ORAL at 17:22

## 2019-08-10 RX ADMIN — SODIUM CHLORIDE 100 ML/HR: 450 INJECTION, SOLUTION INTRAVENOUS at 15:29

## 2019-08-10 RX ADMIN — HEPARIN SODIUM 5000 UNITS: 5000 INJECTION INTRAVENOUS; SUBCUTANEOUS at 17:22

## 2019-08-10 RX ADMIN — HYDRALAZINE HYDROCHLORIDE 25 MG: 50 TABLET, FILM COATED ORAL at 08:12

## 2019-08-10 RX ADMIN — CLOPIDOGREL BISULFATE 75 MG: 75 TABLET ORAL at 08:28

## 2019-08-10 RX ADMIN — Medication 10 ML: at 14:31

## 2019-08-10 RX ADMIN — HEPARIN SODIUM 5000 UNITS: 5000 INJECTION INTRAVENOUS; SUBCUTANEOUS at 08:29

## 2019-08-10 RX ADMIN — LEVETIRACETAM 1000 MG: 500 TABLET, FILM COATED ORAL at 08:11

## 2019-08-10 RX ADMIN — ASPIRIN 325 MG ORAL TABLET 325 MG: 325 PILL ORAL at 08:28

## 2019-08-10 RX ADMIN — CITALOPRAM HYDROBROMIDE 10 MG: 20 TABLET ORAL at 08:28

## 2019-08-10 RX ADMIN — Medication 30 ML: at 08:30

## 2019-08-10 RX ADMIN — MODAFINIL 200 MG: 100 TABLET ORAL at 08:28

## 2019-08-10 RX ADMIN — HYDRALAZINE HYDROCHLORIDE 25 MG: 50 TABLET, FILM COATED ORAL at 17:23

## 2019-08-10 RX ADMIN — LEVETIRACETAM 1000 MG: 500 TABLET, FILM COATED ORAL at 17:22

## 2019-08-10 RX ADMIN — HYDRALAZINE HYDROCHLORIDE 25 MG: 50 TABLET, FILM COATED ORAL at 22:11

## 2019-08-10 RX ADMIN — SODIUM CHLORIDE, PRESERVATIVE FREE 900 UNITS: 5 INJECTION INTRAVENOUS at 14:30

## 2019-08-10 RX ADMIN — Medication 30 ML: at 22:12

## 2019-08-10 NOTE — PROGRESS NOTES
Date of Outreach Update:  Jocelyn Sykes was seen and assessed. Previous Outreach assessment has been reviewed. There have been no significant clinical changes since the completion of the last dated Outreach assessment. Patient resting in bed, no visible signs of distress. Breathing even and unlabored. VSS. Discussed with primary RN who has no concerns. Will continue to follow up per outreach protocol.     Signed By:   Fiorella Be    August 10, 2019 3:51 AM

## 2019-08-10 NOTE — PROGRESS NOTES
[facility] PROGRESS NOTE    Follow up for: NING over ckd     Subjective:   Patient seen and examined. Chart, notes, labs, imaging, results all reviewed.      Seen in floor     Off Cardene and nipride drip         ROS:  Gen - no fever, no chills, appetite okay  CV - no chest pain, no orthopnea  Lung - no shortness of breath, no cough  Abd - no tenderness, no nausea, no vomiting  Ext - no edema    Objective:   Exam:  Vitals:    08/10/19 0333 08/10/19 0805 08/10/19 0959 08/10/19 1125   BP: (!) 156/93 170/90 137/84 143/89   Pulse: 81 82  79   Resp: 18 17  18   Temp: 98 °F (36.7 °C) 98.1 °F (36.7 °C)  98.6 °F (37 °C)   SpO2: 97% 95%  95%   Weight:             Intake/Output Summary (Last 24 hours) at 8/10/2019 1443  Last data filed at 8/10/2019 0622  Gross per 24 hour   Intake --   Output 1225 ml   Net -1225 ml       Current Facility-Administered Medications   Medication Dose Route Frequency    heparin (porcine) pf 300-900 Units  300-900 Units InterCATHeter PRN    heparin (porcine) pf 900 Units  900 Units InterCATHeter Q8H    SALINE PERIPHERAL FLUSH Q8H soln 10-30 mL  10-30 mL InterCATHeter Q8H    saline peripheral flush soln 10-30 mL  10-30 mL InterCATHeter PRN    levETIRAcetam (KEPPRA) tablet 1,000 mg  1,000 mg Oral BID    aspirin tablet 325 mg  325 mg Oral DAILY    metoprolol tartrate (LOPRESSOR) tablet 100 mg  100 mg Oral BID    clopidogrel (PLAVIX) tablet 75 mg  75 mg Oral DAILY    citalopram (CELEXA) tablet 10 mg  10 mg Oral DAILY    modafinil (PROVIGIL) tablet 200 mg  200 mg Oral DAILY    nicotine (NICODERM CQ) 14 mg/24 hr patch 1 Patch  1 Patch TransDERmal Q24H    famotidine (PEPCID) tablet 20 mg  20 mg Oral DAILY    hydrALAZINE (APRESOLINE) tablet 25 mg  25 mg Oral TID    HYDROcodone-acetaminophen (NORCO) 7.5-325 mg per tablet 1 Tab  1 Tab Oral Q4H PRN    ondansetron (ZOFRAN) injection 4 mg  4 mg IntraVENous Q4H PRN    heparin (porcine) injection 5,000 Units  5,000 Units SubCUTAneous Q8H EXAM  GEN - Alert, oriented, in no distress  CV - S1, S2, RRR, no rub, murmur, or gallop  Lung - clear to auscultation bilaterally  Abd - soft, nontender, BS present  Ext - no edema    Recent Labs     08/10/19  0826 08/09/19  0337 08/08/19  0353   WBC 7.3 7.2 8.7   HGB 8.7* 7.8* 8.2*   HCT 27.1* 24.4* 25.3*    167 150        Recent Labs     08/10/19  0826 08/09/19  0337 08/08/19  0353   * 143 142   K 3.7 3.3* 3.7   * 115* 111*   CO2 22 21 23   BUN 18 19 27*   CREA 3.55* 3.07* 3.40*   CA 8.9 8.2* 8.4   GLU 87 112* 102*   MG 2.4 2.1 2.7*       Assessment and Plan:     NING over CKD stage IV  - NING - worsening renal function seen and pt is not on IVf now , will restart IVF   - US kidney - some cysts and chronic changes   - reviewed Ua - proteinuria present , no active sediments     Nephrotic syndrome 4.5 gms   All work up ordered      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 No family h/o of PCKD known so far .       Shivani Carney MD

## 2019-08-10 NOTE — PROGRESS NOTES
08/10/19 0730   NIH Stroke Scale   Interval Other (comment)   LOC 0   LOC Questions 0   LOC Commands 0   Best Gaze 0   Visual 0   Facial Palsy 0   Motor Right Arm 1   Motor Left Arm 0   Motor Right Leg 1   Motor Left Leg 0   Limb Ataxia 1   Sensory 0   Best Language 0   Dysarthria 0   Extinction and Inattention 0   Total 3

## 2019-08-10 NOTE — PROGRESS NOTES
Progress Note    Patient: Criselda Maya MRN: [id number]  SSN: [id number]    YOB: 1975  Age: 37 y.o. Sex: male      Admit Date: 8/7/2019    LOS: 3 days     Subjective:   Pt awake this am he can tell me his name, name of his children, but remains confused about place. Pt follows commands. Flat affect but did smile this am and interactive. Will cont monitor. on floor now, no specific BP goals, but will monitor for any acute neuro changes with elevated BP.       Current Facility-Administered Medications   Medication Dose Route Frequency    heparin (porcine) pf 300-900 Units  300-900 Units InterCATHeter PRN    heparin (porcine) pf 900 Units  900 Units InterCATHeter Q8H    SALINE PERIPHERAL FLUSH Q8H soln 10-30 mL  10-30 mL InterCATHeter Q8H    saline peripheral flush soln 10-30 mL  10-30 mL InterCATHeter PRN    levETIRAcetam (KEPPRA) tablet 1,000 mg  1,000 mg Oral BID    aspirin tablet 325 mg  325 mg Oral DAILY    metoprolol tartrate (LOPRESSOR) tablet 100 mg  100 mg Oral BID    clopidogrel (PLAVIX) tablet 75 mg  75 mg Oral DAILY    citalopram (CELEXA) tablet 10 mg  10 mg Oral DAILY    modafinil (PROVIGIL) tablet 200 mg  200 mg Oral DAILY    nicotine (NICODERM CQ) 14 mg/24 hr patch 1 Patch  1 Patch TransDERmal Q24H    famotidine (PEPCID) tablet 20 mg  20 mg Oral DAILY    hydrALAZINE (APRESOLINE) tablet 25 mg  25 mg Oral TID    HYDROcodone-acetaminophen (NORCO) 7.5-325 mg per tablet 1 Tab  1 Tab Oral Q4H PRN    ondansetron (ZOFRAN) injection 4 mg  4 mg IntraVENous Q4H PRN    heparin (porcine) injection 5,000 Units  5,000 Units SubCUTAneous Q8H       Objective:     Vitals:    08/09/19 2037 08/09/19 2342 08/10/19 0333 08/10/19 0805   BP: 137/82 (!) 153/91 (!) 156/93 170/90   Pulse: 75 78 81 82   Resp: 16 16 18 17   Temp: 98.4 °F (36.9 °C) 99 °F (37.2 °C) 98 °F (36.7 °C) 98.1 °F (36.7 °C)   SpO2: 96% 94% 97% 95%   Weight:             Intake and Output:  Current Shift: No intake/output data recorded. Last 24 hr: 08/09 0701 - 08/10 0700  In: 942.9 [P.O.:415; I.V.:527.9]  Out: 2075 [Urine:2075]         Physical Exam:   General:  Awake and following commands. + headache. Eyes:  PERRL+3, no nystagmus. Neck: Supple, symmetrical, trachea midline, no adenopathy, thyroid: no enlargment/tenderness/nodules, no carotid bruit and no JVD. Lungs:   Clear to auscultation bilaterally. Heart:  Regular rate and rhythm, S1, S2 normal, no murmur, click, rub or gallop. Abdomen:   Soft, non-tender. Bowel sounds normal. No masses,  No organomegaly. Extremities: Extremities normal, atraumatic, no cyanosis or edema. Pulses: 2+ and symmetric all extremities. Skin: Skin color, texture, turgor normal. No rashes or lesions   Neurologic: Pt awake, following commands. Confused at times about place. Caprice, DOYLE >R. Lab/Data Review:    Recent Results (from the past 12 hour(s))   CBC W/O DIFF    Collection Time: 08/10/19  8:26 AM   Result Value Ref Range    WBC 7.3 4.3 - 11.1 K/uL    RBC 2.85 (L) 4.23 - 5.6 M/uL    HGB 8.7 (L) 13.6 - 17.2 g/dL    HCT 27.1 (L) 41.1 - 50.3 %    MCV 95.1 79.6 - 97.8 FL    MCH 30.5 26.1 - 32.9 PG    MCHC 32.1 31.4 - 35.0 g/dL    RDW 14.0 11.9 - 14.6 %    PLATELET 938 097 - 788 K/uL    MPV 11.0 9.4 - 12.3 FL    ABSOLUTE NRBC 0.00 0.0 - 0.2 K/uL       Assessment/ Plan:     Principal Problem:    Posterior reversible encephalopathy syndrome (8/8/2019)    Active Problems:    Seizure cerebral (8/7/2019)      NEURO: Pt admitted back to ICU for seizure activity and PRES. Pt looks much better today. Keep BP <160. On metoprolol and hydralazine. PT/OT/ST with goal to return to Rehab this week unless acute changes. CT head this am looks good, PRES resolving. Transfer to floor today. RESP: tolerating RA, 02 sat 100%. CV: NO MAP OR BP GOAL. will cont asa/plavix, heparin and cont monitor. Metoprolol and hydralazine for BP. HEME: HH: 7.8/24,   NEPH: bun/cr: 19/3.  ( pt with hx of kidney disease on admit- Nephrology following. GI: advance diet as tolerated. Senna, IVF\"s. ID: afebrile, no abx  LINES: [facility] un[facility].      Signed By: Harleen Nina NP     August 10, 2019

## 2019-08-10 NOTE — PROGRESS NOTES
[facility]IT[facility] OUTREACH NURSE PROGRESS REPORT      SUBJECTIVE: Called to assess patient secondary to transfer from ICU. MEWS Score: 1 (08/10/19 0333)  Vitals:    08/09/19 2037 08/09/19 2342 08/10/19 0333 08/10/19 0805   BP: 137/82 (!) 153/91 (!) 156/93 170/90   Pulse: 75 78 81 82   Resp: 16 16 18 17   Temp: 98.4 °F (36.9 °C) 99 °F (37.2 °C) 98 °F (36.7 °C) 98.1 °F (36.7 °C)   SpO2: 96% 94% 97% 95%   Weight:            LAB DATA:    Recent Labs     08/09/19 0337 08/08/19 0353 08/07/19 2033 08/07/19 2021 08/07/19  1425    142  --   --   --    K 3.3* 3.7  --   --   --    * 111*  --   --   --    CO2 21 23  --   --   --    AGAP 7 8  --   --   --    * 102*  --   --   --    BUN 19 27*  --   --   --    CREA 3.07* 3.40*  --   --   --    GFRAA 28* 25*  --   --   --    GFRNA 24* 21*  --   --   --    CA 8.2* 8.4  --   --   --    MG 2.1 2.7*  --  2.9*  --    TP  --   --   --   --  4.9*   AGRAT  --   --  2.2  --  1.1        Recent Labs     08/09/19 0337 08/08/19 0353   WBC 7.2 8.7   HGB 7.8* 8.2*   HCT 24.4* 25.3*    150          OBJECTIVE: On arrival to room, I found patient to be getting up to use urinal.       Pain Assessment  Pain Intensity 1: 0 (08/09/19 1945)  Pain Location 1: Head  Pain Intervention(s) 1: Medication (see MAR)  Patient Stated Pain Goal: 0      ASSESSMENT:  Patient alert. Oriented to self. States he is in a facility. Moves all extremities purposefully. Delayed responses. Flat affect. VS, labs, and progress notes reviewed. No new concerns identified. PLAN:  Will continue to follow per outreach protocol.

## 2019-08-10 NOTE — PROGRESS NOTES
[facility] CRITICAL CARE OUTREACH NURSE PROGRESS REPORT      SUBJECTIVE: Called to assess patient secondary to transfer from CCU. MEWS Score: 1 (08/09/19 2037)  Vitals:    08/09/19 1256 08/09/19 1343 08/09/19 1556 08/09/19 2037   BP: (!) 167/96 142/86 (!) 154/99 137/82   Pulse: 68  75 75   Resp: 15  16 16   Temp: 98.1 °F (36.7 °C)  97.5 °F (36.4 °C) 98.4 °F (36.9 °C)   SpO2: 95%  96% 96%   Weight:            LAB DATA:    Recent Labs     08/09/19 0337 08/08/19 0353 08/07/19 2033 08/07/19 2021 08/07/19  1425 08/07/19  0440 08/07/19  0439    142  --   --   --   --   --  141   K 3.3* 3.7  --   --   --   --   --  3.9   * 111*  --   --   --   --   --  108*   CO2 21 23  --   --   --   --   --  24   AGAP 7 8  --   --   --   --   --  9   * 102*  --   --   --   --   --  92   BUN 19 27*  --   --   --   --   --  35*   CREA 3.07* 3.40*  --   --   --   --   --  3.81*   GFRAA 28* 25*  --   --   --   --   --  22*   GFRNA 24* 21*  --   --   --   --   --  18*   CA 8.2* 8.4  --   --   --   --  9.2 9.1   MG 2.1 2.7*  --  2.9*  --    < >  --   --    TP  --   --   --   --  4.9*  --   --   --    AGRAT  --   --  2.2  --  1.1  --   --   --     < > = values in this interval not displayed. Recent Labs     08/09/19 0337 08/08/19 0353 08/07/19  0439   WBC 7.2 8.7 10.0   HGB 7.8* 8.2* 9.7*   HCT 24.4* 25.3* 29.6*    150 145*          OBJECTIVE: On arrival to room, I found patient to be resting in bed. Pain Assessment  Pain Intensity 1: 0 (08/09/19 1159)  Pain Location 1: Head  Pain Intervention(s) 1: Medication (see MAR)  Patient Stated Pain Goal: 0      ASSESSMENT:  Patient wakes to voice. Disoriented to place and year, oriented to self. Ride side weaker than left. Flat affect. Breathing even and unlabored. PLAN:  Will continue to follow per outreach protocol.

## 2019-08-10 NOTE — PROGRESS NOTES
08/10/19 2539   NIH Stroke Scale   Interval Other (comment)  (shift change with JOHANNA Ridley )   LOC 0   LOC Questions 0   LOC Commands 0   Best Gaze 0   Visual 0   Facial Palsy 0   Motor Right Arm 1   Motor Left Arm 0   Motor Right Leg 0   Motor Left Leg 0   Limb Ataxia 1   Sensory 0   Best Language 0   Dysarthria 0   Extinction and Inattention 0   Total 2

## 2019-08-10 NOTE — PROGRESS NOTES
Date of Outreach Update:  [place] Barb was seen and assessed. MEWS Score: 1 (08/10/19 1125)  Vitals:    08/10/19 0333 08/10/19 0805 08/10/19 0959 08/10/19 1125   BP: (!) 156/93 170/90 137/84 143/89   Pulse: 81 82  79   Resp: 18 17  18   Temp: 98 °F (36.7 °C) 98.1 °F (36.7 °C)  98.6 °F (37 °C)   SpO2: 97% 95%  95%   Weight:             Pain Assessment  Pain Intensity 1: 0 (08/09/19 1945)  Pain Location 1: Head  Pain Intervention(s) 1: Medication (see MAR)  Patient Stated Pain Goal: 0      Previous Outreach assessment has been reviewed. There have been no significant clinical changes since the completion of the last dated Outreach assessment. Will continue to follow up per outreach protocol.     Signed By:   Linda Gallego RN    August 10, 2019 3:51 PM

## 2019-08-10 NOTE — PROGRESS NOTES
Problem: Delirium Treatment  Goal: *Level of consciousness restored to baseline  Outcome: Progressing Towards Goal     Problem: Falls - Risk of  Goal: *Absence of Falls  Description  Document Nir Madden Fall Risk and appropriate interventions in the flowsheet.   Outcome: Progressing Towards Goal  Note:   Fall Risk Interventions:  Mobility Interventions: Assess mobility with egress test, Bed/chair exit alarm, Mechanical lift    Mentation Interventions: Adequate sleep, hydration, pain control, Bed/chair exit alarm, Door open when patient unattended    Medication Interventions: Evaluate medications/consider consulting pharmacy, Patient to call before getting OOB, Teach patient to arise slowly, Bed/chair exit alarm    Elimination Interventions: Call light in reach    History of Falls Interventions: Bed/chair exit alarm, Investigate reason for fall

## 2019-08-11 LAB
ANION GAP SERPL CALC-SCNC: 7 MMOL/L (ref 7–16)
BUN SERPL-MCNC: 17 MG/DL (ref 6–23)
CALCIUM SERPL-MCNC: 8.6 MG/DL (ref 8.3–10.4)
CHLORIDE SERPL-SCNC: 113 MMOL/L (ref 98–107)
CO2 SERPL-SCNC: 24 MMOL/L (ref 21–32)
CREAT SERPL-MCNC: 3.78 MG/DL (ref 0.8–1.5)
ERYTHROCYTE [DISTWIDTH] IN BLOOD BY AUTOMATED COUNT: 13.9 % (ref 11.9–14.6)
GLUCOSE SERPL-MCNC: 89 MG/DL (ref 65–100)
HCT VFR BLD AUTO: 25.8 % (ref 41.1–50.3)
HGB BLD-MCNC: 8.4 G/DL (ref 13.6–17.2)
MAGNESIUM SERPL-MCNC: 1.9 MG/DL (ref 1.8–2.4)
MCH RBC QN AUTO: 30.7 PG (ref 26.1–32.9)
MCHC RBC AUTO-ENTMCNC: 32.6 G/DL (ref 31.4–35)
MCV RBC AUTO: 94.2 FL (ref 79.6–97.8)
NRBC # BLD: 0 K/UL (ref 0–0.2)
PLATELET # BLD AUTO: 206 K/UL (ref 150–450)
PMV BLD AUTO: 10.9 FL (ref 9.4–12.3)
POTASSIUM SERPL-SCNC: 3.4 MMOL/L (ref 3.5–5.1)
RBC # BLD AUTO: 2.74 M/UL (ref 4.23–5.6)
SODIUM SERPL-SCNC: 144 MMOL/L (ref 136–145)
WBC # BLD AUTO: 7.4 K/UL (ref 4.3–11.1)

## 2019-08-11 PROCEDURE — 65660000000 HC RM CCU STEPDOWN

## 2019-08-11 PROCEDURE — 74011000258 HC RX REV CODE- 258: Performed by: INTERNAL MEDICINE

## 2019-08-11 PROCEDURE — 74011250637 HC RX REV CODE- 250/637: Performed by: NURSE PRACTITIONER

## 2019-08-11 PROCEDURE — 85027 COMPLETE CBC AUTOMATED: CPT

## 2019-08-11 PROCEDURE — 83735 ASSAY OF MAGNESIUM: CPT

## 2019-08-11 PROCEDURE — 74011000258 HC RX REV CODE- 258: Performed by: NURSE PRACTITIONER

## 2019-08-11 PROCEDURE — 74011250636 HC RX REV CODE- 250/636: Performed by: NURSE PRACTITIONER

## 2019-08-11 PROCEDURE — 77030020257 HC SOL INJ SOD CL 0.45% 1000ML BG

## 2019-08-11 PROCEDURE — 80048 BASIC METABOLIC PNL TOTAL CA: CPT

## 2019-08-11 RX ORDER — MAGNESIUM SULFATE HEPTAHYDRATE 40 MG/ML
4 INJECTION, SOLUTION INTRAVENOUS ONCE
Status: COMPLETED | OUTPATIENT
Start: 2019-08-11 | End: 2019-08-11

## 2019-08-11 RX ORDER — LANOLIN ALCOHOL/MO/W.PET/CERES
400 CREAM (GRAM) TOPICAL 2 TIMES DAILY
Status: DISCONTINUED | OUTPATIENT
Start: 2019-08-11 | End: 2019-08-13 | Stop reason: HOSPADM

## 2019-08-11 RX ADMIN — MAGNESIUM SULFATE HEPTAHYDRATE 4 G: 40 INJECTION, SOLUTION INTRAVENOUS at 11:06

## 2019-08-11 RX ADMIN — CITALOPRAM HYDROBROMIDE 10 MG: 20 TABLET ORAL at 09:28

## 2019-08-11 RX ADMIN — Medication 30 ML: at 06:34

## 2019-08-11 RX ADMIN — METOPROLOL TARTRATE 100 MG: 50 TABLET ORAL at 09:27

## 2019-08-11 RX ADMIN — Medication 30 ML: at 22:13

## 2019-08-11 RX ADMIN — MODAFINIL 200 MG: 100 TABLET ORAL at 09:27

## 2019-08-11 RX ADMIN — SODIUM CHLORIDE, PRESERVATIVE FREE 900 UNITS: 5 INJECTION INTRAVENOUS at 22:13

## 2019-08-11 RX ADMIN — HYDRALAZINE HYDROCHLORIDE 25 MG: 50 TABLET, FILM COATED ORAL at 09:28

## 2019-08-11 RX ADMIN — LEVETIRACETAM 1000 MG: 500 TABLET, FILM COATED ORAL at 18:22

## 2019-08-11 RX ADMIN — SODIUM CHLORIDE, PRESERVATIVE FREE 900 UNITS: 5 INJECTION INTRAVENOUS at 06:34

## 2019-08-11 RX ADMIN — METOPROLOL TARTRATE 100 MG: 50 TABLET ORAL at 18:22

## 2019-08-11 RX ADMIN — LEVETIRACETAM 1000 MG: 500 TABLET, FILM COATED ORAL at 09:28

## 2019-08-11 RX ADMIN — Medication 400 MG: at 18:23

## 2019-08-11 RX ADMIN — FAMOTIDINE 20 MG: 20 TABLET ORAL at 09:28

## 2019-08-11 RX ADMIN — HEPARIN SODIUM 5000 UNITS: 5000 INJECTION INTRAVENOUS; SUBCUTANEOUS at 17:22

## 2019-08-11 RX ADMIN — HYDROCODONE BITARTRATE AND ACETAMINOPHEN 1 TABLET: 7.5; 325 TABLET ORAL at 06:52

## 2019-08-11 RX ADMIN — HEPARIN SODIUM 5000 UNITS: 5000 INJECTION INTRAVENOUS; SUBCUTANEOUS at 09:28

## 2019-08-11 RX ADMIN — SODIUM CHLORIDE 100 ML/HR: 450 INJECTION, SOLUTION INTRAVENOUS at 18:23

## 2019-08-11 RX ADMIN — ASPIRIN 325 MG ORAL TABLET 325 MG: 325 PILL ORAL at 09:28

## 2019-08-11 RX ADMIN — SODIUM CHLORIDE 1000 ML: 450 INJECTION, SOLUTION INTRAVENOUS at 14:38

## 2019-08-11 RX ADMIN — SODIUM CHLORIDE, PRESERVATIVE FREE 900 UNITS: 5 INJECTION INTRAVENOUS at 14:38

## 2019-08-11 RX ADMIN — Medication 400 MG: at 11:04

## 2019-08-11 RX ADMIN — SODIUM CHLORIDE 1000 ML: 450 INJECTION, SOLUTION INTRAVENOUS at 11:01

## 2019-08-11 RX ADMIN — CLOPIDOGREL BISULFATE 75 MG: 75 TABLET ORAL at 09:28

## 2019-08-11 RX ADMIN — HYDRALAZINE HYDROCHLORIDE 25 MG: 50 TABLET, FILM COATED ORAL at 22:13

## 2019-08-11 RX ADMIN — Medication 10 ML: at 14:39

## 2019-08-11 RX ADMIN — HYDRALAZINE HYDROCHLORIDE 25 MG: 50 TABLET, FILM COATED ORAL at 18:22

## 2019-08-11 RX ADMIN — HEPARIN SODIUM 5000 UNITS: 5000 INJECTION INTRAVENOUS; SUBCUTANEOUS at 00:55

## 2019-08-11 NOTE — PROGRESS NOTES
[facility] PROGRESS NOTE    Follow up for: NING over ckd     Subjective:   Patient seen and examined. Chart, notes, labs, imaging, results all reviewed.      Seen in floor     On IVF         ROS:  Gen - no fever, no chills, appetite okay  CV - no chest pain, no orthopnea  Lung - no shortness of breath, no cough  Abd - no tenderness, no nausea, no vomiting  Ext - no edema    Objective:   Exam:  Vitals:    08/10/19 1938 08/10/19 2328 08/11/19 0435 08/11/19 0800   BP: 153/85 154/86 140/81 151/88   Pulse: 77 78 89 78   Resp: 18 18 19 18   Temp: 99.2 °F (37.3 °C) 98.8 °F (37.1 °C) 98.2 °F (36.8 °C) 98 °F (36.7 °C)   SpO2: 95% 96% 98% 96%   Weight:             Intake/Output Summary (Last 24 hours) at 8/11/2019 1026  Last data filed at 8/11/2019 1004  Gross per 24 hour   Intake --   Output 800 ml   Net -800 ml       Current Facility-Administered Medications   Medication Dose Route Frequency    heparin (porcine) pf 300-900 Units  300-900 Units InterCATHeter PRN    heparin (porcine) pf 900 Units  900 Units InterCATHeter Q8H    SALINE PERIPHERAL FLUSH Q8H soln 10-30 mL  10-30 mL InterCATHeter Q8H    saline peripheral flush soln 10-30 mL  10-30 mL InterCATHeter PRN    0.45% sodium chloride infusion  100 mL/hr IntraVENous CONTINUOUS    levETIRAcetam (KEPPRA) tablet 1,000 mg  1,000 mg Oral BID    aspirin tablet 325 mg  325 mg Oral DAILY    metoprolol tartrate (LOPRESSOR) tablet 100 mg  100 mg Oral BID    clopidogrel (PLAVIX) tablet 75 mg  75 mg Oral DAILY    citalopram (CELEXA) tablet 10 mg  10 mg Oral DAILY    modafinil (PROVIGIL) tablet 200 mg  200 mg Oral DAILY    nicotine (NICODERM CQ) 14 mg/24 hr patch 1 Patch  1 Patch TransDERmal Q24H    famotidine (PEPCID) tablet 20 mg  20 mg Oral DAILY    hydrALAZINE (APRESOLINE) tablet 25 mg  25 mg Oral TID    HYDROcodone-acetaminophen (NORCO) 7.5-325 mg per tablet 1 Tab  1 Tab Oral Q4H PRN    ondansetron (ZOFRAN) injection 4 mg  4 mg IntraVENous Q4H PRN    heparin (porcine) injection 5,000 Units  5,000 Units SubCUTAneous Q8H       EXAM  GEN - Alert, oriented, in no distress  CV - S1, S2, RRR, no rub, murmur, or gallop  Lung - clear to auscultation bilaterally  Abd - soft, nontender, BS present  Ext - no edema    Recent Labs     08/11/19  0645 08/10/19  0826 08/09/19  0337   WBC 7.4 7.3 7.2   HGB 8.4* 8.7* 7.8*   HCT 25.8* 27.1* 24.4*    189 167        Recent Labs     08/11/19  0645 08/10/19  0826 08/09/19  0337    146* 143   K 3.4* 3.7 3.3*   * 115* 115*   CO2 24 22 21   BUN 17 18 19   CREA 3.78* 3.55* 3.07*   CA 8.6 8.9 8.2*   GLU 89 87 112*   MG 1.9 2.4 2.1       Assessment and Plan:     NING over CKD stage IV  - NING - worsening renal function seen , restarted on IVf yesterday   - US kidney - some cysts and chronic changes   - reviewed Ua - proteinuria present , no active sediments     Nephrotic syndrome 4.5 gms   All work up negative so far      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 No family h/o of PCKD known so far .       Miranda Topete MD

## 2019-08-11 NOTE — PROGRESS NOTES
Problem: Delirium Treatment  Goal: *Level of consciousness restored to baseline  Outcome: Progressing Towards Goal  Goal: *Level of environmental perceptions restored to baseline  Outcome: Progressing Towards Goal  Goal: *Sensory perception restored to baseline  Outcome: Progressing Towards Goal  Goal: *Emotional stability restored to baseline  Outcome: Progressing Towards Goal  Goal: *Functional assessment restored to baseline  Outcome: Progressing Towards Goal  Goal: *Absence of falls  Outcome: Progressing Towards Goal  Goal: *Will remain free of delirium, CAM Score negative  Outcome: Progressing Towards Goal  Goal: *Cognitive status will be restored to baseline  Outcome: Progressing Towards Goal  Goal: Interventions  Outcome: Progressing Towards Goal     Problem: Patient Education: Go to Patient Education Activity  Goal: Patient/Family Education  Outcome: Progressing Towards Goal     Problem: Pressure Injury - Risk of  Goal: *Prevention of pressure injury  Description  Document Anselmo Scale and appropriate interventions in the flowsheet. Outcome: Progressing Towards Goal  Note:   Pressure Injury Interventions:  Sensory Interventions: Assess changes in LOC    Moisture Interventions: Absorbent underpads    Activity Interventions: Increase time out of bed    Mobility Interventions: HOB 30 degrees or less    Nutrition Interventions: Document food/fluid/supplement intake, Offer support with meals,snacks and hydration    Friction and Shear Interventions: Foam dressings/transparent film/skin sealants                Problem: Patient Education: Go to Patient Education Activity  Goal: Patient/Family Education  Outcome: Progressing Towards Goal     Problem: Falls - Risk of  Goal: *Absence of Falls  Description  Document Shaina Fall Risk and appropriate interventions in the flowsheet.   Outcome: Progressing Towards Goal  Note:   Fall Risk Interventions:  Mobility Interventions: Bed/chair exit alarm    Mentation Interventions: Bed/chair exit alarm    Medication Interventions: Bed/chair exit alarm    Elimination Interventions: Bed/chair exit alarm    History of Falls Interventions: Bed/chair exit alarm         Problem: Patient Education: Go to Patient Education Activity  Goal: Patient/Family Education  Outcome: Progressing Towards Goal     Problem: Seizure Disorder (Adult)  Goal: *STG: Remains free of seizure activity  Outcome: Progressing Towards Goal  Goal: *STG: Maintains lab values within therapeutic range  Outcome: Progressing Towards Goal  Goal: *STG/LTG: Complies with medication therapy  Outcome: Progressing Towards Goal  Goal: *STG: Remains free of injury during seizure activity  Outcome: Progressing Towards Goal  Goal: *STG: Remains safe in hospital  Outcome: Progressing Towards Goal  Goal: Interventions  Outcome: Progressing Towards Goal     Problem: Patient Education: Go to Patient Education Activity  Goal: Patient/Family Education  Outcome: Progressing Towards Goal     Problem: Hypertension  Goal: *Blood pressure within specified parameters  Outcome: Progressing Towards Goal  Goal: *Fluid volume balance  Outcome: Progressing Towards Goal  Goal: *Labs within defined limits  Outcome: Progressing Towards Goal     Problem: Patient Education: Go to Patient Education Activity  Goal: Patient/Family Education  Outcome: Progressing Towards Goal     Problem: Patient Education: Go to Patient Education Activity  Goal: Patient/Family Education  Outcome: Progressing Towards Goal

## 2019-08-11 NOTE — PROGRESS NOTES
[facility] CRITICAL CARE OUTREACH NURSE PROGRESS REPORT      SUBJECTIVE: Called to assess patient secondary to transfer from CCU. MEWS Score: 1 (08/10/19 1938)  Vitals:    08/10/19 0959 08/10/19 1125 08/10/19 1658 08/10/19 1938   BP: 137/84 143/89 (!) 161/96 153/85   Pulse:  79 78 77   Resp:  18 17 18   Temp:  98.6 °F (37 °C) 98.4 °F (36.9 °C) 99.2 °F (37.3 °C)   SpO2:  95% 98% 95%   Weight:              LAB DATA:    Recent Labs     08/10/19  0826 08/09/19  0337 08/08/19  0353   * 143 142   K 3.7 3.3* 3.7   * 115* 111*   CO2 22 21 23   AGAP 9 7 8   GLU 87 112* 102*   BUN 18 19 27*   CREA 3.55* 3.07* 3.40*   GFRAA 24* 28* 25*   GFRNA 20* 24* 21*   CA 8.9 8.2* 8.4   MG 2.4 2.1 2.7*        Recent Labs     08/10/19  0826 08/09/19  0337 08/08/19  0353   WBC 7.3 7.2 8.7   HGB 8.7* 7.8* 8.2*   HCT 27.1* 24.4* 25.3*    167 150          OBJECTIVE: On arrival to room, I found patient to be resting quietly. Pain Assessment  Pain Intensity 1: 0 (08/09/19 1945)  Pain Location 1: Head  Pain Intervention(s) 1: Medication (see MAR)  Patient Stated Pain Goal: 0      ASSESSMENT:  Patient awakens to voice, flat affect. Oriented to person and place, able to follow commands. Breathing even and unlabored. No visible signs of distress. PLAN:  Will continue to follow per outreach protocol.

## 2019-08-11 NOTE — PROGRESS NOTES
Progress Note    Patient: Michele Ho MRN: [id number]  SSN: [id number]    YOB: 1975  Age: 37 y.o. Sex: male      Admit Date: 8/7/2019    LOS: 4 days     Subjective:   Pt OOB to chair this am, more talkative, smiling more this am and interactive.      Current Facility-Administered Medications   Medication Dose Route Frequency    sodium chloride 0.45 % bolus infusion 1,000 mL  1,000 mL IntraVENous ONCE    sodium chloride 0.45 % bolus infusion 1,000 mL  1,000 mL IntraVENous ONCE    magnesium sulfate 4 g/100 mL IVPB  4 g IntraVENous ONCE    magnesium oxide (MAG-OX) tablet 400 mg  400 mg Oral BID    heparin (porcine) pf 300-900 Units  300-900 Units InterCATHeter PRN    heparin (porcine) pf 900 Units  900 Units InterCATHeter Q8H    SALINE PERIPHERAL FLUSH Q8H soln 10-30 mL  10-30 mL InterCATHeter Q8H    saline peripheral flush soln 10-30 mL  10-30 mL InterCATHeter PRN    0.45% sodium chloride infusion  100 mL/hr IntraVENous CONTINUOUS    levETIRAcetam (KEPPRA) tablet 1,000 mg  1,000 mg Oral BID    aspirin tablet 325 mg  325 mg Oral DAILY    metoprolol tartrate (LOPRESSOR) tablet 100 mg  100 mg Oral BID    clopidogrel (PLAVIX) tablet 75 mg  75 mg Oral DAILY    citalopram (CELEXA) tablet 10 mg  10 mg Oral DAILY    modafinil (PROVIGIL) tablet 200 mg  200 mg Oral DAILY    nicotine (NICODERM CQ) 14 mg/24 hr patch 1 Patch  1 Patch TransDERmal Q24H    famotidine (PEPCID) tablet 20 mg  20 mg Oral DAILY    hydrALAZINE (APRESOLINE) tablet 25 mg  25 mg Oral TID    HYDROcodone-acetaminophen (NORCO) 7.5-325 mg per tablet 1 Tab  1 Tab Oral Q4H PRN    ondansetron (ZOFRAN) injection 4 mg  4 mg IntraVENous Q4H PRN    heparin (porcine) injection 5,000 Units  5,000 Units SubCUTAneous Q8H       Objective:     Vitals:    08/10/19 1938 08/10/19 2328 08/11/19 0435 08/11/19 0800   BP: 153/85 154/86 140/81 151/88   Pulse: 77 78 89 78   Resp: 18 18 19 18   Temp: 99.2 °F (37.3 °C) 98.8 °F (37.1 °C) 98.2 °F (36.8 °C) 98 °F (36.7 °C)   SpO2: 95% 96% 98% 96%   Weight:             Intake and Output:  Current Shift: 08/11 0701 - 08/11 1900  In: -   Out: 400 [Urine:400]  Last 24 hr: 08/10 0701 - 08/11 0700  In: -   Out: 400 [Urine:400]         Physical Exam:   General:  Awake and following commands. + headache. Eyes:  PERRL+3, no nystagmus. Neck: Supple, symmetrical, trachea midline, no adenopathy, thyroid: no enlargment/tenderness/nodules, no carotid bruit and no JVD. Lungs:   Clear to auscultation bilaterally. Heart:  Regular rate and rhythm, S1, S2 normal, no murmur, click, rub or gallop. Abdomen:   Soft, non-tender. Bowel sounds normal. No masses,  No organomegaly. Extremities: Extremities normal, atraumatic, no cyanosis or edema. Pulses: 2+ and symmetric all extremities. Skin: Skin color, texture, turgor normal. No rashes or lesions   Neurologic: Pt awake, following commands. Confused at times about place. Vasques, L >R.         Lab/Data Review:    Recent Results (from the past 12 hour(s))   MAGNESIUM    Collection Time: 08/11/19  6:45 AM   Result Value Ref Range    Magnesium 1.9 1.8 - 2.4 mg/dL   CBC W/O DIFF    Collection Time: 08/11/19  6:45 AM   Result Value Ref Range    WBC 7.4 4.3 - 11.1 K/uL    RBC 2.74 (L) 4.23 - 5.6 M/uL    HGB 8.4 (L) 13.6 - 17.2 g/dL    HCT 25.8 (L) 41.1 - 50.3 %    MCV 94.2 79.6 - 97.8 FL    MCH 30.7 26.1 - 32.9 PG    MCHC 32.6 31.4 - 35.0 g/dL    RDW 13.9 11.9 - 14.6 %    PLATELET 072 323 - 969 K/uL    MPV 10.9 9.4 - 12.3 FL    ABSOLUTE NRBC 0.00 0.0 - 0.2 K/uL   METABOLIC PANEL, BASIC    Collection Time: 08/11/19  6:45 AM   Result Value Ref Range    Sodium 144 136 - 145 mmol/L    Potassium 3.4 (L) 3.5 - 5.1 mmol/L    Chloride 113 (H) 98 - 107 mmol/L    CO2 24 21 - 32 mmol/L    Anion gap 7 7 - 16 mmol/L    Glucose 89 65 - 100 mg/dL    BUN 17 6 - 23 MG/DL    Creatinine 3.78 (H) 0.8 - 1.5 MG/DL    GFR est AA 22 (L) >60 ml/min/1.73m2    GFR est non-AA 19 (L) >60 ml/min/1.73m2 Calcium 8.6 8.3 - 10.4 MG/DL       Assessment/ Plan:     Principal Problem:    Posterior reversible encephalopathy syndrome (8/8/2019)    Active Problems:    Seizure cerebral (8/7/2019)      NEURO: Pt admitted back to ICU for seizure activity and PRES. Pt looks much better today. Keep BP <160. On metoprolol and hydralazine. PT/OT/ST with goal to return to Rehab this week unless acute changes. CT head this am looks good, PRES resolving. Transfer to floor today. RESP: tolerating RA, 02 sat 100%. CV: NO MAP OR BP GOAL. will cont asa/plavix, heparin and cont monitor. Metoprolol and hydralazine for BP. HEME: HH: 8.4/24,   NEPH: bun/cr: 17/3.7 ( pt with hx of kidney disease on admit- Nephrology following.  will give bolus x2L 1/2 NS and start maint. Mg. 1.9, will replace with 4gm IV x1 and start on mag 400mg po bid. GI: advance diet as tolerated. Senna, IVF\"s. ID: afebrile, no abx  LINES: SHANNAN unger PICC.      Signed By: Amaya Childress NP     August 11, 2019

## 2019-08-11 NOTE — PROGRESS NOTES
08/11/19 0658   NIH Stroke Scale   Interval Other (comment)   LOC 0   LOC Questions 0   LOC Commands 0   Best Gaze 0   Visual 0   Facial Palsy 0   Motor Right Arm 0   Motor Left Arm 0   Motor Right Leg 0   Motor Left Leg 0   Limb Ataxia 1   Sensory 0   Best Language 0   Dysarthria 0   Extinction and Inattention 0   Total 1

## 2019-08-11 NOTE — PROGRESS NOTES
08/11/19 1911   NIH Stroke Scale   Interval Other (comment)  (shift change with JOHANNA Ridley )   LOC 0   LOC Questions 0   LOC Commands 0   Best Gaze 0   Visual 0   Facial Palsy 0   Motor Right Arm 0   Motor Left Arm 0   Motor Right Leg 0   Motor Left Leg 0   Limb Ataxia 1   Sensory 0   Best Language 0   Dysarthria 0   Extinction and Inattention 0   Total 1

## 2019-08-11 NOTE — PROGRESS NOTES
Problem: Pressure Injury - Risk of  Goal: *Prevention of pressure injury  Description  Document Anselmo Scale and appropriate interventions in the flowsheet. Outcome: Progressing Towards Goal  Note:   Pressure Injury Interventions:  Sensory Interventions: Assess changes in LOC    Moisture Interventions: Apply protective barrier, creams and emollients, Absorbent underpads, Offer toileting Q_hr    Activity Interventions: PT/OT evaluation, Pressure redistribution bed/mattress(bed type)    Mobility Interventions: HOB 30 degrees or less, PT/OT evaluation, Pressure redistribution bed/mattress (bed type)    Nutrition Interventions: Offer support with meals,snacks and hydration    Friction and Shear Interventions: HOB 30 degrees or less                Problem: Patient Education: Go to Patient Education Activity  Goal: Patient/Family Education  Outcome: Progressing Towards Goal     Problem: Falls - Risk of  Goal: *Absence of Falls  Description  Document Shaina Fall Risk and appropriate interventions in the flowsheet.   Outcome: Progressing Towards Goal  Note:   Fall Risk Interventions:  Mobility Interventions: Assess mobility with egress test, Bed/chair exit alarm, Communicate number of staff needed for ambulation/transfer    Mentation Interventions: Bed/chair exit alarm, Door open when patient unattended    Medication Interventions: Evaluate medications/consider consulting pharmacy, Bed/chair exit alarm, Teach patient to arise slowly    Elimination Interventions: Stay With Me (per policy), Call light in reach    History of Falls Interventions: Bed/chair exit alarm, Door open when patient unattended         Problem: Patient Education: Go to Patient Education Activity  Goal: Patient/Family Education  Outcome: Progressing Towards Goal

## 2019-08-11 NOTE — PROGRESS NOTES
Date of Outreach Update:  Konrad Santos was seen and assessed. MEWS Score: 1 (08/11/19 1158)  Vitals:    08/10/19 2328 08/11/19 0435 08/11/19 0800 08/11/19 1158   BP: 154/86 140/81 151/88 150/87   Pulse: 78 89 78 65   Resp: 18 19 18 18   Temp: 98.8 °F (37.1 °C) 98.2 °F (36.8 °C) 98 °F (36.7 °C) 98.3 °F (36.8 °C)   SpO2: 96% 98% 96% 99%   Weight:             Pain Assessment  Pain Intensity 1: 0 (08/10/19 1930)  Pain Location 1: Head  Pain Intervention(s) 1: Medication (see MAR)  Patient Stated Pain Goal: 0      Previous Outreach assessment has been reviewed. There have been no significant clinical changes since the completion of the last dated Outreach assessment. Will discharge from outreach program per protocol.     Signed By:   Rashaun Torres RN    August 11, 2019 2:40 PM

## 2019-08-11 NOTE — PROGRESS NOTES
[facility] CRITICAL CARE OUTREACH NURSE PROGRESS REPORT      SUBJECTIVE: Called to assess patient secondary to transfer from ICU. MEWS Score: 1 (08/11/19 0435)  Vitals:    08/10/19 1938 08/10/19 2328 08/11/19 0435 08/11/19 0800   BP: 153/85 154/86 140/81 151/88   Pulse: 77 78 89 78   Resp: 18 18 19 18   Temp: 99.2 °F (37.3 °C) 98.8 °F (37.1 °C) 98.2 °F (36.8 °C) 98 °F (36.7 °C)   SpO2: 95% 96% 98% 96%   Weight:          LAB DATA:    Recent Labs     08/11/19  0645 08/10/19  0826 08/09/19  0337    146* 143   K 3.4* 3.7 3.3*   * 115* 115*   CO2 24 22 21   AGAP 7 9 7   GLU 89 87 112*   BUN 17 18 19   CREA 3.78* 3.55* 3.07*   GFRAA 22* 24* 28*   GFRNA 19* 20* 24*   CA 8.6 8.9 8.2*   MG 1.9 2.4 2.1        Recent Labs     08/11/19  0645 08/10/19  0826 08/09/19  0337   WBC 7.4 7.3 7.2   HGB 8.4* 8.7* 7.8*   HCT 25.8* 27.1* 24.4*    189 167          OBJECTIVE: On arrival to room, I found patient to be resting in bed. Pain Assessment  Pain Intensity 1: 0 (08/10/19 1930)  Pain Location 1: Head  Pain Intervention(s) 1: Medication (see MAR)  Patient Stated Pain Goal: 0      ASSESSMENT:  Patient wakes to voice. Oriented x3. Flat affect. Follows commands. Respirations even and unlabored. No new complaints. VS, labs, and progress notes reviewed. No new concerns identified. PLAN:  Will continue to follow per outreach protocol.

## 2019-08-11 NOTE — PROGRESS NOTES
Date of Outreach Update:  Yelena Or was seen and assessed. Previous Outreach assessment has been reviewed. There have been no significant clinical changes since the completion of the last dated Outreach assessment. Patient resting in bed with no visible signs of distress. Breathing even and unlabored. Discussed with primary RN who voices no concerns. Will continue to follow up per outreach protocol.     Signed By:   Moose Tsang    August 11, 2019 2:44 AM

## 2019-08-12 LAB
ANION GAP SERPL CALC-SCNC: 6 MMOL/L (ref 7–16)
BUN SERPL-MCNC: 17 MG/DL (ref 6–23)
CALCIUM SERPL-MCNC: 8.5 MG/DL (ref 8.3–10.4)
CHLORIDE SERPL-SCNC: 111 MMOL/L (ref 98–107)
CO2 SERPL-SCNC: 25 MMOL/L (ref 21–32)
CREAT SERPL-MCNC: 3.51 MG/DL (ref 0.8–1.5)
ERYTHROCYTE [DISTWIDTH] IN BLOOD BY AUTOMATED COUNT: 13.6 % (ref 11.9–14.6)
GLUCOSE SERPL-MCNC: 86 MG/DL (ref 65–100)
HCT VFR BLD AUTO: 26.3 % (ref 41.1–50.3)
HGB BLD-MCNC: 8.6 G/DL (ref 13.6–17.2)
MAGNESIUM SERPL-MCNC: 2.6 MG/DL (ref 1.8–2.4)
MCH RBC QN AUTO: 30.6 PG (ref 26.1–32.9)
MCHC RBC AUTO-ENTMCNC: 32.7 G/DL (ref 31.4–35)
MCV RBC AUTO: 93.6 FL (ref 79.6–97.8)
NRBC # BLD: 0 K/UL (ref 0–0.2)
PLATELET # BLD AUTO: 222 K/UL (ref 150–450)
PMV BLD AUTO: 10.8 FL (ref 9.4–12.3)
POTASSIUM SERPL-SCNC: 3.3 MMOL/L (ref 3.5–5.1)
RBC # BLD AUTO: 2.81 M/UL (ref 4.23–5.6)
SODIUM SERPL-SCNC: 142 MMOL/L (ref 136–145)
WBC # BLD AUTO: 6.9 K/UL (ref 4.3–11.1)

## 2019-08-12 PROCEDURE — 74011000258 HC RX REV CODE- 258: Performed by: NURSE PRACTITIONER

## 2019-08-12 PROCEDURE — 97530 THERAPEUTIC ACTIVITIES: CPT

## 2019-08-12 PROCEDURE — 74011000258 HC RX REV CODE- 258: Performed by: INTERNAL MEDICINE

## 2019-08-12 PROCEDURE — 74011250636 HC RX REV CODE- 250/636: Performed by: NURSE PRACTITIONER

## 2019-08-12 PROCEDURE — 83735 ASSAY OF MAGNESIUM: CPT

## 2019-08-12 PROCEDURE — 92507 TX SP LANG VOICE COMM INDIV: CPT

## 2019-08-12 PROCEDURE — 77030020263 HC SOL INJ SOD CL0.9% LFCR 1000ML

## 2019-08-12 PROCEDURE — 74011250637 HC RX REV CODE- 250/637: Performed by: NURSE PRACTITIONER

## 2019-08-12 PROCEDURE — 85027 COMPLETE CBC AUTOMATED: CPT

## 2019-08-12 PROCEDURE — 65660000000 HC RM CCU STEPDOWN

## 2019-08-12 PROCEDURE — 80048 BASIC METABOLIC PNL TOTAL CA: CPT

## 2019-08-12 RX ADMIN — HEPARIN SODIUM 5000 UNITS: 5000 INJECTION INTRAVENOUS; SUBCUTANEOUS at 01:06

## 2019-08-12 RX ADMIN — Medication 400 MG: at 18:37

## 2019-08-12 RX ADMIN — Medication 400 MG: at 08:18

## 2019-08-12 RX ADMIN — CITALOPRAM HYDROBROMIDE 10 MG: 20 TABLET ORAL at 08:19

## 2019-08-12 RX ADMIN — METOPROLOL TARTRATE 100 MG: 50 TABLET ORAL at 18:37

## 2019-08-12 RX ADMIN — HEPARIN SODIUM 5000 UNITS: 5000 INJECTION INTRAVENOUS; SUBCUTANEOUS at 08:19

## 2019-08-12 RX ADMIN — HYDRALAZINE HYDROCHLORIDE 25 MG: 50 TABLET, FILM COATED ORAL at 08:18

## 2019-08-12 RX ADMIN — METOPROLOL TARTRATE 100 MG: 50 TABLET ORAL at 08:18

## 2019-08-12 RX ADMIN — SODIUM CHLORIDE, PRESERVATIVE FREE 900 UNITS: 5 INJECTION INTRAVENOUS at 14:30

## 2019-08-12 RX ADMIN — LEVETIRACETAM 1000 MG: 500 TABLET, FILM COATED ORAL at 08:18

## 2019-08-12 RX ADMIN — MODAFINIL 200 MG: 100 TABLET ORAL at 08:19

## 2019-08-12 RX ADMIN — HEPARIN SODIUM 5000 UNITS: 5000 INJECTION INTRAVENOUS; SUBCUTANEOUS at 18:37

## 2019-08-12 RX ADMIN — HYDRALAZINE HYDROCHLORIDE 25 MG: 50 TABLET, FILM COATED ORAL at 18:37

## 2019-08-12 RX ADMIN — FAMOTIDINE 20 MG: 20 TABLET ORAL at 08:18

## 2019-08-12 RX ADMIN — LEVETIRACETAM 1000 MG: 500 TABLET, FILM COATED ORAL at 18:37

## 2019-08-12 RX ADMIN — ASPIRIN 325 MG ORAL TABLET 325 MG: 325 PILL ORAL at 08:18

## 2019-08-12 RX ADMIN — SODIUM CHLORIDE, PRESERVATIVE FREE 900 UNITS: 5 INJECTION INTRAVENOUS at 06:41

## 2019-08-12 RX ADMIN — Medication 30 ML: at 06:42

## 2019-08-12 RX ADMIN — SODIUM CHLORIDE, PRESERVATIVE FREE 900 UNITS: 5 INJECTION INTRAVENOUS at 22:19

## 2019-08-12 RX ADMIN — HYDRALAZINE HYDROCHLORIDE 25 MG: 50 TABLET, FILM COATED ORAL at 22:19

## 2019-08-12 RX ADMIN — SODIUM CHLORIDE 100 ML/HR: 450 INJECTION, SOLUTION INTRAVENOUS at 22:14

## 2019-08-12 RX ADMIN — SODIUM CHLORIDE 1000 ML: 450 INJECTION, SOLUTION INTRAVENOUS at 14:35

## 2019-08-12 RX ADMIN — Medication 30 ML: at 22:19

## 2019-08-12 RX ADMIN — CLOPIDOGREL BISULFATE 75 MG: 75 TABLET ORAL at 08:19

## 2019-08-12 RX ADMIN — Medication 10 ML: at 14:30

## 2019-08-12 NOTE — PROGRESS NOTES
Cognitive Linguistic:   LTG: Patient will increase receptive/expressive language skills demonstrated by the ability to communicate basic wants/needs across environments   STG: Patient will name 8 items to given category with 80 accuracy given minimal cueing  STG: Patient will complete basic problem solving/reasoning tasks with 80 accuracy given moderate cueing   STG: Patient will participate in moderate level word finding task with 80% accuracy  STG: Patient will demonstrate attention by attending salient task details with 80 accuracy given minimal cueing  STG: Patient will demonstrate sustained attention by maintaining focus during a task for 5 minutes with minimal assistance  STG: Patient will utilize memory compensatory strategies to improve recall of functional list/message with 75% given moderate assistance    SPEECH LANGUAGE PATHOLOGY: DYSPHAGIA AND SPEECH-LANGUAGE/COGNITION: Daily Note 1    NAME/AGE/GENDER: Winifred Fuentes is a 37 y.o. male  DATE: 8/12/2019  PRIMARY DIAGNOSIS: Seizure cerebral [I67.89]      ICD-10: Treatment Diagnosis: R41.841 Cognitive-Communication Deficit    INTERDISCIPLINARY COLLABORATION: Registered Nurse  PRECAUTIONS/ALLERGIES: Patient has no known allergies. SUBJECTIVE   Father at bedside. Patient reluctant to participate \"this won't take long will it? \" educated patient on role of ST and importance of participating. Current Diet Regular/thin liquids     History of Present Injury/Illness: Mr. Karlee Gar  has no past medical history on file. Esvin Hoang He also  has a past surgical history that includes ir emboli intracran lt (7/11/2019). Previous Speech Therapy: YES During recent acute care stay- targeting cognitive linguistic ability    Problem List:  (Impairments causing functional limitations):  1. Attention  2. Memory  3.  Speech    Orientation:   Person  Place     Pain: Pain Scale 1: Numeric (0 - 10)  Pain Intensity 1: 0         OBJECTIVE   Oral Motor Assessment:  · Labial: No impairment  · Dentition: Intact  · Oral Hygiene: Adequate  · Lingual: No impairment        Cognitive Linguistic Treatment    Identify problem in picture: 7/10 independently,8/10 with min cues     ASSESSMENT   Patient demonstrates increased processing time, impaired attention, and decreased initiation. Volume low. Responses more delayed in open ended conversation vs. picture cards. Patient noted to provide correct concrete problem, however required follow up questions to explain why it is a problem. Word finding issue on single occasion with no improvement with cues. Stating \"arms\" for \"hands\" on clock. When given open ended prompt, \"there are ____on a watch\" patient stated \"there are wheels on a bus\". Tool Used: Dysphagia Outcome and Severity Scale (MIR)    Score Comments   Normal Diet  [] 7 With no strategies or extra time needed   Functional Swallow  [] 6 May have mild oral or pharyngeal delay   Mild Dysphagia  [] 5 Which may require one diet consistency restricted    Mild-Moderate Dysphagia  [] 4 With 1-2 diet consistencies restricted   Moderate Dysphagia  [] 3 With 2 or more diet consistencies restricted   Moderate-Severe Dysphagia  [] 2 With partial PO strategies (trials with ST only)   Severe Dysphagia  [] 1 With inability to tolerate any PO safely      Score:  Initial: 7 Most Recent: x (Date 08/12/19 )   Interpretation of Tool: The Dysphagia Outcome and Severity Scale (MIR) is a simple, easy-to-use, 7-point scale developed to systematically rate the functional severity of dysphagia based on objective assessment and make recommendations for diet level, independence level, and type of nutrition. Tool Used: MODIFIED TAYLER SCALE (mRS)   Score   No Symptoms  [] 0   No significant disability despite symptoms; able to carry out all usual duties and activities  [] 1   Slight disability; unable to carry out all previous activities but able to look after own affairs without assistance.    [] 2 Moderate disability; requiring some help but able to walk without assistance  [] 3   Moderately severe disability; unable to walk without assistance and unable to attend to own bodily needs without assistance  [] 4   Severe disability; bedridden, incontinent, and requiring constant nursing care and attention  [] 5      Score:  Initial: 3    Interpretation of Tool: The Modified [place] Scale is a 7-point scaled used to quantify level of disability as it relates to a patient's functional abilities. Current Medications:   No current facility-administered medications on file prior to encounter. Current Outpatient Medications on File Prior to Encounter   Medication Sig Dispense Refill    aspirin (ASPIRIN) 325 mg tablet Take 1 Tab by mouth daily. 90 Tab 1    atorvastatin (LIPITOR) 40 mg tablet Take 1 Tab by mouth nightly. 90 Tab 1    citalopram (CELEXA) 10 mg tablet Take 1 Tab by mouth daily. 90 Tab 1    clopidogrel (PLAVIX) 75 mg tab Take 1 Tab by mouth daily. 90 Tab 1    famotidine (PEPCID) 20 mg tablet Take 1 Tab by mouth daily. 90 Tab 1    levETIRAcetam (KEPPRA) 500 mg tablet Take 1 Tab by mouth two (2) times a day. 60 Tab 5    modafinil (PROVIGIL) 200 mg tablet Take 1 Tab by mouth daily. Max Daily Amount: 200 mg. 30 Tab 0    nicotine (NICODERM CQ) 14 mg/24 hr patch 1 Patch by TransDERmal route every twenty-four (24) hours for 30 days. 30 Patch 0       PLAN    FREQUENCY/DURATION: Continue to follow patient 3 times a week for duration of hospital stay to address above goals.     - Recommendations for next treatment session: Next treatment will address cognitive-lingusitic abilities     REHABILITATION POTENTIAL FOR STATED GOALS: Good     COMPLIANCE WITH PROGRAM/EXERCISES: Will assess as treatment progresses    CONTINUATION OF SKILLED SERVICES/MEDICAL NECESSITY:   Patient is expected to demonstrate progress in expressive communication, receptive ability and cognitive ability to decrease assistance required communication, increase independence with activities of daily living, increase communication with family/caregivers and return to work.  Patient continues to require skilled intervention due to cognitive-linguistic deficits  . RECOMMENDATIONS   DIET:    continue prescribed diet    MEDICATIONS: With liquid     ASPIRATION PRECAUTIONS  · Slow rate of intake  · Small bites/sips  · Upright at 90 degrees during meal     COMPENSATORY STRATEGIES/MODIFICATIONS  · None     EDUCATION:  · Recommendations discussed with Nursing  · Patient     RECOMMENDATIONS for CONTINUED SPEECH THERAPY: YES: Anticipate need for ongoing speech therapy during this hospitalization at next level of care.        SAFETY:  After treatment position/precautions:  · family at bedside  · Call light within reach    Total Treatment Duration:   Time In: 1334  Time Out: [address] [facility], [facility] MED[facility] DEL [facility], [address] [place] [address]

## 2019-08-12 NOTE — PROGRESS NOTES
[facility] PROGRESS NOTE    Follow up for: NING over ckd     Subjective:   Patient seen and examined. Chart, notes, labs, imaging, results all reviewed. Awake and alert. Denies complaints. Good uop. Renal function stable.      ROS:  Gen - no fever, no chills, appetite okay  CV - no chest pain, no orthopnea  Lung - no shortness of breath, no cough  Abd - no tenderness, no nausea, no vomiting  Ext - no edema    Objective:   Exam:  Vitals:    08/12/19 0018 08/12/19 0443 08/12/19 0716 08/12/19 1109   BP: (!) 157/95 (!) 162/95 163/90 143/89   Pulse: 73 81 74 73   Resp: 18 18 18 18   Temp: 98.4 °F (36.9 °C) 98.5 °F (36.9 °C) 98.2 °F (36.8 °C) 98.5 °F (36.9 °C)   SpO2: 98% 99% 98% 98%   Weight:             Intake/Output Summary (Last 24 hours) at 8/12/2019 1113  Last data filed at 8/12/2019 0900  Gross per 24 hour   Intake --   Output 1950 ml   Net -1950 ml       Current Facility-Administered Medications   Medication Dose Route Frequency    magnesium oxide (MAG-OX) tablet 400 mg  400 mg Oral BID    heparin (porcine) pf 300-900 Units  300-900 Units InterCATHeter PRN    heparin (porcine) pf 900 Units  900 Units InterCATHeter Q8H    SALINE PERIPHERAL FLUSH Q8H soln 10-30 mL  10-30 mL InterCATHeter Q8H    saline peripheral flush soln 10-30 mL  10-30 mL InterCATHeter PRN    0.45% sodium chloride infusion  100 mL/hr IntraVENous CONTINUOUS    levETIRAcetam (KEPPRA) tablet 1,000 mg  1,000 mg Oral BID    aspirin tablet 325 mg  325 mg Oral DAILY    metoprolol tartrate (LOPRESSOR) tablet 100 mg  100 mg Oral BID    clopidogrel (PLAVIX) tablet 75 mg  75 mg Oral DAILY    citalopram (CELEXA) tablet 10 mg  10 mg Oral DAILY    modafinil (PROVIGIL) tablet 200 mg  200 mg Oral DAILY    nicotine (NICODERM CQ) 14 mg/24 hr patch 1 Patch  1 Patch TransDERmal Q24H    famotidine (PEPCID) tablet 20 mg  20 mg Oral DAILY    hydrALAZINE (APRESOLINE) tablet 25 mg  25 mg Oral TID    HYDROcodone-acetaminophen (NORCO) 7.5-325 mg per tablet 1 Tab  1 Tab Oral Q4H PRN    ondansetron (ZOFRAN) injection 4 mg  4 mg IntraVENous Q4H PRN    heparin (porcine) injection 5,000 Units  5,000 Units SubCUTAneous Q8H       EXAM  GEN - Alert, oriented, in no distress  CV - S1, S2, RRR, no rub, murmur, or gallop  Lung - clear to auscultation bilaterally  Abd - soft, nontender, BS present  Ext - no edema    Recent Labs     08/12/19  0651 08/11/19  0645 08/10/19  0826   WBC 6.9 7.4 7.3   HGB 8.6* 8.4* 8.7*   HCT 26.3* 25.8* 27.1*    206 189        Recent Labs     08/12/19  0651 08/11/19  0645 08/10/19  0826    144 146*   K 3.3* 3.4* 3.7   * 113* 115*   CO2 25 24 22   BUN 17 17 18   CREA 3.51* 3.78* 3.55*   CA 8.5 8.6 8.9   GLU 86 89 87   MG 2.6* 1.9 2.4       Assessment and Plan:     NING over CKD stage IV  - NING vs CKD. Creatinine has been mid 2's to mid 3's since admission, Worse with volume depletion. Now on IVF. Creatinine better today  -  kidney - some cysts and chronic changes   - reviewed UA - proteinuria present , no active sediments   - Nephrotic syndrome 4.5 gms   - serologic workup negative   - As per father - he has been known to have kidney problems in the past, has been checked for that 8 yrs back .  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

## 2019-08-12 NOTE — PROGRESS NOTES
Spoke with Elle SPENCER with admissions with [facility] states still pening auth for approval for [facility] hopefull will hear back today. CM will continue to follow.

## 2019-08-12 NOTE — PROGRESS NOTES
08/12/19 0715   NIH Stroke Scale   Interval Other (comment)   LOC 0   LOC Questions 0   LOC Commands 0   Best Gaze 0   Visual 0   Facial Palsy 0   Motor Right Arm 0   Motor Left Arm 0   Motor Right Leg 0   Motor Left Leg 0   Limb Ataxia 0   Sensory 0   Best Language 0   Dysarthria 0   Extinction and Inattention 0   Total 0

## 2019-08-12 NOTE — INTERDISCIPLINARY ROUNDS
Interdisciplinary team rounds were held 8/12/2019 with the following team members:Care Management,  Physical Therapy and . Plan of care discussed. See clinical pathway and/or care plan for interventions and desired outcomes.

## 2019-08-12 NOTE — PROGRESS NOTES
08/12/19 1929   NIH Stroke Scale   Interval Other (comment)  (DUAL NIH w Christine Lee RN)   LOC 0   LOC Questions 1  (periodic confusion)   LOC Commands 0   Best Gaze 0   Visual 0   Facial Palsy 0   Motor Right Arm 0   Motor Left Arm 0   Motor Right Leg 0   Motor Left Leg 0   Limb Ataxia 0   Sensory 0   Best Language 0   Dysarthria 0   Extinction and Inattention 0   Total 1

## 2019-08-12 NOTE — PROGRESS NOTES
Progress Note    Patient: Faustino Koo MRN: [id number]  SSN: [id number]    YOB: 1975  Age: 37 y.o. Sex: male      Admit Date: 8/7/2019    LOS: 5 days     Subjective:   No acute neuro changes.     Current Facility-Administered Medications   Medication Dose Route Frequency    magnesium oxide (MAG-OX) tablet 400 mg  400 mg Oral BID    heparin (porcine) pf 300-900 Units  300-900 Units InterCATHeter PRN    heparin (porcine) pf 900 Units  900 Units InterCATHeter Q8H    SALINE PERIPHERAL FLUSH Q8H soln 10-30 mL  10-30 mL InterCATHeter Q8H    saline peripheral flush soln 10-30 mL  10-30 mL InterCATHeter PRN    0.45% sodium chloride infusion  100 mL/hr IntraVENous CONTINUOUS    levETIRAcetam (KEPPRA) tablet 1,000 mg  1,000 mg Oral BID    aspirin tablet 325 mg  325 mg Oral DAILY    metoprolol tartrate (LOPRESSOR) tablet 100 mg  100 mg Oral BID    clopidogrel (PLAVIX) tablet 75 mg  75 mg Oral DAILY    citalopram (CELEXA) tablet 10 mg  10 mg Oral DAILY    modafinil (PROVIGIL) tablet 200 mg  200 mg Oral DAILY    nicotine (NICODERM CQ) 14 mg/24 hr patch 1 Patch  1 Patch TransDERmal Q24H    famotidine (PEPCID) tablet 20 mg  20 mg Oral DAILY    hydrALAZINE (APRESOLINE) tablet 25 mg  25 mg Oral TID    HYDROcodone-acetaminophen (NORCO) 7.5-325 mg per tablet 1 Tab  1 Tab Oral Q4H PRN    ondansetron (ZOFRAN) injection 4 mg  4 mg IntraVENous Q4H PRN    heparin (porcine) injection 5,000 Units  5,000 Units SubCUTAneous Q8H       Objective:     Vitals:    08/12/19 0018 08/12/19 0443 08/12/19 0716 08/12/19 1109   BP: (!) 157/95 (!) 162/95 163/90 143/89   Pulse: 73 81 74 73   Resp: 18 18 18 18   Temp: 98.4 °F (36.9 °C) 98.5 °F (36.9 °C) 98.2 °F (36.8 °C) 98.5 °F (36.9 °C)   SpO2: 98% 99% 98% 98%   Weight:             Intake and Output:  Current Shift: 08/12 0701 - 08/12 1900  In: -   Out: 200 [Urine:200]  Last 24 hr: 08/11 0701 - 08/12 0700  In: -   Out: 2150 [Urine:2150]         Physical Exam:   General: Awake and following commands. + headache. Eyes:  PERRL+3, no nystagmus. Neck: Supple, symmetrical, trachea midline, no adenopathy, thyroid: no enlargment/tenderness/nodules, no carotid bruit and no JVD. Lungs:   Clear to auscultation bilaterally. Heart:  Regular rate and rhythm, S1, S2 normal, no murmur, click, rub or gallop. Abdomen:   Soft, non-tender. Bowel sounds normal. No masses,  No organomegaly. Extremities: Extremities normal, atraumatic, no cyanosis or edema. Pulses: 2+ and symmetric all extremities. Skin: Skin color, texture, turgor normal. No rashes or lesions   Neurologic: Pt awake, following commands. Confused at times about place. Caprice, DOYLE >R. Lab/Data Review:    Recent Results (from the past 12 hour(s))   MAGNESIUM    Collection Time: 08/12/19  6:51 AM   Result Value Ref Range    Magnesium 2.6 (H) 1.8 - 2.4 mg/dL   CBC W/O DIFF    Collection Time: 08/12/19  6:51 AM   Result Value Ref Range    WBC 6.9 4.3 - 11.1 K/uL    RBC 2.81 (L) 4.23 - 5.6 M/uL    HGB 8.6 (L) 13.6 - 17.2 g/dL    HCT 26.3 (L) 41.1 - 50.3 %    MCV 93.6 79.6 - 97.8 FL    MCH 30.6 26.1 - 32.9 PG    MCHC 32.7 31.4 - 35.0 g/dL    RDW 13.6 11.9 - 14.6 %    PLATELET 464 580 - 612 K/uL    MPV 10.8 9.4 - 12.3 FL    ABSOLUTE NRBC 0.00 0.0 - 0.2 K/uL   METABOLIC PANEL, BASIC    Collection Time: 08/12/19  6:51 AM   Result Value Ref Range    Sodium 142 136 - 145 mmol/L    Potassium 3.3 (L) 3.5 - 5.1 mmol/L    Chloride 111 (H) 98 - 107 mmol/L    CO2 25 21 - 32 mmol/L    Anion gap 6 (L) 7 - 16 mmol/L    Glucose 86 65 - 100 mg/dL    BUN 17 6 - 23 MG/DL    Creatinine 3.51 (H) 0.8 - 1.5 MG/DL    GFR est AA 24 (L) >60 ml/min/1.73m2    GFR est non-AA 20 (L) >60 ml/min/1.73m2    Calcium 8.5 8.3 - 10.4 MG/DL       Assessment/ Plan:     Principal Problem:    Posterior reversible encephalopathy syndrome (8/8/2019)    Active Problems:    Seizure cerebral (8/7/2019)      NEURO: Pt admitted back to ICU for seizure activity and PRES. Pt looks much better today. Keep BP <160. On metoprolol and hydralazine. PT/OT/ST with goal to return to Rehab this week unless acute changes. CT head this am looks good, PRES resolving. Transfer to floor today. RESP: tolerating RA, 02 sat 100%. CV: NO MAP OR BP GOAL. will cont asa/plavix, heparin and cont monitor. Metoprolol and hydralazine for BP. HEME: HH: 8.6/26,   NEPH: bun/cr: 17/3.5 ( pt with hx of kidney disease on admit- Nephrology following.  will give bolus x 1L 1/2 NS and start maint. Mg. 2.6 mag. 400mg po bid. GI: advance diet as tolerated. Senna, IVF\"s. ID: afebrile, no abx  LINES: SHANNAN unger PICC. Pending Rehab approval [facility], 9th floor.      Signed By: Jennifer Frankel NP     August 12, 2019

## 2019-08-12 NOTE — PROGRESS NOTES
Problem: Mobility Impaired (Adult and Pediatric)  Goal: *Acute Goals and Plan of Care (Insert Text)  Description  LTG: (goals revised 8/9/19)  (1.)Mr. Malgorzata Huitron will move from supine to sit and sit to supine , scoot up and down and roll side to side with SBA within 7 treatment day(s). (2.)Mr. Malgorzata Huitron will transfer from bed to chair and chair to bed with CONTACT GUARD ASSIST using the least restrictive device within 7 treatment day(s). (3.)Mr. Malgorzata Huitron will ambulate with MINIMAL ASSIST x 1 for 250+ feet with the least restrictive device within 7 treatment day(s) while maintaining normal vital signs. (4.)Mr. Malgorzata Huitron will perform standing static and dynamic balance activities x 15 minutes with MINIMAL ASSIST to improve safety within 7 day(s). (5.)Mr. Malgorzata Huitron will maintain stable vital signs throughout all functional mobility within 7 days. ________________________________________________________________________________________________     Outcome: Progressing Towards Goal     PHYSICAL THERAPY: Daily Note and AM 8/12/2019  INPATIENT: PT Visit Days : 2  Payor: Cathleen Barrios / Plan: [facility] / Product Type: PPO /       NAME/AGE/GENDER: Toshia Olivas is a 37 y.o. male   PRIMARY DIAGNOSIS: Seizure cerebral [I67.89] Posterior reversible encephalopathy syndrome   Posterior reversible encephalopathy syndrome       ICD-10: Treatment Diagnosis:    · Difficulty in walking, Not elsewhere classified (R26.2)   Precaution/Allergies:  Patient has no known allergies. ASSESSMENT:     Mr. Malgorzata Huitron transferred from [facility] to ICU s/p seizure and PRES syndrome. Pt originally admitted to this hospital for subarachnoid hemorrhage, pt of Dr. Sabrina Pickens. Pt discharged to [facility] 8/5/19, rapid response called 8/6/19 for seizure and resulting transfer to ICU. Patient was supine upon contact and agreeable to PT with encouragement. Patient presents with a flat affect and little engagement with therapist noted.  Patient performs supine to sit with SBA and transfer to standing with min assist. Patient ambulates 125' x 2 with rolling walker, min assist, Gait belt for safety, and rest break in between ambulation bouts. Patient returns to room where he participates in static/dynamic standing balance activities at sink x 8 minutes while washing hands/face and brushing teeth. Patient returns to EOB and to supine with supervision. Overall slow progress towards physical therapy goals. Patient's goals listed above are still appropriate. Will continue skilled PT to address remaining deficits. This section established at most recent assessment   PROBLEM LIST (Impairments causing functional limitations):  1. Decreased Strength  2. Decreased ADL/Functional Activities  3. Decreased Transfer Abilities  4. Decreased Ambulation Ability/Technique  5. Decreased Balance  6. Decreased Activity Tolerance  7. Decreased Pacing Skills  8. Increased Shortness of Breath  9. Decreased Knowledge of Precautions  10. Decreased [place] with Home Exercise Program   INTERVENTIONS PLANNED: (Benefits and precautions of physical therapy have been discussed with the patient.)  1. Balance Exercise  2. Bed Mobility  3. Family Education  4. Gait Training  5. Home Exercise Program (HEP)  6. Therapeutic Activites  7. Therapeutic Exercise/Strengthening  8. Transfer Training     TREATMENT PLAN: Frequency/Duration: 3 times a week for duration of hospital stay  Rehabilitation Potential For Stated Goals: Good     REHAB RECOMMENDATIONS (at time of discharge pending progress):    Placement:   It is my opinion, based on this patient's performance to date, that Mr. Vane Ennis may benefit from intensive therapy at an [address] [place] [address] after discharge due to a probable need for close medical supervision by a rehab physician, a probable need for 24 hour rehab nursing, a probable need for multiple therapy disciplines and potential to make ongoing and sustainable functional improvement that is of practical value. .  Equipment:    None at this time              HISTORY:   History of Present Injury/Illness (Reason for Referral):  Subarachnoid hemorrhage. Past Medical History/Comorbidities:   Mr. Amna Bueno  has no past medical history on file. Mr. Amna Bueno  has a past surgical history that includes ir emboli intracran lt (7/11/2019). Social History/Living Environment:   Home Environment: Rehabilitation facility  One/Two Story Residence: Two story  Living Alone: No  Support Systems: Family member(s)  Patient Expects to be Discharged to[de-identified] Rehabilitation facility  Current DME Used/Available at Home: None  Prior Level of Function/Work/Activity:  Independent at baseline, works, drives     Number of Personal Factors/Comorbidities that affect the Plan of Care: 1-2: MODERATE COMPLEXITY   EXAMINATION:   Most Recent Physical Functioning:   Gross Assessment:                  Posture:     Balance:  Sitting: Impaired  Sitting - Static: Good (unsupported)  Sitting - Dynamic: Fair (occasional)  Standing: Impaired  Standing - Static: Constant support; Fair  Standing - Dynamic : Fair;Constant support Bed Mobility:  Supine to Sit: Stand-by assistance  Sit to Supine: Stand-by assistance  Wheelchair Mobility:     Transfers:  Sit to Stand: Minimum assistance  Stand to Sit: Minimum assistance  Gait:     Base of Support: Center of gravity altered;Narrowed  Speed/Aminata: Slow;Shuffled  Step Length: Left shortened;Right shortened  Gait Abnormalities: Decreased step clearance; Path deviations  Distance (ft): (125' x 2)  Assistive Device: Walker, rolling;Gait belt  Ambulation - Level of Assistance: Minimal assistance;Contact guard assistance  Interventions: Safety awareness training;Verbal cues      Body Structures Involved:  1. Nerves  2. Heart  3. Lungs  4. Muscles Body Functions Affected:  1. Sensory/Pain  2. Voice and Speech  3. Cardio  4. Respiratory  5. Neuromusculoskeletal  6.  Movement Related Activities and Participation Affected:  1. Learning and Applying Knowledge  2. General Tasks and Demands  3. Communication  4. Mobility  5. Self Care  6. Domestic Life  7. Interpersonal Interactions and Relationships  8. Community, Social and [place]   Number of elements that affect the Plan of Care: 4+: HIGH COMPLEXITY   CLINICAL PRESENTATION:   Presentation: Evolving clinical presentation with changing clinical characteristics: MODERATE COMPLEXITY   CLINICAL DECISION MAKIN [facility] Mobility Inpatient Short Form  How much difficulty does the patient currently have. .. Unable A Lot A Little None   1. Turning over in bed (including adjusting bedclothes, sheets and blankets)? ? 1    2   X? 3   ? 4   2. Sitting down on and standing up from a chair with arms ( e.g., wheelchair, bedside commode, etc.)   ? 1    2   X? 3   ? 4   3. Moving from lying on back to sitting on the side of the bed? ? 1    2   X? 3   ? 4   How much help from another person does the patient currently need. .. Total A Lot A Little None   4. Moving to and from a bed to a chair (including a wheelchair)? ? 1    2  X ? 3   ? 4   5. Need to walk in hospital room? 1   ? 2   X? 3   ? 4   6. Climbing 3-5 steps with a railing? 1   X 2   ? 3   ? 4   © 2007, Trustees of [address] [place] [address] [address] [address], under license to [employer]. All rights reserved      Score:  Initial: 10 Most Recent: `17 (Date: -19- )    Interpretation of Tool:  Represents activities that are increasingly more difficult (i.e. Bed mobility, Transfers, Gait). Medical Necessity:     · Patient demonstrates good  ·  rehab potential due to higher previous functional level. Reason for Services/Other Comments:  · Patient continues to require modification of therapeutic interventions to increase complexity of exercises  · .    Use of outcome tool(s) and clinical judgement create a POC that gives a: Questionable prediction of patient's progress: MODERATE COMPLEXITY            TREATMENT:   (In addition to Assessment/Re-Assessment sessions the following treatments were rendered)   Pre-treatment Symptoms/Complaints: None  Pain: Initial:   Pain Intensity 1: 0  Post Session:  0/10     Therapeutic Activity: (    25 Minutes): Therapeutic activities including bed mobility training including supine <-> sit, ambulation on level ground, static/dynamic standing balance, posture training, instruction in sequencing with rolling walker, and patient education to improve mobility, strength and balance. Required moderate Safety awareness training;Verbal cues to promote static and dynamic balance in standing and promote coordination of bilateral, lower extremity(s). Neuromuscular Re-education: (  min): Activities to included balance, posture, sequencing, trunk control and motor planning skills to improve balance, coordination and posture. Required min to moderate verbal and tactile cues to promote static and dynamic balance in standing and promote coordination of bilateral, lower extremity(s). Date:  7/15/19 Date:  08/02/19 Date:     Activity/Exercise Parameters Parameters Parameters   Seated heel raises x15 B A     Seated toe raises x15 B A     Seated LAQ x15 B A x10 AB    Seated hip flexion  x10 AB                          Braces/Orthotics/Lines/Etc:   · IV  Treatment/Session Assessment:    · Response to Treatment:  See above  · Interdisciplinary Collaboration:   o Physical Therapy Assistant  o Registered Nurse  · After treatment position/precautions:   o Supine in bed  o Bed alarm/tab alert on  o Bed/Chair-wheels locked  o Bed in low position  o Call light within reach  o RN notified   · Compliance with Program/Exercises: Compliant all of the time  · Recommendations/Intent for next treatment session: \"Next visit will focus on advancements to more challenging activities and reduction in assistance provided\".   Total Treatment Duration:  PT Patient Time In/Time Out  Time In: 0950  Time [facility]: [facility]

## 2019-08-13 ENCOUNTER — HOSPITAL ENCOUNTER (INPATIENT)
Age: 44
LOS: 15 days | Discharge: HOME HEALTH | DRG: 064 | End: 2019-08-28
Attending: PHYSICAL MEDICINE & REHABILITATION | Admitting: PHYSICAL MEDICINE & REHABILITATION
Payer: COMMERCIAL

## 2019-08-13 VITALS
WEIGHT: 152.1 LBS | RESPIRATION RATE: 18 BRPM | DIASTOLIC BLOOD PRESSURE: 88 MMHG | BODY MASS INDEX: 20.63 KG/M2 | HEART RATE: 79 BPM | TEMPERATURE: 98.5 F | SYSTOLIC BLOOD PRESSURE: 162 MMHG | OXYGEN SATURATION: 98 %

## 2019-08-13 DIAGNOSIS — I61.5 IVH (INTRAVENTRICULAR HEMORRHAGE) (HCC): ICD-10-CM

## 2019-08-13 DIAGNOSIS — I60.9 SAH (SUBARACHNOID HEMORRHAGE) (HCC): ICD-10-CM

## 2019-08-13 DIAGNOSIS — I60.4 SUBARACHNOID HEMORRHAGE FROM BASILAR ARTERY ANEURYSM (HCC): ICD-10-CM

## 2019-08-13 DIAGNOSIS — I67.848 CEREBRAL VASOSPASM: ICD-10-CM

## 2019-08-13 DIAGNOSIS — I67.83 POSTERIOR REVERSIBLE ENCEPHALOPATHY SYNDROME: ICD-10-CM

## 2019-08-13 DIAGNOSIS — G40.909 SEIZURE CEREBRAL (HCC): ICD-10-CM

## 2019-08-13 DIAGNOSIS — J96.01 ACUTE RESPIRATORY FAILURE WITH HYPOXIA (HCC): ICD-10-CM

## 2019-08-13 DIAGNOSIS — J15.211 PNEUMONIA OF LEFT LOWER LOBE DUE TO METHICILLIN SUSCEPTIBLE STAPHYLOCOCCUS AUREUS (MSSA) (HCC): ICD-10-CM

## 2019-08-13 DIAGNOSIS — F32.9 MAJOR DEPRESSIVE EPISODE: ICD-10-CM

## 2019-08-13 DIAGNOSIS — R79.89 ELEVATED SERUM CREATININE: ICD-10-CM

## 2019-08-13 DIAGNOSIS — G91.0 COMMUNICATING HYDROCEPHALUS (HCC): ICD-10-CM

## 2019-08-13 DIAGNOSIS — R56.9 SEIZURE (HCC): ICD-10-CM

## 2019-08-13 DIAGNOSIS — Z72.0 TOBACCO ABUSE: ICD-10-CM

## 2019-08-13 LAB
ANION GAP SERPL CALC-SCNC: 6 MMOL/L (ref 7–16)
APPEARANCE UR: CLEAR
BACTERIA URNS QL MICRO: 0 /HPF
BILIRUB UR QL: NEGATIVE
BUN SERPL-MCNC: 17 MG/DL (ref 6–23)
CALCIUM SERPL-MCNC: 8.5 MG/DL (ref 8.3–10.4)
CASTS URNS QL MICRO: ABNORMAL /LPF
CHLORIDE SERPL-SCNC: 110 MMOL/L (ref 98–107)
CO2 SERPL-SCNC: 26 MMOL/L (ref 21–32)
COLOR UR: YELLOW
CREAT SERPL-MCNC: 3.39 MG/DL (ref 0.8–1.5)
EPI CELLS #/AREA URNS HPF: ABNORMAL /HPF
ERYTHROCYTE [DISTWIDTH] IN BLOOD BY AUTOMATED COUNT: 13.6 % (ref 11.9–14.6)
GLUCOSE SERPL-MCNC: 78 MG/DL (ref 65–100)
GLUCOSE UR STRIP.AUTO-MCNC: NEGATIVE MG/DL
HCT VFR BLD AUTO: 25.6 % (ref 41.1–50.3)
HGB BLD-MCNC: 8.5 G/DL (ref 13.6–17.2)
HGB UR QL STRIP: NEGATIVE
KETONES UR QL STRIP.AUTO: NEGATIVE MG/DL
LEUKOCYTE ESTERASE UR QL STRIP.AUTO: NEGATIVE
MAGNESIUM SERPL-MCNC: 2.3 MG/DL (ref 1.8–2.4)
MCH RBC QN AUTO: 31 PG (ref 26.1–32.9)
MCHC RBC AUTO-ENTMCNC: 33.2 G/DL (ref 31.4–35)
MCV RBC AUTO: 93.4 FL (ref 79.6–97.8)
NITRITE UR QL STRIP.AUTO: NEGATIVE
NRBC # BLD: 0 K/UL (ref 0–0.2)
PH UR STRIP: 5.5 [PH] (ref 5–9)
PLATELET # BLD AUTO: 233 K/UL (ref 150–450)
PMV BLD AUTO: 10.7 FL (ref 9.4–12.3)
POTASSIUM SERPL-SCNC: 3.5 MMOL/L (ref 3.5–5.1)
PROT UR STRIP-MCNC: 100 MG/DL
RBC # BLD AUTO: 2.74 M/UL (ref 4.23–5.6)
RBC #/AREA URNS HPF: ABNORMAL /HPF
SODIUM SERPL-SCNC: 142 MMOL/L (ref 136–145)
SP GR UR REFRACTOMETRY: 1.01 (ref 1–1.02)
UROBILINOGEN UR QL STRIP.AUTO: 0.2 EU/DL (ref 0.2–1)
WBC # BLD AUTO: 6.9 K/UL (ref 4.3–11.1)
WBC URNS QL MICRO: ABNORMAL /HPF

## 2019-08-13 PROCEDURE — 51798 US URINE CAPACITY MEASURE: CPT

## 2019-08-13 PROCEDURE — 85027 COMPLETE CBC AUTOMATED: CPT

## 2019-08-13 PROCEDURE — 99223 1ST HOSP IP/OBS HIGH 75: CPT | Performed by: PHYSICAL MEDICINE & REHABILITATION

## 2019-08-13 PROCEDURE — 83735 ASSAY OF MAGNESIUM: CPT

## 2019-08-13 PROCEDURE — 74011250637 HC RX REV CODE- 250/637: Performed by: PHYSICAL MEDICINE & REHABILITATION

## 2019-08-13 PROCEDURE — 80048 BASIC METABOLIC PNL TOTAL CA: CPT

## 2019-08-13 PROCEDURE — 74011250637 HC RX REV CODE- 250/637: Performed by: NURSE PRACTITIONER

## 2019-08-13 PROCEDURE — 99238 HOSP IP/OBS DSCHRG MGMT 30/<: CPT | Performed by: NURSE PRACTITIONER

## 2019-08-13 PROCEDURE — 36592 COLLECT BLOOD FROM PICC: CPT

## 2019-08-13 PROCEDURE — 74011250636 HC RX REV CODE- 250/636: Performed by: NURSE PRACTITIONER

## 2019-08-13 PROCEDURE — 81001 URINALYSIS AUTO W/SCOPE: CPT

## 2019-08-13 PROCEDURE — 74011250636 HC RX REV CODE- 250/636: Performed by: PHYSICAL MEDICINE & REHABILITATION

## 2019-08-13 PROCEDURE — 65310000000 HC RM PRIVATE REHAB

## 2019-08-13 PROCEDURE — 77030020257 HC SOL INJ SOD CL 0.45% 1000ML BG

## 2019-08-13 PROCEDURE — 77030032490 HC SLV COMPR SCD KNE COVD -B

## 2019-08-13 RX ORDER — ACETAMINOPHEN 325 MG/1
650 TABLET ORAL
Status: DISCONTINUED | OUTPATIENT
Start: 2019-08-13 | End: 2019-08-28 | Stop reason: HOSPADM

## 2019-08-13 RX ORDER — IBUPROFEN 200 MG
1 TABLET ORAL EVERY 24 HOURS
Status: DISCONTINUED | OUTPATIENT
Start: 2019-08-14 | End: 2019-08-28 | Stop reason: HOSPADM

## 2019-08-13 RX ORDER — HEPARIN SODIUM 5000 [USP'U]/ML
5000 INJECTION, SOLUTION INTRAVENOUS; SUBCUTANEOUS EVERY 8 HOURS
Status: CANCELLED | OUTPATIENT
Start: 2019-08-13

## 2019-08-13 RX ORDER — METOPROLOL TARTRATE 100 MG/1
100 TABLET ORAL 2 TIMES DAILY
Qty: 60 TAB | Refills: 3 | Status: ON HOLD
Start: 2019-08-13 | End: 2019-08-27 | Stop reason: SDUPTHER

## 2019-08-13 RX ORDER — METOPROLOL TARTRATE 50 MG/1
100 TABLET ORAL 2 TIMES DAILY
Status: CANCELLED | OUTPATIENT
Start: 2019-08-13

## 2019-08-13 RX ORDER — SODIUM CHLORIDE 0.9 % (FLUSH) 0.9 %
10-30 SYRINGE (ML) INJECTION AS NEEDED
Status: CANCELLED | OUTPATIENT
Start: 2019-08-13

## 2019-08-13 RX ORDER — TRAZODONE HYDROCHLORIDE 50 MG/1
25 TABLET ORAL
Status: DISCONTINUED | OUTPATIENT
Start: 2019-08-13 | End: 2019-08-18

## 2019-08-13 RX ORDER — HYDRALAZINE HYDROCHLORIDE 25 MG/1
25 TABLET, FILM COATED ORAL 3 TIMES DAILY
Status: CANCELLED | OUTPATIENT
Start: 2019-08-13

## 2019-08-13 RX ORDER — LANOLIN ALCOHOL/MO/W.PET/CERES
400 CREAM (GRAM) TOPICAL 2 TIMES DAILY
Qty: 60 TAB | Refills: 0 | Status: ON HOLD
Start: 2019-08-13 | End: 2019-08-27 | Stop reason: SDUPTHER

## 2019-08-13 RX ORDER — CITALOPRAM 20 MG/1
20 TABLET, FILM COATED ORAL DAILY
Status: DISCONTINUED | OUTPATIENT
Start: 2019-08-14 | End: 2019-08-28 | Stop reason: HOSPADM

## 2019-08-13 RX ORDER — HEPARIN SODIUM 5000 [USP'U]/ML
5000 INJECTION, SOLUTION INTRAVENOUS; SUBCUTANEOUS EVERY 8 HOURS
Status: DISCONTINUED | OUTPATIENT
Start: 2019-08-13 | End: 2019-08-28 | Stop reason: HOSPADM

## 2019-08-13 RX ORDER — ONDANSETRON 4 MG/1
4 TABLET, ORALLY DISINTEGRATING ORAL
Status: DISCONTINUED | OUTPATIENT
Start: 2019-08-13 | End: 2019-08-28 | Stop reason: HOSPADM

## 2019-08-13 RX ORDER — ASPIRIN 325 MG
325 TABLET ORAL DAILY
Status: CANCELLED | OUTPATIENT
Start: 2019-08-14

## 2019-08-13 RX ORDER — MODAFINIL 100 MG/1
200 TABLET ORAL DAILY
Status: CANCELLED | OUTPATIENT
Start: 2019-08-14

## 2019-08-13 RX ORDER — CLOPIDOGREL BISULFATE 75 MG/1
75 TABLET ORAL DAILY
Status: DISCONTINUED | OUTPATIENT
Start: 2019-08-14 | End: 2019-08-28 | Stop reason: HOSPADM

## 2019-08-13 RX ORDER — HYDRALAZINE HYDROCHLORIDE 25 MG/1
25 TABLET, FILM COATED ORAL 3 TIMES DAILY
Status: DISCONTINUED | OUTPATIENT
Start: 2019-08-13 | End: 2019-08-24

## 2019-08-13 RX ORDER — LEVETIRACETAM 1000 MG/1
1000 TABLET ORAL 2 TIMES DAILY
Qty: 60 TAB | Refills: 5 | Status: ON HOLD
Start: 2019-08-13 | End: 2019-08-27 | Stop reason: SDUPTHER

## 2019-08-13 RX ORDER — CITALOPRAM 20 MG/1
10 TABLET, FILM COATED ORAL DAILY
Status: DISCONTINUED | OUTPATIENT
Start: 2019-08-14 | End: 2019-08-13

## 2019-08-13 RX ORDER — LEVETIRACETAM 500 MG/1
1000 TABLET ORAL 2 TIMES DAILY
Status: DISCONTINUED | OUTPATIENT
Start: 2019-08-13 | End: 2019-08-28 | Stop reason: HOSPADM

## 2019-08-13 RX ORDER — CLOPIDOGREL BISULFATE 75 MG/1
75 TABLET ORAL DAILY
Status: CANCELLED | OUTPATIENT
Start: 2019-08-14

## 2019-08-13 RX ORDER — HYDRALAZINE HYDROCHLORIDE 25 MG/1
25 TABLET, FILM COATED ORAL 3 TIMES DAILY
Qty: 90 TAB | Refills: 3 | Status: ON HOLD
Start: 2019-08-13 | End: 2019-08-27 | Stop reason: SDUPTHER

## 2019-08-13 RX ORDER — FAMOTIDINE 20 MG/1
20 TABLET, FILM COATED ORAL DAILY
Status: DISCONTINUED | OUTPATIENT
Start: 2019-08-14 | End: 2019-08-28 | Stop reason: HOSPADM

## 2019-08-13 RX ORDER — HYDROCODONE BITARTRATE AND ACETAMINOPHEN 7.5; 325 MG/1; MG/1
1 TABLET ORAL
Status: CANCELLED | OUTPATIENT
Start: 2019-08-13

## 2019-08-13 RX ORDER — CITALOPRAM 10 MG/1
10 TABLET ORAL DAILY
Status: CANCELLED | OUTPATIENT
Start: 2019-08-14

## 2019-08-13 RX ORDER — SODIUM CHLORIDE 0.9 % (FLUSH) 0.9 %
10-30 SYRINGE (ML) INJECTION EVERY 8 HOURS
Status: DISCONTINUED | OUTPATIENT
Start: 2019-08-13 | End: 2019-08-28 | Stop reason: HOSPADM

## 2019-08-13 RX ORDER — LANOLIN ALCOHOL/MO/W.PET/CERES
400 CREAM (GRAM) TOPICAL 2 TIMES DAILY
Status: COMPLETED | OUTPATIENT
Start: 2019-08-13 | End: 2019-08-17

## 2019-08-13 RX ORDER — LANOLIN ALCOHOL/MO/W.PET/CERES
400 CREAM (GRAM) TOPICAL 2 TIMES DAILY
Status: CANCELLED | OUTPATIENT
Start: 2019-08-13 | End: 2019-08-18

## 2019-08-13 RX ORDER — MODAFINIL 100 MG/1
200 TABLET ORAL DAILY
Status: DISCONTINUED | OUTPATIENT
Start: 2019-08-14 | End: 2019-08-23

## 2019-08-13 RX ORDER — ONDANSETRON 2 MG/ML
4 INJECTION INTRAMUSCULAR; INTRAVENOUS
Status: CANCELLED | OUTPATIENT
Start: 2019-08-13

## 2019-08-13 RX ORDER — METOPROLOL TARTRATE 100 MG/1
100 TABLET ORAL 2 TIMES DAILY
Status: DISCONTINUED | OUTPATIENT
Start: 2019-08-13 | End: 2019-08-28 | Stop reason: HOSPADM

## 2019-08-13 RX ORDER — ONDANSETRON 2 MG/ML
4 INJECTION INTRAMUSCULAR; INTRAVENOUS
Status: DISCONTINUED | OUTPATIENT
Start: 2019-08-13 | End: 2019-08-28 | Stop reason: HOSPADM

## 2019-08-13 RX ORDER — HEPARIN 100 UNIT/ML
900 SYRINGE INTRAVENOUS EVERY 8 HOURS
Status: DISCONTINUED | OUTPATIENT
Start: 2019-08-13 | End: 2019-08-23

## 2019-08-13 RX ORDER — SODIUM CHLORIDE 0.9 % (FLUSH) 0.9 %
10-30 SYRINGE (ML) INJECTION AS NEEDED
Status: DISCONTINUED | OUTPATIENT
Start: 2019-08-13 | End: 2019-08-28 | Stop reason: HOSPADM

## 2019-08-13 RX ORDER — HEPARIN 100 UNIT/ML
300-900 SYRINGE INTRAVENOUS AS NEEDED
Status: DISCONTINUED | OUTPATIENT
Start: 2019-08-13 | End: 2019-08-23

## 2019-08-13 RX ORDER — HEPARIN 100 UNIT/ML
300-900 SYRINGE INTRAVENOUS AS NEEDED
Status: CANCELLED | OUTPATIENT
Start: 2019-08-13

## 2019-08-13 RX ORDER — SODIUM CHLORIDE 0.9 % (FLUSH) 0.9 %
10-30 SYRINGE (ML) INJECTION EVERY 8 HOURS
Status: CANCELLED | OUTPATIENT
Start: 2019-08-13

## 2019-08-13 RX ORDER — AMOXICILLIN 250 MG
1 CAPSULE ORAL DAILY
Status: DISCONTINUED | OUTPATIENT
Start: 2019-08-14 | End: 2019-08-28 | Stop reason: HOSPADM

## 2019-08-13 RX ORDER — HEPARIN 100 UNIT/ML
900 SYRINGE INTRAVENOUS EVERY 8 HOURS
Status: CANCELLED | OUTPATIENT
Start: 2019-08-13

## 2019-08-13 RX ORDER — LEVETIRACETAM 500 MG/1
1000 TABLET ORAL 2 TIMES DAILY
Status: CANCELLED | OUTPATIENT
Start: 2019-08-13

## 2019-08-13 RX ORDER — IBUPROFEN 200 MG
1 TABLET ORAL EVERY 24 HOURS
Status: CANCELLED | OUTPATIENT
Start: 2019-08-14

## 2019-08-13 RX ORDER — FAMOTIDINE 20 MG/1
20 TABLET, FILM COATED ORAL DAILY
Status: CANCELLED | OUTPATIENT
Start: 2019-08-14

## 2019-08-13 RX ORDER — ASPIRIN 325 MG
325 TABLET ORAL DAILY
Status: DISCONTINUED | OUTPATIENT
Start: 2019-08-14 | End: 2019-08-28 | Stop reason: HOSPADM

## 2019-08-13 RX ORDER — HYDROCODONE BITARTRATE AND ACETAMINOPHEN 7.5; 325 MG/1; MG/1
1 TABLET ORAL
Status: DISCONTINUED | OUTPATIENT
Start: 2019-08-13 | End: 2019-08-17

## 2019-08-13 RX ADMIN — Medication 400 MG: at 08:50

## 2019-08-13 RX ADMIN — HEPARIN SODIUM 5000 UNITS: 5000 INJECTION INTRAVENOUS; SUBCUTANEOUS at 00:12

## 2019-08-13 RX ADMIN — MODAFINIL 200 MG: 100 TABLET ORAL at 08:50

## 2019-08-13 RX ADMIN — HEPARIN SODIUM 5000 UNITS: 5000 INJECTION INTRAVENOUS; SUBCUTANEOUS at 14:45

## 2019-08-13 RX ADMIN — Medication 30 ML: at 04:00

## 2019-08-13 RX ADMIN — HYDRALAZINE HYDROCHLORIDE 25 MG: 25 TABLET, FILM COATED ORAL at 21:59

## 2019-08-13 RX ADMIN — Medication 30 ML: at 21:59

## 2019-08-13 RX ADMIN — HYDRALAZINE HYDROCHLORIDE 25 MG: 25 TABLET, FILM COATED ORAL at 15:47

## 2019-08-13 RX ADMIN — LEVETIRACETAM 1000 MG: 500 TABLET, FILM COATED ORAL at 08:50

## 2019-08-13 RX ADMIN — TRAZODONE HYDROCHLORIDE 25 MG: 50 TABLET ORAL at 21:59

## 2019-08-13 RX ADMIN — METOPROLOL TARTRATE 100 MG: 50 TABLET ORAL at 08:50

## 2019-08-13 RX ADMIN — HEPARIN SODIUM 5000 UNITS: 5000 INJECTION INTRAVENOUS; SUBCUTANEOUS at 21:58

## 2019-08-13 RX ADMIN — ASPIRIN 325 MG ORAL TABLET 325 MG: 325 PILL ORAL at 08:49

## 2019-08-13 RX ADMIN — HYDRALAZINE HYDROCHLORIDE 25 MG: 50 TABLET, FILM COATED ORAL at 08:50

## 2019-08-13 RX ADMIN — ACETAMINOPHEN 650 MG: 325 TABLET, FILM COATED ORAL at 15:50

## 2019-08-13 RX ADMIN — CITALOPRAM HYDROBROMIDE 10 MG: 20 TABLET ORAL at 08:50

## 2019-08-13 RX ADMIN — SODIUM CHLORIDE, PRESERVATIVE FREE 900 UNITS: 5 INJECTION INTRAVENOUS at 14:45

## 2019-08-13 RX ADMIN — SODIUM CHLORIDE, PRESERVATIVE FREE 900 UNITS: 5 INJECTION INTRAVENOUS at 21:59

## 2019-08-13 RX ADMIN — HEPARIN SODIUM 5000 UNITS: 5000 INJECTION INTRAVENOUS; SUBCUTANEOUS at 08:50

## 2019-08-13 RX ADMIN — Medication 30 ML: at 14:46

## 2019-08-13 RX ADMIN — SODIUM CHLORIDE, PRESERVATIVE FREE 900 UNITS: 5 INJECTION INTRAVENOUS at 04:00

## 2019-08-13 RX ADMIN — METOPROLOL TARTRATE 100 MG: 100 TABLET ORAL at 18:28

## 2019-08-13 RX ADMIN — LEVETIRACETAM 1000 MG: 500 TABLET, FILM COATED ORAL at 22:00

## 2019-08-13 RX ADMIN — CLOPIDOGREL BISULFATE 75 MG: 75 TABLET ORAL at 08:50

## 2019-08-13 RX ADMIN — MAGNESIUM GLUCONATE 500 MG ORAL TABLET 400 MG: 500 TABLET ORAL at 18:28

## 2019-08-13 RX ADMIN — FAMOTIDINE 20 MG: 20 TABLET ORAL at 08:50

## 2019-08-13 NOTE — PROGRESS NOTES
TRANSFER - IN REPORT:    Verbal report received from JOHANNA Gaona(name) [facility]  [facility] from [facility](unit) for routine progression of care      Report consisted of patients Situation, Background, Assessment and   Recommendations(SBAR). Information from the following report(s) SBAR, Kardex, Procedure Summary and Intake/Output was reviewed with the receiving nurse. Opportunity for questions and clarification was provided. Assessment completed upon patients arrival to unit and care assumed.

## 2019-08-13 NOTE — PROGRESS NOTES
CASE MANAGEMENT ASSESSMENT      AGE:   37            DIAGNOSIS:    SAH/Seizures                     DPOA/ LIVING WILL:    none         PCP? LAST VISIT? Patient unable to recall name    CURRENT FAMILY SUPPORT:    Wife Mora Townsend, son age 6 and daughter age 8, and father Eden Barrera:     from wife and lives with father José Luis Craig APARTMENT/ TRAILER:    2 story house with the master on the main level   TUB-SHOWER COMBO OR WALK IN SHOWER:    Tub/shower combo         STEPS TO ENTER HOME:    2-3      PRIOR FUNCTION:    Independent          ADL'S:    Independent  DRIVING:    Independent    DME:    None    PROVIDER FOR OXYGEN/ NEBULIZER:    None  AIDES/ SITTERS:    None    HOME HEALTH AGENCY PRIOR:    None  HOME HEALTH AGENCY DESIRED AT DISCHARGE IF NEEDED:    Patient agrees with [facility]    :    None    FAMILY ASSESSMENT:     from wife, going back together with his wife after discharge. Currently living with his father    SPIRITUAL ASSESSMENT:    [employer]  CURRENT VOCATION:   Manufacturing for insoles    FINANCIAL ASSESSMENT:   Employed full time  [address]:    Insurance covers medications    PRIMARY CAREGIVER ABILITY:    Wife Mora Townsend works part time  [address] METHOD OF LEARNING AND COMMUNITY REINTEGRATION POTENTIAL:    Good.   Patient enjoys reading, and finger painting      CM to follow patient     Care Management Interventions  PCP Verified by CM: Yes(Patient does not recall physician name)  Mode of Transport at Discharge: Self  Transition of Care Consult (CM Consult): Discharge Planning  Physical Therapy Consult: Yes  Occupational Therapy Consult: Yes  Speech Therapy Consult: Yes  Current Support Network: Relative's Home, Own Home(Lives with father Ela, patient stated going to be living with his wife after discharge)  Confirm Follow Up Transport: Family  Plan discussed with Pt/Family/Caregiver: Yes  Freedom of Choice Offered: Yes  Discharge Location  Discharge Placement: Unable to determine at this time

## 2019-08-13 NOTE — H&P
HISTORY AND PHYSICAL  IRC       Admit Date: 8/13/2019  Endovascular neurosurgeon: Dr Rafat Cavazos  Primary Care Physician: Other, MD Catherine    Chief Complaint : Gait dysfunction secondary to below. Admit Diagnosis: SAH (subarachnoid hemorrhage) (HCC) [I60.9]  sz disorder, PRES, basilar artery aneurysm, communicating hydrocephalus, IVH, NING, Pneumonia due to MSSA, cerebral vasospasm    Acute Rehab Dx:  Gait impairment/ gait dysfunction  Debility    deconditioning  Mobility and ambulation deficits  Self Care/ADL deficits  Cognitive deficits    Medical Dx:No past medical history on file. Date of Evaluation:  August 13, 2019    HPI: Criselda Maya is a 37 y.o. male patient at [address] who was admitted on 8/13/2019  by Naya Bautista MD with below mentioned medical history ,is being seen for Physical Medicine and Rehabilitation. HPI; this is Mr. Josiah Fothergill second [facility] admission. Initially admitted on 8/5 to [facility]; Gm Jay a  Right handed functionally independent 37 y. o. male who originally reported to [facility] ED via EMS after found with altered mental status and possible seizure like activity. Per brother, pt was at [address] and had witnessed syncopal event with possible seizure like activity. Pt had CT head and CTA head and neck and found to have [address] secondary to Basilar tip aneurysm rupture. Pt transferred to [address] [place] [address] ED via [facility] team. Pt with eyes closed and drowsy, but will open eyes to voice and follow commands.  Pt GCS E3, V5, M6: 14, PERRL +3 with EOM's intact. NIHSS of 2 for drowsiness and altered mental status. Pt admitted to our ICU under [address] protocol. Labs from [facility] show bun/cr 46/3.4, will give pt IVF's x2 L bolus and reeval. Pt states he smokes about 9 cig a day, denies any ETOH or drugs, denies any family hx of SAH. Pt unsure of what happened.  Arterial line placed on admit, pt with IV x2., phelps. \"   Hardik , unfortunately, had a decline in status overnoc and required intubation for airway protection. F/u HCT showed extensive/diffuse SAH and increased ICP. An EVD was placed into the 3rd ventricle and head Ct repeated with dec in ventricular size. Pts creatinine has improved to 2.83 with a BUN of 37 this a.m. troponins elevated at 0.08 and now 0.07. 2D ECHO pending. Na 154 earlier this a.m, now 149. Leukocytosis improved from 118.2 to 13.8. He is on Nimotop to prevent vasospasm and Keppra for sz control. \"   The pt with large basilar tip aneurysm rupture, Guillermo Solis III / Watts Grade IV. : stent/coiled 7-10-19. Pt had had daily TCD's during his admit and did show vasospasm, but pt was able to autoregulate and no IA verapamil was required. The pt had EVD placed on admit due to hydrocephalus and was unable to remove due to altered mental status. Pt required ventricular tPA x2 for IVH clot prior to  shunt placement on 8-2-19. The pt is alert and follows commands today, but he does have a flat effect. He was started on celexa 10mg po daily during admit. Pt will remain on his asa 325mg po daily, Plavix 75mg po daily for his stent/coil of basilar aneurysm. He completed 21days of nimotop\"    Unfortunately, on his 2 d at [address] he had a sz and a CODE S was called. CTA/CTP head obtained and showed no new ICH, IVH but did show bilat occipital lobe edema/ischemia that was read by RAD as possible bilat stroke, but images and pt presentation and findings were most consistent with PRES. Pebbles placed and pt BP controlled for SBP goal <140. Pt started on cardene, but not responsive, he was given labetalol total of 30mg IV and started on Nipride gtt and tolerated well. Pt also given Mag 4gm IV x1, mag level 2, goal >2.5.   elevated BP was controlled with nipride gtt, he is now on metoprolol 100mg po bid and hydralazine 25mg po tid. There is no specific BP goal, but if any acute neuro changes please call and monitor if blood pressure has increased.  Pt was also given magnesium as original mag <2. He has been startedon mag 400mg po bid. The pt has improved and ready for dc to in pt rehab. He will follow up with Dr. Kai Mohan in 6 weeks. His elevated BP was controlled with nipride gtt, he is now on metoprolol 100mg po bid and hydralazine 25mg po tid. There is no specific BP goal, but if any acute neuro changes we are asked to call Dr Kai Mohan and monitor if blood pressure has increased. Pt was also given magnesium as original mag <2. He has been startedon mag 400mg po bid. The pt has improved and ready for dc to in pt rehab. He will follow up with Dr. Kai Mohan in 6 weeks. Physical therapy was initiated and patient was starting to mobilize. However, Patient shows significant functional deficits due to prolonged immobility and hospitalization with multiple complications related to the original basilar aneurysm. Our service was consulted for rehab needs and we recommended inpatient hospital rehabilitation is reasonable and necessary due to ongoing acute medical issues which have not changed since initial pre-admission evaluation. I reviewed the preadmission screening and have approved for admission to the [facility]. The patient was cleared for transfer to rehab today. Patient continues to have ongoing  medical issues outlined above requiring physician medical supervision and functional deficits which would benefit from continued intensive therapies. Current Level of Function: (evaluated by acute therapy staff, with bed mobility, transfers, balance personally observed post-admission in the IRF setting minutes prior to submission of document) gait 125ft (best) min assist with a RW. SBA bed mobility, STS min assist, fair dynamic balance  With ST; \"Patient demonstrates increased processing time, impaired attention, and decreased initiation. Volume low. Responses more delayed in open ended conversation vs. picture cards.  Patient noted to provide correct concrete problem, however required follow up questions to explain why it is a problem. Word finding issue on single occasion with no improvement with cues. Stating \"arms\" for \"hands\" on clock. When given open ended prompt, \"there are ____on a watch\" patient stated \"there are wheels on a bus\". \"  -last seen by OT 8/9; mod assist bathing, toileting,LE dressing, min assist UE dressing, grooming and feeding. Prior Level of Function/Home Situation:   Home Environment: Rehabilitation facility  One/Two Story Residence: Two story  Living Alone: No  Support Systems: Family member(s)  Patient Expects to be Discharged to[de-identified] Rehabilitation facility  Current DME Used/Available at Home: None  Prior Level of Function/Work/Activity:  Pt is independent at baseline. He works as a . Lives with his father and 2 children ages 15, 8    No past medical history on file. Past Surgical History:   Procedure Laterality Date    IR EMBOLI INTRACRAN LT  7/11/2019     No Known Allergies   No family history on file.    Social History     Tobacco Use    Smoking status: Not on file   Substance Use Topics    Alcohol use: Not on file      Current Facility-Administered Medications   Medication Dose Route Frequency    ondansetron (ZOFRAN ODT) tablet 4 mg  4 mg Oral Q6H PRN    lactulose (CHRONULAC) 10 gram/15 mL solution 45 mL  30 g Oral DAILY PRN    traZODone (DESYREL) tablet 25 mg  25 mg Oral QHS PRN    HYDROcodone-acetaminophen (NORCO) 7.5-325 mg per tablet 1 Tab  1 Tab Oral Q4H PRN    ondansetron (ZOFRAN) injection 4 mg  4 mg IntraVENous Q4H PRN    heparin (porcine) injection 5,000 Units  5,000 Units SubCUTAneous Q8H    [START ON 8/14/2019] aspirin tablet 325 mg  325 mg Oral DAILY    [START ON 8/14/2019] citalopram (CELEXA) tablet 10 mg  10 mg Oral DAILY    [START ON 8/14/2019] clopidogrel (PLAVIX) tablet 75 mg  75 mg Oral DAILY    [START ON 8/14/2019] famotidine (PEPCID) tablet 20 mg  20 mg Oral DAILY    heparin (porcine) pf 300-900 Units  300-900 Units InterCATHeter PRN    heparin (porcine) pf 900 Units  900 Units InterCATHeter Q8H    hydrALAZINE (APRESOLINE) tablet 25 mg  25 mg Oral TID    levETIRAcetam (KEPPRA) tablet 1,000 mg  1,000 mg Oral BID    magnesium oxide (MAG-OX) tablet 400 mg  400 mg Oral BID    metoprolol tartrate (LOPRESSOR) tablet 100 mg  100 mg Oral BID    [START ON 8/14/2019] modafinil (PROVIGIL) tablet 200 mg  200 mg Oral DAILY    [START ON 8/14/2019] nicotine (NICODERM CQ) 14 mg/24 hr patch 1 Patch  1 Patch TransDERmal Q24H    SALINE PERIPHERAL FLUSH Q8H soln 10-30 mL  10-30 mL InterCATHeter Q8H    saline peripheral flush soln 10-30 mL  10-30 mL InterCATHeter PRN       Review of Systems:  Denies: fevers,  sweats,  malaise,  + chills, fatigue , anorexia and wt loss (father states 25lbs)  Denies: loss of vision, eye pain, photophobia +blurred vision  Denies: hearing loss, ringing in the ears, earache, epistaxis   Denies: chest pain, palpitations, syncope, orthopnea, paroxysmal nocturnal dyspnea, claudication   Denies: dysphagia, odynophagia,vomiting, diarrhea, constipation, abdominal pain, jaundice, melena + nausea  Denies: frequency, dysuria, nocturia, urinary incontinence, stones, hematuria   Denies: polydipsia/polyuria, skin changes, temperature intolerance, unexpected weight gain   Denies: back pain, joint pain, joint swelling, muscle pain, + muscle weakness   Denies: bleeding problems, blood transfusions, bruising, pallor, swollen lymph nodes   Denies: Neg dysarthria, focal deficits, diplopia  +headache, blurred vision,seizure      Objective:     Visit Vitals  BP (!) 159/95 (BP 1 Location: Left arm, BP Patient Position: At rest)   Pulse 73   Temp 99.3 °F (37.4 °C)   Resp 18   Wt 152 lb (68.9 kg)   SpO2 98%   BMI 20.61 kg/m²      Intake and Output:  No intake/output data recorded. Physical Exam:   Psych: Patient was oriented to person, place, year; no active hallucinations, abnormal affect or abnormal behaviors during the examination.  Confused statements at times   General:   Alert, appears stated age, No acute distress. HEENT:  Normocephalic, EOMI, facial symmetry  Intact. Oral mucosa pink and moist. No nasal discharge. Lungs:  CTA Bilaterally,Respiration even and unlabored   No apparent use of accessory muscles for respiration. No nasal alar flaring. Heart:  Regular rate and rhythm, S1, S2, No obvious murmur, click, rub or gallop. Genitourinary: defered   Abdomen:  Soft, non-tender to palpation in all four quadrants. Bowel sounds present. No obvious masses palpated.     Neuromuscular:  PERRL, EOMI  Generalized non focal weakness  Effort dependent  Sensory - intact  Plantars - down going  DTR Right  B 2, T 2, BR 2, Knee 2, Ach 1           Left    B 2, T 2, BR 2, Knee 2, Ach 1     Skin:  Intact, dry, good skin turgor, age related changes present   Edema: none     Incision:  Cranial wounds healing well, clean, dry, and intact        Lab Review:    Recent Results (from the past 72 hour(s))   MAGNESIUM    Collection Time: 08/11/19  6:45 AM   Result Value Ref Range    Magnesium 1.9 1.8 - 2.4 mg/dL   CBC W/O DIFF    Collection Time: 08/11/19  6:45 AM   Result Value Ref Range    WBC 7.4 4.3 - 11.1 K/uL    RBC 2.74 (L) 4.23 - 5.6 M/uL    HGB 8.4 (L) 13.6 - 17.2 g/dL    HCT 25.8 (L) 41.1 - 50.3 %    MCV 94.2 79.6 - 97.8 FL    MCH 30.7 26.1 - 32.9 PG    MCHC 32.6 31.4 - 35.0 g/dL    RDW 13.9 11.9 - 14.6 %    PLATELET 395 406 - 840 K/uL    MPV 10.9 9.4 - 12.3 FL    ABSOLUTE NRBC 0.00 0.0 - 0.2 K/uL   METABOLIC PANEL, BASIC    Collection Time: 08/11/19  6:45 AM   Result Value Ref Range    Sodium 144 136 - 145 mmol/L    Potassium 3.4 (L) 3.5 - 5.1 mmol/L    Chloride 113 (H) 98 - 107 mmol/L    CO2 24 21 - 32 mmol/L    Anion gap 7 7 - 16 mmol/L    Glucose 89 65 - 100 mg/dL    BUN 17 6 - 23 MG/DL    Creatinine 3.78 (H) 0.8 - 1.5 MG/DL    GFR est AA 22 (L) >60 ml/min/1.73m2    GFR est non-AA 19 (L) >60 ml/min/1.73m2    Calcium 8.6 8.3 - 10.4 MG/DL   MAGNESIUM Collection Time: 08/12/19  6:51 AM   Result Value Ref Range    Magnesium 2.6 (H) 1.8 - 2.4 mg/dL   CBC W/O DIFF    Collection Time: 08/12/19  6:51 AM   Result Value Ref Range    WBC 6.9 4.3 - 11.1 K/uL    RBC 2.81 (L) 4.23 - 5.6 M/uL    HGB 8.6 (L) 13.6 - 17.2 g/dL    HCT 26.3 (L) 41.1 - 50.3 %    MCV 93.6 79.6 - 97.8 FL    MCH 30.6 26.1 - 32.9 PG    MCHC 32.7 31.4 - 35.0 g/dL    RDW 13.6 11.9 - 14.6 %    PLATELET 227 990 - 073 K/uL    MPV 10.8 9.4 - 12.3 FL    ABSOLUTE NRBC 0.00 0.0 - 0.2 K/uL   METABOLIC PANEL, BASIC    Collection Time: 08/12/19  6:51 AM   Result Value Ref Range    Sodium 142 136 - 145 mmol/L    Potassium 3.3 (L) 3.5 - 5.1 mmol/L    Chloride 111 (H) 98 - 107 mmol/L    CO2 25 21 - 32 mmol/L    Anion gap 6 (L) 7 - 16 mmol/L    Glucose 86 65 - 100 mg/dL    BUN 17 6 - 23 MG/DL    Creatinine 3.51 (H) 0.8 - 1.5 MG/DL    GFR est AA 24 (L) >60 ml/min/1.73m2    GFR est non-AA 20 (L) >60 ml/min/1.73m2    Calcium 8.5 8.3 - 10.4 MG/DL   MAGNESIUM    Collection Time: 08/13/19  3:47 AM   Result Value Ref Range    Magnesium 2.3 1.8 - 2.4 mg/dL   CBC W/O DIFF    Collection Time: 08/13/19  3:47 AM   Result Value Ref Range    WBC 6.9 4.3 - 11.1 K/uL    RBC 2.74 (L) 4.23 - 5.6 M/uL    HGB 8.5 (L) 13.6 - 17.2 g/dL    HCT 25.6 (L) 41.1 - 50.3 %    MCV 93.4 79.6 - 97.8 FL    MCH 31.0 26.1 - 32.9 PG    MCHC 33.2 31.4 - 35.0 g/dL    RDW 13.6 11.9 - 14.6 %    PLATELET 879 225 - 154 K/uL    MPV 10.7 9.4 - 12.3 FL    ABSOLUTE NRBC 0.00 0.0 - 0.2 K/uL   METABOLIC PANEL, BASIC    Collection Time: 08/13/19  3:47 AM   Result Value Ref Range    Sodium 142 136 - 145 mmol/L    Potassium 3.5 3.5 - 5.1 mmol/L    Chloride 110 (H) 98 - 107 mmol/L    CO2 26 21 - 32 mmol/L    Anion gap 6 (L) 7 - 16 mmol/L    Glucose 78 65 - 100 mg/dL    BUN 17 6 - 23 MG/DL    Creatinine 3.39 (H) 0.8 - 1.5 MG/DL    GFR est AA 25 (L) >60 ml/min/1.73m2    GFR est non-AA 21 (L) >60 ml/min/1.73m2    Calcium 8.5 8.3 - 10.4 MG/DL   URINALYSIS W/ RFLX MICROSCOPIC    Collection Time: 08/13/19  1:42 PM   Result Value Ref Range    Color YELLOW      Appearance CLEAR      Specific gravity 1.009 1.001 - 1.023      pH (UA) 5.5 5.0 - 9.0      Protein 100 (A) NEG mg/dL    Glucose NEGATIVE  mg/dL    Ketone NEGATIVE  NEG mg/dL    Bilirubin NEGATIVE  NEG      Blood NEGATIVE  NEG      Urobilinogen 0.2 0.2 - 1.0 EU/dL    Nitrites NEGATIVE  NEG      Leukocyte Esterase NEGATIVE  NEG      WBC 0-3 0 /hpf    RBC 0-3 0 /hpf    Epithelial cells 0-3 0 /hpf    Bacteria 0 0 /hpf    Casts 0-3 0 /lpf           Condition on Admission: Good      Assessment:SAH secondary to basilar artery aneurysm s/p coiling with IVH, communicating hydrocephalus s/p VPS, sz d/o , PRES    The Post Assessment Physician Evaluation (BENNIE) found the current functional status to be comparable with the Pre-admission Screening. The Patient is a good candidate for acute inpatient rehabilitation. Nothing since the Pre-admission screen has changed that determination. Rehabilitation Plan  The patient has shown the ability to tolerate and benefit from 3 hours of therapy daily and is being admitted to a comprehensive acute inpatient rehabilitation program consisting of at least 3 hours of combined physical and occupational therapies. Begin intensive Physical Therapy for a minimum of 1.5 hours a day, at least 5 out of 7 days per week to address bed mobility, transfers, ambulation, strengthening, balance, and endurance. Begin intensive Occupational Therapy for a minimum of 1.5 hours a day, at least 5 out of 7 days per week to address ADL ( bathing, LE dressing, toileting) and adaptive equipment as needed.     Continue ST for: impaired communication skills, cognitive deficits     Continue 24-hour skilled rehabilitation nursing for bowel and bladder management, skin care for decubitus ulcer prevention , pain management and ongoing medication administration     The patient may benefit from a psychology consult for depression, anxiety and adjustment disorder. SAH secondary to basilar artery aneurysm s/p coiling with IVH, communicating hydrocephalus s/p VPS, sz d/o , PRES    Continue daily physician medical management:  Pneumonia prophylaxis- Incentive spirometer every hour while awake; lungs clear, hx MSSA pneumonia    CVA cont ASA, Plavix, ? Statin; LDL 67.6 ; adequate    Stimulation/ depression ; flat affect, improved with Provigil. Increase celexa. Psych consult    Hypokalemia; improved with supplement; monitor    Cont magnesium to prevent vasospasm. Daily labs    DVT risk / DVT Prophylaxis- Will require daily physician exam to assess for signs and symptoms as patient is at increased risk for of thromboembolism. Mobilization as tolerated. Intermittent pneumatic compression devices when in bed Thigh-high or knee-high thromboembolic deterrent hose when out of bed. Lovenox subq daily. sz d/o; controlled with increased Keppra 1000mg bid;sz precautions    Pain Control: using NSAID medication regularly, daily. Will require regular pain assessment and comprenhensive pain management,     Wound Care: Monitor wound status daily per staff and physician. At risk for failure. Will require 24/7 rehab nursing. Keep wound clean and dry and dc scalp sutures     Hypertension - BP uncontrolled, fluctuating, managed medically. No bp goals at this time. Prolonged HTN for management of SAH to prevent vasospasm contributed to development of PRES. F/u scan improved; now on hydralazine and metoprolol; add parameters. Would like to keep systolic less than 667; monitor symptoms and adjust as needed. Avoid hypotension    Malnutrition; needs supplement; jayce ct. Significant wt loss. Check alb    Proteinuria; monitor    NING, likely CKD; creat 3.39; slt improvement. BUN 17 improved; renal US 8/6; Echogenic kidneys suggesting chronic renal disease. No evidence of  Obstruction. Anemia likely due to CKD; recheck in a.m. Check stool.     Urinary retention/ neurogenic bladder - schedule voids q6-8 hrs. Check post-void residual as needed; In and Out catheterize if post-void residual is more than 400 cc.    bowel program - add stool softeners and prn bowel program    GERD - resume PPI. At times may need additional antacids, Maalox prn. Tobacco abuse; stress cessation. Cont nicoderm patch    Hydrocephalus, resolved s/p VPS      The patient's prognosis for significant practical improvement within a reasonable period of time appears good and the estimated length of stay is 21 days and patient is expected to return home with father and continued rehabilitation with home health therapy. Given the patient's complex neurologic/medical condition and the risk of further medical complications including: DVT, PE, skin breakdown, pneumonia due to decreased mobility,   infection at surgical site , CVA, MI, cardiac arrhythmias due to HTN, increased risk of thromboembolism secondary to decreased blood volume and increased energy expenditure in a patient with known acute blood loss could potentially impact the IRF program with decreased cardiovascular efficiency, postural hypotension and cardiac arrhythmia. For these ongoing medical issues, rehabilitation services could not be safely provided at a lower level of care such as a skilled nursing facility or nursing home.         Signed By: Norman Lewis MD     August 13, 2019

## 2019-08-13 NOTE — PROGRESS NOTES
Patient admitted to the floor from 7th floor under service of Dr. Rudi Diaz. A/O to name only, knows he's in the hospital. Dual skin assessment performed x 2 nurses with Ingrid Prince RN. No break down noted. Scalp/abdomen incision c/d/i, no drainage noted. Patient noted to be impulsive on previous floor, posey alarm placed for safety. Call bell within reach, no distress noted.

## 2019-08-13 NOTE — PROGRESS NOTES
Resting in bed, ate 25% of supper, drank half of tea. Periodic confusion, thinks there is a fireplace behind his bed. Call bell within reach, bed alarm on for safety. Brother at bedside, hourly rounds completed this shift.

## 2019-08-13 NOTE — DISCHARGE SUMMARY
Discharge Summary     Patient: Corey Cassidy MRN: [id number]  SSN: [id number]    YOB: 1975  Age: 37 y.o. Sex: male       Admit Date: 8/7/2019    Discharge Date: 8/13/2019      Admission Diagnoses: Seizure cerebral [I67.89]    Discharge Diagnoses:   Problem List as of 8/13/2019 Date Reviewed: 7/10/2019          Codes Class Noted - Resolved    RESOLVED: SAH (subarachnoid hemorrhage) ([facility] ICD-10-CM: I60.9  ICD-9-CM: 951  7/9/2019 - 7/10/2019        * (Principal) Posterior reversible encephalopathy syndrome ICD-10-CM: I67.83  ICD-9-CM: 348.39  8/8/2019 - Present        Seizure cerebral ICD-10-CM: I67.89  ICD-9-CM: 542  8/7/2019 - Present        SAH (subarachnoid hemorrhage) ([facility] ICD-10-CM: I60.9  ICD-9-CM: 430  8/5/2019 - Present        Pneumonia due to methicillin susceptible Staphylococcus aureus (MSSA) ([facility] ICD-10-CM: J15.211  ICD-9-CM: 482.41  7/15/2019 - Present        Tobacco abuse ICD-10-CM: Z72.0  ICD-9-CM: 305.1  7/10/2019 - Present        Acute respiratory failure with hypoxia ([facility] ICD-10-CM: J96.01  ICD-9-CM: 518.81  7/10/2019 - Present        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8  ICD-9-CM: V49.89  7/10/2019 - 7/10/2019               Discharge Condition: Stable    Hospital Course: Mr. 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  Mr. Patricia Linder was treated under [facility] protocol and originally transferred to our [facility] for inpt rehab on 8-5-19. The pt had a seizure secondary to PRES and was re-admitted to our ICU on 8-7-19. He has done well with his treatment and is ready for dc and transfer back to our inpt rehab with Dr. Gumaro Hernandez and her team. ( please see prior DC summary from 8-5-19 for full [facility] details) in addition to those dc instructions he has been started on metoprolol 100mg po bid, hydralazine 25mg po TID to manage his blood pressure secondary to PRES. He is alert and following commands. He will follow up with Dr. Suzette Solis in 6 weeks. 1. PRES: ( posterior reversible encephalopathy syndrome): pt developed PRES and subsequently had seizure during rehab, pt transferred back to ICU and was elevated BP was controlled with nipride gtt, he is now on metoprolol 100mg po bid and hydralazine 25mg po tid. There is no specific BP goal, but if any acute neuro changes please call and monitor if blood pressure has increased. Pt was also given magnesium as original mag <2. He has been startedon mag 400mg po bid. The pt has improved and ready for dc to in pt rehab. He will follow up with Dr. Suzette Solis in 6 weeks. 2. SAH: Stent coil of basilar tip aneurysm, ruptured, 7-10-19. Pt to remain on asa/plavix as directed until follow up with Dr. Suzette Solis. 3. Hydrocephalus: pt received  shunt, Certas valve set at 3, on 8-2-19. 4. LDL: 154: remains on lipitor 40mg po daily. 5. Tobacco abuse: remains on nicotine patch 14mcg daily. 6. NIHSS on DC:  1, intermittent confusion to place, but otherwise intact. 7. MRS on DC: 3    8. DC time of 25 min for dc summary, med recs and instructions. Consults: Physical Medicine and Rehabilitation, nephrology    Significant Diagnostic Studies: CT head, CTA head and neck, daily labs.      Disposition: inpt rehab [facility], 9th floor    Discharge Medications:   Current Discharge Medication List      START taking these medications    Details   hydrALAZINE (APRESOLINE) 25 mg tablet Take 1 Tab by mouth three (3) times daily. Qty: 90 Tab, Refills: 3    Associated Diagnoses: Posterior reversible encephalopathy syndrome      magnesium oxide (MAG-OX) 400 mg tablet Take 1 Tab by mouth two (2) times a day. Qty: 60 Tab, Refills: 0    Associated Diagnoses: Posterior reversible encephalopathy syndrome      metoprolol tartrate (LOPRESSOR) 100 mg IR tablet Take 1 Tab by mouth two (2) times a day. Qty: 60 Tab, Refills: 3         CONTINUE these medications which have CHANGED    Details   levETIRAcetam 1,000 mg tablet Take 1 Tab by mouth two (2) times a day. 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 Qty: 90 Tab, Refills: 1    Associated Diagnoses: SAH (subarachnoid hemorrhage) (HCC)      citalopram (CELEXA) 10 mg tablet Take 1 Tab by mouth daily. Qty: 90 Tab, Refills: 1      clopidogrel (PLAVIX) 75 mg tab Take 1 Tab by mouth daily. 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 Qty: 90 Tab, Refills: 1    Associated Diagnoses: SAH (subarachnoid hemorrhage) (HCC)      modafinil (PROVIGIL) 200 mg tablet Take 1 Tab by mouth daily. Max Daily Amount: 200 mg. Qty: 30 Tab, Refills: 0    Associated Diagnoses: SAH (subarachnoid hemorrhage) ([address] Seizure ([address]  Tobacco abuse; Cerebral vasospasm      nicotine (NICODERM CQ) 14 mg/24 hr patch 1 Patch by TransDERmal route every twenty-four (24) hours for 30 days. Qty: 30 Patch, Refills: 0    Associated Diagnoses: SAH (subarachnoid hemorrhage) ([facility] Tobacco abuse             Activity: per Rehab team  Diet: as tolerated  Wound Care: incisions to scalp are intact. Sutures are dissolvable and do not need to be removed. Call if any concerns for infections, drainage. Follow-up Appointments   Procedures    FOLLOW UP VISIT Appointment in: 6 Weeks Follow up with Dr. Sabrina Pickens in 6 weeks. Our office will call you and set up appointment date and time     Follow up with Dr. Sabrina Pickens in 6 weeks.  Our office will call you and set up appointment date and time     Standing Status:   Standing     Number of Occurrences:   1     Order Specific Question:   Appointment in     Answer:   6 Weeks       Signed By: Carole Hsieh NP     August 13, 2019

## 2019-08-13 NOTE — PROGRESS NOTES
08/13/19 0715   NIH Stroke Scale   Interval Other (comment)  (shift change)   LOC 0   LOC Questions 1   LOC Commands 0   Best Gaze 0   Visual 0   Facial Palsy 0   Motor Right Arm 0   Motor Left Arm 0   Motor Right Leg 0   Motor Left Leg 0   Limb Ataxia 0   Sensory 0   Best Language 0   Dysarthria 0   Extinction and Inattention 0   Total 1

## 2019-08-13 NOTE — PROGRESS NOTES
Pt approved for admission to [facility] for today, primary RN Candelaria notified, DC order noted.

## 2019-08-13 NOTE — DISCHARGE INSTRUCTIONS
1. Follow up with Dr. Scarlett Rodgers in 6 weeks. Our office will call you to set up appointment date and time. 2. Call our office for any concerns or questions. 3. No returning to work or driving until follow up with Dr. Scarlett Rodgers   4. Do not stop any of your medications without discussing with Dr. Scarlett Rodgers.   5. You will need to follow up with your primary provider, nephrology, once discharged from rehab.

## 2019-08-14 LAB
ALBUMIN SERPL-MCNC: 2.4 G/DL (ref 3.5–5)
ALBUMIN/GLOB SERPL: 0.8 {RATIO} (ref 1.2–3.5)
ALP SERPL-CCNC: 60 U/L (ref 50–136)
ALT SERPL-CCNC: 12 U/L (ref 12–65)
ANION GAP SERPL CALC-SCNC: 5 MMOL/L (ref 7–16)
AST SERPL-CCNC: 7 U/L (ref 15–37)
BASOPHILS # BLD: 0.1 K/UL (ref 0–0.2)
BASOPHILS NFR BLD: 1 % (ref 0–2)
BILIRUB SERPL-MCNC: 0.2 MG/DL (ref 0.2–1.1)
BUN SERPL-MCNC: 18 MG/DL (ref 6–23)
CALCIUM SERPL-MCNC: 8.8 MG/DL (ref 8.3–10.4)
CHLORIDE SERPL-SCNC: 110 MMOL/L (ref 98–107)
CO2 SERPL-SCNC: 29 MMOL/L (ref 21–32)
CREAT SERPL-MCNC: 3.52 MG/DL (ref 0.8–1.5)
DIFFERENTIAL METHOD BLD: ABNORMAL
EOSINOPHIL # BLD: 0.2 K/UL (ref 0–0.8)
EOSINOPHIL NFR BLD: 3 % (ref 0.5–7.8)
ERYTHROCYTE [DISTWIDTH] IN BLOOD BY AUTOMATED COUNT: 13.7 % (ref 11.9–14.6)
GLOBULIN SER CALC-MCNC: 2.9 G/DL (ref 2.3–3.5)
GLUCOSE BLD STRIP.AUTO-MCNC: 99 MG/DL (ref 65–100)
GLUCOSE SERPL-MCNC: 79 MG/DL (ref 65–100)
HCT VFR BLD AUTO: 27.2 % (ref 41.1–50.3)
HGB BLD-MCNC: 8.7 G/DL (ref 13.6–17.2)
IMM GRANULOCYTES # BLD AUTO: 0 K/UL (ref 0–0.5)
IMM GRANULOCYTES NFR BLD AUTO: 0 % (ref 0–5)
LYMPHOCYTES # BLD: 1.8 K/UL (ref 0.5–4.6)
LYMPHOCYTES NFR BLD: 26 % (ref 13–44)
MAGNESIUM SERPL-MCNC: 2.3 MG/DL (ref 1.8–2.4)
MCH RBC QN AUTO: 29.9 PG (ref 26.1–32.9)
MCHC RBC AUTO-ENTMCNC: 32 G/DL (ref 31.4–35)
MCV RBC AUTO: 93.5 FL (ref 79.6–97.8)
MONOCYTES # BLD: 0.7 K/UL (ref 0.1–1.3)
MONOCYTES NFR BLD: 10 % (ref 4–12)
NEUTS SEG # BLD: 4 K/UL (ref 1.7–8.2)
NEUTS SEG NFR BLD: 60 % (ref 43–78)
NRBC # BLD: 0 K/UL (ref 0–0.2)
PLATELET # BLD AUTO: 262 K/UL (ref 150–450)
PMV BLD AUTO: 10.9 FL (ref 9.4–12.3)
POTASSIUM SERPL-SCNC: 3.5 MMOL/L (ref 3.5–5.1)
PROT SERPL-MCNC: 5.3 G/DL (ref 6.3–8.2)
RBC # BLD AUTO: 2.91 M/UL (ref 4.23–5.6)
SODIUM SERPL-SCNC: 144 MMOL/L (ref 136–145)
WBC # BLD AUTO: 6.8 K/UL (ref 4.3–11.1)

## 2019-08-14 PROCEDURE — 97535 SELF CARE MNGMENT TRAINING: CPT

## 2019-08-14 PROCEDURE — 74011250637 HC RX REV CODE- 250/637: Performed by: PHYSICAL MEDICINE & REHABILITATION

## 2019-08-14 PROCEDURE — 80053 COMPREHEN METABOLIC PANEL: CPT

## 2019-08-14 PROCEDURE — 85025 COMPLETE CBC W/AUTO DIFF WBC: CPT

## 2019-08-14 PROCEDURE — 97162 PT EVAL MOD COMPLEX 30 MIN: CPT

## 2019-08-14 PROCEDURE — 82962 GLUCOSE BLOOD TEST: CPT

## 2019-08-14 PROCEDURE — 36592 COLLECT BLOOD FROM PICC: CPT

## 2019-08-14 PROCEDURE — 97530 THERAPEUTIC ACTIVITIES: CPT

## 2019-08-14 PROCEDURE — 74011250636 HC RX REV CODE- 250/636: Performed by: PHYSICAL MEDICINE & REHABILITATION

## 2019-08-14 PROCEDURE — 51798 US URINE CAPACITY MEASURE: CPT

## 2019-08-14 PROCEDURE — 65310000000 HC RM PRIVATE REHAB

## 2019-08-14 PROCEDURE — 97110 THERAPEUTIC EXERCISES: CPT

## 2019-08-14 PROCEDURE — 97166 OT EVAL MOD COMPLEX 45 MIN: CPT

## 2019-08-14 PROCEDURE — 96125 COGNITIVE TEST BY HC PRO: CPT

## 2019-08-14 PROCEDURE — 97116 GAIT TRAINING THERAPY: CPT

## 2019-08-14 PROCEDURE — 83735 ASSAY OF MAGNESIUM: CPT

## 2019-08-14 PROCEDURE — 99232 SBSQ HOSP IP/OBS MODERATE 35: CPT | Performed by: PHYSICAL MEDICINE & REHABILITATION

## 2019-08-14 RX ORDER — MEGESTROL ACETATE 40 MG/ML
200 SUSPENSION ORAL DAILY
Status: DISCONTINUED | OUTPATIENT
Start: 2019-08-15 | End: 2019-08-24

## 2019-08-14 RX ADMIN — ASPIRIN 325 MG ORAL TABLET 325 MG: 325 PILL ORAL at 09:09

## 2019-08-14 RX ADMIN — METOPROLOL TARTRATE 100 MG: 100 TABLET ORAL at 09:09

## 2019-08-14 RX ADMIN — HEPARIN SODIUM 5000 UNITS: 5000 INJECTION INTRAVENOUS; SUBCUTANEOUS at 15:54

## 2019-08-14 RX ADMIN — METOPROLOL TARTRATE 100 MG: 100 TABLET ORAL at 18:55

## 2019-08-14 RX ADMIN — SODIUM CHLORIDE, PRESERVATIVE FREE 900 UNITS: 5 INJECTION INTRAVENOUS at 05:38

## 2019-08-14 RX ADMIN — HEPARIN SODIUM 5000 UNITS: 5000 INJECTION INTRAVENOUS; SUBCUTANEOUS at 05:38

## 2019-08-14 RX ADMIN — FAMOTIDINE 20 MG: 20 TABLET ORAL at 09:09

## 2019-08-14 RX ADMIN — MAGNESIUM GLUCONATE 500 MG ORAL TABLET 400 MG: 500 TABLET ORAL at 18:55

## 2019-08-14 RX ADMIN — MAGNESIUM GLUCONATE 500 MG ORAL TABLET 400 MG: 500 TABLET ORAL at 09:09

## 2019-08-14 RX ADMIN — LEVETIRACETAM 1000 MG: 500 TABLET, FILM COATED ORAL at 09:08

## 2019-08-14 RX ADMIN — HEPARIN SODIUM 5000 UNITS: 5000 INJECTION INTRAVENOUS; SUBCUTANEOUS at 22:57

## 2019-08-14 RX ADMIN — SODIUM CHLORIDE, PRESERVATIVE FREE 900 UNITS: 5 INJECTION INTRAVENOUS at 15:55

## 2019-08-14 RX ADMIN — MODAFINIL 200 MG: 100 TABLET ORAL at 09:08

## 2019-08-14 RX ADMIN — CLOPIDOGREL BISULFATE 75 MG: 75 TABLET ORAL at 09:08

## 2019-08-14 RX ADMIN — SODIUM CHLORIDE, PRESERVATIVE FREE 900 UNITS: 5 INJECTION INTRAVENOUS at 22:56

## 2019-08-14 RX ADMIN — HYDRALAZINE HYDROCHLORIDE 25 MG: 25 TABLET, FILM COATED ORAL at 05:38

## 2019-08-14 RX ADMIN — Medication 30 ML: at 22:56

## 2019-08-14 RX ADMIN — ACETAMINOPHEN 650 MG: 325 TABLET, FILM COATED ORAL at 10:51

## 2019-08-14 RX ADMIN — Medication 30 ML: at 05:38

## 2019-08-14 RX ADMIN — CITALOPRAM HYDROBROMIDE 20 MG: 20 TABLET ORAL at 09:09

## 2019-08-14 RX ADMIN — HYDRALAZINE HYDROCHLORIDE 25 MG: 25 TABLET, FILM COATED ORAL at 22:57

## 2019-08-14 RX ADMIN — Medication 30 ML: at 15:54

## 2019-08-14 RX ADMIN — LEVETIRACETAM 1000 MG: 500 TABLET, FILM COATED ORAL at 22:56

## 2019-08-14 RX ADMIN — HYDRALAZINE HYDROCHLORIDE 25 MG: 25 TABLET, FILM COATED ORAL at 15:53

## 2019-08-14 RX ADMIN — SENNOSIDES, DOCUSATE SODIUM 1 TABLET: 50; 8.6 TABLET, FILM COATED ORAL at 09:08

## 2019-08-14 RX ADMIN — TRAZODONE HYDROCHLORIDE 25 MG: 50 TABLET ORAL at 22:57

## 2019-08-14 NOTE — PROGRESS NOTES
PHYSICAL THERAPY EXAMINATION  [id number]    Patient Name: Toshia Olivas  Patient Age: 37 y.o. Past Medical History: No past medical history on file. Medical Diagnosis:  SAH (subarachnoid hemorrhage) ([facility] [I60.9] <principal problem not specified>         Therapy Diagnosis:   Difficulty with bed mobility  [x]     Difficulty with functional transfers  [x]     Difficulty with ambulation  [x]     Difficulty with stair negotiations  [x]       Problem List:    Decreased strength B LE  [x]     Decreased strength trunk/core  [x]     Decreased AROM   []     Decreased PROM  []    Decreased endurance  [x]     Decreased balance sitting  [x]     Decreased balance standing  [x]     Pain   [x]     Slow ambulation velocity  [x]    Decreased coordination  [x]    Decreased safety awareness  [x]      Functional Limitations:   Decreased independence with bed mobility  [x]     Decreased independence with functional transfers  [x]     Decreased independence with ambulation  [x]     Decreased independence with stair negotiation  [x]       Previous Functional Level: Per previous reports, patient was independent with all ADL/IADL tasks, working, driving. Did not use A/D prior to [facility].     Home Environment: Home Environment: Private residence  # Steps to Enter: 4  One/Two Story Residence: Two story  Living Alone: No  Support Systems: Family member(s)  Patient Expects to be Discharged to[de-identified] Other (comment)(his dad's private residence)  Current DME Used/Available at Home: None  Tub or Shower Type: Tub/Shower combination         Outcome Measures: Vital Signs: BP (!) 155/91 (BP 1 Location: Left arm, BP Patient Position: At rest)   Pulse (!) 102   Temp 98.1 °F (36.7 °C)   Resp 16   Wt 68.9 kg (152 lb)   SpO2 98%   BMI 20.61 kg/m²     Pain level: pt. C/o HA, unable to rate  Pain interventions: RN contacted to provide pain medication    Patient education: role of PT, orientation to [facility], gait w/ RW    Interdisciplinary Communication: collaborated w/ OT regarding pt's POC      Cognition: Pt. Lethargic this AM, difficulty keeping eyes open during therapy. Flat, depressed affect. Speaks w/ one word sentences, volume barely audible. Follows one step commands consistently. Pt. W/ decreased insight & problem solving as well as decreased short term recall. MMT Initial Asssessment   Right Lower Extremity Left Lower Extremity   Hip Flexion 4 4   Knee Extension 5 5   Knee Flexion 5 5   Ankle Dorsiflexion 5 5   0/5 No palpable muscle contraction  1/5 Palpable muscle contraction, no joint movement  2-/5 Less than full range of motion in gravity eliminated position  2/5 Able to complete full range of motion in gravity eliminated position  2+/5 Able to initiate movement against gravity  3-/5 More than half but not full range of motion against gravity  3/5 Able to complete full range of motion against gravity  3+/5 Completes full range of motion against gravity with minimal resistance  4-/5 Completes full range of motion against gravity with minimal-moderate resistance  4/5 Completes full range of motion against gravity with moderate resistance  4+/5 Completes full range of motion against gravity with moderate-maximum resistance  5/5 Completes full range of motion against gravity with maximum resistance     AROM: WFL    FIM SCORES Initial Assessment   Bed/Chair/Wheelchair Transfers 4   Wheelchair Mobility NT   Walking [place] 4   Steps/Stairs NT   PRIMARY MODE OF LOCOMOTION: ambulation  Please see IRC Interdisciplinary Eval: Coordination/Balance Section for details regarding FIM score description.     BED/CHAIR/WHEELCHAIR TRANSFERS Initial Assessment   Rolling Right 5 (Supervision)   Rolling Left 5 (Supervision)   Supine to Sit 5 (Supervision)   Sit to Stand Minimal assistance   Sit to Supine 5 (Supervision)   Transfer Assist Score 4   Transfer Type SPT without device   Comments flexed posture, increased time, unsteady`   Car Transfer Not tested(secondary to lethargy & fatigue)   Car Type         WHEELCHAIR MOBILITY/MANAGEMENT Initial Assessment   Able to Propel 0 feet   Functional Level NT   Curbs/ramps assistance required 0 (Not tested)   Wheelchair set up assistance required 0 (Not tested)   Wheelchair management NT   NT as anticipate ambulation to be primary mode of locomotion. WALKING INDEPENDENCE Initial Assessment   Assistive device Other (comment), Gait belt(no A/D)   Ambulation assistance - level surface 4 (Minimal assistance)   Distance 150 Feet (ft)   Functional Level 4   Comments flexed posture, decreased unilateral stance time, decreased heel strike, decreased trunk rotation,    Ambulation assistance - unlevel surface 0 (Not tested)       STEPS/STAIRS Initial Assessment   Steps/Stairs ambulated 0   Rail Use NT   Functional Level NT   Comments NT   Curbs/Ramps 0 (Not tested)   Deferred secondary to lethargy making stair training unsafe. QUALITY INDICATOR ASSIST COMMENTS   Walk 10 feet 4: Supervision or touching A    Walk 50 feet with 2 turns 4: Supervision or touching A    Walk 150 feet 4: Supervision or touching A    Walk 10 feet on uneven  Not Tested: Not attempted due to safety concerns    1 step/curb Not Tested: Not attempted due to safety concerns    4 steps Not Tested: Not attempted due to safety concerns    12 steps Not Tested: Not attempted due to safety concerns     object Not Tested: Not attempted due to safety concerns    Wheel 48' w/2 turns Not Tested: Not applicable secondary to pt not completing activity previously    Wheel 150' Not Tested: Not applicable secondary to pt not completing activity previously      Gait training:  Pt. Amb. x150' w/ RW CGA w/ improved step length & tiffanie as compared to ambulation w/o A/D.     Therapeutic Exercise:  Pt. Performed supine LE exercises x10 each as follows:   SUPINE EXERCISES Sets Reps Comments   Ankle Pumps 1 10    bridigng 1 10    Short Arc Quad 1 10    Straight Leg Raise 1 10      Pt. Left up in recliner in NAD, call bell in reach, [place] activated. PHYSICAL THERAPY PLAN OF CARE    Therapy Diagnosis:   Please see table above    Order received from MD for physical therapy services and chart reviewed. Pt to be seen at least 5 times per week for at least 1.5 hours of physical therapy per day for 2 weeks. Thank you for the referral.    LTGs:  LTG 1. Patient transfer supine<>sit with mod I in 2 weeks  LTG 2. Patient transfer sit<>stand and perform stand pivot transfer with LRAD and modified independence in 2 weeks  LTG 3. Patient ambulate 400 feet with LRAD and supervision in 2 weeks  LTG 4. Patient ambulate up/down 12 steps with handrails and supervision in 2 weeks  LTG 5. Patient will be supervision w/ HEP  in 2 weeks    Pt would benefit from skilled physical therapy in order to improve independent functional mobility within the home. Interventions may include range of motion (AROM, PROM B LE/trunk), motor function (B LE/trunk strengthening/coordination), activity tolerance (vitals, oxygen saturation levels), bed mobility training, balance activities, gait training (progressive ambulation program), and functional transfer training. Please see IRC; Interdisciplinary Eval, Care Plan, and Patient Education for further information regarding physical therapy examination and plan of care.      Tanvi Zacarias, PT  8/14/2019

## 2019-08-14 NOTE — PROGRESS NOTES
08/14/19 1145   Time Spent With Patient   Time In 1052   Time Out 1127   Mental Status   Neurologic State Alert   Orientation Level Oriented to person;Oriented to place; Disoriented to time   Cognition Decreased attention/concentration;Decreased command following; Follows commands; Impaired decision making   Perseveration No perseveration noted   Safety/Judgement Fall prevention;Decreased insight into deficits   Psychosocial   Patient Behaviors Flat affect; Lethargic; Tearful;Calm; Withdrawn; Cooperative   Check Scientific Process Nurture loving kindness; Teaching/learning;Enable kyree/hope;Establish trust;Create healing environment   Reading Comprehension   Visual Impairment No impairment  (Pt read 5/5 words and 5/5 sentences with S.)   Pre-Morbid Reading Status Literate  (Pt reported he completed high school and 6 years of [place] language school.)   Scanning/Tracking  No impairment   Words  No   Sentence No Impairment   Interfering Components Processing time; Attention   Overall Impairment Severity Minimal      08/14/19 1150   Verbal Expression   Primary Mode of Expression Verbal   Initiation Impaired (%)   Automatic Speech Task No impairment  (Pt recalled 12/12 months of the year in a timely manner.)   Repetition No impairment  (Pt repeated 5/5 words, 5/5 phrases, and 5/5 sentences.)   Naming Impaired  (Pt named 10/10 objects around the room in a timely manner. On generative naming task, Pt named 12 animals in 1 minute and 8 food items in 1 minute.)   Conversation Fluent   Speech Characteristics Word retrieval   Interfering Components Attention; Impaired thought organization   Effective Techniques Cueing;Provide extra time; Word retrieval strategies   Overall Impairment Moderate   FIM Score (Verbal Expression) 2   Oral-Motor Structure/Motor Speech   Face No impairment   Labial No impairment   Dentition Natural   Lingual No impairment   Velum No impairment   Mandible No impairment   Apraxic Characteristics None   Dysarthric Characteristics None   Intelligibility Impaired  (Due to decreased breath support and low volume)   Intonation Decreased   Rate Slow   [place]ody Minimal   Overall Impairment Severity Moderate   Neuro-Linguistics/Cognitive Function   Reasoning  Divergent thinking;Deductive reasoning; Abstract reasoning;Executive function; Analogies; [place] reasoning;Convergent thinking; Inductive reasoning;Metaphors/similes;Proverbs/idioms   Organizational Categorization; Complex functional tasks; Sequencing; Thought;Simple functional tasks   Problem Solving Cause and effect; Complex; Functional   FIM Score (Problem Solving) 2   Mathematics Subtraction, simple   Memory  Short-term  (Pt recalled 2 of 3 items after a short delayed with a phonemic cue.)   FIM Score (Memory) 3   Attention  Divided attention;Easily redirected;Sustained attention   Overall Impairment Severity Moderate-severe   Pragmatics   Pragmatics Impairment Inappropriate facial expressions; Inconsistent eye contact; Abnormal affect;Monotone; Turn taking   Pragmatics Impairment Severity Moderate-severe   FIM Score (Pragmatics/Social Interaction) 2     Medical Hx (copied from previous reports): HPI; this is Mr. Shefali Lauren second [facility] admission. Initially admitted on 8/5 to [facility]; Hardik Jordan is a  Right handed functionally independent 43 y. o. male who originally reported to [facility] ED via EMS after found with altered mental status and possible seizure like activity. Per brother, pt was at [address] and had witnessed syncopal event with possible seizure like activity. Pt had CT head and CTA head and neck and found to have [facility] secondary to Basilar tip aneurysm rupture. Pt transferred to [address] [place] [address] ED via [facility] team. Pt with eyes closed and drowsy, but will open eyes to voice and follow commands.  Pt GCS E3, V5, M6: 14, PERRL +3 with EOM's intact. NIHSS of 2 for drowsiness and altered mental status. Pt admitted to our ICU under [facility] protocol.  Labs from [facility] show bun/cr 46/3.4, will give pt IVF's x2 L bolus and reeval. Pt states he smokes about 9 cig a day, denies any ETOH or drugs, denies any family hx of SAH. Pt unsure of what happened.  Arterial line placed on admit, pt with IV x2., phelps. \"    Kvng Marshall , unfortunately, had a decline in status overnoc and required intubation for airway protection. F/u HCT showed extensive/diffuse SAH and increased ICP. An EVD was placed into the 3rd ventricle and head Ct repeated with dec in ventricular size. Pts creatinine has improved to 2.83 with a BUN of 37 this a.m. troponins elevated at 0.08 and now 0.07. 2D ECHO pending. Na 154 earlier this a.m, now 149. Leukocytosis improved from 118.2 to 13.8. He is on Nimotop to prevent vasospasm and Keppra for sz control. 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 : stent/coiled 7-10-19. Pt had had daily TCD's during his admit and did show vasospasm, but pt was able to autoregulate and no IA verapamil was required. The pt had EVD placed on admit due to hydrocephalus and was unable to remove due to altered mental status. Pt required ventricular tPA x2 for IVH clot prior to  shunt placement on 8-2-19. The pt is alert and follows commands today, but he does have a flat effect. He was started on celexa 10mg po daily during admit. Pt will remain on his asa 325mg po daily, Plavix 75mg po daily for his stent/coil of basilar aneurysm. He completed 21days of nimotop\"     Unfortunately, on his 2 d at [facility] he had a sz and a CODE S was called.  CTA/CTP head obtained and showed no new ICH, IVH but did show bilat occipital lobe edema/ischemia that was read by RAD as possible bilat stroke, but images and pt presentation and findings were most consistent with PRES. Pebbles placed and pt BP controlled for SBP goal <140. Pt started on cardene, but not responsive, he was given labetalol total of 30mg IV and started on Nipride gtt and tolerated well.  Pt also given Mag 4gm IV x1, mag level 2, goal >2.5.   elevated BP was controlled with nipride gtt, he is now on metoprolol 100mg po bid and hydralazine 25mg po tid. There is no specific BP goal, but if any acute neuro changes please call and monitor if blood pressure has increased. Pt was also given magnesium as original mag <2. He has been startedon mag 400mg po bid. The pt has improved and ready for dc to in pt rehab. He will follow up with Dr. Ammon Toscano in 6 weeks. His elevated BP was controlled with nipride gtt, he is now on metoprolol 100mg po bid and hydralazine 25mg po tid. There is no specific BP goal, but if any acute neuro changes we are asked to call Dr Jayde Barrera monitor if blood pressure has increased. Pt was also given magnesium as original mag <2. He has been startedon mag 400mg po bid. The pt has improved and ready for dc to in pt rehab. He will follow up with Dr. Ammon Toscano in 6 weeks. Pt was seen in room with head off from PT. Pt reported a headache for which he was receiving medicine from his nurse. Pt alert and cooperative, but kept his eyes closed most of the session and spoke at a lower volume. When prompted, he would speak in a louder, more intelligible voice. Also, at the end of the session he opened his eyes and thanked the clinician for her help. Pt was encouraged about the role of rehab in helping him to regain his abilities and ST in assisting with regaining his ability to think and communicate more quickly and easily.     Pt completed basic ST cognitive-linguistic evaluation with the following results:   1) Auditory Processing: Yes/no Questions 10/10, 1-step commands 5/5, 2-step commands 5/5, 3 step commands 5/5  2) Verbal Expression: named 10/10 Pt randomly selected objects in room (quickly), automatic speech 12/12 months of the year, Generative Naming 12 animals in 1-minute and 8 food items in 1-minute trial  3) Short Term Memory: recalled 2 of 3 words with phonemic cues following a short delay  4) Repetition: 5/5 words, 5/5 phrase, & 5/5 sentences  5) Problem Solving: 3/5 basic problems  6) Reasoning: 3/4 basic   7) Basic Math: 4/5 (trouble with second subtraction question)    Pt displays cognitive-linguistic deficits in the areas of attention, STM, word retrieval, thought organization, problem solving, and reasoning along with pragmatic deficits (eye contact, volume/prosody, initiation/turn taking, flat affect). Pt would benefit from ST 5x/week for 30-minutes to address his cognitive-linguistic and pragmatic deficits.   Yuliya Vazquez, CCC-SLP

## 2019-08-14 NOTE — PROGRESS NOTES
08/14/19 1021   Time With Patient   Time In 0915   Time Out 1000   Interdisciplinary Eval   Eval Timepoint Admission   Grooming   Functional Level 5   Tasks Completed by Patient Brushed teeth; Washed hands   Comments setup assist; verbal cueing for strategy for closing listerine bottle    Toileting   Functional Level 4   Toilet Transfer [place]   Toilet Transfer Score 1   Comments requiring RW for safety; ambulated without AE prior to admission    S: \"I'm cold. \" [blanket provided for comfort]  O: Patient presented sidelying in bed sleeping. Agreeable to treatment with encouragement. Patient completed toileting and grooming; see above for details. Shoes: 3: Partial/moderate assist   Patient completed 9-hole Peg test: RUE 47 seconds, LUE 49 seconds. Required moderate cueing to follow multi-step commands during task. Completed simple written direction following task using small pegs and RUE to complete 4 sets of instructions with minor error in 1/4 sets. A: Flat affect and decreased initiation/motivation remain primary barriers. Patient tolerated session well with complaint of headache, 2/10, declining intervention. P: Continue OT POC with focus on ADL/IADL skills, participation, cognition, safety awareness, functional transfers, functional mobility, coordination, strength, static and dynamic balance, and activity tolerance to maximize safety and independence with ADLs and functional transfers. Patient was handed off to PT. Interdisciplinary communication: Collaborated with PT regarding functional status and with nurse regarding schedule.      Heavenly Cassidy MS, OTR/L  8/14/2019          Note may contain the following abbreviations:   Abbreviation Explanation   AROM Active range of motion   PROM Passive range of motion   SPT Stand pivot transfer   LPT Lateral pivot transfer   WC wheelchair   RW Rolling walker    BUE Bilateral upper extremities   BLE Bilateral lower extremities    WBAT Weight bearing as tolerated    TTWB Toe-touch weight bearing    AD Adaptive device   AE Adaptive equipment    NMES Neuro muscular electrical stimulation   UBE Upper body ergometer

## 2019-08-14 NOTE — PROGRESS NOTES
[facility] OCCUPATIONAL THERAPY INITIAL EVALUATION    Time In: 9552  Time Out: 0805    Precautions: Falls, Bed Alarm, Poor Safety Awareness, Confused and Impulsive    Vitals:   Patient Vitals for the past 8 hrs:   Pulse Resp BP   08/14/19 0500 (!) 102 16 (!) 155/91         Pain: Patient rated pain 0 out of 10. History of Presenting Illness (copied from previous reports): Shemar Rubio is a 37 y.o. male patient at [address] [place] [address] who was admitted on 8/13/2019  by Jame Schlatter, MD with below mentioned medical history ,is being seen for Physical Medicine and Rehabilitation.       HPI; this is Mr. Pedro Ocampo second [facility] admission. Initially admitted on 8/5 to [facility]; James Santos a  Right handed functionally independent 43 y. o. male who originally reported to [facility] ED via EMS after found with altered mental status and possible seizure like activity. Per brother, pt was at [address] and had witnessed syncopal event with possible seizure like activity. Pt had CT head and CTA head and neck and found to have [facility] secondary to Basilar tip aneurysm rupture. Pt transferred to [facility] ED via [facility]. Pt with eyes closed and drowsy, but will open eyes to voice and follow commands.  Pt GCS E3, V5, M6: 14, PERRL +3 with EOM's intact. NIHSS of 2 for drowsiness and altered mental status. Pt admitted to our ICU under [facility] protocol. Labs from [facility] show bun/cr 46/3.4, will give pt IVF's x2 L bolus and reeval. Pt states he smokes about 9 cig a day, denies any ETOH or drugs, denies any family hx of SAH. Pt unsure of what happened.  Arterial line placed on admit, pt with IV x2., phelps. \"   Hardik , unfortunately, had a decline in status overnoc and required intubation for airway protection. F/u HCT showed extensive/diffuse SAH and increased ICP. An EVD was placed into the 3rd ventricle and head Ct repeated with dec in ventricular size. Pts creatinine has improved to 2.83 with a BUN of 37 this a.m. troponins elevated at 0.08 and now 0.07. 2D ECHO pending. Na 154 earlier this a.m, now 149. Leukocytosis improved from 118.2 to 13.8. He is on Nimotop to prevent vasospasm and Keppra for sz control. 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 : stent/coiled 7-10-19. Pt had had daily TCD's during his admit and did show vasospasm, but pt was able to autoregulate and no IA verapamil was required. The pt had EVD placed on admit due to hydrocephalus and was unable to remove due to altered mental status. Pt required ventricular tPA x2 for IVH clot prior to  shunt placement on 8-2-19. The pt is alert and follows commands today, but he does have a flat effect. He was started on celexa 10mg po daily during admit. Pt will remain on his asa 325mg po daily, Plavix 75mg po daily for his stent/coil of basilar aneurysm. He completed 21days of nimotop\"     Unfortunately, on his 2 d at [facility] he had a sz and a CODE S was called. CTA/CTP head obtained and showed no new ICH, IVH but did show bilat occipital lobe edema/ischemia that was read by RAD as possible bilat stroke, but images and pt presentation and findings were most consistent with PRES. Pebbles placed and pt BP controlled for SBP goal <140. Pt started on cardene, but not responsive, he was given labetalol total of 30mg IV and started on Nipride gtt and tolerated well. Pt also given Mag 4gm IV x1, mag level 2, goal >2.5.   elevated BP was controlled with nipride gtt, he is now on metoprolol 100mg po bid and hydralazine 25mg po tid. There is no specific BP goal, but if any acute neuro changes please call and monitor if blood pressure has increased. Pt was also given magnesium as original mag <2. He has been startedon mag 400mg po bid. The pt has improved and ready for dc to in pt rehab. He will follow up with Dr. Katiana Jennings in 6 weeks.   His elevated BP was controlled with nipride gtt, he is now on metoprolol 100mg po bid and hydralazine 25mg po tid. There is no specific BP goal, but if any acute neuro changes we are asked to call Dr Suzette Solis and monitor if blood pressure has increased. Pt was also given magnesium as original mag <2. He has been startedon mag 400mg po bid. The pt has improved and ready for dc to in pt rehab. He will follow up with Dr. Suzette Solis in 6 weeks. Past Medical History/ Co-morbidities: No past medical history on file. Patient's Goal: \"Be the best I can be. \"     Previous Level of Function: Per previous reports, patient was independent with all ADL/IADL tasks, working, driving. Patient is known to this therapist from very brief previous stay in [facility].      [facility] 65 residence    Lives Alone No    Support Systems Parent(per  note: is  from wife but intends to reunite at discharge )    Shower Situation Tub/Shower combination    Current DME None    Lift Chair      Stairs to [employer] 3       Rails         W/C Ramp      Interior Steps        Upper Extremity Function   REJI IVERSON   [address] Impaired Impaired    GMC Decreased, but functional Decreased, but functional   Light Touch No apparent deficit No apparent deficit   Sharp/Dull Discrimination Not tested Not tested   Hot/Cold No apparent deficit No apparent deficit   Proprioception No apparent deficit No apparent deficit   Stereognosis No apparent deficit No apparent deficit   9 Hole Peg Test       General Comments        REJI IVERSON   General Evalutaion       AROM Within defined limits Within defined limits   Shoulder Flexion 4- 4-   Shoulder Extension  4- 4-   Shoulder Abduction 4- 4-   Shoulder Adduction 4- 4-   Elbow Flexion 3+ 3+   Elbow Extension  4 4   Wrist Flexion/Extension        4- 4-   General Comments     0/5 No palpable muscle contraction  1/5 Palpable muscle contraction, no joint movement  2-/5 Less than full range of motion in gravity eliminated position  2/5 Able to complete full range of motion in gravity eliminated position  2+/5 Able to initiate movement against gravity  3-/5 More than half but not full range of motion against gravity  3/5 Able to complete full range of motion against gravity  3+/5 Completes full range of motion against gravity with minimal resistance  4-/5 Completes full range of motion against gravity with minimal-moderate resistance  4/5 Completes full range of motion against gravity with moderate resistance  4+/5 Completes full range of motion against gravity with moderate-maximum resistance  5/5 Completes full range of motion against gravity with maximum resistance      Cognition   Performance Comments   Orientation Level Oriented to person, Oriented to place, Disoriented to time, Oriented to situation    Comprehension Level 3 Mode: Auditory  Hearing Aid:None  Glasses:    Expression (Native Language) 2 Mode: Verbal  very low volume, difficult to understand and has to repeat almost everything said. Uses single words and short phrases only. Social Interaction/Pragmatics 2 flat affect, withdrawn, needs cues to initiate interaction. interacts appropriately 25-49% of the time. Problem Solving 2 needs direction >50% of the time to intiate simple activities    Memory 2 executes 1 of 2 step request 25-49% of the time    Comments       Activities of Daily Living    Score Comments   Self-Feeding 5 patient refused breakfast but did drink juice after setup assist    Grooming 5 Tasks completed by patient: Washed face, Washed hands      Bathing 4 Body Parts Bathe: Abdomen, Arm, right, Arm, left, Buttocks, Chest, Lower leg and foot, left, Lower leg and foot, right, [place] area, Thigh, left, Thigh, right  CGA in standing   Tub/Shower Transfer [place] 1 Type of Shower: Shower  Adaptive  Equipment:Tub transfer bench, Grab bars and Walker   Upper Body  Dressing/Undressing 5 Items Applied:Pullover (4 steps)  setup assist    Lower Body Dressing/Undressing 4 Items Applied:Elastic waist pants (3 steps), Sock, left (1 step), Sock, right (1 step), Underpants (3 steps)  patient completed 6/8 steps, requiring total A for B socks. Toileting 4 steadying assist    Toilet Transfer 1 required RW      SOCKS/SHOES: total A for B socks, refused B shoes due to laying down in bed     Vision/Perception: Formal testing needs to be completed. Instrumental Activities of Daily Living   Performance Comments   Meal Preparation Total assistance    Homemaking Total assistance    Medication Management Total assistance    Financial Management Total assistance        Session: Patient supine in bed on arrival to room. Requiring much encouragement to participate in OT evaluation. Completed ADL; see above for details. After dressing UB/LB except socks, when being encouraged to attempt socks, patient stated he felt as thought he might vomit. No gagging noted. Patient was allowed sidelying rest break, as he stated that \"when I push myself I throw up. \" Patient stated more than 5 times during session, \"I'm just so tired. \" Brief BUE assessment completed; coordination to be assessed when patient is willing to come out to the gym. Patient declined further OT treatment at this time, requesting to return to sleeping. Interdisciplinary Communication: with nurse Rafy Hayes regarding complaints of nausea      Patient/Family Education: Patient was/were educated On the role of OT, On POC, On IRC expectations and use of call bell, yellow sock policy . Problem List: [facility], [address], Activity Tolerance, Visual Perceptual , Safety Awareness, Strength, Sitting Balance, Standing Balance and Cognition    Functional Limitations: ADL, IADL, Functional Transfers and Functional Mobility    Goals: Please see Care Plan    OT order received and chart reviewed. OT orders have been acknowledged.  Patient will benefit from skilled OT services to address ADL, functional transfers, UE strength, UE coordination, balance, cognition, activity tolerance and engagement in environment  to maximize functional performance with daily self-care tasks and functional mobility. Treatment is likely to include ADL, Balance, Strength, Activity tolerance, Neuro-muscular re-education, Visual perceptual, Cognitive, DME, AE, Family  and Safety awareness training to increase independence with self-care. Patient will be seen for 1.5-2 hours of skilled OT services 5-6 days a week.      Kinsey López MS, OTR/L  8/14/2019

## 2019-08-14 NOTE — PROGRESS NOTES
Laura Amaro MD,   Medical Director  [address] [facility], [address] [facility]  Tel: [phone]       [facility] PROGRESS NOTE    Marquis Christensen  Admit Date: 8/13/2019  Admit Diagnosis: SAH (subarachnoid hemorrhage) ([address] [I60.9]    Subjective     C/o feeling cold. Walked well today.  A few smiles otherwise remains flat    Objective:     Current Facility-Administered Medications   Medication Dose Route Frequency    ondansetron (ZOFRAN ODT) tablet 4 mg  4 mg Oral Q6H PRN    lactulose (CHRONULAC) 10 gram/15 mL solution 45 mL  30 g Oral DAILY PRN    traZODone (DESYREL) tablet 25 mg  25 mg Oral QHS PRN    HYDROcodone-acetaminophen (NORCO) 7.5-325 mg per tablet 1 Tab  1 Tab Oral Q4H PRN    ondansetron (ZOFRAN) injection 4 mg  4 mg IntraVENous Q4H PRN    heparin (porcine) injection 5,000 Units  5,000 Units SubCUTAneous Q8H    aspirin tablet 325 mg  325 mg Oral DAILY    clopidogrel (PLAVIX) tablet 75 mg  75 mg Oral DAILY    famotidine (PEPCID) tablet 20 mg  20 mg Oral DAILY    heparin (porcine) pf 300-900 Units  300-900 Units InterCATHeter PRN    heparin (porcine) pf 900 Units  900 Units InterCATHeter Q8H    hydrALAZINE (APRESOLINE) tablet 25 mg  25 mg Oral TID    levETIRAcetam (KEPPRA) tablet 1,000 mg  1,000 mg Oral BID    magnesium oxide (MAG-OX) tablet 400 mg  400 mg Oral BID    metoprolol tartrate (LOPRESSOR) tablet 100 mg  100 mg Oral BID    modafinil (PROVIGIL) tablet 200 mg  200 mg Oral DAILY    nicotine (NICODERM CQ) 14 mg/24 hr patch 1 Patch  1 Patch TransDERmal Q24H    SALINE PERIPHERAL FLUSH Q8H soln 10-30 mL  10-30 mL InterCATHeter Q8H    saline peripheral flush soln 10-30 mL  10-30 mL InterCATHeter PRN    acetaminophen (TYLENOL) tablet 650 mg  650 mg Oral Q6H PRN    citalopram (CELEXA) tablet 20 mg  20 mg Oral DAILY    senna-docusate (PERICOLACE) 8.6-50 mg per tablet 1 Tab  1 Tab Oral DAILY     Review of Systems:Denies chest pain, shortness of breath, cough, headache, visual problems, abdominal pain, dysurea, calf pain. Pertinent positives are as noted in the medical records and unremarkable otherwise. Visit Vitals  BP (!) 155/91 (BP 1 Location: Left arm, BP Patient Position: At rest)   Pulse (!) 102   Temp 98.1 °F (36.7 °C)   Resp 16   Wt 152 lb (68.9 kg)   SpO2 98%   BMI 20.61 kg/m²        Physical Exam:   General: Alert and O x 2 (person and Place; not time) , confused at times  No acute cardio respiratory distress. HEENT: Normocephalic,no scleral icterus  Oral mucosa moist without cyanosis; scalp wounds c/d/i   Lungs: Clear to auscultation  bilaterally. Respiration even and unlabored   Heart: Regular rate and rhythm, S1, S2   No  murmurs, clicks, rub or gallops   Abdomen: Soft, non-tender, nondistended. Bowel sounds present. No organomegaly. Genitourinary: Benign . Neuromuscular:      Generalized non focal weakness  Asked his kids ages; 6, 6; I said that I thought they were 15 and 8; he replied; \"you are right\"; follows commands, quiet phonation   Skin/extremity: No rashes, no erythema. No calf tenderness BLE  No edema                                                                            Functional Assessment:          Balance  Sitting - Static: Good (unsupported) (08/14/19 1200)  Sitting - Dynamic: Good (unsupported) (08/14/19 1200)  Standing - Static: Fair (08/14/19 1200)                     Darlene Johnson Fall Risk Assessment:  Darlene Johnson Fall Risk  Mobility: Ambulates or transfers with assist devices or assistance (08/14/19 0846)  Mobility Interventions: Utilize walker, cane, or other assistive device (08/13/19 1958)  Mentation: Periodic confusion (08/13/19 1958)  Mentation Interventions: Bed/chair exit alarm; Door open when patient unattended (08/13/19 1958)  Medication: Patient receiving anticonvulsants, sedatives(tranquilizers), psychotropics or hypnotics, hypoglycemics, narcotics, sleep aids, antihypertensives, laxatives, or diuretics (08/13/19 1958)  Medication Interventions: Patient to call before getting OOB (08/13/19 1958)  Elimination: Needs assistance with toileting (08/13/19 1958)  Elimination Interventions: Bed/chair exit alarm;Call light in reach (08/13/19 1958)  Prior Fall History: Before admission in past 12 months _home or previous inpatient care) (08/13/19 1958)  History of Falls Interventions: Bed/chair exit alarm; Door open when patient unattended (08/13/19 1958)  Total Score: 5 (08/13/19 1958)  High Fall Risk: Yes (08/13/19 1958)     Speech Assessment:         Ambulation:  Gait  Distance (ft): 150 Feet (ft) (08/14/19 1200)  Assistive Device: Other (comment);Gait belt(no A/D) (08/14/19 1200)     Labs/Studies:  Recent Results (from the past 72 hour(s))   MAGNESIUM    Collection Time: 08/12/19  6:51 AM   Result Value Ref Range    Magnesium 2.6 (H) 1.8 - 2.4 mg/dL   CBC W/O DIFF    Collection Time: 08/12/19  6:51 AM   Result Value Ref Range    WBC 6.9 4.3 - 11.1 K/uL    RBC 2.81 (L) 4.23 - 5.6 M/uL    HGB 8.6 (L) 13.6 - 17.2 g/dL    HCT 26.3 (L) 41.1 - 50.3 %    MCV 93.6 79.6 - 97.8 FL    MCH 30.6 26.1 - 32.9 PG    MCHC 32.7 31.4 - 35.0 g/dL    RDW 13.6 11.9 - 14.6 %    PLATELET 148 647 - 852 K/uL    MPV 10.8 9.4 - 12.3 FL    ABSOLUTE NRBC 0.00 0.0 - 0.2 K/uL   METABOLIC PANEL, BASIC    Collection Time: 08/12/19  6:51 AM   Result Value Ref Range    Sodium 142 136 - 145 mmol/L    Potassium 3.3 (L) 3.5 - 5.1 mmol/L    Chloride 111 (H) 98 - 107 mmol/L    CO2 25 21 - 32 mmol/L    Anion gap 6 (L) 7 - 16 mmol/L    Glucose 86 65 - 100 mg/dL    BUN 17 6 - 23 MG/DL    Creatinine 3.51 (H) 0.8 - 1.5 MG/DL    GFR est AA 24 (L) >60 ml/min/1.73m2    GFR est non-AA 20 (L) >60 ml/min/1.73m2    Calcium 8.5 8.3 - 10.4 MG/DL   MAGNESIUM    Collection Time: 08/13/19  3:47 AM   Result Value Ref Range    Magnesium 2.3 1.8 - 2.4 mg/dL   CBC W/O DIFF    Collection Time: 08/13/19  3:47 AM   Result Value Ref Range    WBC 6.9 4.3 - 11.1 K/uL    RBC 2.74 (L) 4.23 - 5.6 M/uL    HGB 8.5 (L) 13.6 - 17.2 g/dL    HCT 25.6 (L) 41.1 - 50.3 %    MCV 93.4 79.6 - 97.8 FL    MCH 31.0 26.1 - 32.9 PG    MCHC 33.2 31.4 - 35.0 g/dL    RDW 13.6 11.9 - 14.6 %    PLATELET 774 487 - 505 K/uL    MPV 10.7 9.4 - 12.3 FL    ABSOLUTE NRBC 0.00 0.0 - 0.2 K/uL   METABOLIC PANEL, BASIC    Collection Time: 08/13/19  3:47 AM   Result Value Ref Range    Sodium 142 136 - 145 mmol/L    Potassium 3.5 3.5 - 5.1 mmol/L    Chloride 110 (H) 98 - 107 mmol/L    CO2 26 21 - 32 mmol/L    Anion gap 6 (L) 7 - 16 mmol/L    Glucose 78 65 - 100 mg/dL    BUN 17 6 - 23 MG/DL    Creatinine 3.39 (H) 0.8 - 1.5 MG/DL    GFR est AA 25 (L) >60 ml/min/1.73m2    GFR est non-AA 21 (L) >60 ml/min/1.73m2    Calcium 8.5 8.3 - 10.4 MG/DL   URINALYSIS W/ RFLX MICROSCOPIC    Collection Time: 08/13/19  1:42 PM   Result Value Ref Range    Color YELLOW      Appearance CLEAR      Specific gravity 1.009 1.001 - 1.023      pH (UA) 5.5 5.0 - 9.0      Protein 100 (A) NEG mg/dL    Glucose NEGATIVE  mg/dL    Ketone NEGATIVE  NEG mg/dL    Bilirubin NEGATIVE  NEG      Blood NEGATIVE  NEG      Urobilinogen 0.2 0.2 - 1.0 EU/dL    Nitrites NEGATIVE  NEG      Leukocyte Esterase NEGATIVE  NEG      WBC 0-3 0 /hpf    RBC 0-3 0 /hpf    Epithelial cells 0-3 0 /hpf    Bacteria 0 0 /hpf    Casts 0-3 0 /lpf   CBC WITH AUTOMATED DIFF    Collection Time: 08/14/19  5:25 AM   Result Value Ref Range    WBC 6.8 4.3 - 11.1 K/uL    RBC 2.91 (L) 4.23 - 5.6 M/uL    HGB 8.7 (L) 13.6 - 17.2 g/dL    HCT 27.2 (L) 41.1 - 50.3 %    MCV 93.5 79.6 - 97.8 FL    MCH 29.9 26.1 - 32.9 PG    MCHC 32.0 31.4 - 35.0 g/dL    RDW 13.7 11.9 - 14.6 %    PLATELET 954 151 - 307 K/uL    MPV 10.9 9.4 - 12.3 FL    ABSOLUTE NRBC 0.00 0.0 - 0.2 K/uL    DF AUTOMATED      NEUTROPHILS 60 43 - 78 %    LYMPHOCYTES 26 13 - 44 %    MONOCYTES 10 4.0 - 12.0 %    EOSINOPHILS 3 0.5 - 7.8 %    BASOPHILS 1 0.0 - 2.0 %    IMMATURE GRANULOCYTES 0 0.0 - 5.0 %    ABS. NEUTROPHILS 4.0 1.7 - 8.2 K/UL    ABS. LYMPHOCYTES 1.8 0.5 - 4.6 K/UL    ABS. MONOCYTES 0.7 0.1 - 1.3 K/UL    ABS. EOSINOPHILS 0.2 0.0 - 0.8 K/UL    ABS. BASOPHILS 0.1 0.0 - 0.2 K/UL    ABS. IMM. GRANS. 0.0 0.0 - 0.5 K/UL   METABOLIC PANEL, COMPREHENSIVE    Collection Time: 08/14/19  5:25 AM   Result Value Ref Range    Sodium 144 136 - 145 mmol/L    Potassium 3.5 3.5 - 5.1 mmol/L    Chloride 110 (H) 98 - 107 mmol/L    CO2 29 21 - 32 mmol/L    Anion gap 5 (L) 7 - 16 mmol/L    Glucose 79 65 - 100 mg/dL    BUN 18 6 - 23 MG/DL    Creatinine 3.52 (H) 0.8 - 1.5 MG/DL    GFR est AA 24 (L) >60 ml/min/1.73m2    GFR est non-AA 20 (L) >60 ml/min/1.73m2    Calcium 8.8 8.3 - 10.4 MG/DL    Bilirubin, total 0.2 0.2 - 1.1 MG/DL    ALT (SGPT) 12 12 - 65 U/L    AST (SGOT) 7 (L) 15 - 37 U/L    Alk.  phosphatase 60 50 - 136 U/L    Protein, total 5.3 (L) 6.3 - 8.2 g/dL    Albumin 2.4 (L) 3.5 - 5.0 g/dL    Globulin 2.9 2.3 - 3.5 g/dL    A-G Ratio 0.8 (L) 1.2 - 3.5     MAGNESIUM    Collection Time: 08/14/19  5:25 AM   Result Value Ref Range    Magnesium 2.3 1.8 - 2.4 mg/dL   GLUCOSE, POC    Collection Time: 08/14/19  7:15 AM   Result Value Ref Range    Glucose (POC) 99 65 - 100 mg/dL       Assessment:     Problem List as of 8/14/2019 Date Reviewed: 7/10/2019          Codes Class Noted - Resolved    Posterior reversible encephalopathy syndrome ICD-10-CM: I67.83  ICD-9-CM: 348.39  8/8/2019 - Present        Seizure cerebral ICD-10-CM: I67.89  ICD-9-CM: 436  8/7/2019 - Present        SAH (subarachnoid hemorrhage) ([email or web address] ICD-10-CM: I60.9  ICD-9-CM: 430  8/5/2019 - Present        Pneumonia due to methicillin susceptible Staphylococcus aureus (MSSA) ([facility] [email or web address] ICD-10-CM: J08.116  ICD-9-CM: 482.41  7/15/2019 - Present        Tobacco abuse ICD-10-CM: Z72.0  ICD-9-CM: 305.1  7/10/2019 - Present        Acute respiratory failure with hypoxia [facility]) ICD-10-CM: J96.01  ICD-9-CM: 518.81  7/10/2019 - Present        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  ICD-9-CM: 331.3  7/10/2019 - Present        IVH (intraventricular hemorrhage) (HCC) ICD-10-CM: I61.5  ICD-9-CM: 707  7/10/2019 - Present        Seizure ([facility] ICD-10-CM: R56.9  ICD-9-CM: 780.39  7/10/2019 - Present        Elevated serum creatinine ICD-10-CM: R79.89  ICD-9-CM: 790.99  7/10/2019 - Present        Cerebral vasospasm ICD-10-CM: I67.848  ICD-9-CM: 435.9  7/10/2019 - Present        RESOLVED: Endotracheally intubated ICD-10-CM: Z97.8  ICD-9-CM: V49.89  7/10/2019 - 7/10/2019        RESOLVED: SAH (subarachnoid hemorrhage) ([facility] ICD-10-CM: I60.9  ICD-9-CM: 720  7/9/2019 - 7/10/2019              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 Statin; LDL 67.6 ; adequate     Stimulation/ depression ; flat affect, improved with Provigil. Increase celexa. Psych consult     Hypokalemia; improved with supplement; monitor; 3.5 8/14     Cont magnesium to prevent vasospasm. Daily labs; Mg 2.3 cont suppl. Will change to [facility] labs     DVT risk / DVT Prophylaxis- Will require daily physician exam to assess for signs and symptoms as patient is at increased risk for of thromboembolism. Mobilization as tolerated. Intermittent pneumatic compression devices when in bed Thigh-high or knee-high thromboembolic deterrent hose when out of bed. Heparin 5000 units subcut q 8hrs      sz d/o; controlled with increased Keppra 1000mg bid;sz precautions     Pain Control: using NSAID medication regularly, daily. Will require regular pain assessment and comprenhensive pain management,      Wound Care: Monitor wound status daily per staff and physician. At risk for failure. Will require 24/7 rehab nursing.  Keep wound clean and dry and dc scalp sutures      Hypertension - BP uncontrolled, fluctuating, managed medically. No bp goals at this time. Prolonged HTN for management of SAH to prevent vasospasm contributed to development of PRES. F/u scan improved; now on hydralazine and metoprolol; add parameters. Would like to keep systolic less than 451; monitor symptoms and adjust as needed. Avoid hypotension  -8/14 155/71 range     Malnutrition; needs supplement; jayce ct. Significant wt loss. Check alb; 2.4     Proteinuria; monitor     NING, likely CKD; creat 3.39; slt improvement. BUN 17 improved; renal US 8/6; Echogenic kidneys suggesting chronic renal disease.  No evidence of  Obstruction. 3.52 this a.m 8/14; need strict I/Os     Anemia likely due to CKD; recheck in a.m. Check stool.  -8/14 hgb 8.7 slt impr (8.5)     Urinary retention/ neurogenic bladder - schedule voids q6-8 hrs. Check post-void residual as needed; In and Out catheterize if post-void residual is more than 400 cc.     bowel program - add stool softeners and prn bowel program     GERD - resume PPI. At times may need additional antacids, Maalox prn.     Tobacco abuse; stress cessation. Cont nicoderm patch     Hydrocephalus, resolved s/p VPS      Time spent was 25 minutes with over 1/2 in direct patient care/examination, consultation and coordination of care.      Signed By: Seth Christensen MD     August 14, 2019

## 2019-08-14 NOTE — PROGRESS NOTES
PSYCHOLOGY PROGRESS NOTE       Name: Yelena Topete YOB: 1975  MRN: [id number]    Date of Service: 2019  Location of Service: /    Type of Service:  Patient unavailable for service  Duration:  5 minutes  Primary Diagnosis:   1. Seizure ([facility]    2. Posterior reversible encephalopathy syndrome    3. Seizure cerebral    4. Tobacco abuse    5. 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 Acute respiratory failure with hypoxia (HCC)    7. Cerebral vasospasm    8. Communicating hydrocephalus    9. Elevated serum creatinine    10. IVH (intraventricular hemorrhage) (HCC)    11. Pneumonia of left lower lobe due to methicillin susceptible Staphylococcus aureus (MSSA) ([facility]        Summary of Service:  Psychology consult received on 19. Attempted to evaluate however patient was unavailable and occupied by another discipline. Will re-attempt. Electronically Signed By:  Arely Conn Psy.D.   [address] [facility]

## 2019-08-14 NOTE — PROGRESS NOTES
Problem: Falls - Risk of  Goal: *Absence of Falls  Description  Document Flavia Girt Fall Risk and appropriate interventions in the flowsheet.   Outcome: Progressing Towards Goal  Note:   Fall Risk Interventions:  Mobility Interventions: Utilize walker, cane, or other assistive device    Mentation Interventions: Bed/chair exit alarm, Door open when patient unattended    Medication Interventions: Patient to call before getting OOB    Elimination Interventions: Bed/chair exit alarm, Call light in reach    History of Falls Interventions: Bed/chair exit alarm, Door open when patient unattended         Problem: Patient Education: Go to Patient Education Activity  Goal: Patient/Family Education  Outcome: Progressing Towards Goal

## 2019-08-14 NOTE — PROGRESS NOTES
PHYSICAL THERAPY DAILY NOTE  Time In: 2432  Time Out: 1356  Patient Seen For: PM;Balance activities;Gait training; Therapeutic exercise;Transfer training    Subjective: \"Thank you for all your help. \"         Objective:     COGNITION Daily Assessment    Pt. More alert this PM, cont. W/ flat, depressed affect. Follows one step commands consistently. Initiated communication to address bathroom needs. Has difficulty identifying needs/wants when given choices (eg. Do you want to sit in the chair or lay in the bed?)        BED/MAT MOBILITY Daily Assessment    Rolling Right : 5 (Supervision)  Rolling Left : 5 (Supervision)  Supine to Sit : 5 (Supervision)  Sit to Supine : 5 (Supervision)       TRANSFERS Daily Assessment    Transfer Type: SPT without device  Transfer Assistance : 4 (Minimal assistance)  Sit to Stand Assistance: Minimal assistance  Car Transfers: Not tested       GAIT Daily Assessment   Worked on ambulating around obstacles on level surface w/o A/D w/ min A & VCs to increase step length when negotiating turns. Pt. tends to make turns wide w/ decreased use of UEs for balance. Amount of Assistance: 4 (Contact guard assistance)  Distance (ft): 150 Feet (ft)  Assistive Device: Gait belt;Walker, rolling       STEPS or STAIRS Daily Assessment    Steps/Stairs Ambulated (#): 0  Level of Assist : 0 (Not tested)       BALANCE Daily Assessment   Worked on standing reach & retrieval task standing on balance pad. Pt. Able to reach fully out of NUSRAT w/o LOB, performed both single UE & B UE tasks. Pt. Stands for bathroom needs w/ CGA for increased A/P sway.  Sitting - Static: Good (unsupported)  Sitting - Dynamic: Good (unsupported)  Standing - Static: Fair  Standing - Dynamic : Impaired       WHEELCHAIR MOBILITY Daily Assessment    Able to Propel (ft): 0 feet  Curbs/Ramps Assist Required (FIM Score): 0 (Not tested)  Wheelchair Setup Assist Required : 0 (Not tested)       LOWER EXTREMITY EXERCISES Daily Assessment Pt. Performed NuStep @ resistance level 1 x10 minutes to increase strength & endurance B UEs/LEs     Vital Signs: BP (!) 155/91 (BP 1 Location: Left arm, BP Patient Position: At rest)   Pulse (!) 102   Temp 98.1 °F (36.7 °C)   Resp 16   Wt 68.9 kg (152 lb)   SpO2 98%   BMI 20.61 kg/m²     Pain level: no c/o pain this session    Patient education: pt. Educated on the benefits of therapy to improve mobility so he may return home    Interdisciplinary Communication: MD made aware of pt's poor oral intake @ meals; Discussed w/ RN & OT    Pt. Left supine in NAD, call bell in reach, family @ bedside, Posey activated           Assessment: Pt. W/ much improved activity tolerance this PM, requiring less rest breaks & able to keep eyes open. Reaching during standing task improved w/ pt. moving outside of NUSRAT w/o LOB. Will progress balance activities @ next visit. Gait cont. Unstable, and pt. Cont. To benefit from RW to improve gait quality. Will cont. To work on gait training w/ hopes of weaning A/D. Plan of Care: Continue with POC and progress as tolerated.      Carin Davis, PT  8/14/2019

## 2019-08-15 PROCEDURE — 97530 THERAPEUTIC ACTIVITIES: CPT

## 2019-08-15 PROCEDURE — 65310000000 HC RM PRIVATE REHAB

## 2019-08-15 PROCEDURE — 92507 TX SP LANG VOICE COMM INDIV: CPT

## 2019-08-15 PROCEDURE — 74011250637 HC RX REV CODE- 250/637: Performed by: PHYSICAL MEDICINE & REHABILITATION

## 2019-08-15 PROCEDURE — 97535 SELF CARE MNGMENT TRAINING: CPT

## 2019-08-15 PROCEDURE — 99232 SBSQ HOSP IP/OBS MODERATE 35: CPT | Performed by: PHYSICAL MEDICINE & REHABILITATION

## 2019-08-15 PROCEDURE — 82272 OCCULT BLD FECES 1-3 TESTS: CPT

## 2019-08-15 PROCEDURE — 97110 THERAPEUTIC EXERCISES: CPT

## 2019-08-15 PROCEDURE — 74011250636 HC RX REV CODE- 250/636: Performed by: PHYSICAL MEDICINE & REHABILITATION

## 2019-08-15 PROCEDURE — 97116 GAIT TRAINING THERAPY: CPT

## 2019-08-15 RX ADMIN — METOPROLOL TARTRATE 100 MG: 100 TABLET ORAL at 08:33

## 2019-08-15 RX ADMIN — HEPARIN SODIUM 5000 UNITS: 5000 INJECTION INTRAVENOUS; SUBCUTANEOUS at 13:59

## 2019-08-15 RX ADMIN — HYDRALAZINE HYDROCHLORIDE 25 MG: 25 TABLET, FILM COATED ORAL at 21:01

## 2019-08-15 RX ADMIN — LEVETIRACETAM 1000 MG: 500 TABLET, FILM COATED ORAL at 21:01

## 2019-08-15 RX ADMIN — METOPROLOL TARTRATE 100 MG: 100 TABLET ORAL at 17:21

## 2019-08-15 RX ADMIN — CLOPIDOGREL BISULFATE 75 MG: 75 TABLET ORAL at 08:32

## 2019-08-15 RX ADMIN — MEGESTROL ACETATE 200 MG: 40 SUSPENSION ORAL at 09:00

## 2019-08-15 RX ADMIN — MAGNESIUM GLUCONATE 500 MG ORAL TABLET 400 MG: 500 TABLET ORAL at 08:31

## 2019-08-15 RX ADMIN — HEPARIN SODIUM 5000 UNITS: 5000 INJECTION INTRAVENOUS; SUBCUTANEOUS at 05:47

## 2019-08-15 RX ADMIN — SODIUM CHLORIDE, PRESERVATIVE FREE 900 UNITS: 5 INJECTION INTRAVENOUS at 05:47

## 2019-08-15 RX ADMIN — SODIUM CHLORIDE, PRESERVATIVE FREE 900 UNITS: 5 INJECTION INTRAVENOUS at 21:01

## 2019-08-15 RX ADMIN — Medication 30 ML: at 05:47

## 2019-08-15 RX ADMIN — ACETAMINOPHEN 650 MG: 325 TABLET, FILM COATED ORAL at 23:58

## 2019-08-15 RX ADMIN — HYDRALAZINE HYDROCHLORIDE 25 MG: 25 TABLET, FILM COATED ORAL at 13:59

## 2019-08-15 RX ADMIN — SODIUM CHLORIDE, PRESERVATIVE FREE 900 UNITS: 5 INJECTION INTRAVENOUS at 14:09

## 2019-08-15 RX ADMIN — HYDRALAZINE HYDROCHLORIDE 25 MG: 25 TABLET, FILM COATED ORAL at 05:47

## 2019-08-15 RX ADMIN — MAGNESIUM GLUCONATE 500 MG ORAL TABLET 400 MG: 500 TABLET ORAL at 17:21

## 2019-08-15 RX ADMIN — CITALOPRAM HYDROBROMIDE 20 MG: 20 TABLET ORAL at 08:32

## 2019-08-15 RX ADMIN — TRAZODONE HYDROCHLORIDE 25 MG: 50 TABLET ORAL at 23:58

## 2019-08-15 RX ADMIN — SENNOSIDES, DOCUSATE SODIUM 1 TABLET: 50; 8.6 TABLET, FILM COATED ORAL at 08:32

## 2019-08-15 RX ADMIN — FAMOTIDINE 20 MG: 20 TABLET ORAL at 08:32

## 2019-08-15 RX ADMIN — ASPIRIN 325 MG ORAL TABLET 325 MG: 325 PILL ORAL at 08:32

## 2019-08-15 RX ADMIN — Medication 30 ML: at 21:02

## 2019-08-15 RX ADMIN — LEVETIRACETAM 1000 MG: 500 TABLET, FILM COATED ORAL at 08:31

## 2019-08-15 RX ADMIN — HEPARIN SODIUM 5000 UNITS: 5000 INJECTION INTRAVENOUS; SUBCUTANEOUS at 21:00

## 2019-08-15 RX ADMIN — MODAFINIL 200 MG: 100 TABLET ORAL at 09:16

## 2019-08-15 NOTE — PROGRESS NOTES
Nutrition:  Nutrition Consult for General Nutrition Management & Supplements. Calorie Count. Assessment:  Anthropometrics:  Ht - 6'0\", wgt - 68.9 kg (9th floor 8/13/19), BMI - 20.6 c/w acceptable weight, edema - none. Macronutrient Needs:  Estimated calorie needs - 2580-4745 jayce/day (25-28 jayce/kg/day)   Estimated protein needs - 69-83 gm pro/day (1-1.2 gm pro/kg/day) (GFR 23 ml/min)  Intake/Comparative Standards:  Calorie count results for 8/14 (renal diet) - 2 meals recorded: <100 calories. Pertinent Labs:   Sodium 144. Pertinent Medications:   Megace started today. Diet:   Renal diet changed to regular today. Food/Nutrition History:   37year old gentleman with a h/o tobacco abuse admitted with SAH from basilar artery aneurysm on 7/9/2019. Admission weight - 68.9 kg. He is s/p stent and coiling of his aneurysm on 7/10/19. He had a  shunt placed on 8/2/2019. While in ICU he was maintained on enteral nutrition support from 7/10/19 through 7/15 when he was extubated. He reports that he typically did not eat breakfast PTA since he worked 3rd shift and \"didn't have time for it. \"    Diagnosis (Nutrition): Inadequate oral intake related to decreased ability to consume sufficient oral intake as evidenced by disinterest in food, dislike of modified diet restricted diet and institutional food fatigue. Intervention:  Meals and Snacks: Diet was changed to regular today. The patient has a copy of the menu to use as reference to help with meal selections. Calorie count to continue through dinner 8/16/19. Medical Food Supplement Therapy: Commercial Beverage: Restart chocolate Ensure Enlive with all meals (350 calories, 20 gm protein per bottle). Nutrition-Related Medication Management: Megace has been started. Coordination of Nutrition Care by a Nutrition Professional: Collaboration with Dr. Talib Kong. Nutrition Discharge Plan: Too soon to determine. Nafisa Williamson  [phone]

## 2019-08-15 NOTE — PROGRESS NOTES
PHYSICAL THERAPY DAILY NOTE  Time In: 1435  Time Out: 0804  Patient Seen For: PM;Transfer training;Gait training; Therapeutic exercise; Other (see progress notes)    Subjective: Patient seemed to be in better spirits this afternoon. Objective: NO pain noted. GROSS ASSESSMENT Daily Assessment            COGNITION Daily Assessment           BED/MAT MOBILITY Daily Assessment    Supine to Sit : 5 (Supervision)  Sit to Supine : 5 (Supervision)       TRANSFERS Daily Assessment    Transfer Type: SPT without device  Transfer Assistance : 4 (Minimal assistance)  Sit to Stand Assistance: Minimal assistance  Car Transfers: Not tested       GAIT Daily Assessment    Amount of Assistance: 4 (Minimal assistance)  Distance (ft): 60 Feet (ft)  Assistive Device: Gait belt       STEPS or STAIRS Daily Assessment    Level of Assist : 0 (Not tested)       BALANCE Daily Assessment            WHEELCHAIR MOBILITY Daily Assessment            LOWER EXTREMITY EXERCISES Daily Assessment    Extremity: Both  Exercise Type #1: Other (comment)(nustep x 10 minutes)  Sets Performed: 1  Reps Performed: 10  Level of Assist: Supervision  Exercise Type #2: Seated lower extremity strengthening  Sets Performed: 2  Reps Performed: 10  Level of Assist: Contact guard assistance          Assessment: Patient moving well but unsteady balance with gait . Plan of Care: Continue with plan of care.     Fito Luna, PTA  8/15/2019

## 2019-08-15 NOTE — PROGRESS NOTES
Problem: Falls - Risk of  Goal: *Absence of Falls  Description  Document Rani Angeloa Fall Risk and appropriate interventions in the flowsheet.   Outcome: Progressing Towards Goal  Note:   Fall Risk Interventions:  Mobility Interventions: Utilize walker, cane, or other assistive device    Mentation Interventions: Bed/chair exit alarm, Door open when patient unattended    Medication Interventions: Bed/chair exit alarm, Patient to call before getting OOB    Elimination Interventions: Bed/chair exit alarm, Call light in reach    History of Falls Interventions: Bed/chair exit alarm, Door open when patient unattended         Problem: Patient Education: Go to Patient Education Activity  Goal: Patient/Family Education  Outcome: Progressing Towards Goal

## 2019-08-15 NOTE — PROGRESS NOTES
Bree Hogan MD,   Medical Director  [address] [facility], [address] [facility]  Tel: [phone]       SFD PROGRESS NOTE    [place]  Admit Date: 8/13/2019  Admit Diagnosis: SAH (subarachnoid hemorrhage) ([address] [I60.9]    Subjective     Has not been eating. Has lost significant wt. Wt recorded is 152lb, but OT weighed pt yesterday and he is actually 135lbs. Father stating a 41lb wt loss since beginning of hospital stay . Cesario Babinski says he just isnt hungry. I prepared his pancakes for him this am. And went down and got him Cheerios.      Objective:     Current Facility-Administered Medications   Medication Dose Route Frequency    megestrol (MEGACE) 400 mg/10 mL (10 mL) oral suspension 200 mg  200 mg Oral DAILY    ondansetron (ZOFRAN ODT) tablet 4 mg  4 mg Oral Q6H PRN    lactulose (CHRONULAC) 10 gram/15 mL solution 45 mL  30 g Oral DAILY PRN    traZODone (DESYREL) tablet 25 mg  25 mg Oral QHS PRN    HYDROcodone-acetaminophen (NORCO) 7.5-325 mg per tablet 1 Tab  1 Tab Oral Q4H PRN    ondansetron (ZOFRAN) injection 4 mg  4 mg IntraVENous Q4H PRN    heparin (porcine) injection 5,000 Units  5,000 Units SubCUTAneous Q8H    aspirin tablet 325 mg  325 mg Oral DAILY    clopidogrel (PLAVIX) tablet 75 mg  75 mg Oral DAILY    famotidine (PEPCID) tablet 20 mg  20 mg Oral DAILY    heparin (porcine) pf 300-900 Units  300-900 Units InterCATHeter PRN    heparin (porcine) pf 900 Units  900 Units InterCATHeter Q8H    hydrALAZINE (APRESOLINE) tablet 25 mg  25 mg Oral TID    levETIRAcetam (KEPPRA) tablet 1,000 mg  1,000 mg Oral BID    magnesium oxide (MAG-OX) tablet 400 mg  400 mg Oral BID    metoprolol tartrate (LOPRESSOR) tablet 100 mg  100 mg Oral BID    modafinil (PROVIGIL) tablet 200 mg  200 mg Oral DAILY    nicotine (NICODERM CQ) 14 mg/24 hr patch 1 Patch  1 Patch TransDERmal Q24H    SALINE PERIPHERAL FLUSH Q8H soln 10-30 mL  10-30 mL InterCATHeter Q8H    saline peripheral flush soln 10-30 mL  10-30 mL InterCATHeter PRN    acetaminophen (TYLENOL) tablet 650 mg  650 mg Oral Q6H PRN    citalopram (CELEXA) tablet 20 mg  20 mg Oral DAILY    senna-docusate (PERICOLACE) 8.6-50 mg per tablet 1 Tab  1 Tab Oral DAILY     Review of Systems:Denies chest pain, shortness of breath, cough, headache, visual problems, abdominal pain, dysurea, calf pain. Pertinent positives are as noted in the medical records and unremarkable otherwise. Visit Vitals  /80   Pulse 80   Temp 98.6 °F (37 °C)   Resp 12   Wt 152 lb (68.9 kg)   SpO2 97%   BMI 20.61 kg/m²        Physical Exam:   General: Alert and oriented to place, situation and self ; not time  No acute cardio respiratory distress. HEENT: Normocephalic,no scleral icterus  Oral mucosa moist without cyanosis   Lungs: Clear to auscultation  bilaterally. Respiration even and unlabored   Heart: Regular rate and rhythm, S1, S2   No  murmurs, clicks, rub or gallops   Abdomen: Soft, non-tender, nondistended. Bowel sounds present. No organomegaly. Genitourinary: defer   Neuromuscular:      Expressive and cogn deficits, attn fair. Affect flat. No visual impairments appreciated; nl scanning and tracking; prolonged processing; good naming and repetition  Generalized weakness   Skin/extremity: No rashes, no erythema.  No calf tenderness BLE  No edema                                                                            Functional Assessment:          Balance  Sitting - Static: Good (unsupported) (08/14/19 1400)  Sitting - Dynamic: Good (unsupported) (08/14/19 1400)  Standing - Static: Fair (08/14/19 1400)  Standing - Dynamic : Impaired (08/14/19 1400)                     Keith Cecelia Fall Risk Assessment:  Keith Cecelia Fall Risk  Mobility: Ambulates or transfers with assist devices or assistance (08/14/19 2045)  Mobility Interventions: Utilize walker, cane, or other assistive device (08/14/19 2045)  Mentation: Periodic confusion (08/14/19 2045)  Mentation Interventions: Bed/chair exit alarm; Door open when patient unattended (08/14/19 2045)  Medication: Patient receiving anticonvulsants, sedatives(tranquilizers), psychotropics or hypnotics, hypoglycemics, narcotics, sleep aids, antihypertensives, laxatives, or diuretics (08/14/19 2045)  Medication Interventions: Bed/chair exit alarm; Patient to call before getting OOB (08/14/19 2045)  Elimination: Needs assistance with toileting (08/14/19 2045)  Elimination Interventions: Bed/chair exit alarm;Call light in reach (08/14/19 2045)  Prior Fall History: Before admission in past 12 months _home or previous inpatient care) (08/14/19 2045)  History of Falls Interventions: Bed/chair exit alarm; Door open when patient unattended (08/14/19 2045)  Total Score: 5 (08/14/19 2045)  High Fall Risk: Yes (08/14/19 2045)     Speech Assessment:         Ambulation:  Gait  Distance (ft): 150 Feet (ft) (08/14/19 1400)  Assistive Device: Gait belt;Walker, rolling (08/14/19 1400)     Labs/Studies:  Recent Results (from the past 72 hour(s))   MAGNESIUM    Collection Time: 08/13/19  3:47 AM   Result Value Ref Range    Magnesium 2.3 1.8 - 2.4 mg/dL   CBC W/O DIFF    Collection Time: 08/13/19  3:47 AM   Result Value Ref Range    WBC 6.9 4.3 - 11.1 K/uL    RBC 2.74 (L) 4.23 - 5.6 M/uL    HGB 8.5 (L) 13.6 - 17.2 g/dL    HCT 25.6 (L) 41.1 - 50.3 %    MCV 93.4 79.6 - 97.8 FL    MCH 31.0 26.1 - 32.9 PG    MCHC 33.2 31.4 - 35.0 g/dL    RDW 13.6 11.9 - 14.6 %    PLATELET 590 349 - 649 K/uL    MPV 10.7 9.4 - 12.3 FL    ABSOLUTE NRBC 0.00 0.0 - 0.2 K/uL   METABOLIC PANEL, BASIC    Collection Time: 08/13/19  3:47 AM   Result Value Ref Range    Sodium 142 136 - 145 mmol/L    Potassium 3.5 3.5 - 5.1 mmol/L    Chloride 110 (H) 98 - 107 mmol/L    CO2 26 21 - 32 mmol/L    Anion gap 6 (L) 7 - 16 mmol/L    Glucose 78 65 - 100 mg/dL    BUN 17 6 - 23 MG/DL    Creatinine 3.39 (H) 0.8 - 1.5 MG/DL    GFR est AA 25 (L) >60 ml/min/1.73m2    GFR est non-AA 21 (L) >60 ml/min/1.73m2    Calcium 8.5 8.3 - 10.4 MG/DL   URINALYSIS W/ RFLX MICROSCOPIC    Collection Time: 08/13/19  1:42 PM   Result Value Ref Range    Color YELLOW      Appearance CLEAR      Specific gravity 1.009 1.001 - 1.023      pH (UA) 5.5 5.0 - 9.0      Protein 100 (A) NEG mg/dL    Glucose NEGATIVE  mg/dL    Ketone NEGATIVE  NEG mg/dL    Bilirubin NEGATIVE  NEG      Blood NEGATIVE  NEG      Urobilinogen 0.2 0.2 - 1.0 EU/dL    Nitrites NEGATIVE  NEG      Leukocyte Esterase NEGATIVE  NEG      WBC 0-3 0 /hpf    RBC 0-3 0 /hpf    Epithelial cells 0-3 0 /hpf    Bacteria 0 0 /hpf    Casts 0-3 0 /lpf   CBC WITH AUTOMATED DIFF    Collection Time: 08/14/19  5:25 AM   Result Value Ref Range    WBC 6.8 4.3 - 11.1 K/uL    RBC 2.91 (L) 4.23 - 5.6 M/uL    HGB 8.7 (L) 13.6 - 17.2 g/dL    HCT 27.2 (L) 41.1 - 50.3 %    MCV 93.5 79.6 - 97.8 FL    MCH 29.9 26.1 - 32.9 PG    MCHC 32.0 31.4 - 35.0 g/dL    RDW 13.7 11.9 - 14.6 %    PLATELET 053 299 - 928 K/uL    MPV 10.9 9.4 - 12.3 FL    ABSOLUTE NRBC 0.00 0.0 - 0.2 K/uL    DF AUTOMATED      NEUTROPHILS 60 43 - 78 %    LYMPHOCYTES 26 13 - 44 %    MONOCYTES 10 4.0 - 12.0 %    EOSINOPHILS 3 0.5 - 7.8 %    BASOPHILS 1 0.0 - 2.0 %    IMMATURE GRANULOCYTES 0 0.0 - 5.0 %    ABS. NEUTROPHILS 4.0 1.7 - 8.2 K/UL    ABS. LYMPHOCYTES 1.8 0.5 - 4.6 K/UL    ABS. MONOCYTES 0.7 0.1 - 1.3 K/UL    ABS. EOSINOPHILS 0.2 0.0 - 0.8 K/UL    ABS. BASOPHILS 0.1 0.0 - 0.2 K/UL    ABS. IMM.  GRANS. 0.0 0.0 - 0.5 K/UL   METABOLIC PANEL, COMPREHENSIVE    Collection Time: 08/14/19  5:25 AM   Result Value Ref Range    Sodium 144 136 - 145 mmol/L    Potassium 3.5 3.5 - 5.1 mmol/L    Chloride 110 (H) 98 - 107 mmol/L    CO2 29 21 - 32 mmol/L    Anion gap 5 (L) 7 - 16 mmol/L    Glucose 79 65 - 100 mg/dL    BUN 18 6 - 23 MG/DL    Creatinine 3.52 (H) 0.8 - 1.5 MG/DL    GFR est AA 24 (L) >60 ml/min/1.73m2    GFR est non-AA 20 (L) >60 ml/min/1.73m2    Calcium 8.8 8.3 - 10.4 MG/DL    Bilirubin, total 0.2 0.2 - 1.1 MG/DL    ALT (SGPT) 12 12 - 65 U/L    AST (SGOT) 7 (L) 15 - 37 U/L    Alk.  phosphatase 60 50 - 136 U/L    Protein, total 5.3 (L) 6.3 - 8.2 g/dL    Albumin 2.4 (L) 3.5 - 5.0 g/dL    Globulin 2.9 2.3 - 3.5 g/dL    A-G Ratio 0.8 (L) 1.2 - 3.5     MAGNESIUM    Collection Time: 08/14/19  5:25 AM   Result Value Ref Range    Magnesium 2.3 1.8 - 2.4 mg/dL   GLUCOSE, POC    Collection Time: 08/14/19  7:15 AM   Result Value Ref Range    Glucose (POC) 99 65 - 100 mg/dL       Assessment:     Problem List as of 8/15/2019 Date Reviewed: 7/10/2019          Codes Class Noted - Resolved    Posterior reversible encephalopathy syndrome ICD-10-CM: I67.83  ICD-9-CM: 348.39  8/8/2019 - Present        Seizure cerebral ICD-10-CM: I67.89  ICD-9-CM: 343  8/7/2019 - Present        SAH (subarachnoid hemorrhage) ([facility] ICD-10-CM: I60.9  ICD-9-CM: 430  8/5/2019 - Present        Pneumonia due to methicillin susceptible Staphylococcus aureus (MSSA) ([facility] ICD-10-CM: J15.211  ICD-9-CM: 482.41  7/15/2019 - Present        Tobacco abuse ICD-10-CM: Z72.0  ICD-9-CM: 305.1  7/10/2019 - Present        Acute respiratory failure with hypoxia (HCC) ICD-10-CM: J96.01  ICD-9-CM: 518.81  7/10/2019 - Present        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  ICD-9-CM: 331.3  7/10/2019 - Present        IVH (intraventricular hemorrhage) ([facility] ICD-10-CM: I61.5  ICD-9-CM: 011  7/10/2019 - Present        Seizure ([facility] ICD-10-CM: R56.9  ICD-9-CM: 780.39  7/10/2019 - Present        Elevated serum creatinine ICD-10-CM: R79.89  ICD-9-CM: 790.99  7/10/2019 - Present        Cerebral vasospasm ICD-10-CM: I67.848  ICD-9-CM: 435.9  7/10/2019 - Present        RESOLVED: Endotracheally intubated ICD-10-CM: Z97.8  ICD-9-CM: V49.89  7/10/2019 - 7/10/2019        RESOLVED: SAH (subarachnoid hemorrhage) ([facility] ICD-10-CM: I60.9  ICD-9-CM: 430  7/9/2019 - 7/10/2019 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 Statin; LDL 67.6 ; adequate     Stimulation/ depression ; flat affect, improved with Provigil. Increase celexa. Psych consult     Hypokalemia; improved with supplement; monitor; 3.5 8/14     Cont magnesium to prevent vasospasm. Daily labs; Mg 2.3 cont suppl. Will change to [facility] labs     DVT risk / DVT Prophylaxis- Will require daily physician exam to assess for signs and symptoms as patient is at increased risk for of thromboembolism. Mobilization as tolerated. Intermittent pneumatic compression devices when in bed Thigh-high or knee-high thromboembolic deterrent hose when out of bed. Heparin 5000 units subcut q 8hrs      sz d/o; controlled with increased Keppra 1000mg bid;sz precautions     Pain Control: using NSAID medication regularly, daily. Will require regular pain assessment and comprenhensive pain management,      Wound Care: Monitor wound status daily per staff and physician. At risk for failure. Will require 24/7 rehab nursing. Keep wound clean and dry and dc scalp sutures      Hypertension - BP uncontrolled, fluctuating, managed medically. No bp goals at this time. Prolonged HTN for management of SAH to prevent vasospasm contributed to development of PRES. F/u scan improved; now on hydralazine and metoprolol; add parameters. Would like to keep systolic less than 831; monitor symptoms and adjust as needed. Avoid hypotension  -8/14 130s- 140s / 70-80s range     Malnutrition; needs supplement; jayce ct. Significant wt loss.  Check alb; 2.4  -8/15 added Megace, approx 40 lb wt loss during hospitalization; need to document accurate wt; will change to a regular diet from renal to inc food choices to include things he might eat.      Proteinuria; monitor     NING, likely CKD; creat 3.39; slt improvement. BUN 17 improved; renal US 8/6; Echogenic kidneys suggesting chronic renal disease.  No evidence of  Obstruction. 3.52 this a.m 8/14; need strict I/Os     Anemia likely due to CKD; recheck in a.m. Check stool.  -8/14 hgb 8.7 slt impr (8.5)     Urinary retention/ neurogenic bladder - schedule voids q6-8 hrs. Check post-void residual as needed; In and Out catheterize if post-void residual is more than 400 cc.     bowel program - add stool softeners and prn bowel program     GERD - resume PPI. At times may need additional antacids, Maalox prn.     Tobacco abuse; stress cessation. Cont nicoderm patch     Hydrocephalus, resolved s/p VPS      Time spent was 25 minutes with over 1/2 in direct patient care/examination, consultation and coordination of care.      Signed By: Ana Morrison MD     August 15, 2019

## 2019-08-15 NOTE — PROGRESS NOTES
Interdisciplinary Conference Notes    Interdisciplinary conference completed to discuss plan of care.       Estimated D/C Date: Deferred    Recommended Equipment: 4 Wheeled Walker    Recommended Follow-Up Therapy:  PT and OT outpatient    Communication with family/caregivers: Called and left message for father Catrina High to return call to set up family training and give weekly update

## 2019-08-15 NOTE — PROGRESS NOTES
08/15/19 1207   Time Spent With Patient   Time In 0830   Time Out 0915   Patient Seen For: AM;ADLs   Upper Body Bathing   Bathing Assistance, Upper SBA   Position Performed Seated in chair   Lower Body Bathing   Bathing Assistance, Lower  CGA   Position Performed Seated in chair;Standing   Comments CGA when standing; verbal cueing for safety    Toileting   Toileting Assistance (FIM Score) Min A   Upper Body Dressing    Dressing Assistance  S   Lower Body Dressing    Dressing Assistance  Mod A   Leg Crossed Method Used No   Position Performed Seated edge of bed;Standing   Comments assist with B socks; declined to attempt, reporting nausea    Functional Transfers   Toilet Transfer  Stand pivot transfer with walker   Amount of Assistance Required CGA   Tub or Shower Type Shower   Amount of Assistance Required CGA   Adaptive Equipment Grab bars; Walker (comment); Tub transfer bench   S: \"I'm tired. \"   O: Patient presented sidelying in bed sleeping. Agreeable to treatment with encouragement. Patient completed ADL; see above for details. Required moderate cueing for safety during mobility and standing tasks. Patient continues to attempt to stand on one leg to dry legs, requiring maximum cueing for safer technique of sitting to dry legs. Patient declined to attempt socks, stating he felt nauseated and requiring sidelying rest break.    Shoes: 7: Patient refused  Socks: 1: Dependent  Orientation: alert and oriented x 1-2  Expression: able to express needs verbally but with increased time, maximum cueing for volume   Comprehension: understands basic instructions 75% of the time, moderate to maximum assist with complex instructions  Social Interaction: flat affect, requiring much encouragement to participate   Problem Solving: solves routine problems <50% of the time, max assist with complex problems   Memory: short and long term memory impaired, able to complete 1/3 step commands   A: Impaired initiation and impaired motivation remain primary barriers. Patient tolerated session well with no complaint of pain. P: Continue OT POC with focus on ADL/IADL skills, initiation, cognition, functional transfers, functional mobility, coordination, strength, static and dynamic balance, and activity tolerance to maximize safety and independence with ADLs and functional transfers. Patient was left sidelying in bed with call bell/all other needs in reach. Bed alarm activated. Interdisciplinary communication: Collaborated with nurse Blanca Monteiro regarding putting nicotine patch on after shower instead of before.      Jayne Chappell, MS, OTR/L  8/15/2019          Note may contain the following abbreviations:   Abbreviation Explanation   AROM Active range of motion   PROM Passive range of motion   SPT Stand pivot transfer   LPT Lateral pivot transfer   WC wheelchair   RW Rolling walker    BUE Bilateral upper extremities   BLE Bilateral lower extremities    WBAT Weight bearing as tolerated    TTWB Toe-touch weight bearing    AD Adaptive device   AE Adaptive equipment    NMES Neuro muscular electrical stimulation   UBE Upper body ergometer

## 2019-08-15 NOTE — PROGRESS NOTES
08/15/19 1255   Time Spent With Patient   Time In [id number]   Time Out 0957   Patient Seen For: AM;Neuro-linguistics   Mental Status   Neurologic State Alert   Orientation Level Oriented to person;Disoriented to time;Oriented to place   Cognition Decreased command following;Decreased attention/concentration; Follows commands; Impaired decision making;Memory loss   Perception Appears intact   Perseveration No perseveration noted   Safety/Judgement Fall prevention   Pt seen in room. Pt alert, cooperative, and maintained good eye contact. Pt answered orientation questions with orientation to person and place. Pt provided with written questions and answers to increase his orientation. Pt read sheet and answered questions with 80% acc and mod cues. Pt encouraged to review sheet 2-3x a day until the information is easily recalled. Pt later recalled this when this information was provided to his father. Both Pt and father verbalized understanding of this material and its benefit to increasing Pt's orientation, attention, and STM. On the Assessment of Language-Related Functional Activities, Pt achieved a 10/10 on Telling Time and Addressing an Envelope with 9/10 acc, small script, and poor-average legibility. Pt would benefit from continued ST to address his cognitive-linguistic deficits (orientation, attention, thought organization, word finding, problem solving, reasoning).   Yuliya Barahona, CCC-SLP

## 2019-08-15 NOTE — PROGRESS NOTES
08/15/19 1250   Time Spent With Patient   Time In 1116   Time Out 1203   Patient Seen For: AM;Other (see progress notes); ADLs   Grooming   Grooming Assistance  SBA   Comments setup assist to brush teeth; encouragement to attempt task    S: No complaints. More animated and talking more as session progressed. Thanked therapist for assistance. O: Patient presented up in [facility] following PT. Agreeable to treatment. Patient completed grooming after encouragement; see above for details. Patient completed simulated medication management task with moderate to maximum cueing for following verbal and written instructions. Patient completed 3/5 medications correctly. Patient asked questions appropriately during task but asked the same questions about two separate medications twice, with no recall of having the conversation with therapist about either medication. Transferred WC to recliner using SPT without AE with CGA. Patient's father present at end of session in room. A: Recommend assistance with medication management at discharge. Anticipate impaired cognition, especially memory, will be patient's primary barrier. Patient tolerated session well with no complaint of pain. P: Continue OT POC with focus on ADL/IADL skills, cognition, safety awareness, functional transfers, functional mobility, coordination, strength, static and dynamic balance, and activity tolerance to maximize safety and independence with ADLs and functional transfers. Patient was left seated up in recliner with call bell/all other needs in reach. Chair alarm activated. Interdisciplinary communication: Collaborated with PT and confirmed that patient is on track to meet goals.      Monica Love MS, OTR/L  8/15/2019          Note may contain the following abbreviations:   Abbreviation Explanation   AROM Active range of motion   PROM Passive range of motion   SPT Stand pivot transfer   LPT Lateral pivot transfer   WC wheelchair   RW Rolling walker BUE Bilateral upper extremities   BLE Bilateral lower extremities    WBAT Weight bearing as tolerated    TTWB Toe-touch weight bearing    AD Adaptive device   AE Adaptive equipment    NMES Neuro muscular electrical stimulation   UBE Upper body ergometer

## 2019-08-15 NOTE — PROGRESS NOTES
PHYSICAL THERAPY DAILY NOTE  Time In: 1030  Time Out: 1116  Patient Seen For: AM;Transfer training;Gait training; Therapeutic exercise; Other (see progress notes)    Subjective: \" I dont feel good. \"         Objective: No pain noted. GROSS ASSESSMENT Daily Assessment            COGNITION Daily Assessment           BED/MAT MOBILITY Daily Assessment    Supine to Sit : 5 (Supervision)  Sit to Supine : 5 (Supervision)       TRANSFERS Daily Assessment    Transfer Type: SPT without device  Transfer Assistance : 4 (Minimal assistance)  Sit to Stand Assistance: Minimal assistance  Car Transfers: Not tested       GAIT Daily Assessment    Amount of Assistance: 3 (Contact guard assistance)  Distance (ft): 60 Feet (ft)  Assistive Device:        STEPS or STAIRS Daily Assessment    Level of Assist : 0 (Not tested)       BALANCE Daily Assessment            WHEELCHAIR MOBILITY Daily Assessment            LOWER EXTREMITY EXERCISES Daily Assessment    Extremity: Both  Exercise Type #1: Other (comment)(motomed x 10 minutes)  Sets Performed: 1  Reps Performed: 10  Level of Assist: Supervision  Exercise Type #2: Seated lower extremity strengthening  Sets Performed: 2  Reps Performed: 10  Level of Assist: Contact guard assistance          Assessment: Patient requires a lot of motivation for therapy. Plan of Care: Continue with plan of care.     Daquan James, JOSE J  8/15/2019

## 2019-08-16 LAB
ANION GAP SERPL CALC-SCNC: 6 MMOL/L (ref 7–16)
BUN SERPL-MCNC: 24 MG/DL (ref 6–23)
CALCIUM SERPL-MCNC: 8.8 MG/DL (ref 8.3–10.4)
CHLORIDE SERPL-SCNC: 105 MMOL/L (ref 98–107)
CO2 SERPL-SCNC: 32 MMOL/L (ref 21–32)
CREAT SERPL-MCNC: 3.74 MG/DL (ref 0.8–1.5)
GLUCOSE SERPL-MCNC: 80 MG/DL (ref 65–100)
HCT VFR BLD AUTO: 28.9 % (ref 41.1–50.3)
HEMOCCULT STL QL: NEGATIVE
HGB BLD-MCNC: 9.3 G/DL (ref 13.6–17.2)
MAGNESIUM SERPL-MCNC: 2.5 MG/DL (ref 1.8–2.4)
POTASSIUM SERPL-SCNC: 3.7 MMOL/L (ref 3.5–5.1)
SODIUM SERPL-SCNC: 143 MMOL/L (ref 136–145)

## 2019-08-16 PROCEDURE — 99233 SBSQ HOSP IP/OBS HIGH 50: CPT | Performed by: PHYSICAL MEDICINE & REHABILITATION

## 2019-08-16 PROCEDURE — 83735 ASSAY OF MAGNESIUM: CPT

## 2019-08-16 PROCEDURE — 97116 GAIT TRAINING THERAPY: CPT

## 2019-08-16 PROCEDURE — 77030008031

## 2019-08-16 PROCEDURE — 97530 THERAPEUTIC ACTIVITIES: CPT

## 2019-08-16 PROCEDURE — 74011250636 HC RX REV CODE- 250/636: Performed by: PHYSICAL MEDICINE & REHABILITATION

## 2019-08-16 PROCEDURE — 92507 TX SP LANG VOICE COMM INDIV: CPT

## 2019-08-16 PROCEDURE — 80048 BASIC METABOLIC PNL TOTAL CA: CPT

## 2019-08-16 PROCEDURE — 85018 HEMOGLOBIN: CPT

## 2019-08-16 PROCEDURE — 74011250637 HC RX REV CODE- 250/637: Performed by: PHYSICAL MEDICINE & REHABILITATION

## 2019-08-16 PROCEDURE — 65310000000 HC RM PRIVATE REHAB

## 2019-08-16 PROCEDURE — 97110 THERAPEUTIC EXERCISES: CPT

## 2019-08-16 PROCEDURE — 77030020257 HC SOL INJ SOD CL 0.45% 1000ML BG

## 2019-08-16 PROCEDURE — 74011000258 HC RX REV CODE- 258: Performed by: PHYSICAL MEDICINE & REHABILITATION

## 2019-08-16 RX ORDER — SODIUM CHLORIDE 450 MG/100ML
125 INJECTION, SOLUTION INTRAVENOUS CONTINUOUS
Status: DISPENSED | OUTPATIENT
Start: 2019-08-16 | End: 2019-08-17

## 2019-08-16 RX ORDER — SODIUM CHLORIDE 450 MG/100ML
125 INJECTION, SOLUTION INTRAVENOUS CONTINUOUS
Status: DISPENSED | OUTPATIENT
Start: 2019-08-16 | End: 2019-08-16

## 2019-08-16 RX ADMIN — SODIUM CHLORIDE, PRESERVATIVE FREE 600 UNITS: 5 INJECTION INTRAVENOUS at 21:21

## 2019-08-16 RX ADMIN — LEVETIRACETAM 1000 MG: 500 TABLET, FILM COATED ORAL at 08:36

## 2019-08-16 RX ADMIN — SODIUM CHLORIDE 125 ML/HR: 450 INJECTION, SOLUTION INTRAVENOUS at 16:51

## 2019-08-16 RX ADMIN — SODIUM CHLORIDE 125 ML/HR: 450 INJECTION, SOLUTION INTRAVENOUS at 09:29

## 2019-08-16 RX ADMIN — SENNOSIDES, DOCUSATE SODIUM 1 TABLET: 50; 8.6 TABLET, FILM COATED ORAL at 08:36

## 2019-08-16 RX ADMIN — HYDRALAZINE HYDROCHLORIDE 25 MG: 25 TABLET, FILM COATED ORAL at 14:41

## 2019-08-16 RX ADMIN — SODIUM CHLORIDE 125 ML/HR: 450 INJECTION, SOLUTION INTRAVENOUS at 23:36

## 2019-08-16 RX ADMIN — TRAZODONE HYDROCHLORIDE 25 MG: 50 TABLET ORAL at 21:20

## 2019-08-16 RX ADMIN — METOPROLOL TARTRATE 100 MG: 100 TABLET ORAL at 17:47

## 2019-08-16 RX ADMIN — Medication 30 ML: at 05:25

## 2019-08-16 RX ADMIN — HEPARIN SODIUM 5000 UNITS: 5000 INJECTION INTRAVENOUS; SUBCUTANEOUS at 05:25

## 2019-08-16 RX ADMIN — FAMOTIDINE 20 MG: 20 TABLET ORAL at 08:37

## 2019-08-16 RX ADMIN — MAGNESIUM GLUCONATE 500 MG ORAL TABLET 400 MG: 500 TABLET ORAL at 17:47

## 2019-08-16 RX ADMIN — HYDRALAZINE HYDROCHLORIDE 25 MG: 25 TABLET, FILM COATED ORAL at 05:26

## 2019-08-16 RX ADMIN — Medication 10 ML: at 14:45

## 2019-08-16 RX ADMIN — LEVETIRACETAM 1000 MG: 500 TABLET, FILM COATED ORAL at 21:20

## 2019-08-16 RX ADMIN — HEPARIN SODIUM 5000 UNITS: 5000 INJECTION INTRAVENOUS; SUBCUTANEOUS at 21:20

## 2019-08-16 RX ADMIN — Medication 20 ML: at 21:21

## 2019-08-16 RX ADMIN — ASPIRIN 325 MG ORAL TABLET 325 MG: 325 PILL ORAL at 08:36

## 2019-08-16 RX ADMIN — MEGESTROL ACETATE 200 MG: 40 SUSPENSION ORAL at 08:55

## 2019-08-16 RX ADMIN — CITALOPRAM HYDROBROMIDE 20 MG: 20 TABLET ORAL at 08:37

## 2019-08-16 RX ADMIN — SODIUM CHLORIDE, PRESERVATIVE FREE 900 UNITS: 5 INJECTION INTRAVENOUS at 05:25

## 2019-08-16 RX ADMIN — MODAFINIL 200 MG: 100 TABLET ORAL at 08:36

## 2019-08-16 RX ADMIN — SODIUM CHLORIDE, PRESERVATIVE FREE 900 UNITS: 5 INJECTION INTRAVENOUS at 14:41

## 2019-08-16 RX ADMIN — HYDRALAZINE HYDROCHLORIDE 25 MG: 25 TABLET, FILM COATED ORAL at 21:20

## 2019-08-16 RX ADMIN — MAGNESIUM GLUCONATE 500 MG ORAL TABLET 400 MG: 500 TABLET ORAL at 08:37

## 2019-08-16 RX ADMIN — METOPROLOL TARTRATE 100 MG: 100 TABLET ORAL at 09:00

## 2019-08-16 RX ADMIN — HEPARIN SODIUM 5000 UNITS: 5000 INJECTION INTRAVENOUS; SUBCUTANEOUS at 14:40

## 2019-08-16 RX ADMIN — CLOPIDOGREL BISULFATE 75 MG: 75 TABLET ORAL at 08:37

## 2019-08-16 NOTE — PROGRESS NOTES
Remaining sutures removed from pt's head. Cleansed with Iodine and removed a few remaining  sutures from scalp. No bleeding to incision areas.  Pt tolerated well

## 2019-08-16 NOTE — PROGRESS NOTES
PHYSICAL THERAPY DAILY NOTE  Time In: 1003  Time Out: 3874  Patient Seen For: AM;Gait training; Therapeutic exercise;Patient education;Transfer training    Subjective: \"I'm cold. \" Patient agreeable to therapy. Objective: Vital Signs:   Patient Vitals for the past 8 hrs:   Temp Pulse Resp BP SpO2   08/16/19 0810 98.5 °F (36.9 °C) 77 12 144/81 97 %       Pain level: No pain reported. Pain location: n/a  Pain interventions: n/a    Patient education: Educated patient on safety with ambulation and functional transfers. Interdisciplinary Communication: Communicated with [facility] regarding patient's POC. GROSS ASSESSMENT Daily Assessment            COGNITION Daily Assessment    Increased time for processing, requests to speak louder. BED/MAT MOBILITY Daily Assessment    Sit to Supine : 5 (Supervision)       TRANSFERS Daily Assessment   Required for safety. Transfer Type: SPT without device  Transfer Assistance : 4 (Minimal assistance)  Sit to Stand Assistance: Minimal assistance  Car Transfers: Not tested       GAIT Daily Assessment   Patient ambulated with slow continuous step through gait pattern with narrowed NUSRAT, decreased step clearance of B LE.  Amount of Assistance: 4 (Minimal assistance)  Distance (ft): 150 Feet (ft)  Assistive Device: (HHA x1)       STEPS or STAIRS Daily Assessment    Steps/Stairs Ambulated (#): 0  Level of Assist : 0 (Not tested)       BALANCE Daily Assessment    Sitting - Static: Good (unsupported)  Sitting - Dynamic: Good (unsupported)  Standing - Static: Fair  Standing - Dynamic : Impaired       LOWER EXTREMITY EXERCISES Daily Assessment    Extremity: Both  Exercise Type #1: Seated lower extremity strengthening  Sets Performed: 2  Reps Performed: 10  Level of Assist: Minimal assistance     Patient performed the following B LE exercises:  SEATED EXERCISES Sets Reps Comments   Ankle Pumps 2 10    Hip Flexion 2 10    Long Arc Quads 2 10    Hip Adduction/Ball Squeeze 2 10 Hip Abduction with green Theraband 2 10    Hamstring Curls with green Theraband 2 10    Hip Extension with green Theraband 2 10      Patient handed off to KERMIT Yang in therapy gym. Assessment: Patient making steady progress towards goals. Patient demonstrating improved endurance with household ambulation. Plan of Care: Continue with POC and progress as tolerated.        Nahum Daniels  8/16/2019

## 2019-08-16 NOTE — PROGRESS NOTES
OT Daily Note  Time In 1033   Time Out 1204     Subjective: \"I'm tired. \"   Pain: none reported  Education: benefits of rehab; [facility] expectations; stroke/brain injury education   Interdisciplinary Communication: handoff from PT   Precautions:    Location on arrival: handoff from PT     Activity Tolerance Daily Assessment   Patient performed reaching activity using different vertical heights and small rings with 1 UE at a time with 4 pound clothespin, working on shoulder stabilization for overhead reaching and  strengthening. Completed 10 rings x 10 sets. Required much encouragement to persist with task. Reported fatigue after first set of 10 rings. Cognition Daily Assessment   Patient completed cognitive task re-sequencing discs from 1-60 with no errors; patient then re-sequenced flipping over to random side and counting spaces with two errors that patient identified and self-corrected. Patient completed simple verbal direction following task to organize a deck of standard cards by number, 2 through Ace, with greater than 20 errors that patient required assist to identify and correct. Patient reported that he forgot what he was supposed to do shelter through task, but did not initiate asking for help or to have directions repeated. After repeating instructions, patient again completed task and again made greater than 10 errors. Will benefit from further cognitive skills training, especially attention and memory. Transfers Daily Assessment   Patient transferred SHANNAN Ignacio 23 to edge of bed using SPT with RW with CGA. Progressing steadily. Remains with decreased safety managing RW. Education Daily Assessment   Educated patient on benefits of rehab, importance of participation in tasks, [facility] expectations, and transfer training. Educated patient on eating as much lunch as possible in order to increase strength; verbalized understanding but will benefit from further training.    Would benefit from further education and training. Remains with decreased understanding of benefits of rehab and [facility] expectations. Patient was left seated up at edge of bed with call light/all needs in reach and in no distress. Bed alarm activated and father present. Assessment: Impaired cognition remains primary barrier. Plan: Continue OT POC with focus on ADL/IADL skills, functional transfers, cognition, participation, functional mobility, coordination, strength, static and dynamic balance, and activity tolerance to maximize safety and independence with ADLs and functional transfers.      Iraj Onofre MS, OTR/L  8/16/2019        Note may contain the following abbreviations:   Abbreviation Explanation   AROM Active range of motion   PROM Passive range of motion   SPT Stand pivot transfer   LPT Lateral pivot transfer   WC wheelchair   RW Rolling walker    BUE Bilateral upper extremities   BLE Bilateral lower extremities    WBAT Weight bearing as tolerated    TTWB Toe-touch weight bearing    AD Adaptive device   AE Adaptive equipment    NMES Neuro muscular electrical stimulation   UBE Upper body ergometer

## 2019-08-16 NOTE — PROGRESS NOTES
Nutrition F/U:  Calorie Count. Assessment:  Megace was recently started for appetite stimulation. I think the big change that has improved his intake was the diet change to regular, unlimited diet as well as realizing that the hospital's menu has optional choices other than the standard meals being offered and prepared. I visited with the patient yesterday during lunch where he received a hamburger and french fries on his hospital tray and his father brought him rice and fresh fruit. He had eaten the burger and fries and was starting on the rice and fruit in my presence. No record of his intake at dinner was noted. Macronutrient Needs:  Estimated calorie needs - 0068-1918 jayce/day (25-28 jayce/kg/day)   Estimated protein needs - 69-83 gm pro/day (1-1.2 gm pro/kg/day) (GFR 23 ml/min)  Intake/Comparative Standards:  No record of his intake by staff, therefore will extend his calorie count until Monday. Intervention:   Meals and Snacks: Regular. Medical Food Supplement Therapy: Commercial Beverage: Continue chocolate Ensure Enlive with all meals. Nutrition Discharge Plan: Too soon to determine. Nina Bess.  Pipo Renee  [phone]

## 2019-08-16 NOTE — PROGRESS NOTES
Problem: Falls - Risk of  Goal: *Absence of Falls  Description  Document Kar Santos Fall Risk and appropriate interventions in the flowsheet. Outcome: Progressing Towards Goal  Note:   Fall Risk Interventions:  Mobility Interventions: Utilize gait belt for transfers/ambulation, Utilize walker, cane, or other assistive device    Mentation Interventions: Eyeglasses and hearing aids, Evaluate medications/consider consulting pharmacy, Door open when patient unattended, Bed/chair exit alarm, Adequate sleep, hydration, pain control, Increase mobility, Toileting rounds, Update white board, Reorient patient    Medication Interventions: Teach patient to arise slowly, Utilize gait belt for transfers/ambulation, Patient to call before getting OOB, Evaluate medications/consider consulting pharmacy, Bed/chair exit alarm    Elimination Interventions: Call light in reach, Elevated toilet seat, Bed/chair exit alarm, Toileting schedule/hourly rounds    History of Falls Interventions: Bed/chair exit alarm, Room close to nurse's station, Utilize gait belt for transfer/ambulation, Evaluate medications/consider consulting pharmacy, Door open when patient unattended         Problem: Patient Education: Go to Patient Education Activity  Goal: Patient/Family Education  Outcome: Progressing Towards Goal     Problem: Pressure Injury - Risk of  Goal: *Prevention of pressure injury  Description  Document Anselmo Scale and appropriate interventions in the flowsheet.   Outcome: Progressing Towards Goal  Note:   Pressure Injury Interventions:  Sensory Interventions: Assess changes in LOC, Maintain/enhance activity level, Pressure redistribution bed/mattress (bed type), Check visual cues for pain, Chair cushion    Moisture Interventions: Absorbent underpads, Apply protective barrier, creams and emollients, Check for incontinence Q2 hours and as needed, Moisture barrier, Maintain skin hydration (lotion/cream)    Activity Interventions: Chair cushion, Increase time out of bed, Pressure redistribution bed/mattress(bed type)    Mobility Interventions: Chair cushion, Pressure redistribution bed/mattress (bed type), HOB 30 degrees or less    Nutrition Interventions: Discuss nutritional consult with provider, Document food/fluid/supplement intake    Friction and Shear Interventions: HOB 30 degrees or less, Feet elevated on foot rest                Problem: Patient Education: Go to Patient Education Activity  Goal: Patient/Family Education  Outcome: Progressing Towards Goal     Problem: Infection - Risk of, Surgical Site Infection  Goal: *Absence of surgical site infection signs and symptoms  Outcome: Progressing Towards Goal     Problem: Patient Education: Go to Patient Education Activity  Goal: Patient/Family Education  Outcome: Progressing Towards Goal     Problem: Patient Education: Go to Patient Education Activity  Goal: Patient/Family Education  Outcome: Progressing Towards Goal     Problem: Patient Education: Go to Patient Education Activity  Goal: Patient/Family Education  Description  Patient / Tiffany Shepherd family will verbalize understanding of PT safety recommendations, demonstrate appropriate assist for current functional mobility status, safety, and home exercise program by time of discharge.    Outcome: Progressing Towards Goal     Problem: Patient Education: Go to Patient Education Activity  Goal: Patient/Family Education  Outcome: Progressing Towards Goal     Problem: Nutrition Deficit  Goal: *Optimize nutritional status  Outcome: Progressing Towards Goal

## 2019-08-16 NOTE — PROGRESS NOTES
Digna Sykes MD,   Medical Director  [address] [facility], [address] [facility]  Tel: [phone]       SFD PROGRESS NOTE    Marquis Christensen  Admit Date: 8/13/2019  Admit Diagnosis: SAH (subarachnoid hemorrhage) ([address] [I60.9]    Subjective     Doing ok. Per nursing pt had c/o hamstring pain overnoc, then said it wasn't painful but itching. Several things tried to comfort him. Finally fell asleep at 2 a.m. After refusing narcotic. Pt doesn't mention leg pain to me this a.m. \"Why is is so cold up here? \" Denies headache, fever, cough, n/v, SOB    Objective:     Current Facility-Administered Medications   Medication Dose Route Frequency    megestrol (MEGACE) 400 mg/10 mL (10 mL) oral suspension 200 mg  200 mg Oral DAILY    ondansetron (ZOFRAN ODT) tablet 4 mg  4 mg Oral Q6H PRN    lactulose (CHRONULAC) 10 gram/15 mL solution 45 mL  30 g Oral DAILY PRN    traZODone (DESYREL) tablet 25 mg  25 mg Oral QHS PRN    HYDROcodone-acetaminophen (NORCO) 7.5-325 mg per tablet 1 Tab  1 Tab Oral Q4H PRN    ondansetron (ZOFRAN) injection 4 mg  4 mg IntraVENous Q4H PRN    heparin (porcine) injection 5,000 Units  5,000 Units SubCUTAneous Q8H    aspirin tablet 325 mg  325 mg Oral DAILY    clopidogrel (PLAVIX) tablet 75 mg  75 mg Oral DAILY    famotidine (PEPCID) tablet 20 mg  20 mg Oral DAILY    heparin (porcine) pf 300-900 Units  300-900 Units InterCATHeter PRN    heparin (porcine) pf 900 Units  900 Units InterCATHeter Q8H    hydrALAZINE (APRESOLINE) tablet 25 mg  25 mg Oral TID    levETIRAcetam (KEPPRA) tablet 1,000 mg  1,000 mg Oral BID    magnesium oxide (MAG-OX) tablet 400 mg  400 mg Oral BID    metoprolol tartrate (LOPRESSOR) tablet 100 mg  100 mg Oral BID    modafinil (PROVIGIL) tablet 200 mg  200 mg Oral DAILY    nicotine (NICODERM CQ) 14 mg/24 hr patch 1 Patch  1 Patch TransDERmal Q24H    SALINE PERIPHERAL FLUSH Q8H soln 10-30 mL  10-30 mL InterCATHeter Q8H    saline peripheral flush soln 10-30 mL  10-30 mL InterCATHeter PRN    acetaminophen (TYLENOL) tablet 650 mg  650 mg Oral Q6H PRN    citalopram (CELEXA) tablet 20 mg  20 mg Oral DAILY    senna-docusate (PERICOLACE) 8.6-50 mg per tablet 1 Tab  1 Tab Oral DAILY     Review of Systems:Denies chest pain, shortness of breath, cough, headache, visual problems, abdominal pain, dysurea, calf pain. Pertinent positives are as noted in the medical records and unremarkable otherwise. Visit Vitals  /81   Pulse 77   Temp 98.5 °F (36.9 °C)   Resp 12   Wt 152 lb (68.9 kg)   SpO2 97%   BMI 20.61 kg/m²        Physical Exam:   General: Alert and Ox person, place, not time  No acute cardio respiratory distress. HEENT: Normocephalic,no scleral icterus  Oral mucosa moist without cyanosis   Lungs: Clear to auscultation  bilaterally. Respiration even and unlabored   Heart: Regular rate and rhythm, S1, S2   No  murmurs, clicks, rub or gallops   Abdomen: Soft, non-tender, nondistended. Bowel sounds present. No organomegaly. Genitourinary: defer   Neuromuscular:      Grossly no focal motor deficits noted. Moves ankles. Generalized prox>distal weakness  Slow auditory processing. Delayed speech, confabulates. Poor eye contact, affect blunted. fcs well   Skin/extremity: No rashes, no ervered.ythema. No calf tenderness BLE  No edema, no pain to palpation of LE muscle groups.  Does have tight hamstrings                                                                            Functional Assessment:          Balance  Sitting - Static: Good (unsupported) (08/14/19 1400)  Sitting - Dynamic: Good (unsupported) (08/14/19 1400)  Standing - Static: Fair (08/14/19 1400)  Standing - Dynamic : Impaired (08/14/19 1400)                     Rebecca Lightning Fall Risk Assessment:  Rebecca Lightning Fall Risk  Mobility: Ambulates or transfers with assist devices or assistance (08/15/19 9171)  Mobility Interventions: Communicate number of staff needed for ambulation/transfer;OT consult for ADLs; Patient to call before getting OOB;PT Consult for mobility concerns;PT Consult for assist device competence;Strengthening exercises (ROM-active/passive); Utilize walker, cane, or other assistive device (08/15/19 2331)  Mentation: Periodic confusion (08/15/19 2331)  Mentation Interventions: Adequate sleep, hydration, pain control;Door open when patient unattended;Bed/chair exit alarm;Evaluate medications/consider consulting pharmacy; Eyeglasses and hearing aids; Familiar objects from home; Increase mobility; Reorient patient; More frequent rounding;Room close to nurse's station;Update white board; Toileting rounds (08/15/19 2331)  Medication: Patient receiving anticonvulsants, sedatives(tranquilizers), psychotropics or hypnotics, hypoglycemics, narcotics, sleep aids, antihypertensives, laxatives, or diuretics (08/15/19 2331)  Medication Interventions: Assess postural VS orthostatic hypotension; Evaluate medications/consider consulting pharmacy; Bed/chair exit alarm; Patient to call before getting OOB; Teach patient to arise slowly (08/15/19 2331)  Elimination: Needs assistance with toileting (08/15/19 2331)  Elimination Interventions: Bed/chair exit alarm;Call light in reach; Patient to call for help with toileting needs;Urinal in reach (08/15/19 2331)  Prior Fall History: Before admission in past 12 months _home or previous inpatient care) (08/15/19 2331)  History of Falls Interventions: Bed/chair exit alarm; Consult care management for discharge planning;Door open when patient unattended;Evaluate medications/consider consulting pharmacy; Investigate reason for fall;Room close to nurse's station (08/15/19 2331)  Total Score: 5 (08/15/19 2331)  Standard Fall Precautions: Yes (08/15/19 2331)  High Fall Risk: Yes (08/15/19 2331)     Speech Assessment:         Ambulation:  Gait  Distance (ft): 60 Feet (ft) (08/15/19 1650)  Assistive Device: Gait belt (08/15/19 1650)     Labs/Studies:  Recent Results (from the past 72 hour(s))   URINALYSIS W/ RFLX MICROSCOPIC    Collection Time: 08/13/19  1:42 PM   Result Value Ref Range    Color YELLOW      Appearance CLEAR      Specific gravity 1.009 1.001 - 1.023      pH (UA) 5.5 5.0 - 9.0      Protein 100 (A) NEG mg/dL    Glucose NEGATIVE  mg/dL    Ketone NEGATIVE  NEG mg/dL    Bilirubin NEGATIVE  NEG      Blood NEGATIVE  NEG      Urobilinogen 0.2 0.2 - 1.0 EU/dL    Nitrites NEGATIVE  NEG      Leukocyte Esterase NEGATIVE  NEG      WBC 0-3 0 /hpf    RBC 0-3 0 /hpf    Epithelial cells 0-3 0 /hpf    Bacteria 0 0 /hpf    Casts 0-3 0 /lpf   CBC WITH AUTOMATED DIFF    Collection Time: 08/14/19  5:25 AM   Result Value Ref Range    WBC 6.8 4.3 - 11.1 K/uL    RBC 2.91 (L) 4.23 - 5.6 M/uL    HGB 8.7 (L) 13.6 - 17.2 g/dL    HCT 27.2 (L) 41.1 - 50.3 %    MCV 93.5 79.6 - 97.8 FL    MCH 29.9 26.1 - 32.9 PG    MCHC 32.0 31.4 - 35.0 g/dL    RDW 13.7 11.9 - 14.6 %    PLATELET 383 963 - 969 K/uL    MPV 10.9 9.4 - 12.3 FL    ABSOLUTE NRBC 0.00 0.0 - 0.2 K/uL    DF AUTOMATED      NEUTROPHILS 60 43 - 78 %    LYMPHOCYTES 26 13 - 44 %    MONOCYTES 10 4.0 - 12.0 %    EOSINOPHILS 3 0.5 - 7.8 %    BASOPHILS 1 0.0 - 2.0 %    IMMATURE GRANULOCYTES 0 0.0 - 5.0 %    ABS. NEUTROPHILS 4.0 1.7 - 8.2 K/UL    ABS. LYMPHOCYTES 1.8 0.5 - 4.6 K/UL    ABS. MONOCYTES 0.7 0.1 - 1.3 K/UL    ABS. EOSINOPHILS 0.2 0.0 - 0.8 K/UL    ABS. BASOPHILS 0.1 0.0 - 0.2 K/UL    ABS. IMM.  GRANS. 0.0 0.0 - 0.5 K/UL   METABOLIC PANEL, COMPREHENSIVE    Collection Time: 08/14/19  5:25 AM   Result Value Ref Range    Sodium 144 136 - 145 mmol/L    Potassium 3.5 3.5 - 5.1 mmol/L    Chloride 110 (H) 98 - 107 mmol/L    CO2 29 21 - 32 mmol/L    Anion gap 5 (L) 7 - 16 mmol/L    Glucose 79 65 - 100 mg/dL    BUN 18 6 - 23 MG/DL    Creatinine 3.52 (H) 0.8 - 1.5 MG/DL    GFR est AA 24 (L) >60 ml/min/1.73m2    GFR est non-AA 20 (L) >60 ml/min/1.73m2    Calcium 8.8 8.3 - 10.4 MG/DL    Bilirubin, total 0.2 0.2 - 1.1 MG/DL    ALT (SGPT) 12 12 - 65 U/L    AST (SGOT) 7 (L) 15 - 37 U/L    Alk.  phosphatase 60 50 - 136 U/L    Protein, total 5.3 (L) 6.3 - 8.2 g/dL    Albumin 2.4 (L) 3.5 - 5.0 g/dL    Globulin 2.9 2.3 - 3.5 g/dL    A-G Ratio 0.8 (L) 1.2 - 3.5     MAGNESIUM    Collection Time: 08/14/19  5:25 AM   Result Value Ref Range    Magnesium 2.3 1.8 - 2.4 mg/dL   GLUCOSE, POC    Collection Time: 08/14/19  7:15 AM   Result Value Ref Range    Glucose (POC) 99 65 - 100 mg/dL   OCCULT BLOOD, STOOL    Collection Time: 08/15/19 11:13 PM   Result Value Ref Range    Occult blood, stool NEGATIVE  NEG     HGB & HCT    Collection Time: 08/16/19  5:21 AM   Result Value Ref Range    HGB 9.3 (L) 13.6 - 17.2 g/dL    HCT 28.9 (L) 41.1 - 28.3 %   METABOLIC PANEL, BASIC    Collection Time: 08/16/19  5:21 AM   Result Value Ref Range    Sodium 143 136 - 145 mmol/L    Potassium 3.7 3.5 - 5.1 mmol/L    Chloride 105 98 - 107 mmol/L    CO2 32 21 - 32 mmol/L    Anion gap 6 (L) 7 - 16 mmol/L    Glucose 80 65 - 100 mg/dL    BUN 24 (H) 6 - 23 MG/DL    Creatinine 3.74 (H) 0.8 - 1.5 MG/DL    GFR est AA 23 (L) >60 ml/min/1.73m2    GFR est non-AA 19 (L) >60 ml/min/1.73m2    Calcium 8.8 8.3 - 10.4 MG/DL       Assessment:     Problem List as of 8/16/2019 Date Reviewed: 7/10/2019          Codes Class Noted - Resolved    Posterior reversible encephalopathy syndrome ICD-10-CM: I67.83  ICD-9-CM: 348.39  8/8/2019 - Present        Seizure cerebral ICD-10-CM: I67.89  ICD-9-CM: 322  8/7/2019 - Present        SAH (subarachnoid hemorrhage) (HCC) ICD-10-CM: I60.9  ICD-9-CM: 430  8/5/2019 - Present        Pneumonia due to methicillin susceptible Staphylococcus aureus (MSSA) ([facility] [address] ICD-10-CM: M41.275  ICD-9-CM: 482.41  7/15/2019 - Present        Tobacco abuse ICD-10-CM: Z72.0  ICD-9-CM: 305.1  7/10/2019 - Present        Acute respiratory failure with hypoxia [facility]) ICD-10-CM: J96.01  ICD-9-CM: 518.81  7/10/2019 - Present        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  ICD-9-CM: 331.3  7/10/2019 - Present        IVH (intraventricular hemorrhage) (HCC) ICD-10-CM: I61.5  ICD-9-CM: 165  7/10/2019 - Present        Seizure ([facility] [address] ICD-10-CM: R56.9  ICD-9-CM: 780.39  7/10/2019 - Present        Elevated serum creatinine ICD-10-CM: R79.89  ICD-9-CM: 790.99  7/10/2019 - Present        Cerebral vasospasm ICD-10-CM: I67.848  ICD-9-CM: 435.9  7/10/2019 - Present        RESOLVED: Endotracheally intubated ICD-10-CM: Z97.8  ICD-9-CM: V49.89  7/10/2019 - 7/10/2019        RESOLVED: SAH (subarachnoid hemorrhage) ([facility] [address] ICD-10-CM: I60.9  ICD-9-CM: 558  7/9/2019 - 7/10/2019             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 Statin; LDL 67.6 ; adequate     Stimulation/ depression ; flat affect, improved with Provigil. Increase celexa. Psych consult  -8/16 staff notices improvement in conversation. Smiling more     Hypokalemia; improved with supplement; monitor; 3.5 8/14; 3.7 8/16     Cont magnesium to prevent vasospasm. Daily labs; Mg 2.3 cont suppl. Will change to [facility] labs; 8/16 mag not reported     DVT risk / DVT Prophylaxis- Will require daily physician exam to assess for signs and symptoms as patient is at increased risk for of thromboembolism. Mobilization as tolerated. Intermittent pneumatic compression devices when in bed Thigh-high or knee-high thromboembolic deterrent hose when out of bed. Heparin 5000 units subcut q 8hrs      sz d/o; controlled with increased Keppra 1000mg bid;sz precautions     Pain Control: using NSAID medication regularly, daily.  Will require regular pain assessment and comprenhensive pain management,      Wound Care: Monitor wound status daily per staff and physician. At risk for failure. Will require 24/7 rehab nursing. Keep wound clean and dry and dc scalp sutures      Hypertension - BP uncontrolled, fluctuating, managed medically. No bp goals at this time. Prolonged HTN for management of SAH to prevent vasospasm contributed to development of PRES. F/u scan improved; now on hydralazine and metoprolol; add parameters. Would like to keep systolic less than 511; monitor symptoms and adjust as needed. Avoid hypotension  -8/14 130s- 140s / 70-80s range  -8/16 144/81 controlled     Malnutrition; needs supplement; jayce ct. Significant wt loss. Check alb; 2.4  -8/15 added Megace, approx 40 lb wt loss during hospitalization; need to document accurate wt; will change to a regular diet from renal to inc food choices to include things he might eat. -8/16 seen by Dietician, discussed. Po improving. Eating what family brings in. Changed to reg diet with better choices. Encourage po.     Proteinuria; monitor     NING, likely CKD; creat 3.39; slt improvement. BUN 17 improved; renal US 8/6; Echogenic kidneys suggesting chronic renal disease.  No evidence of  Obstruction. 3.52 this a.m 8/14; need strict I/Os  -8/16 creat slowly increasing 3.74 today>3.52> 3.39< 3.51; BUN trending up as well. Will give fluid bolus and continue to monitor. Strict I/0s ordered; however , only # voids, not amt recorded! Recheck 8/17     Anemia likely due to CKD; recheck in a.m. Check stool.  -8/14 hgb 8.7 slt impr (8.5)  -8/15 improved 9.3, stool negative       Urinary retention/ neurogenic bladder - schedule voids q6-8 hrs. Check post-void residual as needed; In and Out catheterize if post-void residual is more than 400 cc.  -8/16 has been continent without residuals     bowel program - add stool softeners and prn bowel program     GERD - resume PPI. At times may need additional antacids, Maalox prn.     Tobacco abuse; stress cessation.  Cont nicoderm patch     Hydrocephalus, resolved s/p VPS      Time spent was 35 minutes with over 1/2 in direct patient care/examination, consultation and coordination of care.      Signed By: Poncho Hines MD     August 16, 2019

## 2019-08-16 NOTE — PROGRESS NOTES
Pt has called multiple times thus far in night for complaints of back of R thigh hurting. Pt has been given tylenol for pain. Back of thigh has been rubbed with lotion. Heat pack was applied to leg. Pt took pajama pants off to attempt to alleviate pain. Pt has been up multiple times to urinate. Many attempts made to find a way to assist pt to become more comfortable. Pt was offered a stronger pain medication but when med was about to be administered, pt now reports he did not need pain medication because he was itching, not hurting. Discussed with pt if during the entire night was leg itching or hurting, however, given pt's cognitive difficulties, it is difficult to tell. norco was held and wasted. Pt then complained of sheets possibly being the reason he was itching. Explained to pt that the sheets are the same type of sheets he has been on since he was admitted to the hospital.  Sheet was changed anyway and a blanket was placed over the sheet before pt laid down to attempt to ease itching. Pt also placed pajama pants back on. Pt assisted back to bed and bed alarm is on.

## 2019-08-16 NOTE — PROGRESS NOTES
Pt complains of pain, at first behind L knee and up into back of thigh and then a few minutes later behind R knee and back of thigh. Pt denies any calf pain in either leg. Nurse specifically palpated on both calf muscles and pt denies any discomfort there. Nurse palpated behind both knees and up into back of thigh and pt was unable to pinpoint where pain was except to say \"back of leg. \"  No redness or swelling noted. Pt does not specifically say if SCD sleeves are making the discomfort more or less. Tylenol was given for pain and lotion applied to attempt to ease pt's discomfort.

## 2019-08-16 NOTE — PROGRESS NOTES
08/16/19 0952   Time Spent With Patient   Time In 0909   Time Out 0950   Patient Seen For: AM;Neuro-linguistics   Mental Status   Neurologic State Alert   Orientation Level Oriented to person;Oriented to place;Oriented to situation;Disoriented to time   Cognition Follows commands; Impaired decision making; Appropriate decision making;Decreased attention/concentration;Decreased command following;Memory loss   Perception Appears intact   Perseveration No perseveration noted   Safety/Judgement Fall prevention   Pt seen in room. Pt alert, cooperative, and more talkative today. Pt followed 2-step commands with 4/4 acc I and 3-step commands with 5/6 acc and min cues. Pt recalled a opposite word for a given word with 15/15 acc and min cues. Pt answered yes/no questions with 15/15 acc with S and additional time. Pt answered yes/no questions and explained his answer with 9/10 acc and min-mod cues. Pt would benefit from continued ST to address his cognitive-linguistic deficits.   Yuliya Krishnan, CCC-SLP

## 2019-08-16 NOTE — PROGRESS NOTES
Problem: Pressure Injury - Risk of  Goal: *Prevention of pressure injury  Description  Document Anselmo Scale and appropriate interventions in the flowsheet. Outcome: Progressing Towards Goal  Note:   Pressure Injury Interventions:  Sensory Interventions: Assess changes in LOC, Assess need for specialty bed, Avoid rigorous massage over bony prominences, Chair cushion, Check visual cues for pain, Discuss PT/OT consult with provider, Keep linens dry and wrinkle-free, Maintain/enhance activity level, Minimize linen layers, Monitor skin under medical devices, Pressure redistribution bed/mattress (bed type), Turn and reposition approx. every two hours (pillows and wedges if needed)    Moisture Interventions: Absorbent underpads    Activity Interventions: Assess need for specialty bed, Chair cushion, Increase time out of bed, Pressure redistribution bed/mattress(bed type), PT/OT evaluation    Mobility Interventions: Assess need for specialty bed, Chair cushion, Float heels, HOB 30 degrees or less, Pressure redistribution bed/mattress (bed type), PT/OT evaluation, Turn and reposition approx. every two hours(pillow and wedges)    Nutrition Interventions: Document food/fluid/supplement intake, Offer support with meals,snacks and hydration, Discuss nutritional consult with provider    Friction and Shear Interventions: HOB 30 degrees or less                Problem: Falls - Risk of  Goal: *Absence of Falls  Description  Document Shefali Malloy Fall Risk and appropriate interventions in the flowsheet.   Outcome: Progressing Towards Goal  Note:   Fall Risk Interventions:  Mobility Interventions: Communicate number of staff needed for ambulation/transfer, OT consult for ADLs, Patient to call before getting OOB, PT Consult for mobility concerns, PT Consult for assist device competence, Strengthening exercises (ROM-active/passive), Utilize walker, cane, or other assistive device    Mentation Interventions: Adequate sleep, hydration, pain control, Door open when patient unattended, Bed/chair exit alarm, Evaluate medications/consider consulting pharmacy, Eyeglasses and hearing aids, Familiar objects from home, Increase mobility, Reorient patient, More frequent rounding, Room close to nurse's station, Update white board, Toileting rounds    Medication Interventions: Assess postural VS orthostatic hypotension, Evaluate medications/consider consulting pharmacy, Bed/chair exit alarm, Patient to call before getting OOB, Teach patient to arise slowly    Elimination Interventions: Bed/chair exit alarm, Call light in reach, Patient to call for help with toileting needs, Urinal in reach    History of Falls Interventions: Bed/chair exit alarm, Consult care management for discharge planning, Door open when patient unattended, Evaluate medications/consider consulting pharmacy, Investigate reason for fall, Room close to nurse's station

## 2019-08-16 NOTE — PROGRESS NOTES
PHYSICAL THERAPY DAILY NOTE  Time In: 8229  Time Out: 9776  Patient Seen For: PM;Gait training;Patient education; Therapeutic exercise;Transfer training    Subjective: \"I'm not ready to bend forward yet. \" In regards to PT question if he could attempt to tie his shoes while sitting on edge of bed. Patient agreeable to therapy. Objective: Vital Signs:   Patient Vitals for the past 8 hrs:   Temp Pulse Resp BP SpO2   08/16/19 1535 98.2 °F (36.8 °C) 64 16 146/81 98 %       Pain level: No pain reported. Pain location: n/a  Pain interventions: n/a    Patient education: Educated patient on benefits of physical therapy. Interdisciplinary Communication: Communicated with Hetal Mota regarding patient's POC. GROSS ASSESSMENT Daily Assessment            COGNITION Daily Assessment    Increased time for response, request to speak louder and repeat himself. BED/MAT MOBILITY Daily Assessment    Supine to Sit : 5 (Supervision)  Sit to Supine : 5 (Supervision)       TRANSFERS Daily Assessment   Required for safety. Transfer Type: SPT without device  Transfer Assistance : 4 (Minimal assistance)  Sit to Stand Assistance: Minimal assistance  Car Transfers: Not tested       GAIT Daily Assessment   Patient ambulated with slow partial step through gait pattern, intermittent narrowed NUSRAT and decreased step clearance of B LE.  Amount of Assistance: 4 (Minimal assistance)  Distance (ft): 60 Feet (ft)(x2)  Assistive Device: Gait belt       STEPS or STAIRS Daily Assessment    Steps/Stairs Ambulated (#): 0  Level of Assist : 0 (Not tested)       BALANCE Daily Assessment    Sitting - Static: Good (unsupported)  Sitting - Dynamic: Good (unsupported)  Standing - Static: Fair  Standing - Dynamic : Impaired       LOWER EXTREMITY EXERCISES Daily Assessment    Extremity: Both  Exercise Type #1: Other (comment)(B LE motomed x 10 mins, Level 2)  Level of Assist: Supervision     Patient returned to room lying supine in bed with all needs in reach, bed alarm on. Assessment: Patient attempting to vocalize more with therapy, small improvement with initiation with conversation and activity. Plan of Care: Continue with POC and progress as tolerated.        Rm Nurse  8/16/2019

## 2019-08-16 NOTE — PROGRESS NOTES
Patient working with physical therapy, no needs expressed. Patient is currently on a calorie count, for weight loss.

## 2019-08-16 NOTE — PROGRESS NOTES
Problem: Falls - Risk of  Goal: *Absence of Falls  Description  Document Ramin Rojas Fall Risk and appropriate interventions in the flowsheet.   8/16/2019 1710 by Sheree David RN  Outcome: Progressing Towards Goal  Note:   Fall Risk Interventions:  Mobility Interventions: Utilize gait belt for transfers/ambulation, Utilize walker, cane, or other assistive device    Mentation Interventions: Eyeglasses and hearing aids, Evaluate medications/consider consulting pharmacy, Door open when patient unattended, Bed/chair exit alarm, Adequate sleep, hydration, pain control, Increase mobility, Toileting rounds, Update white board, Reorient patient    Medication Interventions: Teach patient to arise slowly, Utilize gait belt for transfers/ambulation, Patient to call before getting OOB, Evaluate medications/consider consulting pharmacy, Bed/chair exit alarm    Elimination Interventions: Call light in reach, Elevated toilet seat, Bed/chair exit alarm, Toileting schedule/hourly rounds    History of Falls Interventions: Bed/chair exit alarm, Room close to nurse's station, Utilize gait belt for transfer/ambulation, Evaluate medications/consider consulting pharmacy, Door open when patient unattended      8/16/2019 1521 by Sheree David RN  Outcome: Progressing Towards Goal  Note:   Fall Risk Interventions:  Mobility Interventions: Utilize gait belt for transfers/ambulation, Utilize walker, cane, or other assistive device    Mentation Interventions: Eyeglasses and hearing aids, Evaluate medications/consider consulting pharmacy, Door open when patient unattended, Bed/chair exit alarm, Adequate sleep, hydration, pain control, Increase mobility, Toileting rounds, Update white board, Reorient patient    Medication Interventions: Teach patient to arise slowly, Utilize gait belt for transfers/ambulation, Patient to call before getting OOB, Evaluate medications/consider consulting pharmacy, Bed/chair exit alarm    Elimination Interventions: Call light in reach, Elevated toilet seat, Bed/chair exit alarm, Toileting schedule/hourly rounds    History of Falls Interventions: Bed/chair exit alarm, Room close to nurse's station, Utilize gait belt for transfer/ambulation, Evaluate medications/consider consulting pharmacy, Door open when patient unattended         Problem: Patient Education: Go to Patient Education Activity  Goal: Patient/Family Education  Outcome: Progressing Towards Goal     Problem: Pressure Injury - Risk of  Goal: *Prevention of pressure injury  Description  Document Anselmo Scale and appropriate interventions in the flowsheet.   8/16/2019 1710 by Maryuri Candelario RN  Outcome: Progressing Towards Goal  Note:   Pressure Injury Interventions:  Sensory Interventions: Assess changes in LOC, Maintain/enhance activity level, Pressure redistribution bed/mattress (bed type), Check visual cues for pain, Chair cushion    Moisture Interventions: Absorbent underpads, Apply protective barrier, creams and emollients, Check for incontinence Q2 hours and as needed, Moisture barrier, Maintain skin hydration (lotion/cream)    Activity Interventions: Chair cushion, Increase time out of bed, Pressure redistribution bed/mattress(bed type)    Mobility Interventions: Chair cushion, Pressure redistribution bed/mattress (bed type), HOB 30 degrees or less    Nutrition Interventions: Discuss nutritional consult with provider, Document food/fluid/supplement intake    Friction and Shear Interventions: HOB 30 degrees or less, Feet elevated on foot rest             8/16/2019 1521 by Maryuri Candelario RN  Outcome: Progressing Towards Goal  Note:   Pressure Injury Interventions:  Sensory Interventions: Assess changes in LOC, Maintain/enhance activity level, Pressure redistribution bed/mattress (bed type), Check visual cues for pain, Chair cushion    Moisture Interventions: Absorbent underpads, Apply protective barrier, creams and emollients, Check for incontinence Q2 hours and as needed, Moisture barrier, Maintain skin hydration (lotion/cream)    Activity Interventions: Chair cushion, Increase time out of bed, Pressure redistribution bed/mattress(bed type)    Mobility Interventions: Chair cushion, Pressure redistribution bed/mattress (bed type), HOB 30 degrees or less    Nutrition Interventions: Discuss nutritional consult with provider, Document food/fluid/supplement intake    Friction and Shear Interventions: HOB 30 degrees or less, Feet elevated on foot rest                Problem: Patient Education: Go to Patient Education Activity  Goal: Patient/Family Education  8/16/2019 1710 by Tiki Leon RN  Outcome: Progressing Towards Goal  8/16/2019 1521 by Tiki Leon RN  Outcome: Progressing Towards Goal     Problem: Infection - Risk of, Surgical Site Infection  Goal: *Absence of surgical site infection signs and symptoms  Outcome: Progressing Towards Goal     Problem: Patient Education: Go to Patient Education Activity  Goal: Patient/Family Education  Outcome: Progressing Towards Goal     Problem: Patient Education: Go to Patient Education Activity  Goal: Patient/Family Education  Outcome: Progressing Towards Goal     Problem: Patient Education: Go to Patient Education Activity  Goal: Patient/Family Education  Description  Patient / Tierney Holly family will verbalize understanding of PT safety recommendations, demonstrate appropriate assist for current functional mobility status, safety, and home exercise program by time of discharge.    Outcome: Progressing Towards Goal     Problem: Patient Education: Go to Patient Education Activity  Goal: Patient/Family Education  Outcome: Progressing Towards Goal     Problem: Nutrition Deficit  Goal: *Optimize nutritional status  Outcome: Progressing Towards Goal

## 2019-08-17 LAB
ANION GAP SERPL CALC-SCNC: 7 MMOL/L (ref 7–16)
BUN SERPL-MCNC: 27 MG/DL (ref 6–23)
CALCIUM SERPL-MCNC: 9 MG/DL (ref 8.3–10.4)
CHLORIDE SERPL-SCNC: 104 MMOL/L (ref 98–107)
CO2 SERPL-SCNC: 31 MMOL/L (ref 21–32)
CREAT SERPL-MCNC: 3.64 MG/DL (ref 0.8–1.5)
GLUCOSE SERPL-MCNC: 84 MG/DL (ref 65–100)
POTASSIUM SERPL-SCNC: 3.9 MMOL/L (ref 3.5–5.1)
SODIUM SERPL-SCNC: 142 MMOL/L (ref 136–145)

## 2019-08-17 PROCEDURE — 74011250637 HC RX REV CODE- 250/637: Performed by: PHYSICAL MEDICINE & REHABILITATION

## 2019-08-17 PROCEDURE — 97110 THERAPEUTIC EXERCISES: CPT

## 2019-08-17 PROCEDURE — 80048 BASIC METABOLIC PNL TOTAL CA: CPT

## 2019-08-17 PROCEDURE — 97116 GAIT TRAINING THERAPY: CPT

## 2019-08-17 PROCEDURE — 97530 THERAPEUTIC ACTIVITIES: CPT

## 2019-08-17 PROCEDURE — 74011000258 HC RX REV CODE- 258: Performed by: PHYSICAL MEDICINE & REHABILITATION

## 2019-08-17 PROCEDURE — 65310000000 HC RM PRIVATE REHAB

## 2019-08-17 PROCEDURE — 77030020257 HC SOL INJ SOD CL 0.45% 1000ML BG

## 2019-08-17 PROCEDURE — 74011250636 HC RX REV CODE- 250/636: Performed by: PHYSICAL MEDICINE & REHABILITATION

## 2019-08-17 RX ORDER — SODIUM CHLORIDE 450 MG/100ML
125 INJECTION, SOLUTION INTRAVENOUS CONTINUOUS
Status: DISPENSED | OUTPATIENT
Start: 2019-08-17 | End: 2019-08-17

## 2019-08-17 RX ORDER — HYDROCODONE BITARTRATE AND ACETAMINOPHEN 5; 325 MG/1; MG/1
1 TABLET ORAL
Status: DISCONTINUED | OUTPATIENT
Start: 2019-08-17 | End: 2019-08-28 | Stop reason: HOSPADM

## 2019-08-17 RX ADMIN — HEPARIN SODIUM 5000 UNITS: 5000 INJECTION INTRAVENOUS; SUBCUTANEOUS at 14:38

## 2019-08-17 RX ADMIN — SODIUM CHLORIDE, PRESERVATIVE FREE 600 UNITS: 5 INJECTION INTRAVENOUS at 21:39

## 2019-08-17 RX ADMIN — Medication 30 ML: at 14:41

## 2019-08-17 RX ADMIN — SODIUM CHLORIDE 125 ML/HR: 450 INJECTION, SOLUTION INTRAVENOUS at 16:42

## 2019-08-17 RX ADMIN — ASPIRIN 325 MG ORAL TABLET 325 MG: 325 PILL ORAL at 09:14

## 2019-08-17 RX ADMIN — METOPROLOL TARTRATE 100 MG: 100 TABLET ORAL at 09:15

## 2019-08-17 RX ADMIN — CLOPIDOGREL BISULFATE 75 MG: 75 TABLET ORAL at 09:15

## 2019-08-17 RX ADMIN — LEVETIRACETAM 1000 MG: 500 TABLET, FILM COATED ORAL at 21:39

## 2019-08-17 RX ADMIN — MAGNESIUM GLUCONATE 500 MG ORAL TABLET 400 MG: 500 TABLET ORAL at 09:14

## 2019-08-17 RX ADMIN — METOPROLOL TARTRATE 100 MG: 100 TABLET ORAL at 18:29

## 2019-08-17 RX ADMIN — Medication 20 ML: at 21:40

## 2019-08-17 RX ADMIN — TRAZODONE HYDROCHLORIDE 25 MG: 50 TABLET ORAL at 21:39

## 2019-08-17 RX ADMIN — Medication 30 ML: at 05:16

## 2019-08-17 RX ADMIN — LEVETIRACETAM 1000 MG: 500 TABLET, FILM COATED ORAL at 09:16

## 2019-08-17 RX ADMIN — HEPARIN SODIUM 5000 UNITS: 5000 INJECTION INTRAVENOUS; SUBCUTANEOUS at 21:38

## 2019-08-17 RX ADMIN — MEGESTROL ACETATE 200 MG: 40 SUSPENSION ORAL at 09:21

## 2019-08-17 RX ADMIN — HYDRALAZINE HYDROCHLORIDE 25 MG: 25 TABLET, FILM COATED ORAL at 21:39

## 2019-08-17 RX ADMIN — SODIUM CHLORIDE, PRESERVATIVE FREE 900 UNITS: 5 INJECTION INTRAVENOUS at 14:41

## 2019-08-17 RX ADMIN — FAMOTIDINE 20 MG: 20 TABLET ORAL at 09:16

## 2019-08-17 RX ADMIN — MODAFINIL 200 MG: 100 TABLET ORAL at 09:17

## 2019-08-17 RX ADMIN — HYDRALAZINE HYDROCHLORIDE 25 MG: 25 TABLET, FILM COATED ORAL at 05:14

## 2019-08-17 RX ADMIN — HYDRALAZINE HYDROCHLORIDE 25 MG: 25 TABLET, FILM COATED ORAL at 14:37

## 2019-08-17 RX ADMIN — SODIUM CHLORIDE 125 ML/HR: 450 INJECTION, SOLUTION INTRAVENOUS at 22:26

## 2019-08-17 RX ADMIN — SENNOSIDES, DOCUSATE SODIUM 1 TABLET: 50; 8.6 TABLET, FILM COATED ORAL at 09:15

## 2019-08-17 RX ADMIN — HEPARIN SODIUM 5000 UNITS: 5000 INJECTION INTRAVENOUS; SUBCUTANEOUS at 05:14

## 2019-08-17 RX ADMIN — CITALOPRAM HYDROBROMIDE 20 MG: 20 TABLET ORAL at 09:16

## 2019-08-17 RX ADMIN — MAGNESIUM GLUCONATE 500 MG ORAL TABLET 400 MG: 500 TABLET ORAL at 18:29

## 2019-08-17 NOTE — PROGRESS NOTES
PHYSICAL THERAPY DAILY NOTE  Time In: 4431  Time Out: 1470  Patient Seen For: AM;Patient education;Gait training; Therapeutic exercise;Transfer training    Subjective: \"I'm a little worn out today. \" Patient agreeable to therapy. Objective: Vital Signs:   Patient Vitals for the past 8 hrs:   Temp Pulse Resp BP SpO2   08/17/19 0722 97.6 °F (36.4 °C) 97 16 114/75 98 %       Pain level: No pain reported. Pain location: n/a  Pain interventions: n/a    Patient education: Educated patient on safety with ambulation with use of RW. Interdisciplinary Communication: n/a       GROSS ASSESSMENT Daily Assessment            COGNITION Daily Assessment    Verbal cues for safety with mobility, request that patient speak louder. BED/MAT MOBILITY Daily Assessment    Supine to Sit : 5 (Supervision)  Sit to Supine : 5 (Supervision)       TRANSFERS Daily Assessment   Required for safety. Transfer Type: SPT without device  Transfer Assistance : 4 (Minimal assistance)  Sit to Stand Assistance: Minimal assistance  Car Transfers: Not tested       GAIT Daily Assessment   Patient ambulated with slow step through gait pattern, intermittent narrowed NUSRAT. Amount of Assistance: 4 (Minimal assistance)  Distance (ft): 150 Feet (ft)(x1, 79' x2)  Assistive Device: Gait belt;Walker, rolling       STEPS or STAIRS Daily Assessment   Patient ambulated up stairs with reciprocal pattern, ambulated down steps with one step at a time method leading with R LE.  Steps/Stairs Ambulated (#): 8  Level of Assist : 4 (Minimal assistance)  Rail Use: Both       BALANCE Daily Assessment    Sitting - Static: Good (unsupported)  Sitting - Dynamic: Good (unsupported)  Standing - Static: Fair  Standing - Dynamic : Impaired       WHEELCHAIR MOBILITY Daily Assessment            LOWER EXTREMITY EXERCISES Daily Assessment    Extremity: Both  Exercise Type #1: Seated lower extremity strengthening  Sets Performed: 2  Reps Performed: 10  Level of Assist: Minimal assistance  Exercise Type #2: Other (comment)(B LE motomed x 10 mins, Level 3)  Level of Assist: Supervision     Patient performed the following B LE exercises:  SEATED EXERCISES Sets Reps Comments   Ankle Pumps 2 10    Hip Flexion 2 10    Long Arc Quads 2 10    Hip Adduction/Ball Squeeze 2 10    Hip Abduction with green Theraband 2 10    Hamstring Curls with green Theraband 2 10    Hip Extension with green Theraband 2 10      Patient returned to room lying supine in bed with all needs in reach. Assessment: Patient demonstrated good balance and safety with ambulating up/down steps this date. Patient making slow steady progress towards goals. Plan of Care: Continue with POC and progress as tolerated.        Marietta Lyn  8/17/2019

## 2019-08-17 NOTE — PROGRESS NOTES
Problem: Falls - Risk of  Goal: *Absence of Falls  Description  Document Loreto Peterson Fall Risk and appropriate interventions in the flowsheet. Outcome: Progressing Towards Goal  Note:   Fall Risk Interventions:  Mobility Interventions: Communicate number of staff needed for ambulation/transfer    Mentation Interventions: Adequate sleep, hydration, pain control, Bed/chair exit alarm    Medication Interventions: Bed/chair exit alarm, Evaluate medications/consider consulting pharmacy    Elimination Interventions: Bed/chair exit alarm, Call light in reach    History of Falls Interventions: Bed/chair exit alarm, Door open when patient unattended         Problem: Patient Education: Go to Patient Education Activity  Goal: Patient/Family Education  Outcome: Progressing Towards Goal     Problem: Pressure Injury - Risk of  Goal: *Prevention of pressure injury  Description  Document Anselmo Scale and appropriate interventions in the flowsheet.   Outcome: Progressing Towards Goal  Note:   Pressure Injury Interventions:  Sensory Interventions: Assess changes in LOC    Moisture Interventions: Absorbent underpads, Apply protective barrier, creams and emollients    Activity Interventions: Assess need for specialty bed    Mobility Interventions: Assess need for specialty bed    Nutrition Interventions: Document food/fluid/supplement intake    Friction and Shear Interventions: HOB 30 degrees or less, Lift sheet                Problem: Patient Education: Go to Patient Education Activity  Goal: Patient/Family Education  Outcome: Progressing Towards Goal     Problem: Infection - Risk of, Surgical Site Infection  Goal: *Absence of surgical site infection signs and symptoms  Outcome: Progressing Towards Goal     Problem: Patient Education: Go to Patient Education Activity  Goal: Patient/Family Education  Outcome: Progressing Towards Goal     Problem: Patient Education: Go to Patient Education Activity  Goal: Patient/Family Education  Outcome: Progressing Towards Goal

## 2019-08-17 NOTE — PROGRESS NOTES
Problem: Falls - Risk of  Goal: *Absence of Falls  Description  Document Antonio David Fall Risk and appropriate interventions in the flowsheet. Note:   Fall Risk Interventions:  Mobility Interventions: Communicate number of staff needed for ambulation/transfer, OT consult for ADLs, Patient to call before getting OOB, PT Consult for mobility concerns, PT Consult for assist device competence, Strengthening exercises (ROM-active/passive), Utilize walker, cane, or other assistive device    Mentation Interventions: Adequate sleep, hydration, pain control, Door open when patient unattended, Bed/chair exit alarm, Evaluate medications/consider consulting pharmacy, Familiar objects from home, Increase mobility, More frequent rounding, Reorient patient, Update white board    Medication Interventions: Bed/chair exit alarm, Evaluate medications/consider consulting pharmacy, Teach patient to arise slowly, Patient to call before getting OOB    Elimination Interventions: Bed/chair exit alarm, Call light in reach, Elevated toilet seat, Patient to call for help with toileting needs    History of Falls Interventions: Consult care management for discharge planning, Bed/chair exit alarm, Door open when patient unattended, Investigate reason for fall, Room close to nurse's station         Problem: Pressure Injury - Risk of  Goal: *Prevention of pressure injury  Description  Document Anselmo Scale and appropriate interventions in the flowsheet. Outcome: Progressing Towards Goal  Note:   Pressure Injury Interventions:  Sensory Interventions: Assess changes in LOC, Assess need for specialty bed, Avoid rigorous massage over bony prominences, Chair cushion, Check visual cues for pain, Discuss PT/OT consult with provider, Float heels, Keep linens dry and wrinkle-free, Maintain/enhance activity level, Minimize linen layers, Pressure redistribution bed/mattress (bed type), Turn and reposition approx.  every two hours (pillows and wedges if needed)    Moisture Interventions: Absorbent underpads, Apply protective barrier, creams and emollients, Check for incontinence Q2 hours and as needed, Moisture barrier, Maintain skin hydration (lotion/cream)    Activity Interventions: Assess need for specialty bed, Chair cushion, Increase time out of bed, Pressure redistribution bed/mattress(bed type), PT/OT evaluation    Mobility Interventions: Assess need for specialty bed, Chair cushion, HOB 30 degrees or less, Float heels, Pressure redistribution bed/mattress (bed type), PT/OT evaluation, Turn and reposition approx.  every two hours(pillow and wedges)    Nutrition Interventions: Document food/fluid/supplement intake, Offer support with meals,snacks and hydration, Discuss nutritional consult with provider    Friction and Shear Interventions: HOB 30 degrees or less, Feet elevated on foot rest

## 2019-08-17 NOTE — PROGRESS NOTES
Darya Castro MD,   Medical Director  [address] [facility], [address] [facility]  Tel: [phone]       S[facility] PROGRESS NOTE    Hetal Roberto  Admit Date: 8/13/2019  Admit Diagnosis: SAH (subarachnoid hemorrhage) ([address] [I60.9]    Subjective     Doing ok. Per nursing pt had c/o hamstring pain overnoc, then said it wasn't painful but itching. Several things tried to comfort him. Finally fell asleep at 2 a.m. After refusing narcotic. Pt doesn't mention leg pain to me this a.m. \"Why is is so cold up here? \" Denies headache, fever, cough, n/v, SOB    Patient seen and examined. Transfers and ambulation with minimum assistance. Hypophonic. Flat affect. Denies pain, dizziness, nausea or SOB. Participating in therapy.      Objective:     Current Facility-Administered Medications   Medication Dose Route Frequency    megestrol (MEGACE) 400 mg/10 mL (10 mL) oral suspension 200 mg  200 mg Oral DAILY    ondansetron (ZOFRAN ODT) tablet 4 mg  4 mg Oral Q6H PRN    lactulose (CHRONULAC) 10 gram/15 mL solution 45 mL  30 g Oral DAILY PRN    traZODone (DESYREL) tablet 25 mg  25 mg Oral QHS PRN    HYDROcodone-acetaminophen (NORCO) 7.5-325 mg per tablet 1 Tab  1 Tab Oral Q4H PRN    ondansetron (ZOFRAN) injection 4 mg  4 mg IntraVENous Q4H PRN    heparin (porcine) injection 5,000 Units  5,000 Units SubCUTAneous Q8H    aspirin tablet 325 mg  325 mg Oral DAILY    clopidogrel (PLAVIX) tablet 75 mg  75 mg Oral DAILY    famotidine (PEPCID) tablet 20 mg  20 mg Oral DAILY    heparin (porcine) pf 300-900 Units  300-900 Units InterCATHeter PRN    heparin (porcine) pf 900 Units  900 Units InterCATHeter Q8H    hydrALAZINE (APRESOLINE) tablet 25 mg  25 mg Oral TID    levETIRAcetam (KEPPRA) tablet 1,000 mg  1,000 mg Oral BID    magnesium oxide (MAG-OX) tablet 400 mg  400 mg Oral BID    metoprolol tartrate (LOPRESSOR) tablet 100 mg  100 mg Oral BID    modafinil (PROVIGIL) tablet 200 mg  200 mg Oral DAILY    nicotine (NICODERM CQ) 14 mg/24 hr patch 1 Patch  1 Patch TransDERmal Q24H    SALINE PERIPHERAL FLUSH Q8H soln 10-30 mL  10-30 mL InterCATHeter Q8H    saline peripheral flush soln 10-30 mL  10-30 mL InterCATHeter PRN    acetaminophen (TYLENOL) tablet 650 mg  650 mg Oral Q6H PRN    citalopram (CELEXA) tablet 20 mg  20 mg Oral DAILY    senna-docusate (PERICOLACE) 8.6-50 mg per tablet 1 Tab  1 Tab Oral DAILY     Review of Systems:Denies chest pain, shortness of breath, cough, headache, visual problems, abdominal pain, dysurea, calf pain. Pertinent positives are as noted in the medical records and unremarkable otherwise. Visit Vitals  /75   Pulse 97   Temp 97.6 °F (36.4 °C)   Resp 16   Wt 68.9 kg (152 lb)   SpO2 98%   BMI 20.61 kg/m²        Physical Exam:   General: Alert and Ox person, place, not time  No acute cardio respiratory distress. HEENT: Normocephalic,no scleral icterus  Oral mucosa moist without cyanosis   Lungs: Clear to auscultation  bilaterally. Respiration even and unlabored   Heart: Regular rate and rhythm, S1, S2   No  murmurs, clicks, rub or gallops   Abdomen: Soft, non-tender, nondistended. Bowel sounds present. No organomegaly. Genitourinary: defer   Neuromuscular:      Grossly no focal motor deficits noted. Moves ankles. Generalized prox>distal weakness  Slow auditory processing. Delayed speech, confabulates. Poor eye contact, affect blunted. fcs well   Skin/extremity: No rashes, no ervered.ythema. No calf tenderness BLE  No edema, no pain to palpation of LE muscle groups.  Does have tight hamstrings                                                                            Functional Assessment:          Balance  Sitting - Static: Good (unsupported) (08/16/19 1600)  Sitting - Dynamic: Good (unsupported) (08/16/19 1600)  Standing - Static: Fair (08/16/19 1600)  Standing - Dynamic : Impaired (08/16/19 1600)           Corey Ramirez Fall Risk Assessment:  Mikey Medrano Risk  Mobility: Ambulates or transfers with assist devices or assistance (08/16/19 2258)  Mobility Interventions: Communicate number of staff needed for ambulation/transfer;OT consult for ADLs; Patient to call before getting OOB;PT Consult for mobility concerns;PT Consult for assist device competence;Strengthening exercises (ROM-active/passive); Utilize walker, cane, or other assistive device (08/16/19 2258)  Mentation: Periodic confusion (08/16/19 2258)  Mentation Interventions: Adequate sleep, hydration, pain control;Door open when patient unattended;Bed/chair exit alarm;Evaluate medications/consider consulting pharmacy; Familiar objects from home; Increase mobility;More frequent rounding;Reorient patient;Update white board (08/16/19 2258)  Medication: Patient receiving anticonvulsants, sedatives(tranquilizers), psychotropics or hypnotics, hypoglycemics, narcotics, sleep aids, antihypertensives, laxatives, or diuretics (08/16/19 2258)  Medication Interventions: Bed/chair exit alarm;Evaluate medications/consider consulting pharmacy; Teach patient to arise slowly; Patient to call before getting OOB (08/16/19 2258)  Elimination: Needs assistance with toileting (08/16/19 2258)  Elimination Interventions: Bed/chair exit alarm;Call light in reach;Elevated toilet seat;Patient to call for help with toileting needs (08/16/19 2258)  Prior Fall History: Before admission in past 12 months _home or previous inpatient care) (08/16/19 2258)  History of Falls Interventions: Consult care management for discharge planning;Bed/chair exit alarm; Door open when patient unattended; Investigate reason for fall;Room close to nurse's station (08/16/19 2258)  Total Score: 5 (08/16/19 2258)  Standard Fall Precautions: Yes (08/16/19 2258)  High Fall Risk: Yes (08/16/19 2258)     Speech Assessment:         Ambulation:  Gait  Distance (ft): 60 Feet (ft)(x2) (08/16/19 1600)  Assistive Device: Gait belt (08/16/19 1600)     Labs/Studies:  Recent Results (from the past 72 hour(s))   OCCULT BLOOD, STOOL    Collection Time: 08/15/19 11:13 PM   Result Value Ref Range    Occult blood, stool NEGATIVE  NEG     HGB & HCT    Collection Time: 08/16/19  5:21 AM   Result Value Ref Range    HGB 9.3 (L) 13.6 - 17.2 g/dL    HCT 28.9 (L) 41.1 - 49.7 %   METABOLIC PANEL, BASIC    Collection Time: 08/16/19  5:21 AM   Result Value Ref Range    Sodium 143 136 - 145 mmol/L    Potassium 3.7 3.5 - 5.1 mmol/L    Chloride 105 98 - 107 mmol/L    CO2 32 21 - 32 mmol/L    Anion gap 6 (L) 7 - 16 mmol/L    Glucose 80 65 - 100 mg/dL    BUN 24 (H) 6 - 23 MG/DL    Creatinine 3.74 (H) 0.8 - 1.5 MG/DL    GFR est AA 23 (L) >60 ml/min/1.73m2    GFR est non-AA 19 (L) >60 ml/min/1.73m2    Calcium 8.8 8.3 - 10.4 MG/DL   MAGNESIUM    Collection Time: 08/16/19  5:21 AM   Result Value Ref Range    Magnesium 2.5 (H) 1.8 - 2.4 mg/dL   METABOLIC PANEL, BASIC    Collection Time: 08/17/19  5:23 AM   Result Value Ref Range    Sodium 142 136 - 145 mmol/L    Potassium 3.9 3.5 - 5.1 mmol/L    Chloride 104 98 - 107 mmol/L    CO2 31 21 - 32 mmol/L    Anion gap 7 7 - 16 mmol/L    Glucose 84 65 - 100 mg/dL    BUN 27 (H) 6 - 23 MG/DL    Creatinine 3.64 (H) 0.8 - 1.5 MG/DL    GFR est AA 23 (L) >60 ml/min/1.73m2    GFR est non-AA 19 (L) >60 ml/min/1.73m2    Calcium 9.0 8.3 - 10.4 MG/DL       Assessment:     Problem List as of 8/17/2019 Date Reviewed: 7/10/2019          Codes Class Noted - Resolved    Posterior reversible encephalopathy syndrome ICD-10-CM: I67.83  ICD-9-CM: 348.39  8/8/2019 - Present        Seizure cerebral ICD-10-CM: I67.89  ICD-9-CM: 371  8/7/2019 - Present        SAH (subarachnoid hemorrhage) (HCC) ICD-10-CM: I60.9  ICD-9-CM: 430  8/5/2019 - Present        Pneumonia due to methicillin susceptible Staphylococcus aureus ([facility]SA) ([facility] ICD-10-CM: X22.657  ICD-9-CM: 482.41  7/15/2019 - Present        Tobacco abuse ICD-10-CM: Z72.0  ICD-9-CM: 305.1  7/10/2019 - Present        Acute respiratory failure with hypoxia ([facility] ICD-10-CM: J96.01  ICD-9-CM: 518.81  7/10/2019 - Present        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 [facility]) ICD-10-CM: I60.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  ICD-9-CM: 331.3  7/10/2019 - Present        IVH (intraventricular hemorrhage) (HCC) ICD-10-CM: I61.5  ICD-9-CM: 407  7/10/2019 - Present        Seizure ([facility] ICD-10-CM: R56.9  ICD-9-CM: 780.39  7/10/2019 - Present        Elevated serum creatinine ICD-10-CM: R79.89  ICD-9-CM: 790.99  7/10/2019 - Present        Cerebral vasospasm ICD-10-CM: I67.848  ICD-9-CM: 435.9  7/10/2019 - Present        RESOLVED: Endotracheally intubated ICD-10-CM: Z97.8  ICD-9-CM: V49.89  7/10/2019 - 7/10/2019        RESOLVED: SAH (subarachnoid hemorrhage) ([facility] ICD-10-CM: I60.9  ICD-9-CM: 639  7/9/2019 - 7/10/2019             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 Statin; LDL 67.6 ; adequate     Stimulation/ depression ; flat affect, improved with Provigil. Increase celexa. Psych consult  -8/16 staff notices improvement in conversation. Smiling more     Hypokalemia; improved with supplement; monitor; 3.5 8/14; 3.7 8/16 8/17 - K - 3.9     Cont magnesium to prevent vasospasm. Daily labs; Mg 2.3 cont suppl. Will change to F labs; 8/16 mag not reported     DVT risk / DVT Prophylaxis- Will require daily physician exam to assess for signs and symptoms as patient is at increased risk for of thromboembolism. Mobilization as tolerated.  Intermittent pneumatic compression devices when in bed Thigh-high or knee-high thromboembolic deterrent hose when out of bed. Heparin 5000 units subcut q 8hrs      sz d/o; controlled with increased Keppra 1000mg bid;sz precautions     Pain Control: using NSAID medication regularly, daily. Will require regular pain assessment and comprenhensive pain management,   Norco tapered to prn 5mg, has not been receiving     Wound Care: Monitor wound status daily per staff and physician. At risk for failure. Will require 24/7 rehab nursing. Keep wound clean and dry and dc scalp sutures      Hypertension - BP uncontrolled, fluctuating, managed medically. No bp goals at this time. Prolonged HTN for management of SAH to prevent vasospasm contributed to development of PRES. F/u scan improved; now on hydralazine and metoprolol; add parameters. Would like to keep systolic less than 810; monitor symptoms and adjust as needed. Avoid hypotension  -8/14 130s- 140s / 70-80s range  -8/16 144/81 controlled  -8/17 114/75     Malnutrition; needs supplement; jayce ct. Significant wt loss. Check alb; 2.4  -8/15 added Megace, approx 40 lb wt loss during hospitalization; need to document accurate wt; will change to a regular diet from renal to inc food choices to include things he might eat. -8/16 seen by Dietician, discussed. Po improving. Eating what family brings in. Changed to reg diet with better choices. Encourage po.     Proteinuria; monitor     NING, likely CKD; creat 3.39; slt improvement. BUN 17 improved; renal US 8/6; Echogenic kidneys suggesting chronic renal disease.  No evidence of  Obstruction. 3.52 this a.m 8/14; need strict I/Os  -8/16 creat slowly increasing 3.74 today>3.52> 3.39< 3.51; BUN trending up as well. Will give fluid bolus and continue to monitor. Strict I/0s ordered; however , only # voids, not amt recorded! 8/17 - BUN - 24 > 27, Cr - 3.7 > 3.6, continue ivf     Anemia likely due to CKD; recheck in a.m. Check stool.  -8/14 hgb 8.7 slt impr (8.5)  -8/15 improved 9.3, stool negative     Urinary retention/ neurogenic bladder - schedule voids q6-8 hrs. Check post-void residual as needed;  In and Out catheterize if post-void residual is more than 400 cc.  -8/16 has been continent without residuals     bowel program - add stool softeners and prn bowel program     GERD - resume PPI. At times may need additional antacids, Maalox prn.     Tobacco abuse; stress cessation. Cont nicoderm patch     Hydrocephalus, resolved s/p VPS    Time spent was 25 minutes with over 1/2 in direct patient care/examination, consultation and coordination of care.      Signed By: Melyssa Vega MD     August 17, 2019

## 2019-08-18 LAB
ANION GAP SERPL CALC-SCNC: 4 MMOL/L (ref 7–16)
BUN SERPL-MCNC: 31 MG/DL (ref 6–23)
CALCIUM SERPL-MCNC: 8.8 MG/DL (ref 8.3–10.4)
CHLORIDE SERPL-SCNC: 106 MMOL/L (ref 98–107)
CO2 SERPL-SCNC: 30 MMOL/L (ref 21–32)
CREAT SERPL-MCNC: 3.74 MG/DL (ref 0.8–1.5)
GLUCOSE BLD STRIP.AUTO-MCNC: 136 MG/DL (ref 65–100)
GLUCOSE SERPL-MCNC: 79 MG/DL (ref 65–100)
POTASSIUM SERPL-SCNC: 4.2 MMOL/L (ref 3.5–5.1)
SODIUM SERPL-SCNC: 140 MMOL/L (ref 136–145)

## 2019-08-18 PROCEDURE — 65310000000 HC RM PRIVATE REHAB

## 2019-08-18 PROCEDURE — 74011250637 HC RX REV CODE- 250/637: Performed by: PHYSICAL MEDICINE & REHABILITATION

## 2019-08-18 PROCEDURE — 82962 GLUCOSE BLOOD TEST: CPT

## 2019-08-18 PROCEDURE — 74011250636 HC RX REV CODE- 250/636: Performed by: PHYSICAL MEDICINE & REHABILITATION

## 2019-08-18 PROCEDURE — 80048 BASIC METABOLIC PNL TOTAL CA: CPT

## 2019-08-18 RX ORDER — TRAZODONE HYDROCHLORIDE 50 MG/1
50 TABLET ORAL
Status: DISCONTINUED | OUTPATIENT
Start: 2019-08-18 | End: 2019-08-28 | Stop reason: HOSPADM

## 2019-08-18 RX ADMIN — METOPROLOL TARTRATE 100 MG: 100 TABLET ORAL at 18:11

## 2019-08-18 RX ADMIN — CITALOPRAM HYDROBROMIDE 20 MG: 20 TABLET ORAL at 10:08

## 2019-08-18 RX ADMIN — HYDROCODONE BITARTRATE AND ACETAMINOPHEN 1 TABLET: 5; 325 TABLET ORAL at 21:48

## 2019-08-18 RX ADMIN — SODIUM CHLORIDE, PRESERVATIVE FREE 900 UNITS: 5 INJECTION INTRAVENOUS at 05:08

## 2019-08-18 RX ADMIN — HEPARIN SODIUM 5000 UNITS: 5000 INJECTION INTRAVENOUS; SUBCUTANEOUS at 14:28

## 2019-08-18 RX ADMIN — HEPARIN SODIUM 5000 UNITS: 5000 INJECTION INTRAVENOUS; SUBCUTANEOUS at 05:07

## 2019-08-18 RX ADMIN — Medication 30 ML: at 05:08

## 2019-08-18 RX ADMIN — ASPIRIN 325 MG ORAL TABLET 325 MG: 325 PILL ORAL at 10:08

## 2019-08-18 RX ADMIN — LEVETIRACETAM 1000 MG: 500 TABLET, FILM COATED ORAL at 21:49

## 2019-08-18 RX ADMIN — HYDRALAZINE HYDROCHLORIDE 25 MG: 25 TABLET, FILM COATED ORAL at 05:08

## 2019-08-18 RX ADMIN — SODIUM CHLORIDE, PRESERVATIVE FREE 900 UNITS: 5 INJECTION INTRAVENOUS at 21:50

## 2019-08-18 RX ADMIN — HEPARIN SODIUM 5000 UNITS: 5000 INJECTION INTRAVENOUS; SUBCUTANEOUS at 21:48

## 2019-08-18 RX ADMIN — HYDRALAZINE HYDROCHLORIDE 25 MG: 25 TABLET, FILM COATED ORAL at 21:48

## 2019-08-18 RX ADMIN — HYDROCODONE BITARTRATE AND ACETAMINOPHEN 1 TABLET: 5; 325 TABLET ORAL at 00:47

## 2019-08-18 RX ADMIN — TRAZODONE HYDROCHLORIDE 50 MG: 50 TABLET ORAL at 21:48

## 2019-08-18 RX ADMIN — Medication 30 ML: at 14:31

## 2019-08-18 RX ADMIN — SODIUM CHLORIDE, PRESERVATIVE FREE 900 UNITS: 5 INJECTION INTRAVENOUS at 14:30

## 2019-08-18 RX ADMIN — CLOPIDOGREL BISULFATE 75 MG: 75 TABLET ORAL at 10:08

## 2019-08-18 RX ADMIN — Medication 30 ML: at 21:50

## 2019-08-18 RX ADMIN — MEGESTROL ACETATE 200 MG: 40 SUSPENSION ORAL at 10:12

## 2019-08-18 RX ADMIN — METOPROLOL TARTRATE 100 MG: 100 TABLET ORAL at 10:08

## 2019-08-18 RX ADMIN — MODAFINIL 200 MG: 100 TABLET ORAL at 10:08

## 2019-08-18 RX ADMIN — FAMOTIDINE 20 MG: 20 TABLET ORAL at 10:08

## 2019-08-18 RX ADMIN — SENNOSIDES, DOCUSATE SODIUM 1 TABLET: 50; 8.6 TABLET, FILM COATED ORAL at 10:08

## 2019-08-18 RX ADMIN — LEVETIRACETAM 1000 MG: 500 TABLET, FILM COATED ORAL at 10:08

## 2019-08-18 NOTE — PROGRESS NOTES
Mary Ellen Olvera MD,   Medical Director  [address] [facility], [address] [facility]  Tel: [phone]       SFD PROGRESS NOTE    [place]  Admit Date: 8/13/2019  Admit Diagnosis: SAH (subarachnoid hemorrhage) ([facility] [address] [I60.9]    Subjective     Doing ok. Per nursing pt had c/o hamstring pain overnoc, then said it wasn't painful but itching. Several things tried to comfort him. Finally fell asleep at 2 a.m. After refusing narcotic. Pt doesn't mention leg pain to me this a.m. \"Why is is so cold up here? \" Denies headache, fever, cough, n/v, SOB    Patient seen and examined. Transfers and ambulation with minimum assistance. Poor sleep overnight with frequent urination. Voiding 100-200cc, PVR wnml. UA neg. On 8/13. Denies pain, dysuria, lightheadedness, nausea or SOB. Participating in therapy.      Objective:     Current Facility-Administered Medications   Medication Dose Route Frequency    traZODone (DESYREL) tablet 50 mg  50 mg Oral QHS PRN    HYDROcodone-acetaminophen (NORCO) 5-325 mg per tablet 1 Tab  1 Tab Oral Q6H PRN    megestrol (MEGACE) 400 mg/10 mL (10 mL) oral suspension 200 mg  200 mg Oral DAILY    ondansetron (ZOFRAN ODT) tablet 4 mg  4 mg Oral Q6H PRN    lactulose (CHRONULAC) 10 gram/15 mL solution 45 mL  30 g Oral DAILY PRN    ondansetron (ZOFRAN) injection 4 mg  4 mg IntraVENous Q4H PRN    heparin (porcine) injection 5,000 Units  5,000 Units SubCUTAneous Q8H    aspirin tablet 325 mg  325 mg Oral DAILY    clopidogrel (PLAVIX) tablet 75 mg  75 mg Oral DAILY    famotidine (PEPCID) tablet 20 mg  20 mg Oral DAILY    heparin (porcine) pf 300-900 Units  300-900 Units InterCATHeter PRN    heparin (porcine) pf 900 Units  900 Units InterCATHeter Q8H    hydrALAZINE (APRESOLINE) tablet 25 mg  25 mg Oral TID    levETIRAcetam (KEPPRA) tablet 1,000 mg  1,000 mg Oral BID    metoprolol tartrate (LOPRESSOR) tablet 100 mg  100 mg Oral BID    modafinil (PROVIGIL) tablet 200 mg  200 mg Oral DAILY    nicotine (NICODERM CQ) 14 mg/24 hr patch 1 Patch  1 Patch TransDERmal Q24H    SALINE PERIPHERAL FLUSH Q8H soln 10-30 mL  10-30 mL InterCATHeter Q8H    saline peripheral flush soln 10-30 mL  10-30 mL InterCATHeter PRN    acetaminophen (TYLENOL) tablet 650 mg  650 mg Oral Q6H PRN    citalopram (CELEXA) tablet 20 mg  20 mg Oral DAILY    senna-docusate (PERICOLACE) 8.6-50 mg per tablet 1 Tab  1 Tab Oral DAILY     Review of Systems:Denies chest pain, shortness of breath, cough, headache, visual problems, abdominal pain, dysurea, calf pain. Pertinent positives are as noted in the medical records and unremarkable otherwise. Visit Vitals  BP (!) 149/91   Pulse 67   Temp 98.4 °F (36.9 °C)   Resp 18   Wt 68.9 kg (152 lb)   SpO2 99%   BMI 20.61 kg/m²        Physical Exam:   General: Alert and Ox person, place, not time  No acute cardio respiratory distress. HEENT: Normocephalic,no scleral icterus  Oral mucosa moist without cyanosis   Lungs: Clear to auscultation  bilaterally. Respiration even and unlabored   Heart: Regular rate and rhythm, S1, S2   No  murmurs, clicks, rub or gallops   Abdomen: Soft, non-tender, nondistended. Bowel sounds present. No organomegaly. Genitourinary: defer   Neuromuscular:      Grossly no focal motor deficits noted. Moves ankles. Generalized prox>distal weakness  Slow auditory processing. Delayed speech, confabulates. Poor eye contact, affect blunted. fcs well   Skin/extremity: No rashes, no ervered.ythema. No calf tenderness BLE  No edema, no pain to palpation of LE muscle groups.  Does have tight hamstrings                                                                            Functional Assessment:          Balance  Sitting - Static: Good (unsupported) (08/17/19 1200)  Sitting - Dynamic: Good (unsupported) (08/17/19 1200)  Standing - Static: Fair (08/17/19 1200)  Standing - Dynamic : Impaired (08/17/19 1200)           Pete Baldwin Risk Assessment:  [facility] Fall Risk  Mobility: Ambulates or transfers with assist devices or assistance (08/18/19 0740)  Mobility Interventions: Bed/chair exit alarm; Patient to call before getting OOB (08/18/19 0740)  Mentation: Periodic confusion (08/18/19 0740)  Mentation Interventions: Adequate sleep, hydration, pain control (08/18/19 0740)  Medication: Patient receiving anticonvulsants, sedatives(tranquilizers), psychotropics or hypnotics, hypoglycemics, narcotics, sleep aids, antihypertensives, laxatives, or diuretics (08/18/19 0740)  Medication Interventions: Bed/chair exit alarm; Patient to call before getting OOB (08/18/19 0740)  Elimination: Needs assistance with toileting (08/18/19 0740)  Elimination Interventions: Bed/chair exit alarm;Call light in reach (08/18/19 0740)  Prior Fall History: Before admission in past 12 months _home or previous inpatient care) (08/18/19 0740)  History of Falls Interventions: Bed/chair exit alarm (08/18/19 0740)  Total Score: 5 (08/18/19 0740)  Standard Fall Precautions: Yes (08/18/19 0740)  High Fall Risk: Yes (08/18/19 0740)     Speech Assessment:         Ambulation:  Gait  Distance (ft): 150 Feet (ft)(x1, 70' x2) (08/17/19 1200)  Assistive Device: Gait belt;Walker, rolling (08/17/19 1200)  Rail Use: Both (08/17/19 1200)     Labs/Studies:  Recent Results (from the past 72 hour(s))   OCCULT BLOOD, STOOL    Collection Time: 08/15/19 11:13 PM   Result Value Ref Range    Occult blood, stool NEGATIVE  NEG     HGB & HCT    Collection Time: 08/16/19  5:21 AM   Result Value Ref Range    HGB 9.3 (L) 13.6 - 17.2 g/dL    HCT 28.9 (L) 41.1 - 71.7 %   METABOLIC PANEL, BASIC    Collection Time: 08/16/19  5:21 AM   Result Value Ref Range    Sodium 143 136 - 145 mmol/L    Potassium 3.7 3.5 - 5.1 mmol/L    Chloride 105 98 - 107 mmol/L    CO2 32 21 - 32 mmol/L    Anion gap 6 (L) 7 - 16 mmol/L    Glucose 80 65 - 100 mg/dL    BUN 24 (H) 6 - 23 MG/DL    Creatinine 3.74 (H) 0.8 - 1.5 MG/DL    GFR est AA 23 (L) >60 ml/min/1.73m2    GFR est non-AA 19 (L) >60 ml/min/1.73m2    Calcium 8.8 8.3 - 10.4 MG/DL   MAGNESIUM    Collection Time: 08/16/19  5:21 AM   Result Value Ref Range    Magnesium 2.5 (H) 1.8 - 2.4 mg/dL   METABOLIC PANEL, BASIC    Collection Time: 08/17/19  5:23 AM   Result Value Ref Range    Sodium 142 136 - 145 mmol/L    Potassium 3.9 3.5 - 5.1 mmol/L    Chloride 104 98 - 107 mmol/L    CO2 31 21 - 32 mmol/L    Anion gap 7 7 - 16 mmol/L    Glucose 84 65 - 100 mg/dL    BUN 27 (H) 6 - 23 MG/DL    Creatinine 3.64 (H) 0.8 - 1.5 MG/DL    GFR est AA 23 (L) >60 ml/min/1.73m2    GFR est non-AA 19 (L) >60 ml/min/1.73m2    Calcium 9.0 8.3 - 51.1 MG/DL   METABOLIC PANEL, BASIC    Collection Time: 08/18/19  5:20 AM   Result Value Ref Range    Sodium 140 136 - 145 mmol/L    Potassium 4.2 3.5 - 5.1 mmol/L    Chloride 106 98 - 107 mmol/L    CO2 30 21 - 32 mmol/L    Anion gap 4 (L) 7 - 16 mmol/L    Glucose 79 65 - 100 mg/dL    BUN 31 (H) 6 - 23 MG/DL    Creatinine 3.74 (H) 0.8 - 1.5 MG/DL    GFR est AA 23 (L) >60 ml/min/1.73m2    GFR est non-AA 19 (L) >60 ml/min/1.73m2    Calcium 8.8 8.3 - 10.4 MG/DL       Assessment:     Problem List as of 8/18/2019 Date Reviewed: 7/10/2019          Codes Class Noted - Resolved    Posterior reversible encephalopathy syndrome ICD-10-CM: I67.83  ICD-9-CM: 348.39  8/8/2019 - Present        Seizure cerebral ICD-10-CM: I67.89  ICD-9-CM: 987  8/7/2019 - Present        SAH (subarachnoid hemorrhage) (HCC) ICD-10-CM: I60.9  ICD-9-CM: 430  8/5/2019 - Present        Pneumonia due to methicillin susceptible Staphylococcus aureus (MSSA) ([facility] ICD-10-CM: K62.571  ICD-9-CM: 482.41  7/15/2019 - Present        Tobacco abuse ICD-10-CM: Z72.0  ICD-9-CM: 305.1  7/10/2019 - Present        Acute respiratory failure with hypoxia (HCC) ICD-10-CM: J96.01  ICD-9-CM: 518.81  7/10/2019 - Present        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  ICD-9-CM: 331.3  7/10/2019 - Present        IVH (intraventricular hemorrhage) ([facility] [address] ICD-10-CM: I61.5  ICD-9-CM: 129  7/10/2019 - Present        Seizure ([facility] ICD-10-CM: R56.9  ICD-9-CM: 780.39  7/10/2019 - Present        Elevated serum creatinine ICD-10-CM: R79.89  ICD-9-CM: 790.99  7/10/2019 - Present        Cerebral vasospasm ICD-10-CM: I67.848  ICD-9-CM: 435.9  7/10/2019 - Present        RESOLVED: Endotracheally intubated ICD-10-CM: Z97.8  ICD-9-CM: V49.89  7/10/2019 - 7/10/2019        RESOLVED: SAH (subarachnoid hemorrhage) ([facility] ICD-10-CM: I60.9  ICD-9-CM: 305  7/9/2019 - 7/10/2019             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 Statin; LDL 67.6 ; adequate     Stimulation/ depression ; flat affect, improved with Provigil. Increase celexa. Psych consult  -8/16 staff notices improvement in conversation. Smiling more     Hypokalemia; improved with supplement; monitor; 3.5 8/14; 3.7 8/16 8/18 - K - 4.2     Cont magnesium to prevent vasospasm. Daily labs; Mg 2.3 cont suppl. Will change to [facility] labs; 8/16 mag not reported     DVT risk / DVT Prophylaxis- Will require daily physician exam to assess for signs and symptoms as patient is at increased risk for of thromboembolism. Mobilization as tolerated. Intermittent pneumatic compression devices when in bed Thigh-high or knee-high thromboembolic deterrent hose when out of bed. Heparin 5000 units subcut q 8hrs      sz d/o; controlled with increased Keppra 1000mg bid;sz precautions     Pain Control: using NSAID medication regularly, daily.  Will require regular pain assessment and comprenhensive pain management,   Norco tapered to 5mg prn       Wound Care: Monitor wound status daily per staff and physician. At risk for failure. Will require 24/7 rehab nursing. Keep wound clean and dry and dc scalp sutures      Hypertension - BP uncontrolled, fluctuating, managed medically. No bp goals at this time. Prolonged HTN for management of SAH to prevent vasospasm contributed to development of PRES. F/u scan improved; now on hydralazine and metoprolol; add parameters. Would like to keep systolic less than 890; monitor symptoms and adjust as needed. Avoid hypotension  -8/14 130s- 140s / 70-80s range  -8/16 144/81 controlled  -8/18 152/84, fluctuating     Malnutrition; needs supplement; jayce ct. Significant wt loss. Check alb; 2.4  -8/15 added Megace, approx 40 lb wt loss during hospitalization; need to document accurate wt; will change to a regular diet from renal to inc food choices to include things he might eat. -8/16 seen by Dietician, discussed. Po improving. Eating what family brings in. Changed to reg diet with better choices. Encourage po.     Proteinuria; monitor     NING, likely CKD; creat 3.39; slt improvement. BUN 17 improved; renal US 8/6; Echogenic kidneys suggesting chronic renal disease.  No evidence of  Obstruction. 3.52 this a.m 8/14; need strict I/Os  -8/16 creat slowly increasing 3.74 today>3.52> 3.39< 3.51; BUN trending up as well. Will give fluid bolus and continue to monitor. Strict I/0s ordered; however , only # voids, not amt recorded! 8/18 - BUN - 24 > 27 > 31, Cr - 3.7 > 3.6 >3.7, will hold ivf. Begin to monitor I/Os, seemingly baseline Cr - 3.4 -3.7 since hospital admission     Anemia likely due to CKD; recheck in a.m. Check stool.  -8/14 hgb 8.7 slt impr (8.5)  -8/15 improved 9.3, stool negative     Urinary retention/ neurogenic bladder - schedule voids q6-8 hrs. Check post-void residual as needed;  In and Out catheterize if post-void residual is more than 400 cc.  -8/16 has been continent without residuals     bowel program - add stool softeners and prn bowel program     GERD - resume PPI. At times may need additional antacids, Maalox prn.     Tobacco abuse; stress cessation. Cont nicoderm patch     Hydrocephalus, resolved s/p VPS    Insomnia - will increase trazodone to 50mg hs prn with prn repeat dose    Time spent was 25 minutes with over 1/2 in direct patient care/examination, consultation and coordination of care.      Signed By: Bruno Mcnamara MD     August 18, 2019

## 2019-08-18 NOTE — PROGRESS NOTES
Pt will call approximately every 30min to go to the bathroom. Pt assisted to bathroom each time to urinate. Pt complains of pain in back of R thigh. No redness or swelling noted. Pt reports it feels like a cramp. Area massaged with lotion. Pt reports this did not help. Pain pill given. Pt will report that the room is too cold. Temperature adjusted and pt provided with more blankets. Pt encouraged to attempt to sleep.

## 2019-08-18 NOTE — PROGRESS NOTES
After some encouragement, pt on side of bed eating entire sandwich family had brought in earlier in the day. Pt did not eat any of the tuna salad that was brought on dinner tray.

## 2019-08-18 NOTE — PROGRESS NOTES
Patient much more alert/oriented this 12 hour shift, using call light and asking to walk to bathroom. Appetite much better today, asking for snacks and extra fluids to drink. Resting in bed, call bell within reach, bed alarm on. Hourly rounds completed this shift.

## 2019-08-19 LAB
ANION GAP SERPL CALC-SCNC: 4 MMOL/L (ref 7–16)
BUN SERPL-MCNC: 37 MG/DL (ref 6–23)
CALCIUM SERPL-MCNC: 9 MG/DL (ref 8.3–10.4)
CHLORIDE SERPL-SCNC: 106 MMOL/L (ref 98–107)
CO2 SERPL-SCNC: 30 MMOL/L (ref 21–32)
CREAT SERPL-MCNC: 3.76 MG/DL (ref 0.8–1.5)
GLUCOSE SERPL-MCNC: 84 MG/DL (ref 65–100)
MAGNESIUM SERPL-MCNC: 2.6 MG/DL (ref 1.8–2.4)
POTASSIUM SERPL-SCNC: 4.8 MMOL/L (ref 3.5–5.1)
SODIUM SERPL-SCNC: 140 MMOL/L (ref 136–145)

## 2019-08-19 PROCEDURE — 97535 SELF CARE MNGMENT TRAINING: CPT

## 2019-08-19 PROCEDURE — 92507 TX SP LANG VOICE COMM INDIV: CPT

## 2019-08-19 PROCEDURE — 99233 SBSQ HOSP IP/OBS HIGH 50: CPT | Performed by: PHYSICAL MEDICINE & REHABILITATION

## 2019-08-19 PROCEDURE — 74011250637 HC RX REV CODE- 250/637: Performed by: PHYSICAL MEDICINE & REHABILITATION

## 2019-08-19 PROCEDURE — 97530 THERAPEUTIC ACTIVITIES: CPT

## 2019-08-19 PROCEDURE — 80048 BASIC METABOLIC PNL TOTAL CA: CPT

## 2019-08-19 PROCEDURE — 97110 THERAPEUTIC EXERCISES: CPT

## 2019-08-19 PROCEDURE — 97116 GAIT TRAINING THERAPY: CPT

## 2019-08-19 PROCEDURE — 74011250636 HC RX REV CODE- 250/636: Performed by: PHYSICAL MEDICINE & REHABILITATION

## 2019-08-19 PROCEDURE — 83735 ASSAY OF MAGNESIUM: CPT

## 2019-08-19 PROCEDURE — 65310000000 HC RM PRIVATE REHAB

## 2019-08-19 RX ADMIN — METOPROLOL TARTRATE 100 MG: 100 TABLET ORAL at 17:31

## 2019-08-19 RX ADMIN — CLOPIDOGREL BISULFATE 75 MG: 75 TABLET ORAL at 08:20

## 2019-08-19 RX ADMIN — HEPARIN SODIUM 5000 UNITS: 5000 INJECTION INTRAVENOUS; SUBCUTANEOUS at 05:22

## 2019-08-19 RX ADMIN — MODAFINIL 200 MG: 100 TABLET ORAL at 08:19

## 2019-08-19 RX ADMIN — SODIUM CHLORIDE, PRESERVATIVE FREE 900 UNITS: 5 INJECTION INTRAVENOUS at 05:23

## 2019-08-19 RX ADMIN — HYDRALAZINE HYDROCHLORIDE 25 MG: 25 TABLET, FILM COATED ORAL at 05:23

## 2019-08-19 RX ADMIN — HYDROCODONE BITARTRATE AND ACETAMINOPHEN 1 TABLET: 5; 325 TABLET ORAL at 21:10

## 2019-08-19 RX ADMIN — Medication 30 ML: at 14:29

## 2019-08-19 RX ADMIN — CITALOPRAM HYDROBROMIDE 20 MG: 20 TABLET ORAL at 08:20

## 2019-08-19 RX ADMIN — ASPIRIN 325 MG ORAL TABLET 325 MG: 325 PILL ORAL at 08:20

## 2019-08-19 RX ADMIN — LEVETIRACETAM 1000 MG: 500 TABLET, FILM COATED ORAL at 08:19

## 2019-08-19 RX ADMIN — MEGESTROL ACETATE 200 MG: 40 SUSPENSION ORAL at 08:24

## 2019-08-19 RX ADMIN — SENNOSIDES, DOCUSATE SODIUM 1 TABLET: 50; 8.6 TABLET, FILM COATED ORAL at 08:20

## 2019-08-19 RX ADMIN — LEVETIRACETAM 1000 MG: 500 TABLET, FILM COATED ORAL at 21:09

## 2019-08-19 RX ADMIN — Medication 30 ML: at 05:23

## 2019-08-19 RX ADMIN — HYDRALAZINE HYDROCHLORIDE 25 MG: 25 TABLET, FILM COATED ORAL at 14:31

## 2019-08-19 RX ADMIN — HYDRALAZINE HYDROCHLORIDE 25 MG: 25 TABLET, FILM COATED ORAL at 21:09

## 2019-08-19 RX ADMIN — FAMOTIDINE 20 MG: 20 TABLET ORAL at 08:21

## 2019-08-19 RX ADMIN — Medication 30 ML: at 21:10

## 2019-08-19 RX ADMIN — HEPARIN SODIUM 5000 UNITS: 5000 INJECTION INTRAVENOUS; SUBCUTANEOUS at 21:21

## 2019-08-19 RX ADMIN — SODIUM CHLORIDE, PRESERVATIVE FREE 900 UNITS: 5 INJECTION INTRAVENOUS at 14:31

## 2019-08-19 RX ADMIN — TRAZODONE HYDROCHLORIDE 50 MG: 50 TABLET ORAL at 21:10

## 2019-08-19 RX ADMIN — METOPROLOL TARTRATE 100 MG: 100 TABLET ORAL at 08:21

## 2019-08-19 RX ADMIN — HEPARIN SODIUM 5000 UNITS: 5000 INJECTION INTRAVENOUS; SUBCUTANEOUS at 14:28

## 2019-08-19 RX ADMIN — SODIUM CHLORIDE, PRESERVATIVE FREE 900 UNITS: 5 INJECTION INTRAVENOUS at 21:10

## 2019-08-19 NOTE — PROGRESS NOTES
Pt resting at this time past therapy this AM . PT ate 75% of breakfast . No complaints noted at his time . Pt states he is tired past am therapy. No complaints of pain  Noted  At this  time .

## 2019-08-19 NOTE — PROGRESS NOTES
[facility] PROGRESS NOTE    Follow up for: NING over ckd     Subjective:   Patient seen and examined. Chart, notes, labs, imaging, results all reviewed. Re-consulted for CKD stage IV. Awake and alert. Frequent urination. No other complaints. Renal function stable. UOP not well recorded.     ROS:  Gen - no fever, no chills, appetite okay  CV - no chest pain, no orthopnea  Lung - no shortness of breath, no cough  Abd - no tenderness, no nausea, no vomiting  Ext - no edema    Objective:   Exam:  Vitals:    08/18/19 1426 08/18/19 1802 08/18/19 2027 08/19/19 0759   BP: 120/65 128/72 131/72 128/79   Pulse: 72 73 78 65   Resp: 16  16 16   Temp: 98.9 °F (37.2 °C)  98.5 °F (36.9 °C) 98.5 °F (36.9 °C)   SpO2: 98%  96% 97%   Weight:             Intake/Output Summary (Last 24 hours) at 8/19/2019 0944  Last data filed at 8/19/2019 0705  Gross per 24 hour   Intake 240 ml   Output 212 ml   Net 28 ml       Current Facility-Administered Medications   Medication Dose Route Frequency    traZODone (DESYREL) tablet 50 mg  50 mg Oral QHS PRN    HYDROcodone-acetaminophen (NORCO) 5-325 mg per tablet 1 Tab  1 Tab Oral Q6H PRN    megestrol (MEGACE) 400 mg/10 mL (10 mL) oral suspension 200 mg  200 mg Oral DAILY    ondansetron (ZOFRAN ODT) tablet 4 mg  4 mg Oral Q6H PRN    lactulose (CHRONULAC) 10 gram/15 mL solution 45 mL  30 g Oral DAILY PRN    ondansetron (ZOFRAN) injection 4 mg  4 mg IntraVENous Q4H PRN    heparin (porcine) injection 5,000 Units  5,000 Units SubCUTAneous Q8H    aspirin tablet 325 mg  325 mg Oral DAILY    clopidogrel (PLAVIX) tablet 75 mg  75 mg Oral DAILY    famotidine (PEPCID) tablet 20 mg  20 mg Oral DAILY    heparin (porcine) pf 300-900 Units  300-900 Units InterCATHeter PRN    heparin (porcine) pf 900 Units  900 Units InterCATHeter Q8H    hydrALAZINE (APRESOLINE) tablet 25 mg  25 mg Oral TID    levETIRAcetam (KEPPRA) tablet 1,000 mg  1,000 mg Oral BID    metoprolol tartrate (LOPRESSOR) tablet 100 mg  100 mg Oral BID    modafinil (PROVIGIL) tablet 200 mg  200 mg Oral DAILY    nicotine (NICODERM CQ) 14 mg/24 hr patch 1 Patch  1 Patch TransDERmal Q24H    SALINE PERIPHERAL FLUSH Q8H soln 10-30 mL  10-30 mL InterCATHeter Q8H    saline peripheral flush soln 10-30 mL  10-30 mL InterCATHeter PRN    acetaminophen (TYLENOL) tablet 650 mg  650 mg Oral Q6H PRN    citalopram (CELEXA) tablet 20 mg  20 mg Oral DAILY    senna-docusate (PERICOLACE) 8.6-50 mg per tablet 1 Tab  1 Tab Oral DAILY       EXAM  GEN - Alert, oriented, in no distress  CV - S1, S2, RRR, no rub, murmur, or gallop  Lung - clear to auscultation bilaterally  Abd - soft, nontender, BS present  Ext - no edema    No results for input(s): WBC, HGB, HCT, PLT, HGBEXT, HCTEXT, PLTEXT, HGBEXT, HCTEXT, PLTEXT in the last 72 hours. Recent Labs     08/19/19  0612 08/19/19  0443 08/18/19  0520 08/17/19  0523   NA  --  140 140 142   K  --  4.8 4.2 3.9   CL  --  106 106 104   CO2  --  30 30 31   BUN  --  37* 31* 27*   CREA  --  3.76* 3.74* 3.64*   CA  --  9.0 8.8 9.0   GLU  --  84 79 84   MG 2.6*  --   --   --        Assessment and Plan:   NING on CKD stage IV  - NING vs CKD. Creatinine has been mid 2's to mid 3's since admission, Worse with volume depletion and improved with IVF  - US kidney - some cysts and chronic changes   - reviewed UA - proteinuria present, no active sediments   - Nephrotic syndrome 4.5 gms   - serologic workup negative. K/L ratio 2.15 with no M-spike. No abnormal proteins  - As per father, he has been known to have kidney problems in the past, has been checked for that 8 yrs back . No family h/o of PCKD known   - follow labs.  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

## 2019-08-19 NOTE — PROGRESS NOTES
PHYSICAL THERAPY DAILY NOTE  Time In: 3450  Time Out: 5267  Patient Seen For: AM;Transfer training;Gait training; Therapeutic exercise; Other (see progress notes)    Subjective: Patient stated that he just doesn't feel good. Objective:     GROSS ASSESSMENT Daily Assessment            COGNITION Daily Assessment           BED/MAT MOBILITY Daily Assessment    Supine to Sit : 5 (Supervision)  Sit to Supine : 5 (Supervision)       TRANSFERS Daily Assessment    Transfer Type: SPT without device  Transfer Assistance : 4 (Minimal assistance)  Sit to Stand Assistance: Minimal assistance  Car Transfers: Not tested       GAIT Daily Assessment    Amount of Assistance: 4 (Minimal assistance)  Distance (ft): 150 Feet (ft)  Assistive Device: Walker, rolling;Gait belt       STEPS or STAIRS Daily Assessment    Level of Assist : 0 (Not tested)       BALANCE Daily Assessment            WHEELCHAIR MOBILITY Daily Assessment            LOWER EXTREMITY EXERCISES Daily Assessment    Extremity: Both  Exercise Type #1: Seated lower extremity strengthening  Sets Performed: 2  Reps Performed: 10  Level of Assist: Supervision  Exercise Type #2: Other (comment)(nustep x 10 minutes)  Sets Performed: 1  Reps Performed: 10  Level of Assist: Supervision          Assessment: Patient moving well but requires a lot of motivation. Plan of Care: Continue with plan of care.     Rufino Faulkner, PTA  8/19/2019

## 2019-08-19 NOTE — PROGRESS NOTES
PHYSICAL THERAPY DAILY NOTE  Time In: 1300  Time Out: 5613  Patient Seen For: PM;Gait training;Transfer training; Therapeutic exercise; Other (see progress notes)    Subjective: Patient had no complaints. Objective: NO pain noted. GROSS ASSESSMENT Daily Assessment            COGNITION Daily Assessment           BED/MAT MOBILITY Daily Assessment    Supine to Sit : 5 (Supervision)  Sit to Supine : 5 (Supervision)       TRANSFERS Daily Assessment    Transfer Type: SPT without device  Transfer Assistance : 4 (Minimal assistance)  Sit to Stand Assistance: Minimal assistance  Car Transfers: Not tested       GAIT Daily Assessment    Amount of Assistance: 4 (Minimal assistance)  Distance (ft): 150 Feet (ft)  Assistive Device: Other (comment)(no device)       STEPS or STAIRS Daily Assessment    Level of Assist : 0 (Not tested)       BALANCE Daily Assessment            WHEELCHAIR MOBILITY Daily Assessment            LOWER EXTREMITY EXERCISES Daily Assessment    Extremity: Both  Exercise Type #1: Seated lower extremity strengthening  Sets Performed: 2  Reps Performed: 10  Level of Assist: Supervision  Exercise Type #2: Other (comment)(nustep x 10 minutes)  Sets Performed: 1  Reps Performed: 10  Level of Assist: Supervision          Assessment: Patient making good progress. Plan of Care: Continue with plan of care.     Marc Brennan, JOSE J  8/19/2019

## 2019-08-19 NOTE — PROGRESS NOTES
08/19/19 0528   Time Spent With Patient   Time In [facility]   Time Out 0718   Patient Seen For: AM;ADLs   Feeding/Eating   Comments patient refused to eat breakfast at this time    Grooming   Comments patient refused    Upper Body Bathing   Bathing Assistance, Upper S   Position Performed Seated in chair   Comments moderate cueing for thoroughness    Lower Body Bathing   Bathing Assistance, Lower  CGA   Position Performed Seated in chair   Comments moderate cueing for thoroughness    Toileting   Toileting Assistance (FIM Score) Min A  (steadying assist during clothing management )   Upper Body Dressing    Dressing Assistance  S   Comments setup assist   Lower Body Dressing    Dressing Assistance  SBA   Position Performed Seated edge of bed;Standing   Comments maximum encouragement to don B socks; able to don propping legs up on bottom bedrail    Functional Transfers   Toilet Transfer  Stand pivot transfer with walker   Amount of Assistance Required CGA   Tub or Shower Type Shower   Amount of Assistance Required CGA   Adaptive Equipment Tub transfer bench;Grab bars; Walker (comment)   S: \"I can't believe she's making me go to school when I feel this bad. \"   O: Patient presented sidelying in bed. Agreeable to treatment with maximum encouragement. Patient completed ADL; see above for details. Required maximum encouragement throughout ADL to complete. Required cueing for initiation and thoroughness of ADL tasks.    Shoes: 7: Patient refused  Socks: 4: Supervision or touching assist  Orientation: alert and oriented x 4  Expression: able to express needs verbally  Comprehension: understands basic instructions 50-75% of the time, total assist with complex instructions  Social Interaction: not participating much, withdrawn, flat affect   Problem Solving: solves routine problems <50% of the time, total assist with complex problems   Memory: recalls people frequently seen, able to complete 1/3 step commands   A: Impaired cognition remains a primary barrier. Patient tolerated session fairly with complaint of headache 6/10, declined intervention, prefers to lay down. P: Continue OT POC with focus on ADL/IADL skills, functional transfers, functional mobility, coordination, strength, static and dynamic balance, and activity tolerance to maximize safety and independence with ADLs and functional transfers. Patient was left sidelying in bed with call bell/all other needs in reach. Bed alarm activated. Interdisciplinary communication: Collaborated with physician regarding participation.      Isaiah Myles MS, OTR/L  8/19/2019          Note may contain the following abbreviations:   Abbreviation Explanation   AROM Active range of motion   PROM Passive range of motion   SPT Stand pivot transfer   LPT Lateral pivot transfer   WC wheelchair   RW Rolling walker    BUE Bilateral upper extremities   BLE Bilateral lower extremities    WBAT Weight bearing as tolerated    TTWB Toe-touch weight bearing    AD Adaptive device   AE Adaptive equipment    NMES Neuro muscular electrical stimulation   UBE Upper body ergometer

## 2019-08-19 NOTE — PROGRESS NOTES
08/19/19 1216   Time Spent With Patient   Time In 0859   Time Out 0915   Patient Seen For: AM;ADLs   Grooming   Grooming Assistance  S   Comments moderate encouragement to brush teeth    S: \"I was sleeping so well. \"   O: Patient presented sidelying sleeping in bed. Patient late to therapy due to eating breakfast. Agreeable to treatment with encouragement. Patient transferred sidelying to sitting with supervision and to edge of bed with SBA. Required moderate encouragement to brush teeth. Patient donned sweatshirt with supervision. Shoes: 5: Setup or clean-up assist   Patient transferred WC to scale to obtain patient's weight. Patient's weight 132.4; notified nurse Gaby Marcelo. A: Continues to require much encouragement to participate in therapy. Patient tolerated session fairly with no complaint of pain. P: Continue OT POC with focus on ADL/IADL skills, participation, cognition, functional transfers, functional mobility, coordination, strength, static and dynamic balance, and activity tolerance to maximize safety and independence with ADLs and functional transfers. Patient was handed off to SLP. Interdisciplinary communication: Collaborated with SLP regarding patient's functional status.      Gregorio Wilson MS, OTR/L  8/19/2019          Note may contain the following abbreviations:   Abbreviation Explanation   AROM Active range of motion   PROM Passive range of motion   SPT Stand pivot transfer   LPT Lateral pivot transfer   WC wheelchair   RW Rolling walker    BUE Bilateral upper extremities   BLE Bilateral lower extremities    WBAT Weight bearing as tolerated    TTWB Toe-touch weight bearing    AD Adaptive device   AE Adaptive equipment    NMES Neuro muscular electrical stimulation   UBE Upper body ergometer

## 2019-08-19 NOTE — PROGRESS NOTES
Pt has participated in therapy today . Has rested between therapy . No complaints noted . Pt has voided without difficult  No complaints of pain. Bed alarm intact call bell within reach .  Hourly rounds made this shift

## 2019-08-19 NOTE — PROGRESS NOTES
08/19/19 1214   Time Spent With Patient   Time In [id number]   Time Out 3223   Patient Seen For: AM   Mental Status   Neurologic State Alert   Cognition Impaired decision making;Decreased attention/concentration;Memory loss   Safety/Judgement Home safety      08/19/19 1215   Neuro-Linguistic Exercises   Verbal Problem Solving Impaired   Verbal Organization Impaired   Mathematical Impaired; functional addition/subtraction required Mod A   Memory Impaired; decreased short-term memory to recall recent events and answer orientation questions  Recalled 3/4 items chosen from a menu with a 5 minute delay   Attention  Impaired     Patient participated with cognitive therapy. Decreased pragmatics with cues to maintain eye contact and brief responses to questions related to his life/family but responded to questions and smiled on occasion. Reviewed basic orientation to time and recent events. Recalled 1/4 items with a 5 minute delay. Wrote the information down and was able to recall 2/4 with repeated practice. Following directions to choose items from a menu and determine total cost and change with basic rounded amounts completed with ModA. Patient recalled 3/4 items chosen from the menu with min-mod verbal/visual cues. Encoraged eye contact with clinician and increased volume with responses during the session. Recommend continued therapy to address problem solving, attention, memory, and pragmatics.     Benito Acosta MS, CCC-SLP

## 2019-08-19 NOTE — PROGRESS NOTES
Beth Hoyt MD,   Medical Director  [address] [facility], [address]  Tel: [phone]       D PROGRESS NOTE    Rabia Commander  Admit Date: 8/13/2019  Admit Diagnosis: SAH (subarachnoid hemorrhage) ([address] [I60.9]    Subjective   Woke pt up, thus, not too attentive or answering my questions. Opens eyes and looks at me  C/o headache  Per nursing ,over weekend, ate [facility] better, using call light appropriately  (Transfers and ambulation with minimum assistance. Poor sleep overnight with frequent urination. Voiding 100-200cc, PVR wnml. UA neg.  On 8/13.)  Objective:     Current Facility-Administered Medications   Medication Dose Route Frequency    traZODone (DESYREL) tablet 50 mg  50 mg Oral QHS PRN    HYDROcodone-acetaminophen (NORCO) 5-325 mg per tablet 1 Tab  1 Tab Oral Q6H PRN    megestrol (MEGACE) 400 mg/10 mL (10 mL) oral suspension 200 mg  200 mg Oral DAILY    ondansetron (ZOFRAN ODT) tablet 4 mg  4 mg Oral Q6H PRN    lactulose (CHRONULAC) 10 gram/15 mL solution 45 mL  30 g Oral DAILY PRN    ondansetron (ZOFRAN) injection 4 mg  4 mg IntraVENous Q4H PRN    heparin (porcine) injection 5,000 Units  5,000 Units SubCUTAneous Q8H    aspirin tablet 325 mg  325 mg Oral DAILY    clopidogrel (PLAVIX) tablet 75 mg  75 mg Oral DAILY    famotidine (PEPCID) tablet 20 mg  20 mg Oral DAILY    heparin (porcine) pf 300-900 Units  300-900 Units InterCATHeter PRN    heparin (porcine) pf 900 Units  900 Units InterCATHeter Q8H    hydrALAZINE (APRESOLINE) tablet 25 mg  25 mg Oral TID    levETIRAcetam (KEPPRA) tablet 1,000 mg  1,000 mg Oral BID    metoprolol tartrate (LOPRESSOR) tablet 100 mg  100 mg Oral BID    modafinil (PROVIGIL) tablet 200 mg  200 mg Oral DAILY    nicotine (NICODERM CQ) 14 mg/24 hr patch 1 Patch  1 Patch TransDERmal Q24H    SALINE PERIPHERAL FLUSH Q8H soln 10-30 mL  10-30 mL InterCATHeter Q8H    saline peripheral flush soln 10-30 mL 10-30 mL InterCATHeter PRN    acetaminophen (TYLENOL) tablet 650 mg  650 mg Oral Q6H PRN    citalopram (CELEXA) tablet 20 mg  20 mg Oral DAILY    senna-docusate (PERICOLACE) 8.6-50 mg per tablet 1 Tab  1 Tab Oral DAILY     Review of Systems:Denies chest pain, shortness of breath, cough, headache, visual problems, abdominal pain, dysurea, calf pain. Pertinent positives are as noted in the medical records and unremarkable otherwise. Visit Vitals  /79   Pulse 65   Temp 98.5 °F (36.9 °C)   Resp 16   Wt 152 lb (68.9 kg)   SpO2 97%   BMI 20.61 kg/m²        Physical Exam:   General: Awakens to voice and tactile cues, sleepy  No acute cardio respiratory distress. HEENT: Normocephalic,no scleral icterus  Oral mucosa moist without cyanosis   Lungs: Clear to auscultation  bilaterally. Respiration even and unlabored   Heart: Regular rate and rhythm, S1, S2   No  murmurs, clicks, rub or gallops   Abdomen: Soft, non-tender, nondistended. Bowel sounds present. No organomegaly. Genitourinary: defer   Neuromuscular:      Not cooperative with MMT   Skin/extremity: No rashes, no erythema.  No calf tenderness BLE  Scalp wounds healing; some dissolvable sutures still noted                                                                            Functional Assessment:          Balance  Sitting - Static: Good (unsupported) (08/17/19 1200)  Sitting - Dynamic: Good (unsupported) (08/17/19 1200)  Standing - Static: Fair (08/17/19 1200)  Standing - Dynamic : Impaired (08/17/19 1200)                     Jaylan Ramirez Fall Risk Assessment:  Jaylan Ramirez Fall Risk  Mobility: Ambulates or transfers with assist devices or assistance (08/18/19 2058)  Mobility Interventions: Bed/chair exit alarm (08/18/19 2058)  Mentation: Periodic confusion (08/18/19 2058)  Mentation Interventions: Adequate sleep, hydration, pain control (08/18/19 2058)  Medication: Patient receiving anticonvulsants, sedatives(tranquilizers), psychotropics or hypnotics, hypoglycemics, narcotics, sleep aids, antihypertensives, laxatives, or diuretics (08/18/19 2058)  Medication Interventions: Bed/chair exit alarm (08/18/19 2058)  Elimination: Needs assistance with toileting (08/18/19 2058)  Elimination Interventions: Bed/chair exit alarm;Call light in reach; Patient to call for help with toileting needs (08/18/19 2058)  Prior Fall History: Before admission in past 12 months _home or previous inpatient care) (08/18/19 2058)  History of Falls Interventions: Bed/chair exit alarm; Door open when patient unattended;Room close to nurse's station (08/18/19 5529)  Total Score: 5 (08/18/19 2058)  Standard Fall Precautions: Yes (08/18/19 6734)  High Fall Risk: Yes (08/18/19 2058)     Speech Assessment:         Ambulation:  Gait  Distance (ft): 150 Feet (ft)(x1, 70' x2) (08/17/19 1200)  Assistive Device: Gait belt;Walker, rolling (08/17/19 1200)  Rail Use: Both (08/17/19 1200)     Labs/Studies:  Recent Results (from the past 72 hour(s))   METABOLIC PANEL, BASIC    Collection Time: 08/17/19  5:23 AM   Result Value Ref Range    Sodium 142 136 - 145 mmol/L    Potassium 3.9 3.5 - 5.1 mmol/L    Chloride 104 98 - 107 mmol/L    CO2 31 21 - 32 mmol/L    Anion gap 7 7 - 16 mmol/L    Glucose 84 65 - 100 mg/dL    BUN 27 (H) 6 - 23 MG/DL    Creatinine 3.64 (H) 0.8 - 1.5 MG/DL    GFR est AA 23 (L) >60 ml/min/1.73m2    GFR est non-AA 19 (L) >60 ml/min/1.73m2    Calcium 9.0 8.3 - 33.5 MG/DL   METABOLIC PANEL, BASIC    Collection Time: 08/18/19  5:20 AM   Result Value Ref Range    Sodium 140 136 - 145 mmol/L    Potassium 4.2 3.5 - 5.1 mmol/L    Chloride 106 98 - 107 mmol/L    CO2 30 21 - 32 mmol/L    Anion gap 4 (L) 7 - 16 mmol/L    Glucose 79 65 - 100 mg/dL    BUN 31 (H) 6 - 23 MG/DL    Creatinine 3.74 (H) 0.8 - 1.5 MG/DL    GFR est AA 23 (L) >60 ml/min/1.73m2    GFR est non-AA 19 (L) >60 ml/min/1.73m2    Calcium 8.8 8.3 - 10.4 MG/DL   GLUCOSE, POC    Collection Time: 08/18/19  7:33 PM   Result Value Ref Range Glucose (POC) 136 (H) 65 - 007 mg/dL   METABOLIC PANEL, BASIC    Collection Time: 08/19/19  4:43 AM   Result Value Ref Range    Sodium 140 136 - 145 mmol/L    Potassium 4.8 3.5 - 5.1 mmol/L    Chloride 106 98 - 107 mmol/L    CO2 30 21 - 32 mmol/L    Anion gap 4 (L) 7 - 16 mmol/L    Glucose 84 65 - 100 mg/dL    BUN 37 (H) 6 - 23 MG/DL    Creatinine 3.76 (H) 0.8 - 1.5 MG/DL    GFR est AA 23 (L) >60 ml/min/1.73m2    GFR est non-AA 19 (L) >60 ml/min/1.73m2    Calcium 9.0 8.3 - 10.4 MG/DL       Assessment:     Problem List as of 8/19/2019 Date Reviewed: 7/10/2019          Codes Class Noted - Resolved    Posterior reversible encephalopathy syndrome ICD-10-CM: I67.83  ICD-9-CM: 348.39  8/8/2019 - Present        Seizure cerebral ICD-10-CM: I67.89  ICD-9-CM: 287  8/7/2019 - Present        SAH (subarachnoid hemorrhage) (HCC) ICD-10-CM: I60.9  ICD-9-CM: 430  8/5/2019 - Present        Pneumonia due to methicillin susceptible Staphylococcus aureus (MSSA) ([facility] ICD-10-CM: J15.211  ICD-9-CM: 482.41  7/15/2019 - Present        Tobacco abuse ICD-10-CM: Z72.0  ICD-9-CM: 305.1  7/10/2019 - Present        Acute respiratory failure with hypoxia (HCC) ICD-10-CM: J96.01  ICD-9-CM: 518.81  7/10/2019 - Present        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  ICD-9-CM: 331.3  7/10/2019 - Present        IVH (intraventricular hemorrhage) (HCC) ICD-10-CM: I61.5  ICD-9-CM: 090  7/10/2019 - Present        Seizure ([facility] ICD-10-CM: R56.9  ICD-9-CM: 780.39  7/10/2019 - Present        Elevated serum creatinine ICD-10-CM: R79.89  ICD-9-CM: 790.99  7/10/2019 - Present        Cerebral vasospasm ICD-10-CM: H84.504  ICD-9-CM: 435.9  7/10/2019 - Present        RESOLVED: Endotracheally intubated ICD-10-CM: Z97.8  ICD-9-CM: V49.89  7/10/2019 - 7/10/2019        RESOLVED: SAH (subarachnoid hemorrhage) (HCC) ICD-10-CM: I60.9  ICD-9-CM: 430  7/9/2019 - 7/10/2019          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 Statin; LDL 67.6 ; adequate     Stimulation/ depression ; flat affect, improved with Provigil. Increase celexa. Psych consult  -8/19 staff notices improvement in conversation. Smiling more     Hypokalemia; improved with supplement; monitor; 3.5 8/14; 3.7 8/16 8/18 - K - 4.2; 4.8 this a.m 8/19     Cont magnesium to prevent vasospasm. Daily labs; Mg 2.3 cont suppl. Will change to [facility] labs; 8/16 mag not reported     DVT risk / DVT Prophylaxis- Will require daily physician exam to assess for signs and symptoms as patient is at increased risk for of thromboembolism. Mobilization as tolerated. Intermittent pneumatic compression devices when in bed Thigh-high or knee-high thromboembolic deterrent hose when out of bed. Heparin 5000 units subcut q 8hrs      sz d/o; controlled with increased Keppra 1000mg bid;sz precautions     Pain Control: using NSAID medication regularly, daily. Will require regular pain assessment and comprenhensive pain management,   Norco tapered to 5mg prn       Wound Care: Monitor wound status daily per staff and physician. At risk for failure. Will require 24/7 rehab nursing. Keep wound clean and dry and dc scalp sutures      Hypertension - BP uncontrolled, fluctuating, managed medically. No bp goals at this time. Prolonged HTN for management of SAH to prevent vasospasm contributed to development of PRES. F/u scan improved; now on hydralazine and metoprolol; add parameters. Would like to keep systolic less than 750; monitor symptoms and adjust as needed.  Avoid hypotension  -9/38 309X- 140s / 70-80s range  -8/16 144/81 controlled  -8/18 152/84, fluctuating; 8/19 128/79     Malnutrition; needs supplement; jayce ct. Significant wt loss. Check alb; 2.4  -8/15 added Megace, approx 40 lb wt loss during hospitalization; need to document accurate wt; will change to a regular diet from renal to inc food choices to include things he might eat.   -8/16 seen by Dietician, discussed. Po improving. Eating what family brings in. Changed to reg diet with better choices. Encourage po.  -8/19 excellent po over weekend; monitor wt     Proteinuria; monitor     NING, likely CKD; creat 3.39; slt improvement. BUN 17 improved; renal US 8/6; Echogenic kidneys suggesting chronic renal disease.  No evidence of  Obstruction. 3.52 this a.m 8/14; need strict I/Os  -8/16 creat slowly increasing 3.74 today>3.52> 3.39< 3.51; BUN trending up as well. Will give fluid bolus and continue to monitor. Strict I/0s ordered; however , only # voids, not amt recorded! 8/18 - BUN - 24 > 27 > 31, Cr - 3.7 > 3.6 >3.7, will hold ivf. Begin to monitor I/Os, seemingly baseline Cr - 3.4 -3.7 since hospital admission  -8/19 creat slowly creeping up. No change with IVFs. I/O have not been recorded accurately; STRICT ADHERENCE NEEDED   -reconsult Nephrology. Had prior Protein elctrophoresis. Not sure significance of free light chains kappa/lambda, QT     Anemia likely due to CKD; recheck in a.m. Check stool.  -8/14 hgb 8.7 slt impr (8.5)  -8/15 improved 9.3, stool negative     Urinary retention/ neurogenic bladder - schedule voids q6-8 hrs. Check post-void residual as needed; In and Out catheterize if post-void residual is more than 400 cc.  -8/16 has been continent without residuals     bowel program - add stool softeners and prn bowel program     GERD - resume PPI. At times may need additional antacids, Maalox prn.     Tobacco abuse; stress cessation.  Cont nicoderm patch     Hydrocephalus, resolved s/p VPS     Insomnia - will increase trazodone to 50mg hs prn with prn repeat dose   -8/19 slept better last noc but sleepy this a.m. With increased Trazadone. May have to decrease      Time spent was 35 minutes with over 1/2 in direct patient care/examination, consultation and coordination of care.      Signed By: Nasir Black MD     August 19, 2019

## 2019-08-20 PROBLEM — F32.9 MAJOR DEPRESSIVE EPISODE: Status: ACTIVE | Noted: 2019-08-20

## 2019-08-20 LAB
ANION GAP SERPL CALC-SCNC: 7 MMOL/L (ref 7–16)
BUN SERPL-MCNC: 41 MG/DL (ref 6–23)
CALCIUM SERPL-MCNC: 9.4 MG/DL (ref 8.3–10.4)
CHLORIDE SERPL-SCNC: 106 MMOL/L (ref 98–107)
CO2 SERPL-SCNC: 28 MMOL/L (ref 21–32)
CREAT SERPL-MCNC: 4.12 MG/DL (ref 0.8–1.5)
GLUCOSE SERPL-MCNC: 86 MG/DL (ref 65–100)
POTASSIUM SERPL-SCNC: 4.8 MMOL/L (ref 3.5–5.1)
SODIUM SERPL-SCNC: 141 MMOL/L (ref 136–145)

## 2019-08-20 PROCEDURE — 65310000000 HC RM PRIVATE REHAB

## 2019-08-20 PROCEDURE — 92507 TX SP LANG VOICE COMM INDIV: CPT

## 2019-08-20 PROCEDURE — 97535 SELF CARE MNGMENT TRAINING: CPT

## 2019-08-20 PROCEDURE — 97530 THERAPEUTIC ACTIVITIES: CPT

## 2019-08-20 PROCEDURE — 97110 THERAPEUTIC EXERCISES: CPT

## 2019-08-20 PROCEDURE — 36592 COLLECT BLOOD FROM PICC: CPT

## 2019-08-20 PROCEDURE — 74011250637 HC RX REV CODE- 250/637: Performed by: PHYSICAL MEDICINE & REHABILITATION

## 2019-08-20 PROCEDURE — 77030020263 HC SOL INJ SOD CL0.9% LFCR 1000ML

## 2019-08-20 PROCEDURE — 99232 SBSQ HOSP IP/OBS MODERATE 35: CPT | Performed by: PHYSICAL MEDICINE & REHABILITATION

## 2019-08-20 PROCEDURE — 97116 GAIT TRAINING THERAPY: CPT

## 2019-08-20 PROCEDURE — 90791 PSYCH DIAGNOSTIC EVALUATION: CPT | Performed by: PSYCHOLOGIST

## 2019-08-20 PROCEDURE — 80048 BASIC METABOLIC PNL TOTAL CA: CPT

## 2019-08-20 PROCEDURE — 74011250636 HC RX REV CODE- 250/636: Performed by: PHYSICAL MEDICINE & REHABILITATION

## 2019-08-20 RX ORDER — SODIUM CHLORIDE 9 MG/ML
100 INJECTION, SOLUTION INTRAVENOUS CONTINUOUS
Status: DISCONTINUED | OUTPATIENT
Start: 2019-08-20 | End: 2019-08-22

## 2019-08-20 RX ADMIN — SODIUM CHLORIDE, PRESERVATIVE FREE 900 UNITS: 5 INJECTION INTRAVENOUS at 14:56

## 2019-08-20 RX ADMIN — CITALOPRAM HYDROBROMIDE 20 MG: 20 TABLET ORAL at 08:22

## 2019-08-20 RX ADMIN — SENNOSIDES, DOCUSATE SODIUM 1 TABLET: 50; 8.6 TABLET, FILM COATED ORAL at 08:22

## 2019-08-20 RX ADMIN — HEPARIN SODIUM 5000 UNITS: 5000 INJECTION INTRAVENOUS; SUBCUTANEOUS at 06:00

## 2019-08-20 RX ADMIN — HYDRALAZINE HYDROCHLORIDE 25 MG: 25 TABLET, FILM COATED ORAL at 06:01

## 2019-08-20 RX ADMIN — HYDRALAZINE HYDROCHLORIDE 25 MG: 25 TABLET, FILM COATED ORAL at 21:26

## 2019-08-20 RX ADMIN — FAMOTIDINE 20 MG: 20 TABLET ORAL at 08:23

## 2019-08-20 RX ADMIN — HEPARIN SODIUM 5000 UNITS: 5000 INJECTION INTRAVENOUS; SUBCUTANEOUS at 14:50

## 2019-08-20 RX ADMIN — HEPARIN SODIUM 5000 UNITS: 5000 INJECTION INTRAVENOUS; SUBCUTANEOUS at 21:26

## 2019-08-20 RX ADMIN — MEGESTROL ACETATE 200 MG: 40 SUSPENSION ORAL at 08:29

## 2019-08-20 RX ADMIN — MODAFINIL 200 MG: 100 TABLET ORAL at 08:21

## 2019-08-20 RX ADMIN — CLOPIDOGREL BISULFATE 75 MG: 75 TABLET ORAL at 08:22

## 2019-08-20 RX ADMIN — SODIUM CHLORIDE, PRESERVATIVE FREE 900 UNITS: 5 INJECTION INTRAVENOUS at 06:00

## 2019-08-20 RX ADMIN — HYDROCODONE BITARTRATE AND ACETAMINOPHEN 1 TABLET: 5; 325 TABLET ORAL at 21:26

## 2019-08-20 RX ADMIN — METOPROLOL TARTRATE 100 MG: 100 TABLET ORAL at 17:06

## 2019-08-20 RX ADMIN — Medication 30 ML: at 14:55

## 2019-08-20 RX ADMIN — Medication 10 ML: at 21:35

## 2019-08-20 RX ADMIN — ASPIRIN 325 MG ORAL TABLET 325 MG: 325 PILL ORAL at 08:22

## 2019-08-20 RX ADMIN — SODIUM CHLORIDE, PRESERVATIVE FREE 900 UNITS: 5 INJECTION INTRAVENOUS at 21:35

## 2019-08-20 RX ADMIN — LEVETIRACETAM 1000 MG: 500 TABLET, FILM COATED ORAL at 21:26

## 2019-08-20 RX ADMIN — HYDRALAZINE HYDROCHLORIDE 25 MG: 25 TABLET, FILM COATED ORAL at 14:48

## 2019-08-20 RX ADMIN — SODIUM CHLORIDE 100 ML/HR: 900 INJECTION, SOLUTION INTRAVENOUS at 08:18

## 2019-08-20 RX ADMIN — TRAZODONE HYDROCHLORIDE 50 MG: 50 TABLET ORAL at 21:26

## 2019-08-20 RX ADMIN — METOPROLOL TARTRATE 100 MG: 100 TABLET ORAL at 08:21

## 2019-08-20 RX ADMIN — LEVETIRACETAM 1000 MG: 500 TABLET, FILM COATED ORAL at 08:21

## 2019-08-20 RX ADMIN — Medication 10 ML: at 06:00

## 2019-08-20 NOTE — PROGRESS NOTES
PHYSICAL THERAPY DAILY NOTE  Time In: 1346  Time Out: 9189  Patient Seen For: PM;Gait training;Transfer training; Other (see progress notes)    Subjective: \" I'm tired. \" \" I don't sleep well at night. \"         Objective: NO pain noted. Patient did not engage conversation very much today. GROSS ASSESSMENT Daily Assessment            COGNITION Daily Assessment           BED/MAT MOBILITY Daily Assessment    Rolling Left : 5 (Supervision)  Supine to Sit : 5 (Supervision)  Sit to Supine : 5 (Supervision)       TRANSFERS Daily Assessment    Transfer Type: SPT without device  Transfer Assistance : 4 (Contact guard assistance)  Sit to Stand Assistance: Supervision  Car Transfers: Not tested       GAIT Daily Assessment    Amount of Assistance: 4 (Contact guard assistance)  Distance (ft): 400 Feet (ft)  Assistive Device: Other (comment)(no device)       STEPS or STAIRS Daily Assessment    Steps/Stairs Ambulated (#): 8  Level of Assist : 4 (Contact guard assistance)  Rail Use: Both       BALANCE Daily Assessment            WHEELCHAIR MOBILITY Daily Assessment            LOWER EXTREMITY EXERCISES Daily Assessment    Extremity: Both  Exercise Type #1: Other (comment)(nustep x 10 minutes)  Sets Performed: 1  Reps Performed: 10  Level of Assist: Supervision          Assessment: Patient seems down today. Plan of Care: Continue with plan of care.     Marc Brennan PTA  8/20/2019

## 2019-08-20 NOTE — PROGRESS NOTES
Problem: Falls - Risk of  Goal: *Absence of Falls  Description  Document Effie Late Fall Risk and appropriate interventions in the flowsheet.   Outcome: Progressing Towards Goal  Note:   Fall Risk Interventions:  Mobility Interventions: Utilize walker, cane, or other assistive device    Mentation Interventions: Bed/chair exit alarm, Door open when patient unattended    Medication Interventions: Bed/chair exit alarm, Patient to call before getting OOB    Elimination Interventions: Bed/chair exit alarm, Call light in reach    History of Falls Interventions: Bed/chair exit alarm         Problem: Patient Education: Go to Patient Education Activity  Goal: Patient/Family Education  Outcome: Progressing Towards Goal

## 2019-08-20 NOTE — PROGRESS NOTES
08/20/19 1242   Time Spent With Patient   Time In 0915   Time Out 0955   Patient Seen For: AM   Mental Status   Neurologic State Alert   Orientation Level Oriented to person;Disoriented to place; Disoriented to time;Oriented to situation   Cognition Follows commands   Perception Appears intact   Safety/Judgement Home safety        08/20/19 1243   Verbal Reasoning Exercises-Deductive/Inductive Reasoning, Transivity   Deductive Reasoning Matrix Accuracy (%) 40 %  (word deductions)   Verbal Organization Exercises   Functional Sequencing Accuracy (%) 80 %  (basic level to retain three words and sequence in appropriate order)   Memory Exercises   Functional Tasks answered orientation questions with direct visual aid provided with Junaid (cues to refer back to the handout rather than guessing intermittently)   Neuro-Linguistic Exercises   Verbal Reasoning Tasks Impaired   Verbal Problem Solving Impaired   Verbal Organization Impaired   Memory Impaired   Attention  Impaired     Patient participated with cognitive therapy. Improved appetite. He recalled that he had two bowls of cereal at breakfast this morning but required cues to recall the type of cereal.  Able to choose the correct one in multiple choice format. Pt asked for more to eat and fruit was provided. Answered orientation questions with a visual aid provided with Junaid (cues to refer back to use the visual aid needed). Executive function task involving word deductions completed 2/5 independently with additional time. Required mod-max cues with the remainder of the items often stating a similar item that was closely related. Immediate memory and problem solving task to sequence three words in order of occurrence with Junaid given additional time. Patient answered questions related to a picture of his family within the room. Decreased word finding to state the name of the [employer] he attends but recalled later in the session.   Recommend continued therapy to address cognitive-linguistic deficits.     Luis Angel Callaway MS, CCC-SLP

## 2019-08-20 NOTE — PROGRESS NOTES
[facility] PROGRESS NOTE    Follow up for: NING over ckd     Subjective:   Patient seen and examined.   ROS:  Gen - no fever, no chills, appetite okay  CV - no chest pain, no orthopnea  Lung - no shortness of breath, no cough  Abd - no tenderness, no nausea, no vomiting  Ext - no edema    Objective:   Exam:  Vitals:    08/19/19 1731 08/19/19 1912 08/20/19 0600 08/20/19 0741   BP: (!) 153/92 129/73 151/90 135/88   Pulse: 73 66 70 72   Resp:  16 16 12   Temp:  99.3 °F (37.4 °C) 99 °F (37.2 °C) 98.5 °F (36.9 °C)   SpO2:  97% 98% 97%   Weight:             Intake/Output Summary (Last 24 hours) at 8/20/2019 1500  Last data filed at 8/20/2019 6399  Gross per 24 hour   Intake 240 ml   Output 2275 ml   Net -2035 ml       Current Facility-Administered Medications   Medication Dose Route Frequency    0.9% sodium chloride infusion  100 mL/hr IntraVENous CONTINUOUS    traZODone (DESYREL) tablet 50 mg  50 mg Oral QHS PRN    HYDROcodone-acetaminophen (NORCO) 5-325 mg per tablet 1 Tab  1 Tab Oral Q6H PRN    megestrol (MEGACE) 400 mg/10 mL (10 mL) oral suspension 200 mg  200 mg Oral DAILY    ondansetron (ZOFRAN ODT) tablet 4 mg  4 mg Oral Q6H PRN    lactulose (CHRONULAC) 10 gram/15 mL solution 45 mL  30 g Oral DAILY PRN    ondansetron (ZOFRAN) injection 4 mg  4 mg IntraVENous Q4H PRN    heparin (porcine) injection 5,000 Units  5,000 Units SubCUTAneous Q8H    aspirin tablet 325 mg  325 mg Oral DAILY    clopidogrel (PLAVIX) tablet 75 mg  75 mg Oral DAILY    famotidine (PEPCID) tablet 20 mg  20 mg Oral DAILY    heparin (porcine) pf 300-900 Units  300-900 Units InterCATHeter PRN    heparin (porcine) pf 900 Units  900 Units InterCATHeter Q8H    hydrALAZINE (APRESOLINE) tablet 25 mg  25 mg Oral TID    levETIRAcetam (KEPPRA) tablet 1,000 mg  1,000 mg Oral BID    metoprolol tartrate (LOPRESSOR) tablet 100 mg  100 mg Oral BID    modafinil (PROVIGIL) tablet 200 mg  200 mg Oral DAILY    nicotine (NICODERM CQ) 14 mg/24 hr patch 1 Patch  1 Patch TransDERmal Q24H    SALINE PERIPHERAL FLUSH Q8H soln 10-30 mL  10-30 mL InterCATHeter Q8H    saline peripheral flush soln 10-30 mL  10-30 mL InterCATHeter PRN    acetaminophen (TYLENOL) tablet 650 mg  650 mg Oral Q6H PRN    citalopram (CELEXA) tablet 20 mg  20 mg Oral DAILY    senna-docusate (PERICOLACE) 8.6-50 mg per tablet 1 Tab  1 Tab Oral DAILY       EXAM  GEN - Alert, oriented, in no distress  CV - S1, S2, RRR, no rub, murmur, or gallop  Lung - clear to auscultation bilaterally  Abd - soft, nontender, BS present  Ext - no edema    No results for input(s): WBC, HGB, HCT, PLT, HGBEXT, HCTEXT, PLTEXT, HGBEXT, HCTEXT, PLTEXT in the last 72 hours. Recent Labs     08/20/19  0501 08/19/19  0612 08/19/19  0443 08/18/19  0520     --  140 140   K 4.8  --  4.8 4.2     --  106 106   CO2 28  --  30 30   BUN 41*  --  37* 31*   CREA 4.12*  --  3.76* 3.74*   CA 9.4  --  9.0 8.8   GLU 86  --  84 79   MG  --  2.6*  --   --        Assessment and Plan:   NING on CKD stage IV  - NING vs CKD. Creatinine has been mid 2's to mid 3's since admission, Worse with volume depletion and improved with IVF  - US kidney - some cysts and chronic changes   - reviewed UA - proteinuria present, no active sediments   - Nephrotic syndrome 4.5 gms   - serologic workup negative. K/L ratio 2.15 with no M-spike. No abnormal proteins  - As per father, he has been known to have kidney problems in the past, has been checked for that 8 yrs back . No family h/o of PCKD known   - follow labs. Renal function relatively stable over last couple of weeks  - need to encourage po intake.  Continue fluids for now.     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

## 2019-08-20 NOTE — PROGRESS NOTES
Problem: Falls - Risk of  Goal: *Absence of Falls  Description  Document Carolyn Adams Fall Risk and appropriate interventions in the flowsheet. Outcome: Progressing Towards Goal  Note:   Fall Risk Interventions:  Mobility Interventions: OT consult for ADLs, Patient to call before getting OOB, PT Consult for mobility concerns, PT Consult for assist device competence    Mentation Interventions: Adequate sleep, hydration, pain control, Bed/chair exit alarm, Door open when patient unattended, Evaluate medications/consider consulting pharmacy    Medication Interventions: Bed/chair exit alarm, Evaluate medications/consider consulting pharmacy, Patient to call before getting OOB    Elimination Interventions: Bed/chair exit alarm, Call light in reach, Patient to call for help with toileting needs    History of Falls Interventions: Evaluate medications/consider consulting pharmacy, Consult care management for discharge planning, Door open when patient unattended         Problem: Patient Education: Go to Patient Education Activity  Goal: Patient/Family Education  Outcome: Progressing Towards Goal     Problem: Pressure Injury - Risk of  Goal: *Prevention of pressure injury  Description  Document Anselmo Scale and appropriate interventions in the flowsheet.   Outcome: Progressing Towards Goal  Note:   Pressure Injury Interventions:  Sensory Interventions: Check visual cues for pain, Discuss PT/OT consult with provider, Keep linens dry and wrinkle-free, Minimize linen layers, Maintain/enhance activity level, Pressure redistribution bed/mattress (bed type)    Moisture Interventions: Absorbent underpads, Apply protective barrier, creams and emollients, Moisture barrier, Offer toileting Q_hr    Activity Interventions: Pressure redistribution bed/mattress(bed type), PT/OT evaluation, Increase time out of bed    Mobility Interventions: Pressure redistribution bed/mattress (bed type)    Nutrition Interventions: Document food/fluid/supplement intake    Friction and Shear Interventions: Lift sheet, Lift team/patient mobility team, Foam dressings/transparent film/skin sealants, Minimize layers                Problem: Patient Education: Go to Patient Education Activity  Goal: Patient/Family Education  Outcome: Progressing Towards Goal     Problem: Infection - Risk of, Surgical Site Infection  Goal: *Absence of surgical site infection signs and symptoms  Outcome: Progressing Towards Goal     Problem: Patient Education: Go to Patient Education Activity  Goal: Patient/Family Education  Outcome: Progressing Towards Goal     Problem: Patient Education: Go to Patient Education Activity  Goal: Patient/Family Education  Outcome: Progressing Towards Goal     Problem: Patient Education: Go to Patient Education Activity  Goal: Patient/Family Education  Description  Patient / Jordno Reyes family will verbalize understanding of PT safety recommendations, demonstrate appropriate assist for current functional mobility status, safety, and home exercise program by time of discharge.    Outcome: Progressing Towards Goal     Problem: Patient Education: Go to Patient Education Activity  Goal: Patient/Family Education  Outcome: Progressing Towards Goal     Problem: Nutrition Deficit  Goal: *Optimize nutritional status  Outcome: Progressing Towards Goal

## 2019-08-20 NOTE — PROGRESS NOTES
08/20/19 1357   Time Spent With Patient   Time In 1302   Time Out 1345   Patient Seen For: PM;Other (see progress notes); ADLs   Toileting   Toileting Assistance (FIM Score) S  (SBA to stand to urinate )   Functional Transfers   Toilet Transfer  Stand pivot transfer without device   Amount of Assistance Required CGA   S: \"Jon Lyn. \" [patient recalled this therapist's name correctly and initiated saying [facility] G[facility] Push" as conversationally appropriate, after therapist stated [facility] Hardik\" upon entry into room.]  O: Patient presented sidelying in bed. Agreeable to treatment. Patient transferred to edge of bed with supervision and completed toileting; see above. Patient ambulated from room to gym pushing empty wheelchair, with focus on functional mobility and wayfinding. Patient correctly located gym without cueing. In gym, patient completed visual perceptual reasoning task using bicolor blocks with and without visual guidelines x 8 patterns, working on visual perceptual skills for improved safety and independence with functional transfers. Required intermittent moderate cueing for problem solving throughout task. Required entire session to complete. Educated patient on visual perceptual techniques with fair carryover. A: Would benefit from further cognitive and visual perceptual interventions. Patient tolerated session well with no complaint of pain. P: Continue OT POC with focus on ADL/IADL skills, cognition, safety awareness, insight, functional transfers, functional mobility, coordination, strength, static and dynamic balance, and activity tolerance to maximize safety and independence with ADLs and functional transfers. Patient was left seated up in [facility] in gym for PT with PT present. Interdisciplinary communication: Collaborated with PT and confirmed that patient is on track to meet goals.      Monica Love MS, OTR/L  8/20/2019          Note may contain the following abbreviations:   Abbreviation Explanation AROM Active range of motion   PROM Passive range of motion   SPT Stand pivot transfer   LPT Lateral pivot transfer   WC wheelchair   RW Rolling walker    BUE Bilateral upper extremities   BLE Bilateral lower extremities    WBAT Weight bearing as tolerated    TTWB Toe-touch weight bearing    AD Adaptive device   AE Adaptive equipment    NMES Neuro muscular electrical stimulation   UBE Upper body ergometer

## 2019-08-20 NOTE — PROGRESS NOTES
Nutrition F/U:  Calorie Count. Assessment:  Daily average= 1772 kcal, 97 g protein  Note several meals patient did not consume ensure enlive drink, but which meal was not consistent. Patient also had meal brought in by family and was unable to quantify nutritional intake from that meal.     Macronutrient Needs:  Estimated calorie needs - 2529-7378 jayce/day (25-28 jayce/kg/day)   Estimated protein needs - 69-83 gm pro/day (1-1.2 gm pro/kg/day) (GFR 23 ml/min)  Intake/Comparative Standards:  Intake per calorie count meets 100% kcal needs and >100% protein needs when Ensure Enlive is consumed at least 1.5 bottles per day. Intervention:   Meals and Snacks: Regular. Medical Food Supplement Therapy: Commercial Beverage: Continue chocolate Ensure Enlive with all meals. Nutrition Discharge Plan: Too soon to determine. Likely would benefit from continuing oral nutrition supplement along with regular nourishing meals and snacks.      Cande Anaya, [address], [address]

## 2019-08-20 NOTE — PROGRESS NOTES
OT WEEKLY PROGRESS NOTE    Time In: 0831  Time Out: 0915    COMPREHENSION MODE Initial Assessment Weekly Progress Assessment 8/20/2019   Score 3 4     EXPRESSION Initial Assessment Weekly Progress Assessment 8/20/2019   Primary Mode of Expression Verbal Verbal   Score 2 4   Comments very low volume, difficult to understand and has to repeat almost everything said. Uses single words and short phrases only. low volume, difficult to understand      SOCIAL INTERACTION/ PRAGMATICS Initial Assessment Weekly Progress Assessment 8/20/2019   Score 2 3   Comments flat affect, withdrawn, needs cues to initiate interaction. interacts appropriately 25-49% of the time. often withdrawn, interacts appropriately 50% of the time      PROBLEM SOLVING Initial Assessment Weekly Progress Assessment 8/20/2019   Score 2 3   Comments needs direction >50% of the time to intiate simple activities  solves routine problems 50% of the time      MEMORY Initial Assessment Weekly Progress Assessment 8/20/2019   Score 2 2   Comments executes 1 of 2 step request 25-49% of the time  limited carryover of training      EATING Initial Assessment Weekly Progress Assessment 8/20/2019   Functional Level 5 5   Comments patient refused breakfast but did drink juice after setup assist  setup assist      GROOMING Initial Assessment Weekly Progress Assessment 8/20/2019   Functional Level 5 5   Tasks completed by patient Washed face, Washed hands Washed face; Washed hands;Brushed teeth   Comments setup assist; verbal cueing for strategy for closing listerine bottle  moderate verbal cueing for sequencing and thoroughness      BATHING Initial Assessment Weekly Progress Assessment 8/20/2019   Functional Level 4 4   Body parts patient bathed Abdomen, Arm, right, Arm, left, Buttocks, Chest, Lower leg and foot, left, Lower leg and foot, right, Ladonna area, Thigh, left, Thigh, right Abdomen;Arm, left;Arm, right;Buttocks; Chest;Lower leg and foot, left; Lower leg and foot, right;Rigoberto area; Thigh, left; Thigh, right   Comments CGA in standing CGA when standing for rigoberto care; moderate cueing for safety      TUB/SHOWER TRANSFER INDEPENDENCE Initial Assessment Weekly Progress Assessment 8/20/2019   Score 1 4   Comments required RW for safety; ambulated without AE prior to admission  CGA without AE      UPPER BODY DRESSING/UNDRESSING Initial Assessment Weekly Progress Assessment 8/20/2019   Functional Level 5 5   Items applied/Steps completed Pullover (4 steps) Pullover (4 steps)   Comments setup assist  setup assist      LOWER BODY DRESSING/UNDRESSING Initial Assessment Weekly Progress Assessment 8/20/2019   Functional Level 4 5   Items applied/Steps completed Elastic waist pants (3 steps), Sock, left (1 step), Sock, right (1 step), Underpants (3 steps) Elastic waist pants (3 steps); Sock, left (1 step); Sock, right (1 step); Fasten shoe (1 step); Shoe, left (1 step); Underpants (3 steps); Shoe, right (1 step)   Comments patient completed 6/8 steps, requiring total A for B socks. TOILETING Initial Assessment Weekly Progress Assessment 8/20/2019   Functional Level 4 4   Comments steadying assist  steadying assist during clothing management      TOILET TRANSFER INDEPENDENCE Initial Assessment Weekly Progress Assessment 8/20/2019   Transfer score 1 4   Comments required RW  CGA without AE      Plan of Care: Please see Care Plan for updated LTGs. Family Training:  needs to be scheduled     Summary of Progress: Patient is making steady progress with ADL skills and transfers as noted above. Patient remains with significant cognitive deficits including impaired memory, problem solving, attention, and insight. Continue to anticipate patient will require 24/7 supervision and assist with IADL tasks. Summary of Session: Patient sideyling in bed on arrival to room completing breakfast. Agreeable to treatment. Completed ADL; see above for details.  Patient gathered own clothing with CGA without AE with moderate cueing for initiation and thoroughness. Patient is making steady progress towards goals. Continue OT POC with focus on ADL/IADL skills, functional transfers, functional mobility, neuro re-education, cognition, attention, coordination, strength, static and dynamic balance, and activity tolerance to maximize safety and independence with ADLs and functional transfers. Patient was left seated up in recliner with call light/all needs in reach and chair alarm activated.      Sangita Patiño MS, OTR/L  8/20/2019

## 2019-08-20 NOTE — PROGRESS NOTES
Alessandra Krabbe, MD,   Medical Director  [address] [facility], [place], [address] [facility]  Tel: [phone]       D PROGRESS NOTE    Wesley Love  Admit Date: 8/13/2019  Admit Diagnosis: SAH (subarachnoid hemorrhage) ([address] [I60.9]    Subjective     Drowsy, but I woke him up. Not very conversive. Ambulating 150 ft without AD with min assist. Participating and making good progress.  Po intake improving  Wt 132.4  Objective:     Current Facility-Administered Medications   Medication Dose Route Frequency    traZODone (DESYREL) tablet 50 mg  50 mg Oral QHS PRN    HYDROcodone-acetaminophen (NORCO) 5-325 mg per tablet 1 Tab  1 Tab Oral Q6H PRN    megestrol (MEGACE) 400 mg/10 mL (10 mL) oral suspension 200 mg  200 mg Oral DAILY    ondansetron (ZOFRAN ODT) tablet 4 mg  4 mg Oral Q6H PRN    lactulose (CHRONULAC) 10 gram/15 mL solution 45 mL  30 g Oral DAILY PRN    ondansetron (ZOFRAN) injection 4 mg  4 mg IntraVENous Q4H PRN    heparin (porcine) injection 5,000 Units  5,000 Units SubCUTAneous Q8H    aspirin tablet 325 mg  325 mg Oral DAILY    clopidogrel (PLAVIX) tablet 75 mg  75 mg Oral DAILY    famotidine (PEPCID) tablet 20 mg  20 mg Oral DAILY    heparin (porcine) pf 300-900 Units  300-900 Units InterCATHeter PRN    heparin (porcine) pf 900 Units  900 Units InterCATHeter Q8H    hydrALAZINE (APRESOLINE) tablet 25 mg  25 mg Oral TID    levETIRAcetam (KEPPRA) tablet 1,000 mg  1,000 mg Oral BID    metoprolol tartrate (LOPRESSOR) tablet 100 mg  100 mg Oral BID    modafinil (PROVIGIL) tablet 200 mg  200 mg Oral DAILY    nicotine (NICODERM CQ) 14 mg/24 hr patch 1 Patch  1 Patch TransDERmal Q24H    SALINE PERIPHERAL FLUSH Q8H soln 10-30 mL  10-30 mL InterCATHeter Q8H    saline peripheral flush soln 10-30 mL  10-30 mL InterCATHeter PRN    acetaminophen (TYLENOL) tablet 650 mg  650 mg Oral Q6H PRN    citalopram (CELEXA) tablet 20 mg  20 mg Oral DAILY    senna-docusate (PERICOLACE) 8.6-50 mg per tablet 1 Tab  1 Tab Oral DAILY     Review of Systems:Denies chest pain, shortness of breath, cough, headache, visual problems, abdominal pain, dysurea, calf pain. Pertinent positives are as noted in the medical records and unremarkable otherwise. Visit Vitals  /90 (BP 1 Location: Left arm, BP Patient Position: Post activity)   Pulse 70   Temp 99 °F (37.2 °C)   Resp 16   Wt 132 lb 6.4 oz (60.1 kg)   SpO2 98%   BMI 17.96 kg/m²        Physical Exam:   General: Awakens to voice but drowsy, oriented to person ,hospital, year  No acute cardio respiratory distress. HEENT: Normocephalic,no scleral icterus  Oral mucosa moist without cyanosis   Lungs: Clear to auscultation  bilaterally. Respiration even and unlabored   Heart: Regular rate and rhythm, S1, S2   No  murmurs, clicks, rub or gallops   Abdomen: Soft, non-tender, nondistended. Bowel sounds present. No organomegaly. Genitourinary: defer   Neuromuscular:      Impaired attn, dec concentration, cannot recall what he did yesterday; difficulty with simple math addn and subst.  Recall 2/4 with vcs after 3 min   Skin/extremity: No rashes, no erythema. No calf tenderness BLE  No edema                                                                            Functional Assessment:          Balance  Sitting - Static: Good (unsupported) (08/17/19 1200)  Sitting - Dynamic: Good (unsupported) (08/17/19 1200)  Standing - Static: Fair (08/17/19 1200)  Standing - Dynamic : Impaired (08/17/19 1200)                     Tamela Knight Fall Risk Assessment:  Tamela Knight Fall Risk  Mobility: Ambulates or transfers with assist devices or assistance (08/19/19 1951)  Mobility Interventions: Utilize walker, cane, or other assistive device (08/19/19 1951)  Mentation: Periodic confusion (08/19/19 1951)  Mentation Interventions: Bed/chair exit alarm; Door open when patient unattended (08/19/19 1951)  Medication: Patient receiving anticonvulsants, sedatives(tranquilizers), psychotropics or hypnotics, hypoglycemics, narcotics, sleep aids, antihypertensives, laxatives, or diuretics (08/19/19 1951)  Medication Interventions: Bed/chair exit alarm; Patient to call before getting OOB (08/19/19 1951)  Elimination: Needs assistance with toileting (08/19/19 1951)  Elimination Interventions: Bed/chair exit alarm;Call light in reach (08/19/19 1951)  Prior Fall History: Before admission in past 12 months _home or previous inpatient care) (08/19/19 1951)  History of Falls Interventions: Bed/chair exit alarm (08/19/19 1951)  Total Score: 5 (08/19/19 1951)  Standard Fall Precautions: Yes (08/18/19 7128)  High Fall Risk: Yes (08/19/19 1951)     Speech Assessment:         Ambulation:  Gait  Distance (ft): 150 Feet (ft) (08/19/19 1649)  Assistive Device: Other (comment)(no device) (08/19/19 1649)  Rail Use: Both (08/17/19 1200)     Labs/Studies:  Recent Results (from the past 72 hour(s))   METABOLIC PANEL, BASIC    Collection Time: 08/18/19  5:20 AM   Result Value Ref Range    Sodium 140 136 - 145 mmol/L    Potassium 4.2 3.5 - 5.1 mmol/L    Chloride 106 98 - 107 mmol/L    CO2 30 21 - 32 mmol/L    Anion gap 4 (L) 7 - 16 mmol/L    Glucose 79 65 - 100 mg/dL    BUN 31 (H) 6 - 23 MG/DL    Creatinine 3.74 (H) 0.8 - 1.5 MG/DL    GFR est AA 23 (L) >60 ml/min/1.73m2    GFR est non-AA 19 (L) >60 ml/min/1.73m2    Calcium 8.8 8.3 - 10.4 MG/DL   GLUCOSE, POC    Collection Time: 08/18/19  7:33 PM   Result Value Ref Range    Glucose (POC) 136 (H) 65 - 592 mg/dL   METABOLIC PANEL, BASIC    Collection Time: 08/19/19  4:43 AM   Result Value Ref Range    Sodium 140 136 - 145 mmol/L    Potassium 4.8 3.5 - 5.1 mmol/L    Chloride 106 98 - 107 mmol/L    CO2 30 21 - 32 mmol/L    Anion gap 4 (L) 7 - 16 mmol/L    Glucose 84 65 - 100 mg/dL    BUN 37 (H) 6 - 23 MG/DL    Creatinine 3.76 (H) 0.8 - 1.5 MG/DL    GFR est AA 23 (L) >60 ml/min/1.73m2    GFR est non-AA 19 (L) >60 ml/min/1.73m2    Calcium 9.0 8.3 - 10.4 MG/DL   MAGNESIUM    Collection Time: 08/19/19  6:12 AM   Result Value Ref Range    Magnesium 2.6 (H) 1.8 - 2.4 mg/dL   METABOLIC PANEL, BASIC    Collection Time: 08/20/19  5:01 AM   Result Value Ref Range    Sodium 141 136 - 145 mmol/L    Potassium 4.8 3.5 - 5.1 mmol/L    Chloride 106 98 - 107 mmol/L    CO2 28 21 - 32 mmol/L    Anion gap 7 7 - 16 mmol/L    Glucose 86 65 - 100 mg/dL    BUN 41 (H) 6 - 23 MG/DL    Creatinine 4.12 (H) 0.8 - 1.5 MG/DL    GFR est AA 20 (L) >60 ml/min/1.73m2    GFR est non-AA 17 (L) >60 ml/min/1.73m2    Calcium 9.4 8.3 - 10.4 MG/DL       Assessment:     Problem List as of 8/20/2019 Date Reviewed: 7/10/2019          Codes Class Noted - Resolved    Posterior reversible encephalopathy syndrome ICD-10-CM: I67.83  ICD-9-CM: 348.39  8/8/2019 - Present        Seizure cerebral ICD-10-CM: I67.89  ICD-9-CM: 177  8/7/2019 - Present        SAH (subarachnoid hemorrhage) (HCC) ICD-10-CM: I60.9  ICD-9-CM: 430  8/5/2019 - Present        Pneumonia due to methicillin susceptible Staphylococcus aureus (MSSA) ([facility] ICD-10-CM: J15.211  ICD-9-CM: 482.41  7/15/2019 - Present        Tobacco abuse ICD-10-CM: Z72.0  ICD-9-CM: 305.1  7/10/2019 - Present        Acute respiratory failure with hypoxia (HCC) ICD-10-CM: J96.01  ICD-9-CM: 518.81  7/10/2019 - Present        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  ICD-9-CM: 331.3  7/10/2019 - Present        IVH (intraventricular hemorrhage) (HCC) ICD-10-CM: I61.5  ICD-9-CM: 038  7/10/2019 - Present        Seizure ([address] ICD-10-CM: R56.9  ICD-9-CM: 780.39  7/10/2019 - Present        Elevated serum creatinine ICD-10-CM: R79.89  ICD-9-CM: 790.99  7/10/2019 - Present        Cerebral vasospasm ICD-10-CM: G72.011  ICD-9-CM: 435.9  7/10/2019 - Present        RESOLVED: Endotracheally intubated ICD-10-CM: Z97.8  ICD-9-CM: V49.89  7/10/2019 - 7/10/2019 RESOLVED: SAH (subarachnoid hemorrhage) (HCC) ICD-10-CM: I60.9  ICD-9-CM: 409  7/9/2019 - 7/10/2019            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 Statin; LDL 67.6 ; adequate     Stimulation/ depression ; flat affect, improved with Provigil. Increase celexa. Psych consult  -8/19 staff notices improvement in conversation. Smiling more     Hypokalemia; improved with supplement; monitor; 3.5 8/14; 3.7 8/16 8/18 - K - 4.2; 4.8 this a.m 8/19     Cont magnesium to prevent vasospasm. Daily labs; Mg 2.3 cont suppl. Will change to [facility] labs; 8/16 mag not reported     DVT risk / DVT Prophylaxis- Will require daily physician exam to assess for signs and symptoms as patient is at increased risk for of thromboembolism. Mobilization as tolerated. Intermittent pneumatic compression devices when in bed Thigh-high or knee-high thromboembolic deterrent hose when out of bed. Heparin 5000 units subcut q 8hrs      sz d/o; controlled with increased Keppra 1000mg bid;sz precautions     Pain Control: using NSAID medication regularly, daily. Will require regular pain assessment and comprenhensive pain management,   Norco tapered to 5mg prn       Wound Care: Monitor wound status daily per staff and physician. At risk for failure. Will require 24/7 rehab nursing. Keep wound clean and dry and dc scalp sutures      Hypertension - BP uncontrolled, fluctuating, managed medically. No bp goals at this time. Prolonged HTN for management of SAH to prevent vasospasm contributed to development of PRES. F/u scan improved; now on hydralazine and metoprolol; add parameters. Would like to keep systolic less than 904; monitor symptoms and adjust as needed.  Avoid hypotension  -5/18 214D- 140s / 70-80s range  -8/16 144/81 controlled  -[id number], fluctuating; 8/19 128/79     Malnutrition; needs supplement; jayec ct. Significant wt loss. Check alb; 2.4  -8/15 added Megace, approx 40 lb wt loss during hospitalization; need to document accurate wt; will change to a regular diet from renal to inc food choices to include things he might eat.   -8/16 seen by Dietician, discussed. Po improving. Eating what family brings in. Changed to reg diet with better choices. Encourage po.  -8/19 excellent po over weekend; monitor wt     Proteinuria; monitor     NING, likely CKD; creat 3.39; slt improvement. BUN 17 improved; renal US 8/6; Echogenic kidneys suggesting chronic renal disease.  No evidence of  Obstruction. 3.52 this a.m 8/14; need strict I/Os  -8/16 creat slowly increasing 3.74 today>3.52> 3.39< 3.51; BUN trending up as well. Will give fluid bolus and continue to monitor. Strict I/0s ordered; however , only # voids, not amt recorded! 8/18 - BUN - 24 > 27 > 31, Cr - 3.7 > 3.6 >3.7, will hold ivf. Begin to monitor I/Os, seemingly baseline Cr - 3.4 -3.7 since hospital admission  -8/19 creat slowly creeping up. No change with IVFs. I/O have not been recorded accurately; STRICT ADHERENCE NEEDED   -reconsult Nephrology. Had prior Protein elctrophoresis. Not sure significance of free light chains kappa/lambda, QT;  8/20 per nephrol No M spike or abnl proteins  -8/20 appreciate Nephrology assist; bun creat increasing; 41/4/12, mag 2.6; has been eating much better; may be dry; will give IVFs; however, didn't make much difference last week. Stg IV. UOP better documented ; 1800ml, intake only noted to be 540cc. ; recheck in a.m after flds.      Anemia likely due to CKD; recheck in a.m. Check stool.  -8/14 hgb 8.7 slt impr (8.5)  -8/15 improved 9.3, stool negative     Urinary retention/ neurogenic bladder - schedule voids q6-8 hrs. Check post-void residual as needed;  In and Out catheterize if post-void residual is more than 400 cc.  -8/16 has been continent without residuals     bowel program - add stool softeners and prn bowel program     GERD - resume PPI. At times may need additional antacids, Maalox prn.     Tobacco abuse; stress cessation. Cont nicoderm patch     Hydrocephalus, resolved s/p VPS     Insomnia - will increase trazodone to 50mg hs prn with prn repeat dose   -8/19 slept better last noc but sleepy this a.m. With increased Trazadone. May have to decrease         Time spent was 25 minutes with over 1/2 in direct patient care/examination, consultation and coordination of care.      Signed By: Darya Castro MD     August 20, 2019

## 2019-08-20 NOTE — PROGRESS NOTES
PSYCHOLOGY PROGRESS NOTE      Name:  Konrad Santos    Date of Service: 8/20/2019  Location of Service:   910/01  Type of Service: 62807 Initial Psychodiagnostic Evaluation, Staff Consultation, Chart Review, Research on Behalf of Patient, Mental Health Provider Travel Time  Duration:  60 minutes  Primary Diagnosis:   1. Seizure ([facility])    2. Posterior reversible encephalopathy syndrome    3. Seizure cerebral    4. Tobacco abuse    5. 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 Acute respiratory failure with hypoxia ([facility])    7. Cerebral vasospasm    8. Communicating hydrocephalus    9. Elevated serum creatinine    10. IVH (intraventricular hemorrhage) ([facility])    11. Pneumonia of left lower lobe due to methicillin susceptible Staphylococcus aureus (MSSA) ([facility])        Summary of Service:  Engaged in initial with patient. Affect flat, eye contact initially avoidant but was responsive to interaction. Patient indicates belief that he may have been admitted to this hospital once before for the same problem [facility]). No support system. Works for [employer] engaged in . Asked patient what he would like us to do for him to help him get better, he stated, \"keep talking to me, improve my motivation, help me do things I used to do\". Enjoyed drawing, washing his car, and watching TV. Provided patient with pens and paper to get back to drawing. During this evaluation patient remarked that he typically eats once per day (prior to admission), normally rice, and does this because, \"I don't want to get fat\". I clarified with patient whether he was referring to right now, or, prior to admission. He indicated that he normally eats once a day prior to admission because he is worried about getting fat. He also said finances are always a concern. I am holding as a diagnostic rule-out the possibility that Mr. Debbi Pacheco may have a history of anorexia.     Patient's mood is consistent with the presence of a major depressive episode. History: Kayleen Sanabria denies past inpatient/outpatient mental health treatment as well as diagnostic history. While he denies past mental health treatment, it will be helpful to carefully examine his past experiences in [place] to rule out inpatient psychiatric admission history. Pt appears capable and able to respond to rehabilitation services. Patient also denies history of alcohol and drug treatment. SI/HI denied. Social and family history discussed. Patient is first generation, father is originally from [place].  [address] denied. Introduced psychological services in rehab, and, provided overview of the rehab process. Mental Status   Individuals Condition Within Normal Limits If notable, list the changes in individuals condition:    Appearance and Behavior: [x]  Yes      [] No    Mood and Affect:   []  Yes      [x] No Flat affect, major depressive episode   Speech:   []  Yes      [x] No Poverty of content   Thought Process:   []  Yes      [x] No Immediate memory impairment   Thought Content:   [x]  Yes      [] No    Cognition []  Yes      [x] No Impaired processing speed       Recommendations/Initial Treatment Plan: It is recommended that Kayleen Sanabria 's treatment team consider the results of this evaluation as they pertain to relevant treatment and discharge planning. Plan to continue to follow-up with patient during this admission for psychological treatment, as he is requesting psychology continue to provide services. Recommend rule-out of premorbid anorexia and premorbid mental ferdinand treatment history during adolescence/young adulthood. Recommend medication management for his depressed mood. Hien Cuadra Psy.D.   Psychologist

## 2019-08-21 LAB
ANION GAP SERPL CALC-SCNC: 5 MMOL/L (ref 7–16)
BUN SERPL-MCNC: 44 MG/DL (ref 6–23)
CALCIUM SERPL-MCNC: 8.9 MG/DL (ref 8.3–10.4)
CHLORIDE SERPL-SCNC: 109 MMOL/L (ref 98–107)
CO2 SERPL-SCNC: 28 MMOL/L (ref 21–32)
CREAT SERPL-MCNC: 3.98 MG/DL (ref 0.8–1.5)
GLUCOSE SERPL-MCNC: 95 MG/DL (ref 65–100)
MAGNESIUM SERPL-MCNC: 2.6 MG/DL (ref 1.8–2.4)
POTASSIUM SERPL-SCNC: 5.1 MMOL/L (ref 3.5–5.1)
SODIUM SERPL-SCNC: 142 MMOL/L (ref 136–145)

## 2019-08-21 PROCEDURE — 80048 BASIC METABOLIC PNL TOTAL CA: CPT

## 2019-08-21 PROCEDURE — 74011250637 HC RX REV CODE- 250/637: Performed by: PHYSICAL MEDICINE & REHABILITATION

## 2019-08-21 PROCEDURE — 92507 TX SP LANG VOICE COMM INDIV: CPT

## 2019-08-21 PROCEDURE — 97530 THERAPEUTIC ACTIVITIES: CPT

## 2019-08-21 PROCEDURE — 74011250636 HC RX REV CODE- 250/636: Performed by: PHYSICAL MEDICINE & REHABILITATION

## 2019-08-21 PROCEDURE — 99232 SBSQ HOSP IP/OBS MODERATE 35: CPT | Performed by: PHYSICAL MEDICINE & REHABILITATION

## 2019-08-21 PROCEDURE — 83735 ASSAY OF MAGNESIUM: CPT

## 2019-08-21 PROCEDURE — 97116 GAIT TRAINING THERAPY: CPT

## 2019-08-21 PROCEDURE — 97535 SELF CARE MNGMENT TRAINING: CPT

## 2019-08-21 PROCEDURE — 77030020263 HC SOL INJ SOD CL0.9% LFCR 1000ML

## 2019-08-21 PROCEDURE — 65310000000 HC RM PRIVATE REHAB

## 2019-08-21 PROCEDURE — 97110 THERAPEUTIC EXERCISES: CPT

## 2019-08-21 RX ADMIN — LEVETIRACETAM 1000 MG: 500 TABLET, FILM COATED ORAL at 08:13

## 2019-08-21 RX ADMIN — HEPARIN SODIUM 5000 UNITS: 5000 INJECTION INTRAVENOUS; SUBCUTANEOUS at 14:23

## 2019-08-21 RX ADMIN — HYDRALAZINE HYDROCHLORIDE 25 MG: 25 TABLET, FILM COATED ORAL at 05:19

## 2019-08-21 RX ADMIN — SODIUM CHLORIDE, PRESERVATIVE FREE 900 UNITS: 5 INJECTION INTRAVENOUS at 22:21

## 2019-08-21 RX ADMIN — MODAFINIL 200 MG: 100 TABLET ORAL at 08:16

## 2019-08-21 RX ADMIN — HEPARIN SODIUM 5000 UNITS: 5000 INJECTION INTRAVENOUS; SUBCUTANEOUS at 05:20

## 2019-08-21 RX ADMIN — SODIUM CHLORIDE 100 ML/HR: 900 INJECTION, SOLUTION INTRAVENOUS at 00:46

## 2019-08-21 RX ADMIN — LEVETIRACETAM 1000 MG: 500 TABLET, FILM COATED ORAL at 20:35

## 2019-08-21 RX ADMIN — Medication 30 ML: at 14:41

## 2019-08-21 RX ADMIN — Medication 30 ML: at 05:21

## 2019-08-21 RX ADMIN — TRAZODONE HYDROCHLORIDE 50 MG: 50 TABLET ORAL at 20:35

## 2019-08-21 RX ADMIN — SODIUM CHLORIDE 100 ML/HR: 900 INJECTION, SOLUTION INTRAVENOUS at 17:06

## 2019-08-21 RX ADMIN — CITALOPRAM HYDROBROMIDE 20 MG: 20 TABLET ORAL at 08:13

## 2019-08-21 RX ADMIN — SODIUM CHLORIDE, PRESERVATIVE FREE 900 UNITS: 5 INJECTION INTRAVENOUS at 05:21

## 2019-08-21 RX ADMIN — HYDRALAZINE HYDROCHLORIDE 25 MG: 25 TABLET, FILM COATED ORAL at 20:35

## 2019-08-21 RX ADMIN — HYDROCODONE BITARTRATE AND ACETAMINOPHEN 1 TABLET: 5; 325 TABLET ORAL at 08:40

## 2019-08-21 RX ADMIN — Medication 10 ML: at 22:21

## 2019-08-21 RX ADMIN — SENNOSIDES, DOCUSATE SODIUM 1 TABLET: 50; 8.6 TABLET, FILM COATED ORAL at 08:13

## 2019-08-21 RX ADMIN — METOPROLOL TARTRATE 100 MG: 100 TABLET ORAL at 17:26

## 2019-08-21 RX ADMIN — CLOPIDOGREL BISULFATE 75 MG: 75 TABLET ORAL at 08:16

## 2019-08-21 RX ADMIN — ASPIRIN 325 MG ORAL TABLET 325 MG: 325 PILL ORAL at 08:16

## 2019-08-21 RX ADMIN — FAMOTIDINE 20 MG: 20 TABLET ORAL at 08:13

## 2019-08-21 RX ADMIN — SODIUM CHLORIDE, PRESERVATIVE FREE 900 UNITS: 5 INJECTION INTRAVENOUS at 14:41

## 2019-08-21 RX ADMIN — HEPARIN SODIUM 5000 UNITS: 5000 INJECTION INTRAVENOUS; SUBCUTANEOUS at 20:35

## 2019-08-21 RX ADMIN — HYDRALAZINE HYDROCHLORIDE 25 MG: 25 TABLET, FILM COATED ORAL at 14:40

## 2019-08-21 RX ADMIN — MEGESTROL ACETATE 200 MG: 40 SUSPENSION ORAL at 08:14

## 2019-08-21 RX ADMIN — METOPROLOL TARTRATE 100 MG: 100 TABLET ORAL at 08:13

## 2019-08-21 NOTE — PROGRESS NOTES
PHYSICAL THERAPY DAILY NOTE  Time In: 1116  Time Out: 1201  Patient Seen For: AM;Transfer training;Gait training; Therapeutic exercise; Other (see progress notes)    Subjective: Patient had no complaints. Objective: No pain noted. GROSS ASSESSMENT Daily Assessment            COGNITION Daily Assessment           BED/MAT MOBILITY Daily Assessment    Rolling Right : 0 (Not tested)  Rolling Left : 0 (Not tested)  Supine to Sit : 0 (Not tested)  Sit to Supine : 5 (Supervision)       TRANSFERS Daily Assessment    Transfer Type: SPT without device  Transfer Assistance : 5 (Stand-by assistance)  Sit to Stand Assistance: Supervision  Car Transfers: Not tested       GAIT Daily Assessment    Amount of Assistance: 5 (Stand-by assistance)  Distance (ft): 400 Feet (ft)  Assistive Device: Other (comment)(no device)       STEPS or STAIRS Daily Assessment    Level of Assist : 0 (Not tested)       BALANCE Daily Assessment            WHEELCHAIR MOBILITY Daily Assessment            LOWER EXTREMITY EXERCISES Daily Assessment    Extremity: Both  Exercise Type #1: Other (comment)(nustep x 10 minutes)  Sets Performed: 1  Reps Performed: 10  Level of Assist: Supervision  Exercise Type #2: Other (comment)(balance exs on blue foam throwing balll)  Sets Performed: 1  Reps Performed: 15  Level of Assist: Stand-by assistance          Assessment: Patient ambulating well. Plan of Care: Continue with plan of care.     Rosas Dupree, JOSE J  8/21/2019

## 2019-08-21 NOTE — PROGRESS NOTES
OT Daily Note  Time In 1301   Time Out 1346     Subjective: \"I'm just so tired. \"   Pain: none reported  Education: with family, educated on OT POC and functional status  Interdisciplinary Communication: handoff to PT   Precautions:    Location on arrival: sidelying in bed    Self-Care Daily Assessment   Patient donned B shoes with supervision, requiring minimal cueing for persisting with task. Progressing well. Activity Tolerance Daily Assessment   Patient completed UBE x 10 minutes x light/moderate resistance, going in 2 direction(s) with 1 rest break(s), working on BUE strengthening and functional activity tolerance. Would benefit from further activity tolerance tasks. Functional mobility Daily Assessment   Patient ambulated from room to gym pushing wheelchair, with focus on wayfinding during functional mobility. Patient was instructed to \"head to the gym,\" and patient ambulated from room around the floor past the turn to take to the gym; patient then pushed wheelchair into an empty hospital room and required cue to identify and correct error. Patient was then able to locate gym after being pointed down the [place] in the correct direction. Anticipate patient will require supervision during mobility due to cognitive deficits. Cognition Daily Assessment   Patient engaged in [address] coordination task participating in familiar and previously enjoyed task, drawing. Patient saba a clock with numbers in appropriate places and hands appropriate but with the wrong length to each hand. Patient was able to correctly verbalize the error and state the correct hand placement. Patient then saba a person accurately and saba four other objects placed on table in front of patient. During task, patient appeared more engaged in environment and even smiled twice. Patient reporting he will try doing more drawing tonight when he is in his room. Patient would benefit from further engagement in previous occupations.  Appeared to enjoy drawing despite requiring moderate encouragement to attempt. Patient was left seated up in [facility] in gym for PT. Assessment: See above. Plan: Continue OT POC with focus on ADL/IADL skills, cognition, engagement in environment, functional transfers, functional mobility, coordination, strength, static and dynamic balance, and activity tolerance to maximize safety and independence with ADLs and functional transfers.      Bekah Pizarro MS, OTR/L  8/21/2019        Note may contain the following abbreviations:   Abbreviation Explanation   AROM Active range of motion   PROM Passive range of motion   SPT Stand pivot transfer   LPT Lateral pivot transfer   WC wheelchair   RW Rolling walker    BUE Bilateral upper extremities   BLE Bilateral lower extremities    WBAT Weight bearing as tolerated    TTWB Toe-touch weight bearing    AD Adaptive device   AE Adaptive equipment    NMES Neuro muscular electrical stimulation   UBE Upper body ergometer

## 2019-08-21 NOTE — PROGRESS NOTES
08/21/19 1145   Time Spent With Patient   Time In 0830   Time Out 0915   Patient Seen For: AM;ADLs   Grooming   Grooming Assistance  S   Comments standing at sink, cueing for problem solving    Upper Body Bathing   Bathing Assistance, Upper S   Position Performed Seated in chair   Comments verbal cueing for thoroughness    Lower Body Bathing   Bathing Assistance, Lower  SBA   Adaptive Equipment Grab bar   Position Performed Seated in chair;Standing   Comments verbal cueing x 2 for safety    Toileting   Toileting Assistance (FIM Score) S   Upper [address]  S   Comments patient completed setup after verbal cue    Lower Body Dressing    Dressing Assistance  SBA   Leg Crossed Method Used No   Position Performed Seated edge of bed;Standing;Bending forward method   Functional Transfers   Toilet Transfer  Stand pivot transfer without device   Amount of Assistance Required CGA   Tub or Shower Type Shower   Amount of Assistance Required CGA   Adaptive Equipment Tub transfer bench;Grab bars   S: \"I'm just so tired. \" [patient sidelying in bed, requiring maximum encouragement to get up to complete ADL]  O: Patient presented sidelying in bed. Agreeable to treatment with maximum encouragement. Patient completed ADL; see above for details. Shoes: 5: Setup or clean-up assist  Socks: 5: Setup or clean-up assist  Orientation: alert and oriented to year this session   Expression: able to express needs verbally but VERY low volume, very difficult to understand, withdrawn   Comprehension: understands basic instructions 75% of the time, moderate assist with complex instructions  Social Interaction: withdrawn, flat affect   Problem Solving: solves routine problems 50% of the time, total assist with complex problems   Memory: able to complete 1/3 step commands   A: Remains with limited participation and motivation. Ongoing cognitive deficits remain primary barrier.  Patient tolerated session well with no complaint of pain. P: Continue OT POC with focus on ADL/IADL skills, cognition, functional transfers, functional mobility, coordination, strength, static and dynamic balance, and activity tolerance to maximize safety and independence with ADLs and functional transfers. Patient was left seated up in recliner with call bell/all other needs in reach. Chair alarm activated. Interdisciplinary communication: Collaborated with nurse regarding complaint to nurse of headache prior to session; nurse provided pain medication during session.       Meryle Simpson, MS, OTR/L  8/21/2019          Note may contain the following abbreviations:   Abbreviation Explanation   AROM Active range of motion   PROM Passive range of motion   SPT Stand pivot transfer   LPT Lateral pivot transfer   WC wheelchair   RW Rolling walker    BUE Bilateral upper extremities   BLE Bilateral lower extremities    WBAT Weight bearing as tolerated    TTWB Toe-touch weight bearing    AD Adaptive device   AE Adaptive equipment    NMES Neuro muscular electrical stimulation   UBE Upper body ergometer

## 2019-08-21 NOTE — PROGRESS NOTES
Purposeful hourly rounds completed this shift, needs met. Bed alarm activated. Patient calls appropriately for assistance, most of the time.

## 2019-08-21 NOTE — PROGRESS NOTES
Cleo Villalba MD,   Medical Director  [address] [facility], [address] [facility]  Tel: [phone]       SFD PROGRESS NOTE    Michele Ho  Admit Date: 8/13/2019  Admit Diagnosis: SAH (subarachnoid hemorrhage) ([address] [I60.9]    Subjective     C/o headaches. Does most mornings when he wakes up.  Ambulating well without AD, close CGA    Objective:     Current Facility-Administered Medications   Medication Dose Route Frequency    0.9% sodium chloride infusion  100 mL/hr IntraVENous CONTINUOUS    traZODone (DESYREL) tablet 50 mg  50 mg Oral QHS PRN    HYDROcodone-acetaminophen (NORCO) 5-325 mg per tablet 1 Tab  1 Tab Oral Q6H PRN    megestrol (MEGACE) 400 mg/10 mL (10 mL) oral suspension 200 mg  200 mg Oral DAILY    ondansetron (ZOFRAN ODT) tablet 4 mg  4 mg Oral Q6H PRN    lactulose (CHRONULAC) 10 gram/15 mL solution 45 mL  30 g Oral DAILY PRN    ondansetron (ZOFRAN) injection 4 mg  4 mg IntraVENous Q4H PRN    heparin (porcine) injection 5,000 Units  5,000 Units SubCUTAneous Q8H    aspirin tablet 325 mg  325 mg Oral DAILY    clopidogrel (PLAVIX) tablet 75 mg  75 mg Oral DAILY    famotidine (PEPCID) tablet 20 mg  20 mg Oral DAILY    heparin (porcine) pf 300-900 Units  300-900 Units InterCATHeter PRN    heparin (porcine) pf 900 Units  900 Units InterCATHeter Q8H    hydrALAZINE (APRESOLINE) tablet 25 mg  25 mg Oral TID    levETIRAcetam (KEPPRA) tablet 1,000 mg  1,000 mg Oral BID    metoprolol tartrate (LOPRESSOR) tablet 100 mg  100 mg Oral BID    modafinil (PROVIGIL) tablet 200 mg  200 mg Oral DAILY    nicotine (NICODERM CQ) 14 mg/24 hr patch 1 Patch  1 Patch TransDERmal Q24H    SALINE PERIPHERAL FLUSH Q8H soln 10-30 mL  10-30 mL InterCATHeter Q8H    saline peripheral flush soln 10-30 mL  10-30 mL InterCATHeter PRN    acetaminophen (TYLENOL) tablet 650 mg  650 mg Oral Q6H PRN    citalopram (CELEXA) tablet 20 mg  20 mg Oral DAILY    senna-docusate (PERICOLACE) 8.6-50 mg per tablet 1 Tab  1 Tab Oral DAILY     Review of Systems:Denies chest pain, shortness of breath, cough,visual problems, abdominal pain, dysurea, calf pain. Pertinent positives are as noted in the medical records and unremarkable otherwise. Visit Vitals  /78   Pulse 74   Temp 98.6 °F (37 °C)   Resp 12   Wt 132 lb 6.4 oz (60.1 kg)   SpO2 94%   BMI 17.96 kg/m²        Physical Exam:   General: Alert and oriented x 2 (person and situation) not time or place  No acute cardio respiratory distress. HEENT: Normocephalic,no scleral icterus  Oral mucosa moist without cyanosis   Lungs: Clear to auscultation  bilaterally. Respiration even and unlabored   Heart: Regular rate and rhythm, S1, S2   No  murmurs, clicks, rub or gallops   Abdomen: Soft, non-tender, nondistended. Bowel sounds present. No organomegaly. Genitourinary: defer   Neuromuscular:      Grossly no focal motor deficits noted. Moves ankles. Sensation intact  Slow processing, follows commands well. Appears to be guessing quite a bit with questions asked. Skin/extremity: No rashes, no erythema. No calf tenderness BLE  Scalp wounds healing.  Still with dissolvable sutures                                                                            Functional Assessment:          Balance  Sitting - Static: Good (unsupported) (08/17/19 1200)  Sitting - Dynamic: Good (unsupported) (08/17/19 1200)  Standing - Static: Fair (08/17/19 1200)  Standing - Dynamic : Impaired (08/17/19 1200)                     Carolyn Adams Fall Risk Assessment:  Carolyn Adams Fall Risk  Mobility: Ambulates or transfers with assist devices or assistance (08/20/19 2023)  Mobility Interventions: Bed/chair exit alarm;Utilize walker, cane, or other assistive device (08/20/19 2023)  Mentation: Periodic confusion (08/20/19 2023)  Mentation Interventions: Adequate sleep, hydration, pain control (08/20/19 2023)  Medication: Patient receiving anticonvulsants, sedatives(tranquilizers), psychotropics or hypnotics, hypoglycemics, narcotics, sleep aids, antihypertensives, laxatives, or diuretics (08/20/19 2023)  Medication Interventions: Bed/chair exit alarm; Patient to call before getting OOB (08/20/19 2023)  Elimination: Needs assistance with toileting (08/20/19 2023)  Elimination Interventions: Bed/chair exit alarm;Call light in reach (08/20/19 2023)  Prior Fall History: Before admission in past 12 months _home or previous inpatient care) (08/20/19 2023)  History of Falls Interventions: Evaluate medications/consider consulting pharmacy; Consult care management for discharge planning;Door open when patient unattended (08/20/19 0935)  Total Score: 5 (08/20/19 2023)  Standard Fall Precautions: Yes (08/20/19 2023)  High Fall Risk: Yes (08/20/19 2023)     Speech Assessment:         Ambulation:  Gait  Distance (ft): 400 Feet (ft) (08/20/19 1649)  Assistive Device: Other (comment)(no device) (08/20/19 1649)  Rail Use: Both (08/20/19 1649)     Labs/Studies:  Recent Results (from the past 72 hour(s))   GLUCOSE, POC    Collection Time: 08/18/19  7:33 PM   Result Value Ref Range    Glucose (POC) 136 (H) 65 - 365 mg/dL   METABOLIC PANEL, BASIC    Collection Time: 08/19/19  4:43 AM   Result Value Ref Range    Sodium 140 136 - 145 mmol/L    Potassium 4.8 3.5 - 5.1 mmol/L    Chloride 106 98 - 107 mmol/L    CO2 30 21 - 32 mmol/L    Anion gap 4 (L) 7 - 16 mmol/L    Glucose 84 65 - 100 mg/dL    BUN 37 (H) 6 - 23 MG/DL    Creatinine 3.76 (H) 0.8 - 1.5 MG/DL    GFR est AA 23 (L) >60 ml/min/1.73m2    GFR est non-AA 19 (L) >60 ml/min/1.73m2    Calcium 9.0 8.3 - 10.4 MG/DL   MAGNESIUM    Collection Time: 08/19/19  6:12 AM   Result Value Ref Range    Magnesium 2.6 (H) 1.8 - 2.4 mg/dL   METABOLIC PANEL, BASIC    Collection Time: 08/20/19  5:01 AM   Result Value Ref Range    Sodium 141 136 - 145 mmol/L    Potassium 4.8 3.5 - 5.1 mmol/L    Chloride 106 98 - 107 mmol/L    CO2 28 21 - 32 mmol/L    Anion gap 7 7 - 16 mmol/L Glucose 86 65 - 100 mg/dL    BUN 41 (H) 6 - 23 MG/DL    Creatinine 4.12 (H) 0.8 - 1.5 MG/DL    GFR est AA 20 (L) >60 ml/min/1.73m2    GFR est non-AA 17 (L) >60 ml/min/1.73m2    Calcium 9.4 8.3 - 10.4 MG/DL   MAGNESIUM    Collection Time: 08/21/19  4:56 AM   Result Value Ref Range    Magnesium 2.6 (H) 1.8 - 2.4 mg/dL   METABOLIC PANEL, BASIC    Collection Time: 08/21/19  4:56 AM   Result Value Ref Range    Sodium 142 136 - 145 mmol/L    Potassium 5.1 3.5 - 5.1 mmol/L    Chloride 109 (H) 98 - 107 mmol/L    CO2 28 21 - 32 mmol/L    Anion gap 5 (L) 7 - 16 mmol/L    Glucose 95 65 - 100 mg/dL    BUN 44 (H) 6 - 23 MG/DL    Creatinine 3.98 (H) 0.8 - 1.5 MG/DL    GFR est AA 21 (L) >60 ml/min/1.73m2    GFR est non-AA 17 (L) >60 ml/min/1.73m2    Calcium 8.9 8.3 - 10.4 MG/DL       Assessment:     Problem List as of 8/21/2019 Date Reviewed: 7/10/2019          Codes Class Noted - Resolved    Major depressive episode ICD-10-CM: F32.9  ICD-9-CM: 296.20  8/20/2019 - Present        Posterior reversible encephalopathy syndrome ICD-10-CM: I67.83  ICD-9-CM: 348.39  8/8/2019 - Present        Seizure cerebral ICD-10-CM: I67.89  ICD-9-CM: 681  8/7/2019 - Present        SAH (subarachnoid hemorrhage) (HCC) ICD-10-CM: I60.9  ICD-9-CM: 430  8/5/2019 - Present        Pneumonia due to methicillin susceptible Staphylococcus aureus (MSSA) ([facility] ICD-10-CM: J15.211  ICD-9-CM: 482.41  7/15/2019 - Present        Tobacco abuse ICD-10-CM: Z72.0  ICD-9-CM: 305.1  7/10/2019 - Present        Acute respiratory failure with hypoxia (HCC) ICD-10-CM: J96.01  ICD-9-CM: 518.81  7/10/2019 - Present        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  ICD-9-CM: 331.3  7/10/2019 - Present        IVH (intraventricular hemorrhage) ([facility] ICD-10-CM: I61.5  ICD-9-CM: 877  7/10/2019 - Present        Seizure ([facility] ICD-10-CM: R56.9  ICD-9-CM: 780.39 7/10/2019 - Present        Elevated serum creatinine ICD-10-CM: R79.89  ICD-9-CM: 790.99  7/10/2019 - Present        Cerebral vasospasm ICD-10-CM: I67.848  ICD-9-CM: 435.9  7/10/2019 - Present        RESOLVED: Endotracheally intubated ICD-10-CM: Z97.8  ICD-9-CM: V49.89  7/10/2019 - 7/10/2019        RESOLVED: SAH (subarachnoid hemorrhage) ([facility] [address] ICD-10-CM: I60.9  ICD-9-CM: 556  7/9/2019 - 7/10/2019                 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 Statin; LDL 67.6 ; adequate     Stimulation/ depression ; flat affect, improved with Provigil. Increase celexa. Psych consult  -8/19 staff notices improvement in conversation. Smiling more     Hypokalemia; improved with supplement; monitor; 3.5 8/14; 3.7 8/16 8/18 - K - 4.2; 4.8 this a.m 8/19; 8/21 5.1 ; no supplements; likely inc due to renal dz; monitor     Cont magnesium to prevent vasospasm. Daily labs; Mg 2.3 cont suppl. Will change to [facility] labs; 8/121 2.6 off supplement     DVT risk / DVT Prophylaxis- Will require daily physician exam to assess for signs and symptoms as patient is at increased risk for of thromboembolism. Mobilization as tolerated. Intermittent pneumatic compression devices when in bed Thigh-high or knee-high thromboembolic deterrent hose when out of bed. Heparin 5000 units subcut q 8hrs      sz d/o; controlled with increased Keppra 1000mg bid;sz precautions     Pain Control: using NSAID medication regularly, daily. Will require regular pain assessment and comprenhensive pain management,   Norco tapered to 5mg prn       Wound Care: Monitor wound status daily per staff and physician. At risk for failure.  Will require 24/7 rehab nursing. Keep wound clean and dry and dc scalp sutures      Hypertension - BP uncontrolled, fluctuating, managed medically. No bp goals at this time. Prolonged HTN for management of SAH to prevent vasospasm contributed to development of PRES. F/u scan improved; now on hydralazine and metoprolol; add parameters. Would like to keep systolic less than 570; monitor symptoms and adjust as needed. Avoid hypotension  -6/42 231A- 140s / 70-80s range  -8/16 144/81 controlled  -6/68 543/03, fluctuating; 8/19 128/79; 8/21 bp 130s-140s acceptable     Malnutrition; needs supplement; jayce ct. Significant wt loss. Check alb; 2.4  -8/15 added Megace, approx 40 lb wt loss during hospitalization; need to document accurate wt; will change to a regular diet from renal to inc food choices to include things he might eat.   -8/16 seen by Dietician, discussed. Po improving. Eating what family brings in. Changed to reg diet with better choices. Encourage po.  -8/19 excellent po over weekend; monitor wt; 8/21 appears to be meeting caloric needs. Push ensure enlive     Proteinuria; monitor     NING, likely CKD; creat 3.39; slt improvement. BUN 17 improved; renal US 8/6; Echogenic kidneys suggesting chronic renal disease.  No evidence of  Obstruction. 3.52 this a.m 8/14; need strict I/Os  -8/16 creat slowly increasing 3.74 today>3.52> 3.39< 3.51; BUN trending up as well. Will give fluid bolus and continue to monitor. Strict I/0s ordered; however , only # voids, not amt recorded! 8/18 - BUN - 24 > 27 > 31, Cr - 3.7 > 3.6 >3.7, will hold ivf. Begin to monitor I/Os, seemingly baseline Cr - 3.4 -3.7 since hospital admission  -8/19 creat slowly creeping up. No change with IVFs. I/O have not been recorded accurately; STRICT ADHERENCE NEEDED   -reconsult Nephrology. Had prior Protein elctrophoresis.  Not sure significance of free light chains kappa/lambda, QT;  8/20 per nephrol No M spike or abnl proteins  -8/20 appreciate Nephrology assist; bun creat increasing; 41/4/12, mag 2.6; has been eating much better; may be dry; will give IVFs; however, didn't make much difference last week. Stg IV. UOP better documented ; 1800ml, intake only noted to be 540cc. ; recheck in a.m after flds. -8/21 bun/creat 44/3.98 with IVFs. Creat slt dec, but slt inc. Push po fluids. ? Baseline. stg IV CKD. UOP not well recorded     Anemia likely due to CKD; recheck in a.m. Check stool.  -8/14 hgb 8.7 slt impr (8.5)  -8/15 improved 9.3, stool negative     Urinary retention/ neurogenic bladder - schedule voids q6-8 hrs. Check post-void residual as needed; In and Out catheterize if post-void residual is more than 400 cc.  -8/16 has been continent without residuals     bowel program - add stool softeners and prn bowel program     GERD - resume PPI. At times may need additional antacids, Maalox prn.     Tobacco abuse; stress cessation. Cont nicoderm patch     Hydrocephalus, resolved s/p VPS     Insomnia - will increase trazodone to 50mg hs prn with prn repeat dose   -8/19 slept better last noc but sleepy this a.m. With increased Trazadone. May have to decrease; 8/21 will cont current dosing, but give range of dosing to start at lowest dose. Participating well with therapies          Time spent was 25 minutes with over 1/2 in direct patient care/examination, consultation and coordination of care.      Signed By: Maribell Nathan MD     August 21, 2019

## 2019-08-21 NOTE — PROGRESS NOTES
OT Daily Note  Time In 1033   Time Out 1115     Pain: Patient had no complaint of pain. Functional Mobility   Pt walked to w/c with HHA. Pt needed max A for initiation. Activity Tolerance   Pt verbalized wanting to go outside. Pt engaged with pop beads while outside needing mod A for problem solving. Pt was able to carryover strategies and required increased time and rest breaks. Pt was able to redirect himself to the task after taking a break. Education   Benefits of therapy     Interdisciplinary Communication: PT Lynnette Lies on performance    Plan: Continue with POC. Pt was left with PT Juanis.      Keri Gutierrez OTR/L  8/21/2019

## 2019-08-21 NOTE — PROGRESS NOTES
08/21/19 1143   Time Spent With Patient   Time In 0917   Time Out 1000   Patient Seen For: AM   Mental Status   Neurologic State Alert   Orientation Level Oriented to person;Disoriented to place; Disoriented to time   Cognition Follows commands;Memory loss   Perception Appears intact   Perseveration Perseverates during conversation   Safety/Judgement Home safety;Decreased insight into deficits        08/21/19 1144   Reasoning Task Exercises-Categorical Exclusion/Analogy   Categorical Exclusion Accuracy (%) 90 %  (basic level)   Verbal Organization Exercises   Divergent Naming Accuracy (%) 50 %   Memory Exercises   Functional Tasks spaced retrieval used to recall the current month; patient was able to recall at the end of the session   Attention Exercises Performed   Accuracy (%) 85 %-90%   Neuro-Linguistic Exercises   Verbal Reasoning Tasks Impaired   Verbal Problem Solving Impaired   Verbal Organization Impaired   Memory Impaired   Attention  Impaired  (basic; sorting 5 cards in sequence)     Patient was seen for cognitive therapy. Basic concrete category exclusion task 1:4 completed with 90% accuracy. 80% accuracy discussing rationale. [facility] listing critical items needed to complete a task. Patient was able to recall 2/3 of the activities completed only when given a very specific category cue. Attention task to sort five cards from lowest/highest completed with SBA. Encouraged patient to read them outloud with moderate cues to increased intensity needed. Memory task to recall the current month using spaced retrieval throughout the session; recalled it was August by the end of the session. Recommend continued therapy to address cognitive-linguistic deficits.     Lorenza Ron MS, CCC-SLP

## 2019-08-21 NOTE — PROGRESS NOTES
[facility] PROGRESS NOTE    Follow up for: NING over ckd     Patient seen and examined on rounds he is up in wheelchair  To work with PT, reports fatigue with poor rest last night, better appetite today. Labs and chart reviewed- renal function stable    Subjective:   Patient seen and examined.   ROS:  Gen - no fever, no chills, appetite better, + fatigue  CV - no chest pain, no orthopnea  Lung - no shortness of breath, no cough  Abd - no tenderness, no nausea, no vomiting  Ext - no edema    Objective:   Exam:  Vitals:    08/20/19 0741 08/20/19 1522 08/20/19 1906 08/21/19 0805   BP: 135/88 130/83 142/78 135/80   Pulse: 72 80 74 75   Resp: 12 14 12 16   Temp: 98.5 °F (36.9 °C) 98.1 °F (36.7 °C) 98.6 °F (37 °C) 98.8 °F (37.1 °C)   SpO2: 97% 98% 94% 98%   Weight:             Intake/Output Summary (Last 24 hours) at 8/21/2019 1046  Last data filed at 8/21/2019 0818  Gross per 24 hour   Intake 240 ml   Output 200 ml   Net 40 ml       Current Facility-Administered Medications   Medication Dose Route Frequency    0.9% sodium chloride infusion  100 mL/hr IntraVENous CONTINUOUS    traZODone (DESYREL) tablet 50 mg  50 mg Oral QHS PRN    HYDROcodone-acetaminophen (NORCO) 5-325 mg per tablet 1 Tab  1 Tab Oral Q6H PRN    megestrol (MEGACE) 400 mg/10 mL (10 mL) oral suspension 200 mg  200 mg Oral DAILY    ondansetron (ZOFRAN ODT) tablet 4 mg  4 mg Oral Q6H PRN    lactulose (CHRONULAC) 10 gram/15 mL solution 45 mL  30 g Oral DAILY PRN    ondansetron (ZOFRAN) injection 4 mg  4 mg IntraVENous Q4H PRN    heparin (porcine) injection 5,000 Units  5,000 Units SubCUTAneous Q8H    aspirin tablet 325 mg  325 mg Oral DAILY    clopidogrel (PLAVIX) tablet 75 mg  75 mg Oral DAILY    famotidine (PEPCID) tablet 20 mg  20 mg Oral DAILY    heparin (porcine) pf 300-900 Units  300-900 Units InterCATHeter PRN    heparin (porcine) pf 900 Units  900 Units InterCATHeter Q8H    hydrALAZINE (APRESOLINE) tablet 25 mg  25 mg Oral TID  levETIRAcetam (KEPPRA) tablet 1,000 mg  1,000 mg Oral BID    metoprolol tartrate (LOPRESSOR) tablet 100 mg  100 mg Oral BID    modafinil (PROVIGIL) tablet 200 mg  200 mg Oral DAILY    nicotine (NICODERM CQ) 14 mg/24 hr patch 1 Patch  1 Patch TransDERmal Q24H    SALINE PERIPHERAL FLUSH Q8H soln 10-30 mL  10-30 mL InterCATHeter Q8H    saline peripheral flush soln 10-30 mL  10-30 mL InterCATHeter PRN    acetaminophen (TYLENOL) tablet 650 mg  650 mg Oral Q6H PRN    citalopram (CELEXA) tablet 20 mg  20 mg Oral DAILY    senna-docusate (PERICOLACE) 8.6-50 mg per tablet 1 Tab  1 Tab Oral DAILY       EXAM  GEN - Alert, oriented, in no distress  CV - S1, S2, RRR, no rub, murmur, or gallop  Lung - clear to auscultation bilaterally  Abd - soft, nontender, BS present  Ext - no edema    No results for input(s): WBC, HGB, HCT, PLT, HGBEXT, HCTEXT, PLTEXT, HGBEXT, HCTEXT, PLTEXT in the last 72 hours. Recent Labs     08/21/19  0456 08/20/19  0501 08/19/19  0612 08/19/19  0443    141  --  140   K 5.1 4.8  --  4.8   * 106  --  106   CO2 28 28  --  30   BUN 44* 41*  --  37*   CREA 3.98* 4.12*  --  3.76*   CA 8.9 9.4  --  9.0   GLU 95 86  --  84   MG 2.6*  --  2.6*  --        Assessment and Plan:   NING on CKD stage IV  - NING vs CKD. Creatinine has been mid 2's to mid 3's since admission, Worse with volume depletion and improved with IVF  - US kidney - some cysts and chronic changes   - reviewed UA - proteinuria present, no active sediments   - Nephrotic syndrome 4.5 gms   - serologic workup negative. K/L ratio 2.15 with no M-spike.  No abnormal proteins   -creatinine stable 3.98 today with better intake, non oliguric continue to follow renal function continue IVF for now.     SAH secondary to ruptured basilar aneurysm  - s/p stent/coil and EVD and  shunt per neurosurgery  - on ASA , Plavix and statin therapy    Seizures  - Keppra     HTN - controlled       Sidney Sue, NP

## 2019-08-21 NOTE — PROGRESS NOTES
PHYSICAL THERAPY WEEKLY NOTE  Time In: 1324  Time Out: 9716  Patient Seen For: PM;Balance activities;Gait training; Therapeutic exercise;Transfer training    Subjective: \"I'm tired. I want to go back to bed. \"         Objective:     GROSS ASSESSMENT Daily Assessment    AROM: Generally decreased, functional  PROM: Within functional limits  Strength: Generally decreased, functional  Coordination: Generally decreased, functional       COGNITION Daily Assessment    Pt. W/ decreased insight, decreased attention to task, decreased safety awareness, & decreased short term recall. Unable to identify current month. BED/MAT MOBILITY Daily Assessment    Rolling Right : 0 (Not tested)  Rolling Left : 0 (Not tested)  Supine to Sit : 0 (Not tested)  Sit to Supine : 5 (Supervision)       TRANSFERS Daily Assessment   Toilet transfer using grab bar supervision Transfer Type: SPT without device  Transfer Assistance : 5 (Supervision/setup)  Sit to Stand Assistance: Supervision  Car Transfers: Not tested       GAIT Daily Assessment   Utilized gait ladder to work on improving step height to improve foot clearance in swing. Moderate carry-over noted w/ cues required & pt. W/ tendency to exhibit a steppage gait rather than improving stride length. Amount of Assistance: 4 (Contact guard assistance)  Distance (ft): 300 Feet (ft)  Assistive Device: Other (comment)(no A/D)       STEPS or STAIRS Daily Assessment    Steps/Stairs Ambulated (#): 0  Level of Assist : 0 (Not tested)       BALANCE Daily Assessment   Performed dynamic standing balance activities to improve postural reactions. Activities included:  Side stepping, walking w/ single LE on balance pad, & walking w/ B UEs on balance beam.  Pt. Required CGA-Emil for balance due to delayed postural reactions.  Sitting - Static: Good (unsupported)  Sitting - Dynamic: Good (unsupported)  Standing - Static: Good  Standing - Dynamic : Impaired       WHEELCHAIR MOBILITY Daily Assessment Able to Propel (ft): 0 feet  Wheelchair Setup Assist Required : 0 (Not tested)       LOWER EXTREMITY EXERCISES Daily Assessment    Pt. Performed B LE standing LE exercises x10 each w/ B UE support & 2 seated rest breaks. STANDING EXERCISES Sets Reps Comments   Heel Raises 1 10    Toe Raises 1 10    Hip Flexion/Marching 1 10    Hamstring Curls 1 10    Hip Abduction 1 10    Hip Extension 1 10    Mini Squats 1 10      Vital Signs: /88   Pulse 62   Temp 98.8 °F (37.1 °C)   Resp 16   Wt 60.1 kg (132 lb 6.4 oz)   SpO2 98%   BMI 17.96 kg/m²     Pain level: no c/o pain    Patient education: pt. Instructed in standing exercises    Interdisciplinary Communication: collaborated w/ OT regarding pt's progress    Pt. Left supine in NAD, call bell in reach. Assessment: Pt. Able to tolerate increased balance challenges this session. However, w/ proximal weakness, pt's hip strategy & stepping response cont. To be delayed. Pt. Cont. To benefit from PT services to address strength, balance & endurance impairments. Pt. Has met 3/5 STGs & cont. To make progress towards remaining goals. Plan of Care: Continue with POC and progress as tolerated.      Rosa Roberts, PT  8/21/2019

## 2019-08-22 LAB
ANION GAP SERPL CALC-SCNC: 3 MMOL/L (ref 7–16)
BUN SERPL-MCNC: 44 MG/DL (ref 6–23)
CALCIUM SERPL-MCNC: 8.9 MG/DL (ref 8.3–10.4)
CHLORIDE SERPL-SCNC: 111 MMOL/L (ref 98–107)
CO2 SERPL-SCNC: 26 MMOL/L (ref 21–32)
CREAT SERPL-MCNC: 3.8 MG/DL (ref 0.8–1.5)
GLUCOSE SERPL-MCNC: 93 MG/DL (ref 65–100)
HCT VFR BLD AUTO: 28.3 % (ref 41.1–50.3)
HGB BLD-MCNC: 8.9 G/DL (ref 13.6–17.2)
POTASSIUM SERPL-SCNC: 5.3 MMOL/L (ref 3.5–5.1)
SODIUM SERPL-SCNC: 140 MMOL/L (ref 136–145)

## 2019-08-22 PROCEDURE — 97110 THERAPEUTIC EXERCISES: CPT

## 2019-08-22 PROCEDURE — 74011250636 HC RX REV CODE- 250/636: Performed by: PHYSICAL MEDICINE & REHABILITATION

## 2019-08-22 PROCEDURE — 97535 SELF CARE MNGMENT TRAINING: CPT

## 2019-08-22 PROCEDURE — 99232 SBSQ HOSP IP/OBS MODERATE 35: CPT | Performed by: PHYSICAL MEDICINE & REHABILITATION

## 2019-08-22 PROCEDURE — 97116 GAIT TRAINING THERAPY: CPT

## 2019-08-22 PROCEDURE — 74011250637 HC RX REV CODE- 250/637: Performed by: PHYSICAL MEDICINE & REHABILITATION

## 2019-08-22 PROCEDURE — 65310000000 HC RM PRIVATE REHAB

## 2019-08-22 PROCEDURE — 92507 TX SP LANG VOICE COMM INDIV: CPT

## 2019-08-22 PROCEDURE — 77030020263 HC SOL INJ SOD CL0.9% LFCR 1000ML

## 2019-08-22 PROCEDURE — 80048 BASIC METABOLIC PNL TOTAL CA: CPT

## 2019-08-22 PROCEDURE — 97530 THERAPEUTIC ACTIVITIES: CPT

## 2019-08-22 PROCEDURE — C1751 CATH, INF, PER/CENT/MIDLINE: HCPCS

## 2019-08-22 PROCEDURE — 85018 HEMOGLOBIN: CPT

## 2019-08-22 RX ADMIN — FAMOTIDINE 20 MG: 20 TABLET ORAL at 09:00

## 2019-08-22 RX ADMIN — CLOPIDOGREL BISULFATE 75 MG: 75 TABLET ORAL at 09:00

## 2019-08-22 RX ADMIN — MODAFINIL 200 MG: 100 TABLET ORAL at 08:59

## 2019-08-22 RX ADMIN — SENNOSIDES, DOCUSATE SODIUM 1 TABLET: 50; 8.6 TABLET, FILM COATED ORAL at 08:59

## 2019-08-22 RX ADMIN — HEPARIN SODIUM 5000 UNITS: 5000 INJECTION INTRAVENOUS; SUBCUTANEOUS at 06:12

## 2019-08-22 RX ADMIN — SODIUM CHLORIDE, PRESERVATIVE FREE 900 UNITS: 5 INJECTION INTRAVENOUS at 06:13

## 2019-08-22 RX ADMIN — METOPROLOL TARTRATE 100 MG: 100 TABLET ORAL at 18:31

## 2019-08-22 RX ADMIN — HYDRALAZINE HYDROCHLORIDE 25 MG: 25 TABLET, FILM COATED ORAL at 21:57

## 2019-08-22 RX ADMIN — Medication 10 ML: at 06:13

## 2019-08-22 RX ADMIN — LEVETIRACETAM 1000 MG: 500 TABLET, FILM COATED ORAL at 09:00

## 2019-08-22 RX ADMIN — SODIUM CHLORIDE, PRESERVATIVE FREE 900 UNITS: 5 INJECTION INTRAVENOUS at 14:50

## 2019-08-22 RX ADMIN — METOPROLOL TARTRATE 100 MG: 100 TABLET ORAL at 08:59

## 2019-08-22 RX ADMIN — Medication 30 ML: at 14:58

## 2019-08-22 RX ADMIN — HYDRALAZINE HYDROCHLORIDE 25 MG: 25 TABLET, FILM COATED ORAL at 06:12

## 2019-08-22 RX ADMIN — LEVETIRACETAM 1000 MG: 500 TABLET, FILM COATED ORAL at 21:57

## 2019-08-22 RX ADMIN — HYDRALAZINE HYDROCHLORIDE 25 MG: 25 TABLET, FILM COATED ORAL at 14:59

## 2019-08-22 RX ADMIN — Medication 30 ML: at 21:59

## 2019-08-22 RX ADMIN — ASPIRIN 325 MG ORAL TABLET 325 MG: 325 PILL ORAL at 09:00

## 2019-08-22 RX ADMIN — TRAZODONE HYDROCHLORIDE 50 MG: 50 TABLET ORAL at 21:57

## 2019-08-22 RX ADMIN — HEPARIN SODIUM 5000 UNITS: 5000 INJECTION INTRAVENOUS; SUBCUTANEOUS at 21:58

## 2019-08-22 RX ADMIN — MEGESTROL ACETATE 200 MG: 40 SUSPENSION ORAL at 09:00

## 2019-08-22 RX ADMIN — SODIUM CHLORIDE, PRESERVATIVE FREE 900 UNITS: 5 INJECTION INTRAVENOUS at 21:58

## 2019-08-22 RX ADMIN — HEPARIN SODIUM 5000 UNITS: 5000 INJECTION INTRAVENOUS; SUBCUTANEOUS at 14:57

## 2019-08-22 RX ADMIN — CITALOPRAM HYDROBROMIDE 20 MG: 20 TABLET ORAL at 08:59

## 2019-08-22 NOTE — PROGRESS NOTES
OT Daily Note  Time In 1300   Time Out 1345     Subjective: No complaints. Sleeping in room; family attempting to have therapist come back later but discussed [facility] expectations and family agreed that patient needs to participate as scheduled. Discussed having patient wake up and be more engaged when family is visiting with verbalized understanding. Pain: none reported  Education: discussed at length with family: supervision recommendations, NO driving at this time, assistance required with ADL/IADL tasks, outpatient therapy, discharge date, encouraging patient to re-engage in family occupations, vocational rehab, and consulting with  regarding short/long term disability. Interdisciplinary Communication: with PT regarding functional status   Precautions:    Location on arrival: sidelying in bed     Education Daily Assessment   Educated patient's father, brother, and uncle on above noted education; all verbalized understanding and all appear very invested in assisting patient. Formal family training to be scheduled be . Family would benefit from further education. Functional mobility Daily Assessment   Patient was educated to \"find the gym\" when he left the room. Patient turned to the R and then walked down the long hallway, not turning to go to the gym. Patient then continued walking and again attempted to enter empty room 901, but did stop before requiring cue. Patient then stated, \"I need to find the gym\" and turned to go down the long hallway that does lead to the gym. Patient experienced one LOB to L requiring CGA to correct; all other mobility was completed with SBA without AE. Anticipate impaired cognition will remain a primary barrier to safety with mobility. Anticipate patient will require supervision with mobility due to cognition.       Coordination Daily Assessment   Patient completed fine motor coordination task generating sets of nuts/bolts/washers onto elevated surface x 7 sets. Patient required two cues for following multi-step instructions during task. Would benefit from further coordination tasks. Patient was handed off to PT. Assessment: Continue to anticipate that cognition will remain patient's primary barrier. Formal family training would be beneficial.   Plan: Continue OT POC with focus on ADL/IADL skills, functional transfers, cognition, functional mobility, coordination, strength, static and dynamic balance, and activity tolerance to maximize safety and independence with ADLs and functional transfers.      Nicole Decker MS, OTR/L  8/22/2019        Note may contain the following abbreviations:   Abbreviation Explanation   AROM Active range of motion   PROM Passive range of motion   SPT Stand pivot transfer   LPT Lateral pivot transfer   WC wheelchair   RW Rolling walker    BUE Bilateral upper extremities   BLE Bilateral lower extremities    WBAT Weight bearing as tolerated    TTWB Toe-touch weight bearing    AD Adaptive device   AE Adaptive equipment    NMES Neuro muscular electrical stimulation   UBE Upper body ergometer

## 2019-08-22 NOTE — PROGRESS NOTES
Interdisciplinary Conference Notes    Interdisciplinary conference completed to discuss plan of care. Estimated D/C Date: 08/28/2019    Recommended Equipment: Delvin Beckett    Recommended Follow-Up Therapy: Outpatient  PT, OT and ST    Communication with family/caregivers: Left message on machine to return call. Need to schedule family training. Spoke with physical therapist Lynnette Rushing, recommending the use of a cane on discharge.

## 2019-08-22 NOTE — PROGRESS NOTES
PHYSICAL THERAPY DAILY NOTE  Time In: 1345  Time Out: 1430  Patient Seen For: PM;Gait training; Therapeutic exercise;Transfer training    Subjective: \"Can I eat my lunch? \"  Pt. Expressing hunger after therapy session. Objective:       COGNITION Daily Assessment    Flat affect, decreased short term recall, decreased safety awareness       BED/MAT MOBILITY Daily Assessment   Supine to/from prone supervision  Prone to/from tall kneel CGA  Prone to/from quadruped min A    Performed prone laying to provide hip & knee extension stretch   Rolling Right : 6 (Modified independent)  Rolling Left : 6 (Modified independent)  Supine to Sit : 5 (Supervision)  Sit to Supine : 5 (Supervision)       TRANSFERS Daily Assessment    Transfer Type: SPT without device  Transfer Assistance : 5 (Supervision/setup)  Sit to Stand Assistance: Supervision  Car Transfers: Not tested       GAIT Daily Assessment   Introduced [facility] as gait aid to assist w/ balance. Amount of Assistance: 4 (Contact guard assistance)  Distance (ft): 150 Feet (ft)  Assistive Device: Cane, straight       STEPS or STAIRS Daily Assessment    Steps/Stairs Ambulated (#): 0  Level of Assist : 0 (Not tested)       BALANCE Daily Assessment   Pt. Performed activities in developmental positions to increase core strength & stability. Pt. Performed: Tall kneel, tall kneel w/ reaches using Swiss ball, Swiss ball lifts in tall kneel, quadruped w/ single UE & LE extension, & quadruped w/ alternate UE/LE extension. Sitting - Static: Good (unsupported)  Sitting - Dynamic: Good (unsupported)  Standing - Static: Good  Standing - Dynamic : Impaired       WHEELCHAIR MOBILITY Daily Assessment    Able to Propel (ft): 0 feet  Wheelchair Setup Assist Required : 0 (Not tested)       LOWER EXTREMITY EXERCISES Daily Assessment    Pt.  Performed NuStep @ resistance level 2 x10 minutes to increase strength & endurance B UEs/LEs     Vital Signs: /72   Pulse 68   Temp 98.1 °F (36.7 °C)   Resp 14   Wt 60.1 kg (132 lb 6.4 oz)   SpO2 99%   BMI 17.96 kg/m²     Pain level: no c/o pain    Patient education: pt. Educated on purpose of exercises in developmental positions    Interdisciplinary Communication: collaborated w/ OT regarding pt's progress    Pt. Left in recliner in NAD, call bell in reach, eating lunch, [place] activated. Assessment: Pt. Tolerated activities in tall kneel & quadruped well as able to progress to more challenging activities within the single session. Trial of gait w/ SPC resulted in no significant changes in gait pattern, but SPC may improve stability w/ LOB due to delayed postural reactions. Pt. Cont. To benefit from PT services to address. Plan of Care: Continue with POC and progress as tolerated.      Miguel Fabian, PT  8/22/2019

## 2019-08-22 NOTE — PROGRESS NOTES
PHYSICAL THERAPY DAILY NOTE  Time In: 1031  Time Out: 1120  Patient Seen For: AM;Balance activities;Gait training; Therapeutic exercise;Transfer training    Subjective: \"I'm tired. \" and \"Can I have a banana? \"         Objective:     COGNITION Daily Assessment    Flat affect, decreased insight into deficits, decreased initiation, decreased short term recall       BED/MAT MOBILITY Daily Assessment    NT       TRANSFERS Daily Assessment    Transfer Type: SPT without device  Transfer Assistance : 4 (Contact guard assistance)  Sit to Stand Assistance: Supervision  Car Transfers: Not tested       GAIT Daily Assessment   Decreased foot clearance in swing w/ flexed posture/shuffling gait Amount of Assistance: 4 (Contact guard assistance)  Distance (ft): 300 Feet (ft)(x2)  Assistive Device: Other (comment)(no A/D)       STEPS or STAIRS Daily Assessment    Steps/Stairs Ambulated (#): 0  Level of Assist : 0 (Not tested)       BALANCE Daily Assessment   Pt. Performed standing balance training on balance board to improve hip & ankle strategies. Worked on static stability & progressing to weight shifting w/ board in A/P plane followed by sagittal plane. Pt. Then incorporated UE reaching on unstable surface. Sitting - Static: Good (unsupported)  Sitting - Dynamic: Good (unsupported)  Standing - Static: Good  Standing - Dynamic : Impaired       WHEELCHAIR MOBILITY Daily Assessment    Able to Propel (ft): 0 feet  Wheelchair Setup Assist Required : 0 (Not tested)       LOWER EXTREMITY EXERCISES Daily Assessment    Pt. Performed single LE step ups on 6\" step x10 each LE for closed chain strengthening. Pt. Then performed lateral step-ups on 6\" step x10 each. Pt. Then performed LE touch downs standing on step x10 each LE to improve eccentric control.      Vital Signs: /72   Pulse 68   Temp 98.1 °F (36.7 °C)   Resp 14   Wt 60.1 kg (132 lb 6.4 oz)   SpO2 99%   BMI 17.96 kg/m²     Pain level: no c/o pain    Patient education: pt. Educated on purpose of balance activities    Interdisciplinary Communication: collaborated w/ OT regarding pt's progress    Pt. Left up in recliner in NAD, call bell in reach. Assessment: Pt. Able to increase balance challenges this visit, although very reluctant to move outside of NUSRAT. Pt. Also tolerated step-ups, although exhibited fatigue w/ repeated efforts. Pt. Cont. To benefit from PT services to increase independence w/ mobility. Plan of Care: Continue with POC and progress as tolerated.      Haim Antoine, PT  8/22/2019

## 2019-08-22 NOTE — PROGRESS NOTES
[facility] PROGRESS NOTE    Follow up for: NING over ckd     Patient seen and examined on rounds he is up in wheelchair  To work with PT, reports fatigue with poor rest last night, better appetite today. Labs and chart reviewed- renal function stable    Subjective:   Patient seen and examined.   ROS:  Gen - no fever, no chills, appetite better, + fatigue  CV - no chest pain, no orthopnea  Lung - no shortness of breath, no cough  Abd - no tenderness, no nausea, no vomiting  Ext - no edema    Objective:   Exam:  Vitals:    08/21/19 0805 08/21/19 1440 08/21/19 1726 08/22/19 0742   BP: 135/80 144/88 155/81 145/72   Pulse: 75 62 70 68   Resp: 16 16  14   Temp: 98.8 °F (37.1 °C) 98.6 °F (37 °C)  98.1 °F (36.7 °C)   SpO2: 98% 100%  99%   Weight:             Intake/Output Summary (Last 24 hours) at 8/22/2019 0953  Last data filed at 8/22/2019 0144  Gross per 24 hour   Intake 1200 ml   Output 1300 ml   Net -100 ml       Current Facility-Administered Medications   Medication Dose Route Frequency    0.9% sodium chloride infusion  100 mL/hr IntraVENous CONTINUOUS    traZODone (DESYREL) tablet 50 mg  50 mg Oral QHS PRN    HYDROcodone-acetaminophen (NORCO) 5-325 mg per tablet 1 Tab  1 Tab Oral Q6H PRN    megestrol (MEGACE) 400 mg/10 mL (10 mL) oral suspension 200 mg  200 mg Oral DAILY    ondansetron (ZOFRAN ODT) tablet 4 mg  4 mg Oral Q6H PRN    lactulose (CHRONULAC) 10 gram/15 mL solution 45 mL  30 g Oral DAILY PRN    ondansetron (ZOFRAN) injection 4 mg  4 mg IntraVENous Q4H PRN    heparin (porcine) injection 5,000 Units  5,000 Units SubCUTAneous Q8H    aspirin tablet 325 mg  325 mg Oral DAILY    clopidogrel (PLAVIX) tablet 75 mg  75 mg Oral DAILY    famotidine (PEPCID) tablet 20 mg  20 mg Oral DAILY    heparin (porcine) pf 300-900 Units  300-900 Units InterCATHeter PRN    heparin (porcine) pf 900 Units  900 Units InterCATHeter Q8H    hydrALAZINE (APRESOLINE) tablet 25 mg  25 mg Oral TID    levETIRAcetam (KEPPRA) tablet 1,000 mg  1,000 mg Oral BID    metoprolol tartrate (LOPRESSOR) tablet 100 mg  100 mg Oral BID    modafinil (PROVIGIL) tablet 200 mg  200 mg Oral DAILY    nicotine (NICODERM CQ) 14 mg/24 hr patch 1 Patch  1 Patch TransDERmal Q24H    SALINE PERIPHERAL FLUSH Q8H soln 10-30 mL  10-30 mL InterCATHeter Q8H    saline peripheral flush soln 10-30 mL  10-30 mL InterCATHeter PRN    acetaminophen (TYLENOL) tablet 650 mg  650 mg Oral Q6H PRN    citalopram (CELEXA) tablet 20 mg  20 mg Oral DAILY    senna-docusate (PERICOLACE) 8.6-50 mg per tablet 1 Tab  1 Tab Oral DAILY       EXAM  GEN - Alert, oriented, in no distress  CV - S1, S2, RRR, no rub, murmur, or gallop  Lung - clear to auscultation bilaterally  Abd - soft, nontender, BS present  Ext - no edema    Recent Labs     08/22/19  0612   HGB 8.9*   HCT 28.3*        Recent Labs     08/22/19  0612 08/21/19  0456 08/20/19  0501    142 141   K 5.3* 5.1 4.8   * 109* 106   CO2 26 28 28   BUN 44* 44* 41*   CREA 3.80* 3.98* 4.12*   CA 8.9 8.9 9.4   GLU 93 95 86   MG  --  2.6*  --        Assessment and Plan:   NING on CKD stage IV  - NING vs CKD. Creatinine has been mid 2's to mid 3's since admission, Worse with volume depletion and improved with IVF  - US kidney - some cysts and chronic changes   - reviewed UA - proteinuria present, no active sediments   - Nephrotic syndrome 4.5 gms   - serologic workup negative. K/L ratio 2.15 with no M-spike. No abnormal proteins   -creatinine stable/better 3.8 today with better intake, non oliguric continue to follow renal function continue IVF for now.     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 Will s/o - please schedule outpatient f/u on discharge. Needs to see [facility].     Olya Bautista MD

## 2019-08-22 NOTE — PROGRESS NOTES
Problem: Falls - Risk of  Goal: *Absence of Falls  Description  Document Ramin Rojas Fall Risk and appropriate interventions in the flowsheet. Outcome: Progressing Towards Goal  Note:   Fall Risk Interventions:  Mobility Interventions: Bed/chair exit alarm    Mentation Interventions: Bed/chair exit alarm    Medication Interventions: Bed/chair exit alarm    Elimination Interventions: Call light in reach    History of Falls Interventions: Bed/chair exit alarm         Problem: Patient Education: Go to Patient Education Activity  Goal: Patient/Family Education  Outcome: Progressing Towards Goal     Problem: Patient Education: Go to Patient Education Activity  Goal: Patient/Family Education  Outcome: Progressing Towards Goal     Problem: Infection - Risk of, Surgical Site Infection  Goal: *Absence of surgical site infection signs and symptoms  Outcome: Progressing Towards Goal     Problem: Patient Education: Go to Patient Education Activity  Goal: Patient/Family Education  Outcome: Progressing Towards Goal     Problem: Patient Education: Go to Patient Education Activity  Goal: Patient/Family Education  Outcome: Progressing Towards Goal     Problem: Patient Education: Go to Patient Education Activity  Goal: Patient/Family Education  Description  Patient / [place] family will verbalize understanding of PT safety recommendations, demonstrate appropriate assist for current functional mobility status, safety, and home exercise program by time of discharge.    Outcome: Progressing Towards Goal     Problem: Patient Education: Go to Patient Education Activity  Goal: Patient/Family Education  Outcome: Progressing Towards Goal     Problem: Nutrition Deficit  Goal: *Optimize nutritional status  Outcome: Progressing Towards Goal

## 2019-08-22 NOTE — PROGRESS NOTES
08/22/19 0743   Time Spent With Patient   Time In 0704   Time Out 0743   Patient Seen For: AM;ADLs   Feeding/Eating   Feeding/Eating Assistance I   Comments seated in recliner; completed all setup    Upper Body Bathing   Bathing Assistance, Upper S   Comments verbal cue for thoroughness    Lower Body Bathing   Bathing Assistance, Lower  SBA   Adaptive Equipment Grab bar   Position Performed Seated in chair;Standing   Comments verbal cue for safety    Toileting   Toileting Assistance (FIM Score) S   Upper Body Dressing    Dressing Assistance  S   Comments gathered clothing after verbal cue    Lower Body Dressing    Dressing Assistance  S   Position Performed Seated edge of bed;Standing   Comments verbal cue to sit to thread BLE into underpants    Functional Transfers   Toilet Transfer  Stand pivot transfer without device   Amount of Assistance Required SBA   Tub or Shower Type Shower   Amount of Assistance Required SBA   Adaptive Equipment Tub transfer bench;Grab bars   S: \"I'm doing okay I guess. \" [patient more talkative this morning, requiring less encouragement to get up to complete ADL, noted increased volume with speech]  O: Patient presented sidelying in bed. Agreeable to treatment. Patient completed ADL; see above for details. When standing at cabinet to gather clothing, patient applied deodorant. Patient then walked to the edge of bed, donned shirt, and then stated, \"Oh, I forgot deodorant. \" Patient with no recall of having applied deodorant less than 3 minutes prior.    Shoes: 5: Setup or clean-up assist  Socks: 5: Setup or clean-up assist  Orientation: alert   Expression: able to express needs verbally but with low volume, has to repeat most statements he says   Comprehension: understands basic instructions 75-90% of the time, moderate assist with complex instructions  Social Interaction: cooperating, appropriate with OT, participating, motivated to improve  Problem Solving: solves routine problems 75% of the time, moderate assist with complex problems   Memory: recalls people frequently seen, able to complete 1/3 step commands, poor short term memory as noted above  A: Today is the best performance with ADL skills so far. Patient was more awake and more engaged in conversation, and also appearing to initiate more. Patient tolerated session well with no complaint of pain. P: Continue OT POC with focus on ADL/IADL skills, cognition, functional transfers, functional mobility, coordination, strength, static and dynamic balance, and activity tolerance to maximize safety and independence with ADLs and functional transfers. Patient was left seated up in recliner with call bell/all other needs in reach. Chair alarm activated. Interdisciplinary communication: Collaborated with nurse regarding IV.      Ramon Ascencio MS, OTR/L  8/22/2019          Note may contain the following abbreviations:   Abbreviation Explanation   AROM Active range of motion   PROM Passive range of motion   SPT Stand pivot transfer   LPT Lateral pivot transfer   WC wheelchair   RW Rolling walker    BUE Bilateral upper extremities   BLE Bilateral lower extremities    WBAT Weight bearing as tolerated    TTWB Toe-touch weight bearing    AD Adaptive device   AE Adaptive equipment    NMES Neuro muscular electrical stimulation   UBE Upper body ergometer

## 2019-08-22 NOTE — PROGRESS NOTES
08/22/19 1022   Time Spent With Patient   Time In 0917   Time Out 1000   Mental Status   Neurologic State Alert   Orientation Level Oriented to person;Disoriented to place; Disoriented to time   Cognition Memory loss;Decreased attention/concentration   Safety/Judgement Decreased insight into deficits;Home safety   Comprehension (Native Language)   Primary Mode of Comprehension Auditory   Score 5   Expression (Native Language)   Primary Mode of Expression Verbal   Score 5   Social Interaction/Pragmatics   Score 3   Problem Solving   Score 3   Memory   Score 1        08/22/19 1023   Reasoning Task Exercises-Similarities/Differences   Similarities Accuracy (%) 80 %   Differences Accuracy (%) 80 %   Neuro-Linguistic Exercises   Verbal Reasoning/Auditory processeing Tasks Impaired; 60% accuracy with immediate recall and processing task    Verbal Problem Solving Impaired; stating missing items needed to complete a task with 80% accuracy given additional time    Verbal Organization Impaired   Memory Impaired   Attention  Impaired     Patient participated with cognitive therapy. Reports he would visit [place] with his family. Stated similariities/differences between countries with [place]; required additional time for responses and cues to elaborate. Stating a missing item needed to complete a familiar task with 80% accuracy. Difficulty with thought organization to describe the task \"I'm sequencing the things I need\". 60% accuracy with immediate memory and auditory processing task. Short-term memory remains a primary barrier with decreased recall of orientation questions despite frequent repetitions and visual aids. Decreased insight into deficits with patient stating \"a little\" when asked if he was demonstrating difficulty with memory or problem solving. Discussed deficits related to hemorrhage related to problem solving and memory with patient becoming despondent and stating \"I don't like to dwell on that\". Recommend continued therapy to address cognitive-linguistic deficits.     Wolfgang Ortega MS, CCC-SLP

## 2019-08-23 LAB
ANION GAP SERPL CALC-SCNC: 3 MMOL/L (ref 7–16)
BUN SERPL-MCNC: 45 MG/DL (ref 6–23)
CALCIUM SERPL-MCNC: 9.2 MG/DL (ref 8.3–10.4)
CHLORIDE SERPL-SCNC: 110 MMOL/L (ref 98–107)
CO2 SERPL-SCNC: 26 MMOL/L (ref 21–32)
CREAT SERPL-MCNC: 3.98 MG/DL (ref 0.8–1.5)
GLUCOSE SERPL-MCNC: 89 MG/DL (ref 65–100)
POTASSIUM SERPL-SCNC: 5.1 MMOL/L (ref 3.5–5.1)
SODIUM SERPL-SCNC: 139 MMOL/L (ref 136–145)

## 2019-08-23 PROCEDURE — 97110 THERAPEUTIC EXERCISES: CPT

## 2019-08-23 PROCEDURE — 97530 THERAPEUTIC ACTIVITIES: CPT

## 2019-08-23 PROCEDURE — 74011250636 HC RX REV CODE- 250/636: Performed by: PHYSICAL MEDICINE & REHABILITATION

## 2019-08-23 PROCEDURE — 99232 SBSQ HOSP IP/OBS MODERATE 35: CPT | Performed by: PHYSICAL MEDICINE & REHABILITATION

## 2019-08-23 PROCEDURE — 36592 COLLECT BLOOD FROM PICC: CPT

## 2019-08-23 PROCEDURE — 74011250637 HC RX REV CODE- 250/637: Performed by: PHYSICAL MEDICINE & REHABILITATION

## 2019-08-23 PROCEDURE — 92507 TX SP LANG VOICE COMM INDIV: CPT

## 2019-08-23 PROCEDURE — 80048 BASIC METABOLIC PNL TOTAL CA: CPT

## 2019-08-23 PROCEDURE — 97535 SELF CARE MNGMENT TRAINING: CPT

## 2019-08-23 PROCEDURE — 65310000000 HC RM PRIVATE REHAB

## 2019-08-23 PROCEDURE — 97116 GAIT TRAINING THERAPY: CPT

## 2019-08-23 RX ORDER — HEPARIN 100 UNIT/ML
300-900 SYRINGE INTRAVENOUS EVERY 8 HOURS
Status: DISCONTINUED | OUTPATIENT
Start: 2019-08-23 | End: 2019-08-28 | Stop reason: HOSPADM

## 2019-08-23 RX ORDER — HEPARIN SODIUM,PORCINE 10 UNIT/ML
30 VIAL (ML) INTRAVENOUS AS NEEDED
Status: DISCONTINUED | OUTPATIENT
Start: 2019-08-23 | End: 2019-08-23

## 2019-08-23 RX ADMIN — CLOPIDOGREL BISULFATE 75 MG: 75 TABLET ORAL at 09:08

## 2019-08-23 RX ADMIN — LEVETIRACETAM 1000 MG: 500 TABLET, FILM COATED ORAL at 21:52

## 2019-08-23 RX ADMIN — MODAFINIL 200 MG: 100 TABLET ORAL at 09:08

## 2019-08-23 RX ADMIN — HEPARIN SODIUM 5000 UNITS: 5000 INJECTION INTRAVENOUS; SUBCUTANEOUS at 05:06

## 2019-08-23 RX ADMIN — TRAZODONE HYDROCHLORIDE 50 MG: 50 TABLET ORAL at 21:52

## 2019-08-23 RX ADMIN — Medication 30 ML: at 13:37

## 2019-08-23 RX ADMIN — HYDRALAZINE HYDROCHLORIDE 25 MG: 25 TABLET, FILM COATED ORAL at 13:44

## 2019-08-23 RX ADMIN — SODIUM CHLORIDE, PRESERVATIVE FREE 900 UNITS: 5 INJECTION INTRAVENOUS at 05:06

## 2019-08-23 RX ADMIN — SENNOSIDES, DOCUSATE SODIUM 1 TABLET: 50; 8.6 TABLET, FILM COATED ORAL at 09:08

## 2019-08-23 RX ADMIN — SODIUM CHLORIDE, PRESERVATIVE FREE 900 UNITS: 5 INJECTION INTRAVENOUS at 13:00

## 2019-08-23 RX ADMIN — METOPROLOL TARTRATE 100 MG: 100 TABLET ORAL at 09:07

## 2019-08-23 RX ADMIN — MEGESTROL ACETATE 200 MG: 40 SUSPENSION ORAL at 09:16

## 2019-08-23 RX ADMIN — CITALOPRAM HYDROBROMIDE 20 MG: 20 TABLET ORAL at 09:08

## 2019-08-23 RX ADMIN — Medication 30 ML: at 05:08

## 2019-08-23 RX ADMIN — ASPIRIN 325 MG ORAL TABLET 325 MG: 325 PILL ORAL at 09:08

## 2019-08-23 RX ADMIN — HYDRALAZINE HYDROCHLORIDE 25 MG: 25 TABLET, FILM COATED ORAL at 21:52

## 2019-08-23 RX ADMIN — Medication 30 ML: at 21:56

## 2019-08-23 RX ADMIN — SODIUM CHLORIDE, PRESERVATIVE FREE 900 UNITS: 5 INJECTION INTRAVENOUS at 22:07

## 2019-08-23 RX ADMIN — LEVETIRACETAM 1000 MG: 500 TABLET, FILM COATED ORAL at 09:08

## 2019-08-23 RX ADMIN — METOPROLOL TARTRATE 100 MG: 100 TABLET ORAL at 17:30

## 2019-08-23 RX ADMIN — HEPARIN SODIUM 5000 UNITS: 5000 INJECTION INTRAVENOUS; SUBCUTANEOUS at 21:52

## 2019-08-23 RX ADMIN — HEPARIN SODIUM 5000 UNITS: 5000 INJECTION INTRAVENOUS; SUBCUTANEOUS at 13:23

## 2019-08-23 RX ADMIN — HYDRALAZINE HYDROCHLORIDE 25 MG: 25 TABLET, FILM COATED ORAL at 05:06

## 2019-08-23 RX ADMIN — FAMOTIDINE 20 MG: 20 TABLET ORAL at 09:08

## 2019-08-23 NOTE — PROGRESS NOTES
PHYSICAL THERAPY DAILY NOTE  Time In: 1350  Time Out: 3552  Patient Seen For: PM    Subjective: \"Is today Thursday? \"         Objective:     COGNITION Daily Assessment    Flat affect; Decreased short term recall. Needs cues to locate room when walking back from therapy gym. BED/MAT MOBILITY Daily Assessment    Rolling Right : 0 (Not tested)  Rolling Left : 0 (Not tested)  Supine to Sit : 5 (Supervision)  Sit to Supine : 5 (Supervision)       TRANSFERS Daily Assessment    Transfer Type: SPT without device  Transfer Assistance : 4 (Contact guard assistance)  Sit to Stand Assistance: Supervision  Car Transfers: Not tested       GAIT Daily Assessment   Gait inconsistent, requiring CGA @ times for balance, but when step clearance & gait tiffanie increase pt. Is able to ambulate w/ supervision. Worked on using directional cues (eg. Room signs) to find his way back to room. Needs mod VCs for problem solving. Amount of Assistance: 4 (Contact guard assistance)  Distance (ft): 200 Feet (ft)(x3)  Assistive Device: Cane, straight       STEPS or STAIRS Daily Assessment    Steps/Stairs Ambulated (#): 0  Level of Assist : 0 (Not tested)       BALANCE Daily Assessment   Standing balance during toileting supervision for increased A/P sway Sitting - Static: Good (unsupported)  Sitting - Dynamic: Good (unsupported)  Standing - Static: Good  Standing - Dynamic : Impaired       WHEELCHAIR MOBILITY Daily Assessment    Able to Propel (ft): 0 feet  Wheelchair Setup Assist Required : 0 (Not tested)       LOWER EXTREMITY EXERCISES Daily Assessment    Pt.  Performed NuStep @ resistance level 2 x16 minutes to increase strength & endurance B UEs/LEs     Vital Signs: /69   Pulse 85   Temp 98.2 °F (36.8 °C)   Resp 18   Wt 60.1 kg (132 lb 6.4 oz)   SpO2 98%   BMI 17.96 kg/m²     Pain level: no c/o pain    Patient education: pt. Educated on how to use cues to help w/ navigation - will need ongoing education    Interdisciplinary Communication: collaborated w/ OT regarding pt's progress    Pt. Left supine in NAD, call bell in reach, [place] activated           Assessment: Pt's mobilitycont. To be inconsistent. Pt. Can ambulate w/ supervision, but often requires CGA due to decreased attention to task. Balance & strength slowly improving, but pt. Cont. To remain below his baseline & benefits from cont. PT services to address. Plan of Care: Continue with POC and progress as tolerated.      Joelle Whelan, PT  8/23/2019

## 2019-08-23 NOTE — PROGRESS NOTES
OT Daily Note  Time In 1117   Time Out 1201     Subjective: \"I'm tired. \"   Pain: none reported  Education: benefits of rehab; wayfinding; attention strategies   Interdisciplinary Communication: with PT regarding functional status   Precautions:    Location on arrival: up at OT table    Cognition Daily Assessment   Patient engaged in cognitive task following verbal instructions to replicate one 16 piece puzzle, requiring repetition of instructions 5 times and moderate cueing to identify problems. Once patient had all pieces turned correctly, he was then able to accurately complete 12/16 piece but required cueing to identify errors in 4 pieces. Patient initiated pen and paper cognitive task following written instructions with errors in 3/3 attempts. After cueing, patient was able to correct errors. Patient was instructed to complete task by Monday and was provided with a pen to finish. Patient's father was in room upon arrival back and father was aware of instructions for completing homework. Discussed the process for family training on Monday with patient's father. Anticipate impaired cognition will remain patient's primary barrier. Functional mobility Daily Assessment   Patient ambulated from room to gym with CGA to SBA using straight cane, with focus on wayfinding. Patient able to accurately state that he is in room 910 without cueing. Patient required one minimal cue when choosing between turning R/L to go to room after passing nursing station. Would benefit from further cognitive tasks during functional mobility. At this time recommend patient not ambulate without a helper due to cognition. Patient was left seated up in recliner with call light/all needs in reach and in no distress. Chair alarm activated and father present. Assessment: Decreased participation compared to yesterday; continues to report he is tired; willing to complete tasks as asked but appears apathetic.    Plan: Continue OT POC with focus on ADL/IADL skills, cognition, functional transfers, functional mobility, coordination, strength, static and dynamic balance, and activity tolerance to maximize safety and independence with ADLs and functional transfers.      Sonali Macedo MS, OTR/L  8/23/2019        Note may contain the following abbreviations:   Abbreviation Explanation   AROM Active range of motion   PROM Passive range of motion   SPT Stand pivot transfer   LPT Lateral pivot transfer   WC wheelchair   RW Rolling walker    BUE Bilateral upper extremities   BLE Bilateral lower extremities    WBAT Weight bearing as tolerated    TTWB Toe-touch weight bearing    AD Adaptive device   AE Adaptive equipment    NMES Neuro muscular electrical stimulation   UBE Upper body ergometer

## 2019-08-23 NOTE — PROGRESS NOTES
08/23/19 1023   Time Spent With Patient   Time In 0915   Time Out 0955   Patient Seen For: AM   Mental Status   Neurologic State Alert   Cognition Follows commands;Memory loss;Decreased attention/concentration   Safety/Judgement Home safety;Decreased insight into deficits        08/23/19 1024   Verbal Organization Exercises   Convergent Categorization Accuracy (%) 50 % problem solving   Word deductions   Memory Exercises   Functional Tasks improved recall of written orientation and biographical information reviewed during today's session; recalled 75% after review x3   Neuro-Linguistic Exercises   Verbal Reasoning Tasks Impaired   Verbal Problem Solving Impaired   Verbal Organization Impaired   Memory Impaired     Patient participated with cognitive therapy. Reported he was tired and required increased encouragement at the beginning of the session. Frequent cues to maintain eye contact. Improved recall of written orientation/biographical information compared with previous sessions at 75% accuracy after repetitions x3. Functional basic calculations for money management completed with 60% accuracy. [address] [employer] for problem solving task involving word deductions. Often naming an item that fits 1-2 of 3 descriptors with verbal cues needed. Encourarged patient and notified of improved ability to recall information versus similar task completed earlier this week. Pt stated he had no immediate needs at the end of the session and thanked therapist.  Dontrell Kramer continued therapy to address cognitive deficits.       Ilana Barahona MS, CCC-SLP

## 2019-08-23 NOTE — PROGRESS NOTES
Problem: Infection - Risk of, Surgical Site Infection  Goal: *Absence of surgical site infection signs and symptoms  Outcome: Progressing Towards Goal     Problem: Patient Education: Go to Patient Education Activity  Goal: Patient/Family Education  Outcome: Progressing Towards Goal     Problem: Nutrition Deficit  Goal: *Optimize nutritional status  Outcome: Progressing Towards Goal

## 2019-08-23 NOTE — PROGRESS NOTES
08/23/19 1016   Time Spent With Patient   Time In 0747   Time Out 0818   Patient Seen For: AM;ADLs   Feeding/Eating   Feeding/Eating Assistance Mod I   Comments seated up in recliner   Upper Body Bathing   Bathing Assistance, Upper S   Position Performed Seated in chair   Lower Body Bathing   Bathing Assistance, Lower  SBA   Position Performed Seated in chair;Standing   Comments verbal cue for sitting to dry BLE; continues to attempt to stand on one leg with no carryover of training    Toileting   Toileting Assistance (FIM Score) S   Upper Body Dressing    Dressing Assistance  S   Comments setup assist    Lower Body Dressing    Dressing Assistance  SBA   Position Performed Seated edge of bed;Standing   Functional Transfers   Toilet Transfer  Stand pivot transfer without device   Amount of Assistance Required SBA   Tub or Shower Type Shower   Amount of Assistance Required CGA   Adaptive Equipment Tub transfer bench;Grab bars   S: \"I'm tired. \"   O: Patient presented sidelying in bed. Agreeable to treatment. Patient completed ADL; see above for details. Required encouragement to participate in self care tasks and required 15 minutes to go from sidelying to sitting up on the toilet. Patient was told, \"We only have 30 minutes left to complete the bathing and the dressing,\" and after that verbal cue patient significantly sped up completing self care tasks and was able to complete ADL within appropriate timeframe.    Shoes: 5: Setup or clean-up assist  Socks: 5: Setup or clean-up assist  Orientation: alert but somnolent   Expression: able to express needs verbally but very low volume and very difficult to understand at times, has to repeat almost everything stated   Comprehension: understands basic instructions, maximum assist with complex instructions  Social Interaction: withdrawn, flat affect   Problem Solving: solves routine problems 50% of the time, total assist with complex problems   Memory: recalls people frequently seen but inconsistently, able to complete 1/3 step commands, very impaired short term memory   A: Impaired cognition remains primary barrier. Patient tolerated session well with no complaint of pain. Continue to anticipate patient will require 24/7 supervision. P: Continue OT POC with focus on ADL/IADL skills, cognition, functional transfers, functional mobility, coordination, strength, static and dynamic balance, and activity tolerance to maximize safety and independence with ADLs and functional transfers. Patient was left seated up in recliner with call bell/all other needs in reach. Chair alarm activated. Interdisciplinary communication: Collaborated with PT and confirmed that patient is on track to meet goals.      Paddy Rodarte MS, OTR/L  8/23/2019          Note may contain the following abbreviations:   Abbreviation Explanation   AROM Active range of motion   PROM Passive range of motion   SPT Stand pivot transfer   LPT Lateral pivot transfer   WC wheelchair   RW Rolling walker    BUE Bilateral upper extremities   BLE Bilateral lower extremities    WBAT Weight bearing as tolerated    TTWB Toe-touch weight bearing    AD Adaptive device   AE Adaptive equipment    NMES Neuro muscular electrical stimulation   UBE Upper body ergometer

## 2019-08-23 NOTE — PROGRESS NOTES
PHYSICAL THERAPY DAILY NOTE  Time In: 1030  Time Out: 1115  Patient Seen For: AM;Gait training; Therapeutic exercise;Transfer training    Subjective: \"I'm cold. \"  And \"I'm tired. \"  Pt. Very flat this AM, needing increased encouragement to participate w/ PT         Objective:     COGNITION Daily Assessment    Pt. Cont. W/ flat affect;  Minimal eye contact made this session w/ [pt. Requiring encouragement to participate w/ activities       BED/MAT MOBILITY Daily Assessment    Rolling Right : 0 (Not tested)  Rolling Left : 0 (Not tested)  Supine to Sit : 0 (Not tested)  Sit to Supine : 0 (Not tested)       TRANSFERS Daily Assessment    Transfer Type: SPT without device  Transfer Assistance : 4 (Contact guard assistance)  Sit to Stand Assistance: Supervision  Car Transfers: Not tested       GAIT Daily Assessment   First gait trial @ beginning of session pt. Required CGA due to instability & .  Performed second gait trial w/ SPC with supervision. Improved use of SPC this session. Amount of Assistance: 4 (Contact guard assistance)  Distance (ft): 150 Feet (ft)(x1)  Assistive Device: Other (comment)(no A/D)       STEPS or STAIRS Daily Assessment    Steps/Stairs Ambulated (#): 0  Level of Assist : 0 (Not tested)       BALANCE Daily Assessment   Pt. Performed tandem walking w/ B UE support & CGA as well as retrieved object from floor w/ CGA for safety w/ balance. Sitting - Static: Good (unsupported)  Sitting - Dynamic: Good (unsupported)  Standing - Static: Good  Standing - Dynamic : Impaired       WHEELCHAIR MOBILITY Daily Assessment    Able to Propel (ft): 0 feet  Wheelchair Setup Assist Required : 0 (Not tested)       LOWER EXTREMITY EXERCISES Daily Assessment   Pt. Performed exercises to focus on dynamic stability, LE strengthening, & core control. Exercises all performed x10 each:   Bottom shelf/top shelf w/ swiss ball, LE lifts w/ swiss ball in between, knee ups w/ UE tap, lateral wall ball (oblique toss & catch) using [employer], & wall push-ups on Swiss ball. Vital Signs: /80   Pulse 71   Temp 98.2 °F (36.8 °C)   Resp 18   Wt 60.1 kg (132 lb 6.4 oz)   SpO2 98%   BMI 17.96 kg/m²     Pain level: pt. C/o HA  Pain interventions: incorporated rest breaks,  Pt. Denied wanting pain medication     Patient education: pt. Educated on purpose of each activity prior to completing    Interdisciplinary Communication: discussed using SPC w/ OT    Pt. Left in therapy gym @ table for OT session. Assessment: Pt. needing increased encouragement to participate this session. However, was able to demonstrate more complex movement patterns & improved core strength & stability as activities progressed. Pt. Is likely stronger than he appears. Gait quality continues to fluctuate, but stability improved w/ SPC, so will cont. Gait training w/ SPC to hopefully assist pt. In becoming more independent w/ his mobility. Plan of Care: Continue with POC and progress as tolerated.      Linda Sport, PT  8/23/2019

## 2019-08-23 NOTE — PROGRESS NOTES
Harley Aschoff, MD,   Medical Director  [facility], [address] [facility]  Tel: [phone]       SFD PROGRESS NOTE    Shemar Alert  Admit Date: 8/13/2019  Admit Diagnosis: SAH (subarachnoid hemorrhage) ([address] [I60.9]    Subjective     Patient seen and examined. Vss. No acute complaints. PT, OT well tolerated. Steady gains made. No new barrier to progress noted.      Objective:     Current Facility-Administered Medications   Medication Dose Route Frequency    traZODone (DESYREL) tablet 50 mg  50 mg Oral QHS PRN    HYDROcodone-acetaminophen (NORCO) 5-325 mg per tablet 1 Tab  1 Tab Oral Q6H PRN    megestrol (MEGACE) 400 mg/10 mL (10 mL) oral suspension 200 mg  200 mg Oral DAILY    ondansetron (ZOFRAN ODT) tablet 4 mg  4 mg Oral Q6H PRN    lactulose (CHRONULAC) 10 gram/15 mL solution 45 mL  30 g Oral DAILY PRN    ondansetron (ZOFRAN) injection 4 mg  4 mg IntraVENous Q4H PRN    heparin (porcine) injection 5,000 Units  5,000 Units SubCUTAneous Q8H    aspirin tablet 325 mg  325 mg Oral DAILY    clopidogrel (PLAVIX) tablet 75 mg  75 mg Oral DAILY    famotidine (PEPCID) tablet 20 mg  20 mg Oral DAILY    heparin (porcine) pf 300-900 Units  300-900 Units InterCATHeter PRN    heparin (porcine) pf 900 Units  900 Units InterCATHeter Q8H    hydrALAZINE (APRESOLINE) tablet 25 mg  25 mg Oral TID    levETIRAcetam (KEPPRA) tablet 1,000 mg  1,000 mg Oral BID    metoprolol tartrate (LOPRESSOR) tablet 100 mg  100 mg Oral BID    modafinil (PROVIGIL) tablet 200 mg  200 mg Oral DAILY    nicotine (NICODERM CQ) 14 mg/24 hr patch 1 Patch  1 Patch TransDERmal Q24H    SALINE PERIPHERAL FLUSH Q8H soln 10-30 mL  10-30 mL InterCATHeter Q8H    saline peripheral flush soln 10-30 mL  10-30 mL InterCATHeter PRN    acetaminophen (TYLENOL) tablet 650 mg  650 mg Oral Q6H PRN    citalopram (CELEXA) tablet 20 mg  20 mg Oral DAILY    senna-docusate (PERICOLACE) 8.6-50 mg per tablet 1 Tab 1 Tab Oral DAILY     Review of Systems:Denies chest pain, shortness of breath, cough, headache, visual problems, abdominal pain, dysurea, calf pain. Pertinent positives are as noted in the medical records and unremarkable otherwise. Visit Vitals  /80   Pulse 71   Temp 98.2 °F (36.8 °C)   Resp 18   Wt 132 lb 6.4 oz (60.1 kg)   SpO2 98%   BMI 17.96 kg/m²        Physical Exam:   General: Alert and oriented to person and situation only  No acute cardio respiratory distress. HEENT: Normocephalic,no scleral icterus  Oral mucosa moist without cyanosis   Lungs: Clear to auscultation  bilaterally. Respiration even and unlabored   Heart: Regular rate and rhythm, S1, S2   No  murmurs, clicks, rub or gallops   Abdomen: Soft, non-tender, nondistended. Bowel sounds present. No organomegaly. Genitourinary: defer. Neuromuscular:      Continued cogn deficits in memory, recall, though processing, judgment , prob solving and attn  Generalized non focal weaness  Hypophonic, blunted affect   Skin/extremity: No rashes, no erythema. No calf tenderness BLE  Scalp incisions healing well                                                                            Functional Assessment:  Gross Assessment  AROM: Generally decreased, functional (08/21/19 1500)  PROM: Within functional limits (08/21/19 1500)  Strength: Generally decreased, functional (08/21/19 1500)  Coordination: Generally decreased, functional (08/21/19 1500)       Balance  Sitting - Static: Good (unsupported) (08/22/19 1400)  Sitting - Dynamic: Good (unsupported) (08/22/19 1400)  Standing - Static: Good (08/22/19 1400)  Standing - Dynamic : Impaired (08/22/19 1400)                     [facility] Fall Risk Assessment:  [facility] Fall Risk  Mobility: Ambulates or transfers with assist devices or assistance (08/22/19 1950)  Mobility Interventions: Bed/chair exit alarm; Patient to call before getting OOB;PT Consult for mobility concerns;PT Consult for assist device competence;Utilize walker, cane, or other assistive device;Strengthening exercises (ROM-active/passive) (08/22/19 1950)  Mentation: Periodic confusion (08/22/19 1950)  Mentation Interventions: Adequate sleep, hydration, pain control;Bed/chair exit alarm; Door open when patient unattended; Increase mobility;More frequent rounding;Reorient patient;Room close to nurse's station (08/22/19 1950)  Medication: Patient receiving anticonvulsants, sedatives(tranquilizers), psychotropics or hypnotics, hypoglycemics, narcotics, sleep aids, antihypertensives, laxatives, or diuretics (08/22/19 1950)  Medication Interventions: Patient to call before getting OOB; Teach patient to arise slowly; Bed/chair exit alarm; Assess postural VS orthostatic hypotension (08/22/19 1950)  Elimination: Needs assistance with toileting (08/22/19 1950)  Elimination Interventions: Call light in reach; Toileting schedule/hourly rounds; Patient to call for help with toileting needs;Stay With Me (per policy) (43/02/31 6964)  Prior Fall History: Before admission in past 12 months _home or previous inpatient care) (08/22/19 1950)  History of Falls Interventions: Consult care management for discharge planning;Door open when patient unattended (08/22/19 1950)  Total Score: 5 (08/22/19 1950)  Standard Fall Precautions: Yes (08/20/19 2023)  High Fall Risk: Yes (08/22/19 1950)     Speech Assessment:         Ambulation:  Gait  Base of Support: Narrowed (08/21/19 1500)  Speed/Aminata: Slow;Shuffled (08/21/19 1500)  Step Length: Right shortened;Left shortened (08/21/19 1500)  Stance: Right increased; Left increased (08/21/19 1500)  Gait Abnormalities: Decreased step clearance; Path deviations; Altered arm swing (08/21/19 1500)  Distance (ft): 150 Feet (ft) (08/22/19 1400)  Assistive Device: Cane, straight (08/22/19 1400)  Rail Use: Both (08/20/19 5999)     Labs/Studies:  Recent Results (from the past 67 hour(s))   MAGNESIUM    Collection Time: 08/21/19  4:56 AM   Result Value Ref Range    Magnesium 2.6 (H) 1.8 - 2.4 mg/dL   METABOLIC PANEL, BASIC    Collection Time: 08/21/19  4:56 AM   Result Value Ref Range    Sodium 142 136 - 145 mmol/L    Potassium 5.1 3.5 - 5.1 mmol/L    Chloride 109 (H) 98 - 107 mmol/L    CO2 28 21 - 32 mmol/L    Anion gap 5 (L) 7 - 16 mmol/L    Glucose 95 65 - 100 mg/dL    BUN 44 (H) 6 - 23 MG/DL    Creatinine 3.98 (H) 0.8 - 1.5 MG/DL    GFR est AA 21 (L) >60 ml/min/1.73m2    GFR est non-AA 17 (L) >60 ml/min/1.73m2    Calcium 8.9 8.3 - 09.2 MG/DL   METABOLIC PANEL, BASIC    Collection Time: 08/22/19  6:12 AM   Result Value Ref Range    Sodium 140 136 - 145 mmol/L    Potassium 5.3 (H) 3.5 - 5.1 mmol/L    Chloride 111 (H) 98 - 107 mmol/L    CO2 26 21 - 32 mmol/L    Anion gap 3 (L) 7 - 16 mmol/L    Glucose 93 65 - 100 mg/dL    BUN 44 (H) 6 - 23 MG/DL    Creatinine 3.80 (H) 0.8 - 1.5 MG/DL    GFR est AA 22 (L) >60 ml/min/1.73m2    GFR est non-AA 18 (L) >60 ml/min/1.73m2    Calcium 8.9 8.3 - 10.4 MG/DL   HGB & HCT    Collection Time: 08/22/19  6:12 AM   Result Value Ref Range    HGB 8.9 (L) 13.6 - 17.2 g/dL    HCT 28.3 (L) 41.1 - 47.3 %   METABOLIC PANEL, BASIC    Collection Time: 08/23/19  5:04 AM   Result Value Ref Range    Sodium 139 136 - 145 mmol/L    Potassium 5.1 3.5 - 5.1 mmol/L    Chloride 110 (H) 98 - 107 mmol/L    CO2 26 21 - 32 mmol/L    Anion gap 3 (L) 7 - 16 mmol/L    Glucose 89 65 - 100 mg/dL    BUN 45 (H) 6 - 23 MG/DL    Creatinine 3.98 (H) 0.8 - 1.5 MG/DL    GFR est AA 21 (L) >60 ml/min/1.73m2    GFR est non-AA 17 (L) >60 ml/min/1.73m2    Calcium 9.2 8.3 - 10.4 MG/DL       Assessment:     Problem List as of 8/23/2019 Date Reviewed: 7/10/2019          Codes Class Noted - Resolved    Major depressive episode ICD-10-CM: F32.9  ICD-9-CM: 296.20  8/20/2019 - Present        Posterior reversible encephalopathy syndrome ICD-10-CM: I67.83  ICD-9-CM: 348.39  8/8/2019 - Present        Seizure cerebral ICD-10-CM: I67.89  ICD-9-CM: 436  8/7/2019 - Present SAH (subarachnoid hemorrhage) ([facility] ICD-10-CM: I60.9  ICD-9-CM: 430  8/5/2019 - Present        Pneumonia due to methicillin susceptible Staphylococcus aureus (MSSA) ([facility] ICD-10-CM: I38.788  ICD-9-CM: 482.41  7/15/2019 - Present        Tobacco abuse ICD-10-CM: Z72.0  ICD-9-CM: 305.1  7/10/2019 - Present        Acute respiratory failure with hypoxia (HCC) ICD-10-CM: J96.01  ICD-9-CM: 518.81  7/10/2019 - Present        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  ICD-9-CM: 331.3  7/10/2019 - Present        IVH (intraventricular hemorrhage) ([facility] ICD-10-CM: I61.5  ICD-9-CM: 963  7/10/2019 - Present        Seizure ([facility] ICD-10-CM: R56.9  ICD-9-CM: 780.39  7/10/2019 - Present        Elevated serum creatinine ICD-10-CM: R79.89  ICD-9-CM: 790.99  7/10/2019 - Present        Cerebral vasospasm ICD-10-CM: I67.848  ICD-9-CM: 435.9  7/10/2019 - Present        RESOLVED: Endotracheally intubated ICD-10-CM: Z97.8  ICD-9-CM: V49.89  7/10/2019 - 7/10/2019        RESOLVED: SAH (subarachnoid hemorrhage) ([facility] ICD-10-CM: I60.9  ICD-9-CM: 018  7/9/2019 - 7/10/2019               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 Statin; LDL 67.6 ; adequate     Stimulation/ depression ; flat affect, improved with Provigil. Increase celexa. Psych consult  -8/19 staff notices improvement in conversation. Smiling more  -8/23 dc Provigil. Discharging next week. Insurance will not cover provigil.  If pt becomes less engaged; will start Amantadine     Hypokalemia; improved with supplement; monitor; 3.5 8/14; 3.7 8/16 8/18 - K - 4.2; 4.8 this a.m 8/19; 8/21 5.1 ; no supplements; likely inc due to renal dz; monitor; 8/23 5. 1     Cont magnesium to prevent vasospasm. Daily labs; Mg 2.3 cont suppl. Will change to [facility] labs; 8/121 2.6 off supplement     DVT risk / DVT Prophylaxis- Will require daily physician exam to assess for signs and symptoms as patient is at increased risk for of thromboembolism. Mobilization as tolerated. Intermittent pneumatic compression devices when in bed Thigh-high or knee-high thromboembolic deterrent hose when out of bed. Heparin 5000 units subcut q 8hrs      sz d/o; controlled with increased Keppra 1000mg bid;sz precautions     Pain Control: using NSAID medication regularly, daily. Will require regular pain assessment and comprenhensive pain management,   Norco tapered to 5mg prn       Wound Care: Monitor wound status daily per staff and physician. At risk for failure. Will require 24/7 rehab nursing. Keep wound clean and dry and dc scalp sutures      Hypertension - BP uncontrolled, fluctuating, managed medically. No bp goals at this time. Prolonged HTN for management of SAH to prevent vasospasm contributed to development of PRES. F/u scan improved; now on hydralazine and metoprolol; add parameters. Would like to keep systolic less than 355; monitor symptoms and adjust as needed. Avoid hypotension  -5/72 89Y- 140s / 70-80s range  -8/16 144/81 controlled  -1/86 790/03, fluctuating; 8/19 128/79; 8/21 bp 130s-140s acceptable; 8/23 127//80     Malnutrition; needs supplement; jayce ct. Significant wt loss. Check alb; 2.4  -8/15 added Megace, approx 40 lb wt loss during hospitalization; need to document accurate wt; will change to a regular diet from renal to inc food choices to include things he might eat.   -8/16 seen by Dietician, discussed. Po improving. Eating what family brings in. Changed to reg diet with better choices. Encourage po.  -8/19 excellent po over weekend; monitor wt; 8/21 appears to be meeting caloric needs.  Push ensure enlive     Proteinuria; monitor     NING, likely CKD; creat 3.39; slt improvement. BUN 17 improved; renal US 8/6; Echogenic kidneys suggesting chronic renal disease.  No evidence of  Obstruction. 3.52 this a.m 8/14; need strict I/Os  -8/16 creat slowly increasing 3.74 today>3.52> 3.39< 3.51; BUN trending up as well. Will give fluid bolus and continue to monitor. Strict I/0s ordered; however , only # voids, not amt recorded! 8/18 - BUN - 24 > 27 > 31, Cr - 3.7 > 3.6 >3.7, will hold ivf. Begin to monitor I/Os, seemingly baseline Cr - 3.4 -3.7 since hospital admission  -8/19 creat slowly creeping up. No change with IVFs. I/O have not been recorded accurately; STRICT ADHERENCE NEEDED   -reconsult Nephrology. Had prior Protein elctrophoresis. Not sure significance of free light chains kappa/lambda, QT;  8/20 per nephrol No M spike or abnl proteins  -8/20 appreciate Nephrology assist; bun creat increasing; 41/4/12, mag 2.6; has been eating much better; may be dry; will give IVFs; however, didn't make much difference last week. Stg IV. UOP better documented ; 1800ml, intake only noted to be 540cc. ; recheck in a.m after flds.   -8/21 bun/creat 44/3.98 with IVFs. Creat slt dec, but slt inc. Push po fluids. ? Baseline. stg IV CKD. UOP not well recorded  -8/22 improved 3.80. Will stop IVFs and monitor     Anemia likely due to CKD; recheck in a.m. Check stool.  -8/14 hgb 8.7 slt impr (8.5)  -8/15 improved 9.3, stool negative; 8/22 hgb 8.9 may be dilutional due to IVFs; iron 76 , lower TIBC, elevated ferritin 497     Urinary retention/ neurogenic bladder - schedule voids q6-8 hrs. Check post-void residual as needed; In and Out catheterize if post-void residual is more than 400 cc.  - has been continent without residuals     bowel program - add stool softeners and prn bowel program     GERD - resume PPI. At times may need additional antacids, Maalox prn.     Tobacco abuse; stress cessation.  Cont nicoderm patch     Hydrocephalus, resolved s/p VPS     Insomnia - will increase trazodone to 50mg hs prn with prn repeat dose   -8/19 slept better last noc but sleepy this a.m. With increased Trazadone. May have to decrease; 8/21 will cont current dosing, but give range of dosing to start at lowest dose.  Participating well with therapies  -8/22 tolerating dose well. Good nocs rest. OT reports that he woke up for ADLs well         Time spent was 25 minutes with over 1/2 in direct patient care/examination, consultation and coordination of care.      Signed By: Jeremi Brush MD     August 23, 2019

## 2019-08-23 NOTE — PROGRESS NOTES
Hourly rounds completed this shift. Requesting cereal in the middle of the night. Eating well. Calling appropriately. Bed alarm remains intact.

## 2019-08-24 LAB
ANION GAP SERPL CALC-SCNC: 3 MMOL/L (ref 7–16)
BUN SERPL-MCNC: 51 MG/DL (ref 6–23)
CALCIUM SERPL-MCNC: 9.3 MG/DL (ref 8.3–10.4)
CHLORIDE SERPL-SCNC: 109 MMOL/L (ref 98–107)
CO2 SERPL-SCNC: 28 MMOL/L (ref 21–32)
CREAT SERPL-MCNC: 4.21 MG/DL (ref 0.8–1.5)
GLUCOSE SERPL-MCNC: 116 MG/DL (ref 65–100)
HCT VFR BLD AUTO: 29.6 % (ref 41.1–50.3)
HGB BLD-MCNC: 9.3 G/DL (ref 13.6–17.2)
POTASSIUM SERPL-SCNC: 5 MMOL/L (ref 3.5–5.1)
SODIUM SERPL-SCNC: 140 MMOL/L (ref 136–145)

## 2019-08-24 PROCEDURE — 97116 GAIT TRAINING THERAPY: CPT

## 2019-08-24 PROCEDURE — 77030020263 HC SOL INJ SOD CL0.9% LFCR 1000ML

## 2019-08-24 PROCEDURE — 99232 SBSQ HOSP IP/OBS MODERATE 35: CPT | Performed by: PHYSICAL MEDICINE & REHABILITATION

## 2019-08-24 PROCEDURE — 74011250636 HC RX REV CODE- 250/636: Performed by: PHYSICAL MEDICINE & REHABILITATION

## 2019-08-24 PROCEDURE — 85018 HEMOGLOBIN: CPT

## 2019-08-24 PROCEDURE — 74011250637 HC RX REV CODE- 250/637: Performed by: PHYSICAL MEDICINE & REHABILITATION

## 2019-08-24 PROCEDURE — 36592 COLLECT BLOOD FROM PICC: CPT

## 2019-08-24 PROCEDURE — 80048 BASIC METABOLIC PNL TOTAL CA: CPT

## 2019-08-24 PROCEDURE — 97530 THERAPEUTIC ACTIVITIES: CPT

## 2019-08-24 PROCEDURE — 65310000000 HC RM PRIVATE REHAB

## 2019-08-24 RX ORDER — HYDRALAZINE HYDROCHLORIDE 25 MG/1
25 TABLET, FILM COATED ORAL
Status: DISCONTINUED | OUTPATIENT
Start: 2019-08-24 | End: 2019-08-25

## 2019-08-24 RX ORDER — SODIUM CHLORIDE 9 MG/ML
100 INJECTION, SOLUTION INTRAVENOUS CONTINUOUS
Status: DISCONTINUED | OUTPATIENT
Start: 2019-08-24 | End: 2019-08-27

## 2019-08-24 RX ADMIN — HEPARIN SODIUM 5000 UNITS: 5000 INJECTION INTRAVENOUS; SUBCUTANEOUS at 15:28

## 2019-08-24 RX ADMIN — MEGESTROL ACETATE 200 MG: 40 SUSPENSION ORAL at 09:01

## 2019-08-24 RX ADMIN — HYDRALAZINE HYDROCHLORIDE 25 MG: 25 TABLET, FILM COATED ORAL at 20:42

## 2019-08-24 RX ADMIN — FAMOTIDINE 20 MG: 20 TABLET ORAL at 08:52

## 2019-08-24 RX ADMIN — TRAZODONE HYDROCHLORIDE 50 MG: 50 TABLET ORAL at 20:42

## 2019-08-24 RX ADMIN — HYDRALAZINE HYDROCHLORIDE 25 MG: 25 TABLET, FILM COATED ORAL at 05:25

## 2019-08-24 RX ADMIN — Medication 30 ML: at 15:34

## 2019-08-24 RX ADMIN — SODIUM CHLORIDE, PRESERVATIVE FREE 300 UNITS: 5 INJECTION INTRAVENOUS at 20:43

## 2019-08-24 RX ADMIN — HEPARIN SODIUM 5000 UNITS: 5000 INJECTION INTRAVENOUS; SUBCUTANEOUS at 05:25

## 2019-08-24 RX ADMIN — SODIUM CHLORIDE 100 ML/HR: 900 INJECTION, SOLUTION INTRAVENOUS at 10:15

## 2019-08-24 RX ADMIN — Medication 10 ML: at 20:44

## 2019-08-24 RX ADMIN — CITALOPRAM HYDROBROMIDE 20 MG: 20 TABLET ORAL at 08:52

## 2019-08-24 RX ADMIN — LEVETIRACETAM 1000 MG: 500 TABLET, FILM COATED ORAL at 20:42

## 2019-08-24 RX ADMIN — SENNOSIDES, DOCUSATE SODIUM 1 TABLET: 50; 8.6 TABLET, FILM COATED ORAL at 08:52

## 2019-08-24 RX ADMIN — SODIUM CHLORIDE, PRESERVATIVE FREE 900 UNITS: 5 INJECTION INTRAVENOUS at 14:00

## 2019-08-24 RX ADMIN — HEPARIN SODIUM 5000 UNITS: 5000 INJECTION INTRAVENOUS; SUBCUTANEOUS at 20:42

## 2019-08-24 RX ADMIN — ASPIRIN 325 MG ORAL TABLET 325 MG: 325 PILL ORAL at 08:52

## 2019-08-24 RX ADMIN — CLOPIDOGREL BISULFATE 75 MG: 75 TABLET ORAL at 08:52

## 2019-08-24 RX ADMIN — LEVETIRACETAM 1000 MG: 500 TABLET, FILM COATED ORAL at 08:52

## 2019-08-24 RX ADMIN — METOPROLOL TARTRATE 100 MG: 100 TABLET ORAL at 08:53

## 2019-08-24 RX ADMIN — SODIUM CHLORIDE 100 ML/HR: 900 INJECTION, SOLUTION INTRAVENOUS at 20:42

## 2019-08-24 RX ADMIN — HYDROCODONE BITARTRATE AND ACETAMINOPHEN 1 TABLET: 5; 325 TABLET ORAL at 20:42

## 2019-08-24 NOTE — PROGRESS NOTES
Isabel Campa MD,   Medical Director  [address], [address] [facility]  Tel: [phone]       D PROGRESS NOTE    Toshia Olivas  Admit Date: 8/13/2019  Admit Diagnosis: SAH (subarachnoid hemorrhage) ([address] [I60.9]    Subjective     Patient seen and examined. Vss. No acute complaints. PT, OT well tolerated. Steady gains made. No new barrier to progress noted. Smiling more. More engaged. Better eye contact with vcs. Always complains of being cold; no f/c. Sleeping well.  Appetite good  Objective:     Current Facility-Administered Medications   Medication Dose Route Frequency    heparin (porcine) pf 300-900 Units  300-900 Units InterCATHeter Q8H    traZODone (DESYREL) tablet 50 mg  50 mg Oral QHS PRN    HYDROcodone-acetaminophen (NORCO) 5-325 mg per tablet 1 Tab  1 Tab Oral Q6H PRN    megestrol (MEGACE) 400 mg/10 mL (10 mL) oral suspension 200 mg  200 mg Oral DAILY    ondansetron (ZOFRAN ODT) tablet 4 mg  4 mg Oral Q6H PRN    lactulose (CHRONULAC) 10 gram/15 mL solution 45 mL  30 g Oral DAILY PRN    ondansetron (ZOFRAN) injection 4 mg  4 mg IntraVENous Q4H PRN    heparin (porcine) injection 5,000 Units  5,000 Units SubCUTAneous Q8H    aspirin tablet 325 mg  325 mg Oral DAILY    clopidogrel (PLAVIX) tablet 75 mg  75 mg Oral DAILY    famotidine (PEPCID) tablet 20 mg  20 mg Oral DAILY    hydrALAZINE (APRESOLINE) tablet 25 mg  25 mg Oral TID    levETIRAcetam (KEPPRA) tablet 1,000 mg  1,000 mg Oral BID    metoprolol tartrate (LOPRESSOR) tablet 100 mg  100 mg Oral BID    nicotine (NICODERM CQ) 14 mg/24 hr patch 1 Patch  1 Patch TransDERmal Q24H    SALINE PERIPHERAL FLUSH Q8H soln 10-30 mL  10-30 mL InterCATHeter Q8H    saline peripheral flush soln 10-30 mL  10-30 mL InterCATHeter PRN    acetaminophen (TYLENOL) tablet 650 mg  650 mg Oral Q6H PRN    citalopram (CELEXA) tablet 20 mg  20 mg Oral DAILY    senna-docusate (PERICOLACE) 8.6-50 mg per tablet 1 Tab  1 Tab Oral DAILY     Review of Systems:Denies chest pain, shortness of breath, cough, headache, visual problems, abdominal pain, dysurea, calf pain. Pertinent positives are as noted in the medical records and unremarkable otherwise. Visit Vitals  /85 (BP 1 Location: Left arm, BP Patient Position: At rest)   Pulse 72   Temp 98 °F (36.7 °C)   Resp 17   Wt 132 lb 6.4 oz (60.1 kg)   SpO2 98%   BMI 17.96 kg/m²        Physical Exam:   General: Alert and oriented to self, stroke, not time and place only with vcs  No acute cardio respiratory distress. HEENT: Normocephalic,no scleral icterus  Oral mucosa moist without cyanosis   Lungs: Clear to auscultation  bilaterally. Respiration even and unlabored   Heart: Regular rate and rhythm, S1, S2   No  murmurs, clicks, rub or gallops   Abdomen: Soft, non-tender, nondistended. Bowel sounds present. No organomegaly. Genitourinary: defer . Neuromuscular:      No neuro deficits  Motor symm; prox>distal generalized weakness  EOMI, PERRLA  Voice quality improved but needs vcs to speak louder  fcs well. Skin/extremity: No rashes, no erythema. No calf tenderness BLE  No edema                                                                            Functional Assessment:  Gross Assessment  AROM: Generally decreased, functional (08/21/19 1500)  PROM: Within functional limits (08/21/19 1500)  Strength: Generally decreased, functional (08/21/19 1500)  Coordination: Generally decreased, functional (08/21/19 1500)       Balance  Sitting - Static: Good (unsupported) (08/23/19 1500)  Sitting - Dynamic: Good (unsupported) (08/23/19 1500)  Standing - Static: Good (08/23/19 1500)  Standing - Dynamic : Impaired (08/23/19 1500)                     Ken Pradhan Fall Risk Assessment:  Ken Pradhan Fall Risk  Mobility: Ambulates or transfers with assist devices or assistance (08/23/19 1945)  Mobility Interventions: Bed/chair exit alarm; Patient to call before getting OOB; Strengthening exercises (ROM-active/passive); Utilize walker, cane, or other assistive device (08/23/19 1945)  Mentation: Periodic confusion (08/23/19 1945)  Mentation Interventions: Adequate sleep, hydration, pain control;Door open when patient unattended;Bed/chair exit alarm; Increase mobility (08/23/19 1945)  Medication: Patient receiving anticonvulsants, sedatives(tranquilizers), psychotropics or hypnotics, hypoglycemics, narcotics, sleep aids, antihypertensives, laxatives, or diuretics (08/23/19 1945)  Medication Interventions: Patient to call before getting OOB; Teach patient to arise slowly (08/23/19 1945)  Elimination: Needs assistance with toileting (08/23/19 1945)  Elimination Interventions: Call light in reach; Patient to call for help with toileting needs; Toilet paper/wipes in reach;Bed/chair exit alarm (08/23/19 1945)  Prior Fall History: Before admission in past 12 months _home or previous inpatient care) (08/23/19 0940)  History of Falls Interventions: Consult care management for discharge planning;Bed/chair exit alarm; Door open when patient unattended (08/23/19 1945)  Total Score: 5 (08/23/19 0940)  Standard Fall Precautions: Yes (08/20/19 2023)  High Fall Risk: Yes (08/23/19 1945)     Speech Assessment:         Ambulation:  Gait  Base of Support: Narrowed (08/21/19 1500)  Speed/Aminata: Slow;Shuffled (08/21/19 1500)  Step Length: Right shortened;Left shortened (08/21/19 1500)  Stance: Right increased; Left increased (08/21/19 1500)  Gait Abnormalities: Decreased step clearance; Path deviations; Altered arm swing (08/21/19 1500)  Distance (ft): 200 Feet (ft)(x3) (08/23/19 1500)  Assistive Device: Cane, straight (08/23/19 1500)  Rail Use: Both (08/20/19 1649)     Labs/Studies:  Recent Results (from the past 72 hour(s))   METABOLIC PANEL, BASIC    Collection Time: 08/22/19  6:12 AM   Result Value Ref Range    Sodium 140 136 - 145 mmol/L    Potassium 5.3 (H) 3.5 - 5.1 mmol/L    Chloride 111 (H) 98 - 107 mmol/L    CO2 26 21 - 32 mmol/L    Anion gap 3 (L) 7 - 16 mmol/L    Glucose 93 65 - 100 mg/dL    BUN 44 (H) 6 - 23 MG/DL    Creatinine 3.80 (H) 0.8 - 1.5 MG/DL    GFR est AA 22 (L) >60 ml/min/1.73m2    GFR est non-AA 18 (L) >60 ml/min/1.73m2    Calcium 8.9 8.3 - 10.4 MG/DL   HGB & HCT    Collection Time: 08/22/19  6:12 AM   Result Value Ref Range    HGB 8.9 (L) 13.6 - 17.2 g/dL    HCT 28.3 (L) 41.1 - 66.3 %   METABOLIC PANEL, BASIC    Collection Time: 08/23/19  5:04 AM   Result Value Ref Range    Sodium 139 136 - 145 mmol/L    Potassium 5.1 3.5 - 5.1 mmol/L    Chloride 110 (H) 98 - 107 mmol/L    CO2 26 21 - 32 mmol/L    Anion gap 3 (L) 7 - 16 mmol/L    Glucose 89 65 - 100 mg/dL    BUN 45 (H) 6 - 23 MG/DL    Creatinine 3.98 (H) 0.8 - 1.5 MG/DL    GFR est AA 21 (L) >60 ml/min/1.73m2    GFR est non-AA 17 (L) >60 ml/min/1.73m2    Calcium 9.2 8.3 - 10.4 MG/DL   HGB & HCT    Collection Time: 08/24/19  5:24 AM   Result Value Ref Range    HGB 9.3 (L) 13.6 - 17.2 g/dL    HCT 29.6 (L) 41.1 - 23.3 %   METABOLIC PANEL, BASIC    Collection Time: 08/24/19  5:24 AM   Result Value Ref Range    Sodium 140 136 - 145 mmol/L    Potassium 5.0 3.5 - 5.1 mmol/L    Chloride 109 (H) 98 - 107 mmol/L    CO2 28 21 - 32 mmol/L    Anion gap 3 (L) 7 - 16 mmol/L    Glucose 116 (H) 65 - 100 mg/dL    BUN 51 (H) 6 - 23 MG/DL    Creatinine 4.21 (H) 0.8 - 1.5 MG/DL    GFR est AA 20 (L) >60 ml/min/1.73m2    GFR est non-AA 16 (L) >60 ml/min/1.73m2    Calcium 9.3 8.3 - 10.4 MG/DL       Assessment:     Problem List as of 8/24/2019 Date Reviewed: 7/10/2019          Codes Class Noted - Resolved    Major depressive episode ICD-10-CM: F32.9  ICD-9-CM: 296.20  8/20/2019 - Present        Posterior reversible encephalopathy syndrome ICD-10-CM: I67.83  ICD-9-CM: 348.39  8/8/2019 - Present        Seizure cerebral ICD-10-CM: I67.89  ICD-9-CM: 197  8/7/2019 - Present        SAH (subarachnoid hemorrhage) (HCC) ICD-10-CM: I60.9  ICD-9-CM: 430  8/5/2019 - Present        Pneumonia due to methicillin susceptible Staphylococcus aureus (MSSA) ([facility] ICD-10-CM: C86.492  ICD-9-CM: 482.41  7/15/2019 - Present        Tobacco abuse ICD-10-CM: Z72.0  ICD-9-CM: 305.1  7/10/2019 - Present        Acute respiratory failure with hypoxia (HCC) ICD-10-CM: J96.01  ICD-9-CM: 518.81  7/10/2019 - Present        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  ICD-9-CM: 331.3  7/10/2019 - Present        IVH (intraventricular hemorrhage) ([facility] ICD-10-CM: I61.5  ICD-9-CM: 820  7/10/2019 - Present        Seizure ([facility] ICD-10-CM: R56.9  ICD-9-CM: 780.39  7/10/2019 - Present        Elevated serum creatinine ICD-10-CM: R79.89  ICD-9-CM: 790.99  7/10/2019 - Present        Cerebral vasospasm ICD-10-CM: I67.848  ICD-9-CM: 435.9  7/10/2019 - Present        RESOLVED: Endotracheally intubated ICD-10-CM: Z97.8  ICD-9-CM: V49.89  7/10/2019 - 7/10/2019        RESOLVED: SAH (subarachnoid hemorrhage) ([facility] ICD-10-CM: I60.9  ICD-9-CM: 083  7/9/2019 - 7/10/2019                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 Statin; LDL 67.6 ; adequate     Stimulation/ depression ; flat affect, improved with Provigil. Increase celexa. Psych consult  -8/19 staff notices improvement in conversation. Smiling more  -8/23 dc Provigil. Discharging next week. Insurance will not cover provigil. If pt becomes less engaged; will start Amantadine     Hypokalemia; improved with supplement; monitor; 3.5 8/14; 3.7 8/16 8/18 - K - 4.2; 4.8 this a.m 8/19; 8/21 5.1 ; no supplements; likely inc due to renal dz; monitor; 8/23 5.1, 5.0 8/24     Cont magnesium to prevent vasospasm.  Daily labs; Mg 2.3 cont suppl. Will change to [facility] labs; 8/121 2.6 off supplement     DVT risk / DVT Prophylaxis- Will require daily physician exam to assess for signs and symptoms as patient is at increased risk for of thromboembolism. Mobilization as tolerated. Intermittent pneumatic compression devices when in bed Thigh-high or knee-high thromboembolic deterrent hose when out of bed. Heparin 5000 units subcut q 8hrs      sz d/o; controlled with increased Keppra 1000mg bid;sz precautions     Pain Control: using NSAID medication regularly, daily. Will require regular pain assessment and comprenhensive pain management,   Norco tapered to 5mg prn       Wound Care: Monitor wound status daily per staff and physician. At risk for failure. Will require 24/7 rehab nursing. Keep wound clean and dry and dc scalp sutures      Hypertension - BP uncontrolled, fluctuating, managed medically. No bp goals at this time. Prolonged HTN for management of SAH to prevent vasospasm contributed to development of PRES. F/u scan improved; now on hydralazine and metoprolol; add parameters. Would like to keep systolic less than 646; monitor symptoms and adjust as needed. Avoid hypotension  -4/80 009K- 140s / 70-80s range  -8/16 144/81 controlled  -8/82 536/35, fluctuating; 8/19 128/79; 8/21 bp 130s-140s acceptable; 8/23 127//80; 8/24 varies; 119/84 this a.m , max ; dec hydralazine sltly. Avoid hypotension. Want sbp higher     Malnutrition; needs supplement; jayce ct. Significant wt loss. Check alb; 2.4  -8/15 added Megace, approx 40 lb wt loss during hospitalization; need to document accurate wt; will change to a regular diet from renal to inc food choices to include things he might eat.   -8/16 seen by Dietician, discussed. Po improving. Eating what family brings in. Changed to reg diet with better choices. Encourage po.  -8/19 excellent po over weekend; monitor wt; 8/21 appears to be meeting caloric needs.  Push ensure enlive  -8/24 dc megace and monitor     Proteinuria; monitor     NING, likely CKD; creat 3.39; slt improvement. BUN 17 improved; renal US 8/6; Echogenic kidneys suggesting chronic renal disease.  No evidence of  Obstruction. 3.52 this a.m 8/14; need strict I/Os  -8/16 creat slowly increasing 3.74 today>3.52> 3.39< 3.51; BUN trending up as well. Will give fluid bolus and continue to monitor. Strict I/0s ordered; however , only # voids, not amt recorded! 8/18 - BUN - 24 > 27 > 31, Cr - 3.7 > 3.6 >3.7, will hold ivf. Begin to monitor I/Os, seemingly baseline Cr - 3.4 -3.7 since hospital admission  -8/19 creat slowly creeping up. No change with IVFs. I/O have not been recorded accurately; STRICT ADHERENCE NEEDED   -reconsult Nephrology. Had prior Protein elctrophoresis. Not sure significance of free light chains kappa/lambda, QT;  8/20 per nephrol No M spike or abnl proteins  -8/20 appreciate Nephrology assist; bun creat increasing; 41/4/12, mag 2.6; has been eating much better; may be dry; will give IVFs; however, didn't make much difference last week. Stg IV. UOP better documented ; 1800ml, intake only noted to be 540cc. ; recheck in a.m after flds.   -8/21 bun/creat 44/3.98 with IVFs. Creat slt dec, but slt inc. Push po fluids. ? Baseline. stg IV CKD. UOP not well recorded  -8/22 improved 3.80. Will stop IVFs and monitor  -8/24 bun and creat back up ; creat 4.21 (3.98, 3.80); restart fluids; Bun 51 from 45/ PUSH PO FLUIDS     Anemia likely due to CKD; recheck in a.m. Check stool.  -8/14 hgb 8.7 slt impr (8.5)  -8/15 improved 9.3, stool negative; 8/22 hgb 8.9 may be dilutional due to IVFs; iron 76 , lower TIBC, elevated ferritin 497  -8/24 improved hgb 9.3 (8.9)     Urinary retention/ neurogenic bladder - schedule voids q6-8 hrs. Check post-void residual as needed; In and Out catheterize if post-void residual is more than 400 cc.   [place] been continent without residuals     bowel program - add stool softeners and prn bowel program     GERD - resume PPI. At times may need additional antacids, Maalox prn.     Tobacco abuse; stress cessation. Cont nicoderm patch     Hydrocephalus, resolved s/p VPS     Insomnia - will increase trazodone to 50mg hs prn with prn repeat dose   -8/19 slept better last noc but sleepy this a.m. With increased Trazadone. May have to decrease; 8/21 will cont current dosing, but give range of dosing to start at lowest dose.  Participating well with therapies  -8/23 tolerating dose well. Good nocs rest. OT reports that he woke up for ADLs well         Time spent was 25 minutes with over 1/2 in direct patient care/examination, consultation and coordination of care.      Signed By: Swetha Mendoza MD     August 24, 2019

## 2019-08-24 NOTE — PROGRESS NOTES
PHYSICAL THERAPY DAILY NOTE  Time In: 0830  Time Out: 0901  Patient Seen For: AM;Gait training;Patient education; Therapeutic exercise;Transfer training    Subjective: \"yeah I'm tired today, worn out\"         Objective: pt rates 0/10 pain before and after PT visit today     GROSS ASSESSMENT Daily Assessment            COGNITION Daily Assessment           BED/MAT MOBILITY Daily Assessment            TRANSFERS Daily Assessment   V.c.s to widen NUSRAT and controlled mobility with 180 degree turns, carryover displayed Transfer Type: SPT without device  Transfer Assistance : 4 (Contact guard assistance)  Sit to Stand Assistance: Supervision       GAIT Daily Assessment   Pt performed gait training additional 200ft SPC CGA slight unsteadiness no LOB, lateral deviation from path noted    Pt ascended and descended ramp with SPC no LOB, SBA Amount of Assistance: 4 (Contact guard assistance)  Distance (ft): 200 Feet (ft)  Assistive Device: Cane, straight       STEPS or STAIRS Daily Assessment    Steps/Stairs Ambulated (#): 4  Level of Assist : 4 (Contact guard assistance)       BALANCE Daily Assessment   5xSTS 29 sec UE used Sitting - Static: Good (unsupported)  Sitting - Dynamic: Good (unsupported)  Standing - Static: Good  Standing - Dynamic : Impaired  Timed Get Up And Go Test: 28       WHEELCHAIR MOBILITY Daily Assessment            LOWER EXTREMITY EXERCISES Daily Assessment   Sit<>stamds x5 v.c.s on proper body mechanics  Extremity: Both  Exercise Type #1: Standing lower extremity strengthening          Assessment: Encouragement provided to pt to increase motivation towards goals, pt tolerated and participated PT visit well, decrease energyy levels today today, educated pt on pacing techniques to prevent overactivty         Plan of Care: Continue with current POC to help pt achieve improved functional mobility and return to [place], PTA  8/24/2019

## 2019-08-25 LAB
ANION GAP SERPL CALC-SCNC: 4 MMOL/L (ref 7–16)
BUN SERPL-MCNC: 47 MG/DL (ref 6–23)
CALCIUM SERPL-MCNC: 9.1 MG/DL (ref 8.3–10.4)
CHLORIDE SERPL-SCNC: 110 MMOL/L (ref 98–107)
CO2 SERPL-SCNC: 26 MMOL/L (ref 21–32)
CREAT SERPL-MCNC: 3.97 MG/DL (ref 0.8–1.5)
GLUCOSE SERPL-MCNC: 93 MG/DL (ref 65–100)
POTASSIUM SERPL-SCNC: 5.6 MMOL/L (ref 3.5–5.1)
SODIUM SERPL-SCNC: 140 MMOL/L (ref 136–145)

## 2019-08-25 PROCEDURE — 65310000000 HC RM PRIVATE REHAB

## 2019-08-25 PROCEDURE — 80048 BASIC METABOLIC PNL TOTAL CA: CPT

## 2019-08-25 PROCEDURE — 77030020263 HC SOL INJ SOD CL0.9% LFCR 1000ML

## 2019-08-25 PROCEDURE — 74011250637 HC RX REV CODE- 250/637: Performed by: PHYSICAL MEDICINE & REHABILITATION

## 2019-08-25 PROCEDURE — 74011250636 HC RX REV CODE- 250/636: Performed by: PHYSICAL MEDICINE & REHABILITATION

## 2019-08-25 PROCEDURE — 99232 SBSQ HOSP IP/OBS MODERATE 35: CPT | Performed by: PHYSICAL MEDICINE & REHABILITATION

## 2019-08-25 RX ADMIN — HEPARIN SODIUM 5000 UNITS: 5000 INJECTION INTRAVENOUS; SUBCUTANEOUS at 20:39

## 2019-08-25 RX ADMIN — SODIUM CHLORIDE 100 ML/HR: 900 INJECTION, SOLUTION INTRAVENOUS at 22:00

## 2019-08-25 RX ADMIN — CLOPIDOGREL BISULFATE 75 MG: 75 TABLET ORAL at 08:07

## 2019-08-25 RX ADMIN — HYDRALAZINE HYDROCHLORIDE 25 MG: 25 TABLET, FILM COATED ORAL at 05:24

## 2019-08-25 RX ADMIN — SODIUM CHLORIDE, PRESERVATIVE FREE 600 UNITS: 5 INJECTION INTRAVENOUS at 20:40

## 2019-08-25 RX ADMIN — LEVETIRACETAM 1000 MG: 500 TABLET, FILM COATED ORAL at 20:40

## 2019-08-25 RX ADMIN — Medication 10 ML: at 05:24

## 2019-08-25 RX ADMIN — METOPROLOL TARTRATE 100 MG: 100 TABLET ORAL at 17:00

## 2019-08-25 RX ADMIN — SODIUM CHLORIDE 100 ML/HR: 900 INJECTION, SOLUTION INTRAVENOUS at 05:25

## 2019-08-25 RX ADMIN — METOPROLOL TARTRATE 100 MG: 100 TABLET ORAL at 08:07

## 2019-08-25 RX ADMIN — HEPARIN SODIUM 5000 UNITS: 5000 INJECTION INTRAVENOUS; SUBCUTANEOUS at 14:22

## 2019-08-25 RX ADMIN — SODIUM CHLORIDE, PRESERVATIVE FREE 300 UNITS: 5 INJECTION INTRAVENOUS at 14:22

## 2019-08-25 RX ADMIN — SODIUM CHLORIDE, PRESERVATIVE FREE 900 UNITS: 5 INJECTION INTRAVENOUS at 05:24

## 2019-08-25 RX ADMIN — Medication 10 ML: at 20:41

## 2019-08-25 RX ADMIN — CITALOPRAM HYDROBROMIDE 20 MG: 20 TABLET ORAL at 08:07

## 2019-08-25 RX ADMIN — FAMOTIDINE 20 MG: 20 TABLET ORAL at 08:07

## 2019-08-25 RX ADMIN — LEVETIRACETAM 1000 MG: 500 TABLET, FILM COATED ORAL at 08:07

## 2019-08-25 RX ADMIN — Medication 10 ML: at 14:23

## 2019-08-25 RX ADMIN — ASPIRIN 325 MG ORAL TABLET 325 MG: 325 PILL ORAL at 08:07

## 2019-08-25 RX ADMIN — SENNOSIDES, DOCUSATE SODIUM 1 TABLET: 50; 8.6 TABLET, FILM COATED ORAL at 08:07

## 2019-08-25 RX ADMIN — HEPARIN SODIUM 5000 UNITS: 5000 INJECTION INTRAVENOUS; SUBCUTANEOUS at 05:24

## 2019-08-25 NOTE — PROGRESS NOTES
Jake Ann MD,   Medical Director  [facility], [address] [facility]  Tel: [phone]       D PROGRESS NOTE    Corky Mack  Admit Date: 8/13/2019  Admit Diagnosis: SAH (subarachnoid hemorrhage) ([address] [I60.9]    Subjective     Sleepy, Sunday morning. Appetite good, sleeping well at noc. No headache or pain. Continent b/b; wants to go home.  Encouraged pt and reminded him that he will be going home in 3 d    Objective:     Current Facility-Administered Medications   Medication Dose Route Frequency    hydrALAZINE (APRESOLINE) tablet 25 mg  25 mg Oral ACB/HS    0.9% sodium chloride infusion  100 mL/hr IntraVENous CONTINUOUS    heparin (porcine) pf 300-900 Units  300-900 Units InterCATHeter Q8H    traZODone (DESYREL) tablet 50 mg  50 mg Oral QHS PRN    HYDROcodone-acetaminophen (NORCO) 5-325 mg per tablet 1 Tab  1 Tab Oral Q6H PRN    ondansetron (ZOFRAN ODT) tablet 4 mg  4 mg Oral Q6H PRN    lactulose (CHRONULAC) 10 gram/15 mL solution 45 mL  30 g Oral DAILY PRN    ondansetron (ZOFRAN) injection 4 mg  4 mg IntraVENous Q4H PRN    heparin (porcine) injection 5,000 Units  5,000 Units SubCUTAneous Q8H    aspirin tablet 325 mg  325 mg Oral DAILY    clopidogrel (PLAVIX) tablet 75 mg  75 mg Oral DAILY    famotidine (PEPCID) tablet 20 mg  20 mg Oral DAILY    levETIRAcetam (KEPPRA) tablet 1,000 mg  1,000 mg Oral BID    metoprolol tartrate (LOPRESSOR) tablet 100 mg  100 mg Oral BID    nicotine (NICODERM CQ) 14 mg/24 hr patch 1 Patch  1 Patch TransDERmal Q24H    SALINE PERIPHERAL FLUSH Q8H soln 10-30 mL  10-30 mL InterCATHeter Q8H    saline peripheral flush soln 10-30 mL  10-30 mL InterCATHeter PRN    acetaminophen (TYLENOL) tablet 650 mg  650 mg Oral Q6H PRN    citalopram (CELEXA) tablet 20 mg  20 mg Oral DAILY    senna-docusate (PERICOLACE) 8.6-50 mg per tablet 1 Tab  1 Tab Oral DAILY     Review of Systems:Denies chest pain, shortness of breath, cough, headache, visual problems, abdominal pain, dysurea, calf pain. Pertinent positives are as noted in the medical records and unremarkable otherwise. Visit Vitals  /76 (BP 1 Location: Left arm, BP Patient Position: At rest)   Pulse 76   Temp 98.4 °F (36.9 °C)   Resp 17   Wt 132 lb 6.4 oz (60.1 kg)   SpO2 98%   BMI 17.96 kg/m²        Physical Exam:   General: Alert and age appropriately oriented to person, place and time (with vc)  No acute cardio respiratory distress. HEENT: Normocephalic,no scleral icterus  Oral mucosa moist without cyanosis   Lungs: Clear to auscultation  bilaterally. Respiration even and unlabored   Heart: Regular rate and rhythm, S1, S2   No  murmurs, clicks, rub or gallops   Abdomen: Soft, non-tender, nondistended. Bowel sounds present. No organomegaly. Genitourinary: defer   Neuromuscular: Follows commands well. Initiation of speech and phonation improving. Blunted by smiling more  Generalized weakness; Short term memory poor  Frequent cues to maintain eye contact   Skin/extremity: No rashes, no erythema. No calf tenderness BLE  No edema                                                                            Functional Assessment:  Gross Assessment  AROM: Generally decreased, functional (08/21/19 1500)  PROM: Within functional limits (08/21/19 1500)  Strength: Generally decreased, functional (08/21/19 1500)  Coordination: Generally decreased, functional (08/21/19 1500)       Balance  Sitting - Static: Good (unsupported) (08/24/19 1100)  Sitting - Dynamic: Good (unsupported) (08/24/19 1100)  Standing - Static: Good (08/24/19 1100)  Standing - Dynamic : Impaired (08/24/19 1100)                     Areli Padilla Fall Risk Assessment:  Areli Padilla Fall Risk  Mobility: Ambulates or transfers with assist devices or assistance (08/25/19 4560)  Mobility Interventions: Bed/chair exit alarm; Patient to call before getting OOB;Utilize walker, cane, or other assistive device (08/25/19 8228)  Mentation: Periodic confusion (08/25/19 0821)  Mentation Interventions: Bed/chair exit alarm; Door open when patient unattended;More frequent rounding; Toileting rounds (08/25/19 7750)  Medication: Patient receiving anticonvulsants, sedatives(tranquilizers), psychotropics or hypnotics, hypoglycemics, narcotics, sleep aids, antihypertensives, laxatives, or diuretics (08/25/19 0714)  Medication Interventions: Bed/chair exit alarm; Patient to call before getting OOB (08/25/19 9032)  Elimination: Needs assistance with toileting (08/25/19 0714)  Elimination Interventions: Bed/chair exit alarm;Call light in reach; Patient to call for help with toileting needs; Toileting schedule/hourly rounds;Urinal in reach (08/25/19 1628)  Prior Fall History: Before admission in past 12 months _home or previous inpatient care) (08/25/19 9893)  History of Falls Interventions: Bed/chair exit alarm; Door open when patient unattended (08/25/19 0714)  Total Score: 5 (08/25/19 0714)  Standard Fall Precautions: Yes (08/20/19 2023)  High Fall Risk: Yes (08/25/19 0714)     Speech Assessment:         Ambulation:  Gait  Base of Support: Narrowed (08/21/19 1500)  Speed/Aminata: Slow;Shuffled (08/21/19 1500)  Step Length: Right shortened;Left shortened (08/21/19 1500)  Stance: Right increased; Left increased (08/21/19 1500)  Gait Abnormalities: Decreased step clearance; Path deviations; Altered arm swing (08/21/19 1500)  Distance (ft): 200 Feet (ft) (08/24/19 1100)  Assistive Device: Cane, straight (08/24/19 1100)  Rail Use: Both (08/20/19 1649)     Labs/Studies:  Recent Results (from the past 72 hour(s))   METABOLIC PANEL, BASIC    Collection Time: 08/23/19  5:04 AM   Result Value Ref Range    Sodium 139 136 - 145 mmol/L    Potassium 5.1 3.5 - 5.1 mmol/L    Chloride 110 (H) 98 - 107 mmol/L    CO2 26 21 - 32 mmol/L    Anion gap 3 (L) 7 - 16 mmol/L    Glucose 89 65 - 100 mg/dL    BUN 45 (H) 6 - 23 MG/DL    Creatinine 3.98 (H) 0.8 - 1.5 MG/DL    GFR est AA 21 (L) >60 ml/min/1.73m2    GFR est non-AA 17 (L) >60 ml/min/1.73m2    Calcium 9.2 8.3 - 10.4 MG/DL   HGB & HCT    Collection Time: 08/24/19  5:24 AM   Result Value Ref Range    HGB 9.3 (L) 13.6 - 17.2 g/dL    HCT 29.6 (L) 41.1 - 99.0 %   METABOLIC PANEL, BASIC    Collection Time: 08/24/19  5:24 AM   Result Value Ref Range    Sodium 140 136 - 145 mmol/L    Potassium 5.0 3.5 - 5.1 mmol/L    Chloride 109 (H) 98 - 107 mmol/L    CO2 28 21 - 32 mmol/L    Anion gap 3 (L) 7 - 16 mmol/L    Glucose 116 (H) 65 - 100 mg/dL    BUN 51 (H) 6 - 23 MG/DL    Creatinine 4.21 (H) 0.8 - 1.5 MG/DL    GFR est AA 20 (L) >60 ml/min/1.73m2    GFR est non-AA 16 (L) >60 ml/min/1.73m2    Calcium 9.3 8.3 - 25.4 MG/DL   METABOLIC PANEL, BASIC    Collection Time: 08/25/19  5:23 AM   Result Value Ref Range    Sodium 140 136 - 145 mmol/L    Potassium 5.6 (H) 3.5 - 5.1 mmol/L    Chloride 110 (H) 98 - 107 mmol/L    CO2 26 21 - 32 mmol/L    Anion gap 4 (L) 7 - 16 mmol/L    Glucose 93 65 - 100 mg/dL    BUN 47 (H) 6 - 23 MG/DL    Creatinine 3.97 (H) 0.8 - 1.5 MG/DL    GFR est AA 21 (L) >60 ml/min/1.73m2    GFR est non-AA 17 (L) >60 ml/min/1.73m2    Calcium 9.1 8.3 - 10.4 MG/DL       Assessment:     Problem List as of 8/25/2019 Date Reviewed: 7/10/2019          Codes Class Noted - Resolved    Major depressive episode ICD-10-CM: F32.9  ICD-9-CM: 296.20  8/20/2019 - Present        Posterior reversible encephalopathy syndrome ICD-10-CM: I67.83  ICD-9-CM: 348.39  8/8/2019 - Present        Seizure cerebral ICD-10-CM: I67.89  ICD-9-CM: 916  8/7/2019 - Present        SAH (subarachnoid hemorrhage) (HCC) ICD-10-CM: I60.9  ICD-9-CM: 430  8/5/2019 - Present        Pneumonia due to methicillin susceptible Staphylococcus aureus (MSSA) (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 [facility]) ICD-10-CM: I60.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  ICD-9-CM: 331.3  7/10/2019 - Present        IVH (intraventricular hemorrhage) (HCC) ICD-10-CM: I61.5  ICD-9-CM: 752  7/10/2019 - Present        Seizure ([facility] ICD-10-CM: R56.9  ICD-9-CM: 780.39  7/10/2019 - Present        Elevated serum creatinine ICD-10-CM: R79.89  ICD-9-CM: 790.99  7/10/2019 - Present        Cerebral vasospasm ICD-10-CM: I67.848  ICD-9-CM: 435.9  7/10/2019 - Present        RESOLVED: Endotracheally intubated ICD-10-CM: Z97.8  ICD-9-CM: V49.89  7/10/2019 - 7/10/2019        RESOLVED: SAH (subarachnoid hemorrhage) ([facility] ICD-10-CM: I60.9  ICD-9-CM: 993  7/9/2019 - 7/10/2019          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 Statin; LDL 67.6 ; adequate     Stimulation/ depression ; flat affect, improved with Provigil. Increase celexa. Psych consult  -8/19 staff notices improvement in conversation. Smiling more  -8/23 dc Provigil. Discharging next week. Insurance will not cover provigil. If pt becomes less engaged; will start Amantadine  -8/25 engaging more, affect a bit brighter; hold on amantadine for now. Doing fine off Provigil     Hypokalemia; improved with supplement; monitor; 3.5 8/14; 3.7 8/16 8/18 - K - 4.2; 4.8 this a.m 8/19; 8/21 5.1 ; no supplements; likely inc due to renal dz; monitor; 8/23 5.1, 5.0 8/24     Cont magnesium to prevent vasospasm. Daily labs; Mg 2.3 cont suppl.  Will change to [facility] labs; 8/121 2.6 off supplement     DVT risk / DVT Prophylaxis- Will require daily physician exam to assess for signs and symptoms as patient is at increased risk for of thromboembolism. Mobilization as tolerated. Intermittent pneumatic compression devices when in bed Thigh-high or knee-high thromboembolic deterrent hose when out of bed. Heparin 5000 units subcut q 8hrs      sz d/o; controlled with increased Keppra 1000mg bid;sz precautions     Pain Control: using NSAID medication regularly, daily. Will require regular pain assessment and comprenhensive pain management,   Norco tapered to 5mg prn       Wound Care: Monitor wound status daily per staff and physician. At risk for failure. Will require 24/7 rehab nursing. Keep wound clean and dry and dc scalp sutures      Hypertension - BP uncontrolled, fluctuating, managed medically. No bp goals at this time. Prolonged HTN for management of SAH to prevent vasospasm contributed to development of PRES. F/u scan improved; now on hydralazine and metoprolol; add parameters. Would like to keep systolic less than 139; monitor symptoms and adjust as needed. Avoid hypotension  -2/34 871P- 140s / 70-80s range  -8/16 144/81 controlled  -1/13 736/72, fluctuating; 8/19 128/79; 8/21 bp 130s-140s acceptable; 8/23 127//80; 8/24 varies; 119/84 this a.m , max ; dec hydralazine sltly. Avoid hypotension. Want sbp higher  -8/25 ; dc hydralazine, cont metoprolol; monitor closely     Malnutrition; needs supplement; jayce ct. Significant wt loss. Check alb; 2.4  -8/15 added Megace, approx 40 lb wt loss during hospitalization; need to document accurate wt; will change to a regular diet from renal to inc food choices to include things he might eat.   -8/16 seen by Dietician, discussed. Po improving. Eating what family brings in. Changed to reg diet with better choices. Encourage po.  -8/19 excellent po over weekend; monitor wt; 8/21 appears to be meeting caloric needs. Push ensure enlive  -8/24 dc megace and monitor     Proteinuria; monitor     NING, likely CKD; creat 3.39; slt improvement.  BUN 17 improved; renal US 8/6; Echogenic kidneys suggesting chronic renal disease.  No evidence of  Obstruction. 3.52 this a.m 8/14; need strict I/Os  -8/16 creat slowly increasing 3.74 today>3.52> 3.39< 3.51; BUN trending up as well. Will give fluid bolus and continue to monitor. Strict I/0s ordered; however , only # voids, not amt recorded! 8/18 - BUN - 24 > 27 > 31, Cr - 3.7 > 3.6 >3.7, will hold ivf. Begin to monitor I/Os, seemingly baseline Cr - 3.4 -3.7 since hospital admission  -8/19 creat slowly creeping up. No change with IVFs. I/O have not been recorded accurately; STRICT ADHERENCE NEEDED   -reconsult Nephrology. Had prior Protein elctrophoresis. Not sure significance of free light chains kappa/lambda, QT;  8/20 per nephrol No M spike or abnl proteins  -8/20 appreciate Nephrology assist; bun creat increasing; 41/4/12, mag 2.6; has been eating much better; may be dry; will give IVFs; however, didn't make much difference last week. Stg IV. UOP better documented ; 1800ml, intake only noted to be 540cc. ; recheck in a.m after flds.   -8/21 bun/creat 44/3.98 with IVFs. Creat slt dec, but slt inc. Push po fluids. ? Baseline. stg IV CKD. UOP not well recorded  -8/22 improved 3.80. Will stop IVFs and monitor  -8/24 bun and creat back up ; creat 4.21 (3.98, 3.80); restart fluids; Bun 51 from 45/ PUSH PO FLUIDS  -8/25 with IVFs creat improved to 3.97(4.21), BUN 47(51); cont IVFs     Anemia likely due to CKD; recheck in a.m. Check stool.  -8/14 hgb 8.7 slt impr (8.5)  -8/15 improved 9.3, stool negative; 8/22 hgb 8.9 may be dilutional due to IVFs; iron 76 , lower TIBC, elevated ferritin 497  -8/24 improved hgb 9.3 (8.9)     Urinary retention/ neurogenic bladder - schedule voids q6-8 hrs. Check post-void residual as needed; In and Out catheterize if post-void residual is more than 400 cc. Sandra Parrish been continent without residuals     bowel program - add stool softeners and prn bowel program     GERD - resume PPI.  At times may need additional antacids, Maalox prn.     Tobacco abuse; stress cessation. Cont nicoderm patch     Hydrocephalus, resolved s/p VPS     Insomnia - will increase trazodone to 50mg hs prn with prn repeat dose   -8/19 slept better last noc but sleepy this a.m. With increased Trazadone. May have to decrease; 8/21 will cont current dosing, but give range of dosing to start at lowest dose.  Participating well with therapies  -8/23 tolerating dose well. Good nocs rest. OT reports that   Time spent was 25 minutes with over 1/2 in direct patient care/examination, consultation and coordination of care.      Signed By: Poncho Hines MD     August 25, 2019

## 2019-08-25 NOTE — PROGRESS NOTES
Problem: Falls - Risk of  Goal: *Absence of Falls  Description  Document Lisa Vogel Fall Risk and appropriate interventions in the flowsheet. Outcome: Progressing Towards Goal  Note:   Fall Risk Interventions:  Mobility Interventions: Bed/chair exit alarm, Patient to call before getting OOB, Utilize walker, cane, or other assistive device    Mentation Interventions: Bed/chair exit alarm, Door open when patient unattended, More frequent rounding, Toileting rounds    Medication Interventions: Bed/chair exit alarm, Patient to call before getting OOB    Elimination Interventions: Bed/chair exit alarm, Call light in reach, Patient to call for help with toileting needs, Toileting schedule/hourly rounds, Urinal in reach    History of Falls Interventions: Bed/chair exit alarm, Door open when patient unattended         Problem: Patient Education: Go to Patient Education Activity  Goal: Patient/Family Education  Outcome: Progressing Towards Goal     Problem: Pressure Injury - Risk of  Goal: *Prevention of pressure injury  Description  Document Anselmo Scale and appropriate interventions in the flowsheet.   Outcome: Progressing Towards Goal  Note:   Pressure Injury Interventions:  Sensory Interventions: Assess changes in LOC, Pressure redistribution bed/mattress (bed type)    Moisture Interventions: Absorbent underpads    Activity Interventions: Increase time out of bed, Pressure redistribution bed/mattress(bed type)    Mobility Interventions: HOB 30 degrees or less, Pressure redistribution bed/mattress (bed type)    Nutrition Interventions: Document food/fluid/supplement intake    Friction and Shear Interventions: HOB 30 degrees or less

## 2019-08-25 NOTE — PROGRESS NOTES
Problem: Falls - Risk of  Goal: *Absence of Falls  Description  Document Rohit Darling Fall Risk and appropriate interventions in the flowsheet. Outcome: Progressing Towards Goal  Note:   Fall Risk Interventions:  Mobility Interventions: Bed/chair exit alarm    Mentation Interventions: Bed/chair exit alarm    Medication Interventions: Bed/chair exit alarm    Elimination Interventions: Bed/chair exit alarm    History of Falls Interventions: Bed/chair exit alarm         Problem: Patient Education: Go to Patient Education Activity  Goal: Patient/Family Education  Outcome: Progressing Towards Goal     Problem: Patient Education: Go to Patient Education Activity  Goal: Patient/Family Education  Outcome: Progressing Towards Goal     Problem: Infection - Risk of, Surgical Site Infection  Goal: *Absence of surgical site infection signs and symptoms  Outcome: Progressing Towards Goal     Problem: Patient Education: Go to Patient Education Activity  Goal: Patient/Family Education  Outcome: Progressing Towards Goal     Problem: Patient Education: Go to Patient Education Activity  Goal: Patient/Family Education  Outcome: Progressing Towards Goal     Problem: Patient Education: Go to Patient Education Activity  Goal: Patient/Family Education  Description  Patient / Vucht family will verbalize understanding of PT safety recommendations, demonstrate appropriate assist for current functional mobility status, safety, and home exercise program by time of discharge.    Outcome: Progressing Towards Goal     Problem: Patient Education: Go to Patient Education Activity  Goal: Patient/Family Education  Outcome: Progressing Towards Goal     Problem: Nutrition Deficit  Goal: *Optimize nutritional status  Outcome: Progressing Towards Goal

## 2019-08-26 LAB
ANION GAP SERPL CALC-SCNC: 6 MMOL/L (ref 7–16)
BUN SERPL-MCNC: 44 MG/DL (ref 6–23)
CALCIUM SERPL-MCNC: 8.3 MG/DL (ref 8.3–10.4)
CHLORIDE SERPL-SCNC: 113 MMOL/L (ref 98–107)
CO2 SERPL-SCNC: 22 MMOL/L (ref 21–32)
CREAT SERPL-MCNC: 3.75 MG/DL (ref 0.8–1.5)
GLUCOSE SERPL-MCNC: 86 MG/DL (ref 65–100)
POTASSIUM SERPL-SCNC: 5.1 MMOL/L (ref 3.5–5.1)
SODIUM SERPL-SCNC: 141 MMOL/L (ref 136–145)

## 2019-08-26 PROCEDURE — 74011250636 HC RX REV CODE- 250/636: Performed by: PHYSICAL MEDICINE & REHABILITATION

## 2019-08-26 PROCEDURE — 74011250637 HC RX REV CODE- 250/637: Performed by: PHYSICAL MEDICINE & REHABILITATION

## 2019-08-26 PROCEDURE — 65310000000 HC RM PRIVATE REHAB

## 2019-08-26 PROCEDURE — 97535 SELF CARE MNGMENT TRAINING: CPT

## 2019-08-26 PROCEDURE — 80048 BASIC METABOLIC PNL TOTAL CA: CPT

## 2019-08-26 PROCEDURE — 97116 GAIT TRAINING THERAPY: CPT

## 2019-08-26 PROCEDURE — 36592 COLLECT BLOOD FROM PICC: CPT

## 2019-08-26 PROCEDURE — 97530 THERAPEUTIC ACTIVITIES: CPT

## 2019-08-26 PROCEDURE — 92507 TX SP LANG VOICE COMM INDIV: CPT

## 2019-08-26 PROCEDURE — 97110 THERAPEUTIC EXERCISES: CPT

## 2019-08-26 PROCEDURE — 77030020263 HC SOL INJ SOD CL0.9% LFCR 1000ML

## 2019-08-26 PROCEDURE — 99232 SBSQ HOSP IP/OBS MODERATE 35: CPT | Performed by: PHYSICAL MEDICINE & REHABILITATION

## 2019-08-26 RX ADMIN — SODIUM CHLORIDE 100 ML/HR: 900 INJECTION, SOLUTION INTRAVENOUS at 07:34

## 2019-08-26 RX ADMIN — METOPROLOL TARTRATE 100 MG: 100 TABLET ORAL at 10:19

## 2019-08-26 RX ADMIN — SENNOSIDES, DOCUSATE SODIUM 1 TABLET: 50; 8.6 TABLET, FILM COATED ORAL at 10:19

## 2019-08-26 RX ADMIN — TRAZODONE HYDROCHLORIDE 50 MG: 50 TABLET ORAL at 21:43

## 2019-08-26 RX ADMIN — HYDROCODONE BITARTRATE AND ACETAMINOPHEN 1 TABLET: 5; 325 TABLET ORAL at 21:43

## 2019-08-26 RX ADMIN — Medication 20 ML: at 21:52

## 2019-08-26 RX ADMIN — SODIUM CHLORIDE, PRESERVATIVE FREE 900 UNITS: 5 INJECTION INTRAVENOUS at 14:23

## 2019-08-26 RX ADMIN — Medication 10 ML: at 05:18

## 2019-08-26 RX ADMIN — HEPARIN SODIUM 5000 UNITS: 5000 INJECTION INTRAVENOUS; SUBCUTANEOUS at 21:43

## 2019-08-26 RX ADMIN — SODIUM CHLORIDE, PRESERVATIVE FREE 600 UNITS: 5 INJECTION INTRAVENOUS at 21:52

## 2019-08-26 RX ADMIN — HEPARIN SODIUM 5000 UNITS: 5000 INJECTION INTRAVENOUS; SUBCUTANEOUS at 14:24

## 2019-08-26 RX ADMIN — CLOPIDOGREL BISULFATE 75 MG: 75 TABLET ORAL at 10:23

## 2019-08-26 RX ADMIN — ASPIRIN 325 MG ORAL TABLET 325 MG: 325 PILL ORAL at 10:19

## 2019-08-26 RX ADMIN — Medication 30 ML: at 14:23

## 2019-08-26 RX ADMIN — METOPROLOL TARTRATE 100 MG: 100 TABLET ORAL at 17:19

## 2019-08-26 RX ADMIN — CITALOPRAM HYDROBROMIDE 20 MG: 20 TABLET ORAL at 10:19

## 2019-08-26 RX ADMIN — FAMOTIDINE 20 MG: 20 TABLET ORAL at 10:19

## 2019-08-26 RX ADMIN — SODIUM CHLORIDE 100 ML/HR: 900 INJECTION, SOLUTION INTRAVENOUS at 23:52

## 2019-08-26 RX ADMIN — HEPARIN SODIUM 5000 UNITS: 5000 INJECTION INTRAVENOUS; SUBCUTANEOUS at 05:11

## 2019-08-26 RX ADMIN — LEVETIRACETAM 1000 MG: 500 TABLET, FILM COATED ORAL at 10:19

## 2019-08-26 RX ADMIN — LEVETIRACETAM 1000 MG: 500 TABLET, FILM COATED ORAL at 21:43

## 2019-08-26 RX ADMIN — SODIUM CHLORIDE, PRESERVATIVE FREE 600 UNITS: 5 INJECTION INTRAVENOUS at 05:18

## 2019-08-26 NOTE — PROGRESS NOTES
Nutrition F/U:    Assessment:  Patient seen today with OT. He states that he thinks he is eating better. OT affirms that patient is eating better than when he first came. He was able to recall that he ate eggs for breakfast this am and drank juice. OT states that only ate eggs and juice. He states that he only drinks Ensure when he is \"in the mood. \" He states that he prefers juice. OT stated that he ate his burger today for lunch.    Macronutrient Needs:  Estimated calorie needs - 0585-9767 jayce/day (25-28 jayce/kg/day)   Estimated protein needs - 69-83 gm pro/day (1-1.2 gm pro/kg/day) (GFR 23 ml/min)  Intake/Comparative Standards:  Per 11 recorded meals since completion of calorie count completed: 90% consumed. This potentially meets 100% EEN and 100% EPN. Questionable accuracy of needs met pending what patient is actually ordering and consuming on the tray.     Intervention:   Meals and Snacks: Regular. Medical Food Supplement Therapy: Commercial Beverage: Continue chocolate Ensure Enlive with noon and evening meals. Will trial Ensure Clear apple for acceptance. Explained the importance of adequate nutrition during rehab to help facilitate progress. Nutrition Discharge Plan: Too soon to determine.  Likely would benefit from continuing oral nutrition supplement along with regular nourishing meals and snacks.      [address], [address], [address]

## 2019-08-26 NOTE — PROGRESS NOTES
08/26/19 0957   Time Spent With Patient   Time In [id number]   Time Out 0914   Patient Seen For: AM   Mental Status   Neurologic State Alert   Cognition Follows commands;Memory loss;Decreased attention/concentration   Perseveration No perseveration noted   Safety/Judgement Decreased insight into deficits;Home safety; Fall prevention        08/26/19 0957   Verbal Organization Exercises   Convergent Categorization Accuracy (%) 50 %   Neuro-Linguistic Exercises   Verbal Reasoning Tasks Impaired   Verbal Problem Solving Impaired   Verbal Organization Impaired; following basic directions to choose items from a menu and determine total costs with Junaid; cues to write the names of the items and to add items one at a time   Memory Impaired; improved recall of orientation questions and recalled discharge later this week and [facility] [employer] game on Thursday   Attention  Impaired     Patient participated with cognitive therapy. Followed basic directions to choose items that met given criteria from a menu and determine total costs with Junaid. Chose items based on description given by ST but with verbal cues x2 to write down both the amount and the name of the item. Basic calculations completed with 75% accuracy. Decreased problem solving for word deductions; completed with 50% accuracy with mod-max verbal cues to correct. Patient is recalling more orientation questions and recalled information related to his children's ages. He became tearful when discussing his kids with re-assurance provided. Recalled that he was getting to go home later this week but could not recall exact day; recalled later in the session after discussing with clinician. Patient's father was present and educated on areas of cognition being addressed, goals of ST, supervision needed at discharge, and continued ST recommended.     Tracey Cleveland MS, CCC-SLP

## 2019-08-26 NOTE — PROGRESS NOTES
Resting in bed on left side, bed alarm on. IVF's infusing, call bell within reach. No distress noted.

## 2019-08-26 NOTE — PROGRESS NOTES
PHYSICAL THERAPY DAILY NOTE  Time In: 1345  Time Out: 1423  Patient Seen For: PM;Gait training; Therapeutic exercise;Transfer training    Subjective: \"I'm okay. \"  Pt. Agreeable to PT. Pt's father present for family training. Objective:     COGNITION Daily Assessment    Flat affect. Decreased short term recall - required mod VCs to find way back to room. BED/MAT MOBILITY Daily Assessment    Supine to Sit : 6 (Modified independent)  Sit to Supine : 6 (Modified independent)       TRANSFERS Daily Assessment   Floor transfer w/ supervision for VCs for technique Transfer Type: SPT without device  Transfer Assistance : 5 (Supervision/setup)  Sit to Stand Assistance: Supervision  Car Transfers: supervision  Car Type: rehab car       GAIT Daily Assessment   Worked on ambulating on level & unlevel surfaces as well as adding self-navigation. Pt. Cont. To require mod VCs for directions due to decreased short term recall. Pt. Performed w/ supervision & had 1 LOB which occurred when pt. Was distracted & subsequently scissored, requiring CGA to correct. Amount of Assistance: 4 (Contact guard assistance)  Distance (ft): 350 Feet (ft)(x2, 200'x2)  Assistive Device: Cane, straight       STEPS or STAIRS Daily Assessment   Step over gait patten    Pt. . Ascended/descended single curb step x2 w/ SPC & HHA for balance Steps/Stairs Ambulated (#): 4  Level of Assist : 5 (Supervision/setup)  Rail Use: Left (SPC in R hand)       BALANCE Daily Assessment   Delayed postural reactions noted Sitting - Static: Good (unsupported)  Sitting - Dynamic: Good (unsupported)  Standing - Static: Good  Standing - Dynamic : Impaired       WHEELCHAIR MOBILITY Daily Assessment    Able to Propel (ft): 0 feet  Wheelchair Setup Assist Required : 0 (Not tested)       LOWER EXTREMITY EXERCISES Daily Assessment   Performed w/ B UE support Extremity: Both  Exercise Type #1: Standing lower extremity strengthening  Sets Performed: 1  Reps Performed: 10  Level of Assist: Supervision     STANDING EXERCISES Sets Reps Comments   Heel Raises 1 10    Toe Raises 1 10    Hip Flexion/Marching 1 10    Hamstring Curls 1 10    Hip Abduction 1 10    Hip Extension 1 10    Mini Squats 1 10      Vital Signs: /89   Pulse 72   Temp 98 °F (36.7 °C)   Resp 14   Wt 60.1 kg (132 lb 6.4 oz)   SpO2 98%   BMI 17.96 kg/m²      Pain level: no c/o pain    Patient education: Performed family training w/ pt's father. Reviewed pt's impaired balance w/ gait & transfers as well as need for 24/7 supervision & occasional CGA. Pt's father given a gait belt & instructed in its' use. Mr. Dawn Rios also trained in how to guard pt. On stairs, during car transfer, & for a floor transfer. Also reviewed HEP with patient & his father with pt. Given handout to perform 1-2x/day @ home. Answered all questions & pt's father verbalized understanding of all education. Interdisciplinary Communication: discussed family training w/ OT    Pt. Left up in recliner in NAD, call bell in reach, Iam activated, father @ bedside           Assessment: Pt's gait improved today w/ improve foot clearance & less LOB (only LOB coming w/ an environmental distraction). Pt's father verbalizes understanding of need for 24/7 supervision/occasional CGA due to balance & cognitive impairments. Pt. Cont. To benefit from PT services to improve balance, especially w/ dynamic gait to reduce falls risk. Plan of Care: Continue with POC and progress as tolerated.      Monico Hernandez, PT  8/26/2019

## 2019-08-26 NOTE — PROGRESS NOTES
08/26/19 1108   Time Spent With Patient   Time In 0828   Time Out 0914   Patient Seen For: AM;ADLs   Grooming   Grooming Assistance  S   Comments initially put soap on his toothbrush; required verbal cueing to wash off and to put toothpaste on instead    Upper Body Bathing   Bathing Assistance, Upper S   Position Performed Seated in chair   Comments supervision, verbal cueing for thoroughness    Lower Body Bathing   Bathing Assistance, Lower  SBA   Adaptive Equipment Grab bar   Position Performed Seated in chair;Standing   Comments verbal cueing for safety and thoroughness    Toileting   Toileting Assistance (FIM Score) S   Upper Body Dressing    Dressing Assistance  S   Comments gathered clothing with SBA    Lower Body Dressing    Dressing Assistance  S   Leg Crossed Method Used No   Position Performed Seated edge of bed;Standing;Bending forward method   Comments gathered clothing with SBA, verbal cue to gather all clothing    Functional Transfers   Toilet Transfer  Stand pivot transfer without device   Amount of Assistance Required SBA   Tub or Shower Type Shower   Amount of Assistance Required SBA   Adaptive Equipment Tub transfer bench;Grab bars   Subjective: No complaints. Pain: none reported  Education: father in for family training; educated patient's father in cognitive deficits, safety concerns, integrating patient into IADL tasks at home to increase engagement in environment, DME recommendations  Interdisciplinary Communication: with PT regarding functional status and family training   Precautions:    O: Patient presented sideyling in bed. Agreeable to treatment. Patient completed ADL; see above for details. Completed family training with patient's father; discussion included but wasn't limited to discussion of cognitive deficits, safety concerns, engaging patient in IADL tasks, assisting with medication/financial management, fall precautions, and developing a routine at discharge.     Shoes: 6: Independent  Socks: 5: Setup or clean-up assist  Orientation: alert   Expression: able to express needs verbally but with very low volume and has to repeat words frequently   Comprehension: understands basic instructions, moderate to maximum assist with complex instructions  Social Interaction: flat affect   Problem Solving: solves routine problems <50% of the time, total assist with complex problems   Memory: recalls people frequently seen, able to complete 1/3 step commands, very impaired memory   A: Anticipate impaired cognition will remain patient's primary barrier at discharge. Patient tolerated session well with no complaint of pain. P: Continue OT POC with focus on ADL/IADL skills, functional transfers, functional mobility, coordination, strength, static and dynamic balance, and activity tolerance to maximize safety and independence with ADLs and functional transfers. Patient was left seated up in recliner with call bell/all other needs in reach. Chair alarm activated and father present. Interdisciplinary communication: Collaborated with PT and confirmed that patient is on track to meet goals.      Isaiah Myles MS, OTR/L  8/26/2019          Note may contain the following abbreviations:   Abbreviation Explanation   AROM Active range of motion   PROM Passive range of motion   SPT Stand pivot transfer   LPT Lateral pivot transfer   WC wheelchair   RW Rolling walker    BUE Bilateral upper extremities   BLE Bilateral lower extremities    WBAT Weight bearing as tolerated    TTWB Toe-touch weight bearing    AD Adaptive device   AE Adaptive equipment    NMES Neuro muscular electrical stimulation   UBE Upper body ergometer

## 2019-08-26 NOTE — PROGRESS NOTES
08/26/19 1349   Time Spent With Patient   Time In 1301   Time Out 1345   Patient Seen For: PM;ADLs;Other (see progress notes)   Grooming   Grooming Assistance  Mod A   Adaptive Equipment Electric razor   Comments assist managing electric razor; moderate cues for technique and thoroughness; physical assist for thoroughness    S: \"I'm tired. \"   O: Patient presented sidelying in bed. Agreeable to treatment. Patient completed grooming; see above for details. Patient required maximum assist to get electric razor out of packaging and set up (razor was new to patient). Patient stood for 18 minutes at bathroom sink while engaging in shaving his thick [place]. Patient perseverated on liking his home electric razor better. After completing shaving, patient attempted to get back into bed and required maximum encouragement to participate in therapy. Patient stated he felt sick and had a headache; offered twice to ask nursing for pain medication and patient declined both times. Patient finally agreed to come out to therapy and stated the only thing he wanted to get better at doing before going home was \"walking. \" Patient completed functional mobility around unit x 400' with straight cane, with focus on following verbal instructions and sustained attention to task. Patient completed with SBA and no LOB. Patient persists with decreased step clearance, often \"scuffing\" feet on floor despite verbal cueing to  feet. A: Remains with limited insight into deficits and decreased motivation to participate. Continues to require moderate to maximum encouragement, but eventually willing to participate. Patient tolerated session well with no complaint of pain. P: Continue OT POC with focus on ADL/IADL skills, cognition, functional transfers, functional mobility, coordination, strength, static and dynamic balance, and activity tolerance to maximize safety and independence with ADLs and functional transfers.    Patient was left seated up in [address] in gym for PT. Interdisciplinary communication: Collaborated with PT and confirmed that patient is on track to meet goals.      Kehinde Perez, MS, OTR/L  8/26/2019          Note may contain the following abbreviations:   Abbreviation Explanation   AROM Active range of motion   PROM Passive range of motion   SPT Stand pivot transfer   LPT Lateral pivot transfer   WC wheelchair   RW Rolling walker    BUE Bilateral upper extremities   BLE Bilateral lower extremities    WBAT Weight bearing as tolerated    TTWB Toe-touch weight bearing    AD Adaptive device   AE Adaptive equipment    NMES Neuro muscular electrical stimulation   UBE Upper body ergometer

## 2019-08-26 NOTE — PROGRESS NOTES
Isabel Campa MD,   Medical Director  [address], [address] [facility]  Tel: [phone]       D PROGRESS NOTE    Toshia Olivas  Admit Date: 8/13/2019  Admit Diagnosis: SAH (subarachnoid hemorrhage) ([address] [I60.9]    Subjective     Patient seen and examined. Vss. No acute complaints. PT, OT well tolerated. Steady gains made. No new barrier to progress noted. Smiling. More talkative. Tearful at times. Reminded him he will be going home in two days.  Happy about this but apprehensive at the same time  Objective:     Current Facility-Administered Medications   Medication Dose Route Frequency    0.9% sodium chloride infusion  100 mL/hr IntraVENous CONTINUOUS    heparin (porcine) pf 300-900 Units  300-900 Units InterCATHeter Q8H    traZODone (DESYREL) tablet 50 mg  50 mg Oral QHS PRN    HYDROcodone-acetaminophen (NORCO) 5-325 mg per tablet 1 Tab  1 Tab Oral Q6H PRN    ondansetron (ZOFRAN ODT) tablet 4 mg  4 mg Oral Q6H PRN    lactulose (CHRONULAC) 10 gram/15 mL solution 45 mL  30 g Oral DAILY PRN    ondansetron (ZOFRAN) injection 4 mg  4 mg IntraVENous Q4H PRN    heparin (porcine) injection 5,000 Units  5,000 Units SubCUTAneous Q8H    aspirin tablet 325 mg  325 mg Oral DAILY    clopidogrel (PLAVIX) tablet 75 mg  75 mg Oral DAILY    famotidine (PEPCID) tablet 20 mg  20 mg Oral DAILY    levETIRAcetam (KEPPRA) tablet 1,000 mg  1,000 mg Oral BID    metoprolol tartrate (LOPRESSOR) tablet 100 mg  100 mg Oral BID    nicotine (NICODERM CQ) 14 mg/24 hr patch 1 Patch  1 Patch TransDERmal Q24H    SALINE PERIPHERAL FLUSH Q8H soln 10-30 mL  10-30 mL InterCATHeter Q8H    saline peripheral flush soln 10-30 mL  10-30 mL InterCATHeter PRN    acetaminophen (TYLENOL) tablet 650 mg  650 mg Oral Q6H PRN    citalopram (CELEXA) tablet 20 mg  20 mg Oral DAILY    senna-docusate (PERICOLACE) 8.6-50 mg per tablet 1 Tab  1 Tab Oral DAILY     Review of Systems:Denies chest pain, shortness of breath, cough, headache, visual problems, abdominal pain, dysurea, calf pain. Pertinent positives are as noted in the medical records and unremarkable otherwise. Visit Vitals  /78 (BP 1 Location: Left arm, BP Patient Position: At rest)   Pulse 68   Temp 98.5 °F (36.9 °C)   Resp 16   Wt 132 lb 6.4 oz (60.1 kg)   SpO2 98%   BMI 17.96 kg/m²        Physical Exam:   General: Alert and age appropriately oriented. No acute cardio respiratory distress. HEENT: Normocephalic,no scleral icterus  Oral mucosa moist without cyanosis   Lungs: Clear to auscultation  bilaterally. Respiration even and unlabored   Heart: Regular rate and rhythm, S1, S2   No  murmurs, clicks, rub or gallops   Abdomen: Soft, non-tender, nondistended. Bowel sounds present. No organomegaly. Genitourinary: defer   Neuromuscular:      Grossly no focal motor deficits noted. Moves ankles. Generalized weakness, no dysmetria, no drift; fcs  vc to increase voice quaility; improving eye contact   Skin/extremity: No rashes, no erythema. No calf tenderness BLE  No edema; scalp incisions healed                                                                            Functional Assessment:  Gross Assessment  AROM: Generally decreased, functional (08/21/19 1500)  PROM: Within functional limits (08/21/19 1500)  Strength: Generally decreased, functional (08/21/19 1500)  Coordination: Generally decreased, functional (08/21/19 1500)       Balance  Sitting - Static: Good (unsupported) (08/24/19 1100)  Sitting - Dynamic: Good (unsupported) (08/24/19 1100)  Standing - Static: Good (08/24/19 1100)  Standing - Dynamic : Impaired (08/24/19 1100)                     Dayanna Lindquist Fall Risk Assessment:  Dayanna Lindquist Fall Risk  Mobility: Ambulates or transfers with assist devices or assistance (08/25/19 1940)  Mobility Interventions: Bed/chair exit alarm; Patient to call before getting OOB; Strengthening exercises (ROM-active/passive); Utilize walker, cane, or other assistive device (08/25/19 1940)  Mentation: Periodic confusion (08/25/19 1940)  Mentation Interventions: Bed/chair exit alarm; Increase mobility; Reorient patient;Room close to nurse's station (08/25/19 1940)  Medication: Patient receiving anticonvulsants, sedatives(tranquilizers), psychotropics or hypnotics, hypoglycemics, narcotics, sleep aids, antihypertensives, laxatives, or diuretics (08/25/19 1940)  Medication Interventions: Bed/chair exit alarm; Patient to call before getting OOB; Teach patient to arise slowly (08/25/19 1940)  Elimination: Needs assistance with toileting (08/25/19 1940)  Elimination Interventions: Bed/chair exit alarm; Patient to call for help with toileting needs;Call light in reach (08/25/19 1940)  Prior Fall History: Before admission in past 12 months _home or previous inpatient care) (08/25/19 1940)  History of Falls Interventions: Bed/chair exit alarm; Door open when patient unattended (08/25/19 1940)  Total Score: 5 (08/25/19 1940)  Standard Fall Precautions: Yes (08/20/19 2023)  High Fall Risk: Yes (08/25/19 1940)     Speech Assessment:         Ambulation:  Gait  Base of Support: Narrowed (08/21/19 1500)  Speed/Aminata: Slow;Shuffled (08/21/19 1500)  Step Length: Right shortened;Left shortened (08/21/19 1500)  Stance: Right increased; Left increased (08/21/19 1500)  Gait Abnormalities: Decreased step clearance; Path deviations; Altered arm swing (08/21/19 1500)  Distance (ft): 200 Feet (ft) (08/24/19 1100)  Assistive Device: Cane, straight (08/24/19 1100)  Rail Use: Both (08/20/19 1649)     Labs/Studies:  Recent Results (from the past 72 hour(s))   HGB & HCT    Collection Time: 08/24/19  5:24 AM   Result Value Ref Range    HGB 9.3 (L) 13.6 - 17.2 g/dL    HCT 29.6 (L) 41.1 - 90.0 %   METABOLIC PANEL, BASIC    Collection Time: 08/24/19  5:24 AM   Result Value Ref Range    Sodium 140 136 - 145 mmol/L    Potassium 5.0 3.5 - 5.1 mmol/L    Chloride 109 (H) 98 - 107 mmol/L    CO2 28 21 - 32 mmol/L    Anion gap 3 (L) 7 - 16 mmol/L    Glucose 116 (H) 65 - 100 mg/dL    BUN 51 (H) 6 - 23 MG/DL    Creatinine 4.21 (H) 0.8 - 1.5 MG/DL    GFR est AA 20 (L) >60 ml/min/1.73m2    GFR est non-AA 16 (L) >60 ml/min/1.73m2    Calcium 9.3 8.3 - 23.7 MG/DL   METABOLIC PANEL, BASIC    Collection Time: 08/25/19  5:23 AM   Result Value Ref Range    Sodium 140 136 - 145 mmol/L    Potassium 5.6 (H) 3.5 - 5.1 mmol/L    Chloride 110 (H) 98 - 107 mmol/L    CO2 26 21 - 32 mmol/L    Anion gap 4 (L) 7 - 16 mmol/L    Glucose 93 65 - 100 mg/dL    BUN 47 (H) 6 - 23 MG/DL    Creatinine 3.97 (H) 0.8 - 1.5 MG/DL    GFR est AA 21 (L) >60 ml/min/1.73m2    GFR est non-AA 17 (L) >60 ml/min/1.73m2    Calcium 9.1 8.3 - 26.7 MG/DL   METABOLIC PANEL, BASIC    Collection Time: 08/26/19  5:22 AM   Result Value Ref Range    Sodium 141 136 - 145 mmol/L    Potassium 5.1 3.5 - 5.1 mmol/L    Chloride 113 (H) 98 - 107 mmol/L    CO2 22 21 - 32 mmol/L    Anion gap 6 (L) 7 - 16 mmol/L    Glucose 86 65 - 100 mg/dL    BUN 44 (H) 6 - 23 MG/DL    Creatinine 3.75 (H) 0.8 - 1.5 MG/DL    GFR est AA 23 (L) >60 ml/min/1.73m2    GFR est non-AA 19 (L) >60 ml/min/1.73m2    Calcium 8.3 8.3 - 10.4 MG/DL       Assessment:     Problem List as of 8/26/2019 Date Reviewed: 7/10/2019          Codes Class Noted - Resolved    Major depressive episode ICD-10-CM: F32.9  ICD-9-CM: 296.20  8/20/2019 - Present        Posterior reversible encephalopathy syndrome ICD-10-CM: I67.83  ICD-9-CM: 348.39  8/8/2019 - Present        Seizure cerebral ICD-10-CM: I67.89  ICD-9-CM: 033  8/7/2019 - Present        SAH (subarachnoid hemorrhage) (HCC) ICD-10-CM: I60.9  ICD-9-CM: 430  8/5/2019 - Present        Pneumonia due to methicillin susceptible Staphylococcus aureus (MSSA) ([facility] ICD-10-CM: V04.504  ICD-9-CM: 482.41  7/15/2019 - Present        Tobacco abuse ICD-10-CM: Z72.0  ICD-9-CM: 305.1  7/10/2019 - Present        Acute respiratory failure with hypoxia (HCC) ICD-10-CM: J96.01  ICD-9-CM: 518.81  7/10/2019 - Present        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 [facility]) ICD-10-CM: I60.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  ICD-9-CM: 331.3  7/10/2019 - Present        IVH (intraventricular hemorrhage) (HCC) ICD-10-CM: I61.5  ICD-9-CM: 039  7/10/2019 - Present        Seizure ([facility] ICD-10-CM: R56.9  ICD-9-CM: 780.39  7/10/2019 - Present        Elevated serum creatinine ICD-10-CM: R79.89  ICD-9-CM: 790.99  7/10/2019 - Present        Cerebral vasospasm ICD-10-CM: I67.848  ICD-9-CM: 435.9  7/10/2019 - Present        RESOLVED: Endotracheally intubated ICD-10-CM: Z97.8  ICD-9-CM: V49.89  7/10/2019 - 7/10/2019        RESOLVED: SAH (subarachnoid hemorrhage) ([facility] ICD-10-CM: I60.9  ICD-9-CM: 776  7/9/2019 - 7/10/2019               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 Statin; LDL 67.6 ; adequate     Stimulation/ depression ; flat affect, improved with Provigil. Increase celexa. Psych consult  -8/19 staff notices improvement in conversation. Smiling more  -8/23 dc Provigil. Discharging next week. Insurance will not cover provigil. If pt becomes less engaged; will start Amantadine  -8/25 engaging more, affect a bit brighter; hold on amantadine for now. Doing fine off Provigil     Hypokalemia; improved with supplement; monitor; 3.5 8/14; 3.7 8/16 8/18 - K - 4.2; 4.8 this a.m 8/19; 8/21 5.1 ; no supplements; likely inc due to renal dz; monitor; 8/23 5.1, 5.0 8/24; 8/25 5. 1     Cont magnesium to prevent vasospasm. Daily labs; Mg 2.3 cont suppl.  Will change to [facility] labs; 8/121 2.6 off supplement     DVT risk / DVT Prophylaxis- Will require daily physician exam to assess for signs and symptoms as patient is at increased risk for of thromboembolism. Mobilization as tolerated. Intermittent pneumatic compression devices when in bed Thigh-high or knee-high thromboembolic deterrent hose when out of bed. Heparin 5000 units subcut q 8hrs      sz d/o; controlled with increased Keppra 1000mg bid;sz precautions     Pain Control: using NSAID medication regularly, daily. Will require regular pain assessment and comprenhensive pain management,   Norco tapered to 5mg prn       Wound Care: Monitor wound status daily per staff and physician. At risk for failure. Will require 24/7 rehab nursing. Keep wound clean and dry and dc scalp sutures      Hypertension - BP uncontrolled, fluctuating, managed medically. No bp goals at this time. Prolonged HTN for management of SAH to prevent vasospasm contributed to development of PRES. F/u scan improved; now on hydralazine and metoprolol; add parameters. Would like to keep systolic less than 625; monitor symptoms and adjust as needed. Avoid hypotension  -1/64 194I- 140s / 70-80s range  -8/16 144/81 controlled  -6/49 034/29, fluctuating; 8/19 128/79; 8/21 bp 130s-140s acceptable; 8/23 127//80; 8/24 varies; 119/84 this a.m , max ; dec hydralazine sltly. Avoid hypotension. Want sbp higher  -8/25 ; dc hydralazine, cont metoprolol; monitor closely  -8/26 119-150s     Malnutrition; needs supplement; jayce ct. Significant wt loss. Check alb; 2.4  -8/15 added Megace, approx 40 lb wt loss during hospitalization; need to document accurate wt; will change to a regular diet from renal to inc food choices to include things he might eat.   -8/16 seen by Dietician, discussed. Po improving. Eating what family brings in. Changed to reg diet with better choices. Encourage po.  -8/19 excellent po over weekend; monitor wt; 8/21 appears to be meeting caloric needs. Push ensure enlive  -8/24 dc megace and monitor; 8/26 po excellent     Proteinuria; monitor     NING, likely CKD; creat 3.39; slt improvement. BUN 17 improved; renal US 8/6; Echogenic kidneys suggesting chronic renal disease.  No evidence of  Obstruction. 3.52 this a.m 8/14; need strict I/Os  -8/16 creat slowly increasing 3.74 today>3.52> 3.39< 3.51; BUN trending up as well. Will give fluid bolus and continue to monitor. Strict I/0s ordered; however , only # voids, not amt recorded! 8/18 - BUN - 24 > 27 > 31, Cr - 3.7 > 3.6 >3.7, will hold ivf. Begin to monitor I/Os, seemingly baseline Cr - 3.4 -3.7 since hospital admission  -8/19 creat slowly creeping up. No change with IVFs. I/O have not been recorded accurately; STRICT ADHERENCE NEEDED   -reconsult Nephrology. Had prior Protein elctrophoresis. Not sure significance of free light chains kappa/lambda, QT;  8/20 per nephrol No M spike or abnl proteins  -8/20 appreciate Nephrology assist; bun creat increasing; 41/4/12, mag 2.6; has been eating much better; may be dry; will give IVFs; however, didn't make much difference last week. Stg IV. UOP better documented ; 1800ml, intake only noted to be 540cc. ; recheck in a.m after flds.   -8/21 bun/creat 44/3.98 with IVFs. Creat slt dec, but slt inc. Push po fluids. ? Baseline. stg IV CKD. UOP not well recorded  -8/22 improved 3.80. Will stop IVFs and monitor  -8/24 bun and creat back up ; creat 4.21 (3.98, 3.80); restart fluids; Bun 51 from 45/ PUSH PO FLUIDS  -8/25 with IVFs creat improved to 3.97(4.21), BUN 47(51); cont IVFs; 8.26 creat 3.75; f/u with nephrology is much needed post dc. Will d/w pts father to reiterate need     Anemia likely due to CKD; recheck in a.m. Check stool.  -8/14 hgb 8.7 slt impr (8.5)  -8/15 improved 9.3, stool negative; 8/22 hgb 8.9 may be dilutional due to IVFs; iron 76 , lower TIBC, elevated ferritin 497  -8/24 improved hgb 9.3 (8.9)     Urinary retention/ neurogenic bladder - schedule voids q6-8 hrs. Check post-void residual as needed; In and Out catheterize if post-void residual is more than 400 cc.   Jose Ramachandran been continent without residuals     bowel program - add stool softeners and prn bowel program     GERD - resume PPI. At times may need additional antacids, Maalox prn.     Tobacco abuse; stress cessation. Cont nicoderm patch     Hydrocephalus, resolved s/p VPS     Insomnia - will increase trazodone to 50mg hs prn with prn repeat dose   -8/19 slept better last noc but sleepy this a.m. With increased Trazadone. May have to decrease; 8/21 will cont current dosing, but give range of dosing to start at lowest dose.  Participating well with therapies  -8/23 tolerating dose well. Good nocs rest. OT reports that     Time spent was 25 minutes with over 1/2 in direct patient care/examination, consultation and coordination of care.      Signed By: Maira Mayorga MD     August 26, 2019

## 2019-08-26 NOTE — PROGRESS NOTES
PHYSICAL THERAPY DAILY NOTE  Time In: 1345  Time Out: 1423  Patient Seen For: PM;Gait training; Therapeutic exercise;Transfer training    Subjective: \"I got up early this morning. \"         Objective:     COGNITION Daily Assessment    Flat affect, decreased short term recall & problem solving - unable to find his way back to his room w/o cues       BED/MAT MOBILITY Daily Assessment    Supine to Sit : 6 (Modified independent)  Sit to Supine : 6 (Modified independent)       TRANSFERS Daily Assessment    Transfer Type: SPT without device  Transfer Assistance : 5 (Supervision/setup)  Sit to Stand Assistance: Supervision  Car Transfers: Not tested  Car Type: rehab car       GAIT Daily Assessment   Performed gait training in hospital lobby & outside to increase environmental distractions & provide unstable surfaces. Pt. Required CGA to ambulate in grass & over transitions secondary to decreased foot clearance in swing, made more difficulty by uneven surface. Once back on unit pt. Required mod VCs to navigate from the nurses station back to his room ( a familiar route after his stay here) Amount of Assistance: 4 (Contact guard assistance)  Distance (ft): 800 Feet (ft)(x2)  Assistive Device: Cane, straight       STEPS or STAIRS Daily Assessment    NT this visit       BALANCE Daily Assessment    Sitting - Static: Good (unsupported)  Sitting - Dynamic: Good (unsupported)  Standing - Static: Good  Standing - Dynamic : Impaired       WHEELCHAIR MOBILITY Daily Assessment    Able to Propel (ft): 0 feet  Wheelchair Setup Assist Required : 0 (Not tested)       LOWER EXTREMITY EXERCISES Daily Assessment    Pt.  Performed NuStep @ resistance level 2 x10 minutes to increase strength & endurance B UEs/LEs     Vital Signs: /89   Pulse 72   Temp 98 °F (36.7 °C)   Resp 14   Wt 60.1 kg (132 lb 6.4 oz)   SpO2 98%   BMI 17.96 kg/m²      Pain level: pt. C/o HA, but denied intervention (suspect behavioral roots as HA resolved once pt back in room)    Patient education: pt. Educated on changes needed in gait technique on grassy surface    Interdisciplinary Communication: collaborated w/ OT regarding pt's progress    Pt. Left supine in NAD, call bell in reach, Iam carnes RN @ bedside         Assessment: Pt. Able to progress to community re-integration tasks today, ambulating in hospital lobby & outside. However, pt. Required increased assistance on unstable surfaces due to decreased foot clearance as well as decreased gait tiffanie w/ fatigue. Pt. Will require cont. PT services after d/c to further improve balance & endurance s/p SAH & prolonged hospitalization. Plan of Care: Continue with POC and progress as tolerated.      Aleksander Horne, PT  8/26/2019

## 2019-08-26 NOTE — PROGRESS NOTES
08/26/19 1242   Time Spent With Patient   Time In 1115   Time Out 1159   Patient Seen For: AM;ADLs;Other (see progress notes)   Grooming   Grooming Assistance  S   Toileting   Toileting Assistance (FIM Score) S   Functional Transfers   Toilet Transfer  Stand pivot transfer without device   Amount of Assistance Required SBA   S: \"I just want to relax. \" [requiring much encouragement to participate]  O: Patient presented seated up in recliner Agreeable to treatment after much encouragement. Patient's father present for duration of session. Patient completed toileting; see above for details. Patient ambulated from room to gym with SBA and verbal cue for wayfinding. Patient engaged in cognitive task following written instructions with focus on attention strategies and moderate verbal cueing. Patient completed with 4 errors that patient required assist to identify and correct. Required two verbal cues for working memory during task. Patient completed sequencing and direction following task organizing a deck of cards into piles by number, requiring moderate to maximum cues for alternating and sustained attention. Completed with approximately 15 errors that patient required assist to identify. Patient ambulated from gym to room using SPT with single point cane with focus on wayfinding; patient was able to state he was in room 910 but turned the wrong direction and required two verbal cues to correct. Patient completed with close SBA. A: Impaired cognition remains patient's primary barrier. No carryover of wayfinding on floor between sessions. Patient tolerated session well with no complaint of pain. P: Continue OT POC with focus on ADL/IADL skills, cognition, functional transfers, functional mobility, coordination, strength, static and dynamic balance, and activity tolerance to maximize safety and independence with ADLs and functional transfers.    Patient was left seated up in recliner with call bell/all other needs in reach. Chair alarm activated. Father present. Interdisciplinary communication: Collaborated with PT regarding cognition.      Bekah Pizarro MS, OTR/L  8/26/2019          Note may contain the following abbreviations:   Abbreviation Explanation   AROM Active range of motion   PROM Passive range of motion   SPT Stand pivot transfer   LPT Lateral pivot transfer   WC wheelchair   RW Rolling walker    BUE Bilateral upper extremities   BLE Bilateral lower extremities    WBAT Weight bearing as tolerated    TTWB Toe-touch weight bearing    AD Adaptive device   AE Adaptive equipment    NMES Neuro muscular electrical stimulation   UBE Upper body ergometer

## 2019-08-27 ENCOUNTER — APPOINTMENT (OUTPATIENT)
Dept: CT IMAGING | Age: 44
DRG: 064 | End: 2019-08-27
Attending: PHYSICAL MEDICINE & REHABILITATION
Payer: COMMERCIAL

## 2019-08-27 LAB
ANION GAP SERPL CALC-SCNC: 6 MMOL/L (ref 7–16)
BUN SERPL-MCNC: 42 MG/DL (ref 6–23)
CALCIUM SERPL-MCNC: 8.3 MG/DL (ref 8.3–10.4)
CHLORIDE SERPL-SCNC: 115 MMOL/L (ref 98–107)
CO2 SERPL-SCNC: 21 MMOL/L (ref 21–32)
CREAT SERPL-MCNC: 3.62 MG/DL (ref 0.8–1.5)
ERYTHROCYTE [DISTWIDTH] IN BLOOD BY AUTOMATED COUNT: 12.6 % (ref 11.9–14.6)
GLUCOSE SERPL-MCNC: 82 MG/DL (ref 65–100)
HCT VFR BLD AUTO: 25.9 % (ref 41.1–50.3)
HGB BLD-MCNC: 8.1 G/DL (ref 13.6–17.2)
MAGNESIUM SERPL-MCNC: 1.9 MG/DL (ref 1.8–2.4)
MCH RBC QN AUTO: 30.5 PG (ref 26.1–32.9)
MCHC RBC AUTO-ENTMCNC: 31.3 G/DL (ref 31.4–35)
MCV RBC AUTO: 97.4 FL (ref 79.6–97.8)
NRBC # BLD: 0 K/UL (ref 0–0.2)
PLATELET # BLD AUTO: 192 K/UL (ref 150–450)
PMV BLD AUTO: 10.6 FL (ref 9.4–12.3)
POTASSIUM SERPL-SCNC: 5 MMOL/L (ref 3.5–5.1)
RBC # BLD AUTO: 2.66 M/UL (ref 4.23–5.6)
SODIUM SERPL-SCNC: 142 MMOL/L (ref 136–145)
WBC # BLD AUTO: 8.1 K/UL (ref 4.3–11.1)

## 2019-08-27 PROCEDURE — 65310000000 HC RM PRIVATE REHAB

## 2019-08-27 PROCEDURE — 97112 NEUROMUSCULAR REEDUCATION: CPT

## 2019-08-27 PROCEDURE — 85027 COMPLETE CBC AUTOMATED: CPT

## 2019-08-27 PROCEDURE — 99232 SBSQ HOSP IP/OBS MODERATE 35: CPT | Performed by: PHYSICAL MEDICINE & REHABILITATION

## 2019-08-27 PROCEDURE — 74011250637 HC RX REV CODE- 250/637: Performed by: PHYSICAL MEDICINE & REHABILITATION

## 2019-08-27 PROCEDURE — 83735 ASSAY OF MAGNESIUM: CPT

## 2019-08-27 PROCEDURE — 97530 THERAPEUTIC ACTIVITIES: CPT

## 2019-08-27 PROCEDURE — 36592 COLLECT BLOOD FROM PICC: CPT

## 2019-08-27 PROCEDURE — 70450 CT HEAD/BRAIN W/O DYE: CPT

## 2019-08-27 PROCEDURE — 96152 PR HEAL & BEHAV INTERVENT,EA 15 MIN,INDIV: CPT | Performed by: PSYCHOLOGIST

## 2019-08-27 PROCEDURE — 97535 SELF CARE MNGMENT TRAINING: CPT

## 2019-08-27 PROCEDURE — 97116 GAIT TRAINING THERAPY: CPT

## 2019-08-27 PROCEDURE — 74011250636 HC RX REV CODE- 250/636: Performed by: PHYSICAL MEDICINE & REHABILITATION

## 2019-08-27 PROCEDURE — 80048 BASIC METABOLIC PNL TOTAL CA: CPT

## 2019-08-27 PROCEDURE — 92507 TX SP LANG VOICE COMM INDIV: CPT

## 2019-08-27 RX ORDER — ONDANSETRON 4 MG/1
4 TABLET, ORALLY DISINTEGRATING ORAL
Qty: 16 TAB | Refills: 2 | Status: SHIPPED | OUTPATIENT
Start: 2019-08-27 | End: 2020-06-23

## 2019-08-27 RX ORDER — CITALOPRAM 20 MG/1
20 TABLET, FILM COATED ORAL DAILY
Qty: 90 TAB | Refills: 3 | Status: SHIPPED | OUTPATIENT
Start: 2019-08-28 | End: 2020-06-23

## 2019-08-27 RX ORDER — LANOLIN ALCOHOL/MO/W.PET/CERES
400 CREAM (GRAM) TOPICAL 2 TIMES DAILY
Qty: 60 TAB | Refills: 0 | Status: SHIPPED | OUTPATIENT
Start: 2019-08-27 | End: 2020-06-23

## 2019-08-27 RX ORDER — TRAZODONE HYDROCHLORIDE 50 MG/1
50 TABLET ORAL
Qty: 30 TAB | Refills: 2 | Status: SHIPPED | OUTPATIENT
Start: 2019-08-27 | End: 2020-06-23

## 2019-08-27 RX ORDER — METOPROLOL TARTRATE 100 MG/1
100 TABLET ORAL 2 TIMES DAILY
Qty: 60 TAB | Refills: 3 | Status: SHIPPED | OUTPATIENT
Start: 2019-08-27 | End: 2021-07-28

## 2019-08-27 RX ORDER — CLOPIDOGREL BISULFATE 75 MG/1
75 TABLET ORAL DAILY
Qty: 90 TAB | Refills: 1 | Status: SHIPPED | OUTPATIENT
Start: 2019-08-27 | End: 2019-12-17

## 2019-08-27 RX ORDER — HYDRALAZINE HYDROCHLORIDE 25 MG/1
25 TABLET, FILM COATED ORAL 3 TIMES DAILY
Qty: 90 TAB | Refills: 3 | Status: SHIPPED | OUTPATIENT
Start: 2019-08-27

## 2019-08-27 RX ORDER — LEVETIRACETAM 1000 MG/1
1000 TABLET ORAL 2 TIMES DAILY
Qty: 60 TAB | Refills: 5 | Status: SHIPPED | OUTPATIENT
Start: 2019-08-27 | End: 2020-06-23

## 2019-08-27 RX ORDER — ASPIRIN 325 MG
325 TABLET ORAL DAILY
Qty: 90 TAB | Refills: 1 | Status: SHIPPED | OUTPATIENT
Start: 2019-08-27

## 2019-08-27 RX ORDER — ATORVASTATIN CALCIUM 40 MG/1
40 TABLET, FILM COATED ORAL
Qty: 90 TAB | Refills: 1 | Status: SHIPPED | OUTPATIENT
Start: 2019-08-27 | End: 2021-07-28

## 2019-08-27 RX ADMIN — SODIUM CHLORIDE, PRESERVATIVE FREE 600 UNITS: 5 INJECTION INTRAVENOUS at 05:13

## 2019-08-27 RX ADMIN — CLOPIDOGREL BISULFATE 75 MG: 75 TABLET ORAL at 08:25

## 2019-08-27 RX ADMIN — ACETAMINOPHEN 650 MG: 325 TABLET, FILM COATED ORAL at 21:14

## 2019-08-27 RX ADMIN — HEPARIN SODIUM 5000 UNITS: 5000 INJECTION INTRAVENOUS; SUBCUTANEOUS at 15:24

## 2019-08-27 RX ADMIN — LEVETIRACETAM 1000 MG: 500 TABLET, FILM COATED ORAL at 08:26

## 2019-08-27 RX ADMIN — TRAZODONE HYDROCHLORIDE 50 MG: 50 TABLET ORAL at 21:14

## 2019-08-27 RX ADMIN — SENNOSIDES, DOCUSATE SODIUM 1 TABLET: 50; 8.6 TABLET, FILM COATED ORAL at 08:26

## 2019-08-27 RX ADMIN — HYDROCODONE BITARTRATE AND ACETAMINOPHEN 1 TABLET: 5; 325 TABLET ORAL at 21:14

## 2019-08-27 RX ADMIN — ASPIRIN 325 MG ORAL TABLET 325 MG: 325 PILL ORAL at 08:24

## 2019-08-27 RX ADMIN — CITALOPRAM HYDROBROMIDE 20 MG: 20 TABLET ORAL at 08:25

## 2019-08-27 RX ADMIN — HEPARIN SODIUM 5000 UNITS: 5000 INJECTION INTRAVENOUS; SUBCUTANEOUS at 21:15

## 2019-08-27 RX ADMIN — LEVETIRACETAM 1000 MG: 500 TABLET, FILM COATED ORAL at 21:14

## 2019-08-27 RX ADMIN — FAMOTIDINE 20 MG: 20 TABLET ORAL at 08:26

## 2019-08-27 RX ADMIN — METOPROLOL TARTRATE 100 MG: 100 TABLET ORAL at 08:26

## 2019-08-27 RX ADMIN — HEPARIN SODIUM 5000 UNITS: 5000 INJECTION INTRAVENOUS; SUBCUTANEOUS at 05:14

## 2019-08-27 RX ADMIN — Medication 20 ML: at 05:14

## 2019-08-27 NOTE — PROGRESS NOTES
Resting on left side, denies headache. States \"feeling some better\" after throwing up. Father at bedside, monitoring.

## 2019-08-27 NOTE — PROGRESS NOTES
PHYSICAL THERAPY DAILY NOTE  Time In: 1031  Time Out: 1115  Patient Seen For: AM;Balance activities; Therapeutic exercise;Transfer training;Gait training    Subjective: \"It's especially cold today. \"         Objective:     COGNITION Daily Assessment    Pt. W/ flat, apathetic affect today. Decreased short term recall, requires mod VCs for directional cues to to stay focused on task. BED/MAT MOBILITY Daily Assessment    Supine to Sit : 6 (Modified independent)       TRANSFERS Daily Assessment   Supervision for safety & directional cues Transfer Type: SPT without device  Transfer Assistance : 5 (Supervision/setup)  Sit to Stand Assistance: Supervision  Car Transfers: Not tested       GAIT Daily Assessment   Pt. Performed gait training task searching for & retrieving objects to simulate functional household gait. Pt. Performed w/ CGA for steadying assist secondary to running into objects on the floor occasionally Amount of Assistance: 5 (Supervision/setup)  Distance (ft): 400 Feet (ft)(150 x2)  Assistive Device: Cane, straight       STEPS or STAIRS Daily Assessment    Steps/Stairs Ambulated (#): 0  Level of Assist : 0 (Not tested)       BALANCE Daily Assessment   Pt. Performed dynamic standing balance activities to improve stability w/ gait. Pt. Performed:  Step/ball bounce & catch, lateral step/ball bounce & catch, & bounce/catch while walking. Pt. Performed w/ CGA for balance assist.  Retrieves object from floor w/ CGA.  Sitting - Static: Good (unsupported)  Sitting - Dynamic: Good (unsupported)  Standing - Static: Good  Standing - Dynamic : Impaired       WHEELCHAIR MOBILITY Daily Assessment    Able to Propel (ft): 0 feet  Wheelchair Setup Assist Required : 0 (Not tested)     Vital Signs: /82 (BP 1 Location: Left arm, BP Patient Position: At rest)   Pulse (!) 58   Temp 98.4 °F (36.9 °C)   Resp 18   Wt 60.1 kg (132 lb 6.4 oz)   SpO2 100%   BMI 17.96 kg/m²     Pain level: no c/o pain    Patient education: pt. Educated on the purpose of balance exercises    Interdisciplinary Communication: handoff communication w/ OT    Pt. Left sitting in chair @ therapy table for OT session. Assessment: Pt. Tolerating increased balance challenges this visit w/o overt LOB. Needs cues to follow through w/ tasks due to inattention & decreased short term memory. Affect more flat this AM - needing cues to initiate activity. Pt. Cont. To benefit from PT services to address. Plan of Care: Continue with POC and progress as tolerated.      Jessee Espinal, PT  8/27/2019

## 2019-08-27 NOTE — PROGRESS NOTES
Rosy Nieves MD,   Medical Director  [address] [facility], [address] [facility]  Tel: [phone]       D PROGRESS NOTE    Faustino Koo  Admit Date: 8/13/2019  Admit Diagnosis: SAH (subarachnoid hemorrhage) ([address] [I60.9]    Subjective     \"cold\". Working with [employer] [facility]. \" you look nice today. \"     Objective:     Current Facility-Administered Medications   Medication Dose Route Frequency    heparin (porcine) pf 300-900 Units  300-900 Units InterCATHeter Q8H    traZODone (DESYREL) tablet 50 mg  50 mg Oral QHS PRN    HYDROcodone-acetaminophen (NORCO) 5-325 mg per tablet 1 Tab  1 Tab Oral Q6H PRN    ondansetron (ZOFRAN ODT) tablet 4 mg  4 mg Oral Q6H PRN    lactulose (CHRONULAC) 10 gram/15 mL solution 45 mL  30 g Oral DAILY PRN    ondansetron (ZOFRAN) injection 4 mg  4 mg IntraVENous Q4H PRN    heparin (porcine) injection 5,000 Units  5,000 Units SubCUTAneous Q8H    aspirin tablet 325 mg  325 mg Oral DAILY    clopidogrel (PLAVIX) tablet 75 mg  75 mg Oral DAILY    famotidine (PEPCID) tablet 20 mg  20 mg Oral DAILY    levETIRAcetam (KEPPRA) tablet 1,000 mg  1,000 mg Oral BID    metoprolol tartrate (LOPRESSOR) tablet 100 mg  100 mg Oral BID    nicotine (NICODERM CQ) 14 mg/24 hr patch 1 Patch  1 Patch TransDERmal Q24H    SALINE PERIPHERAL FLUSH Q8H soln 10-30 mL  10-30 mL InterCATHeter Q8H    saline peripheral flush soln 10-30 mL  10-30 mL InterCATHeter PRN    acetaminophen (TYLENOL) tablet 650 mg  650 mg Oral Q6H PRN    citalopram (CELEXA) tablet 20 mg  20 mg Oral DAILY    senna-docusate (PERICOLACE) 8.6-50 mg per tablet 1 Tab  1 Tab Oral DAILY     Review of Systems:Denies chest pain, shortness of breath, cough, headache, visual problems, abdominal pain, dysurea, calf pain. Pertinent positives are as noted in the medical records and unremarkable otherwise.      Visit Vitals  /79 (BP 1 Location: Left arm, BP Patient Position: At rest)   Pulse 73   Temp 99.2 °F (37.3 °C)   Resp 16   Wt 132 lb 6.4 oz (60.1 kg)   SpO2 98%   BMI 17.96 kg/m²        Physical Exam:   General: Alert, affect blunted. polite  No acute cardio respiratory distress. HEENT: Normocephalic,no scleral icterus  Oral mucosa moist without cyanosis   Lungs: Clear to auscultation  bilaterally. Respiration even and unlabored   Heart: Regular rate and rhythm, S1, S2   No  murmurs, clicks, rub or gallops   Abdomen: Soft, non-tender, nondistended. Bowel sounds present. No organomegaly. Genitourinary: Benign . Neuromuscular:      Grossly no focal motor deficits noted. Moves ankles. Remains cogn impaired danielle with memory, slow processing, insight   Skin/extremity: No rashes, no erythema. No calf tenderness BLE  No edema                                                                            Functional Assessment:  Gross Assessment  AROM: Generally decreased, functional (08/21/19 1500)  PROM: Within functional limits (08/21/19 1500)  Strength: Generally decreased, functional (08/21/19 1500)  Coordination: Generally decreased, functional (08/21/19 1500)       Balance  Sitting - Static: Good (unsupported) (08/26/19 1400)  Sitting - Dynamic: Good (unsupported) (08/26/19 1400)  Standing - Static: Good (08/26/19 1400)  Standing - Dynamic : Impaired (08/26/19 1400)   Grooming  Adaptive Equipment: Electric razor (08/26/19 1349)                 Rohit Lisset Fall Risk Assessment:  Rohit Lisset Fall Risk  Mobility: Ambulates or transfers with assist devices or assistance (08/26/19 2039)  Mobility Interventions: Bed/chair exit alarm; Patient to call before getting OOB; Strengthening exercises (ROM-active/passive); Utilize walker, cane, or other assistive device (08/26/19 2039)  Mentation: Periodic confusion (08/26/19 2039)  Mentation Interventions: Bed/chair exit alarm; Adequate sleep, hydration, pain control;Door open when patient unattended;Reorient patient; More frequent rounding; Increase mobility (08/26/19 2039)  Medication: Patient receiving anticonvulsants, sedatives(tranquilizers), psychotropics or hypnotics, hypoglycemics, narcotics, sleep aids, antihypertensives, laxatives, or diuretics (08/26/19 2039)  Medication Interventions: Bed/chair exit alarm (08/26/19 2039)  Elimination: Needs assistance with toileting (08/26/19 2039)  Elimination Interventions: Bed/chair exit alarm;Call light in reach (08/26/19 2039)  Prior Fall History: Before admission in past 12 months _home or previous inpatient care) (08/26/19 2039)  History of Falls Interventions: Bed/chair exit alarm; Consult care management for discharge planning (08/26/19 2039)  Total Score: 5 (08/26/19 2039)  Standard Fall Precautions: Yes (08/20/19 2023)  High Fall Risk: Yes (08/26/19 2039)     Speech Assessment:         Ambulation:  Gait  Base of Support: Narrowed (08/21/19 1500)  Speed/Aminata: Slow;Shuffled (08/21/19 1500)  Step Length: Right shortened;Left shortened (08/21/19 1500)  Stance: Right increased; Left increased (08/21/19 1500)  Gait Abnormalities: Decreased step clearance; Path deviations; Altered arm swing (08/21/19 1500)  Distance (ft): 800 Feet (ft)(x2) (08/26/19 1400)  Assistive Device: Cane, straight (08/26/19 1400)  Rail Use: Left (SPC in R hand) (08/26/19 1000)     Labs/Studies:  Recent Results (from the past 72 hour(s))   METABOLIC PANEL, BASIC    Collection Time: 08/25/19  5:23 AM   Result Value Ref Range    Sodium 140 136 - 145 mmol/L    Potassium 5.6 (H) 3.5 - 5.1 mmol/L    Chloride 110 (H) 98 - 107 mmol/L    CO2 26 21 - 32 mmol/L    Anion gap 4 (L) 7 - 16 mmol/L    Glucose 93 65 - 100 mg/dL    BUN 47 (H) 6 - 23 MG/DL    Creatinine 3.97 (H) 0.8 - 1.5 MG/DL    GFR est AA 21 (L) >60 ml/min/1.73m2    GFR est non-AA 17 (L) >60 ml/min/1.73m2    Calcium 9.1 8.3 - 35.9 MG/DL   METABOLIC PANEL, BASIC    Collection Time: 08/26/19  5:22 AM   Result Value Ref Range    Sodium 141 136 - 145 mmol/L    Potassium 5.1 3.5 - 5.1 mmol/L    Chloride 113 (H) 98 - 107 mmol/L CO2 22 21 - 32 mmol/L    Anion gap 6 (L) 7 - 16 mmol/L    Glucose 86 65 - 100 mg/dL    BUN 44 (H) 6 - 23 MG/DL    Creatinine 3.75 (H) 0.8 - 1.5 MG/DL    GFR est AA 23 (L) >60 ml/min/1.73m2    GFR est non-AA 19 (L) >60 ml/min/1.73m2    Calcium 8.3 8.3 - 39.0 MG/DL   METABOLIC PANEL, BASIC    Collection Time: 08/27/19  5:10 AM   Result Value Ref Range    Sodium 142 136 - 145 mmol/L    Potassium 5.0 3.5 - 5.1 mmol/L    Chloride 115 (H) 98 - 107 mmol/L    CO2 21 21 - 32 mmol/L    Anion gap 6 (L) 7 - 16 mmol/L    Glucose 82 65 - 100 mg/dL    BUN 42 (H) 6 - 23 MG/DL    Creatinine 3.62 (H) 0.8 - 1.5 MG/DL    GFR est AA 24 (L) >60 ml/min/1.73m2    GFR est non-AA 19 (L) >60 ml/min/1.73m2    Calcium 8.3 8.3 - 10.4 MG/DL   CBC W/O DIFF    Collection Time: 08/27/19  5:10 AM   Result Value Ref Range    WBC 8.1 4.3 - 11.1 K/uL    RBC 2.66 (L) 4.23 - 5.6 M/uL    HGB 8.1 (L) 13.6 - 17.2 g/dL    HCT 25.9 (L) 41.1 - 50.3 %    MCV 97.4 79.6 - 97.8 FL    MCH 30.5 26.1 - 32.9 PG    MCHC 31.3 (L) 31.4 - 35.0 g/dL    RDW 12.6 11.9 - 14.6 %    PLATELET 087 919 - 395 K/uL    MPV 10.6 9.4 - 12.3 FL    ABSOLUTE NRBC 0.00 0.0 - 0.2 K/uL       Assessment:     Problem List as of 8/27/2019 Date Reviewed: 7/10/2019          Codes Class Noted - Resolved    Major depressive episode ICD-10-CM: F32.9  ICD-9-CM: 296.20  8/20/2019 - Present        Posterior reversible encephalopathy syndrome ICD-10-CM: I67.83  ICD-9-CM: 348.39  8/8/2019 - Present        Seizure cerebral ICD-10-CM: I67.89  ICD-9-CM: 940  8/7/2019 - Present        SAH (subarachnoid hemorrhage) (HCC) ICD-10-CM: I60.9  ICD-9-CM: 430  8/5/2019 - Present        Pneumonia due to methicillin susceptible Staphylococcus aureus (MSSA) ([facility] ICD-10-CM: R09.237  ICD-9-CM: 482.41  7/15/2019 - Present        Tobacco abuse ICD-10-CM: Z72.0  ICD-9-CM: 305.1  7/10/2019 - Present        Acute respiratory failure with hypoxia (HCC) ICD-10-CM: J96.01  ICD-9-CM: 518.81  7/10/2019 - Present 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 [facility]) ICD-10-CM: I60.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  ICD-9-CM: 331.3  7/10/2019 - Present        IVH (intraventricular hemorrhage) ([facility] ICD-10-CM: I61.5  ICD-9-CM: 606  7/10/2019 - Present        Seizure ([facility] ICD-10-CM: R56.9  ICD-9-CM: 780.39  7/10/2019 - Present        Elevated serum creatinine ICD-10-CM: R79.89  ICD-9-CM: 790.99  7/10/2019 - Present        Cerebral vasospasm ICD-10-CM: I67.848  ICD-9-CM: 435.9  7/10/2019 - Present        RESOLVED: Endotracheally intubated ICD-10-CM: Z97.8  ICD-9-CM: V49.89  7/10/2019 - 7/10/2019        RESOLVED: SAH (subarachnoid hemorrhage) ([facility] ICD-10-CM: I60.9  ICD-9-CM: 661  7/9/2019 - 7/10/2019               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 Statin; LDL 67.6 ; adequate     Stimulation/ depression ; flat affect, improved with Provigil. Increase celexa. Psych consult  -8/19 staff notices improvement in conversation. Smiling more  -8/23 dc Provigil. Discharging next week. Insurance will not cover provigil. If pt becomes less engaged; will start Amantadine  -8/25 engaging more, affect a bit brighter; hold on amantadine for now. Doing fine off Provigil     Hypokalemia; improved with supplement; monitor; 3.5 8/14; 3.7 8/16 8/18 - K - 4.2; 4.8 this a.m 8/19; 8/21 5.1 ; no supplements; likely inc due to renal dz; monitor; 8/23 5.1, 5.0 8/24; 8/25 5.1; 8/27 5     Cont magnesium to prevent vasospasm. Daily labs; Mg 2.3 cont suppl.  Will change to [facility] labs; 8/121 2.6 off supplement; 8/27 pending     DVT risk / DVT Prophylaxis- Will require daily physician exam to assess for signs and symptoms as patient is at increased risk for of thromboembolism. Mobilization as tolerated. Intermittent pneumatic compression devices when in bed Thigh-high or knee-high thromboembolic deterrent hose when out of bed. Heparin 5000 units subcut q 8hrs      sz d/o; controlled with increased Keppra 1000mg bid;sz precautions     Pain Control: using NSAID medication regularly, daily. Will require regular pain assessment and comprenhensive pain management,   Norco tapered to 5mg prn       Wound Care: Monitor wound status daily per staff and physician. At risk for failure. Will require 24/7 rehab nursing. Keep wound clean and dry and dc scalp sutures      Hypertension - BP uncontrolled, fluctuating, managed medically. No bp goals at this time. Prolonged HTN for management of SAH to prevent vasospasm contributed to development of PRES. F/u scan improved; now on hydralazine and metoprolol; add parameters. Would like to keep systolic less than 300; monitor symptoms and adjust as needed. Avoid hypotension  -1/48 857C- 140s / 70-80s range  -8/16 144/81 controlled  -9/16 030/75, fluctuating; 8/19 128/79; 8/21 bp 130s-140s acceptable; 8/23 127//80; 8/24 varies; 119/84 this a.m , max ; dec hydralazine sltly. Avoid hypotension. Want sbp higher  -8/25 ; dc hydralazine, cont metoprolol; monitor closely  -8/27 120s-150s     Malnutrition; needs supplement; jayce ct. Significant wt loss. Check alb; 2.4  -8/15 added Megace, approx 40 lb wt loss during hospitalization; need to document accurate wt; will change to a regular diet from renal to inc food choices to include things he might eat.   -8/16 seen by Dietician, discussed. Po improving. Eating what family brings in. Changed to reg diet with better choices. Encourage po.  -8/19 excellent po over weekend; monitor wt; 8/21 appears to be meeting caloric needs. Push ensure enlive  -8/24 dc megace and monitor; 8/27 po excellent     Proteinuria; monitor     NING, likely CKD; creat 3.39; slt improvement.  BUN 17 improved; renal US 8/6; Echogenic kidneys suggesting chronic renal disease.  No evidence of  Obstruction. 3.52 this a.m 8/14; need strict I/Os  -8/16 creat slowly increasing 3.74 today>3.52> 3.39< 3.51; BUN trending up as well. Will give fluid bolus and continue to monitor. Strict I/0s ordered; however , only # voids, not amt recorded! 8/18 - BUN - 24 > 27 > 31, Cr - 3.7 > 3.6 >3.7, will hold ivf. Begin to monitor I/Os, seemingly baseline Cr - 3.4 -3.7 since hospital admission  -8/19 creat slowly creeping up. No change with IVFs. I/O have not been recorded accurately; STRICT ADHERENCE NEEDED   -reconsult Nephrology. Had prior Protein elctrophoresis. Not sure significance of free light chains kappa/lambda, QT;  8/20 per nephrol No M spike or abnl proteins  -8/20 appreciate Nephrology assist; bun creat increasing; 41/4/12, mag 2.6; has been eating much better; may be dry; will give IVFs; however, didn't make much difference last week. Stg IV. UOP better documented ; 1800ml, intake only noted to be 540cc. ; recheck in a.m after flds.   -8/21 bun/creat 44/3.98 with IVFs. Creat slt dec, but slt inc. Push po fluids. ? Baseline. stg IV CKD. UOP not well recorded  -8/22 improved 3.80. Will stop IVFs and monitor  -8/24 bun and creat back up ; creat 4.21 (3.98, 3.80); restart fluids; Bun 51 from 45/ PUSH PO FLUIDS  -8/25 with IVFs creat improved to 3.97(4.21), BUN 47(51); cont IVFs; 8.26 creat 3.75; f/u with nephrology is much needed post dc. Will d/w pts father to reiterate need     Anemia likely due to CKD; recheck in a.m. Check stool.  -8/14 hgb 8.7 slt impr (8.5)  -8/15 improved 9.3, stool negative; 8/22 hgb 8.9 may be dilutional due to IVFs; iron 76 , lower TIBC, elevated ferritin 497  -8/24 improved hgb 9.3 (8.9); 8/27 8.1; likely dilutional due to IVFs, no evidence of bleed. Recheck in a.m. ; will f/u with PCP and if it remains low; gi eval     Urinary retention/ neurogenic bladder - schedule voids q6-8 hrs.  Check post-void residual as needed; In and Out catheterize if post-void residual is more than 400 cc. Aylin Burns been continent without residuals     bowel program - add stool softeners and prn bowel program     GERD - resume PPI. At times may need additional antacids, Maalox prn.     Tobacco abuse; stress cessation. Cont nicoderm patch  -dec patch to 7mg x 14d; 8/27     Hydrocephalus, resolved s/p VPS     Insomnia - will increase trazodone to 50mg hs prn with prn repeat dose   -8/19 slept better last noc but sleepy this a.m. With increased Trazadone. May have to decrease; 8/21 will cont current dosing, but give range of dosing to start at lowest dose.  Participating well with therapies  -8/23 tolerating dose well. Good nocs rest. OT reports that   -8/27 resting well. Will likely dc home on trazadone      Time spent was 25 minutes with over 1/2 in direct patient care/examination, consultation and coordination of care.      Signed By: Barbara Canela MD     August 27, 2019

## 2019-08-27 NOTE — PROGRESS NOTES
Spoke with father and he prefers to have Outpatient Therapy in [place]. [facility] and left message to return call.

## 2019-08-27 NOTE — PROGRESS NOTES
Bed alarm sounding, went into room and patient standing over commode vomiting large amount of light brown loose emesis. Assisted back to bed, a/o x 1, responds to voice, perrla. Dr. Zachary Erazo notified, order received for stat head CT without contrast. VSS, monitoring.

## 2019-08-27 NOTE — PROGRESS NOTES
Clinicals sent to [facility], appointment made for 09/04/2019 at 0900 for PT/OT/ST.  Appointment placed on the AVS.

## 2019-08-27 NOTE — PROGRESS NOTES
OT DISCHARGE REPORT    Time In: 2450  Time Out: 3369    COMPREHENSION MODE Initial Assessment Discharge Assessment 8/27/2019   Score 3 5     EXPRESSION Initial Assessment Discharge Assessment 8/27/2019   Primary Mode of Expression Verbal Verbal   Score 2 5   Comments very low volume, difficult to understand and has to repeat almost everything said. Uses single words and short phrases only. Needs cues to express basic needs, OR needs staff member to set up communication device, OR expresses ONLY basic needs or ideas 90% or more of the time. SOCIAL INTERACTION/ PRAGMATICS Initial Assessment Discharge Assessment 8/27/2019   Score 2 3   Comments flat affect, withdrawn, needs cues to initiate interaction. interacts appropriately 25-49% of the time. Interacts appropriately 50-74% of the time, may be physically or verbally inappropriate. PROBLEM SOLVING Initial Assessment Discharge Assessment 8/27/2019   Score 2 3   Comments needs direction >50% of the time to intiate simple activities  Solves basic problems 50-74% of the time. MEMORY Initial Assessment Discharge Assessment 8/27/2019   Score 2 3   Comments executes 1 of 2 step request 25-49% of the time  Recognizes, recalls, or executes 50-74% of the time 2 steps of 2 step request.     BATHING Initial Assessment Discharge Assessment 8/27/2019   Functional Level 4 5   Body parts patient bathed Abdomen, Arm, right, Arm, left, Buttocks, Chest, Lower leg and foot, left, Lower leg and foot, right, [place] area, Thigh, left, Thigh, right Abdomen;Arm, left;Arm, right;Buttocks; Chest;Lower leg and foot, left; Lower leg and foot, right;[place]onna area; Thigh, left; Thigh, right   Comments CGA in standing S     TUB/SHOWER TRANSFER INDEPENDENCE Initial Assessment Discharge Assessment 8/27/2019   Score 1 4  Type of Shower: Shower  Adaptive  Equipment:Tub transfer bench and Grab bars   Comments required RW for safety; ambulated without AE prior to admission  Occasional CGA     UPPER BODY DRESSING/UNDRESSING Initial Assessment Discharge Assessment 8/27/2019   Functional Level 5 4   Items applied/Steps completed Pullover (4 steps) Pullover (4 steps)   Comments setup assist  Occasional CGA     LOWER BODY DRESSING/UNDRESSING Initial Assessment Discharge Assessment 8/27/2019   Functional Level 4 4   Items applied/Steps completed Elastic waist pants (3 steps), Sock, left (1 step), Sock, right (1 step), Underpants (3 steps) Elastic waist pants (3 steps); Sock, left (1 step); Sock, right (1 step); Underpants (3 steps)   Comments patient completed 6/8 steps, requiring total A for B socks. Occasional CGA     Plan of Care: Please see Care Plan for progress towards goals during stay  Precautions at Discharge: Falls, Poor Safety Awareness, Confused and Impulsive  Discharge Location: Home with family  Safety/Supervision Recommendations/Functional Level:    Pt requires 24/7 S secondary to decreased safety awareness and increased fall risk. Family Training: Completed with father    Recommended Continuing Therapy:  Outpatient OT  Residual Deficits:  Cognition, balance,   Progress over LOS: Pt has made significant gains in activity tolerance, cognition, safety awareness, balance, and strength allowing for improvements in ADL and transfers allowing the pt to return home safely. Pt has been limited by poor activity tolerance and initiation. Poor insight and decreased cognition were also barriers. Summary of Session: Pt was in bed and agreeable to tx. Pt's performance with ADL is reflected in above chart. Pt required multiple trips to the closet to collect all appropriate clothes. Pt was left in bed with alarm on. Continue with POC.      Rocco Matt OTR/L  8/27/2019

## 2019-08-27 NOTE — PROGRESS NOTES
08/27/19 1109   Time Spent With Patient   Time In 0915   Time Out 1007   Patient Seen For: AM   Mental Status   Neurologic State Alert   Cognition Decreased attention/concentration;Memory loss   Perception Visual   Perseveration No perseveration noted   Safety/Judgement Decreased insight into deficits;Home safety        08/27/19 1110   Verbal Reasoning Exercises-Deductive/Inductive Reasoning, Transivity   Deductive Reasoning Matrix Accuracy (%) 50 %  (following directions on a basic calendar task)   Verbal Organization Exercises   Convergent Categorization Accuracy (%) 70 % basic level   Memory Exercises   Functional Tasks recalled 6/10 orientation questions after review with therapist; more delayed responses less engaged today versus previous session   Neuro-Linguistic Exercises   Verbal Reasoning Tasks Impaired; categorization grid required Max A to complete   Verbal Problem Solving Impaired   Verbal Organization Impaired   Memory Impaired   Attention  Impaired     Patient participated with cognitive therapy. He had a good session yesterday when his dad was present but less eye contact and more delayed responses and increased encouragement needed this date. Reports he is tired,  70% accuracy with basic level convergent naming. 50% accuracy following directions on a calendar. Instructed to re-read the directions and gini off in order to keep his place and avoid missing items. Recalled 6/10 orientation questions after review with therapist and 10 minute delay. Decreased recall of events from yesterday and activities completed with verbal cues needed. Completed a problem solving task to fill out a categorization grid with MaxA required. Patient requested to lay back down at the end of the session. Recommend continued therapy to address cognitive deficits.     Lorenza Ron MS, CCC-SLP

## 2019-08-27 NOTE — PROGRESS NOTES
OT Daily Note  Time In 1115   Time Out 1200      Pain: Patient had no complaint of pain. Functional Mobility   Pt walked with SBA with SPC back to room. Pt needed min cueing to navigate back to room. Pt had a self-correct LOB when running into old doctor and receiving a fist bump. Cognition   Pt followed instructions to complete simple lego construction. Pt needed min verbal cues throughout activity and increased time. Pt demonstrated good dexterity. Pt's cognition appears below baseline. Pt engaged with Sequence. Pt was able to go through the actions of the game with min A, but unable to implement any strategy. Pt's executive function appears to have significant deficits. Education   How to navigate to room     Interdisciplinary Communication: PT Michael Vo on performance    Plan: Continue with POC. Pt was left in bed with all needs within reach.      Anil Anand OTR/L  8/27/2019

## 2019-08-27 NOTE — PROGRESS NOTES
No more episode of vomiting, able to keep down some kaleigh crackers and sips of ginger ale. Resting in bed, denies nausea. Call bell within reach, hourly rounds completed this shift.

## 2019-08-27 NOTE — PROGRESS NOTES
Left pt in room in recliner with all needs within reach and alarm on. As soon as therapist left room pt got up and put himself back in bed. Pt was reminded he needed to call for A. Continue with POC.      Alberto COOPER/DOYLE  8/27/2019

## 2019-08-27 NOTE — PROGRESS NOTES
PSYCHOLOGY PROGRESS NOTE      Name:  Jocelyn Sykes    Date of Service: 8/27/2019  Location of Service:   910/01  Type of Service: [address] and behavior intervention  Duration:  15  Primary Diagnosis:   1. Seizure ([facility]    2. Posterior reversible encephalopathy syndrome    3. Seizure cerebral    4. Tobacco abuse    5. 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 Acute respiratory failure with hypoxia (HCC)    7. Cerebral vasospasm    8. Communicating hydrocephalus    9. Elevated serum creatinine    10. IVH (intraventricular hemorrhage) (HCC)    11. Pneumonia of left lower lobe due to methicillin susceptible Staphylococcus aureus (MSSA) ([facility]    12. Major depressive episode    13. SAH (subarachnoid hemorrhage) ([facility]        Summary of Service:  Engaged in follow-up with patient. Processed the course of his admission and provided empathy and reflective listening skills. Discharge anticipated tomorrow. Andrea Limon Psy.D.   Psychologist

## 2019-08-28 VITALS
DIASTOLIC BLOOD PRESSURE: 82 MMHG | TEMPERATURE: 98.2 F | OXYGEN SATURATION: 97 % | SYSTOLIC BLOOD PRESSURE: 143 MMHG | RESPIRATION RATE: 12 BRPM | WEIGHT: 132.4 LBS | BODY MASS INDEX: 17.96 KG/M2 | HEART RATE: 72 BPM

## 2019-08-28 LAB
HCT VFR BLD AUTO: 29.7 % (ref 41.1–50.3)
HGB BLD-MCNC: 9.5 G/DL (ref 13.6–17.2)

## 2019-08-28 PROCEDURE — 97535 SELF CARE MNGMENT TRAINING: CPT

## 2019-08-28 PROCEDURE — 74011250636 HC RX REV CODE- 250/636: Performed by: PHYSICAL MEDICINE & REHABILITATION

## 2019-08-28 PROCEDURE — 97116 GAIT TRAINING THERAPY: CPT

## 2019-08-28 PROCEDURE — 99239 HOSP IP/OBS DSCHRG MGMT >30: CPT | Performed by: PHYSICAL MEDICINE & REHABILITATION

## 2019-08-28 PROCEDURE — 36415 COLL VENOUS BLD VENIPUNCTURE: CPT

## 2019-08-28 PROCEDURE — 74011250637 HC RX REV CODE- 250/637: Performed by: PHYSICAL MEDICINE & REHABILITATION

## 2019-08-28 PROCEDURE — 92507 TX SP LANG VOICE COMM INDIV: CPT

## 2019-08-28 PROCEDURE — 85018 HEMOGLOBIN: CPT

## 2019-08-28 PROCEDURE — 97530 THERAPEUTIC ACTIVITIES: CPT

## 2019-08-28 RX ADMIN — HEPARIN SODIUM 5000 UNITS: 5000 INJECTION INTRAVENOUS; SUBCUTANEOUS at 05:16

## 2019-08-28 RX ADMIN — ASPIRIN 325 MG ORAL TABLET 325 MG: 325 PILL ORAL at 08:42

## 2019-08-28 RX ADMIN — LEVETIRACETAM 1000 MG: 500 TABLET, FILM COATED ORAL at 08:42

## 2019-08-28 RX ADMIN — FAMOTIDINE 20 MG: 20 TABLET ORAL at 08:44

## 2019-08-28 RX ADMIN — CLOPIDOGREL BISULFATE 75 MG: 75 TABLET ORAL at 08:43

## 2019-08-28 RX ADMIN — METOPROLOL TARTRATE 100 MG: 100 TABLET ORAL at 08:44

## 2019-08-28 RX ADMIN — SENNOSIDES, DOCUSATE SODIUM 1 TABLET: 50; 8.6 TABLET, FILM COATED ORAL at 08:44

## 2019-08-28 RX ADMIN — CITALOPRAM HYDROBROMIDE 20 MG: 20 TABLET ORAL at 08:41

## 2019-08-28 NOTE — PROGRESS NOTES
Discharge instructions completed with patient and patient's family member. Follow up appointments and prescriptions given to patient. Printed materials given to patient. Patient and patient's family member show verbal understanding of discharge instructions.

## 2019-08-28 NOTE — PROGRESS NOTES
PHYSICAL THERAPY DISCHARGE SUMMARY  [id number]     Precautions at discharge: Other (comment)(falls)    Problem List:    Decreased strength B LE  [x]     Decreased strength trunk/core  [x]     Decreased AROM   []     Decreased PROM  []     Decreased balance sitting  []     Decreased balance standing  [x]     Decreased endurance  []     Pain  []       Functional Limitations:   Decreased independence with bed mobility  []     Decreased independence with functional transfers  []     Decreased independence with ambulation  [x]     Decreased independence with stair negotiation  [x]            Outcome Measures: Vital Signs: /82   Pulse 72   Temp 98.2 °F (36.8 °C)   Resp 12   Wt 60.1 kg (132 lb 6.4 oz)   SpO2 97%   BMI 17.96 kg/m²     Pain level: no c/o pain    Patient education: reviewed stair management technique    Interdisciplinary Communication: collaborated w/ OT regarding pt's readiness for d/c      Cognition: Pt. Alert, follows one step commands consistently. Pt. Presents w/ a flat affect, although beginning to initiate conversations today. Insight impaired, but improving. Pt. Requires mod directional cues to navigate back to his room.        MMT Initial Asssessment   Right Lower Extremity Left Lower Extremity   Hip Flexion 4 4   Knee Extension 5 5   Knee Flexion 5 5   Ankle Dorsiflexion 5 5      MMT Discharge Assessment   Right Lower Extremity Left Lower Extremity   Hip Flexion 4+ 4+   Knee Extension 5 5   Knee Flexion 5 5   Ankle Dorsiflexion 5 5   0/5 No palpable muscle contraction  1/5 Palpable muscle contraction, no joint movement  2-/5 Less than full range of motion in gravity eliminated position  2/5 Able to complete full range of motion in gravity eliminated position  2+/5 Able to initiate movement against gravity  3-/5 More than half but not full range of motion against gravity  3/5 Able to complete full range of motion against gravity  3+/5 Completes full range of motion against gravity with minimal resistance  4-/5 Completes full range of motion against gravity with minimal-moderate resistance  4/5 Completes full range of motion against gravity with moderate resistance  4+/5 Completes full range of motion against gravity with moderate-maximum resistance  5/5 Completes full range of motion against gravity with maximum resistance     AROM: WFL    FIM SCORES Initial Assessment Discharge Assessment   Bed/Chair/Wheelchair Transfers 4 6   Wheelchair Mobility   NT   Walking [place] 4 5   Steps/Stairs 5 5   PRIMARY MODE OF LOCOMOTION: ambulation  Please see IRC Interdisciplinary Eval: Coordination/Balance Section for details regarding FIM score description.     BED/CHAIR/WHEELCHAIR TRANSFERS Initial Assessment Discharge Assessment   Rolling Right 5 (Supervision) 6 (Modified independent)   Rolling Left 5 (Supervision) 6 (Modified independent)   Supine to Sit 5 (Supervision) 6 (Modified independent)   Sit to Stand Minimal assistance Modified independent   Sit to Supine 5 (Supervision) 6 (Modified independent)   Transfer Assist Score 4 6   Transfer Type SPT without device SPT without device   Comments flexed posture, increased time, unsteady` increased time & UE support required   Car Transfer Not tested(secondary to lethargy & fatigue) Supervision   Car Type rehab car rehab car       [facility] MOBILITY/MANAGEMENT Initial Assessment Discharge Assessment   Able to Propel 0 feet 0 feet   Functional Level   NT   Curbs/ramps assistance required 0 (Not tested) 0 (Not tested)   Wheelchair set up assistance required 0 (Not tested) 0 (Not tested)   Wheelchair management   NT   NT as ambulation to be primary mode of locomotion    WALKING INDEPENDENCE Initial Assessment Discharge Assessment   Assistive device Other (comment), Gait belt(no A/D) Cane, straight   Ambulation assistance - level surface 4 (Minimal assistance) 5 (Supervision)   Distance 150 Feet (ft) 400 Feet (ft)(x1, 150' x1)   Functional Level 4 5 Comments flexed posture, decreased unilateral stance time, decreased heel strike, decreased trunk rotation,  flexed posture, decreased tiffanie, decreased trunk rotation/arm swing, needs cues for directional navigation, pt. W/ 1 LOB but able to self-correct   Ambulation assistance - unlevel surface 0 (Not tested) 5 (Supervision/setup)(w/ SPC)       STEPS/STAIRS Initial Assessment Discharge Assessment   Steps/Stairs ambulated 0 12   Rail Use Both Right    Functional Level 5 5   Comments step over gait pattern ascending, step to gait pattern descending step over gait pattern ascending, step to gait pattern descending   Curbs/Ramps 0 (Not tested) 5 (Supervision/setup)(using SPC)       QUALITY INDICATOR ASSIST COMMENTS   Walk 10 feet 5: S/U or clean-up assist    Walk 50 feet with 2 turns 4: Supervision or touching A    Walk 150 feet 4: Supervision or touching A    Walk 10 feet on uneven  4: Supervision or touching A    1 step/curb 4: Supervision or touching A    4 steps 4: Supervision or touching A    12 steps 4: Supervision or touching A     object 4: Supervision or touching A    Wheel 50' w/2 turns Not Tested: Not applicable secondary to pt not completing activity previously    Wheel 150' Not Tested: Not applicable secondary to pt not completing activity previously      Therapeutic Exercise:  Pt. Performed NuStep @ resistance level 2 x10 minutes to increase strength & endurance B UEs/LEs    Pt. Left up in recliner in NAD, call bell in reach, Posey activated         PHYSICAL THERAPY PLAN OF CARE    LTGs: Pt. Has met all LTGs @ this time    Pt would benefit from continued skilled physical therapy in order to improve independent functional mobility within the home with use of least restrictive device.  Interventions may include range of motion (AROM, PROM B LE/trunk), motor function (B LE/trunk strengthening/coordination), activity tolerance (vitals, oxygen saturation levels), bed mobility training, balance activities, gait training (progressive ambulation program), and functional transfer training. HEP handout:  issued @ family training 8/27/19    Pt to be discharged to father's home with 24/7 supervision provided by pt's father. Therapy Recommendations upon discharge: 24 Hour Supervision; Outpatient PT   Equipment needs at discharge:   [facility]    Please see IRC; Interdisciplinary Eval, Care Plan, and Patient Education for further information regarding physical therapy discharge summary and plan of care.      Fabiola Townsend, PT  8/28/2019

## 2019-08-28 NOTE — PROGRESS NOTES
08/28/19 1156   Time Spent With Patient   Time In 1033   Time Out 1115   Patient Seen For: AM   Mental Status   Neurologic State Alert   Orientation Level Oriented to person;Oriented to place;Oriented to situation   Cognition Impaired decision making;Decreased attention/concentration   Perception Appears intact   Perseveration No perseveration noted   Safety/Judgement Home safety; Fall prevention      08/28/19 1201   Neuro-Linguistic Exercises   Verbal Reasoning Tasks Impaired; decreased mental flexibility to determine a replacement item that could but used to solve a problem if ideal solution were not available; appropriate solution 2/6 scenarios   Verbal Problem Solving Impaired; setting time on a clock with 100% accuracy; setting the time given simple time reasoning problems with 70% accuracy; basic money management completed with 75% accuracy   Verbal Organization Impaired   Memory Impaired; recalled items that we determined to be the best solution with mod-max cues    Attention  Impaired     Patient participated with cognitive therapy. Sitting up in the chair. Flat affect but with improved participation this date. Set the time on a clock with 100% accuracy; 70% accuracy using the clock to answer basic time reasoning questions. 75% accuracy with basic money management tasks with additional time needed; pt encouraged to write down information to assist with calculations. Decreased mental flexibility to determine an appropriate replacement item that could be used to solve a problem stating an appropriate item 2/6 attempts. However, patient was able to state the reason a solution provided by clinician would work better then initial item suggested with [address]. Required mod-max cues for recall of items we discussed later in the session with perseverations noted. Patient was provided with plenty of encouragement and stated several times that he is most looking forward to seeing his children when he gets home. He was more engaged today versus yesterday's session and expressed appreciation for the staff here at the end of the session. Continued OP therapy to address cognitive-linguistic deficits indicated.     Milena Donohue MS, CCC-SLP

## 2019-08-28 NOTE — PROGRESS NOTES
OT DISCHARGE REPORT    Time In: 3943  Time Out: 7449    [address] Initial Assessment Discharge Assessment 8/28/2019   Score 3 6     EXPRESSION Initial Assessment Discharge Assessment 8/28/2019   Primary Mode of Expression Verbal Verbal   Score 2 5   Comments very low volume, difficult to understand and has to repeat almost everything said. Uses single words and short phrases only. expresses simple ideas, remains with low volume, more interactive today than any other day so far      SOCIAL INTERACTION/ PRAGMATICS Initial Assessment Discharge Assessment 8/28/2019   Score 2 5   Comments flat affect, withdrawn, needs cues to initiate interaction. interacts appropriately 25-49% of the time. more interactive today than any other day of his [facility] stay so far      PROBLEM SOLVING Initial Assessment Discharge Assessment 8/28/2019   Score 2 4   Comments needs direction >50% of the time to intiate simple activities  solves routine problems 50-75% of the time      MEMORY Initial Assessment Discharge Assessment 8/28/2019   Score 2 4   Comments executes 1 of 2 step request 25-49% of the time  recalls people frequently seen; mild difficulty recalling routine and safety training      EATING Initial Assessment Discharge Assessment 8/28/2019   Functional Level 5 7   Comments patient refused breakfast but did drink juice after setup assist        GROOMING Initial Assessment Discharge Assessment 8/28/2019   Functional Level 5 6   Tasks completed by patient Washed face, Washed hands Washed face; Washed hands   Comments setup assist; verbal cueing for strategy for closing listerine bottle        BATHING Initial Assessment Discharge Assessment 8/28/2019   Functional Level 4 5   Body parts patient bathed Abdomen, Arm, right, Arm, left, Buttocks, Chest, Lower leg and foot, left, Lower leg and foot, right, [place] area, Thigh, left, Thigh, right Abdomen;Arm, left;Arm, right;Buttocks; Chest;Lower leg and foot, left; Thigh, left;[place] area;Lower leg and foot, right;Thigh, right   Comments CGA in standing supervision for safety      TUB/SHOWER TRANSFER INDEPENDENCE Initial Assessment Discharge Assessment 8/28/2019   Score 1 5  Type of Shower: Shower  Adaptive  Equipment:Tub transfer bench, Grab bars and Cane   Comments required RW for safety; ambulated without AE prior to admission  SBA with straight cane      UPPER BODY DRESSING/UNDRESSING Initial Assessment Discharge Assessment 8/28/2019   Functional Level 5 5   Items applied/Steps completed Pullover (4 steps) Pullover (4 steps)   Comments setup assist  gathered clothing with supervision      LOWER BODY DRESSING/UNDRESSING Initial Assessment Discharge Assessment 8/28/2019   Functional Level 4 5   Items applied/Steps completed Elastic waist pants (3 steps), Sock, left (1 step), Sock, right (1 step), Underpants (3 steps) Button/zip pants (4 steps); Shoe, right (1 step); Sock, left (1 step); Sock, right (1 step); Shoe, left (1 step); Underpants (3 steps)    Shoes: 6: Independent  Socks: 6: Independent   Comments patient completed 6/8 steps, requiring total A for B socks. gathered clothing with supervision      TOILETING Initial Assessment Discharge Assessment 8/28/2019   Functional Level 4 5   Comments steadying assist        TOILET TRANSFER INDEPENDENCE Initial Assessment Discharge Assessment 8/28/2019   Transfer score 1 5   Comments required RW        Plan of Care: Please see Care Plan for progress towards goals during stay  Precautions at Discharge: Falls, Bed Alarm, Poor Safety Awareness, Confused and Impulsive  Discharge Location: home with 24/7 supervision   Safety/Supervision Recommendations/Functional Level:    recommend supervision with ADL/IADL tasks, SBA with functional mobility, NO driving until cleared by physician or driving rehab specialist, assist with medication/finances. Family Training: completed with father, who verbalized and demonstrated understanding of all education.  Father appears able to care for patient at discharge. Recommended Continuing Therapy: Outpatient OT;24 Hour Supervision; Outpatient PT;Outpatient SLP  Residual Deficits: impaired cognition, impaired balance, impaired carryover of training, impaired strength, impaired activity tolerance  Progress over LOS: Patient made good progress with ADL skills, functional transfers, activity tolerance, strength, coordination, and cognition. Recommend outpatient OT to address above noted ongoing residual deficits. Summary of Session: Patient supine in bed on arrival and agreeable to OT. Completed ADL; see above for details. Patient has met all goals established on initial evaluation. Patient tolerated session well with no complaint of pain and was left seated up in recliner with call light/all needs in reach. Of note, today was the most talkative patient has been during his entire stay, even asking therapist questions and engaging in more normal conversation. Patient even commented that he has lost weight since his admission after noticing that his pants were baggier; patient reports he used to be 150 pounds. Patient asked what kinds of foods he should be eating; discussed the importance of high quality proteins for gaining strength with verbalized understanding. Discussed smoking cessation; discussed risks of resuming smoking as related to aneurysm. Patient verbalized understanding and reports he intends to quit smoking. Patient tolerated session well with no complaint of pain and was left seated up in recliner with call light/all needs in reach. Chair alarm activated.      Jonel Ford MS, OTR/L  8/28/2019

## 2019-08-28 NOTE — DISCHARGE SUMMARY
REHABILITATION DISCHARGE SUMMARY     Date of admission; 8/13/2019  Discharge Date: 8/28/2109  Neurosurgeon; Dr Eidson Ibrahim (Endovascular)  Primary Care Physician:not listed  Admit Diagnosis: SAH (subarachnoid hemorrhage) ([facility] [address] [I60.9]  sz disorder, PRES, basilar artery aneurysm, communicating hydrocephalus, IVH, NING, Pneumonia due to MSSA, cerebral vasospasm     Admission Condition: fair  Discharged Condition: good    Hospital Course: The patient was admitted to [facility] on 8/13/2019 to [facility] s/p basilar artery aneurysm/non traumatic SAH    HPI; this is Mr. Mercado Pals second [facility] admission. Initially admitted on 8/5 to [facility]; Kacy Quispe a  Right handed functionally independent 43 y. o. male who originally reported to [facility] ED via EMS after found with altered mental status and possible seizure like activity. Per brother, pt was at [address] and had witnessed syncopal event with possible seizure like activity. Pt had CT head and CTA head and neck and found to have [address] secondary to Basilar tip aneurysm rupture. Pt transferred to [address] [place] [address] ED via [facility] team. Pt with eyes closed and drowsy, but will open eyes to voice and follow commands.  Pt GCS E3, V5, M6: 14, PERRL +3 with EOM's intact. NIHSS of 2 for drowsiness and altered mental status. Pt admitted to our ICU under [address] protocol. Labs from [facility] show bun/cr 46/3.4, will give pt IVF's x2 L bolus and reeval. Pt states he smokes about 9 cig a day, denies any ETOH or drugs, denies any family hx of SAH. Pt unsure of what happened.  Arterial line placed on admit, pt with IV x2., phelps. \"   Hardik , unfortunately, had a decline in status overnoc and required intubation for airway protection. F/u HCT showed extensive/diffuse SAH and increased ICP. An EVD was placed into the 3rd ventricle and head Ct repeated with dec in ventricular size.  Pts creatinine has improved to 2.83 with a BUN of 37 this a.m. troponins elevated at 0.08 and now 0.07. 2D ECHO pending. Na 154 earlier this a.m, now 149. Leukocytosis improved from 118.2 to 13.8. He is on Nimotop to prevent vasospasm and Keppra for sz control. 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 : stent/coiled 7-10-19. Pt had had daily TCD's during his admit and did show vasospasm, but pt was able to autoregulate and no IA verapamil was required. The pt had EVD placed on admit due to hydrocephalus and was unable to remove due to altered mental status. Pt required ventricular tPA x2 for IVH clot prior to  shunt placement on 8-2-19. The pt is alert and follows commands today, but he does have a flat effect. He was started on celexa 10mg po daily during admit. Pt will remain on his asa 325mg po daily, Plavix 75mg po daily for his stent/coil of basilar aneurysm. He completed 21days of nimotop\"     Unfortunately, on his 2 d at [facility] he had a sz and a CODE S was called. CTA/CTP head obtained and showed no new ICH, IVH but did show bilat occipital lobe edema/ischemia that was read by RAD as possible bilat stroke, but images and pt presentation and findings were most consistent with PRES. Pebbles placed and pt BP controlled for SBP goal <140. Pt started on cardene, but not responsive, he was given labetalol total of 30mg IV and started on Nipride gtt and tolerated well. Pt also given Mag 4gm IV x1, mag level 2, goal >2.5.   elevated BP was controlled with nipride gtt, he is now on metoprolol 100mg po bid and hydralazine 25mg po tid. There is no specific BP goal, but if any acute neuro changes please call and monitor if blood pressure has increased. Pt was also given magnesium as original mag <2. He has been startedon mag 400mg po bid. The pt has improved and ready for dc to in pt rehab. He will follow up with Dr. Airam Villalobos in 6 weeks. His elevated BP was controlled with nipride gtt, he is now on metoprolol 100mg po bid and hydralazine 25mg po tid.  There is no specific BP goal, but if any acute neuro changes we are asked to call Dr Aries Martinez and monitor if blood pressure has increased. Pt was also given magnesium as original mag <2. He has been startedon mag 400mg po bid. The pt has improved and ready for dc to in pt rehab. He will follow up with Dr. Aries Martinez in 6 weeks.     Rehabilitation Course:      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 Statin; LDL 67.6 ; adequate     Stimulation/ depression ; flat affect, improved with Provigil. Increase celexa. Psych consult  -8/19 staff notices improvement in conversation. Smiling more  -8/23 dc Provigil. Discharging next week. Insurance will not cover provigil. If pt becomes less engaged; will start Amantadine  -8/25 engaging more, affect a bit brighter; hold on amantadine for now. Doing fine off Provigil     Hypokalemia; improved with supplement; monitor; 3.5 8/14; 3.7 8/16 8/18 - K - 4.2; 4.8 this a.m 8/19; 8/21 5.1 ; no supplements; likely inc due to renal dz; monitor; 8/23 5.1, 5.0 8/24; 8/25 5.1; 8/27 5     Cont magnesium to prevent vasospasm. Daily labs; Mg 2.3 cont suppl. Will change to [facility] labs; 8/121 2.6 off supplement; 8/27 pending     DVT risk / DVT Prophylaxis- Will require daily physician exam to assess for signs and symptoms as patient is at increased risk for of thromboembolism. Mobilization as tolerated. Intermittent pneumatic compression devices when in bed Thigh-high or knee-high thromboembolic deterrent hose when out of bed. Heparin 5000 units subcut q 8hrs      sz d/o; controlled with increased Keppra 1000mg bid;sz precautions     Pain Control: using NSAID medication regularly, daily.  Will require regular pain assessment and comprenhensive pain management,   Norco tapered to 5mg prn       Wound Care: Monitor wound status daily per staff and physician. At risk for failure. Will require 24/7 rehab nursing. Keep wound clean and dry and dc scalp sutures      Hypertension - BP uncontrolled, fluctuating, managed medically. No bp goals at this time. Prolonged HTN for management of SAH to prevent vasospasm contributed to development of PRES. F/u scan improved; now on hydralazine and metoprolol; add parameters. Would like to keep systolic less than 262; monitor symptoms and adjust as needed. Avoid hypotension  -8/36 650D- 140s / 70-80s range  -8/16 144/81 controlled  -5/80 136/32, fluctuating; 8/19 128/79; 8/21 bp 130s-140s acceptable; 8/23 127//80; 8/24 varies; 119/84 this a.m , max ; dec hydralazine sltly. Avoid hypotension. Want sbp higher  -8/25 ; dc hydralazine, cont metoprolol; monitor closely  -8/27 120s-150s     Malnutrition; needs supplement; jayce ct. Significant wt loss. Check alb; 2.4  -8/15 added Megace, approx 40 lb wt loss during hospitalization; need to document accurate wt; will change to a regular diet from renal to inc food choices to include things he might eat.   -8/16 seen by Dietician, discussed. Po improving. Eating what family brings in. Changed to reg diet with better choices. Encourage po.  -8/19 excellent po over weekend; monitor wt; 8/21 appears to be meeting caloric needs. Push ensure enlive  -8/24 dc megace and monitor; 8/27 po excellent     Proteinuria; monitor     NING, likely CKD; creat 3.39; slt improvement. BUN 17 improved; renal US 8/6; Echogenic kidneys suggesting chronic renal disease.  No evidence of  Obstruction. 3.52 this a.m 8/14; need strict I/Os  -8/16 creat slowly increasing 3.74 today>3.52> 3.39< 3.51; BUN trending up as well. Will give fluid bolus and continue to monitor. Strict I/0s ordered; however , only # voids, not amt recorded! 8/18 - BUN - 24 > 27 > 31, Cr - 3.7 > 3.6 >3.7, will hold ivf.  Begin to monitor I/Os, seemingly baseline Cr - 3.4 -3.7 since hospital admission  -8/19 creat slowly creeping up. No change with IVFs. I/O have not been recorded accurately; STRICT ADHERENCE NEEDED   -reconsult Nephrology. Had prior Protein elctrophoresis. Not sure significance of free light chains kappa/lambda, QT;  8/20 per nephrol No M spike or abnl proteins  -8/20 appreciate Nephrology assist; bun creat increasing; 41/4/12, mag 2.6; has been eating much better; may be dry; will give IVFs; however, didn't make much difference last week. Stg IV. UOP better documented ; 1800ml, intake only noted to be 540cc. ; recheck in a.m after flds.   -8/21 bun/creat 44/3.98 with IVFs. Creat slt dec, but slt inc. Push po fluids. ? Baseline. stg IV CKD. UOP not well recorded  -8/22 improved 3.80. Will stop IVFs and monitor  -8/24 bun and creat back up ; creat 4.21 (3.98, 3.80); restart fluids; Bun 51 from 45/ PUSH PO FLUIDS  -8/25 with IVFs creat improved to 3.97(4.21), BUN 47(51); cont IVFs; 8.26 creat 3.75; f/u with nephrology is much needed post dc. Will d/w pts father to reiterate need     Anemia likely due to CKD; recheck in a.m. Check stool.  -8/14 hgb 8.7 slt impr (8.5)  -8/15 improved 9.3, stool negative; 8/22 hgb 8.9 may be dilutional due to IVFs; iron 76 , lower TIBC, elevated ferritin 497  -8/24 improved hgb 9.3 (8.9); 8/27 8.1; likely dilutional due to IVFs, no evidence of bleed. Recheck in a.m. ; will f/u with PCP and if it remains low; gi eval     Urinary retention/ neurogenic bladder - schedule voids q6-8 hrs. Check post-void residual as needed; In and Out catheterize if post-void residual is more than 400 cc. Field Leonie been continent without residuals     bowel program - add stool softeners and prn bowel program     GERD - resume PPI. At times may need additional antacids, Maalox prn.  -8/28; one episode of n/v yesterday a.m. ; no further issues     Tobacco abuse; stress cessation.  Cont nicoderm patch  -dec patch to 7mg x 14d; 8/27     Hydrocephalus, resolved s/p VPS     Insomnia - will increase trazodone to 50mg hs prn with prn repeat dose   -8/19 slept better last noc but sleepy this a.m. With increased Trazadone. May have to decrease; 8/21 will cont current dosing, but give range of dosing to start at lowest dose.  Participating well with therapies  -8/23 tolerating dose well. Good nocs rest. OT reports that   -8/27 resting well. Will likely dc home on trazadone          Functional Level On Admission:   gait 125ft (best) min assist with a RW. SBA bed mobility, STS min assist, fair dynamic balance  With ST; \"Patient demonstrates increased processing time, impaired attention, and decreased initiation. Volume low. Responses more delayed in open ended conversation vs. picture cards. Patient noted to provide correct concrete problem, however required follow up questions to explain why it is a problem. Word finding issue on single occasion with no improvement with cues. Stating \"arms\" for \"hands\" on clock. When given open ended prompt, \"there are ____on a watch\" patient stated \"there are wheels on a bus\".   \"  -last seen by OT 8/9; mod assist bathing, toileting,LE dressing, min assist UE dressing, grooming and feeding. Functional Level At Discharge:  OT DISCHARGE REPORT     Time In: 6571  Time Out: 2473     COMPREHENSION MODE Initial Assessment Discharge Assessment 8/27/2019   Score 3 5      EXPRESSION Initial Assessment Discharge Assessment 8/27/2019   Primary Mode of Expression Verbal Verbal   Score 2 5   Comments very low volume, difficult to understand and has to repeat almost everything said. Uses single words and short phrases only. Needs cues to express basic needs, OR needs staff member to set up communication device, OR expresses ONLY basic needs or ideas 90% or more of the time.      SOCIAL INTERACTION/ PRAGMATICS Initial Assessment Discharge Assessment 8/27/2019   Score 2 3   Comments flat affect, withdrawn, needs cues to initiate interaction.  interacts appropriately 25-49% of the time. Interacts appropriately 50-74% of the time, may be physically or verbally inappropriate.      PROBLEM SOLVING Initial Assessment Discharge Assessment 8/27/2019   Score 2 3   Comments needs direction >50% of the time to intiate simple activities  Solves basic problems 50-74% of the time.      MEMORY Initial Assessment Discharge Assessment 8/27/2019   Score 2 3   Comments executes 1 of 2 step request 25-49% of the time  Recognizes, recalls, or executes 50-74% of the time 2 steps of 2 step request.      BATHING Initial Assessment Discharge Assessment 8/27/2019   Functional Level 4 5   Body parts patient bathed Abdomen, Arm, right, Arm, left, Buttocks, Chest, Lower leg and foot, left, Lower leg and foot, right, [place]onna area, Thigh, left, Thigh, right Abdomen;Arm, left;Arm, right;Buttocks; Chest;Lower leg and foot, left; Lower leg and foot, right;Ladonna area; Thigh, left; Thigh, right   Comments CGA in standing S      TUB/SHOWER TRANSFER INDEPENDENCE Initial Assessment Discharge Assessment 8/27/2019   Score 1 4  Type of Shower: Shower  Adaptive  Equipment:Tub transfer bench and Grab bars   Comments required RW for safety; ambulated without AE prior to admission  Occasional CGA      UPPER BODY DRESSING/UNDRESSING Initial Assessment Discharge Assessment 8/27/2019   Functional Level 5 4   Items applied/Steps completed Pullover (4 steps) Pullover (4 steps)   Comments setup assist  Occasional CGA      LOWER BODY DRESSING/UNDRESSING Initial Assessment Discharge Assessment 8/27/2019   Functional Level 4 4   Items applied/Steps completed Elastic waist pants (3 steps), Sock, left (1 step), Sock, right (1 step), Underpants (3 steps) Elastic waist pants (3 steps); Sock, left (1 step); Sock, right (1 step); Underpants (3 steps)   Comments patient completed 6/8 steps, requiring total A for B socks.   Occasional CGA      Plan of Care: Please see Care Plan for progress towards goals during stay  Precautions at Discharge: Falls, Poor Safety Awareness, Confused and Impulsive  Discharge Location: Home with family  Safety/Supervision Recommendations/Functional Level:    Pt requires 24/7 S secondary to decreased safety awareness and increased fall risk. Family Training: Completed with father     Recommended Continuing Therapy:  Outpatient OT  Residual Deficits:  Cognition, balance,   Progress over LOS: Pt has made significant gains in activity tolerance, cognition, safety awareness, balance, and strength allowing for improvements in ADL and transfers allowing the pt to return home safely. Pt has been limited by poor activity tolerance and initiation. Poor insight and decreased cognition were also barriers.       Summary of Session: Pt was in bed and agreeable to tx. Pt's performance with ADL is reflected in above chart. Pt required multiple trips to the closet to collect all appropriate clothes. Pt was left in bed with alarm on.      Continue with POC.      Army Abraham COOPER/DOYLE  8/27/2019  PHYSICAL THERAPY DAILY NOTE  Time In: 1031  Time Out: 1115  Patient Seen For: AM;Balance activities; Therapeutic exercise;Transfer training;Gait training     Subjective: \"It's especially cold today. \"            Objective:  COGNITION Daily Assessment     Pt. W/ flat, apathetic affect today. Decreased short term recall, requires mod VCs for directional cues to to stay focused on task.         BED/MAT MOBILITY Daily Assessment     Supine to Sit : 6 (Modified independent)         TRANSFERS Daily Assessment   Supervision for safety & directional cues Transfer Type: SPT without device  Transfer Assistance : 5 (Supervision/setup)  Sit to Stand Assistance: Supervision  Car Transfers: Not tested         GAIT Daily Assessment   Pt. Performed gait training task searching for & retrieving objects to simulate functional household gait.   Pt. Performed w/ CGA for steadying assist secondary to running into objects on the floor occasionally Amount of Assistance: 5 (Supervision/setup)  Distance (ft): 400 Feet (ft)(150 x2)  Assistive Device: Cane, straight         STEPS or STAIRS Daily Assessment     Steps/Stairs Ambulated (#): 0  Level of Assist : 0 (Not tested)         BALANCE Daily Assessment   Pt. Performed dynamic standing balance activities to improve stability w/ gait. Pt. Performed:  Step/ball bounce & catch, lateral step/ball bounce & catch, & bounce/catch while walking. Pt. Performed w/ CGA for balance assist.  Retrieves object from floor w/ CGA. Sitting - Static: Good (unsupported)  Sitting - Dynamic: Good (unsupported)  Standing - Static: Good  Standing - Dynamic : Impaired         WHEELCHAIR MOBILITY Daily Assessment     Able to Propel (ft): 0 feet  Wheelchair Setup Assist Required : 0 (Not tested)      Vital Signs: /82 (BP 1 Location: Left arm, BP Patient Position: At rest)   Pulse (!) 58   Temp 98.4 °F (36.9 °C)   Resp 18   Wt 60.1 kg (132 lb 6.4 oz)   SpO2 100%   BMI 17.96 kg/m²      Pain level: no c/o pain     Patient education: pt. Educated on the purpose of balance exercises     Interdisciplinary Communication: handoff communication w/ OT     Pt. Left sitting in chair @ therapy table for OT session.            Assessment: Pt. Tolerating increased balance challenges this visit w/o overt LOB. Needs cues to follow through w/ tasks due to inattention & decreased short term memory. Affect more flat this AM - needing cues to initiate activity. Pt. Cont.  To benefit from PT services to address.            Plan of Care: Continue with POC and progress as tolerated.      Ivette Cheney, PT  8/27/2019 08/27/19 1109   Time Spent With Patient   Time In 0915   Time Out 1007   Patient Seen For: AM   Mental Status   Neurologic State Alert   Cognition Decreased attention/concentration;Memory loss   Perception Visual   Perseveration No perseveration noted   Safety/Judgement Decreased insight into deficits;Home safety           08/27/19 1110   Verbal Reasoning Exercises-Deductive/Inductive Reasoning, Transivity   Deductive Reasoning Matrix Accuracy (%) 50 %  (following directions on a basic calendar task)   Verbal Organization Exercises   Convergent Categorization Accuracy (%) 70 % basic level   Memory Exercises   Functional Tasks recalled 6/10 orientation questions after review with therapist; more delayed responses less engaged today versus previous session   Neuro-Linguistic Exercises   Verbal Reasoning Tasks Impaired; categorization grid required Max A to complete   Verbal Problem Solving Impaired   Verbal Organization Impaired   Memory Impaired   Attention  Impaired      Patient participated with cognitive therapy. He had a good session yesterday when his dad was present but less eye contact and more delayed responses and increased encouragement needed this date. Reports he is tired,  70% accuracy with basic level convergent naming. 50% accuracy following directions on a calendar. Instructed to re-read the directions and gini off in order to keep his place and avoid missing items. Recalled 6/10 orientation questions after review with therapist and 10 minute delay. Decreased recall of events from yesterday and activities completed with verbal cues needed. Completed a problem solving task to fill out a categorization grid with MaxA required. Patient requested to lay back down at the end of the session. Recommend continued therapy to address cognitive deficits.     Anna Francois MS, CCC-SLP              Home Architecture:  Home Environment: Rehabilitation facility  One/Two Story Residence: Two story  Living Alone: No  Support Systems: Family member(s)  Patient Expects to be Discharged to[de-identified] Rehabilitation facility  Current DME Used/Available at Home: None  Prior Level of Function/Work/Activity:  Pt is independent at baseline. He works as a . Lives with his father and 2 children ages 15, 8  No past medical history on file.    Past Surgical History:   Procedure Laterality Date    IR EMBOLI INTRACRAN LT  7/11/2019      No family history on file. Social History     Tobacco Use    Smoking status: Not on file   Substance Use Topics    Alcohol use: Not on file     Prior to Admission medications    Medication Sig Start Date End Date Taking? Authorizing Provider   atorvastatin (LIPITOR) 40 mg tablet Take 1 Tab by mouth nightly. 8/27/19  Yes Corrie Magana MD   hydrALAZINE (APRESOLINE) 25 mg tablet Take 1 Tab by mouth three (3) times daily. 8/27/19  Yes Corrie Magana MD   magnesium oxide (MAG-OX) 400 mg tablet Take 1 Tab by mouth two (2) times a day. 8/27/19  Yes Corrie Magana MD   aspirin (ASPIRIN) 325 mg tablet Take 1 Tab by mouth daily. 8/27/19  Yes Corrie Magana MD   clopidogrel (PLAVIX) 75 mg tab Take 1 Tab by mouth daily. 8/27/19  Yes Corrie Magana MD   citalopram (CELEXA) 20 mg tablet Take 1 Tab by mouth daily. 8/28/19  Yes Corrie Magana MD   levETIRAcetam 1,000 mg tablet Take 1 Tab by mouth two (2) times a day. 8/27/19  Yes Corrie Magana MD   metoprolol tartrate (LOPRESSOR) 100 mg IR tablet Take 1 Tab by mouth two (2) times a day. 8/27/19  Yes Corrie Magana MD   ondansetron (ZOFRAN ODT) 4 mg disintegrating tablet Take 1 Tab by mouth every eight (8) hours as needed for Nausea. 8/27/19  Yes Corrie Magana MD   traZODone (DESYREL) 50 mg tablet Take 1 Tab by mouth nightly as needed for Sleep. Indications: insomnia associated with depression 8/27/19  Yes Corrie Magana MD   citalopram (CELEXA) 10 mg tablet Take 1 Tab by mouth daily. 8/6/19   Mull, Junette Phoenix, NP   famotidine (PEPCID) 20 mg tablet Take 1 Tab by mouth daily. 8/6/19   Mull, Junette Phoenix, NP   modafinil (PROVIGIL) 200 mg tablet Take 1 Tab by mouth daily.  Max Daily Amount: 200 mg. 8/6/19   Mull, Junette Phoenix, DEANNE   nicotine (NICODERM CQ) 14 mg/24 hr patch 1 Patch by TransDERmal route every twenty-four (24) hours for 30 days.  8/5/19 9/4/19  Lael Fothergill, NP     No Known Allergies     Lab Review:   Recent Results (from the past 67 hour(s))   METABOLIC PANEL, BASIC    Collection Time: 08/25/19  5:23 AM   Result Value Ref Range    Sodium 140 136 - 145 mmol/L    Potassium 5.6 (H) 3.5 - 5.1 mmol/L    Chloride 110 (H) 98 - 107 mmol/L    CO2 26 21 - 32 mmol/L    Anion gap 4 (L) 7 - 16 mmol/L    Glucose 93 65 - 100 mg/dL    BUN 47 (H) 6 - 23 MG/DL    Creatinine 3.97 (H) 0.8 - 1.5 MG/DL    GFR est AA 21 (L) >60 ml/min/1.73m2    GFR est non-AA 17 (L) >60 ml/min/1.73m2    Calcium 9.1 8.3 - 35.8 MG/DL   METABOLIC PANEL, BASIC    Collection Time: 08/26/19  5:22 AM   Result Value Ref Range    Sodium 141 136 - 145 mmol/L    Potassium 5.1 3.5 - 5.1 mmol/L    Chloride 113 (H) 98 - 107 mmol/L    CO2 22 21 - 32 mmol/L    Anion gap 6 (L) 7 - 16 mmol/L    Glucose 86 65 - 100 mg/dL    BUN 44 (H) 6 - 23 MG/DL    Creatinine 3.75 (H) 0.8 - 1.5 MG/DL    GFR est AA 23 (L) >60 ml/min/1.73m2    GFR est non-AA 19 (L) >60 ml/min/1.73m2    Calcium 8.3 8.3 - 83.9 MG/DL   METABOLIC PANEL, BASIC    Collection Time: 08/27/19  5:10 AM   Result Value Ref Range    Sodium 142 136 - 145 mmol/L    Potassium 5.0 3.5 - 5.1 mmol/L    Chloride 115 (H) 98 - 107 mmol/L    CO2 21 21 - 32 mmol/L    Anion gap 6 (L) 7 - 16 mmol/L    Glucose 82 65 - 100 mg/dL    BUN 42 (H) 6 - 23 MG/DL    Creatinine 3.62 (H) 0.8 - 1.5 MG/DL    GFR est AA 24 (L) >60 ml/min/1.73m2    GFR est non-AA 19 (L) >60 ml/min/1.73m2    Calcium 8.3 8.3 - 10.4 MG/DL   CBC W/O DIFF    Collection Time: 08/27/19  5:10 AM   Result Value Ref Range    WBC 8.1 4.3 - 11.1 K/uL    RBC 2.66 (L) 4.23 - 5.6 M/uL    HGB 8.1 (L) 13.6 - 17.2 g/dL    HCT 25.9 (L) 41.1 - 50.3 %    MCV 97.4 79.6 - 97.8 FL    MCH 30.5 26.1 - 32.9 PG    MCHC 31.3 (L) 31.4 - 35.0 g/dL    RDW 12.6 11.9 - 14.6 %    PLATELET 177 498 - 277 K/uL    MPV 10.6 9.4 - 12.3 FL    ABSOLUTE NRBC 0.00 0.0 - 0.2 K/uL   MAGNESIUM    Collection Time: 08/27/19  5:10 AM   Result Value Ref Range    Magnesium 1.9 1.8 - 2.4 mg/dL       Functional Assessment:  Gross Assessment  AROM: Generally decreased, functional (08/21/19 1500)  PROM: Within functional limits (08/21/19 1500)  Strength: Generally decreased, functional (08/21/19 1500)  Coordination: Generally decreased, functional (08/21/19 1500)       Balance  Sitting - Static: Good (unsupported) (08/27/19 1200)  Sitting - Dynamic: Good (unsupported) (08/27/19 1200)  Standing - Static: Good (08/27/19 1200)  Standing - Dynamic : Impaired (08/27/19 1200)   Grooming  Adaptive Equipment: Electric razor (08/26/19 1349)                 Ramin Rojas Fall Risk Assessment:  Ramin Rojas Fall Risk  Mobility: Ambulates or transfers with assist devices or assistance (08/27/19 1950)  Mobility Interventions: Bed/chair exit alarm (08/27/19 1950)  Mentation: Periodic confusion (08/27/19 1950)  Mentation Interventions: Bed/chair exit alarm (08/27/19 1950)  Medication: Patient receiving anticonvulsants, sedatives(tranquilizers), psychotropics or hypnotics, hypoglycemics, narcotics, sleep aids, antihypertensives, laxatives, or diuretics (08/27/19 1950)  Medication Interventions: Bed/chair exit alarm; Patient to call before getting OOB (08/27/19 1950)  Elimination: Needs assistance with toileting (08/27/19 1950)  Elimination Interventions: Bed/chair exit alarm;Call light in reach (08/27/19 1950)  Prior Fall History: Before admission in past 12 months _home or previous inpatient care) (08/27/19 1950)  History of Falls Interventions: Door open when patient unattended (08/27/19 1950)  Total Score: 5 (08/27/19 1950)  Standard Fall Precautions: Yes (08/20/19 2023)  High Fall Risk: Yes (08/27/19 1950)     Speech Assessment:         Ambulation:  Gait  Base of Support: Narrowed (08/21/19 1500)  Speed/Aminata: Slow;Shuffled (08/21/19 1500)  Step Length: Right shortened;Left shortened (08/21/19 1500)  Stance: Right increased; Left increased (08/21/19 1500)  Gait Abnormalities: Decreased step clearance; Path deviations; Altered arm swing (08/21/19 1500)  Distance (ft): 400 Feet (ft)(150 x2) (08/27/19 1200)  Assistive Device: Cane, straight (08/27/19 1200)  Rail Use: Left (SPC in R hand) (08/26/19 1000)     Visit Vitals  /82 (BP 1 Location: Left arm, BP Patient Position: At rest)   Pulse (!) 58   Temp 98.4 °F (36.9 °C)   Resp 16   Wt 132 lb 6.4 oz (60.1 kg)   SpO2 98%   BMI 17.96 kg/m²      Intake and Output:    08/26 0701 - 08/27 1900  In: 9907 [P.O.:1720; I.V.:2500]  Out: 200 [Urine:200]    Discharge Exam:  General: Alert, affect blunted. polite  No acute cardio respiratory distress. HEENT: Normocephalic,no scleral icterus  Oral mucosa moist without cyanosis   Lungs: Clear to auscultation  bilaterally. Respiration even and unlabored   Heart: Regular rate and rhythm, S1, S2   No  murmurs, clicks, rub or gallops   Abdomen: Soft, non-tender, nondistended. Bowel sounds present. No organomegaly. Genitourinary: Benign . Neuromuscular:        Grossly no focal motor deficits noted. Moves ankles. Remains cogn impaired danielle with memory, slow processing, insight   Skin/extremity: No rashes, no erythema.  No calf tenderness BLE  No edema       Problem List as of 8/27/2019 Date Reviewed: 7/10/2019          Codes Class Noted - Resolved    Major depressive episode ICD-10-CM: F32.9  ICD-9-CM: 296.20  8/20/2019 - Present        Posterior reversible encephalopathy syndrome ICD-10-CM: I67.83  ICD-9-CM: 348.39  8/8/2019 - Present        Seizure cerebral ICD-10-CM: I67.89  ICD-9-CM: 754  8/7/2019 - Present        SAH (subarachnoid hemorrhage) ([facility] ICD-10-CM: I60.9  ICD-9-CM: 141  8/5/2019 - Present        Pneumonia due to methicillin susceptible Staphylococcus aureus (MSSA) ([facility].) ICD-10-CM: P85.107  ICD-9-CM: 482.41  7/15/2019 - Present        Tobacco abuse ICD-10-CM: Z72.0  ICD-9-CM: 305.1  7/10/2019 - Present        Acute respiratory failure with hypoxia [facility]) ICD-10-CM: J96.01  ICD-9-CM: 518.81  7/10/2019 - Present        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 [facility]) ICD-10-CM: I60.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  ICD-9-CM: 331.3  7/10/2019 - Present        IVH (intraventricular hemorrhage) ([facility].) ICD-10-CM: I61.5  ICD-9-CM: 601  7/10/2019 - Present        Seizure ([facility] ICD-10-CM: R56.9  ICD-9-CM: 780.39  7/10/2019 - Present        Elevated serum creatinine ICD-10-CM: R79.89  ICD-9-CM: 790.99  7/10/2019 - Present        Cerebral vasospasm ICD-10-CM: R43.948  ICD-9-CM: 435.9  7/10/2019 - Present        RESOLVED: Endotracheally intubated ICD-10-CM: Z97.8  ICD-9-CM: V49.89  7/10/2019 - 7/10/2019        RESOLVED: SAH (subarachnoid hemorrhage) ([facility] ICD-10-CM: I60.9  ICD-9-CM: 430  7/9/2019 - 7/10/2019              Discharge Instructions:   1. Diet: Regular Diet  2. Activity: No lifting, Driving, or Strenuous exercise until cleared by Dr Jennifer Tracy; no return to work until cleared from a medical and cognitive standpt    Current Discharge Medication List      START taking these medications    Details   ondansetron (ZOFRAN ODT) 4 mg disintegrating tablet Take 1 Tab by mouth every eight (8) hours as needed for Nausea. Qty: 16 Tab, Refills: 2      traZODone (DESYREL) 50 mg tablet Take 1 Tab by mouth nightly as needed for Sleep. Indications: insomnia associated with depression  Qty: 30 Tab, Refills: 2         CONTINUE these medications which have CHANGED    Details   atorvastatin (LIPITOR) 40 mg tablet Take 1 Tab by mouth nightly. Qty: 90 Tab, Refills: 1    Associated Diagnoses: SAH (subarachnoid hemorrhage) ([facility])      hydrALAZINE (APRESOLINE) 25 mg tablet Take 1 Tab by mouth three (3) times daily.   Qty: 90 Tab, Refills: 3    Associated Diagnoses: Posterior reversible encephalopathy syndrome      magnesium oxide (MAG-OX) 400 mg tablet Take 1 Tab by mouth two (2) times a day.  Qty: 60 Tab, Refills: 0    Associated Diagnoses: Posterior reversible encephalopathy syndrome      aspirin (ASPIRIN) 325 mg tablet Take 1 Tab by mouth daily. 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 Qty: 90 Tab, Refills: 3      levETIRAcetam 1,000 mg tablet Take 1 Tab by mouth two (2) times a day. 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 Qty: 60 Tab, Refills: 3         STOP taking these medications       famotidine (PEPCID) 20 mg tablet Comments:   Reason for Stopping:         modafinil (PROVIGIL) 200 mg tablet Comments:   Reason for Stopping:         nicotine (NICODERM CQ) 14 mg/24 hr patch Comments:   Reason for Stopping:               Rehabilitation Management:   1. Devices: None  2. Consult: Rehab team including PT, OT, recreational therapy, and , Speech therapy and Psychology    Disposition: home with Outp PT/OT/ST; will refer to psychological follow up if needed when I see him in 6 weeks. Follow-up with f/u with myself and Dr Melquiades Ulloa as scheduled . Father is seeing if PCP will follow patient as well.  He will need nephrology f/u as well.   >35min  Corrie Palencia Query

## 2019-08-28 NOTE — PROGRESS NOTES
Problem: Falls - Risk of  Goal: *Absence of Falls  Description  Document Jaylan Ramirez Fall Risk and appropriate interventions in the flowsheet. Outcome: Progressing Towards Goal  Note:   Fall Risk Interventions:  Mobility Interventions: OT consult for ADLs, Patient to call before getting OOB, PT Consult for mobility concerns, PT Consult for assist device competence, Communicate number of staff needed for ambulation/transfer    Mentation Interventions: Adequate sleep, hydration, pain control, Door open when patient unattended, Bed/chair exit alarm, Evaluate medications/consider consulting pharmacy    Medication Interventions: Bed/chair exit alarm, Evaluate medications/consider consulting pharmacy, Patient to call before getting OOB    Elimination Interventions: Bed/chair exit alarm, Call light in reach, Patient to call for help with toileting needs    History of Falls Interventions: Bed/chair exit alarm, Consult care management for discharge planning, Evaluate medications/consider consulting pharmacy         Problem: Patient Education: Go to Patient Education Activity  Goal: Patient/Family Education  Outcome: Progressing Towards Goal     Problem: Pressure Injury - Risk of  Goal: *Prevention of pressure injury  Description  Document Anselmo Scale and appropriate interventions in the flowsheet.   Outcome: Progressing Towards Goal  Note:   Pressure Injury Interventions:  Sensory Interventions: Check visual cues for pain, Discuss PT/OT consult with provider, Pressure redistribution bed/mattress (bed type), Assess changes in LOC, Monitor skin under medical devices, Keep linens dry and wrinkle-free    Moisture Interventions: Absorbent underpads, Apply protective barrier, creams and emollients    Activity Interventions: Increase time out of bed, Pressure redistribution bed/mattress(bed type), PT/OT evaluation    Mobility Interventions: Pressure redistribution bed/mattress (bed type), PT/OT evaluation    Nutrition Interventions: Document food/fluid/supplement intake    Friction and Shear Interventions: Minimize layers, Lift sheet, Foam dressings/transparent film/skin sealants                Problem: Patient Education: Go to Patient Education Activity  Goal: Patient/Family Education  Outcome: Progressing Towards Goal     Problem: Infection - Risk of, Surgical Site Infection  Goal: *Absence of surgical site infection signs and symptoms  Outcome: Progressing Towards Goal     Problem: Patient Education: Go to Patient Education Activity  Goal: Patient/Family Education  Outcome: Progressing Towards Goal     Problem: Patient Education: Go to Patient Education Activity  Goal: Patient/Family Education  Outcome: Progressing Towards Goal     Problem: Patient Education: Go to Patient Education Activity  Goal: Patient/Family Education  Description  Patient / Thi Angles family will verbalize understanding of PT safety recommendations, demonstrate appropriate assist for current functional mobility status, safety, and home exercise program by time of discharge.    Outcome: Progressing Towards Goal     Problem: Patient Education: Go to Patient Education Activity  Goal: Patient/Family Education  Outcome: Progressing Towards Goal     Problem: Nutrition Deficit  Goal: *Optimize nutritional status  Outcome: Progressing Towards Goal

## 2019-08-28 NOTE — DISCHARGE INSTRUCTIONS
DISCHARGE SUMMARY from Nurse    PATIENT INSTRUCTIONS:    After general anesthesia or intravenous sedation, for 24 hours or while taking prescription Narcotics:  · Limit your activities  · Do not drive and operate hazardous machinery  · Do not make important personal or business decisions  · Do  not drink alcoholic beverages  · If you have not urinated within 8 hours after discharge, please contact your surgeon on call. Report the following to your surgeon:  · Excessive pain, swelling, redness or odor of or around the surgical area  · Temperature over 100.5  · Nausea and vomiting lasting longer than 4 hours or if unable to take medications  · Any signs of decreased circulation or nerve impairment to extremity: change in color, persistent  numbness, tingling, coldness or increase pain  · Any questions    What to do at Home:  Recommended activity: HE requires 24/7 supervision secondary to poor safety awareness and decreased balance . If you experience any of the following symptoms of a HEART ATTACK or STROKE CALL 911 for other medical issues call PCP      *  Please give a list of your current medications to your Primary Care Provider. *  Please update this list whenever your medications are discontinued, doses are      changed, or new medications (including over-the-counter products) are added. *  Please carry medication information at all times in case of emergency situations. These are general instructions for a healthy lifestyle:    No smoking/ No tobacco products/ Avoid exposure to second hand smoke  Surgeon General's Warning:  Quitting smoking now greatly reduces serious risk to your health.     Obesity, smoking, and sedentary lifestyle greatly increases your risk for illness    A healthy diet, regular physical exercise & weight monitoring are important for maintaining a healthy lifestyle    You may be retaining fluid if you have a history of heart failure or if you experience any of the following symptoms:  Weight gain of 3 pounds or more overnight or 5 pounds in a week, increased swelling in our hands or feet or shortness of breath while lying flat in bed. Please call your doctor as soon as you notice any of these symptoms; do not wait until your next office visit. The discharge information has been reviewed with the patient and caregiver. The patient and caregiver verbalized understanding. Discharge medications reviewed with the patient and caregiver and appropriate educational materials and side effects teaching were provided. ___________________________________________________________________________________________________________________________________-no driving. No climbing/crawling.  No lifting, pushing or pulling > 5 lbs  -seek medical assistance if you development severe headache, persistent nausea and vomiting associated with headache, chest pain or severe shortness or breath, fever >101 no relieved with tylenol

## 2019-08-28 NOTE — PROGRESS NOTES
CASE MANAGEMENT DISCHARGE NOTE      Discharge Destination: Home patient discharged with Outpatient Rehabilitation    Discharge Date:  08/27/2019       DME: [address]: None     Out Patient Facility:  [facility]     STR: None

## 2019-08-28 NOTE — PROGRESS NOTES
Problem: Mobility Impaired (Adult and Pediatric)  Goal: *Therapy Goal (Edit Goal, Insert Text)  Description  STG 1. Patient transfer supine<>sit with mod I in 1 week (8/21/19 - Goal not met, pt. Making progress, cont. W/ current goal)   Outcome: Resolved/Met  Goal: *Therapy Goal (Edit Goal, Insert Text)  Description  STG 2. Patient transfer sit<>stand and perform stand pivot transfer with LRAD and supervision assist in 1 week 8/22/19 MET  LTG 2. Patient transfer sit<>stand and perform stand pivot transfer with LRAD and mod I in 2 weeks    Outcome: Resolved/Met  Goal: *Therapy Goal (Edit Goal, Insert Text)  Description  STG 3. Patient ambulate 200 feet with LRAD and CGA in 1 week 8/22/19 MET  LTG 3. Patient ambulate 400 feet with LRAD and supervision assist in 2 weeks    Outcome: Resolved/Met  Goal: *Therapy Goal (Edit Goal, Insert Text)  Description  STG 4. Patient ambulate up/down 4 steps with LRAD and min assist in 1 week 8/22/19 MET  LTG 4. Patient ambulate up/down 12 steps with LRAD and suprevision assist in 2 weeks    Outcome: Resolved/Met  Goal: *Therapy Goal (Edit Goal, Insert Text)  Description  LTG 5. Patient will be supervision w/ HEP in 2 weeks (8/21/19 - Goal not met, pt. Making progress, cont. W/ current goal)     Outcome: Resolved/Met     Problem: Patient Education: Go to Patient Education Activity  Goal: Patient/Family Education  Description  Patient / Keagan Orosco family will verbalize understanding of PT safety recommendations, demonstrate appropriate assist for current functional mobility status, safety, and home exercise program by time of discharge.    Outcome: Resolved/Met

## 2019-09-14 ENCOUNTER — HOSPITAL ENCOUNTER (OUTPATIENT)
Dept: MRI IMAGING | Age: 44
Discharge: HOME | End: 2019-09-14
Attending: NEUROLOGICAL SURGERY
Payer: COMMERCIAL

## 2019-09-14 DIAGNOSIS — I60.4 SUBARACHNOID HEMORRHAGE FROM BASILAR ARTERY ANEURYSM (HCC): ICD-10-CM

## 2019-09-14 PROCEDURE — A9575 INJ GADOTERATE MEGLUMI 0.1ML: HCPCS | Performed by: NEUROLOGICAL SURGERY

## 2019-09-14 PROCEDURE — 74011250636 HC RX REV CODE- 250/636: Performed by: NEUROLOGICAL SURGERY

## 2019-09-14 PROCEDURE — 70546 MR ANGIOGRAPH HEAD W/O&W/DYE: CPT

## 2019-09-14 RX ORDER — SODIUM CHLORIDE 0.9 % (FLUSH) 0.9 %
10 SYRINGE (ML) INJECTION
Status: COMPLETED | OUTPATIENT
Start: 2019-09-14 | End: 2019-09-14

## 2019-09-14 RX ORDER — GADOTERATE MEGLUMINE 376.9 MG/ML
13 INJECTION INTRAVENOUS
Status: COMPLETED | OUTPATIENT
Start: 2019-09-14 | End: 2019-09-14

## 2019-09-14 RX ADMIN — GADOTERATE MEGLUMINE 13 ML: 376.9 INJECTION INTRAVENOUS at 08:51

## 2019-09-14 RX ADMIN — Medication 10 ML: at 08:51

## 2019-12-17 ENCOUNTER — HOSPITAL ENCOUNTER (OUTPATIENT)
Dept: OTHER | Age: 44
Discharge: HOME | End: 2019-12-17
Attending: NEUROLOGICAL SURGERY
Payer: COMMERCIAL

## 2019-12-17 VITALS
HEART RATE: 54 BPM | BODY MASS INDEX: 21 KG/M2 | TEMPERATURE: 98.2 F | SYSTOLIC BLOOD PRESSURE: 132 MMHG | DIASTOLIC BLOOD PRESSURE: 73 MMHG | WEIGHT: 150 LBS | HEIGHT: 71 IN | OXYGEN SATURATION: 100 % | RESPIRATION RATE: 14 BRPM

## 2019-12-17 DIAGNOSIS — I67.1 CEREBRAL ANEURYSM: ICD-10-CM

## 2019-12-17 DIAGNOSIS — R93.0 ABNORMAL ANGIOGRAM OF HEAD: ICD-10-CM

## 2019-12-17 LAB
ANION GAP SERPL CALC-SCNC: 5 MMOL/L (ref 7–16)
APTT PPP: 34.4 SEC (ref 24.7–39.8)
ASPIRIN TEST, ASPIRN: 529 ARU (ref 620–672)
BASOPHILS # BLD: 0.1 K/UL (ref 0–0.2)
BASOPHILS NFR BLD: 1 % (ref 0–2)
BUN SERPL-MCNC: 58 MG/DL (ref 6–23)
CALCIUM SERPL-MCNC: 9.2 MG/DL (ref 8.3–10.4)
CHLORIDE SERPL-SCNC: 114 MMOL/L (ref 98–107)
CO2 SERPL-SCNC: 25 MMOL/L (ref 21–32)
CREAT SERPL-MCNC: 4.97 MG/DL (ref 0.8–1.5)
DIFFERENTIAL METHOD BLD: ABNORMAL
EOSINOPHIL # BLD: 0.3 K/UL (ref 0–0.8)
EOSINOPHIL NFR BLD: 4 % (ref 0.5–7.8)
ERYTHROCYTE [DISTWIDTH] IN BLOOD BY AUTOMATED COUNT: 11.8 % (ref 11.9–14.6)
GLUCOSE SERPL-MCNC: 88 MG/DL (ref 65–100)
HCT VFR BLD AUTO: 36.6 % (ref 41.1–50.3)
HGB BLD-MCNC: 11.4 G/DL (ref 13.6–17.2)
IMM GRANULOCYTES # BLD AUTO: 0 K/UL (ref 0–0.5)
IMM GRANULOCYTES NFR BLD AUTO: 0 % (ref 0–5)
INR PPP: 0.9
LYMPHOCYTES # BLD: 1.5 K/UL (ref 0.5–4.6)
LYMPHOCYTES NFR BLD: 20 % (ref 13–44)
MCH RBC QN AUTO: 30 PG (ref 26.1–32.9)
MCHC RBC AUTO-ENTMCNC: 31.1 G/DL (ref 31.4–35)
MCV RBC AUTO: 96.3 FL (ref 79.6–97.8)
MONOCYTES # BLD: 0.6 K/UL (ref 0.1–1.3)
MONOCYTES NFR BLD: 8 % (ref 4–12)
NEUTS SEG # BLD: 5 K/UL (ref 1.7–8.2)
NEUTS SEG NFR BLD: 67 % (ref 43–78)
NRBC # BLD: 0 K/UL (ref 0–0.2)
P2Y12 PLT RESPONSE,PPPR: 261 PRU
PLATELET # BLD AUTO: 165 K/UL (ref 150–450)
PMV BLD AUTO: 12.3 FL (ref 9.4–12.3)
POTASSIUM SERPL-SCNC: 4.5 MMOL/L (ref 3.5–5.1)
PROTHROMBIN TIME: 12.1 SEC (ref 11.7–14.5)
RBC # BLD AUTO: 3.8 M/UL (ref 4.23–5.6)
SODIUM SERPL-SCNC: 144 MMOL/L (ref 136–145)
WBC # BLD AUTO: 7.5 K/UL (ref 4.3–11.1)

## 2019-12-17 PROCEDURE — C1769 GUIDE WIRE: HCPCS

## 2019-12-17 PROCEDURE — 74011636320 HC RX REV CODE- 636/320: Performed by: NEUROLOGICAL SURGERY

## 2019-12-17 PROCEDURE — 36224 PLACE CATH CAROTD ART: CPT | Performed by: NEUROLOGICAL SURGERY

## 2019-12-17 PROCEDURE — 85610 PROTHROMBIN TIME: CPT

## 2019-12-17 PROCEDURE — C1760 CLOSURE DEV, VASC: HCPCS

## 2019-12-17 PROCEDURE — 85576 BLOOD PLATELET AGGREGATION: CPT

## 2019-12-17 PROCEDURE — 77030012468 HC VLV BLEEDBK CNTRL ABBT -B

## 2019-12-17 PROCEDURE — 76376 3D RENDER W/INTRP POSTPROCES: CPT | Performed by: NEUROLOGICAL SURGERY

## 2019-12-17 PROCEDURE — C1887 CATHETER, GUIDING: HCPCS

## 2019-12-17 PROCEDURE — 80048 BASIC METABOLIC PNL TOTAL CA: CPT

## 2019-12-17 PROCEDURE — C1894 INTRO/SHEATH, NON-LASER: HCPCS

## 2019-12-17 PROCEDURE — 85730 THROMBOPLASTIN TIME PARTIAL: CPT

## 2019-12-17 PROCEDURE — 74011000250 HC RX REV CODE- 250: Performed by: NEUROLOGICAL SURGERY

## 2019-12-17 PROCEDURE — 85025 COMPLETE CBC W/AUTO DIFF WBC: CPT

## 2019-12-17 PROCEDURE — 36226 PLACE CATH VERTEBRAL ART: CPT

## 2019-12-17 PROCEDURE — 74011250636 HC RX REV CODE- 250/636: Performed by: NEUROLOGICAL SURGERY

## 2019-12-17 RX ORDER — ONDANSETRON 2 MG/ML
4 INJECTION INTRAMUSCULAR; INTRAVENOUS
Status: DISCONTINUED | OUTPATIENT
Start: 2019-12-17 | End: 2019-12-21 | Stop reason: HOSPADM

## 2019-12-17 RX ORDER — VERAPAMIL HYDROCHLORIDE 2.5 MG/ML
5-10 INJECTION, SOLUTION INTRAVENOUS
Status: DISCONTINUED | OUTPATIENT
Start: 2019-12-17 | End: 2019-12-17

## 2019-12-17 RX ORDER — LIDOCAINE HYDROCHLORIDE 20 MG/ML
2-20 INJECTION, SOLUTION INFILTRATION; PERINEURAL ONCE
Status: COMPLETED | OUTPATIENT
Start: 2019-12-17 | End: 2019-12-17

## 2019-12-17 RX ORDER — MIDAZOLAM HYDROCHLORIDE 1 MG/ML
.5-2 INJECTION, SOLUTION INTRAMUSCULAR; INTRAVENOUS
Status: DISCONTINUED | OUTPATIENT
Start: 2019-12-17 | End: 2019-12-21 | Stop reason: HOSPADM

## 2019-12-17 RX ORDER — ACETAMINOPHEN 325 MG/1
650 TABLET ORAL
Status: DISCONTINUED | OUTPATIENT
Start: 2019-12-17 | End: 2019-12-21 | Stop reason: HOSPADM

## 2019-12-17 RX ORDER — HEPARIN SODIUM 200 [USP'U]/100ML
1000 INJECTION, SOLUTION INTRAVENOUS CONTINUOUS
Status: DISCONTINUED | OUTPATIENT
Start: 2019-12-17 | End: 2019-12-21 | Stop reason: HOSPADM

## 2019-12-17 RX ORDER — FENTANYL CITRATE 50 UG/ML
25-100 INJECTION, SOLUTION INTRAMUSCULAR; INTRAVENOUS
Status: DISCONTINUED | OUTPATIENT
Start: 2019-12-17 | End: 2019-12-21 | Stop reason: HOSPADM

## 2019-12-17 RX ORDER — SODIUM CHLORIDE 9 MG/ML
1000 INJECTION, SOLUTION INTRAVENOUS ONCE
Status: COMPLETED | OUTPATIENT
Start: 2019-12-17 | End: 2019-12-17

## 2019-12-17 RX ORDER — EPTIFIBATIDE 0.75 MG/ML
2 INJECTION, SOLUTION INTRAVENOUS CONTINUOUS
Status: DISCONTINUED | OUTPATIENT
Start: 2019-12-17 | End: 2019-12-17

## 2019-12-17 RX ADMIN — FENTANYL CITRATE 50 MCG: 50 INJECTION INTRAMUSCULAR; INTRAVENOUS at 09:44

## 2019-12-17 RX ADMIN — MIDAZOLAM 1 MG: 1 INJECTION INTRAMUSCULAR; INTRAVENOUS at 09:44

## 2019-12-17 RX ADMIN — IOPAMIDOL 100 ML: 612 INJECTION, SOLUTION INTRAVENOUS at 10:14

## 2019-12-17 RX ADMIN — FENTANYL CITRATE 25 MCG: 50 INJECTION INTRAMUSCULAR; INTRAVENOUS at 09:57

## 2019-12-17 RX ADMIN — LIDOCAINE HYDROCHLORIDE 100 MG: 20 INJECTION, SOLUTION INFILTRATION; PERINEURAL at 09:57

## 2019-12-17 RX ADMIN — SODIUM CHLORIDE 1000 ML: 900 INJECTION, SOLUTION INTRAVENOUS at 11:22

## 2019-12-17 RX ADMIN — HEPARIN SODIUM 8000 UNITS: 200 INJECTION, SOLUTION INTRAVENOUS at 10:20

## 2019-12-17 NOTE — H&P
History and Physical    Patient: Diya Estevez MRN: [id number]  SSN: [id number]    YOB: 1975  Age: 53 y.o. Sex: male      Subjective:      Diya Estevez is a 53 y.o. 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 He is on ASA and Plavix. He is also still smoking some cigarettes. No past medical history on file. Past Surgical History:   Procedure Laterality Date    IR EMBOLI INTRACRAN LT  7/11/2019      No family history on file. Social History     Tobacco Use    Smoking status: Former Smoker    Smokeless tobacco: Never Used   Substance Use Topics    Alcohol use: Not on file      Prior to Admission medications    Medication Sig Start Date End Date Taking? Authorizing Provider   atorvastatin (LIPITOR) 40 mg tablet Take 1 Tab by mouth nightly. 8/27/19  Yes Corrie Mendez MD   hydrALAZINE (APRESOLINE) 25 mg tablet Take 1 Tab by mouth three (3) times daily. 8/27/19  Yes Corrie Mendez MD   magnesium oxide (MAG-OX) 400 mg tablet Take 1 Tab by mouth two (2) times a day. 8/27/19  Yes Corrie Mendez MD   aspirin (ASPIRIN) 325 mg tablet Take 1 Tab by mouth daily. 8/27/19  Yes Corrie Mendez MD   clopidogrel (PLAVIX) 75 mg tab Take 1 Tab by mouth daily. 8/27/19  Yes Corrie Mendez MD   citalopram (CELEXA) 20 mg tablet Take 1 Tab by mouth daily. 8/28/19  Yes Corrie Mendez MD   levETIRAcetam 1,000 mg tablet Take 1 Tab by mouth two (2) times a day. 8/27/19  Yes Corrie Mendez MD   metoprolol tartrate (LOPRESSOR) 100 mg IR tablet Take 1 Tab by mouth two (2) times a day. 8/27/19  Yes Corrie Mendez MD   ondansetron (ZOFRAN ODT) 4 mg disintegrating tablet Take 1 Tab by mouth every eight (8) hours as needed for Nausea.  8/27/19  Yes Corrie Mendez MD   traZODone (DESYREL) 50 mg tablet Take 1 Tab by mouth nightly as needed for Sleep. Indications: insomnia associated with depression 8/27/19  Yes Corrie Duke MD        No Known Allergies    Review of Systems:  Constitutional: well nourished male in NAD  Eyes:  no change in visual acuity, no photophobia  Ears, nose, mouth, throat, and face: no  Odynphagia, dysphagia, no thrush or exudate, negative for chronic sinus congestion, recurrent headaches  Respiratory: negative for SOB, hemoptysis or cough  Cardiovascular: negative for CP, palpitations, or PND  Gastrointestinal: negative for abdominal pain, no hematemesis, hematochezia or BRBPR  Genitourinary: no urgency, frequency, or dysuria, no nocturia  Integument/breast: negative for skin rash or skin lesions  Hematologic/lymphatic: negative for known bleeding disorder  Musculoskeletal:negative for joint pain or joint tenderness  Neurological: negative for lightheadedness, syncope or presyncopal events, no seizure or CVA history  Behavioral/Psych: negative for depression or chronic anxiety,   Endocrine: negative for polydyspia, polyuria or intolerance to heat or cold  Allergic/Immunologic: negative for chronic allergic rhinitis, or known connective tissue disorder        Objective:     Vitals:    12/17/19 0843   BP: 168/84   Pulse: 64   Resp: 16   Temp: 98.2 °F (36.8 °C)   Weight: 150 lb (68 kg)   Height: 5' 11\" (1.803 m)        Physical Exam:  General:  Alert, cooperative, no distress, appears stated age. HEENT: Supple, symmetrical, trachea midline. Lungs:   Clear to auscultation bilaterally. Heart:  Regular rate and rhythm, S1, S2 normal, no murmur, click, rub or gallop. Abdomen:   Soft, non-tender. Bowel sounds normal. No masses,  No organomegaly. Extremities: Extremities normal, atraumatic, no cyanosis or edema. Pulses: 2+ and symmetric all extremities. Skin: Skin color, texture, turgor normal. No rashes or lesions   Neurologic: CNII-XII intact. Normal strength, sensation and reflexes throughout.        Assessment: Hospital Problems  Date Reviewed: 9/18/2019    None          Plan:     Patient here for follow up angiogram after stent assisted coiling in 7/2019. I discussed the benefits, alternatives, and risks with the patient. Consent was signed.      Signed By: Benjamin Burroughs MD     December 17, 2019

## 2019-12-17 NOTE — OP NOTES
Procedure: Cerebral angiogram  Surgeon: Dr. 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

## 2019-12-17 NOTE — PROGRESS NOTES
IR Nurse Pre-Procedure Checklist Part 2      Pt to biplane room via stretcher.     Consent signed: Yes    H&P complete:  Yes    Antibiotics: Not applicable    Airway/Mallampati Done: Yes    Shaved: Yes    Pregnancy Form:Not applicable    Patient Position: Yes    MD Side: Yes     Biopsy Worksheet: No    Specimen Medium: Not applicable

## 2019-12-17 NOTE — DISCHARGE INSTRUCTIONS
[address]  Department of Interventional Radiology  [facility]   [phone] Office  [phone] FAX  Angiography Discharge Instructions    General Information: This test is done to evaluate circulation in the extremities. In the case of a stenosis (narrowing) of the arteries, we can sometimes fix the problem either with a balloon, stent, or a combination. If there is a stenosis that we cannot fix or if there is no problem seen on the angiography study, then you will be able to go home today. If there is something that we can fix, you will be spending the night in the hospital.  If we do find a stenosis that we are not able to correct, then the study will be forwarded to the vascular surgeon, who can take you to the operating room to improve the circulation. You will be asked to lie flat on your back for 3-6 hours after the procedure to prevent bleeding complications. Sometimes the physician will use an arterial closure device that will decrease your recovery time. If this is used, your nurse will explain the difference in recovery. We have no way to determine if we can use a closure device until the procedure is started. We will let you know when you wake up after the procedure. Home Care Instructions: You can resume your regular diet. Do not shower, bathe, swim, drink alcohol, or make any important legal decisions in the next 48 hours. You may drive after 24 hours. Do not lift anything heavier than a gallon of milk for 5 days. Watch the site carefully for signs of infection, like fever, drainage, redness, and/or swelling. If you take Glucophage (Metformin) for diabetes, do not take it for the next 48 hours. If you were asked to hold any blood thinners prior to the procedure, you may restart that medicine the day after the procedure is completed. Recline your car seat for the ride home.     Call If: You should call your Physician and/or the Radiology Nurse if you have any signs of infection like fever, drainage, redness, and/or swelling. If the puncture site should ooze, please call. Also call if you have any pain, decreased sensation, numbness, tingling, swelling, or change in color to the affected extremity. SEEK IMMEDIATE MEDICAL CARE IF YOUR PUNCTURE SITE STARTS TO BLEED. APPLY ENOUGH FIRM PRESSURE TO THE SITE WITH THE TIPS OF YOUR FINGERS TO STOP THE BLEEDING. Arterial bleeding is a medical emergency and should be evaluated immediately. Follow-Up Instructions:  Please see your ordering doctor as he/she has requested. To Reach Us: If you have any questions about your procedure, please call the Interventional Radiology department at [phone]. After business hours (5pm) and weekends, call the answering service at [phone] and ask for the Radiologist on call to be paged. Interventional Radiology General Nurse Discharge    After general anesthesia or intravenous sedation, for 24 hours or while taking prescription Narcotics:  · Limit your activities  · Do not drive and operate hazardous machinery  · Do not make important personal or business decisions  · Do  not drink alcoholic beverages  · If you have not urinated within 8 hours after discharge, please contact your surgeon on call. * Please give a list of your current medications to your Primary Care Provider. * Please update this list whenever your medications are discontinued, doses are     changed, or new medications (including over-the-counter products) are added. * Please carry medication information at all times in case of emergency situations. These are general instructions for a healthy lifestyle:    No smoking/ No tobacco products/ Avoid exposure to second hand smoke  Surgeon General's Warning:  Quitting smoking now greatly reduces serious risk to your health.     Obesity, smoking, and sedentary lifestyle greatly increases your risk for illness  A healthy diet, regular physical exercise & weight monitoring are important for maintaining a healthy lifestyle    You may be retaining fluid if you have a history of heart failure or if you experience any of the following symptoms:  Weight gain of 3 pounds or more overnight or 5 pounds in a week, increased swelling in our hands or feet or shortness of breath while lying flat in bed. Please call your doctor as soon as you notice any of these symptoms; do not wait until your next office visit. Recognize signs and symptoms of STROKE:  F-face looks uneven    A-arms unable to move or move unevenly    S-speech slurred or non-existent    T-time-call 911 as soon as signs and symptoms begin-DO NOT go       Back to bed or wait to see if you get better-TIME IS BRAIN.     Patient Signature:  Date: 12/17/2019  Discharging Nurse: Micaela Rose RN

## 2020-06-23 ENCOUNTER — HOSPITAL ENCOUNTER (OUTPATIENT)
Dept: OTHER | Age: 45
Discharge: HOME | End: 2020-06-23
Attending: NEUROLOGICAL SURGERY
Payer: COMMERCIAL

## 2020-06-23 VITALS
BODY MASS INDEX: 21 KG/M2 | HEART RATE: 56 BPM | RESPIRATION RATE: 16 BRPM | SYSTOLIC BLOOD PRESSURE: 180 MMHG | TEMPERATURE: 98.2 F | HEIGHT: 71 IN | DIASTOLIC BLOOD PRESSURE: 77 MMHG | WEIGHT: 150 LBS | OXYGEN SATURATION: 100 %

## 2020-06-23 DIAGNOSIS — R93.0 ABNORMAL ANGIOGRAM OF HEAD: ICD-10-CM

## 2020-06-23 DIAGNOSIS — I67.1 CEREBRAL ANEURYSM: ICD-10-CM

## 2020-06-23 LAB
ANION GAP SERPL CALC-SCNC: 6 MMOL/L (ref 7–16)
APTT PPP: 33.1 SEC (ref 24.3–35.4)
ASPIRIN TEST, ASPIRN: 628 ARU (ref 620–672)
BUN SERPL-MCNC: 79 MG/DL (ref 6–23)
CALCIUM SERPL-MCNC: 9 MG/DL (ref 8.3–10.4)
CHLORIDE SERPL-SCNC: 114 MMOL/L (ref 98–107)
CO2 SERPL-SCNC: 21 MMOL/L (ref 21–32)
CREAT SERPL-MCNC: 7.27 MG/DL (ref 0.8–1.5)
ERYTHROCYTE [DISTWIDTH] IN BLOOD BY AUTOMATED COUNT: 12.4 % (ref 11.9–14.6)
GLUCOSE SERPL-MCNC: 94 MG/DL (ref 65–100)
HCT VFR BLD AUTO: 30.5 % (ref 41.1–50.3)
HGB BLD-MCNC: 9.8 G/DL (ref 13.6–17.2)
INR PPP: 0.9
MCH RBC QN AUTO: 30.1 PG (ref 26.1–32.9)
MCHC RBC AUTO-ENTMCNC: 32.1 G/DL (ref 31.4–35)
MCV RBC AUTO: 93.6 FL (ref 79.6–97.8)
NRBC # BLD: 0 K/UL (ref 0–0.2)
PLATELET # BLD AUTO: 195 K/UL (ref 150–450)
PMV BLD AUTO: 11.2 FL (ref 9.4–12.3)
POTASSIUM SERPL-SCNC: 5.8 MMOL/L (ref 3.5–5.1)
PROTHROMBIN TIME: 12 SEC (ref 12–14.7)
RBC # BLD AUTO: 3.26 M/UL (ref 4.23–5.6)
SODIUM SERPL-SCNC: 141 MMOL/L (ref 136–145)
WBC # BLD AUTO: 8.2 K/UL (ref 4.3–11.1)

## 2020-06-23 PROCEDURE — 77030022017 HC DRSG HEMO QCLOT ZMED -A

## 2020-06-23 PROCEDURE — C1769 GUIDE WIRE: HCPCS

## 2020-06-23 PROCEDURE — 74011250636 HC RX REV CODE- 250/636: Performed by: NEUROLOGICAL SURGERY

## 2020-06-23 PROCEDURE — 77030012468 HC VLV BLEEDBK CNTRL ABBT -B

## 2020-06-23 PROCEDURE — 36226 PLACE CATH VERTEBRAL ART: CPT | Performed by: NEUROLOGICAL SURGERY

## 2020-06-23 PROCEDURE — 74011636320 HC RX REV CODE- 636/320: Performed by: NEUROLOGICAL SURGERY

## 2020-06-23 PROCEDURE — 85027 COMPLETE CBC AUTOMATED: CPT

## 2020-06-23 PROCEDURE — C1894 INTRO/SHEATH, NON-LASER: HCPCS

## 2020-06-23 PROCEDURE — 85730 THROMBOPLASTIN TIME PARTIAL: CPT

## 2020-06-23 PROCEDURE — 36226 PLACE CATH VERTEBRAL ART: CPT

## 2020-06-23 PROCEDURE — 74011000250 HC RX REV CODE- 250: Performed by: NEUROLOGICAL SURGERY

## 2020-06-23 PROCEDURE — 76376 3D RENDER W/INTRP POSTPROCES: CPT | Performed by: NEUROLOGICAL SURGERY

## 2020-06-23 PROCEDURE — 85576 BLOOD PLATELET AGGREGATION: CPT

## 2020-06-23 PROCEDURE — 85610 PROTHROMBIN TIME: CPT

## 2020-06-23 PROCEDURE — 80048 BASIC METABOLIC PNL TOTAL CA: CPT

## 2020-06-23 PROCEDURE — C1887 CATHETER, GUIDING: HCPCS

## 2020-06-23 RX ORDER — ONDANSETRON 2 MG/ML
4 INJECTION INTRAMUSCULAR; INTRAVENOUS
Status: DISCONTINUED | OUTPATIENT
Start: 2020-06-23 | End: 2020-06-27 | Stop reason: HOSPADM

## 2020-06-23 RX ORDER — HEPARIN SODIUM 200 [USP'U]/100ML
1000 INJECTION, SOLUTION INTRAVENOUS AS NEEDED
Status: DISCONTINUED | OUTPATIENT
Start: 2020-06-23 | End: 2020-06-27 | Stop reason: HOSPADM

## 2020-06-23 RX ORDER — MIDAZOLAM HYDROCHLORIDE 1 MG/ML
.5-2 INJECTION, SOLUTION INTRAMUSCULAR; INTRAVENOUS
Status: DISCONTINUED | OUTPATIENT
Start: 2020-06-23 | End: 2020-06-27 | Stop reason: HOSPADM

## 2020-06-23 RX ORDER — FENTANYL CITRATE 50 UG/ML
25-50 INJECTION, SOLUTION INTRAMUSCULAR; INTRAVENOUS
Status: DISCONTINUED | OUTPATIENT
Start: 2020-06-23 | End: 2020-06-27 | Stop reason: HOSPADM

## 2020-06-23 RX ORDER — ONDANSETRON 2 MG/ML
8 INJECTION INTRAMUSCULAR; INTRAVENOUS
Status: COMPLETED | OUTPATIENT
Start: 2020-06-23 | End: 2020-06-23

## 2020-06-23 RX ORDER — SODIUM CHLORIDE 9 MG/ML
1000 INJECTION, SOLUTION INTRAVENOUS ONCE
Status: DISCONTINUED | OUTPATIENT
Start: 2020-06-23 | End: 2020-06-24 | Stop reason: HOSPADM

## 2020-06-23 RX ORDER — ONDANSETRON 2 MG/ML
4 INJECTION INTRAMUSCULAR; INTRAVENOUS
Status: DISCONTINUED | OUTPATIENT
Start: 2020-06-23 | End: 2020-06-23

## 2020-06-23 RX ORDER — SODIUM CHLORIDE 9 MG/ML
25 INJECTION, SOLUTION INTRAVENOUS CONTINUOUS
Status: DISCONTINUED | OUTPATIENT
Start: 2020-06-23 | End: 2020-06-27 | Stop reason: HOSPADM

## 2020-06-23 RX ORDER — LIDOCAINE HYDROCHLORIDE 20 MG/ML
2-20 INJECTION, SOLUTION INFILTRATION; PERINEURAL ONCE
Status: COMPLETED | OUTPATIENT
Start: 2020-06-23 | End: 2020-06-23

## 2020-06-23 RX ORDER — ACETAMINOPHEN 325 MG/1
650 TABLET ORAL
Status: DISCONTINUED | OUTPATIENT
Start: 2020-06-23 | End: 2020-06-27 | Stop reason: HOSPADM

## 2020-06-23 RX ORDER — SODIUM CHLORIDE 9 MG/ML
150 INJECTION, SOLUTION INTRAVENOUS CONTINUOUS
Status: ACTIVE | OUTPATIENT
Start: 2020-06-23 | End: 2020-06-23

## 2020-06-23 RX ADMIN — LIDOCAINE HYDROCHLORIDE 150 MG: 20 INJECTION, SOLUTION INFILTRATION; PERINEURAL at 13:31

## 2020-06-23 RX ADMIN — IOPAMIDOL 100 ML: 612 INJECTION, SOLUTION INTRAVENOUS at 13:50

## 2020-06-23 RX ADMIN — SODIUM CHLORIDE 25 ML/HR: 9 INJECTION, SOLUTION INTRAVENOUS at 13:10

## 2020-06-23 RX ADMIN — ONDANSETRON 8 MG: 2 INJECTION INTRAMUSCULAR; INTRAVENOUS at 12:55

## 2020-06-23 RX ADMIN — FENTANYL CITRATE 50 MCG: 50 INJECTION, SOLUTION INTRAMUSCULAR; INTRAVENOUS at 13:23

## 2020-06-23 RX ADMIN — MIDAZOLAM 1 MG: 1 INJECTION INTRAMUSCULAR; INTRAVENOUS at 13:23

## 2020-06-23 RX ADMIN — HEPARIN SODIUM 2000 UNITS: 200 INJECTION, SOLUTION INTRAVENOUS at 13:25

## 2020-06-23 RX ADMIN — FENTANYL CITRATE 50 MCG: 50 INJECTION, SOLUTION INTRAMUSCULAR; INTRAVENOUS at 13:33

## 2020-06-23 NOTE — H&P
History and Physical    Patient: Maggi Swenson MRN: [id number]  SSN: [id number]    YOB: 1975  Age: 53 y.o. Sex: male      Subjective:      Maggi Swenson is a 53 y.o. 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 He remains on BP med and ASA. He says is doing much better than he was in clinic. He looks better overall. Smoking 1-2 cigarettes a week. Past Medical History:   Diagnosis Date    Aneurysm ([address]     Hypertension      Past Surgical History:   Procedure Laterality Date    IR EMBOLI INTRACRAN LT  7/11/2019      History reviewed. No pertinent family history. Social History     Tobacco Use    Smoking status: Former Smoker    Smokeless tobacco: Never Used   Substance Use Topics    Alcohol use: Not on file      Prior to Admission medications    Medication Sig Start Date End Date Taking? Authorizing Provider   aspirin (ASPIRIN) 325 mg tablet Take 1 Tab by mouth daily. 8/27/19  Yes Corrie Casper MD   atorvastatin (LIPITOR) 40 mg tablet Take 1 Tab by mouth nightly. 8/27/19   Corrie Casper MD   hydrALAZINE (APRESOLINE) 25 mg tablet Take 1 Tab by mouth three (3) times daily. 8/27/19   Corrie Casper MD   magnesium oxide (MAG-OX) 400 mg tablet Take 1 Tab by mouth two (2) times a day. 8/27/19   Corrie Casper MD   citalopram (CELEXA) 20 mg tablet Take 1 Tab by mouth daily. 8/28/19   Corrie Casper MD   levETIRAcetam 1,000 mg tablet Take 1 Tab by mouth two (2) times a day. 8/27/19   Corrie Casper MD   metoprolol tartrate (LOPRESSOR) 100 mg IR tablet Take 1 Tab by mouth two (2) times a day. 8/27/19   Corrie Casper MD   ondansetron (ZOFRAN ODT) 4 mg disintegrating tablet Take 1 Tab by mouth every eight (8) hours as needed for Nausea. 8/27/19   Corrie Casper MD   traZODone (DESYREL) 50 mg tablet Take 1 Tab by mouth nightly as needed for Sleep.  Indications: insomnia associated with depression 8/27/19   Corrie Castaneda MD        No Known Allergies    Review of Systems:  Constitutional: well nourished male in NAD  Eyes:  no change in visual acuity, no photophobia  Ears, nose, mouth, throat, and face: no  Odynphagia, dysphagia, no thrush or exudate, negative for chronic sinus congestion, recurrent headaches  Respiratory: negative for SOB, hemoptysis or cough  Cardiovascular: negative for CP, palpitations, or PND  Gastrointestinal: negative for abdominal pain, no hematemesis, hematochezia or BRBPR  Genitourinary: no urgency, frequency, or dysuria, no nocturia  Integument/breast: negative for skin rash or skin lesions  Hematologic/lymphatic: negative for known bleeding disorder  Musculoskeletal:negative for joint pain or joint tenderness  Neurological: negative for lightheadedness, syncope or presyncopal events, no seizure or CVA history  Behavioral/Psych: negative for depression or chronic anxiety,   Endocrine: negative for polydyspia, polyuria or intolerance to heat or cold  Allergic/Immunologic: negative for chronic allergic rhinitis, or known connective tissue disorder        Objective:     Vitals:    06/23/20 1248   BP: (!) 198/94   Pulse: 74   Resp: 16   Temp: 98.2 °F (36.8 °C)   SpO2: 100%   Weight: 150 lb (68 kg)   Height: 5' 11\" (1.803 m)        Physical Exam:  General:  Alert, cooperative, no distress, appears stated age. HEENT: Supple, symmetrical, trachea midline, no adenopathy, thyroid: no enlargment/tenderness/nodules, no carotid bruit and no JVD. Lungs:   Clear to auscultation bilaterally. Heart:  Regular rate and rhythm, S1, S2 normal, no murmur, click, rub or gallop. Abdomen:   Soft, non-tender. Bowel sounds normal. No masses,  No organomegaly. Extremities: Extremities normal, atraumatic, no cyanosis or edema. Pulses: 2+ and symmetric all extremities.    Skin: Skin color, texture, turgor normal. No rashes or lesions   Neurologic: CNII-XII intact. Normal strength, sensation and reflexes throughout. Assessment:     Hospital Problems  Date Reviewed: 9/18/2019    None          Plan:     I discussed the procedure with the patient. Consent was obtained. His questions were answered.     Signed By: Gloria Chawla MD     June 23, 2020

## 2020-06-23 NOTE — PROGRESS NOTES
TRANSFER - OUT REPORT:    Verbal report given to JOHANNA Carpenter on [employer]  being transferred to IR prep room 3 for routine post - op       Report consisted of patients Situation, Background, Assessment and   Recommendations(SBAR). Information from the following report(s) SBAR, Kardex, Procedure Summary and MAR was reviewed with the receiving nurse. Lines:   Peripheral IV 06/23/20 Right Hand (Active)        Opportunity for questions and clarification was provided. No closure device used to right groin.

## 2020-06-23 NOTE — OP NOTES
Procedure: Cerebral angiogram  Surgeon: Dr. 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

## 2022-03-14 NOTE — PROGRESS NOTES
22.7 A follow up visit was made to the patient. Emotional support, spiritual presence and   prayer were provided. The patient was awake and had his eyes open.         [employer]

## 2022-03-18 PROBLEM — I67.848 CEREBRAL VASOSPASM: Status: ACTIVE | Noted: 2019-07-10

## 2022-03-18 PROBLEM — Z72.0 TOBACCO ABUSE: Status: ACTIVE | Noted: 2019-07-10

## 2022-03-18 PROBLEM — I60.9 SAH (SUBARACHNOID HEMORRHAGE) (HCC): Status: ACTIVE | Noted: 2019-08-05

## 2022-03-19 PROBLEM — R56.9 SEIZURE (HCC): Status: ACTIVE | Noted: 2019-07-10

## 2022-03-19 PROBLEM — I61.5 IVH (INTRAVENTRICULAR HEMORRHAGE) (HCC): Status: ACTIVE | Noted: 2019-07-10

## 2022-03-19 PROBLEM — R79.89 ELEVATED SERUM CREATININE: Status: ACTIVE | Noted: 2019-07-10

## 2022-03-19 PROBLEM — I67.83 POSTERIOR REVERSIBLE ENCEPHALOPATHY SYNDROME: Status: ACTIVE | Noted: 2019-08-08

## 2022-03-19 PROBLEM — J15.211 PNEUMONIA DUE TO METHICILLIN SUSCEPTIBLE STAPHYLOCOCCUS AUREUS (MSSA) (HCC): Status: ACTIVE | Noted: 2019-07-15

## 2022-03-20 PROBLEM — J96.01 ACUTE RESPIRATORY FAILURE WITH HYPOXIA (HCC): Status: ACTIVE | Noted: 2019-07-10

## 2022-03-20 PROBLEM — G91.0 COMMUNICATING HYDROCEPHALUS (HCC): Status: ACTIVE | Noted: 2019-07-10

## 2022-03-20 PROBLEM — F32.9 MAJOR DEPRESSIVE EPISODE: Status: ACTIVE | Noted: 2019-08-20

## 2022-03-20 PROBLEM — G40.909 SEIZURE CEREBRAL (HCC): Status: ACTIVE | Noted: 2019-08-07

## 2022-03-20 PROBLEM — I60.4 SUBARACHNOID HEMORRHAGE FROM BASILAR ARTERY ANEURYSM (HCC): Status: ACTIVE | Noted: 2019-07-10

## 2022-10-18 NOTE — DISCHARGE INSTRUCTIONS
[address] [facility],[address]Th Floor  Cerebrovascular and Stroke Center            Cerebral Angiography Discharge Instructions    General Information: This test is done to evaluate the blood vessels in your brain and neck. Following the procedure, you will be asked to lie flat on your back for 2-6 hours after the procedure to prevent bleeding complications. The physician may use an arterial closure device that will decrease your recovery time. If this is used, your nurse will explain the difference in recovery. We have no way to determine if we can use a closure device until the procedure is started. We will let you know when you wake up after the procedure. Home Care Instructions: You can resume your regular diet. Do not shower, bathe, swim, drink alcohol, or make any important legal decisions in the next 48 hours. You may drive after 24 hours. Do not lift anything heavier than a gallon of milk for 2 days. Watch the site carefully for signs of infection, like fever, drainage, redness, and/or swelling. If you take Glucophage (Metformin) for diabetes, do not take it for the next 48 hours. If you were asked to hold any blood thinners prior to the procedure, you may restart that medicine the day after the procedure is completed or when instructed by your physician. Recline your car seat for the ride home. Call If: You should call your Physician and/or the Radiology Nurse if you have any signs of infection like fever, drainage, redness, and/or swelling. If the puncture site should ooze, please call. Also call if you have any pain, decreased sensation, numbness, tingling, swelling, or change in color to the affected extremity. SEEK IMMEDIATE MEDICAL CARE IF YOUR PUNCTURE SITE STARTS TO BLEED. APPLY ENOUGH FIRM PRESSURE TO THE SITE WITH THE TIPS OF YOUR FINGERS TO STOP THE BLEEDING. Arterial bleeding is a medical emergency and should be evaluated immediately.     Follow-Up Instructions:  Please follow up with your ordering doctor as he/she has requested. To Reach Us: If you have any questions about your procedure, please call Keyshawn Evans NP at [phone]. Interventional Radiology General Nurse Discharge    After general anesthesia or intravenous sedation, for 24 hours or while taking prescription Narcotics:  · Limit your activities  · Do not drive and operate hazardous machinery  · Do not make important personal or business decisions  · Do  not drink alcoholic beverages  · If you have not urinated within 8 hours after discharge, please contact your surgeon on call. * Please give a list of your current medications to your Primary Care Provider. * Please update this list whenever your medications are discontinued, doses are     changed, or new medications (including over-the-counter products) are added. * Please carry medication information at all times in case of emergency situations. These are general instructions for a healthy lifestyle:    No smoking/ No tobacco products/ Avoid exposure to second hand smoke  Surgeon General's Warning:  Quitting smoking now greatly reduces serious risk to your health. Obesity, smoking, and sedentary lifestyle greatly increases your risk for illness  A healthy diet, regular physical exercise & weight monitoring are important for maintaining a healthy lifestyle    You may be retaining fluid if you have a history of heart failure or if you experience any of the following symptoms:  Weight gain of 3 pounds or more overnight or 5 pounds in a week, increased swelling in our hands or feet or shortness of breath while lying flat in bed. Please call your doctor as soon as you notice any of these symptoms; do not wait until your next office visit.     Recognize signs and symptoms of STROKE:  F-face looks uneven    A-arms unable to move or move unevenly    S-speech slurred or non-existent    T-time-call 911 as soon as signs and symptoms begin-DO NOT go       Back to bed or wait to see if you get better-TIME IS BRAIN. [facility]

## 2023-02-15 NOTE — PROGRESS NOTES
Problem: Mobility Impaired (Adult and Pediatric)  Goal: *Acute Goals and Plan of Care (Insert Text)  Description  LTG: (goals revised 8/9/19)  (1.)Mr. Andrés Torres will move from supine to sit and sit to supine , scoot up and down and roll side to side with SBA within 7 treatment day(s). (2.)Mr. Andrés Torres will transfer from bed to chair and chair to bed with CONTACT GUARD ASSIST using the least restrictive device within 7 treatment day(s). (3.)Mr. Andrés Torres will ambulate with MINIMAL ASSIST x 1 for 250+ feet with the least restrictive device within 7 treatment day(s) while maintaining normal vital signs. (4.)Mr. Andrés Torres will perform standing static and dynamic balance activities x 15 minutes with MINIMAL ASSIST to improve safety within 7 day(s). (5.)Mr. Andrés Torres will maintain stable vital signs throughout all functional mobility within 7 days. ________________________________________________________________________________________________     Outcome: Progressing Towards Goal     PHYSICAL THERAPY: Re-evaluation and AM 8/9/2019  INPATIENT: PT Visit Days : 1  Payor: Hung Camara / Plan: [facility] / Product Type: PPO /       NAME/AGE/GENDER: Mae Cruz is a 37 y.o. male   PRIMARY DIAGNOSIS: Seizure cerebral [I67.89] Posterior reversible encephalopathy syndrome   Posterior reversible encephalopathy syndrome       ICD-10: Treatment Diagnosis:    · Difficulty in walking, Not elsewhere classified (R26.2)   Precaution/Allergies:  Patient has no known allergies. ASSESSMENT:     Mr. Andrés Torres is presenting for PT reassessment after transfer from [facility] to ICU s/p seizure and PRES syndrome. Pt originally admitted to this hospital for subarachnoid hemorrhage, pt of Dr. Kwame Jameson. Pt discharged to [facility] 8/5/19, rapid response called 8/6/19 for seizure and resulting transfer to ICU. Pt sitting in recliner on arrival, eyes closed but open to voice.  Pt more verbal during therapy assessment this date, continues with flat affect and some delayed response. Pt alert but confused, states \"I have things to do\" and relates late for one of his children's functions. Pt remains unsteady with transfers and ambulation. Pt required MIN A x 2 for ambulation, narrowed shuffled steps, decreased tiffanie. Pt required verbal cues for forward gaze, posture, balance. Pt noted to have tendency to lean to L slightly, will pay attention to right side with cues. Pt with high risk for falls, safety concerns and functioning well below baseline level of activity. Pt will benefit from intensive rehab at discharge, recommend return to [facility]. At this time, patient is appropriate for Co-treatment with occupational therapy due to patient's decreased overall endurance/tolerance levels, as well as need for high level assistance to complete functional transfers/mobility and functional tasks. [place] [facility] is appropriate for a multidisciplinary co-treatment of PT and OT to address goals of both disciplines. This section established at most recent assessment   PROBLEM LIST (Impairments causing functional limitations):  1. Decreased Strength  2. Decreased ADL/Functional Activities  3. Decreased Transfer Abilities  4. Decreased Ambulation Ability/Technique  5. Decreased Balance  6. Decreased Activity Tolerance  7. Decreased Pacing Skills  8. Increased Shortness of Breath  9. Decreased Knowledge of Precautions  10. Decreased [place] with Home Exercise Program   INTERVENTIONS PLANNED: (Benefits and precautions of physical therapy have been discussed with the patient.)  1. Balance Exercise  2. Bed Mobility  3. Family Education  4. Gait Training  5. Home Exercise Program (HEP)  6. Therapeutic Activites  7. Therapeutic Exercise/Strengthening  8.  Transfer Training     TREATMENT PLAN: Frequency/Duration: 3 times a week for duration of hospital stay  Rehabilitation Potential For Stated Goals: Good     REHAB RECOMMENDATIONS (at time of discharge pending progress):    Placement: It is my opinion, based on this patient's performance to date, that Mr. Darren Lowery may benefit from intensive therapy at an [address] [place] [address] after discharge due to a probable need for close medical supervision by a rehab physician, a probable need for 24 hour rehab nursing, a probable need for multiple therapy disciplines and potential to make ongoing and sustainable functional improvement that is of practical value. .  Equipment:    None at this time              HISTORY:   History of Present Injury/Illness (Reason for Referral):  Subarachnoid hemorrhage. Past Medical History/Comorbidities:   Mr. Darren Lowery  has no past medical history on file. Mr. Darren Lowery  has a past surgical history that includes ir emboli intracran lt (7/11/2019). Social History/Living Environment:   Home Environment: Private residence  One/Two Story Residence: Two story  Living Alone: No  Support Systems: Parent, Family member(s)  Patient Expects to be Discharged to[de-identified] Unknown  Current DME Used/Available at Home: None  Prior Level of Function/Work/Activity:  Independent at baseline, works, drives     Number of Personal Factors/Comorbidities that affect the Plan of Care: 1-2: MODERATE COMPLEXITY   EXAMINATION:   Most Recent Physical Functioning:   Gross Assessment:  AROM: Generally decreased, functional(B LE)  PROM: Within functional limits(B LE)  Strength: Generally decreased, functional(B LE grossly 4/5, slightly weaker R)  Coordination: Generally decreased, functional               Posture:  Posture Assessment: Forward head, Rounded shoulders  Balance:  Sitting: Impaired  Sitting - Static: Good (unsupported)  Sitting - Dynamic: Fair (occasional)  Standing: Impaired  Standing - Static: Constant support; Fair  Standing - Dynamic : Constant support;Poor Bed Mobility:  Supine to Sit: (in chair on arrival)  Sit to Supine: (left up in chair)  Wheelchair Mobility:     Transfers:  Sit to Stand: Contact guard assistance;Minimum assistance  Stand to Sit: Contact guard assistance  Gait:     Base of Support: Center of gravity altered;Narrowed  Speed/Aminata: Shuffled; Slow  Step Length: Left shortened;Right shortened  Swing Pattern: Left asymmetrical;Right asymmetrical  Gait Abnormalities: Altered arm swing;Decreased step clearance;Shuffling gait  Distance (ft): 125 Feet (ft)(leaning to L at times, unsteady, fall risk)  Assistive Device: Gait belt(HHA x 2, close contact)  Ambulation - Level of Assistance: Minimal assistance  Interventions: Safety awareness training; Tactile cues; Verbal cues      Body Structures Involved:  1. Nerves  2. Heart  3. Lungs  4. Muscles Body Functions Affected:  1. Sensory/Pain  2. Voice and Speech  3. Cardio  4. Respiratory  5. Neuromusculoskeletal  6. Movement Related Activities and Participation Affected:  1. Learning and Applying Knowledge  2. General Tasks and Demands  3. Communication  4. Mobility  5. Self Care  6. Domestic Life  7. Interpersonal Interactions and Relationships  8. Community, Social and [place]   Number of elements that affect the Plan of Care: 4+: HIGH COMPLEXITY   CLINICAL PRESENTATION:   Presentation: Evolving clinical presentation with changing clinical characteristics: MODERATE COMPLEXITY   CLINICAL DECISION MAKIN [facility] Mobility Inpatient Short Form  How much difficulty does the patient currently have. .. Unable A Lot A Little None   1. Turning over in bed (including adjusting bedclothes, sheets and blankets)? ? 1    2   X? 3   ? 4   2. Sitting down on and standing up from a chair with arms ( e.g., wheelchair, bedside commode, etc.)   ? 1    2   X? 3   ? 4   3. Moving from lying on back to sitting on the side of the bed? ? 1    2   X? 3   ? 4   How much help from another person does the patient currently need. .. Total A Lot A Little None   4. Moving to and from a bed to a chair (including a wheelchair)?    ? 1    2  X ? 3 ? 4   5.  Need to walk in hospital room? 1   ? 2   X? 3   ? 4   6. Climbing 3-5 steps with a railing? 1   X 2   ? 3   ? 4   © 2007, Trustees of [address] [place] [address] [address] [id number], under license to [employer]. All rights reserved      Score:  Initial: 10 Most Recent: `17 (Date: -8/9/19- )    Interpretation of Tool:  Represents activities that are increasingly more difficult (i.e. Bed mobility, Transfers, Gait). Medical Necessity:     · Patient demonstrates good  ·  rehab potential due to higher previous functional level. Reason for Services/Other Comments:  · Patient continues to require modification of therapeutic interventions to increase complexity of exercises  · . Use of outcome tool(s) and clinical judgement create a POC that gives a: Questionable prediction of patient's progress: MODERATE COMPLEXITY            TREATMENT:   (In addition to Assessment/Re-Assessment sessions the following treatments were rendered)   Pre-treatment Symptoms/Complaints: \"I have a lot to do today\"  Pain: Initial:   Pain Intensity 1: 0  Post Session:  0/10     Neuromuscular Re-education: ( 10 min): Activities to included balance, posture, sequencing, trunk control and motor planning skills to improve balance, coordination and posture. Required min to moderate verbal and tactile cues to promote static and dynamic balance in standing and promote coordination of bilateral, lower extremity(s). Date:  7/15/19 Date:  08/02/19 Date:     Activity/Exercise Parameters Parameters Parameters   Seated heel raises x15 B A     Seated toe raises x15 B A     Seated LAQ x15 B A x10 AB    Seated hip flexion  x10 AB                          Braces/Orthotics/Lines/Etc:   · IV  · arterial line  · ICU lines and monitors  Treatment/Session Assessment:    · Response to Treatment:  Cooperative, more verbal this date, still with flat affect.   · Interdisciplinary Collaboration:   o Physical Therapist  o Occupational Therapist  o Registered Nurse  · After treatment position/precautions:   o Up in chair  o Bed alarm/tab alert on  o Bed/Chair-wheels locked  o Call light within reach  o RN notified   · Compliance with Program/Exercises: Compliant all of the time  · Recommendations/Intent for next treatment session: \"Next visit will focus on advancements to more challenging activities and reduction in assistance provided\".   Total Treatment Duration:  PT Patient Time In/Time Out  Time In: 0919  Time Out: Sujatha 53, PT Ambulatory

## 2023-05-22 NOTE — PROGRESS NOTES
Problem: Dysphagia (Adult)  Goal: *Acute Goals and Plan of Care (Insert Text)  Description  LTG: Patient will tolerate least restrictive diet without overt signs or symptoms of airway compromise. STG: Patient will tolerate po trials with speech therapy only without overt signs or symptoms of airway compromise. STG: Patient will participate in modified barium swallow study as clinically indicated. Outcome: Progressing Towards Goal     Problem: Neurolinguistics Impaired (Adult)  Goal: *Acute Goals and Plan of Care (Insert Text)  Description  STG: Patient will recall verbal information after a 3 minute delay with 80% accuracy and minimal assistance. STG: Patient will recall orientation information with 100% accuracy with minimal assistance. STG: Patient will participate in continued therapeutic assessment of cognitive-linguistic abilities. LTG: Patient will increase neuro-linguistic abilities to increase safety and awareness of deficits. Outcome: Progressing Towards Goal  SPEECH LANGUAGE PATHOLOGY: BEDSIDE SWALLOW AND SPEECH LANGUAGE NOTE: Daily Note 2    NAME/AGE/GENDER: Criselda Maya is a 37 y.o. male  DATE: 7/19/2019  PRIMARY DIAGNOSIS: [facility] [I60.9]      ICD-10: Treatment Diagnosis: R13.12 Oropharyngeal Dysphagia. V.06.812 Cognitive-Communication Deficit    INTERDISCIPLINARY COLLABORATION: Registered Nurse  ASSESSMENT:   Swallowing: Re-assessment of swallow function today with chewable textures. He required significant encouragement to accept any po trials and was only agreeable to limits amount. He continues with slowed mastication, but is able to clear oral cavity appropriately. Thin liquids tolerated without difficulty. Recommend upgrade diet to regular/thin liquids in attempts to increase intake. Will follow up for diet tolerance x1.      Speech Language-Cognitive: Patient alert at beginning of session and during po trials, but was less engaged during cognitive-linguistic portion of treatment. Frequent eye closing as if he was asleep. He continues with delayed responses. ? Processing speed vs initiation vs poor engagement. Much improved alertness at end of session when family arrived. He was observed to be fully alert and talking with family once clinician left room. Word deductions completed with 50% accuracy independently. Errored responses were characterized by semantic errors and repetition of items in prompts. Moderate verbal cues to increase accuracy to 90%. His response time for word deductions ranged between 4-10 seconds, and he benefited from minimal verbal cues to respond. Increased time also required for divergent naming task, but he was able name 10 items in 3 minutes. Patient will benefit from ongoing cognitive linguistic treatment to improve communication, safety, and independence. Will continue to follow. ?????? ? ? This section established at most recent assessment??????????  PROBLEM LIST (Impairments causing functional limitations):  1. Oropharyngeal dysphagia  2. Cognitive-linguistic impairments  REHABILITATION POTENTIAL FOR STATED GOALS: Good  PLAN OF CARE:   Patient will benefit from skilled intervention to address the following impairments. RECOMMENDATIONS AND PLANNED INTERVENTIONS (Benefits and precautions of therapy have been discussed with the patient.):  · PO:  Regular  · Liquids:  regular thin  MEDICATIONS:  · With liquid  COMPENSATORY STRATEGIES/MODIFICATIONS INCLUDING:  · Small sips and bites  · Slow rate of intake   OTHER RECOMMENDATIONS (including follow up treatment recommendations): · Family training/education  · Patient education  RECOMMENDED DIET MODIFICATIONS DISCUSSED WITH:  · Nursing  · Patient  FREQUENCY/DURATION: Continue to follow patient 3 times a week for duration of hospital stay to address above goals.    RECOMMENDED REHABILITATION/EQUIPMENT: (at time of discharge pending progress): Due to the probability of continued deficits (see above) this patient will likely need continued skilled speech therapy after discharge. SUBJECTIVE:   Fluctuating levels of engagement during session. He reports feelings tired as he has \"been up since 7 this morning\". History of Present Injury/Illness: Mr. Karrie Fernandez  has no past medical history on file. Waleska Tyson He also  has a past surgical history that includes ir emboli intracran lt (7/11/2019). Present Symptoms: oropharyngeal dysphagia   Pain Intensity 1: 0  Pain Location 1: Throat  Pain Orientation 1: Posterior  Pain Intervention(s) 1: Medication (see MAR)  Current Medications:   No current facility-administered medications on file prior to encounter. No current outpatient medications on file prior to encounter. Current Dietary Status:  MS/nectar  Social History/Home Situation:    Home Environment: Private residence  One/Two Story Residence: Other (Comment)  Living Alone: No  Support Systems: Parent, Child(rohan)  Patient Expects to be Discharged to[de-identified] Unknown  Current DME Used/Available at Home: None  OBJECTIVE:   Respiratory Status:  Room air       Oral Motor Structure/Speech:  Oral-Motor Structure/Motor Speech  Labial: No impairment  Dentition: Intact  Oral Hygiene: Adequate  Lingual: No impairment    Cognitive and Communication Status:  Neurologic State: Alert  Orientation Level: Oriented X4  Cognition: Follows commands  Perception: Appears intact  Perseveration: No perseveration noted  Safety/Judgement: Decreased insight into deficits    BEDSIDE SWALLOW EVALUATION  Oral Assessment:  Oral Assessment  Labial: No impairment  Dentition: Intact  Lingual: No impairment  P.O. Trials:  Patient Position: up in bed    The patient was given tsp-small sip amounts of the following:   Consistency Presented: Thin liquid; Solid;Mixed consistency  How Presented: Self-fed/presented;Cup/sip;Spoon;Straw;Successive swallows    ORAL PHASE:  Bolus Acceptance: No impairment  Bolus Formation/Control: No impairment  Propulsion: No impairment  Type of Impairment: Delayed  Oral Residue: None    PHARYNGEAL PHASE:  Initiation of Swallow: No impairment  Laryngeal Elevation: Functional  Aspiration Signs/Symptoms: None  Vocal Quality: No impairment     Effective Modifications: None     Pharyngeal Phase Characteristics: No impairment, issues, or problems     OTHER OBSERVATIONS:  Rate/bite size: Questionable  Comments:   Endurance: Questionable  Comments:     SPEECH-LANGUAGE COGNITIVE TREATMENT  Motor Speech:  Oral-Motor Structure/Motor Speech  Labial: No impairment  Dentition: Intact  Lingual: No impairment    Auditory Comprehension:   Auditory Comprehension  Hearing Aid: None    Reading Comprehension:        Verbal Expression:   Verbal Expression  Naming: Impaired(Word deductions: 50% accuracy, increasing to 90% with moderate cues)  Divergent (%): (10 items in 3 minutes with increased time to respond and encourgement to continue with task. )    Neuro-Linguistics:                              Vocal Quality: No impairment                               Cognitive Skills Activities: Activities/Procedures listed utilized to improve and/or restore cognitive function as related to attention to tasks, problem solving skills, memory, sequencing and thought organization. Required minimal verbal and tactile cueing to improve improve recall of information and perform graded activities focusing on attention skills. Tool Used: Dysphagia Outcome and Severity Scale (MIR)    Score Comments   Normal Diet  ? 7 With no strategies or extra time needed       Functional Swallow  ? 6 May have mild oral or pharyngeal delay       Mild Dysphagia    ? 5 Which may require one diet consistency restricted (those who demonstrate penetration which is entirely cleared on MBS would be included)   Mild-Moderate Dysphagia  ? 4 With 1-2 diet consistencies restricted       Moderate Dysphagia  ?  3 With 2 or more diet consistencies restricted       Moderately Severe Dysphagia  ? 2 With partial PO strategies (trials with ST only)       Severe Dysphagia  ? 1 With inability to tolerate any PO safely          Score:  Initial: 5 Most Recent: X (Date: -- )   Interpretation of Tool: The Dysphagia Outcome and Severity Scale (MIR) is a simple, easy-to-use, 7-point scale developed to systematically rate the functional severity of dysphagia based on objective assessment and make recommendations for diet level, independence level, and type of nutrition. Score 7 6 5 4 3 2 1   Modifier CH CI CJ CK CL CM CN     Payor: BLUE CROSS / Plan: [place] BLUE CROSS [facility] / Product Type: PPO /     ________________________________________________________________________________________________    Safety:   After treatment position/precautions:  · Up in chair  · RN notified  . Progression/Medical Necessity:   · Patient is expected to demonstrate progress in diet tolerance  ·  to improve swallow safety, work toward diet advancement and decrease aspiration risk  · .  · Patient is expected to demonstrate progress in expressive communication, receptive ability and cognitive ability  ·  to decrease assistance required communication, increase independence with activities of daily living and increase communication with family/caregivers  · . Compliance with Program/Exercises: Will assess as treatment progresses   Reason for Continuation of Services/Other Comments:  · Patient continues to require skilled intervention due to dysphagia and cognitive-linguistic impairment   · . Recommendations/Intent for next treatment session: \"Treatment next visit will focus on advancements to more challenging activities and reduction in assistance provided\".     Total Treatment Duration:  Time In: 1253  Time Out: Blanca 97, MSP, CCC-SLP no

## 2024-01-01 NOTE — PROGRESS NOTES
Chart reviewed. Pt continues in ICU with EVD open. MD states possible need for shunt in notes. Pt is following commands. Dr. Patricia Benitez continues to follow and pt seen and participates by PT/OT. CM will continue to follow for any assist and d/c POC. Statement Selected

## 2024-09-16 ENCOUNTER — HOSPITAL ENCOUNTER (EMERGENCY)
Age: 49
Discharge: HOME | End: 2024-09-16
Attending: GENERAL PRACTICE
Payer: MEDICARE

## 2024-09-16 VITALS
HEIGHT: 72 IN | SYSTOLIC BLOOD PRESSURE: 159 MMHG | TEMPERATURE: 97.7 F | DIASTOLIC BLOOD PRESSURE: 90 MMHG | RESPIRATION RATE: 16 BRPM | BODY MASS INDEX: 20.9 KG/M2 | HEART RATE: 82 BPM | OXYGEN SATURATION: 95 % | WEIGHT: 154.32 LBS

## 2024-09-16 DIAGNOSIS — T82.838A HEMORRHAGE OF ARTERIOVENOUS FISTULA, INITIAL ENCOUNTER (HCC): Primary | ICD-10-CM

## 2024-09-16 LAB
ALBUMIN SERPL-MCNC: 3.1 G/DL (ref 3.5–5)
ALBUMIN/GLOB SERPL: 1 (ref 1–1.9)
ALP SERPL-CCNC: 103 U/L (ref 40–129)
ALT SERPL-CCNC: 12 U/L (ref 12–65)
ANION GAP SERPL CALC-SCNC: 14 MMOL/L (ref 9–18)
AST SERPL-CCNC: 15 U/L (ref 15–37)
BASOPHILS # BLD: 0.1 K/UL (ref 0–0.2)
BASOPHILS NFR BLD: 1 % (ref 0–2)
BILIRUB SERPL-MCNC: 0.4 MG/DL (ref 0–1.2)
BUN SERPL-MCNC: 60 MG/DL (ref 6–23)
CALCIUM SERPL-MCNC: 8.8 MG/DL (ref 8.8–10.2)
CHLORIDE SERPL-SCNC: 99 MMOL/L (ref 98–107)
CO2 SERPL-SCNC: 28 MMOL/L (ref 20–28)
CREAT SERPL-MCNC: 10.5 MG/DL (ref 0.8–1.3)
DIFFERENTIAL METHOD BLD: ABNORMAL
EOSINOPHIL # BLD: 0.2 K/UL (ref 0–0.8)
EOSINOPHIL NFR BLD: 3 % (ref 0.5–7.8)
ERYTHROCYTE [DISTWIDTH] IN BLOOD BY AUTOMATED COUNT: 15.5 % (ref 11.9–14.6)
GLOBULIN SER CALC-MCNC: 3.1 G/DL (ref 2.3–3.5)
GLUCOSE SERPL-MCNC: 84 MG/DL (ref 70–99)
HCT VFR BLD AUTO: 37.1 % (ref 41.1–50.3)
HGB BLD-MCNC: 11.4 G/DL (ref 13.6–17.2)
IMM GRANULOCYTES # BLD AUTO: 0 K/UL (ref 0–0.5)
IMM GRANULOCYTES NFR BLD AUTO: 0 % (ref 0–5)
INR PPP: 1
LYMPHOCYTES # BLD: 0.7 K/UL (ref 0.5–4.6)
LYMPHOCYTES NFR BLD: 11 % (ref 13–44)
MCH RBC QN AUTO: 29.7 PG (ref 26.1–32.9)
MCHC RBC AUTO-ENTMCNC: 30.7 G/DL (ref 31.4–35)
MCV RBC AUTO: 96.6 FL (ref 82–102)
MONOCYTES # BLD: 0.6 K/UL (ref 0.1–1.3)
MONOCYTES NFR BLD: 10 % (ref 4–12)
NEUTS SEG # BLD: 4.7 K/UL (ref 1.7–8.2)
NEUTS SEG NFR BLD: 75 % (ref 43–78)
NRBC # BLD: 0 K/UL (ref 0–0.2)
PLATELET # BLD AUTO: 100 K/UL (ref 150–450)
PMV BLD AUTO: 11.7 FL (ref 9.4–12.3)
POTASSIUM SERPL-SCNC: 5 MMOL/L (ref 3.5–5.1)
PROT SERPL-MCNC: 6.2 G/DL (ref 6.3–8.2)
PROTHROMBIN TIME: 13.9 SEC (ref 11.3–14.9)
RBC # BLD AUTO: 3.84 M/UL (ref 4.23–5.6)
SODIUM SERPL-SCNC: 141 MMOL/L (ref 136–145)
WBC # BLD AUTO: 6.2 K/UL (ref 4.3–11.1)

## 2024-09-16 PROCEDURE — 80053 COMPREHEN METABOLIC PANEL: CPT

## 2024-09-16 PROCEDURE — 85610 PROTHROMBIN TIME: CPT

## 2024-09-16 PROCEDURE — 99284 EMERGENCY DEPT VISIT MOD MDM: CPT | Performed by: STUDENT IN AN ORGANIZED HEALTH CARE EDUCATION/TRAINING PROGRAM

## 2024-09-16 PROCEDURE — 99283 EMERGENCY DEPT VISIT LOW MDM: CPT

## 2024-09-16 PROCEDURE — 85025 COMPLETE CBC W/AUTO DIFF WBC: CPT

## 2024-09-16 ASSESSMENT — LIFESTYLE VARIABLES
HOW OFTEN DO YOU HAVE A DRINK CONTAINING ALCOHOL: NEVER
HOW MANY STANDARD DRINKS CONTAINING ALCOHOL DO YOU HAVE ON A TYPICAL DAY: PATIENT DOES NOT DRINK

## 2024-09-16 ASSESSMENT — PAIN - FUNCTIONAL ASSESSMENT: PAIN_FUNCTIONAL_ASSESSMENT: NONE - DENIES PAIN

## 2024-09-22 ENCOUNTER — HOSPITAL ENCOUNTER (EMERGENCY)
Age: 49
Discharge: HOME | End: 2024-09-22
Attending: STUDENT IN AN ORGANIZED HEALTH CARE EDUCATION/TRAINING PROGRAM
Payer: MEDICARE

## 2024-09-22 VITALS
BODY MASS INDEX: 20.86 KG/M2 | WEIGHT: 154 LBS | HEART RATE: 92 BPM | RESPIRATION RATE: 15 BRPM | DIASTOLIC BLOOD PRESSURE: 101 MMHG | SYSTOLIC BLOOD PRESSURE: 163 MMHG | TEMPERATURE: 98.9 F | OXYGEN SATURATION: 93 % | HEIGHT: 72 IN

## 2024-09-22 DIAGNOSIS — T82.838A HEMORRHAGE OF ARTERIOVENOUS FISTULA, INITIAL ENCOUNTER (HCC): Primary | ICD-10-CM

## 2024-09-22 LAB
ALBUMIN SERPL-MCNC: 2.9 G/DL (ref 3.5–5)
ALBUMIN/GLOB SERPL: 0.9 (ref 1–1.9)
ALP SERPL-CCNC: 93 U/L (ref 40–129)
ALT SERPL-CCNC: 12 U/L (ref 12–65)
ANION GAP SERPL CALC-SCNC: 13 MMOL/L (ref 9–18)
AST SERPL-CCNC: 11 U/L (ref 15–37)
BASOPHILS # BLD: 0.1 K/UL (ref 0–0.2)
BASOPHILS NFR BLD: 2 % (ref 0–2)
BILIRUB SERPL-MCNC: 0.3 MG/DL (ref 0–1.2)
BUN SERPL-MCNC: 58 MG/DL (ref 6–23)
CALCIUM SERPL-MCNC: 9.3 MG/DL (ref 8.8–10.2)
CHLORIDE SERPL-SCNC: 100 MMOL/L (ref 98–107)
CO2 SERPL-SCNC: 30 MMOL/L (ref 20–28)
CREAT SERPL-MCNC: 9.54 MG/DL (ref 0.8–1.3)
DIFFERENTIAL METHOD BLD: ABNORMAL
EOSINOPHIL # BLD: 0.3 K/UL (ref 0–0.8)
EOSINOPHIL NFR BLD: 4 % (ref 0.5–7.8)
ERYTHROCYTE [DISTWIDTH] IN BLOOD BY AUTOMATED COUNT: 15.3 % (ref 11.9–14.6)
GLOBULIN SER CALC-MCNC: 3.1 G/DL (ref 2.3–3.5)
GLUCOSE SERPL-MCNC: 93 MG/DL (ref 70–99)
HCT VFR BLD AUTO: 32.3 % (ref 41.1–50.3)
HGB BLD-MCNC: 10.1 G/DL (ref 13.6–17.2)
IMM GRANULOCYTES # BLD AUTO: 0 K/UL (ref 0–0.5)
IMM GRANULOCYTES NFR BLD AUTO: 0 % (ref 0–5)
LYMPHOCYTES # BLD: 0.7 K/UL (ref 0.5–4.6)
LYMPHOCYTES NFR BLD: 13 % (ref 13–44)
MCH RBC QN AUTO: 29.9 PG (ref 26.1–32.9)
MCHC RBC AUTO-ENTMCNC: 31.3 G/DL (ref 31.4–35)
MCV RBC AUTO: 95.6 FL (ref 82–102)
MONOCYTES # BLD: 0.6 K/UL (ref 0.1–1.3)
MONOCYTES NFR BLD: 10 % (ref 4–12)
NEUTS SEG # BLD: 4.1 K/UL (ref 1.7–8.2)
NEUTS SEG NFR BLD: 71 % (ref 43–78)
NRBC # BLD: 0 K/UL (ref 0–0.2)
PLATELET # BLD AUTO: 112 K/UL (ref 150–450)
PMV BLD AUTO: 11.1 FL (ref 9.4–12.3)
POTASSIUM SERPL-SCNC: 4.4 MMOL/L (ref 3.5–5.1)
PROT SERPL-MCNC: 6 G/DL (ref 6.3–8.2)
RBC # BLD AUTO: 3.38 M/UL (ref 4.23–5.6)
SODIUM SERPL-SCNC: 143 MMOL/L (ref 136–145)
WBC # BLD AUTO: 5.8 K/UL (ref 4.3–11.1)

## 2024-09-22 PROCEDURE — 80053 COMPREHEN METABOLIC PANEL: CPT

## 2024-09-22 PROCEDURE — 99284 EMERGENCY DEPT VISIT MOD MDM: CPT

## 2024-09-22 PROCEDURE — 12001 RPR S/N/AX/GEN/TRNK 2.5CM/<: CPT

## 2024-09-22 PROCEDURE — 85025 COMPLETE CBC W/AUTO DIFF WBC: CPT

## 2024-09-22 ASSESSMENT — PAIN - FUNCTIONAL ASSESSMENT
PAIN_FUNCTIONAL_ASSESSMENT: NONE - DENIES PAIN
PAIN_FUNCTIONAL_ASSESSMENT: 0-10

## 2024-09-22 ASSESSMENT — PAIN SCALES - GENERAL: PAINLEVEL_OUTOF10: 0

## 2024-09-22 ASSESSMENT — LIFESTYLE VARIABLES
HOW MANY STANDARD DRINKS CONTAINING ALCOHOL DO YOU HAVE ON A TYPICAL DAY: PATIENT DOES NOT DRINK
HOW OFTEN DO YOU HAVE A DRINK CONTAINING ALCOHOL: NEVER

## 2024-09-27 NOTE — PROGRESS NOTES
Interval History: no acute events overnight.  Antibiotic regimen adjusted per ID.  She says she is feeling much better this morning.  She admits to some depression due to her multiple medical problems and Wellbutrin does not seem to be helping much.  Discussed addition of cymbalta at night to which she agreed.  She will follow up with her PCP.    Review of Systems   Constitutional:  Positive for diaphoresis and fever.   Musculoskeletal:  Positive for arthralgias and myalgias.   Skin:  Positive for color change.     Objective:     Vital Signs (Most Recent):  Temp: 98.4 °F (36.9 °C) (09/27/24 0800)  Pulse: 73 (09/27/24 0800)  Resp: 18 (09/27/24 0816)  BP: (!) 124/57 (09/27/24 0800)  SpO2: 97 % (09/27/24 0800) Vital Signs (24h Range):  Temp:  [97.8 °F (36.6 °C)-98.5 °F (36.9 °C)] 98.4 °F (36.9 °C)  Pulse:  [65-79] 73  Resp:  [17-20] 18  SpO2:  [94 %-100 %] 97 %  BP: (105-165)/(47-72) 124/57     Weight: (!) 167.8 kg (370 lb)  Body mass index is 63.51 kg/m².    Intake/Output Summary (Last 24 hours) at 9/27/2024 0882  Last data filed at 9/27/2024 0419  Gross per 24 hour   Intake 1340 ml   Output 2350 ml   Net -1010 ml         Physical Exam  Vitals and nursing note reviewed.   Constitutional:       General: She is not in acute distress.     Appearance: She is well-developed. She is obese. She is ill-appearing.   Cardiovascular:      Rate and Rhythm: Normal rate and regular rhythm.   Pulmonary:      Effort: Pulmonary effort is normal.      Breath sounds: Normal breath sounds.   Chest:      Comments: Red crusty lesions under right breast  Abdominal:      General: Bowel sounds are normal. There is no distension.      Palpations: Abdomen is soft.      Tenderness: There is no abdominal tenderness.   Musculoskeletal:         General: Swelling and tenderness (right thigh) present.      Cervical back: Normal range of motion and neck supple.   Skin:     General: Skin is warm and dry.      Findings: Erythema (improving) and lesion  Progress Note    Patient: Jonny Maldonado MRN: [id number]  SSN: [id number]    YOB: 1975  Age: 37 y.o. Sex: male      Admit Date: 7/9/2019    LOS: 14 days     Subjective:   Pt with no acute neuro changes. EVD @ 5/open. Incision site CDI, no bleeding. Will follow commands, stovall. TCD pending for today.     Current Facility-Administered Medications   Medication Dose Route Frequency    acetaminophen (TYLENOL) tablet 650 mg  650 mg Oral Q4H PRN    lidocaine 4 % patch 1 Patch  1 Patch TransDERmal Q24H    fentaNYL citrate (PF) injection 50 mcg  50 mcg IntraVENous Q1H PRN    ferrous sulfate tablet 325 mg  1 Tab Oral TID WITH MEALS    levETIRAcetam (KEPPRA) tablet 500 mg  500 mg Oral Q12H    niMODipine (NIMOTOP) 30 mg/mL oral solution (compound) 60 mg  60 mg Oral Q4H    0.45% sodium chloride infusion  100 mL/hr IntraVENous CONTINUOUS    ascorbic acid (vitamin C) (VITAMIN C) tablet 500 mg  500 mg Oral TID    aspirin tablet 325 mg  325 mg Oral DAILY    atorvastatin (LIPITOR) tablet 40 mg  40 mg Oral QHS    clopidogrel (PLAVIX) tablet 75 mg  75 mg Oral DAILY    famotidine (PEPCID) tablet 20 mg  20 mg Oral DAILY    magnesium oxide (MAG-OX) tablet 400 mg  400 mg Oral BID    senna-docusate (PERICOLACE) 8.6-50 mg per tablet 2 Tab  2 Tab Oral QHS    magnesium hydroxide (MILK OF MAGNESIA) 400 mg/5 mL oral suspension 30 mL  30 mL Oral DAILY PRN    polyvinyl alcohol (LIQUIFILM TEARS) 1.4 % ophthalmic solution 1 Drop  1 Drop Both Eyes PRN    heparin (porcine) injection 5,000 Units  5,000 Units SubCUTAneous Q8H    sodium chloride (NS) flush 30 mL  30 mL InterCATHeter Q8H    heparin (porcine) pf 900 Units  900 Units InterCATHeter Q8H    sodium chloride (NS) flush 30 mL  30 mL InterCATHeter PRN    heparin (porcine) pf 900 Units  900 Units InterCATHeter PRN    nicotine (NICODERM CQ) 21 mg/24 hr patch 1 Patch  1 Patch TransDERmal Q24H    ondansetron (ZOFRAN) injection 4 mg  4 mg IntraVENous Q6H PRN    NUTRITIONAL SUPPORT ELECTROLYTE PRN ORDERS   Does Not Apply PRN    magnesium sulfate 4 g/100 mL IVPB  4 g IntraVENous DAILY PRN    magnesium sulfate 2 g/50 ml IVPB (premix or compounded)  2 g IntraVENous DAILY PRN       Objective:     Vitals:    07/23/19 0635 07/23/19 0645 07/23/19 0650 07/23/19 0655   BP:       Pulse: 73 67 81 83   Resp: 15 10 12    Temp: 98.9 °F (37.2 °C) 98.9 °F (37.2 °C) 98.9 °F (37.2 °C) 99.1 °F (37.3 °C)   SpO2: 100% 100% 97% 100%   Weight:       Height:             Intake and Output:  Current Shift: No intake/output data recorded. Last 24 hr: 07/22 0701 - 07/23 0700  In: 3578 [P.O.:350; I.V.:3228]  Out: 4906 [Urine:4640; Drains:266]     IO: -1.3L/24hr  TOTAL OVERALL: -5.5L  EVD: 266/24hrs    Physical Exam:   General:  Alert, drowsy, cooperative, no distress. Eyes:   PERRL   Neck: Supple, symmetrical, trachea midline, no adenopathy, thyroid: no enlargment/tenderness/nodules, no carotid bruit and no JVD. Lungs:   Clear to auscultation bilaterally. Heart:  Regular rate and rhythm, S1, S2 normal, no murmur, click, rub or gallop. Abdomen:   Soft, non-tender. Bowel sounds normal. No masses,  No organomegaly. Extremities: Extremities normal, atraumatic, no cyanosis. 2+ edema RLE, 1+ edema LLE. Pulses: 2+ and symmetric all extremities. Skin: Skin color, texture, turgor normal. EVD intact. Neurologic:  pt aox3,follows commands, stovall. Drowsy at times, but interactive on exam. Cont monitor.         Lab/Data Review:    Recent Results (from the past 12 hour(s))   MAGNESIUM    Collection Time: 07/23/19  3:11 AM   Result Value Ref Range    Magnesium 2.5 (H) 1.8 - 2.4 mg/dL   CBC W/O DIFF    Collection Time: 07/23/19  3:11 AM   Result Value Ref Range    WBC 19.7 (H) 4.3 - 11.1 K/uL    RBC 2.61 (L) 4.23 - 5.6 M/uL    HGB 7.7 (L) 13.6 - 17.2 g/dL    HCT 23.5 (L) 41.1 - 50.3 %    MCV 90.0 79.6 - 97.8 FL    MCH 29.5 26.1 - 32.9 PG    MCHC 32.8 31.4 - 35.0 g/dL    RDW 13.2 11.9 - 14.6 % present.   Neurological:      General: No focal deficit present.      Mental Status: She is alert and oriented to person, place, and time.   Psychiatric:         Mood and Affect: Mood normal. Affect is tearful.         Behavior: Behavior normal.             Significant Labs: All pertinent labs within the past 24 hours have been reviewed.  CBC:   Recent Labs   Lab 09/25/24 2318 09/27/24  0400   WBC 13.08* 7.98   HGB 12.6 11.2*   HCT 40.4 36.2*    229     CMP:   Recent Labs   Lab 09/25/24 2318 09/27/24  0400   * 137   K 4.1 4.1    103   CO2 22* 28   * 101   BUN 12 11   CREATININE 0.7 0.6   CALCIUM 8.9 8.4*   PROT 7.6 6.3   ALBUMIN 3.9 3.3*   BILITOT 0.4 0.4   ALKPHOS 68 54*   AST 12 10   ALT 9* 8*   ANIONGAP 10 6*       Significant Imaging: I have reviewed all pertinent imaging results/findings within the past 24 hours.   PLATELET 924 469 - 427 K/uL    MPV 10.5 9.4 - 12.3 FL    ABSOLUTE NRBC 0.00 0.0 - 0.2 K/uL   METABOLIC PANEL, BASIC    Collection Time: 07/23/19  3:11 AM   Result Value Ref Range    Sodium 143 136 - 145 mmol/L    Potassium 3.4 (L) 3.5 - 5.1 mmol/L    Chloride 106 98 - 107 mmol/L    CO2 25 21 - 32 mmol/L    Anion gap 12 7 - 16 mmol/L    Glucose 87 65 - 100 mg/dL    BUN 23 6 - 23 MG/DL    Creatinine 3.15 (H) 0.8 - 1.5 MG/DL    GFR est AA 28 (L) >60 ml/min/1.73m2    GFR est non-AA 23 (L) >60 ml/min/1.73m2    Calcium 8.3 8.3 - 10.4 MG/DL   POST EXPOSURE PROFILE    Collection Time: 07/23/19  5:52 AM   Result Value Ref Range    p24 Antigen NONREACTIVE NR      HIV-1,2 Ab NONREACTIVE NR      HCV Ab PENDING s/co ratio    Hep B surface Ag screen PENDING        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)    Active Problems:    Tobacco abuse (7/10/2019)      Acute respiratory failure with hypoxia (HCC) (7/10/2019)      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 Q1 hour neuro checks. On SAH protocol - Mg, nimotop, zocor. PERRL +3. No MAP goal now. TCDs daily - they have been 180-220 in the left and right MCAs . Last CT head showed improved communicating hydrocephalus. Last CTA head and neck showed no filling of the aneurysm and good filling of the PCAs. Vasospasm seen in the right MCA and both supraclinoid ICAs as well as the ACAs and the left MCA but to a lesser extent. CTP was normal. Will continue SAH protocol, PT/OT. OOB to chair today. /. Pt had EVD replaced 7/20 due to it getting pulled put. EVD with some dried blood drainage but working and still set at 5cm H20. CT head this am showed small ventricles and good placement of EVD. Will continue to monitor. Alert and following commands this morning. Patient may need a shunt. We will drain for several more days and then challenge the EVD. RESP: tolerating RA. No distress. cxr shows improving edema. CV: NO MAP GOAL NOW.  2D Echo: 55-60%, trop peaked @ 0.8, trending down. SCD/SQ heparin. HEME: 7.7/23,  HIT panel negative. On oral iron. And Vit C. NEPH: bun/cr: 23/3.1 . UOP is good. -5.5L. Will give 1L 1/2 NS bolus. Monitor. GI: Pepcid. Senna. +BS. + BM yesterday. ID: Tm: 99.3. Sputum cx sent, pending final, showing gram + cocci -  Patient has MSSA pneumonia. Blood cultures neg and UA neg. US neg for DVT. Pt on Cefazolin D8/10. CSF cx neg for any organisms, protein 172. LINES: RUE PICC, lenin, R EVD. Tobacco abuse: On Nicoderm patch.         Signed By: Alesha Clark NP     July 23, 2019

## 2024-10-01 PROCEDURE — 99282 EMERGENCY DEPT VISIT SF MDM: CPT

## 2024-10-01 PROCEDURE — 99281 EMR DPT VST MAYX REQ PHY/QHP: CPT

## 2024-10-04 ENCOUNTER — HOSPITAL ENCOUNTER (EMERGENCY)
Age: 49
Discharge: HOME | End: 2024-10-04
Payer: MEDICARE

## 2024-11-20 ENCOUNTER — HOSPITAL ENCOUNTER (EMERGENCY)
Age: 49
Discharge: HOME | End: 2024-11-20
Attending: EMERGENCY MEDICINE
Payer: MEDICARE

## 2024-11-20 ENCOUNTER — APPOINTMENT (OUTPATIENT)
Dept: CT IMAGING | Age: 49
End: 2024-11-20
Payer: MEDICARE

## 2024-11-20 ENCOUNTER — APPOINTMENT (OUTPATIENT)
Dept: INTERVENTIONAL RADIOLOGY/VASCULAR | Age: 49
End: 2024-11-20
Attending: EMERGENCY MEDICINE
Payer: MEDICARE

## 2024-11-20 VITALS
TEMPERATURE: 98.6 F | HEART RATE: 83 BPM | SYSTOLIC BLOOD PRESSURE: 163 MMHG | DIASTOLIC BLOOD PRESSURE: 89 MMHG | OXYGEN SATURATION: 98 % | BODY MASS INDEX: 21.67 KG/M2 | HEIGHT: 72 IN | WEIGHT: 160 LBS | RESPIRATION RATE: 18 BRPM

## 2024-11-20 DIAGNOSIS — Z99.2 ESRD ON DIALYSIS (HCC): ICD-10-CM

## 2024-11-20 DIAGNOSIS — R18.8 OTHER ASCITES: Primary | ICD-10-CM

## 2024-11-20 DIAGNOSIS — K76.9 LIVER LESION: ICD-10-CM

## 2024-11-20 DIAGNOSIS — K76.0 HEPATIC STEATOSIS: ICD-10-CM

## 2024-11-20 DIAGNOSIS — N18.6 ESRD ON DIALYSIS (HCC): ICD-10-CM

## 2024-11-20 LAB
ALBUMIN SERPL-MCNC: 3.1 G/DL (ref 3.5–5)
ALBUMIN/GLOB SERPL: 0.9 (ref 1–1.9)
ALP SERPL-CCNC: 106 U/L (ref 40–129)
ALT SERPL-CCNC: 10 U/L (ref 8–55)
ANION GAP SERPL CALC-SCNC: 16 MMOL/L (ref 7–16)
AST SERPL-CCNC: 13 U/L (ref 15–37)
BASOPHILS # BLD: 0.1 K/UL (ref 0–0.2)
BASOPHILS NFR BLD: 2 % (ref 0–2)
BILIRUB SERPL-MCNC: 0.5 MG/DL (ref 0–1.2)
BUN SERPL-MCNC: 55 MG/DL (ref 6–23)
CALCIUM SERPL-MCNC: 9.3 MG/DL (ref 8.8–10.2)
CHLORIDE SERPL-SCNC: 104 MMOL/L (ref 98–107)
CO2 SERPL-SCNC: 25 MMOL/L (ref 20–29)
CREAT SERPL-MCNC: 9.94 MG/DL (ref 0.8–1.3)
DIFFERENTIAL METHOD BLD: ABNORMAL
EKG ATRIAL RATE: 87 BPM
EKG DIAGNOSIS: NORMAL
EKG P AXIS: 47 DEGREES
EKG P-R INTERVAL: 199 MS
EKG Q-T INTERVAL: 400 MS
EKG QRS DURATION: 114 MS
EKG QTC CALCULATION (BAZETT): 482 MS
EKG R AXIS: -16 DEGREES
EKG T AXIS: 145 DEGREES
EKG VENTRICULAR RATE: 87 BPM
EOSINOPHIL # BLD: 0.2 K/UL (ref 0–0.8)
EOSINOPHIL NFR BLD: 3 % (ref 0.5–7.8)
ERYTHROCYTE [DISTWIDTH] IN BLOOD BY AUTOMATED COUNT: 14.9 % (ref 11.9–14.6)
GLOBULIN SER CALC-MCNC: 3.6 G/DL (ref 2.3–3.5)
GLUCOSE SERPL-MCNC: 128 MG/DL (ref 70–99)
HAV IGM SER QL: NONREACTIVE
HBV CORE IGM SER QL: NONREACTIVE
HBV SURFACE AG SER QL: NONREACTIVE
HCT VFR BLD AUTO: 34.5 % (ref 41.1–50.3)
HCV AB SER QL: NONREACTIVE
HGB BLD-MCNC: 11 G/DL (ref 13.6–17.2)
IMM GRANULOCYTES # BLD AUTO: 0 K/UL (ref 0–0.5)
IMM GRANULOCYTES NFR BLD AUTO: 0 % (ref 0–5)
INR PPP: 1.1
LIPASE SERPL-CCNC: 27 U/L (ref 13–60)
LYMPHOCYTES # BLD: 0.9 K/UL (ref 0.5–4.6)
LYMPHOCYTES NFR BLD: 13 % (ref 13–44)
MCH RBC QN AUTO: 30.6 PG (ref 26.1–32.9)
MCHC RBC AUTO-ENTMCNC: 31.9 G/DL (ref 31.4–35)
MCV RBC AUTO: 96.1 FL (ref 82–102)
MONOCYTES # BLD: 0.7 K/UL (ref 0.1–1.3)
MONOCYTES NFR BLD: 10 % (ref 4–12)
NEUTS SEG # BLD: 4.9 K/UL (ref 1.7–8.2)
NEUTS SEG NFR BLD: 72 % (ref 43–78)
NRBC # BLD: 0 K/UL (ref 0–0.2)
PLATELET # BLD AUTO: 138 K/UL (ref 150–450)
PMV BLD AUTO: 11.2 FL (ref 9.4–12.3)
POTASSIUM SERPL-SCNC: 5.2 MMOL/L (ref 3.5–5.1)
PROT SERPL-MCNC: 6.8 G/DL (ref 6.3–8.2)
PROTHROMBIN TIME: 14.8 SEC (ref 11.3–14.9)
RBC # BLD AUTO: 3.59 M/UL (ref 4.23–5.6)
SODIUM SERPL-SCNC: 145 MMOL/L (ref 136–145)
WBC # BLD AUTO: 6.9 K/UL (ref 4.3–11.1)

## 2024-11-20 PROCEDURE — 85610 PROTHROMBIN TIME: CPT

## 2024-11-20 PROCEDURE — 49083 ABD PARACENTESIS W/IMAGING: CPT

## 2024-11-20 PROCEDURE — 83690 ASSAY OF LIPASE: CPT

## 2024-11-20 PROCEDURE — 74177 CT ABD & PELVIS W/CONTRAST: CPT

## 2024-11-20 PROCEDURE — 80074 ACUTE HEPATITIS PANEL: CPT

## 2024-11-20 PROCEDURE — 96375 TX/PRO/DX INJ NEW DRUG ADDON: CPT

## 2024-11-20 PROCEDURE — 2500000003 HC RX 250 WO HCPCS: Performed by: PHYSICIAN ASSISTANT

## 2024-11-20 PROCEDURE — 96374 THER/PROPH/DIAG INJ IV PUSH: CPT

## 2024-11-20 PROCEDURE — 99285 EMERGENCY DEPT VISIT HI MDM: CPT

## 2024-11-20 PROCEDURE — 36415 COLL VENOUS BLD VENIPUNCTURE: CPT

## 2024-11-20 PROCEDURE — 80053 COMPREHEN METABOLIC PANEL: CPT

## 2024-11-20 PROCEDURE — 85025 COMPLETE CBC W/AUTO DIFF WBC: CPT

## 2024-11-20 PROCEDURE — 6360000002 HC RX W HCPCS: Performed by: EMERGENCY MEDICINE

## 2024-11-20 PROCEDURE — 93010 ELECTROCARDIOGRAM REPORT: CPT | Performed by: INTERNAL MEDICINE

## 2024-11-20 PROCEDURE — 93005 ELECTROCARDIOGRAM TRACING: CPT | Performed by: EMERGENCY MEDICINE

## 2024-11-20 PROCEDURE — 6360000004 HC RX CONTRAST MEDICATION: Performed by: EMERGENCY MEDICINE

## 2024-11-20 RX ORDER — LIDOCAINE HYDROCHLORIDE 10 MG/ML
INJECTION, SOLUTION EPIDURAL; INFILTRATION; INTRACAUDAL; PERINEURAL PRN
Status: DISCONTINUED | OUTPATIENT
Start: 2024-11-20 | End: 2024-11-20 | Stop reason: HOSPADM

## 2024-11-20 RX ORDER — IOPAMIDOL 755 MG/ML
100 INJECTION, SOLUTION INTRAVASCULAR
Status: COMPLETED | OUTPATIENT
Start: 2024-11-20 | End: 2024-11-20

## 2024-11-20 RX ORDER — ONDANSETRON 2 MG/ML
4 INJECTION INTRAMUSCULAR; INTRAVENOUS
Status: COMPLETED | OUTPATIENT
Start: 2024-11-20 | End: 2024-11-20

## 2024-11-20 RX ORDER — MORPHINE SULFATE 4 MG/ML
4 INJECTION, SOLUTION INTRAMUSCULAR; INTRAVENOUS
Status: COMPLETED | OUTPATIENT
Start: 2024-11-20 | End: 2024-11-20

## 2024-11-20 RX ADMIN — ONDANSETRON 4 MG: 2 INJECTION INTRAMUSCULAR; INTRAVENOUS at 09:00

## 2024-11-20 RX ADMIN — IOPAMIDOL 100 ML: 755 INJECTION, SOLUTION INTRAVENOUS at 09:36

## 2024-11-20 RX ADMIN — LIDOCAINE HYDROCHLORIDE 10 ML: 10 INJECTION, SOLUTION EPIDURAL; INFILTRATION; INTRACAUDAL; PERINEURAL at 13:09

## 2024-11-20 RX ADMIN — MORPHINE SULFATE 4 MG: 4 INJECTION INTRAVENOUS at 09:00

## 2024-11-20 ASSESSMENT — ENCOUNTER SYMPTOMS
SHORTNESS OF BREATH: 0
BLOOD IN STOOL: 0
NAUSEA: 0
BACK PAIN: 0
DIARRHEA: 0
ABDOMINAL DISTENTION: 1
VOMITING: 0
COUGH: 0
ABDOMINAL PAIN: 1
CONSTIPATION: 0

## 2024-11-20 ASSESSMENT — PAIN - FUNCTIONAL ASSESSMENT
PAIN_FUNCTIONAL_ASSESSMENT: NONE - DENIES PAIN
PAIN_FUNCTIONAL_ASSESSMENT: 0-10

## 2024-11-20 ASSESSMENT — PAIN SCALES - GENERAL: PAINLEVEL_OUTOF10: 8

## 2024-11-20 ASSESSMENT — PAIN DESCRIPTION - LOCATION: LOCATION: ABDOMEN

## 2024-11-20 NOTE — OP NOTE
Title: Ultrasound guided paracentesis.      History: 49-year-old male with ascites, abdominal pain, anasarca, end-stage  renal disease on hemodialysis.    :  Ericka Singh PA-C    Supervising Physician: Varun Austin MD.   The physician attests to  supervising this procedure and agrees with the report as written.    Consent:  Informed written and oral consent was obtained from the patient after  the risks and benefits were discussed.  All questions were answered and the  patient requested that we proceed.    Procedure:  Maximal sterile barrier technique was used.  Following routine prep  and drape of the abdomen, a local field block with 1% lidocaine was achieved.  Ultrasound evaluation was performed.  A permanent, hardcopy ultrasound images  was stored in PACS.    Fluid was identified in the peritoneal cavity. Using real-time ultrasound  guidance, the peritoneal cavity was accessed with an 8 Japanese centesis catheter.   The catheter was connected to wall suction.    A total of 6000 cc of thin yellow fluid was removed. The catheter was removed  and a bandage applied.    Complications: None.    Specimen: Ascites.    Findings: Large volume ascites.   Impression:     Uncomplicated ultrasound guided paracentesis.

## 2024-11-20 NOTE — DISCHARGE INSTRUCTIONS
Schedule close follow-up with your nephrologist.  Contact today to arrange for outpatient hemodialysis.  Schedule follow-up with primary care physician, gastroenterologist, oncologist.    If you have any questions about your procedure, please call the Interventional Radiology department at [phone].   After business hours (5pm) and weekends, call the answering service at [phone] and ask for the Interventional Radiologist on call to be paged.

## 2024-11-20 NOTE — ED NOTES
Patient mobility status  with no difficulty.     I have reviewed discharge instructions with the patient.  The patient verbalized understanding.    Patient left ED via Discharge Method: ambulatory to Home with Parent.    Opportunity for questions and clarification provided.     Patient given 0 scripts.            Tracy Grove, RN  11/20/24 7338

## 2024-11-20 NOTE — ED TRIAGE NOTES
Pt arrives via ems from dialysis c/o abdominal pain x 1 month. Has GI appt next Tuesday but states he can't wait. Pt denies n/v/d. States normal bowel movements. Did not start dialysis run today. Last dialysis on Monday.

## 2024-11-20 NOTE — ED PROVIDER NOTES
lesion in the left hepatic lobe  measuring 8.2 mm. Multiphase CT or MRI is recommended for complete assessment  non-emergently.    3.  Significant bilateral renal atrophy.    4.  Multifocal fat and fluid containing supraumbilical hernias.    5.  Cholelithiasis.    6.  Cardiomegaly.      Electronically signed by Shasha Oakley   CMP   Result Value Ref Range    Sodium 145 136 - 145 mmol/L    Potassium 5.2 (H) 3.5 - 5.1 mmol/L    Chloride 104 98 - 107 mmol/L    CO2 25 20 - 29 mmol/L    Anion Gap 16 7 - 16 mmol/L    Glucose 128 (H) 70 - 99 mg/dL    BUN 55 (H) 6 - 23 MG/DL    Creatinine 9.94 (H) 0.80 - 1.30 MG/DL    Est, Glom Filt Rate 6 (L) >60 ml/min/1.73m2    Calcium 9.3 8.8 - 10.2 MG/DL    Total Bilirubin 0.5 0.0 - 1.2 MG/DL    ALT 10 8 - 55 U/L    AST 13 (L) 15 - 37 U/L    Alk Phosphatase 106 40 - 129 U/L    Total Protein 6.8 6.3 - 8.2 g/dL    Albumin 3.1 (L) 3.5 - 5.0 g/dL    Globulin 3.6 (H) 2.3 - 3.5 g/dL    Albumin/Globulin Ratio 0.9 (L) 1.0 - 1.9     CBC with Auto Differential   Result Value Ref Range    WBC 6.9 4.3 - 11.1 K/uL    RBC 3.59 (L) 4.23 - 5.6 M/uL    Hemoglobin 11.0 (L) 13.6 - 17.2 g/dL    Hematocrit 34.5 (L) 41.1 - 50.3 %    MCV 96.1 82 - 102 FL    MCH 30.6 26.1 - 32.9 PG    MCHC 31.9 31.4 - 35.0 g/dL    RDW 14.9 (H) 11.9 - 14.6 %    Platelets 138 (L) 150 - 450 K/uL    MPV 11.2 9.4 - 12.3 FL    nRBC 0.00 0.0 - 0.2 K/uL    Differential Type AUTOMATED      Neutrophils % 72 43 - 78 %    Lymphocytes % 13 13 - 44 %    Monocytes % 10 4.0 - 12.0 %    Eosinophils % 3 0.5 - 7.8 %    Basophils % 2 0.0 - 2.0 %    Immature Granulocytes % 0 0.0 - 5.0 %    Neutrophils Absolute 4.9 1.7 - 8.2 K/UL    Lymphocytes Absolute 0.9 0.5 - 4.6 K/UL    Monocytes Absolute 0.7 0.1 - 1.3 K/UL    Eosinophils Absolute 0.2 0.0 - 0.8 K/UL    Basophils Absolute 0.1 0.0 - 0.2 K/UL    Immature Granulocytes Absolute 0.0 0.0 - 0.5 K/UL   Lipase   Result Value Ref Range    Lipase 27 13 - 60 U/L   EKG 12 Lead   Result Value Ref Range

## 2024-11-20 NOTE — PROGRESS NOTES
TRANSFER - OUT REPORT:    Verbal report given to Tracy RN on Feliciano Bettencourt  being transferred to ER room 9 for routine progression of patient care       Report consisted of patient's Situation, Background, Assessment and   Recommendations(SBAR).     Information from the following report(s) Nurse Handoff Report, Intake/Output, and MAR was reviewed with the receiving nurse.    Palco Fall Assessment:    Presents to emergency department  because of falls (Syncope, seizure, or loss of consciousness): No  Age > 70: No  Altered Mental Status, Intoxication with alcohol or substance confusion (Disorientation, impaired judgment, poor safety awaremess, or inability to follow instructions): No  Impaired Mobility: Ambulates or transfers with assistive devices or assistance; Unable to ambulate or transer.: No             Lines:   Peripheral IV 11/20/24 Right;Dorsal Forearm (Active)        Opportunity for questions and clarification was provided.      Patient transported with:  Tech

## 2024-11-21 ENCOUNTER — CLINICAL DOCUMENTATION (OUTPATIENT)
Dept: CASE MANAGEMENT | Age: 49
End: 2024-11-21

## 2024-11-21 NOTE — PROGRESS NOTES
Called patient's doctor, DR Isrrael Espinosa to request follow up MRI since pt was seen in the ER at [facility] and was referred to Oncology. We will need more information before accepting as Oncology patient at this time. Dr Isrrael Espinosa has since discharged pt from practice.    Called Donavan Lala NP on care team list and requested a call return for her to possibly order liver MRI for more follow up. Awaiting call return

## 2024-11-25 ENCOUNTER — APPOINTMENT (OUTPATIENT)
Dept: GENERAL RADIOLOGY | Age: 49
DRG: 640 | End: 2024-11-25
Payer: MEDICARE

## 2024-11-25 ENCOUNTER — HOSPITAL ENCOUNTER (INPATIENT)
Age: 49
LOS: 3 days | Discharge: HOME HEALTH | DRG: 640 | End: 2024-11-28
Attending: EMERGENCY MEDICINE | Admitting: INTERNAL MEDICINE
Payer: MEDICARE

## 2024-11-25 DIAGNOSIS — R18.8 CIRRHOSIS OF LIVER WITH ASCITES, UNSPECIFIED HEPATIC CIRRHOSIS TYPE (HCC): ICD-10-CM

## 2024-11-25 DIAGNOSIS — N18.6 ESRD ON DIALYSIS (HCC): ICD-10-CM

## 2024-11-25 DIAGNOSIS — R18.8 OTHER ASCITES: ICD-10-CM

## 2024-11-25 DIAGNOSIS — K74.60 CIRRHOSIS OF LIVER WITH ASCITES, UNSPECIFIED HEPATIC CIRRHOSIS TYPE (HCC): ICD-10-CM

## 2024-11-25 DIAGNOSIS — N18.6 ESRD ON DIALYSIS (HCC): Primary | ICD-10-CM

## 2024-11-25 DIAGNOSIS — Z99.2 ESRD ON DIALYSIS (HCC): ICD-10-CM

## 2024-11-25 DIAGNOSIS — E87.70 HYPERVOLEMIA, UNSPECIFIED HYPERVOLEMIA TYPE: ICD-10-CM

## 2024-11-25 DIAGNOSIS — J81.0 ACUTE PULMONARY EDEMA: ICD-10-CM

## 2024-11-25 DIAGNOSIS — Z99.2 ESRD ON DIALYSIS (HCC): Primary | ICD-10-CM

## 2024-11-25 DIAGNOSIS — E87.5 HYPERKALEMIA: ICD-10-CM

## 2024-11-25 DIAGNOSIS — K76.9 LIVER LESION: Primary | ICD-10-CM

## 2024-11-25 DIAGNOSIS — R93.5 ABNORMAL CT OF THE ABDOMEN: ICD-10-CM

## 2024-11-25 PROBLEM — I60.9 SAH (SUBARACHNOID HEMORRHAGE) (HCC): Status: RESOLVED | Noted: 2019-08-05 | Resolved: 2024-11-25

## 2024-11-25 LAB
ALBUMIN SERPL-MCNC: 2.6 G/DL (ref 3.5–5)
ALBUMIN/GLOB SERPL: 0.8 (ref 1–1.9)
ALP SERPL-CCNC: 109 U/L (ref 40–129)
ALT SERPL-CCNC: 9 U/L (ref 8–55)
ANION GAP SERPL CALC-SCNC: 13 MMOL/L (ref 7–16)
ANION GAP SERPL CALC-SCNC: 13 MMOL/L (ref 7–16)
AST SERPL-CCNC: 17 U/L (ref 15–37)
BASOPHILS # BLD: 0.1 K/UL (ref 0–0.2)
BASOPHILS NFR BLD: 1 % (ref 0–2)
BILIRUB SERPL-MCNC: 0.3 MG/DL (ref 0–1.2)
BUN SERPL-MCNC: 53 MG/DL (ref 6–23)
BUN SERPL-MCNC: 56 MG/DL (ref 6–23)
CALCIUM SERPL-MCNC: 8.8 MG/DL (ref 8.8–10.2)
CALCIUM SERPL-MCNC: 9 MG/DL (ref 8.8–10.2)
CHLORIDE SERPL-SCNC: 100 MMOL/L (ref 98–107)
CHLORIDE SERPL-SCNC: 101 MMOL/L (ref 98–107)
CO2 SERPL-SCNC: 29 MMOL/L (ref 20–29)
CO2 SERPL-SCNC: 29 MMOL/L (ref 20–29)
CREAT SERPL-MCNC: 10.2 MG/DL (ref 0.8–1.3)
CREAT SERPL-MCNC: 10.3 MG/DL (ref 0.8–1.3)
DIFFERENTIAL METHOD BLD: ABNORMAL
EKG ATRIAL RATE: 79 BPM
EKG DIAGNOSIS: NORMAL
EKG P AXIS: 68 DEGREES
EKG P-R INTERVAL: 213 MS
EKG Q-T INTERVAL: 486 MS
EKG QRS DURATION: 173 MS
EKG QTC CALCULATION (BAZETT): 554 MS
EKG R AXIS: -64 DEGREES
EKG T AXIS: 96 DEGREES
EKG VENTRICULAR RATE: 78 BPM
EOSINOPHIL # BLD: 0.2 K/UL (ref 0–0.8)
EOSINOPHIL NFR BLD: 3 % (ref 0.5–7.8)
ERYTHROCYTE [DISTWIDTH] IN BLOOD BY AUTOMATED COUNT: 15.3 % (ref 11.9–14.6)
GLOBULIN SER CALC-MCNC: 3.3 G/DL (ref 2.3–3.5)
GLUCOSE BLD STRIP.AUTO-MCNC: 107 MG/DL (ref 65–100)
GLUCOSE BLD STRIP.AUTO-MCNC: 211 MG/DL (ref 65–100)
GLUCOSE SERPL-MCNC: 82 MG/DL (ref 70–99)
GLUCOSE SERPL-MCNC: 99 MG/DL (ref 70–99)
HCT VFR BLD AUTO: 34.4 % (ref 41.1–50.3)
HGB BLD-MCNC: 10.8 G/DL (ref 13.6–17.2)
IMM GRANULOCYTES # BLD AUTO: 0 K/UL (ref 0–0.5)
IMM GRANULOCYTES NFR BLD AUTO: 0 % (ref 0–5)
LACTATE SERPL-SCNC: 1.2 MMOL/L (ref 0.5–2)
LIPASE SERPL-CCNC: 38 U/L (ref 13–60)
LYMPHOCYTES # BLD: 0.8 K/UL (ref 0.5–4.6)
LYMPHOCYTES NFR BLD: 10 % (ref 13–44)
MCH RBC QN AUTO: 30.9 PG (ref 26.1–32.9)
MCHC RBC AUTO-ENTMCNC: 31.4 G/DL (ref 31.4–35)
MCV RBC AUTO: 98.6 FL (ref 82–102)
MONOCYTES # BLD: 0.8 K/UL (ref 0.1–1.3)
MONOCYTES NFR BLD: 10 % (ref 4–12)
NEUTS SEG # BLD: 6 K/UL (ref 1.7–8.2)
NEUTS SEG NFR BLD: 76 % (ref 43–78)
NRBC # BLD: 0 K/UL (ref 0–0.2)
PLATELET # BLD AUTO: 138 K/UL (ref 150–450)
PMV BLD AUTO: 10.9 FL (ref 9.4–12.3)
POTASSIUM SERPL-SCNC: 6.6 MMOL/L (ref 3.5–5.1)
POTASSIUM SERPL-SCNC: 6.7 MMOL/L (ref 3.5–5.1)
PROT SERPL-MCNC: 5.9 G/DL (ref 6.3–8.2)
RBC # BLD AUTO: 3.49 M/UL (ref 4.23–5.6)
SERVICE CMNT-IMP: ABNORMAL
SERVICE CMNT-IMP: ABNORMAL
SODIUM SERPL-SCNC: 142 MMOL/L (ref 136–145)
SODIUM SERPL-SCNC: 142 MMOL/L (ref 136–145)
WBC # BLD AUTO: 7.9 K/UL (ref 4.3–11.1)

## 2024-11-25 PROCEDURE — 93005 ELECTROCARDIOGRAM TRACING: CPT | Performed by: EMERGENCY MEDICINE

## 2024-11-25 PROCEDURE — 71045 X-RAY EXAM CHEST 1 VIEW: CPT

## 2024-11-25 PROCEDURE — 2500000003 HC RX 250 WO HCPCS: Performed by: EMERGENCY MEDICINE

## 2024-11-25 PROCEDURE — 93010 ELECTROCARDIOGRAM REPORT: CPT | Performed by: INTERNAL MEDICINE

## 2024-11-25 PROCEDURE — 85025 COMPLETE CBC W/AUTO DIFF WBC: CPT

## 2024-11-25 PROCEDURE — 6370000000 HC RX 637 (ALT 250 FOR IP): Performed by: INTERNAL MEDICINE

## 2024-11-25 PROCEDURE — 80053 COMPREHEN METABOLIC PANEL: CPT

## 2024-11-25 PROCEDURE — 96365 THER/PROPH/DIAG IV INF INIT: CPT

## 2024-11-25 PROCEDURE — 99285 EMERGENCY DEPT VISIT HI MDM: CPT

## 2024-11-25 PROCEDURE — 83605 ASSAY OF LACTIC ACID: CPT

## 2024-11-25 PROCEDURE — 36415 COLL VENOUS BLD VENIPUNCTURE: CPT

## 2024-11-25 PROCEDURE — 96375 TX/PRO/DX INJ NEW DRUG ADDON: CPT

## 2024-11-25 PROCEDURE — 2000000000 HC ICU R&B

## 2024-11-25 PROCEDURE — 83690 ASSAY OF LIPASE: CPT

## 2024-11-25 PROCEDURE — 82962 GLUCOSE BLOOD TEST: CPT

## 2024-11-25 PROCEDURE — 6370000000 HC RX 637 (ALT 250 FOR IP): Performed by: EMERGENCY MEDICINE

## 2024-11-25 PROCEDURE — 6360000002 HC RX W HCPCS: Performed by: EMERGENCY MEDICINE

## 2024-11-25 RX ORDER — HEPARIN SODIUM 5000 [USP'U]/ML
5000 INJECTION, SOLUTION INTRAVENOUS; SUBCUTANEOUS EVERY 8 HOURS SCHEDULED
Status: DISCONTINUED | OUTPATIENT
Start: 2024-11-26 | End: 2024-11-28 | Stop reason: HOSPADM

## 2024-11-25 RX ORDER — ONDANSETRON 4 MG/1
4 TABLET, ORALLY DISINTEGRATING ORAL EVERY 8 HOURS PRN
Status: DISCONTINUED | OUTPATIENT
Start: 2024-11-25 | End: 2024-11-28 | Stop reason: HOSPADM

## 2024-11-25 RX ORDER — FAMOTIDINE 20 MG/1
10 TABLET, FILM COATED ORAL DAILY PRN
Status: DISCONTINUED | OUTPATIENT
Start: 2024-11-25 | End: 2024-11-28 | Stop reason: HOSPADM

## 2024-11-25 RX ORDER — SODIUM CHLORIDE 0.9 % (FLUSH) 0.9 %
5-40 SYRINGE (ML) INJECTION EVERY 12 HOURS SCHEDULED
Status: DISCONTINUED | OUTPATIENT
Start: 2024-11-25 | End: 2024-11-28 | Stop reason: HOSPADM

## 2024-11-25 RX ORDER — CALCIUM ACETATE 667 MG/1
2 TABLET ORAL
Status: DISCONTINUED | OUTPATIENT
Start: 2024-11-26 | End: 2024-11-26

## 2024-11-25 RX ORDER — DEXTROSE MONOHYDRATE 25 G/50ML
25 INJECTION, SOLUTION INTRAVENOUS
Status: COMPLETED | OUTPATIENT
Start: 2024-11-25 | End: 2024-11-25

## 2024-11-25 RX ORDER — ONDANSETRON 2 MG/ML
4 INJECTION INTRAMUSCULAR; INTRAVENOUS EVERY 6 HOURS PRN
Status: DISCONTINUED | OUTPATIENT
Start: 2024-11-25 | End: 2024-11-28 | Stop reason: HOSPADM

## 2024-11-25 RX ORDER — HYDRALAZINE HYDROCHLORIDE 25 MG/1
37.5 TABLET, FILM COATED ORAL EVERY 8 HOURS SCHEDULED
Status: DISCONTINUED | OUTPATIENT
Start: 2024-11-25 | End: 2024-11-28 | Stop reason: HOSPADM

## 2024-11-25 RX ORDER — MAGNESIUM HYDROXIDE/ALUMINUM HYDROXICE/SIMETHICONE 120; 1200; 1200 MG/30ML; MG/30ML; MG/30ML
30 SUSPENSION ORAL EVERY 6 HOURS PRN
Status: DISCONTINUED | OUTPATIENT
Start: 2024-11-25 | End: 2024-11-28 | Stop reason: HOSPADM

## 2024-11-25 RX ORDER — CALCIUM GLUCONATE 20 MG/ML
1000 INJECTION, SOLUTION INTRAVENOUS
Status: COMPLETED | OUTPATIENT
Start: 2024-11-25 | End: 2024-11-25

## 2024-11-25 RX ORDER — POLYETHYLENE GLYCOL 3350 17 G/17G
17 POWDER, FOR SOLUTION ORAL DAILY PRN
Status: DISCONTINUED | OUTPATIENT
Start: 2024-11-25 | End: 2024-11-28 | Stop reason: HOSPADM

## 2024-11-25 RX ORDER — HYDROXYZINE HYDROCHLORIDE 25 MG/1
25 TABLET, FILM COATED ORAL 3 TIMES DAILY PRN
Status: DISCONTINUED | OUTPATIENT
Start: 2024-11-25 | End: 2024-11-28 | Stop reason: HOSPADM

## 2024-11-25 RX ORDER — ACETAMINOPHEN 650 MG/1
650 SUPPOSITORY RECTAL EVERY 6 HOURS PRN
Status: DISCONTINUED | OUTPATIENT
Start: 2024-11-25 | End: 2024-11-28 | Stop reason: HOSPADM

## 2024-11-25 RX ORDER — ACETAMINOPHEN 325 MG/1
650 TABLET ORAL EVERY 6 HOURS PRN
Status: DISCONTINUED | OUTPATIENT
Start: 2024-11-25 | End: 2024-11-28 | Stop reason: HOSPADM

## 2024-11-25 RX ORDER — PRAZOSIN HYDROCHLORIDE 1 MG/1
2 CAPSULE ORAL NIGHTLY
Status: DISCONTINUED | OUTPATIENT
Start: 2024-11-25 | End: 2024-11-28 | Stop reason: HOSPADM

## 2024-11-25 RX ORDER — CINACALCET 30 MG/1
30 TABLET, FILM COATED ORAL DAILY
Status: DISCONTINUED | OUTPATIENT
Start: 2024-11-26 | End: 2024-11-28 | Stop reason: HOSPADM

## 2024-11-25 RX ORDER — BISACODYL 10 MG
10 SUPPOSITORY, RECTAL RECTAL DAILY PRN
Status: DISCONTINUED | OUTPATIENT
Start: 2024-11-25 | End: 2024-11-28 | Stop reason: HOSPADM

## 2024-11-25 RX ORDER — LOSARTAN POTASSIUM 50 MG/1
100 TABLET ORAL DAILY
Status: DISCONTINUED | OUTPATIENT
Start: 2024-11-26 | End: 2024-11-26

## 2024-11-25 RX ORDER — SODIUM CHLORIDE 0.9 % (FLUSH) 0.9 %
5-40 SYRINGE (ML) INJECTION PRN
Status: DISCONTINUED | OUTPATIENT
Start: 2024-11-25 | End: 2024-11-28 | Stop reason: HOSPADM

## 2024-11-25 RX ORDER — SODIUM CHLORIDE 9 MG/ML
INJECTION, SOLUTION INTRAVENOUS PRN
Status: DISCONTINUED | OUTPATIENT
Start: 2024-11-25 | End: 2024-11-28 | Stop reason: HOSPADM

## 2024-11-25 RX ORDER — FUROSEMIDE 10 MG/ML
40 INJECTION INTRAMUSCULAR; INTRAVENOUS 2 TIMES DAILY
Status: DISCONTINUED | OUTPATIENT
Start: 2024-11-26 | End: 2024-11-27

## 2024-11-25 RX ORDER — ASPIRIN 325 MG
325 TABLET ORAL DAILY
Status: DISCONTINUED | OUTPATIENT
Start: 2024-11-26 | End: 2024-11-28 | Stop reason: HOSPADM

## 2024-11-25 RX ORDER — OXYCODONE HYDROCHLORIDE 5 MG/1
10 TABLET ORAL
Status: COMPLETED | OUTPATIENT
Start: 2024-11-25 | End: 2024-11-25

## 2024-11-25 RX ADMIN — SODIUM ZIRCONIUM CYCLOSILICATE 5 G: 5 POWDER, FOR SUSPENSION ORAL at 22:35

## 2024-11-25 RX ADMIN — CALCIUM GLUCONATE 1000 MG: 20 INJECTION, SOLUTION INTRAVENOUS at 18:56

## 2024-11-25 RX ADMIN — INSULIN HUMAN 5 UNITS: 100 INJECTION, SOLUTION PARENTERAL at 20:02

## 2024-11-25 RX ADMIN — DEXTROSE MONOHYDRATE 25 G: 25 INJECTION, SOLUTION INTRAVENOUS at 19:36

## 2024-11-25 RX ADMIN — HYDRALAZINE HYDROCHLORIDE 37.5 MG: 25 TABLET ORAL at 22:33

## 2024-11-25 RX ADMIN — OXYCODONE 10 MG: 5 TABLET ORAL at 18:05

## 2024-11-25 ASSESSMENT — ENCOUNTER SYMPTOMS
VOMITING: 0
ABDOMINAL PAIN: 1
BACK PAIN: 0
SHORTNESS OF BREATH: 0
COLOR CHANGE: 0
ABDOMINAL DISTENTION: 1
COUGH: 0
NAUSEA: 0
CONSTIPATION: 0
DIARRHEA: 0

## 2024-11-25 ASSESSMENT — PAIN DESCRIPTION - LOCATION: LOCATION: PERINEUM

## 2024-11-25 ASSESSMENT — PAIN SCALES - GENERAL
PAINLEVEL_OUTOF10: 2
PAINLEVEL_OUTOF10: 10

## 2024-11-25 ASSESSMENT — PAIN - FUNCTIONAL ASSESSMENT: PAIN_FUNCTIONAL_ASSESSMENT: 0-10

## 2024-11-25 ASSESSMENT — PAIN DESCRIPTION - DESCRIPTORS: DESCRIPTORS: DISCOMFORT

## 2024-11-25 NOTE — ED PROVIDER NOTES
Emergency Department Provider Note       PCP: Kristal Steven MD   Age: 49 y.o.   Sex: male     DISPOSITION Decision To Admit 11/25/2024 06:30:18 PM    ICD-10-CM    1. ESRD on dialysis (HCC)  N18.6     Z99.2       2. Hyperkalemia  E87.5       3. Other ascites  R18.8       4. Acute pulmonary edema  J81.0       5. Hypervolemia, unspecified hypervolemia type  E87.70           Medical Decision Making     Patient presents with abdominal pain and swelling with large volume ascites.  Has volume overload with bilateral lower extremity edema and scrotal swelling.  Feels short of breath.  He is on end-stage renal disease with dialysis.  His potassium is elevated today at 6.7.  He recently had abdominal paracentesis with 6 L removed and feels the fluid has come back.  Will admit for further treatment and evaluation.     1 or more acute illnesses that pose a threat to life or bodily function.   Prescription drug management performed.  Parental controlled substances given in the ED.  Chronic medical problems impacting care include ESRD.  Shared medical decision making was utilized in creating the patients health plan today.  I independently ordered and reviewed each unique test.    I reviewed external records: provider visit note from outside specialist.     ED cardiac monitoring rhythm strip was ordered and interpreted:  sinus rhythm, no evidence of an arrhythmia  ST Segments:Nonspecific ST segments - NO STEMI   Rate: 75  I interpreted the X-rays no infiltrate.    EKG: normal sinus rhythm, nonspecific ST and T waves changes. Rate 78. Wide QRS.       The patient was admitted and I have discussed patient management with the admitting provider.          History     Patient seen in the ER November 20.  Note follows.  Medical Decision Making     49-year-old male with history of SAH, communicating hydrocephalus, hypertension, ESRD on HD Monday, Wednesday, Friday who last dialyzed on Monday who presents with complaint of worsening

## 2024-11-26 ENCOUNTER — HOSPITAL ENCOUNTER (INPATIENT)
Dept: ULTRASOUND IMAGING | Age: 49
Discharge: HOME | DRG: 640 | End: 2024-11-29
Payer: MEDICARE

## 2024-11-26 ENCOUNTER — APPOINTMENT (OUTPATIENT)
Dept: ULTRASOUND IMAGING | Age: 49
DRG: 640 | End: 2024-11-26
Payer: MEDICARE

## 2024-11-26 LAB
ANION GAP SERPL CALC-SCNC: 11 MMOL/L (ref 7–16)
ANION GAP SERPL CALC-SCNC: 13 MMOL/L (ref 7–16)
BASOPHILS # BLD: 0.1 K/UL (ref 0–0.2)
BASOPHILS NFR BLD: 1 % (ref 0–2)
BUN SERPL-MCNC: 28 MG/DL (ref 6–23)
BUN SERPL-MCNC: 59 MG/DL (ref 6–23)
CALCIUM SERPL-MCNC: 8.6 MG/DL (ref 8.8–10.2)
CALCIUM SERPL-MCNC: 9 MG/DL (ref 8.8–10.2)
CEA SERPL-MCNC: 2.6 NG/ML (ref 0–3.8)
CHLORIDE SERPL-SCNC: 98 MMOL/L (ref 98–107)
CHLORIDE SERPL-SCNC: 99 MMOL/L (ref 98–107)
CO2 SERPL-SCNC: 28 MMOL/L (ref 20–29)
CO2 SERPL-SCNC: 28 MMOL/L (ref 20–29)
CREAT SERPL-MCNC: 10.9 MG/DL (ref 0.8–1.3)
CREAT SERPL-MCNC: 5.9 MG/DL (ref 0.8–1.3)
DIFFERENTIAL METHOD BLD: ABNORMAL
EOSINOPHIL # BLD: 0.3 K/UL (ref 0–0.8)
EOSINOPHIL NFR BLD: 3 % (ref 0.5–7.8)
ERYTHROCYTE [DISTWIDTH] IN BLOOD BY AUTOMATED COUNT: 15.4 % (ref 11.9–14.6)
GLUCOSE BLD STRIP.AUTO-MCNC: 104 MG/DL (ref 65–100)
GLUCOSE SERPL-MCNC: 105 MG/DL (ref 70–99)
GLUCOSE SERPL-MCNC: 90 MG/DL (ref 70–99)
HCT VFR BLD AUTO: 34.9 % (ref 41.1–50.3)
HGB BLD-MCNC: 11 G/DL (ref 13.6–17.2)
IMM GRANULOCYTES # BLD AUTO: 0 K/UL (ref 0–0.5)
IMM GRANULOCYTES NFR BLD AUTO: 0 % (ref 0–5)
LYMPHOCYTES # BLD: 0.9 K/UL (ref 0.5–4.6)
LYMPHOCYTES NFR BLD: 11 % (ref 13–44)
MCH RBC QN AUTO: 30.6 PG (ref 26.1–32.9)
MCHC RBC AUTO-ENTMCNC: 31.5 G/DL (ref 31.4–35)
MCV RBC AUTO: 97.2 FL (ref 82–102)
MONOCYTES # BLD: 0.8 K/UL (ref 0.1–1.3)
MONOCYTES NFR BLD: 9 % (ref 4–12)
NEUTS SEG # BLD: 6.5 K/UL (ref 1.7–8.2)
NEUTS SEG NFR BLD: 76 % (ref 43–78)
NRBC # BLD: 0 K/UL (ref 0–0.2)
PHOSPHATE SERPL-MCNC: 3.3 MG/DL (ref 2.5–4.5)
PLATELET # BLD AUTO: 153 K/UL (ref 150–450)
PMV BLD AUTO: 10.9 FL (ref 9.4–12.3)
POTASSIUM SERPL-SCNC: 4.5 MMOL/L (ref 3.5–5.1)
POTASSIUM SERPL-SCNC: 7.3 MMOL/L (ref 3.5–5.1)
RBC # BLD AUTO: 3.59 M/UL (ref 4.23–5.6)
SERVICE CMNT-IMP: ABNORMAL
SODIUM SERPL-SCNC: 137 MMOL/L (ref 136–145)
SODIUM SERPL-SCNC: 141 MMOL/L (ref 136–145)
WBC # BLD AUTO: 8.6 K/UL (ref 4.3–11.1)

## 2024-11-26 PROCEDURE — 5A1D70Z PERFORMANCE OF URINARY FILTRATION, INTERMITTENT, LESS THAN 6 HOURS PER DAY: ICD-10-PCS | Performed by: INTERNAL MEDICINE

## 2024-11-26 PROCEDURE — 2580000003 HC RX 258: Performed by: INTERNAL MEDICINE

## 2024-11-26 PROCEDURE — 82105 ALPHA-FETOPROTEIN SERUM: CPT

## 2024-11-26 PROCEDURE — 84100 ASSAY OF PHOSPHORUS: CPT

## 2024-11-26 PROCEDURE — 6370000000 HC RX 637 (ALT 250 FOR IP): Performed by: INTERNAL MEDICINE

## 2024-11-26 PROCEDURE — 6360000002 HC RX W HCPCS: Performed by: HOSPITALIST

## 2024-11-26 PROCEDURE — 6370000000 HC RX 637 (ALT 250 FOR IP): Performed by: HOSPITALIST

## 2024-11-26 PROCEDURE — 85025 COMPLETE CBC W/AUTO DIFF WBC: CPT

## 2024-11-26 PROCEDURE — 36415 COLL VENOUS BLD VENIPUNCTURE: CPT

## 2024-11-26 PROCEDURE — 2500000003 HC RX 250 WO HCPCS: Performed by: INTERNAL MEDICINE

## 2024-11-26 PROCEDURE — 94644 CONT INHLJ TX 1ST HOUR: CPT

## 2024-11-26 PROCEDURE — 82962 GLUCOSE BLOOD TEST: CPT

## 2024-11-26 PROCEDURE — 82378 CARCINOEMBRYONIC ANTIGEN: CPT

## 2024-11-26 PROCEDURE — 2580000003 HC RX 258: Performed by: HOSPITALIST

## 2024-11-26 PROCEDURE — 1100000000 HC RM PRIVATE

## 2024-11-26 PROCEDURE — 6360000002 HC RX W HCPCS: Performed by: INTERNAL MEDICINE

## 2024-11-26 PROCEDURE — 86301 IMMUNOASSAY TUMOR CA 19-9: CPT

## 2024-11-26 PROCEDURE — 1100000003 HC PRIVATE W/ TELEMETRY

## 2024-11-26 PROCEDURE — 80048 BASIC METABOLIC PNL TOTAL CA: CPT

## 2024-11-26 PROCEDURE — 90935 HEMODIALYSIS ONE EVALUATION: CPT

## 2024-11-26 PROCEDURE — 76870 US EXAM SCROTUM: CPT

## 2024-11-26 PROCEDURE — 2500000003 HC RX 250 WO HCPCS: Performed by: HOSPITALIST

## 2024-11-26 RX ORDER — AMLODIPINE BESYLATE 5 MG/1
5 TABLET ORAL DAILY
Status: DISCONTINUED | OUTPATIENT
Start: 2024-11-26 | End: 2024-11-28 | Stop reason: HOSPADM

## 2024-11-26 RX ORDER — IBUPROFEN 600 MG/1
1 TABLET ORAL PRN
Status: DISCONTINUED | OUTPATIENT
Start: 2024-11-26 | End: 2024-11-28 | Stop reason: HOSPADM

## 2024-11-26 RX ORDER — AMLODIPINE BESYLATE 10 MG/1
10 TABLET ORAL DAILY
COMMUNITY

## 2024-11-26 RX ORDER — MORPHINE SULFATE 4 MG/ML
4 INJECTION, SOLUTION INTRAMUSCULAR; INTRAVENOUS ONCE
Status: DISCONTINUED | OUTPATIENT
Start: 2024-11-26 | End: 2024-11-28 | Stop reason: HOSPADM

## 2024-11-26 RX ORDER — CALCIUM GLUCONATE 20 MG/ML
1000 INJECTION, SOLUTION INTRAVENOUS ONCE
Status: COMPLETED | OUTPATIENT
Start: 2024-11-26 | End: 2024-11-26

## 2024-11-26 RX ORDER — DEXTROSE MONOHYDRATE 100 MG/ML
INJECTION, SOLUTION INTRAVENOUS CONTINUOUS PRN
Status: DISCONTINUED | OUTPATIENT
Start: 2024-11-26 | End: 2024-11-28 | Stop reason: HOSPADM

## 2024-11-26 RX ORDER — HYDROCODONE BITARTRATE AND ACETAMINOPHEN 5; 325 MG/1; MG/1
1 TABLET ORAL EVERY 6 HOURS PRN
Status: DISCONTINUED | OUTPATIENT
Start: 2024-11-26 | End: 2024-11-28 | Stop reason: HOSPADM

## 2024-11-26 RX ORDER — PRAZOSIN HYDROCHLORIDE 2 MG/1
2 CAPSULE ORAL NIGHTLY
COMMUNITY

## 2024-11-26 RX ORDER — HYDRALAZINE HYDROCHLORIDE 50 MG/1
50 TABLET, FILM COATED ORAL 3 TIMES DAILY
COMMUNITY

## 2024-11-26 RX ORDER — CINACALCET 30 MG/1
30 TABLET, FILM COATED ORAL DAILY
COMMUNITY

## 2024-11-26 RX ORDER — CARVEDILOL 25 MG/1
25 TABLET ORAL 2 TIMES DAILY WITH MEALS
COMMUNITY

## 2024-11-26 RX ORDER — TELMISARTAN 80 MG/1
80 TABLET ORAL DAILY
Status: ON HOLD | COMMUNITY
End: 2024-11-28 | Stop reason: HOSPADM

## 2024-11-26 RX ORDER — ALBUTEROL SULFATE 5 MG/ML
10 SOLUTION RESPIRATORY (INHALATION) ONCE
Status: COMPLETED | OUTPATIENT
Start: 2024-11-26 | End: 2024-11-26

## 2024-11-26 RX ORDER — SODIUM POLYSTYRENE SULFONATE 15 G/60ML
30 SUSPENSION ORAL; RECTAL ONCE
Status: DISCONTINUED | OUTPATIENT
Start: 2024-11-26 | End: 2024-11-26

## 2024-11-26 RX ORDER — MORPHINE SULFATE 2 MG/ML
2 INJECTION, SOLUTION INTRAMUSCULAR; INTRAVENOUS ONCE
Status: COMPLETED | OUTPATIENT
Start: 2024-11-26 | End: 2024-11-26

## 2024-11-26 RX ORDER — LEVETIRACETAM 750 MG/1
750 TABLET ORAL 2 TIMES DAILY
COMMUNITY

## 2024-11-26 RX ADMIN — HEPARIN SODIUM 5000 UNITS: 5000 INJECTION INTRAVENOUS; SUBCUTANEOUS at 12:26

## 2024-11-26 RX ADMIN — SODIUM ZIRCONIUM CYCLOSILICATE 10 G: 10 POWDER, FOR SUSPENSION ORAL at 07:21

## 2024-11-26 RX ADMIN — FAMOTIDINE 10 MG: 20 TABLET, FILM COATED ORAL at 12:25

## 2024-11-26 RX ADMIN — HYDRALAZINE HYDROCHLORIDE 37.5 MG: 25 TABLET ORAL at 21:33

## 2024-11-26 RX ADMIN — PRAZOSIN HYDROCHLORIDE 2 MG: 1 CAPSULE ORAL at 21:33

## 2024-11-26 RX ADMIN — SODIUM BICARBONATE 50 MEQ: 84 INJECTION, SOLUTION INTRAVENOUS at 06:19

## 2024-11-26 RX ADMIN — FUROSEMIDE 40 MG: 10 INJECTION, SOLUTION INTRAMUSCULAR; INTRAVENOUS at 16:52

## 2024-11-26 RX ADMIN — SODIUM CHLORIDE, PRESERVATIVE FREE 10 ML: 5 INJECTION INTRAVENOUS at 08:25

## 2024-11-26 RX ADMIN — HYDROXYZINE HYDROCHLORIDE 25 MG: 25 TABLET, FILM COATED ORAL at 08:41

## 2024-11-26 RX ADMIN — CINACALCET HYDROCHLORIDE 30 MG: 30 TABLET, FILM COATED ORAL at 08:25

## 2024-11-26 RX ADMIN — HYDROCODONE BITARTRATE AND ACETAMINOPHEN 1 TABLET: 5; 325 TABLET ORAL at 12:24

## 2024-11-26 RX ADMIN — HYDRALAZINE HYDROCHLORIDE 37.5 MG: 25 TABLET ORAL at 07:21

## 2024-11-26 RX ADMIN — SODIUM CHLORIDE, PRESERVATIVE FREE 10 ML: 5 INJECTION INTRAVENOUS at 01:37

## 2024-11-26 RX ADMIN — INSULIN HUMAN 10 UNITS: 100 INJECTION, SOLUTION PARENTERAL at 05:55

## 2024-11-26 RX ADMIN — ASPIRIN 325 MG: 325 TABLET, FILM COATED ORAL at 08:25

## 2024-11-26 RX ADMIN — SODIUM ZIRCONIUM CYCLOSILICATE 5 G: 5 POWDER, FOR SUSPENSION ORAL at 08:25

## 2024-11-26 RX ADMIN — MORPHINE SULFATE 2 MG: 2 INJECTION, SOLUTION INTRAMUSCULAR; INTRAVENOUS at 09:13

## 2024-11-26 RX ADMIN — HYDROXYZINE HYDROCHLORIDE 25 MG: 25 TABLET, FILM COATED ORAL at 01:36

## 2024-11-26 RX ADMIN — ONDANSETRON 4 MG: 2 INJECTION INTRAMUSCULAR; INTRAVENOUS at 08:41

## 2024-11-26 RX ADMIN — ONDANSETRON 4 MG: 2 INJECTION INTRAMUSCULAR; INTRAVENOUS at 03:05

## 2024-11-26 RX ADMIN — AMLODIPINE BESYLATE 5 MG: 5 TABLET ORAL at 09:12

## 2024-11-26 RX ADMIN — FUROSEMIDE 40 MG: 10 INJECTION, SOLUTION INTRAMUSCULAR; INTRAVENOUS at 08:25

## 2024-11-26 RX ADMIN — ALBUTEROL SULFATE 10 MG: 2.5 SOLUTION RESPIRATORY (INHALATION) at 06:57

## 2024-11-26 RX ADMIN — HYDRALAZINE HYDROCHLORIDE 37.5 MG: 25 TABLET ORAL at 12:25

## 2024-11-26 RX ADMIN — CALCIUM GLUCONATE 1000 MG: 20 INJECTION, SOLUTION INTRAVENOUS at 05:40

## 2024-11-26 RX ADMIN — HYDROXYZINE HYDROCHLORIDE 25 MG: 25 TABLET, FILM COATED ORAL at 14:14

## 2024-11-26 RX ADMIN — SODIUM ZIRCONIUM CYCLOSILICATE 5 G: 5 POWDER, FOR SUSPENSION ORAL at 21:35

## 2024-11-26 RX ADMIN — DEXTROSE MONOHYDRATE 250 ML: 100 INJECTION, SOLUTION INTRAVENOUS at 05:54

## 2024-11-26 RX ADMIN — SODIUM CHLORIDE, PRESERVATIVE FREE 10 ML: 5 INJECTION INTRAVENOUS at 21:36

## 2024-11-26 RX ADMIN — HEPARIN SODIUM 5000 UNITS: 5000 INJECTION INTRAVENOUS; SUBCUTANEOUS at 06:16

## 2024-11-26 RX ADMIN — LEVETIRACETAM 750 MG: 500 TABLET, FILM COATED ORAL at 08:25

## 2024-11-26 ASSESSMENT — PAIN SCALES - GENERAL
PAINLEVEL_OUTOF10: 8
PAINLEVEL_OUTOF10: 0
PAINLEVEL_OUTOF10: 5
PAINLEVEL_OUTOF10: 0
PAINLEVEL_OUTOF10: 5
PAINLEVEL_OUTOF10: 8

## 2024-11-26 ASSESSMENT — PAIN DESCRIPTION - LOCATION
LOCATION: GENERALIZED
LOCATION: GENERALIZED

## 2024-11-26 NOTE — DIALYSIS
HD initiated bedside 3109 (room number) using Left UAF and 15g needles. Consent verified for renal replacement therapy.     Patient alert and /89 , P 71 ,  Spo2 97% via RA.    Machine settings per MD order.    Heparin 0 unit bolus and 0 units/hr.    Report given to ELAINA Blakely.    Will monitor during treatment.

## 2024-11-26 NOTE — H&P
is 21.7 kg/m² as calculated from the following:    Height as of this encounter: 1.829 m (6').    Weight as of this encounter: 72.6 kg (160 lb).  No intake or output data in the 24 hours ending 11/25/24 1940      Physical Exam:  Vital signs: 130/68 75 20 98 6  General:    Well nourished.    Head:  Normocephalic, atraumatic  Eyes:  Sclerae appear normal.  Pupils equally round.  ENT:  Nares appear normal.  Moist oral mucosa  Neck:  No restricted ROM.  Trachea midline   CV:   RRR.  No m/r/g.  No jugular venous distension.  Lungs:   CTAB.  No wheezing, rhonchi, or rales.  Symmetric expansion.  Abdomen:   Soft, nontender, nondistended.  Extremities: No cyanosis or clubbing.  No edema  Skin:     No rashes.  Normal coloration.   Warm and dry.    Neuro:  CN II-XII grossly intact.  Sensation intact.   Psych:  Normal mood and affect.      Orders Placed This Encounter   Medications    oxyCODONE (ROXICODONE) immediate release tablet 10 mg    calcium gluconate 1,000 mg in sodium chloride 50 mL    insulin regular (HumuLIN R;NovoLIN R) injection 10 Units    dextrose 50 % IV solution       Prior to Admit Medications:  Current Outpatient Medications   Medication Instructions    aspirin 325 mg, Oral, DAILY    hydrALAZINE (APRESOLINE) 25 mg, Oral, 3 TIMES DAILY       I have personally reviewed labs and tests:  Recent Results (from the past 24 hour(s))   CBC with Auto Differential    Collection Time: 11/25/24  5:25 PM   Result Value Ref Range    WBC 7.9 4.3 - 11.1 K/uL    RBC 3.49 (L) 4.23 - 5.6 M/uL    Hemoglobin 10.8 (L) 13.6 - 17.2 g/dL    Hematocrit 34.4 (L) 41.1 - 50.3 %    MCV 98.6 82 - 102 FL    MCH 30.9 26.1 - 32.9 PG    MCHC 31.4 31.4 - 35.0 g/dL    RDW 15.3 (H) 11.9 - 14.6 %    Platelets 138 (L) 150 - 450 K/uL    MPV 10.9 9.4 - 12.3 FL    nRBC 0.00 0.0 - 0.2 K/uL    Differential Type AUTOMATED      Neutrophils % 76 43 - 78 %    Lymphocytes % 10 (L) 13 - 44 %    Monocytes % 10 4.0 - 12.0 %    Eosinophils % 3 0.5 - 7.8 %

## 2024-11-26 NOTE — CONSULTS
Dr. Mclean spoke with consulting provider regarding liver lesion/potential new diagnosis of cirrhosis; okay with outpatient follow up for now. GI will sign off, please reconsult as needed.

## 2024-11-26 NOTE — DIALYSIS
Hemodialysis treatment completed, Pt ran 3.5 hours,  without complications.     Patient alert and vitals stable. /77  P 84 , SPo2 90% via  room air.     2 Kg removed.     Needles x2 removed from access and manual pressure held until hemostasis complete and pressure dressing applied.      Patient remains in ICU.  Report given to ELAINA Elliott.

## 2024-11-26 NOTE — INTERDISCIPLINARY ROUNDS
Multi-D Rounds/Checklist (leapfrog):  Lines: can any be removed?: None     Hemodialysis Fistula/Graft Arteriovenous fistula Left Arm (Active)     DVT Prophylaxis: Ordered  Vent: N/A  Nutrition Ordered/appropriate: Ordered  Can antibiotics or other drugs be stopped? N/A   MRSA swab:   Inpat Anti-Infectives (From admission, onward)      None          Consults needed:  nephrology   A: Is pain control adequate? (has PRNs? Stop drip?) Yes  B: Sedation break and SBT? N/A  C: Is sedation choice appropriate? N/A  D: Delirium/CAM-ICU? No  E: Mobility goals/appropriateness? Yes  F: Family update and plan? Parent is surrogate decision maker and is being updated daily by primary attending and nursing staff.    ELENA Bunn - NP

## 2024-11-26 NOTE — ED NOTES
TRANSFER - OUT REPORT:    Verbal report given to Lena DELANEY on Feliciano Bettencourt  being transferred to [facility] for routine progression of patient care       Report consisted of patient's Situation, Background, Assessment and   Recommendations(SBAR).     Information from the following report(s) Nurse Handoff Report was reviewed with the receiving nurse.    Cynthia Fall Assessment:    Presents to emergency department  because of falls (Syncope, seizure, or loss of consciousness): No  Age > 70: No  Altered Mental Status, Intoxication with alcohol or substance confusion (Disorientation, impaired judgment, poor safety awaremess, or inability to follow instructions): No  Impaired Mobility: Ambulates or transfers with assistive devices or assistance; Unable to ambulate or transer.: No  Nursing Judgement: No          Lines:   Peripheral IV 11/25/24 Right Antecubital (Active)   Site Assessment Clean, dry & intact 11/25/24 2238   Line Status Blood return noted 11/25/24 2238   Line Care Cap changed 11/25/24 2238   Phlebitis Assessment No symptoms 11/25/24 2238   Infiltration Assessment 0 11/25/24 2238   Alcohol Cap Used No 11/25/24 2238   Dressing Status Clean, dry & intact 11/25/24 2238   Dressing Type Transparent 11/25/24 2238        Opportunity for questions and clarification was provided.      Patient transported with:  Registered Nurse          Madan Marino RN  11/25/24 2240

## 2024-11-26 NOTE — CONSULTS
[facility] CONSULT NOTE    Admission Date:  11/25/2024    Admission Diagnosis:  Hyperkalemia [E87.5]  Acute pulmonary edema [J81.0]  Other ascites [R18.8]  ESRD on dialysis (HCC) [N18.6, Z99.2]  ESRD (end stage renal disease) on dialysis (HCC) [N18.6, Z99.2]  Hypervolemia, unspecified hypervolemia type [E87.70]    Reason for consult: esrd, hyperkalemia    Subjective:   History of Present Illness:  Feliciano Bettencourt is a 49 y.o. male with a PMH of ESRD on iHD MWF at [facility] MWF, HTN, SAH who presented to the hospital due to abodm pain and scrotal swelling. Newly diagnosed liver disease and ascites about 1-2 weeks ago. Had para about 1 week ago with quick re accumulation of ascites. Found to be hyperkalemic. Dialyzed off schedule Sunday due to thanksgiving holiday week, only dialyzed 1 hr 45 min of his scheduled 4 hour treatment. Has AVF      Review of Systems  No CP or SOB    Past Medical History:   Diagnosis Date    Aneurysm (HCC)     Hypertension       Past Surgical History:   Procedure Laterality Date    IR OCCLUSIVE OR EMBOL PERC CENTL NERV SYS  7/11/2019    IR OCCLUSIVE OR EMBOL PERC CENTL NERV SYS  7/11/2019    IR OCCLUSIVE OR EMBOL PERC CENTL NERV SYS 7/11/2019 [facility]      Current Facility-Administered Medications   Medication Dose Route Frequency    morphine sulfate (PF) injection 4 mg  4 mg IntraVENous Once    glucose chewable tablet 16 g  4 tablet Oral PRN    dextrose bolus 10% 125 mL  125 mL IntraVENous PRN    Or    dextrose bolus 10% 250 mL  250 mL IntraVENous PRN    Glucagon Emergency KIT 1 mg  1 mg SubCUTAneous PRN    dextrose 10 % infusion   IntraVENous Continuous PRN    sodium polystyrene (KAYEXALATE) 15 GM/60ML suspension 30 g  30 g Rectal Once    ferric citrate (AURYXIA) tablet 420 mg (Patient Supplied)  420 mg Oral TID WC    hydrALAZINE (APRESOLINE) tablet 37.5 mg  37.5 mg Oral 3 times per day    aspirin tablet 325 mg  325 mg Oral Daily    [Held by provider] losartan

## 2024-11-26 NOTE — ED NOTES
Per Dr. Lorenzana, can change calcium gluconate to go over 10 min instead of 1hr for hyperkalemia protocol     Madan Marino, RN  11/25/24 1914

## 2024-11-27 ENCOUNTER — APPOINTMENT (OUTPATIENT)
Dept: INTERVENTIONAL RADIOLOGY/VASCULAR | Age: 49
DRG: 640 | End: 2024-11-27
Attending: INTERNAL MEDICINE
Payer: MEDICARE

## 2024-11-27 LAB
AFP-TM SERPL-MCNC: <1.82 NG/ML (ref 0–8.3)
ANION GAP SERPL CALC-SCNC: 12 MMOL/L (ref 7–16)
BASOPHILS # BLD: 0.1 K/UL (ref 0–0.2)
BASOPHILS NFR BLD: 1 % (ref 0–2)
BUN SERPL-MCNC: 35 MG/DL (ref 6–23)
CALCIUM SERPL-MCNC: 8.9 MG/DL (ref 8.8–10.2)
CANCER AG19-9 SERPL-ACNC: 36.1 U/ML (ref 0–35)
CHLORIDE SERPL-SCNC: 99 MMOL/L (ref 98–107)
CO2 SERPL-SCNC: 29 MMOL/L (ref 20–29)
CREAT SERPL-MCNC: 7.22 MG/DL (ref 0.8–1.3)
DIFFERENTIAL METHOD BLD: ABNORMAL
EOSINOPHIL # BLD: 0.2 K/UL (ref 0–0.8)
EOSINOPHIL NFR BLD: 2 % (ref 0.5–7.8)
ERYTHROCYTE [DISTWIDTH] IN BLOOD BY AUTOMATED COUNT: 15.3 % (ref 11.9–14.6)
GLUCOSE BLD STRIP.AUTO-MCNC: 107 MG/DL (ref 65–100)
GLUCOSE BLD STRIP.AUTO-MCNC: 115 MG/DL (ref 65–100)
GLUCOSE SERPL-MCNC: 90 MG/DL (ref 70–99)
HCT VFR BLD AUTO: 33.2 % (ref 41.1–50.3)
HGB BLD-MCNC: 10.4 G/DL (ref 13.6–17.2)
IMM GRANULOCYTES # BLD AUTO: 0 K/UL (ref 0–0.5)
IMM GRANULOCYTES NFR BLD AUTO: 0 % (ref 0–5)
LYMPHOCYTES # BLD: 0.6 K/UL (ref 0.5–4.6)
LYMPHOCYTES NFR BLD: 8 % (ref 13–44)
MCH RBC QN AUTO: 30.7 PG (ref 26.1–32.9)
MCHC RBC AUTO-ENTMCNC: 31.3 G/DL (ref 31.4–35)
MCV RBC AUTO: 97.9 FL (ref 82–102)
MONOCYTES # BLD: 0.8 K/UL (ref 0.1–1.3)
MONOCYTES NFR BLD: 10 % (ref 4–12)
NEUTS SEG # BLD: 5.9 K/UL (ref 1.7–8.2)
NEUTS SEG NFR BLD: 79 % (ref 43–78)
NRBC # BLD: 0 K/UL (ref 0–0.2)
PLATELET # BLD AUTO: 170 K/UL (ref 150–450)
PMV BLD AUTO: 11.1 FL (ref 9.4–12.3)
POTASSIUM SERPL-SCNC: 5.8 MMOL/L (ref 3.5–5.1)
RBC # BLD AUTO: 3.39 M/UL (ref 4.23–5.6)
SERVICE CMNT-IMP: ABNORMAL
SERVICE CMNT-IMP: ABNORMAL
SODIUM SERPL-SCNC: 140 MMOL/L (ref 136–145)
WBC # BLD AUTO: 7.5 K/UL (ref 4.3–11.1)

## 2024-11-27 PROCEDURE — 90935 HEMODIALYSIS ONE EVALUATION: CPT

## 2024-11-27 PROCEDURE — 80048 BASIC METABOLIC PNL TOTAL CA: CPT

## 2024-11-27 PROCEDURE — 36415 COLL VENOUS BLD VENIPUNCTURE: CPT

## 2024-11-27 PROCEDURE — 2580000003 HC RX 258: Performed by: INTERNAL MEDICINE

## 2024-11-27 PROCEDURE — 6370000000 HC RX 637 (ALT 250 FOR IP): Performed by: INTERNAL MEDICINE

## 2024-11-27 PROCEDURE — 76705 ECHO EXAM OF ABDOMEN: CPT | Performed by: RADIOLOGY

## 2024-11-27 PROCEDURE — 1100000003 HC PRIVATE W/ TELEMETRY

## 2024-11-27 PROCEDURE — 6360000002 HC RX W HCPCS: Performed by: INTERNAL MEDICINE

## 2024-11-27 PROCEDURE — 49083 ABD PARACENTESIS W/IMAGING: CPT

## 2024-11-27 PROCEDURE — 76705 ECHO EXAM OF ABDOMEN: CPT

## 2024-11-27 PROCEDURE — 82962 GLUCOSE BLOOD TEST: CPT

## 2024-11-27 PROCEDURE — 85025 COMPLETE CBC W/AUTO DIFF WBC: CPT

## 2024-11-27 RX ADMIN — PRAZOSIN HYDROCHLORIDE 2 MG: 1 CAPSULE ORAL at 20:49

## 2024-11-27 RX ADMIN — SODIUM CHLORIDE, PRESERVATIVE FREE 10 ML: 5 INJECTION INTRAVENOUS at 20:50

## 2024-11-27 RX ADMIN — HEPARIN SODIUM 5000 UNITS: 5000 INJECTION INTRAVENOUS; SUBCUTANEOUS at 13:48

## 2024-11-27 RX ADMIN — HYDRALAZINE HYDROCHLORIDE 37.5 MG: 25 TABLET ORAL at 05:59

## 2024-11-27 RX ADMIN — SODIUM ZIRCONIUM CYCLOSILICATE 5 G: 5 POWDER, FOR SUSPENSION ORAL at 13:43

## 2024-11-27 RX ADMIN — HYDRALAZINE HYDROCHLORIDE 37.5 MG: 25 TABLET ORAL at 22:05

## 2024-11-27 RX ADMIN — HEPARIN SODIUM 5000 UNITS: 5000 INJECTION INTRAVENOUS; SUBCUTANEOUS at 22:05

## 2024-11-27 RX ADMIN — HYDRALAZINE HYDROCHLORIDE 37.5 MG: 25 TABLET ORAL at 13:48

## 2024-11-27 RX ADMIN — ONDANSETRON 4 MG: 2 INJECTION INTRAMUSCULAR; INTRAVENOUS at 01:13

## 2024-11-27 ASSESSMENT — PAIN - FUNCTIONAL ASSESSMENT: PAIN_FUNCTIONAL_ASSESSMENT: NONE - DENIES PAIN

## 2024-11-27 ASSESSMENT — PAIN SCALES - GENERAL: PAINLEVEL_OUTOF10: 0

## 2024-11-27 NOTE — DIALYSIS
TRANSFER OUT - DIALYSIS    Hemodialysis treatment stopped per his request MD made aware, pt ran 2.5 hours.     Patient alert and BP (!) 176/85   Pulse 80   Temp 97.6 °F (36.4 °C) (Infrared)   Resp 16   Ht 1.829 m (6')   Wt 87.4 kg (192 lb 11.2 oz)   SpO2 99%   BMI 26.13 kg/m²        2 Kg removed.      Needles x2 removed from access and manual pressure held until hemostasis complete and pressure dressing applied.      Meds given: 0.    RBCs given during dialysis: 0    Patient to 824 after dialysis.

## 2024-11-27 NOTE — CARE COORDINATION
CM met with the patient to discuss discharge plans. Patient is tearful that he is not being discharged today. He did voice understanding as to why he's not discharging today. CM following.  
if so, who? (P) Yes  Plans to Return to Present Housing: (P) Unknown at present  Other Identified Issues/Barriers to RETURNING to current housing: pending  Potential Assistance needed at discharge: N/A            Potential DME:  pending  Patient expects to discharge to: Apartment  Plan for transportation at discharge: (P) Family    Financial    Payor: [employer] MEDICARE / Plan: [employer] GOLD PLUS HMO / Product Type: *No Product type* /     Does insurance require precert for SNF: Yes    Potential assistance Purchasing Medications: (P) No  Meds-to-Beds request:        [id number] [place] [address] - [address] [place] [address] -  [phone] - F [phone] [address] [place] [address]  Phone: [phone] Fax: [phone]      Notes:    Factors facilitating achievement of predicted outcomes: Family support and Cooperative    Barriers to discharge: pending medical treatment    Additional Case Management Notes: Chart reviewed and pt seen in ICU s/p admission ESRD/acute pul edema. Alert and oriented. Sitting up on side of bed. Father at bedside. Confirms demographics/screen. Pt lives with son, Varun he states. Independent of ADL's. No current DME's, HH, or rehab. PCP and insurance confirmed. HD at [facility] and he drives self there. Asking about handicap tag for car. Explained that he would get form from [employer] for PCP to fill out and then return to [employer]. Application for Placard and/or License Plate for People who have a Disability printed and given to pt to take to PCP. Verbalized understanding. Aware CM to follow for HAI needs/POC.

## 2024-11-27 NOTE — ICUWATCH
RRT Clinical Rounding Nurse Progress Report      SUBJECTIVE: Patient assessed secondary to transfer from critical care.      Vitals:    11/27/24 1230 11/27/24 1300 11/27/24 1349 11/27/24 1445   BP: (!) 176/85 (!) 169/86 (!) 166/90 (!) 155/84   Pulse: 80 93 93 90   Resp:  16 18 18   Temp:  97.6 °F (36.4 °C)  98.2 °F (36.8 °C)   TempSrc:    Oral   SpO2:  99%  97%   Weight:       Height:            ASSESSMENT:  Pt sitting up in side of bed, eating dinner, in NAD.  A&Ox4.  On room air.  BP 160s/90s.  NSR on remote telemetry.  Appetite good. Ran HD today- 2kg removed. Tolerated well.  Abd distended, soft.  Went to IR for paracentesis today but no fluid to remove, so returned to room.  HECTOR AVF patent/+bruit/+thrill.  Denies needs or complaints at this time.  No concerns per Primary RN.         PLAN:  Will discharge from RRT Clinical Rounding Program per protocol. Please call if needed.    Ebonie Rivas RN  [facility]: [phone]  [facility]: [phone]

## 2024-11-27 NOTE — OR NURSING
Ericka Singh PA-C at bedside, via US guided imagery-not enough fluid for a therapeutic paracentesis. To be sent back to floor bed.

## 2024-11-27 NOTE — DIALYSIS
TRANSFER IN - DIALYSIS    Received patient in dialysis unit from [facility] (unit) for ordered procedure.    Consent verified for renal replacement therapy. Procedure explained to patient, opportunity for Q&A provided. Call light given.     Patient alert and vital signs stable.  /84 , P 99.    Hemodialysis initiated using LAVF and  15g needles.  Machine settings per MD order.    Heparin 0 unit bolus and 0 units/hr.    Will monitor during treatment.

## 2024-11-28 VITALS
DIASTOLIC BLOOD PRESSURE: 86 MMHG | TEMPERATURE: 98.1 F | BODY MASS INDEX: 25.26 KG/M2 | SYSTOLIC BLOOD PRESSURE: 159 MMHG | OXYGEN SATURATION: 94 % | WEIGHT: 186.5 LBS | HEIGHT: 72 IN | RESPIRATION RATE: 16 BRPM | HEART RATE: 99 BPM

## 2024-11-28 PROBLEM — K74.60 CIRRHOSIS (HCC): Status: ACTIVE | Noted: 2024-11-28

## 2024-11-28 LAB
ANION GAP SERPL CALC-SCNC: 10 MMOL/L (ref 7–16)
BASOPHILS # BLD: 0.1 K/UL (ref 0–0.2)
BASOPHILS NFR BLD: 1 % (ref 0–2)
BUN SERPL-MCNC: 29 MG/DL (ref 6–23)
CALCIUM SERPL-MCNC: 8.8 MG/DL (ref 8.8–10.2)
CHLORIDE SERPL-SCNC: 102 MMOL/L (ref 98–107)
CO2 SERPL-SCNC: 30 MMOL/L (ref 20–29)
CREAT SERPL-MCNC: 6.24 MG/DL (ref 0.8–1.3)
DIFFERENTIAL METHOD BLD: ABNORMAL
EOSINOPHIL # BLD: 0.2 K/UL (ref 0–0.8)
EOSINOPHIL NFR BLD: 3 % (ref 0.5–7.8)
ERYTHROCYTE [DISTWIDTH] IN BLOOD BY AUTOMATED COUNT: 15.2 % (ref 11.9–14.6)
GLUCOSE SERPL-MCNC: 93 MG/DL (ref 70–99)
HCT VFR BLD AUTO: 32.2 % (ref 41.1–50.3)
HGB BLD-MCNC: 10.1 G/DL (ref 13.6–17.2)
IMM GRANULOCYTES # BLD AUTO: 0 K/UL (ref 0–0.5)
IMM GRANULOCYTES NFR BLD AUTO: 1 % (ref 0–5)
LYMPHOCYTES # BLD: 0.8 K/UL (ref 0.5–4.6)
LYMPHOCYTES NFR BLD: 13 % (ref 13–44)
MCH RBC QN AUTO: 30.7 PG (ref 26.1–32.9)
MCHC RBC AUTO-ENTMCNC: 31.4 G/DL (ref 31.4–35)
MCV RBC AUTO: 97.9 FL (ref 82–102)
MONOCYTES # BLD: 0.8 K/UL (ref 0.1–1.3)
MONOCYTES NFR BLD: 14 % (ref 4–12)
NEUTS SEG # BLD: 4 K/UL (ref 1.7–8.2)
NEUTS SEG NFR BLD: 68 % (ref 43–78)
NRBC # BLD: 0 K/UL (ref 0–0.2)
PLATELET # BLD AUTO: 158 K/UL (ref 150–450)
PMV BLD AUTO: 10.6 FL (ref 9.4–12.3)
POTASSIUM SERPL-SCNC: 4.8 MMOL/L (ref 3.5–5.1)
RBC # BLD AUTO: 3.29 M/UL (ref 4.23–5.6)
SODIUM SERPL-SCNC: 142 MMOL/L (ref 136–145)
WBC # BLD AUTO: 5.8 K/UL (ref 4.3–11.1)

## 2024-11-28 PROCEDURE — 85025 COMPLETE CBC W/AUTO DIFF WBC: CPT

## 2024-11-28 PROCEDURE — 80048 BASIC METABOLIC PNL TOTAL CA: CPT

## 2024-11-28 PROCEDURE — 6370000000 HC RX 637 (ALT 250 FOR IP): Performed by: INTERNAL MEDICINE

## 2024-11-28 PROCEDURE — 36415 COLL VENOUS BLD VENIPUNCTURE: CPT

## 2024-11-28 PROCEDURE — 2580000003 HC RX 258: Performed by: INTERNAL MEDICINE

## 2024-11-28 PROCEDURE — 6360000002 HC RX W HCPCS: Performed by: INTERNAL MEDICINE

## 2024-11-28 RX ADMIN — CINACALCET HYDROCHLORIDE 30 MG: 30 TABLET, FILM COATED ORAL at 08:20

## 2024-11-28 RX ADMIN — HEPARIN SODIUM 5000 UNITS: 5000 INJECTION INTRAVENOUS; SUBCUTANEOUS at 05:35

## 2024-11-28 RX ADMIN — ASPIRIN 325 MG: 325 TABLET, FILM COATED ORAL at 08:20

## 2024-11-28 RX ADMIN — AMLODIPINE BESYLATE 5 MG: 5 TABLET ORAL at 08:20

## 2024-11-28 RX ADMIN — LEVETIRACETAM 750 MG: 500 TABLET, FILM COATED ORAL at 08:20

## 2024-11-28 RX ADMIN — SODIUM CHLORIDE, PRESERVATIVE FREE 10 ML: 5 INJECTION INTRAVENOUS at 08:21

## 2024-11-28 RX ADMIN — HYDRALAZINE HYDROCHLORIDE 37.5 MG: 25 TABLET ORAL at 05:34

## 2024-11-28 NOTE — DISCHARGE SUMMARY
Hospitalist Discharge Summary   Admit Date:  2024  4:55 PM   DC Note date: 2024  Name:  Feliciano Bettencourt   Age:  49 y.o.  Sex:  male  :  1975   MRN:  [id number]   Room:  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 male with medical history of subarachnoid hemorrhage secondary to basilar artery aneurysm, seizure, communicating hydrocephalus, hypertension, and end-stage renal disease on hemodialysis currently undergoing hemodialysis in [place] at the Center [facility] on , but reports due to upcoming Thanksgiving he was dialyzed on . Apparently went to dialysis and underwent for 3 hours and then came in to the hospital mainly because of abdominal pain and scrotal swelling. Patient has history of ascites and had US guided paracentesis with IR  and had 6 L removed. Does have a potassium of 6.7 and insulin glucose and calcium gluconate was given in the ED and subsequently hospitalist service called to evaluate for admission.    Patient admitted for volume overload and hyperkalemia in the setting of end-stage renal disease.  Nephrology consulted for continuation of dialysis.  Hyperkalemia resolved.  Volume status

## 2024-11-28 NOTE — PLAN OF CARE
Problem: Pain  Goal: Verbalizes/displays adequate comfort level or baseline comfort level  Outcome: Not Progressing  Flowsheets (Taken 11/26/2024 0730)  Verbalizes/displays adequate comfort level or baseline comfort level:   Encourage patient to monitor pain and request assistance   Assess pain using appropriate pain scale   Administer analgesics based on type and severity of pain and evaluate response     
  Problem: Safety - Adult  Goal: Free from fall injury  Outcome: Progressing     Problem: Discharge Planning  Goal: Discharge to home or other facility with appropriate resources  Outcome: Progressing  Flowsheets (Taken 11/27/2024 1515)  Discharge to home or other facility with appropriate resources:   Identify barriers to discharge with patient and caregiver   Arrange for needed discharge resources and transportation as appropriate   Identify discharge learning needs (meds, wound care, etc)   Refer to discharge planning if patient needs post-hospital services based on physician order or complex needs related to functional status, cognitive ability or social support system     Problem: Pain  Goal: Verbalizes/displays adequate comfort level or baseline comfort level  Outcome: Progressing     Problem: ABCDS Injury Assessment  Goal: Absence of physical injury  Outcome: Progressing     
nutritional intake  11/28/2024 0009 by Jessica Issa RN  Outcome: Progressing     Problem: Infection - Adult  Goal: Absence of infection at discharge  11/28/2024 0009 by Jessica Issa RN  Outcome: Progressing     Problem: Genitourinary - Adult  Goal: Absence of urinary retention  11/28/2024 0009 by Jessica Issa RN  Outcome: Progressing     Problem: Infection - Adult  Goal: Absence of infection at discharge  11/28/2024 0009 by Jessica Issa RN  Outcome: Progressing     Problem: Metabolic/Fluid and Electrolytes - Adult  Goal: Electrolytes maintained within normal limits  11/28/2024 0009 by Jessica Issa RN  Outcome: Progressing  Goal: Hemodynamic stability and optimal renal function maintained  11/28/2024 0009 by Jessica Issa RN  Outcome: Progressing  Goal: Glucose maintained within prescribed range  11/28/2024 0009 by Jessica Issa RN  Outcome: Progressing     Problem: Hematologic - Adult  Goal: Maintains hematologic stability  11/28/2024 0009 by Jessica Issa RN  Outcome: Progressing

## 2024-11-28 NOTE — PROGRESS NOTES
Feliciano Bettencourt  Admission Date: 11/25/2024         [facility] Progress Note: 11/27/2024    Follow-up for:     The patient's chart is reviewed and the patient is discussed with the staff.    Subjective:     Seen and examined on HD. Qb 400, UF 3000. Complains of scrotal swelling.     ROS:  Gen - no fever, no chills, appetite unchanged  CV - no chest pain, no palpitation  Lung - no shortness of breath, no cough  Abd - no tenderness, no nausea/vomiting, no diarrhea  Ext - no edema    Current Facility-Administered Medications   Medication Dose Route Frequency    morphine sulfate (PF) injection 4 mg  4 mg IntraVENous Once    glucose chewable tablet 16 g  4 tablet Oral PRN    dextrose bolus 10% 125 mL  125 mL IntraVENous PRN    Or    dextrose bolus 10% 250 mL  250 mL IntraVENous PRN    Glucagon Emergency KIT 1 mg  1 mg SubCUTAneous PRN    dextrose 10 % infusion   IntraVENous Continuous PRN    ferric citrate (AURYXIA) tablet 420 mg (Patient Supplied)  420 mg Oral TID WC    HYDROcodone-acetaminophen (NORCO) 5-325 MG per tablet 1 tablet  1 tablet Oral Q6H PRN    amLODIPine (NORVASC) tablet 5 mg  5 mg Oral Daily    hydrALAZINE (APRESOLINE) tablet 37.5 mg  37.5 mg Oral 3 times per day    aspirin tablet 325 mg  325 mg Oral Daily    prazosin (MINIPRESS) capsule 2 mg  2 mg Oral Nightly    hydrOXYzine HCl (ATARAX) tablet 25 mg  25 mg Oral TID PRN    cinacalcet (SENSIPAR) tablet 30 mg  30 mg Oral Daily    levETIRAcetam (KEPPRA) tablet 750 mg  750 mg Oral Every Other Day    heparin (porcine) injection 5,000 Units  5,000 Units SubCUTAneous 3 times per day    sodium chloride flush 0.9 % injection 5-40 mL  5-40 mL IntraVENous 2 times per day    sodium chloride flush 0.9 % injection 5-40 mL  5-40 mL IntraVENous PRN    0.9 % sodium chloride infusion   IntraVENous PRN    ondansetron (ZOFRAN-ODT) disintegrating tablet 4 mg  4 mg Oral Q8H PRN    Or    ondansetron (ZOFRAN) injection 4 mg  4 mg IntraVENous Q6H PRN 
  Physician Progress Note      PATIENT:               ROBERTO CARLOS ONEIL  CSN #:                  [id number]  :                       1975  ADMIT DATE:       2024 4:55 PM  DISCH DATE:  RESPONDING  PROVIDER #:        Alfie Hernández MD          QUERY TEXT:    Pt admitted with ESRD, hyperkalemia. Pt noted to have pulmonary edema in   nephrology consult. If possible, please document in the progress notes and   discharge summary if you are evaluating and/or treating any of the following:    The medical record reflects the following:  Risk Factors: 49 y.o. male with medical history of end-stage renal disease on   hemodialysis, history of ascites and was tapped 1 week ago by interventional   radiology 6 L was removed.  Clinical Indicators: Per nephrology consult \"Hypervolemia with pulmonary edema   and scrotal swelling\"   progress note \"Looks like newly diagnosed liver lesion and cirrhosis\"  Treatment: Nephrology & GI consult, HD  Options provided:  -- Noncardiogenic acute pulmonary edema due to liver cirrhosis  -- Noncardiogenic acute pulmonary edema due to ESRD  -- Other - I will add my own diagnosis  -- Disagree - Not applicable / Not valid  -- Disagree - Clinically unable to determine / Unknown  -- Refer to Clinical Documentation Reviewer    PROVIDER RESPONSE TEXT:    This patient has noncardiogenic acute pulmonary edema due to ESRD.    Query created by: Teri Vang on 2024 11:05 AM      Electronically signed by:  Alfie Hernández MD 2024 11:32 AM          
 attempted to visit patient.  Patient appeared to be sleeping comfortably at time.   provided prayer and is available to follow up.  Peace be with you,  Signed by  GODWIN BeaulieuDiv.   [phone]  
Patient discharged home all lines removed  discharge instructions/medications/upcoming appointments for continuation of care reviewed with patient verbalizing understanding    
Prep complete, ready for procedure  
Pt ate only peanut butter and one jeaneth cracker. Did not want lunch. Pt sat himself on side of bed while father was here visiting.    Dialysis RN at bedside. Pt back in bed.   
Pt keeps taking off BP cuff and sat probe. Asked pt to leave them on. Pt agrivated with being in ICU. Frustrated with waiting for dialysis.  
Pt was picking off leads and tape. Pt pulled off dialysis dressing and started to bleed. Pressure held for 5 min. Lower needle site still bleeding. Quick clot to site with 4x4 over it and taped. Pt asked not to peal off dressing.   
Pt was up in chair at start of shift. Pt asked to use commode to have BM. Pt assisted stand by to commode. Gait unsteady at times. Pt had bowel movement and was assisted to bed. Pt had Zofran and atarax before using commode.   Pt still having pain all over. Pt given Morphine sulfate 2 mg IVP.  Pt was sleeping til  came to draw lab work that was ordered. Now unable to get comfortable again.   Dialysis RN made aware that pt requires Emla cream to arm one hour before dialysis needle sticks.   
TRANSFER - IN REPORT:    Verbal report received from ED on Feliciano Bettencourt  being received from ED for routine progression of patient care      Report consisted of patient's Situation, Background, Assessment and   Recommendations(SBAR).     Information from the following report(s) Nurse Handoff Report was reviewed with the receiving nurse.    Opportunity for questions and clarification was provided.      Assessment completed upon patient's arrival to unit and care assumed.     
TRANSFER - OUT REPORT:    Verbal report given to 8th floor RN on Feliciano Bettencourt  being transferred to [facility] for routine progression of patient care       Report consisted of patient's Situation, Background, Assessment and   Recommendations(SBAR).     Information from the following report(s) Nurse Handoff Report, Adult Overview, Intake/Output, MAR, Recent Results, Cardiac Rhythm sinus rhythm, Quality Measures, and Neuro Assessment was reviewed with the receiving nurse.           Lines:   Peripheral IV Right;Anterior Forearm (Active)   Site Assessment Clean, dry & intact 11/26/24 1948   Line Status Blood return noted;Capped;Flushed 11/26/24 1948   Line Care Connections checked and tightened 11/26/24 1948   Phlebitis Assessment No symptoms 11/26/24 1948   Infiltration Assessment 0 11/26/24 1948   Alcohol Cap Used Yes 11/26/24 1948   Dressing Status Clean, dry & intact 11/26/24 1948   Dressing Type Transparent 11/26/24 1948        Opportunity for questions and clarification was provided.      Patient transported with:  Monitor, Patient's medications from home, Patient-specific medications from Pharmacy, and Registered Nurse and all personal belongings         
w/ Reflex to MG    Collection Time: 11/26/24  5:36 PM   Result Value Ref Range    Sodium 137 136 - 145 mmol/L    Potassium 4.5 3.5 - 5.1 mmol/L    Chloride 98 98 - 107 mmol/L    CO2 28 20 - 29 mmol/L    Anion Gap 11 7 - 16 mmol/L    Glucose 105 (H) 70 - 99 mg/dL    BUN 28 (H) 6 - 23 MG/DL    Creatinine 5.90 (H) 0.80 - 1.30 MG/DL    Est, Glom Filt Rate 11 (L) >60 ml/min/1.73m2    Calcium 8.6 (L) 8.8 - 10.2 MG/DL   Phosphorus    Collection Time: 11/26/24  5:36 PM   Result Value Ref Range    Phosphorus 3.3 2.5 - 4.5 MG/DL   POCT Glucose    Collection Time: 11/26/24  8:34 PM   Result Value Ref Range    POC Glucose 104 (H) 65 - 100 mg/dL    [facility]    Basic Metabolic Panel w/ Reflex to MG    Collection Time: 11/27/24  4:24 AM   Result Value Ref Range    Sodium 140 136 - 145 mmol/L    Potassium 5.8 (H) 3.5 - 5.1 mmol/L    Chloride 99 98 - 107 mmol/L    CO2 29 20 - 29 mmol/L    Anion Gap 12 7 - 16 mmol/L    Glucose 90 70 - 99 mg/dL    BUN 35 (H) 6 - 23 MG/DL    Creatinine 7.22 (H) 0.80 - 1.30 MG/DL    Est, Glom Filt Rate 8 (L) >60 ml/min/1.73m2    Calcium 8.9 8.8 - 10.2 MG/DL   CBC with Auto Differential    Collection Time: 11/27/24  4:24 AM   Result Value Ref Range    WBC 7.5 4.3 - 11.1 K/uL    RBC 3.39 (L) 4.23 - 5.6 M/uL    Hemoglobin 10.4 (L) 13.6 - 17.2 g/dL    Hematocrit 33.2 (L) 41.1 - 50.3 %    MCV 97.9 82 - 102 FL    MCH 30.7 26.1 - 32.9 PG    MCHC 31.3 (L) 31.4 - 35.0 g/dL    RDW 15.3 (H) 11.9 - 14.6 %    Platelets 170 150 - 450 K/uL    MPV 11.1 9.4 - 12.3 FL    nRBC 0.00 0.0 - 0.2 K/uL    Differential Type AUTOMATED      Neutrophils % 79 (H) 43 - 78 %    Lymphocytes % 8 (L) 13 - 44 %    Monocytes % 10 4.0 - 12.0 %    Eosinophils % 2 0.5 - 7.8 %    Basophils % 1 0.0 - 2.0 %    Immature Granulocytes % 0 0.0 - 5.0 %    Neutrophils Absolute 5.9 1.7 - 8.2 K/UL    Lymphocytes Absolute 0.6 0.5 - 4.6 K/UL    Monocytes Absolute 0.8 0.1 - 1.3 K/UL    Eosinophils Absolute 0.2 0.0 - 0.8 K/UL    
25 mg Oral TID PRN    cinacalcet (SENSIPAR) tablet 30 mg  30 mg Oral Daily    levETIRAcetam (KEPPRA) tablet 750 mg  750 mg Oral Every Other Day    heparin (porcine) injection 5,000 Units  5,000 Units SubCUTAneous 3 times per day    sodium chloride flush 0.9 % injection 5-40 mL  5-40 mL IntraVENous 2 times per day    sodium chloride flush 0.9 % injection 5-40 mL  5-40 mL IntraVENous PRN    0.9 % sodium chloride infusion   IntraVENous PRN    ondansetron (ZOFRAN-ODT) disintegrating tablet 4 mg  4 mg Oral Q8H PRN    Or    ondansetron (ZOFRAN) injection 4 mg  4 mg IntraVENous Q6H PRN    polyethylene glycol (GLYCOLAX) packet 17 g  17 g Oral Daily PRN    bisacodyl (DULCOLAX) suppository 10 mg  10 mg Rectal Daily PRN    famotidine (PEPCID) tablet 10 mg  10 mg Oral Daily PRN    aluminum & magnesium hydroxide-simethicone (MAALOX) 200-200-20 MG/5ML suspension 30 mL  30 mL Oral Q6H PRN    acetaminophen (TYLENOL) tablet 650 mg  650 mg Oral Q6H PRN    Or    acetaminophen (TYLENOL) suppository 650 mg  650 mg Rectal Q6H PRN    sodium zirconium cyclosilicate (LOKELMA) oral suspension 5 g  5 g Oral TID    furosemide (LASIX) injection 40 mg  40 mg IntraVENous BID       Signed:  Tigist Ceuva DO    Part of this note may have been written by using a voice dictation software.  The note has been proof read but may still contain some grammatical/other typographical errors.

## 2024-12-05 ENCOUNTER — HOSPITAL ENCOUNTER (OUTPATIENT)
Dept: MRI IMAGING | Age: 49
Discharge: HOME | End: 2024-12-08
Attending: INTERNAL MEDICINE
Payer: MEDICARE

## 2024-12-05 DIAGNOSIS — N18.6 ESRD ON DIALYSIS (HCC): ICD-10-CM

## 2024-12-05 DIAGNOSIS — Z99.2 ESRD ON DIALYSIS (HCC): ICD-10-CM

## 2024-12-05 DIAGNOSIS — R93.5 ABNORMAL CT OF THE ABDOMEN: ICD-10-CM

## 2024-12-05 DIAGNOSIS — K76.9 LIVER LESION: ICD-10-CM

## 2024-12-05 PROCEDURE — A9579 GAD-BASE MR CONTRAST NOS,1ML: HCPCS | Performed by: INTERNAL MEDICINE

## 2024-12-05 PROCEDURE — 6360000004 HC RX CONTRAST MEDICATION: Performed by: INTERNAL MEDICINE

## 2024-12-05 PROCEDURE — 74183 MRI ABD W/O CNTR FLWD CNTR: CPT

## 2024-12-05 RX ADMIN — GADOTERIDOL 17 ML: 279.3 INJECTION, SOLUTION INTRAVENOUS at 14:40

## 2024-12-05 NOTE — PROGRESS NOTES
NEW PATIENT INTAKE    Referral Diagnosis:  Liver lesion      Referring Provider: Darron Zepeda Jr., MD    Primary Care Provider: Kristal Steven MD    Family History of Cancer/ Hematology Disorders: No known family history    Presenting Symptoms: Liver lesion    Chronological History of Pertinent Events:     50yo  male    PMH: Aneurysm, HTN, ESRD on dialysis    11/18/24 - Patient went to ED (EPIC)  Patient presents for an ultrasound which was ordered outpatient and was noted to have bleeding from his dialysis fistula in his left upper arm.    He reports that this occurs frequently especially if he takes his dressing off too early.  He denies any chest pain or SOB when this happens.  Denies any significant amount of blood loss.   Reports that someone from dialysis helped him to set up his ultrasound for today. Ace wrap was removed and applied with a focal gauze dressing.      11/18/24 - US Abdomen Complete without Bladder (EPIC)  Impression  1.  No emergent abnormality is identified.  2. Large degree of ascites.  3. Hepatomegaly and hepatic cirrhosis. There is a hyperechoic mass within the left hepatic lobe which is most likely related to a hemangioma in the absence of a known primary malignancy.  4. Hypoechoic area within the left kidney which is incompletely characterized. I recommend further evaluation with a CT kidneys exam with and without contrast.    11/20/24 - Patient returned to ED (EPIC)  PMH: SAH, communicating hydrocephalus, hypertension, ESRD on HD Monday, Wednesday, Friday who last dialyzed on Monday who presents with complaint of worsening abdominal distention and discomfort that been present over the past 1 to 2 months.  Hypertensive but remainder vital signs stable.  Afebrile.  Creatinine 9.94.  Potassium 5.2.  Concern for possible hemolysis.  LFTs unremarkable.  Glucose 128.  WBC 6.9.  H&H stable at 11.0/34.5.  Platelet count 138K.  CT abdomen and pelvis with third spacing of fluids

## 2025-01-30 DIAGNOSIS — R18.8 OTHER ASCITES: Primary | ICD-10-CM

## 2025-02-04 ENCOUNTER — HOSPITAL ENCOUNTER (OUTPATIENT)
Dept: INTERVENTIONAL RADIOLOGY/VASCULAR | Age: 50
Discharge: HOME | End: 2025-02-07
Attending: INTERNAL MEDICINE
Payer: MEDICARE

## 2025-02-04 VITALS
HEART RATE: 81 BPM | TEMPERATURE: 98 F | DIASTOLIC BLOOD PRESSURE: 74 MMHG | RESPIRATION RATE: 16 BRPM | SYSTOLIC BLOOD PRESSURE: 145 MMHG | OXYGEN SATURATION: 96 %

## 2025-02-04 DIAGNOSIS — R18.8 OTHER ASCITES: ICD-10-CM

## 2025-02-04 PROCEDURE — 49083 ABD PARACENTESIS W/IMAGING: CPT | Performed by: PHYSICIAN ASSISTANT

## 2025-02-04 PROCEDURE — 49083 ABD PARACENTESIS W/IMAGING: CPT

## 2025-02-04 ASSESSMENT — PAIN - FUNCTIONAL ASSESSMENT: PAIN_FUNCTIONAL_ASSESSMENT: NONE - DENIES PAIN

## 2025-02-04 NOTE — DISCHARGE INSTRUCTIONS
If you have any questions about your procedure, please call the Interventional Radiology department at [phone].   After business hours (5pm) and weekends, call the answering service at [phone] and ask for the Radiologist on call to be paged.     Si tiene Preguntas acerca del procedimiento, por favor llame al departamento de Radiología Intervencional al [phone].   Después de horas de oficina (5 pm) y los fines de semana, llamar al servicio de llamadas al [phone] y pregunte por el Radiologo de kulwant.

## 2025-02-04 NOTE — OR NURSING
Pt alert and oriented and denies pain. Dressing is clean, dry, and intact. Reviewed discharge instructions with patient, he verbalized understanding. Pt escorted to lobby discharge area. Vital signs completed.

## 2025-02-04 NOTE — PROCEDURES
PROCEDURE NOTE  Date: 2/4/2025   Name: Feliciano Bettencourt  YOB: 1975    Procedures        Reading Physician Reading Date Result Priority   Jesus Davenport MD  [phone] 2/4/2025    Sonia Stockton PA  [phone] 2/4/2025      Narrative & Impression  Title: Ultrasound guided paracentesis.       History: 49-year-old male with history of ESRD on hemodialysis and recurrent  ascites.     :  Evelyne Stockton PA-C     Supervising Physician: Dr. Jesus Davenport MD. Supervising physician attests  that he was immediately available to supervise entire procedure. He reviewed the  permanently stored images and agrees with the report as written     Consent:  Informed written and oral consent was obtained from the patient after  the risks and benefits were discussed.  All questions were answered and the  patient requested that we proceed.     Procedure:  Maximal sterile barrier technique was used.  Following routine prep  and drape of the abdomen, a local field block with 1% lidocaine was achieved.   Ultrasound evaluation was performed.     Fluid was identified in the peritoneal cavity.  Using a #11 blade, a small skin  incision was made on the abdomen.  Using real-time ultrasound guidance, the  peritoneal cavity was accessed with an 8 Maltese centesis catheter.  The catheter  was connected to wall suction.     A total of 7150 cc of thin yellow fluid was removed. The catheter was removed  and a bandage applied.     Complications: None.     Findings: Large volume ascites.     IMPRESSION:  Uncomplicated ultrasound guided paracentesis.

## 2025-03-19 ENCOUNTER — HOSPITAL ENCOUNTER (OUTPATIENT)
Dept: INTERVENTIONAL RADIOLOGY/VASCULAR | Age: 50
Discharge: HOME | End: 2025-03-21
Attending: INTERNAL MEDICINE
Payer: MEDICARE

## 2025-03-19 VITALS
DIASTOLIC BLOOD PRESSURE: 84 MMHG | BODY MASS INDEX: 24.38 KG/M2 | OXYGEN SATURATION: 99 % | SYSTOLIC BLOOD PRESSURE: 158 MMHG | RESPIRATION RATE: 18 BRPM | HEART RATE: 88 BPM | WEIGHT: 180 LBS | HEIGHT: 72 IN | TEMPERATURE: 98 F

## 2025-03-19 DIAGNOSIS — T80.89XA DIALYSIS-ASSOCIATED ASCITES: ICD-10-CM
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
## 2025-03-19 PROCEDURE — 49083 ABD PARACENTESIS W/IMAGING: CPT

## 2025-03-19 PROCEDURE — 6360000002 HC RX W HCPCS: Performed by: PHYSICIAN ASSISTANT

## 2025-03-19 RX ORDER — LIDOCAINE HYDROCHLORIDE 10 MG/ML
INJECTION, SOLUTION EPIDURAL; INFILTRATION; INTRACAUDAL; PERINEURAL PRN
Status: DISCONTINUED | OUTPATIENT
Start: 2025-03-19 | End: 2025-03-22 | Stop reason: HOSPADM

## 2025-03-19 RX ADMIN — LIDOCAINE HYDROCHLORIDE 10 ML: 10 INJECTION, SOLUTION EPIDURAL; INFILTRATION; INTRACAUDAL; PERINEURAL at 08:31

## 2025-03-19 NOTE — DISCHARGE INSTRUCTIONS
[facility]     Department of Interventional Radiology     [facility]    [phone] Office     [phone] Fax       PARACENTESIS DISCHARGE INSTRUCTIONS           General Information:     During this procedure, the doctor will insert a needle into the abdomen to drain fluid. After the procedure, you will be able to take a deep breath much easier. The site of the puncture may ooze the first day. This will decrease and eventually stop. Paracentesis (draining fluid from the abdomen) sometimes makes patients hypotensive (low blood pressure). Your doctor may order for you to receive fluids or albumin (a volume booster) during the procedure through an IV site.            Home Care Instructions:     Keep the puncture site clean and dry. No tub baths or swimming until puncture site heals. Showering is acceptable. Resume your normal diet, and resume your normal activity slowly and as you tolerate. If you are short of breath, rest. If shortness of breath does not ease, please call your ordering doctor. Fluid can re-accumulate in the chest and/or in the abdomen. If this should occur, your doctor needs to know as you may need to have the procedure done again.           Call If:        You should call your Physician and/or the Radiology Nurse if you notice any signs of infection, like pus draining, or if it is swollen or reddened. Also call if you have a fever, or if you are bleeding from the puncture site more than a small amount on the dressing. Call if the puncture site keeps draining fluid. Some oozing is to be expected, but should slow and then stop. Call if you feel like you have pressure in your abdomen. SEEK IMMEDIATE CARE OR CALL 911 IF YOU SUDDENLY HAVE TROUBLE BREATHING, OR IF YOUR LIPS TURN BLUE, OR IF YOU NOTICE BLOOD IN YOUR SPUTUM.           Follow-Up Instructions:   Please see your ordering doctor as he/she has requested.            To Reach Us:

## 2025-03-20 NOTE — OP NOTE
Title: Ultrasound guided paracentesis.      History: Ascites.    :  Ericka Singh PA-C    Supervising Physician: Jesus Davenport MD.   The physician attests to  supervising this procedure and agrees with the report as written.    Consent:  Informed written and oral consent was obtained from the patient after  the risks and benefits were discussed.  All questions were answered and the  patient requested that we proceed.    Procedure:  Maximal sterile barrier technique was used.  Following routine prep  and drape of the abdomen, a local field block with 1% lidocaine was achieved.  Ultrasound evaluation was performed.  A permanent, hardcopy ultrasound images  was stored in PACS.    Fluid was identified in the peritoneal cavity. Using real-time ultrasound  guidance, the peritoneal cavity was accessed with an 8 Sami centesis catheter.   The catheter was connected to wall suction.    A total of 10,000 cc of thin yellow fluid was removed. The catheter was removed  and a bandage applied.    Complications: None.    Specimen: None.    Findings: Large volume ascites.   Impression:     Uncomplicated ultrasound guided paracentesis.

## 2025-03-27 NOTE — PROGRESS NOTES
Medicare Wellness Visit  Plan for Preventive Care    A good way for you to stay healthy is to use preventive care.  Medicare covers many services that can help you stay healthy.* The goal of these services is to find any health problems as quickly as possible. Finding problems early can help make them easier to treat.  Your personal plan below lists the services you may need and when they are due.      Health Maintenance Summary     Shingles Vaccine (1 of 2)  Never done    Pneumococcal Vaccine 50+ (1 of 2 - PCV)  Never done    DTaP/Tdap/Td Vaccine (1 - Tdap)  Overdue since 1/2/2008    Respiratory Syncytial Virus (RSV) Vaccine 60+ (1 - Risk 60-74 years 1-dose series)  Never done    Influenza Vaccine (1)  Never done    COVID-19 Vaccine (3 - 2024-25 season)  Overdue since 9/1/2024    Traditional Medicare- Medicare Wellness Visit (Yearly)  Due since 3/1/2025    Depression Screening (Yearly)  Next due on 3/25/2026    Breast Cancer Screening (Every 2 Years)  Next due on 5/13/2026    Colorectal Cancer Screening (Colonoscopy - Every 5 Years)  Next due on 5/1/2028    Hepatitis C Screening   Completed    Osteoporosis Screening   Completed    Hepatitis A Vaccine   Aged Out    Meningococcal Vaccine   Aged Out    Hepatitis B Vaccine (For Physician/APC Discussion)   Aged Out    Meningococcal Serogroup B Vaccine   Aged Out    HPV Vaccine   Aged Out           Preventive Care for Women and Men    Heart Screenings (Cardiovascular):  Blood tests are used to check your cholesterol, lipid and triglyceride levels. High levels can increase your risk for heart disease and stroke. High levels can be treated with medications, diet and exercise. Lowering your levels can help keep your heart and blood vessels healthy.  Your provider will order these tests if they are needed.    An ultrasound is done to see if you have an abdominal aortic aneurysm (AAA).  This is an enlargement of one of the main blood vessels that delivers blood to the body.  Pt. On medical hold secondary to N&V;  Off the floor for Stat HCT. Unable to complete progress note or d/c summary @ this time. Will complete in AM if pt. Is able to participate w/ PT.   In the [place], 9,000 deaths are caused by AAA.  You may not even know you have this problem and as many as 1 in 3 people will have a serious problem if it is not treated.  Early diagnosis allows for more effective treatment and cure.  If you have a family history of AAA or are a male age 65-75 who has smoked, you are at higher risk of an AAA.  Your provider can order this test, if needed.    Colorectal Screening:  There are many tests that are used to check for cancer of your colon and rectum. You and your provider should discuss what test is best for you and when to have it done.  Options include:  Screening Colonoscopy: exam of the entire colon, seen through a flexible lighted tube.  Flexible Sigmoidoscopy: exam of the last third (sigmoid portion) of the colon and rectum, seen through a flexible lighted tube.  Cologuard DNA stool test: a sample of your stool is used to screen for cancer and unseen blood in your stool.  Fecal Occult Blood Test: a sample of your stool is studied to find any unseen blood    Flu Shot:  An immunization that helps to prevent influenza (the flu). You should get this every year. The best time to get the shot is in the fall.    Pneumococcal Shot:  Vaccines help prevent pneumococcal disease, which is any type of illness caused by Streptococcus pneumoniae bacteria. There are two kinds of pneumococcal vaccines available in the [place]:   Pneumococcal conjugate vaccines (PCV20 or Zabslbe89®)  Pneumococcal polysaccharide vaccine (PPSV23 or Jiipwfcnl77®)  For those who have never received any pneumococcal conjugate vaccine, CDC recommends PVC20 for adults 65 years or older and adults 19 through 64 years old with certain medical conditions or risk factors.   For those who have previously received PCV13, this should be followed by a dose of PPSV23.     Hepatitis B Shot:  An immunization that helps to protect people from getting Hepatitis B. Hepatitis B is a virus that spreads  through contact with infected blood or body fluids. Many people with the virus do not have symptoms.  The virus can lead to serious problems, such as liver disease. Some people are at higher risk than others. Your doctor will tell you if you need this shot.     Diabetes Screening:  A test to measure sugar (glucose) in your blood is called a fasting blood sugar. Fasting means you cannot have food or drink for at least 8 hours before the test. This test can detect diabetes long before you may notice symptoms.    Glaucoma Screening:  Glaucoma screening is performed by your eye doctor. The test measures the fluid pressure inside your eyes to determine if you have glaucoma.     Hepatitis C Screening:  A blood test to see if you have the hepatitis C virus.  Hepatitis C attacks the liver and is a major cause of chronic liver disease.  Medicare will cover a single screening for all adults born between 1945 & 1965, or high risk patients (people who have injected illegal drugs or people who have had blood transfusions).  High risk patients who continue to inject illegal drugs can be screened for Hepatitis C every year.    Smoking and Tobacco-Use Cessation Counseling:  Tobacco is the single greatest cause of disease and early death in our country today. Medication and counseling together can increase a person’s chance of quitting for good.   Medicare covers two quitting attempts per year, with four counseling sessions per attempt (eight sessions in a 12 month period)    Preventive Screening tests for Women    Screening Mammograms and Breast Exams:  An x-ray of your breasts to check for breast cancer before you or your doctor may be able to feel it.  If breast cancer is found early it can usually be treated with success.    Pelvic Exams and Pap Tests:  An exam to check for cervical and vaginal cancer. A Pap test is a lab test in which cells are taken from your cervix and sent to the lab to look for signs of cervical cancer. If  cancer of the cervix is found early, chances for a cure are good. Testing can generally end at age 65, or if a woman has a hysterectomy for a benign condition. Your provider may recommend more frequent testing if certain abnormal results are found.    Bone Mass Measurements:  A painless x-ray of your bone density to see if you are at risk for a broken bone. Bone density refers to the thickness of bones or how tightly the bone tissue is packed.    Preventive Screening tests for Men    Prostate Screening:  Should you have a prostate cancer test (PSA)?  It is up to you to decide if you want a prostate cancer test. Talk to your clinician to find out if the test is right for you.  Things for you to consider and talk about should include:  Benefits and harms of the test  Your family history  How your race/ethnicity may influence the test  If the test may impact other medical conditions you have  Your values on screenings and treatments    *Medicare pays for many preventive services to keep you healthy. For some of these services, you might have to pay a deductible, coinsurance, and / or copayment.  The amounts vary depending on the type of services you need and the kind of Medicare health plan you have.    For further details on screenings offered by Medicare please visit: https://www.medicare.gov/coverage/preventive-screening-services

## 2025-04-01 DIAGNOSIS — T80.89XA DIALYSIS-ASSOCIATED ASCITES: Primary | ICD-10-CM

## 2025-04-01 DIAGNOSIS — Z99.2 ESRD ON DIALYSIS (HCC): ICD-10-CM
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
## 2025-04-01 DIAGNOSIS — R18.8 OTHER ASCITES: ICD-10-CM

## 2025-04-01 DIAGNOSIS — N18.6 ESRD ON DIALYSIS (HCC): ICD-10-CM

## 2025-04-04 ENCOUNTER — HOSPITAL ENCOUNTER (OUTPATIENT)
Dept: INTERVENTIONAL RADIOLOGY/VASCULAR | Age: 50
End: 2025-04-04
Attending: INTERNAL MEDICINE
Payer: MEDICARE

## 2025-04-04 VITALS
DIASTOLIC BLOOD PRESSURE: 97 MMHG | OXYGEN SATURATION: 99 % | SYSTOLIC BLOOD PRESSURE: 155 MMHG | TEMPERATURE: 98 F | HEART RATE: 96 BPM | RESPIRATION RATE: 18 BRPM

## 2025-04-04 DIAGNOSIS — T80.89XA DIALYSIS-ASSOCIATED ASCITES: ICD-10-CM

## 2025-04-04 DIAGNOSIS — N18.6 ESRD ON DIALYSIS (HCC): ICD-10-CM

## 2025-04-04 DIAGNOSIS — Z99.2 ESRD ON DIALYSIS (HCC): ICD-10-CM
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
## 2025-04-04 DIAGNOSIS — R18.8 OTHER ASCITES: ICD-10-CM

## 2025-04-04 LAB
BASOPHILS # BLD: 0.12 K/UL (ref 0–0.2)
BASOPHILS NFR BLD: 1.5 % (ref 0–2)
DIFFERENTIAL METHOD BLD: ABNORMAL
EOSINOPHIL # BLD: 0.26 K/UL (ref 0–0.8)
EOSINOPHIL NFR BLD: 3.3 % (ref 0.5–7.8)
ERYTHROCYTE [DISTWIDTH] IN BLOOD BY AUTOMATED COUNT: 15 % (ref 11.9–14.6)
HCT VFR BLD AUTO: 34.6 % (ref 41.1–50.3)
HGB BLD-MCNC: 10.7 G/DL (ref 13.6–17.2)
IMM GRANULOCYTES # BLD AUTO: 0.03 K/UL (ref 0–0.5)
IMM GRANULOCYTES NFR BLD AUTO: 0.4 % (ref 0–5)
LYMPHOCYTES # BLD: 1.05 K/UL (ref 0.5–4.6)
LYMPHOCYTES NFR BLD: 13.3 % (ref 13–44)
MCH RBC QN AUTO: 29.8 PG (ref 26.1–32.9)
MCHC RBC AUTO-ENTMCNC: 30.9 G/DL (ref 31.4–35)
MCV RBC AUTO: 96.4 FL (ref 82–102)
MONOCYTES # BLD: 1.06 K/UL (ref 0.1–1.3)
MONOCYTES NFR BLD: 13.4 % (ref 4–12)
NEUTS SEG # BLD: 5.39 K/UL (ref 1.7–8.2)
NEUTS SEG NFR BLD: 68.1 % (ref 43–78)
NRBC # BLD: 0 K/UL (ref 0–0.2)
PLATELET # BLD AUTO: 156 K/UL (ref 150–450)
PMV BLD AUTO: 10.6 FL (ref 9.4–12.3)
RBC # BLD AUTO: 3.59 M/UL (ref 4.23–5.6)
WBC # BLD AUTO: 7.9 K/UL (ref 4.3–11.1)

## 2025-04-04 PROCEDURE — 85025 COMPLETE CBC W/AUTO DIFF WBC: CPT

## 2025-04-04 PROCEDURE — 49083 ABD PARACENTESIS W/IMAGING: CPT

## 2025-04-04 PROCEDURE — P9047 ALBUMIN (HUMAN), 25%, 50ML: HCPCS | Performed by: PHYSICIAN ASSISTANT

## 2025-04-04 PROCEDURE — 6360000002 HC RX W HCPCS: Performed by: PHYSICIAN ASSISTANT

## 2025-04-04 RX ORDER — LIDOCAINE HYDROCHLORIDE 10 MG/ML
INJECTION, SOLUTION EPIDURAL; INFILTRATION; INTRACAUDAL; PERINEURAL PRN
Status: COMPLETED | OUTPATIENT
Start: 2025-04-04 | End: 2025-04-04

## 2025-04-04 RX ORDER — ALBUMIN (HUMAN) 12.5 G/50ML
SOLUTION INTRAVENOUS CONTINUOUS PRN
Status: COMPLETED | OUTPATIENT
Start: 2025-04-04 | End: 2025-04-04

## 2025-04-04 RX ADMIN — ALBUMIN (HUMAN) 12.5 G: 0.25 INJECTION, SOLUTION INTRAVENOUS at 14:43

## 2025-04-04 RX ADMIN — ALBUMIN (HUMAN) 12.5 G: 0.25 INJECTION, SOLUTION INTRAVENOUS at 14:34

## 2025-04-04 RX ADMIN — ALBUMIN (HUMAN) 12.5 G: 0.25 INJECTION, SOLUTION INTRAVENOUS at 14:52

## 2025-04-04 RX ADMIN — LIDOCAINE HYDROCHLORIDE 10 ML: 10 INJECTION, SOLUTION EPIDURAL; INFILTRATION; INTRACAUDAL; PERINEURAL at 14:00

## 2025-04-04 RX ADMIN — ALBUMIN (HUMAN) 12.5 G: 0.25 INJECTION, SOLUTION INTRAVENOUS at 14:25

## 2025-04-04 NOTE — OR NURSING
Pt alert and oriented and denies pain. Dressing is clean, dry, and intact. Reviewed discharge instructions with patient and son, both verbalized understanding. Pt escorted to lobby discharge area via wheelchair.

## 2025-04-04 NOTE — PROCEDURES
PROCEDURE NOTE  Date: 4/4/2025   Name: Feliciano Bettencourt  YOB: 1975    Procedures        Reading Physician Reading Date Result Priority   Varun Austin MD  [phone]     4/4/2025    Sonia Stockton PA  [phone]     4/4/2025      Narrative & Impression  Title: Ultrasound guided paracentesis.       History: Pleasant 49-year-old male with history of ESRD on hemodialysis,  recurrent ascites, and cirrhosis diagnosis within the last 6 months..     :  Evelyne Stockton PA-C     Supervising Physician: Dr. Varun Austin MD. Supervising physician attest  that he was immediately available to supervise entire procedure. He reviewed the  permanently stored images and agrees with the report as written.     Consent:  Informed written and oral consent was obtained from the patient after  the risks and benefits were discussed.  All questions were answered and the  patient requested that we proceed.     Procedure:  Maximal sterile barrier technique was used.  Following routine prep  and drape of the abdomen, a local field block with 1% lidocaine was achieved.   Ultrasound evaluation was performed.     Fluid was identified in the peritoneal cavity.  Using a #11 blade, a small skin  incision was made on the abdomen.  Using real-time ultrasound guidance, the  peritoneal cavity was accessed with an 8 Chadian centesis catheter.  The catheter  was connected to wall suction.     A total of 9750 cc of thin yellow fluid was removed. The catheter was removed  and a bandage applied.     Complications: None.     Findings: Large volume ascites.     IMPRESSION:  Uncomplicated ultrasound guided paracentesis.

## 2025-04-04 NOTE — OR NURSING
Interventional Radiology Post Paracentesis/Thoracentesis Note    4/4/2025    Procedure(s): Ultrasound guided Therapeutic Paracentesis Performed with 8 Irish catheter total volume 9750 ml.         Preliminary Findings: large clear and yellow.    Complications: None    Plan:  Observation, check labs if drawn.          Chest X-Ray:  no    Full dictated report to follow

## 2025-04-04 NOTE — DISCHARGE INSTRUCTIONS
If you have any questions about your procedure, please call the Interventional Radiology department at [phone].   After business hours (5pm) and weekends, call the answering service at [phone] and ask for the Radiologist on call to be paged.     Si tiene Preguntas acerca del procedimiento, por favor llame al departamento de Radiología Intervencional al [phone].   Después de horas de oficina (5 pm) y los fines de semana, llamar al servicio de llamadas al [phone] y pregunte por el Radiologo de kulwant.      Interventional Radiology General Nurse Discharge    After general anesthesia or intravenous sedation, for 24 hours or while taking prescription Narcotics:  Limit your activities  Do not drive and operate hazardous machinery  Do not make important personal or business decisions  Do  not drink alcoholic beverages  If you have not urinated within 8 hours after discharge, please contact your surgeon on call.    * Please give a list of your current medications to your Primary Care Provider.  * Please update this list whenever your medications are discontinued, doses are     changed, or new medications (including over-the-counter products) are added.  * Please carry medication information at all times in case of emergency situations.    These are general instructions for a healthy lifestyle:    No smoking/ No tobacco products/ Avoid exposure to second hand smoke  Surgeon General's Warning:  Quitting smoking now greatly reduces serious risk to your health.    Obesity, smoking, and sedentary lifestyle greatly increases your risk for illness  A healthy diet, regular physical exercise & weight monitoring are important for maintaining a healthy lifestyle    You may be retaining fluid if you have a history of heart failure or if you experience any of the following symptoms:  Weight gain of 3 pounds or more overnight or 5 pounds in a week, increased swelling in our hands or feet or shortness of breath  while lying flat in bed.  Please call your doctor as soon as you notice any of these symptoms; do not wait until your next office visit.    Recognize signs and symptoms of STROKE:  F-face looks uneven    A-arms unable to move or move unevenly    S-speech slurred or non-existent    T-time-call 911 as soon as signs and symptoms begin-DO NOT go       Back to bed or wait to see if you get better-TIME IS BRAIN.

## 2025-04-10 ENCOUNTER — APPOINTMENT (OUTPATIENT)
Dept: GENERAL RADIOLOGY | Age: 50
End: 2025-04-10
Payer: MEDICARE

## 2025-04-10 ENCOUNTER — HOSPITAL ENCOUNTER (EMERGENCY)
Age: 50
Discharge: HOME | End: 2025-04-11
Attending: EMERGENCY MEDICINE
Payer: MEDICARE

## 2025-04-10 DIAGNOSIS — R18.8 OTHER ASCITES: ICD-10-CM

## 2025-04-10 DIAGNOSIS — Z99.2 ESRD ON HEMODIALYSIS (HCC): Primary | ICD-10-CM

## 2025-04-10 DIAGNOSIS — N18.6 ESRD ON HEMODIALYSIS (HCC): Primary | ICD-10-CM

## 2025-04-10 LAB
BASOPHILS # BLD: 0.1 K/UL (ref 0–0.2)
BASOPHILS NFR BLD: 1.5 % (ref 0–2)
DIFFERENTIAL METHOD BLD: ABNORMAL
EOSINOPHIL # BLD: 0.23 K/UL (ref 0–0.8)
EOSINOPHIL NFR BLD: 3.4 % (ref 0.5–7.8)
ERYTHROCYTE [DISTWIDTH] IN BLOOD BY AUTOMATED COUNT: 15.3 % (ref 11.9–14.6)
HCT VFR BLD AUTO: 33.1 % (ref 41.1–50.3)
HGB BLD-MCNC: 10.7 G/DL (ref 13.6–17.2)
IMM GRANULOCYTES # BLD AUTO: 0.03 K/UL (ref 0–0.5)
IMM GRANULOCYTES NFR BLD AUTO: 0.4 % (ref 0–5)
LYMPHOCYTES # BLD: 0.85 K/UL (ref 0.5–4.6)
LYMPHOCYTES NFR BLD: 12.6 % (ref 13–44)
MCH RBC QN AUTO: 30.1 PG (ref 26.1–32.9)
MCHC RBC AUTO-ENTMCNC: 32.3 G/DL (ref 31.4–35)
MCV RBC AUTO: 93.2 FL (ref 82–102)
MONOCYTES # BLD: 0.8 K/UL (ref 0.1–1.3)
MONOCYTES NFR BLD: 11.8 % (ref 4–12)
NEUTS SEG # BLD: 4.76 K/UL (ref 1.7–8.2)
NEUTS SEG NFR BLD: 70.3 % (ref 43–78)
NRBC # BLD: 0 K/UL (ref 0–0.2)
PLATELET # BLD AUTO: 144 K/UL (ref 150–450)
PMV BLD AUTO: 10.7 FL (ref 9.4–12.3)
RBC # BLD AUTO: 3.55 M/UL (ref 4.23–5.6)
WBC # BLD AUTO: 6.8 K/UL (ref 4.3–11.1)

## 2025-04-10 PROCEDURE — 80053 COMPREHEN METABOLIC PANEL: CPT

## 2025-04-10 PROCEDURE — 83735 ASSAY OF MAGNESIUM: CPT

## 2025-04-10 PROCEDURE — 83880 ASSAY OF NATRIURETIC PEPTIDE: CPT

## 2025-04-10 PROCEDURE — 71045 X-RAY EXAM CHEST 1 VIEW: CPT

## 2025-04-10 PROCEDURE — 99284 EMERGENCY DEPT VISIT MOD MDM: CPT

## 2025-04-10 PROCEDURE — 85610 PROTHROMBIN TIME: CPT

## 2025-04-10 PROCEDURE — 83605 ASSAY OF LACTIC ACID: CPT

## 2025-04-10 PROCEDURE — 85025 COMPLETE CBC W/AUTO DIFF WBC: CPT

## 2025-04-10 PROCEDURE — 84145 PROCALCITONIN (PCT): CPT

## 2025-04-10 RX ORDER — SEVELAMER CARBONATE 800 MG/1
1600 TABLET, FILM COATED ORAL ONCE
Status: COMPLETED | OUTPATIENT
Start: 2025-04-11 | End: 2025-04-11

## 2025-04-10 ASSESSMENT — PAIN - FUNCTIONAL ASSESSMENT: PAIN_FUNCTIONAL_ASSESSMENT: 0-10

## 2025-04-10 ASSESSMENT — PAIN SCALES - GENERAL: PAINLEVEL_OUTOF10: 8

## 2025-04-11 VITALS
RESPIRATION RATE: 16 BRPM | SYSTOLIC BLOOD PRESSURE: 144 MMHG | TEMPERATURE: 98.3 F | OXYGEN SATURATION: 94 % | DIASTOLIC BLOOD PRESSURE: 113 MMHG | HEART RATE: 92 BPM | WEIGHT: 178 LBS | HEIGHT: 66 IN | BODY MASS INDEX: 28.61 KG/M2

## 2025-04-11 LAB
ALBUMIN SERPL-MCNC: 2.3 G/DL (ref 3.5–5)
ALBUMIN/GLOB SERPL: 0.7 (ref 1–1.9)
ALP SERPL-CCNC: 129 U/L (ref 40–129)
ALT SERPL-CCNC: 16 U/L (ref 8–55)
ANION GAP SERPL CALC-SCNC: 13 MMOL/L (ref 7–16)
AST SERPL-CCNC: 14 U/L (ref 15–37)
BILIRUB SERPL-MCNC: 0.3 MG/DL (ref 0–1.2)
BUN SERPL-MCNC: 64 MG/DL (ref 6–23)
CALCIUM SERPL-MCNC: 8.6 MG/DL (ref 8.8–10.2)
CHLORIDE SERPL-SCNC: 104 MMOL/L (ref 98–107)
CO2 SERPL-SCNC: 25 MMOL/L (ref 20–29)
CREAT SERPL-MCNC: 8.79 MG/DL (ref 0.8–1.3)
GLOBULIN SER CALC-MCNC: 3.1 G/DL (ref 2.3–3.5)
GLUCOSE SERPL-MCNC: 98 MG/DL (ref 70–99)
INR PPP: 1
LACTATE SERPL-SCNC: 1 MMOL/L (ref 0.5–2)
MAGNESIUM SERPL-MCNC: 2.3 MG/DL (ref 1.8–2.4)
NT PRO BNP: ABNORMAL PG/ML (ref 0–125)
POTASSIUM SERPL-SCNC: 5.1 MMOL/L (ref 3.5–5.1)
PROCALCITONIN SERPL-MCNC: 0.45 NG/ML (ref 0–0.1)
PROT SERPL-MCNC: 5.4 G/DL (ref 6.3–8.2)
PROTHROMBIN TIME: 13.5 SEC (ref 11.3–14.9)
SODIUM SERPL-SCNC: 142 MMOL/L (ref 136–145)

## 2025-04-11 PROCEDURE — 6370000000 HC RX 637 (ALT 250 FOR IP): Performed by: EMERGENCY MEDICINE

## 2025-04-11 RX ADMIN — SEVELAMER CARBONATE 1600 MG: 800 TABLET, FILM COATED ORAL at 00:24

## 2025-04-11 NOTE — ED TRIAGE NOTES
Brought in via ems from home w/ c/o abdominal pain started this afternoon    Fluid removed around 5 days ago, dialysis MWF went wed

## 2025-04-18 ENCOUNTER — TRANSCRIBE ORDERS (OUTPATIENT)
Dept: INTERVENTIONAL RADIOLOGY/VASCULAR | Age: 50
End: 2025-04-18

## 2025-04-18 ENCOUNTER — HOSPITAL ENCOUNTER (OUTPATIENT)
Dept: INTERVENTIONAL RADIOLOGY/VASCULAR | Age: 50
Discharge: HOME | End: 2025-04-20
Payer: MEDICARE

## 2025-04-18 VITALS
OXYGEN SATURATION: 96 % | RESPIRATION RATE: 16 BRPM | DIASTOLIC BLOOD PRESSURE: 89 MMHG | SYSTOLIC BLOOD PRESSURE: 161 MMHG | TEMPERATURE: 97.7 F | HEART RATE: 76 BPM

## 2025-04-18 DIAGNOSIS — R18.8 OTHER ASCITES: ICD-10-CM

## 2025-04-18 DIAGNOSIS — R18.8 OTHER ASCITES: Primary | ICD-10-CM

## 2025-04-18 PROCEDURE — 49083 ABD PARACENTESIS W/IMAGING: CPT | Performed by: RADIOLOGY

## 2025-04-18 PROCEDURE — 49083 ABD PARACENTESIS W/IMAGING: CPT

## 2025-04-18 ASSESSMENT — PAIN - FUNCTIONAL ASSESSMENT: PAIN_FUNCTIONAL_ASSESSMENT: NONE - DENIES PAIN

## 2025-04-18 NOTE — DISCHARGE INSTRUCTIONS
[facility]     Department of Interventional Radiology     [facility]    [phone] Office     [phone] Fax       PARACENTESIS DISCHARGE INSTRUCTIONS           General Information:     During this procedure, the doctor will insert a needle into the abdomen to drain fluid. After the procedure, you will be able to take a deep breath much easier. The site of the puncture may ooze the first day. This will decrease and eventually stop. Paracentesis (draining fluid from the abdomen) sometimes makes patients hypotensive (low blood pressure). Your doctor may order for you to receive fluids or albumin (a volume booster) during the procedure through an IV site.            Home Care Instructions:     Keep the puncture site clean and dry. No tub baths or swimming until puncture site heals. Showering is acceptable. Resume your normal diet, and resume your normal activity slowly and as you tolerate. If you are short of breath, rest. If shortness of breath does not ease, please call your ordering doctor. Fluid can re-accumulate in the chest and/or in the abdomen. If this should occur, your doctor needs to know as you may need to have the procedure done again.           Call If:        You should call your Physician and/or the Radiology Nurse if you notice any signs of infection, like pus draining, or if it is swollen or reddened. Also call if you have a fever, or if you are bleeding from the puncture site more than a small amount on the dressing. Call if the puncture site keeps draining fluid. Some oozing is to be expected, but should slow and then stop. Call if you feel like you have pressure in your abdomen. SEEK IMMEDIATE CARE OR CALL 911 IF YOU SUDDENLY HAVE TROUBLE BREATHING, OR IF YOUR LIPS TURN BLUE, OR IF YOU NOTICE BLOOD IN YOUR SPUTUM.           Follow-Up Instructions:   Please see your ordering doctor as he/she has requested.            To Reach Us:

## 2025-04-21 DIAGNOSIS — R18.8 OTHER ASCITES: Primary | ICD-10-CM

## 2025-04-21 NOTE — ED PROVIDER NOTES
Emergency Department Provider Note       PCP: Kristal Steven MD   Age: 49 y.o.   Sex: male     DISPOSITION Decision To Discharge 04/11/2025 01:01:20 AM   DISPOSITION CONDITION Stable            ICD-10-CM    1. ESRD on hemodialysis (HCC)  N18.6     Z99.2       2. Other ascites  R18.8           Medical Decision Making     Ascites related to CHF and end-stage renal disease.  Lab work looks good.  Will arrange for an outpatient paracentesis tomorrow after dialysis.  No indication for admission     1 chronic illness with exacerbation.  Patient was discharged risks and benefits of hospitalization were considered.  Shared medical decision making was utilized in creating the patients health plan today.    I independently ordered and reviewed each unique test.  I reviewed external records: ED visit note from a different ED.   I reviewed external records: provider visit note from PCP.  I reviewed external records: provider visit note from outside specialist.  I reviewed external records: previous lab results from outside ED.  I reviewed external records: previous imaging study including radiologist interpretation.     I interpreted the X-rays of the chest show pulmonary vascular congestion, cardiomegaly, no pneumothorax, or pleural effusion.              History     49-year-old gentleman on dialysis for end-stage renal disease presents with worsening edema and ascites.  He feels short of breath due to abdominal distention.  Patient had a full run of dialysis yesterday, and is due to go tomorrow.  About a week ago he underwent a 5 L paracentesis but it already seems to be returning.        ROS     Review of Systems   Constitutional:  Negative for chills and fever.   HENT:  Negative for congestion, facial swelling and rhinorrhea.    Eyes:  Negative for discharge and redness.   Respiratory:  Positive for shortness of breath. Negative for cough.    Cardiovascular:  Positive for leg swelling. Negative for chest pain and

## 2025-05-15 ENCOUNTER — HOSPITAL ENCOUNTER (OUTPATIENT)
Dept: INTERVENTIONAL RADIOLOGY/VASCULAR | Age: 50
Discharge: HOME | End: 2025-05-17
Payer: MEDICARE

## 2025-05-15 VITALS
SYSTOLIC BLOOD PRESSURE: 154 MMHG | OXYGEN SATURATION: 82 % | HEART RATE: 87 BPM | TEMPERATURE: 97.6 F | RESPIRATION RATE: 16 BRPM | DIASTOLIC BLOOD PRESSURE: 93 MMHG

## 2025-05-15 DIAGNOSIS — R18.8 OTHER ASCITES: ICD-10-CM

## 2025-05-15 PROCEDURE — 49083 ABD PARACENTESIS W/IMAGING: CPT | Performed by: PHYSICIAN ASSISTANT

## 2025-05-15 PROCEDURE — 6360000002 HC RX W HCPCS: Performed by: PHYSICIAN ASSISTANT

## 2025-05-15 PROCEDURE — 49083 ABD PARACENTESIS W/IMAGING: CPT

## 2025-05-15 RX ORDER — LIDOCAINE HYDROCHLORIDE 10 MG/ML
INJECTION, SOLUTION EPIDURAL; INFILTRATION; INTRACAUDAL; PERINEURAL PRN
Status: COMPLETED | OUTPATIENT
Start: 2025-05-15 | End: 2025-05-15

## 2025-05-15 RX ADMIN — LIDOCAINE HYDROCHLORIDE 10 ML: 10 INJECTION, SOLUTION EPIDURAL; INFILTRATION; INTRACAUDAL; PERINEURAL at 09:55

## 2025-05-15 NOTE — OR NURSING
Prep complete. Patient ready for procedure. Patient denies taking any of his prescribed medications for HTN this AM.

## 2025-05-15 NOTE — OR NURSING
Recovery period without difficulty. Pt alert and oriented and denies pain. Dressing is clean, dry, and intact. Reviewed discharge instructions with patient and father, both verbalized understanding.

## 2025-05-15 NOTE — DISCHARGE INSTRUCTIONS
If you have any questions about your procedure, please call the Interventional Radiology department at [phone].   After business hours (5pm) and weekends, call the answering service at [phone] and ask for the Radiologist on call to be paged.     Si tiene Preguntas acerca del procedimiento, por favor llame al departamento de Radiología Intervencional al [phone].   Después de horas de oficina (5 pm) y los fines de semana, llamar al servicio de llamadas al [phone] y pregunte por el Radiologo de kulwant.

## 2025-05-15 NOTE — OR NURSING
Interventional Radiology Post Paracentesis/Thoracentesis Note    5/15/2025    Procedure(s): Ultrasound guided Therapeutic Paracentesis Performed with 8 Bahraini catheter total volume 9650 ml.         Preliminary Findings: large clear and yellow.    Complications: None    Plan:  Observation, check labs if drawn.          Chest X-Ray:  no    Full dictated report to follow

## 2025-05-28 ENCOUNTER — HOSPITAL ENCOUNTER (EMERGENCY)
Age: 50
Discharge: HOME | End: 2025-05-28
Attending: STUDENT IN AN ORGANIZED HEALTH CARE EDUCATION/TRAINING PROGRAM
Payer: MEDICARE

## 2025-05-28 VITALS
BODY MASS INDEX: 28.61 KG/M2 | RESPIRATION RATE: 22 BRPM | HEIGHT: 66 IN | WEIGHT: 178 LBS | DIASTOLIC BLOOD PRESSURE: 98 MMHG | OXYGEN SATURATION: 96 % | TEMPERATURE: 96.9 F | HEART RATE: 85 BPM | SYSTOLIC BLOOD PRESSURE: 151 MMHG

## 2025-05-28 DIAGNOSIS — R18.8 OTHER ASCITES: Primary | ICD-10-CM

## 2025-05-28 PROCEDURE — 99284 EMERGENCY DEPT VISIT MOD MDM: CPT

## 2025-05-28 ASSESSMENT — PAIN DESCRIPTION - DESCRIPTORS: DESCRIPTORS: DISCOMFORT;ACHING

## 2025-05-28 ASSESSMENT — ENCOUNTER SYMPTOMS
EYE REDNESS: 0
SORE THROAT: 0
ABDOMINAL PAIN: 0
VOMITING: 0
EYE PAIN: 0
ABDOMINAL DISTENTION: 1
SHORTNESS OF BREATH: 1
NAUSEA: 0
COUGH: 0

## 2025-05-28 ASSESSMENT — PAIN DESCRIPTION - ORIENTATION: ORIENTATION: MID

## 2025-05-28 ASSESSMENT — PAIN - FUNCTIONAL ASSESSMENT: PAIN_FUNCTIONAL_ASSESSMENT: 0-10

## 2025-05-28 ASSESSMENT — PAIN DESCRIPTION - LOCATION: LOCATION: ABDOMEN

## 2025-05-28 ASSESSMENT — PAIN SCALES - GENERAL: PAINLEVEL_OUTOF10: 8

## 2025-05-28 NOTE — ED NOTES
Pt refusing all interventions at this time. Pt requesting to leave AMA. Dr. Arredondo aware. Pt educated on risks and benefits of leaving. Informed refusal paperwork signed and scanned into pt chart. Pt encouraged to come back at any time.     Juliana Hanson RN  05/28/25 1373

## 2025-05-28 NOTE — ED PROVIDER NOTES
Emergency Department Provider Note       [facility] EMERGENCY DEPT   PCP: Kristal Steven MD   Age: 49 y.o.   Sex: male     DISPOSITION [place] 05/28/2025 10:09:54 AM    ICD-10-CM    1. Other ascites  R18.8           Medical Decision Making     49-year male who presents to the ED for abdominal paracentesis.  Vital signs demonstrate mild hypertension, although maintaining normal oxygen saturation without difficulty.  Patient was very difficult with myself and nursing staff, initially refusing to even get vital signs performed.  I told him I would need to obtain labs given that he has ESRD and did not receive dialysis today but he is refusing.  He states he is only here for abdominal paracentesis.  I told him that we do not perform them unless absolutely necessary in the emergency department, which he does not at this time and also he has one scheduled for Friday, just 2 days from now.  No abdominal tenderness on exam to suggest SBP.  I did reach out to interventional radiology to see if they could schedule him for later today, but they were in a procedure and unable to get back to me at the time which is completely understandable.  While waiting, patient ended up defecating in the trash can in the room out of protest.  He then told nursing staff that he needed to leave, and he signed AMA paperwork.  I told him I cannot definitively rule out hyperkalemia, worsening liver failure without labs for which could lead to lifelong debility and/or death for which she voices understanding but would still like to leave at this time.  Patient left AMA. Has paracentesis in just 2 days.     1 chronic illness with exacerbation.  Discussion with external consultants.  I independently ordered and reviewed each unique test.    I reviewed external records: ED visit note from a different ED.   I reviewed external records: provider visit note from PCP.                     History     Mr. Bettencourt is a 49-year-old male with PMH of ESRD on  hard   Food Insecurity: No Food Insecurity (11/26/2024)    Hunger Vital Sign     Worried About Running Out of Food in the Last Year: Never true     Ran Out of Food in the Last Year: Never true   Transportation Needs: No Transportation Needs (11/26/2024)    PRAPARE - Transportation     Lack of Transportation (Medical): No     Lack of Transportation (Non-Medical): No   Physical Activity: Inactive (2/5/2024)    Received from [facility]    Exercise Vital Sign     Days of Exercise per Week: 0 days     Minutes of Exercise per Session: 0 min   Social Connections: Unknown (2/5/2024)    Received from [facility]    Social Connection and Isolation Panel [NHANES]     Marital Status:    Intimate Partner Violence: Unknown (12/5/2023)    Received from [facility]    Intimate Partner Violence     Fear of Current or Ex-Partner: Not asked     Emotionally Abused: Not asked     Physically Abused: Not asked     Sexually Abused: Not asked   Housing Stability: Low Risk  (11/26/2024)    Housing Stability Vital Sign     Unable to Pay for Housing in the Last Year: No     Number of Times Moved in the Last Year: 1     Homeless in the Last Year: No        Previous Medications    AMLODIPINE (NORVASC) 10 MG TABLET    Take 1 tablet by mouth daily    ASPIRIN 325 MG TABLET    Take 1 tablet by mouth daily    CARVEDILOL (COREG) 25 MG TABLET    Take 1 tablet by mouth 2 times daily (with meals)    CINACALCET (SENSIPAR) 30 MG TABLET    Take 1 tablet by mouth daily    FERRIC CITRATE (AURYXIA) 1  MG(FE) TABS TABLET    Take 2 tablets by mouth 3 times daily (with meals)    HYDRALAZINE (APRESOLINE) 50 MG TABLET    Take 1 tablet by mouth 3 times daily    LEVETIRACETAM (KEPPRA) 750 MG TABLET    Take 1 tablet by mouth 2 times daily    PRAZOSIN (MINIPRESS) 2 MG CAPSULE    Take 1 capsule by mouth nightly        Results from this emergency department visit:      No results found for any visits

## 2025-05-28 NOTE — ED TRIAGE NOTES
Patient arrives to ED via EMS complaining of abdominal pain/distention and SOB. Patient is supposed to have a paracentesis Friday but states he is unable to wait that long. Patient is also a dialysis patient. Patient had dialysis on Monday. Patient is supposed to go to dialysis today.

## 2025-06-05 ENCOUNTER — HOSPITAL ENCOUNTER (OUTPATIENT)
Dept: INTERVENTIONAL RADIOLOGY/VASCULAR | Age: 50
Discharge: HOME | End: 2025-06-07
Payer: MEDICARE

## 2025-06-05 VITALS
HEIGHT: 66 IN | HEART RATE: 76 BPM | DIASTOLIC BLOOD PRESSURE: 84 MMHG | BODY MASS INDEX: 28.61 KG/M2 | RESPIRATION RATE: 18 BRPM | TEMPERATURE: 98 F | WEIGHT: 178 LBS | SYSTOLIC BLOOD PRESSURE: 153 MMHG | OXYGEN SATURATION: 97 %

## 2025-06-05 DIAGNOSIS — R18.8 OTHER ASCITES: ICD-10-CM

## 2025-06-05 PROCEDURE — 49083 ABD PARACENTESIS W/IMAGING: CPT

## 2025-06-05 ASSESSMENT — PAIN - FUNCTIONAL ASSESSMENT: PAIN_FUNCTIONAL_ASSESSMENT: NONE - DENIES PAIN

## 2025-06-05 NOTE — OR NURSING
Recovery period without difficulty. Pt alert and oriented and denies pain. Skin adhesive is clean, dry, and intact. Reviewed discharge instructions with patient and verbalized understanding. Pt escorted to lobby discharge area. Vital signs completed.

## 2025-06-05 NOTE — DISCHARGE INSTRUCTIONS
If you have any questions about your procedure, please call the Interventional Radiology department at [phone].   After business hours (5pm) and weekends, call the answering service at [phone] and ask for the Radiologist on call to be paged.     Si tiene Preguntas acerca del procedimiento, por favor llame al departamento de Radiología Intervencional al [phone].   Después de horas de oficina (5 pm) y los fines de semana, llamar al servicio de llamadas al [phone] y pregunte por el Radiologo de [place].      Interventional Radiology General Nurse Discharge    After general anesthesia or intravenous sedation, for 24 hours or while taking prescription Narcotics:  Limit your activities  Do not drive and operate hazardous machinery  Do not make important personal or business decisions  Do  not drink alcoholic beverages  If you have not urinated within 8 hours after discharge, please contact your surgeon on call.    * Please give a list of your current medications to your Primary Care Provider.  * Please update this list whenever your medications are discontinued, doses are     changed, or new medications (including over-the-counter products) are added.  * Please carry medication information at all times in case of emergency situations.    These are general instructions for a healthy lifestyle:    No smoking/ No tobacco products/ Avoid exposure to second hand smoke  Surgeon General's Warning:  Quitting smoking now greatly reduces serious risk to your health.    Obesity, smoking, and sedentary lifestyle greatly increases your risk for illness  A healthy diet, regular physical exercise & weight monitoring are important for maintaining a healthy lifestyle    You may be retaining fluid if you have a history of heart failure or if you experience any of the following symptoms:  Weight gain of 3 pounds or more overnight or 5 pounds in a week, increased swelling in our hands or feet or shortness of breath

## 2025-06-05 NOTE — OP NOTE
Title: Ultrasound guided paracentesis.      History: Alcoholic cirrhosis, ascites.    :  Ericka Singh PA-C    Supervising Physician: Jesus Davenport MD.   The physician attests to  supervising this procedure and agrees with the report as written.    Consent:  Informed written and oral consent was obtained from the patient after  the risks and benefits were discussed.  All questions were answered and the  patient requested that we proceed.    Procedure:  Maximal sterile barrier technique was used.  Following routine prep  and drape of the abdomen, a local field block with 1% lidocaine was achieved.  Ultrasound evaluation was performed.  A permanent, hardcopy ultrasound images  was stored in PACS.    Fluid was identified in the peritoneal cavity. Using real-time ultrasound  guidance, the peritoneal cavity was accessed with an 8 Mosotho centesis catheter.   The catheter was connected to wall suction.    A total of 9550 cc of thin yellow fluid was removed. The catheter was removed  and a bandage applied.    Complications: None.    Specimen: None.    Findings: Large volume ascites.   Impression:     Uncomplicated ultrasound guided paracentesis.

## 2025-06-26 ENCOUNTER — HOSPITAL ENCOUNTER (OUTPATIENT)
Dept: INTERVENTIONAL RADIOLOGY/VASCULAR | Age: 50
Discharge: HOME | End: 2025-06-28
Payer: MEDICARE

## 2025-06-26 VITALS
HEART RATE: 76 BPM | SYSTOLIC BLOOD PRESSURE: 162 MMHG | RESPIRATION RATE: 18 BRPM | DIASTOLIC BLOOD PRESSURE: 89 MMHG | OXYGEN SATURATION: 97 %

## 2025-06-26 DIAGNOSIS — R18.8 OTHER ASCITES: ICD-10-CM

## 2025-06-26 PROCEDURE — 49083 ABD PARACENTESIS W/IMAGING: CPT

## 2025-06-26 NOTE — DISCHARGE INSTRUCTIONS
If you have any questions about your procedure, please call the Interventional Radiology department at [phone].   After business hours (5pm) and weekends, call the answering service at [phone] and ask for the Interventional Radiologist on call to be paged.     Si tiene Preguntas acerca del procedimiento, por favor llame al departamento de Radiología Intervencional al [phone].   Después de horas de oficina (5 pm) y los fines de semana, llamar al servicio de llamadas al [phone] y pregunte por el Radiologo de [place].

## 2025-06-26 NOTE — OP NOTE
Title: Ultrasound guided paracentesis.      History: Alcoholic cirrhosis, ascites.    :  Ericka Singh PA-C    Supervising Physician: Jesus Davenport MD.   The physician attests to  supervising this procedure and agrees with the report as written.    Consent:  Informed written and oral consent was obtained from the patient after  the risks and benefits were discussed.  All questions were answered and the  patient requested that we proceed.    Procedure:  Maximal sterile barrier technique was used.  Following routine prep  and drape of the abdomen, a local field block with 1% lidocaine was achieved.  Ultrasound evaluation was performed.  A permanent, hardcopy ultrasound images  was stored in PACS.    Fluid was identified in the peritoneal cavity. Using real-time ultrasound  guidance, the peritoneal cavity was accessed with an 8 Yemeni centesis catheter.   The catheter was connected to wall suction.    A total of 8900 cc of thin yellow fluid was removed. The catheter was removed  and a bandage applied.    Complications: None.    Specimen: None.    Findings: Large volume ascites.   Impression:     Uncomplicated ultrasound guided paracentesis.

## 2025-06-26 NOTE — PROGRESS NOTES
Patient is Alert and discharge instructions given and received.   Total ascitic fluid removed is 8,900 ml.

## 2025-07-10 ENCOUNTER — HOSPITAL ENCOUNTER (OUTPATIENT)
Dept: INTERVENTIONAL RADIOLOGY/VASCULAR | Age: 50
Discharge: HOME | End: 2025-07-12
Payer: MEDICARE

## 2025-07-10 VITALS
HEART RATE: 73 BPM | OXYGEN SATURATION: 98 % | WEIGHT: 171.96 LBS | SYSTOLIC BLOOD PRESSURE: 168 MMHG | RESPIRATION RATE: 14 BRPM | BODY MASS INDEX: 23.29 KG/M2 | DIASTOLIC BLOOD PRESSURE: 83 MMHG | HEIGHT: 72 IN | TEMPERATURE: 97.9 F

## 2025-07-10 DIAGNOSIS — R18.8 OTHER ASCITES: ICD-10-CM

## 2025-07-10 LAB
BASOPHILS # BLD: 0.14 K/UL (ref 0–0.2)
BASOPHILS NFR BLD: 1.7 % (ref 0–2)
DIFFERENTIAL METHOD BLD: ABNORMAL
EOSINOPHIL # BLD: 0.5 K/UL (ref 0–0.8)
EOSINOPHIL NFR BLD: 6 % (ref 0.5–7.8)
ERYTHROCYTE [DISTWIDTH] IN BLOOD BY AUTOMATED COUNT: 14.5 % (ref 11.9–14.6)
HCT VFR BLD AUTO: 27.7 % (ref 41.1–50.3)
HGB BLD-MCNC: 8.5 G/DL (ref 13.6–17.2)
IMM GRANULOCYTES # BLD AUTO: 0.03 K/UL (ref 0–0.5)
IMM GRANULOCYTES NFR BLD AUTO: 0.4 % (ref 0–5)
LYMPHOCYTES # BLD: 0.77 K/UL (ref 0.5–4.6)
LYMPHOCYTES NFR BLD: 9.3 % (ref 13–44)
MCH RBC QN AUTO: 29.3 PG (ref 26.1–32.9)
MCHC RBC AUTO-ENTMCNC: 30.7 G/DL (ref 31.4–35)
MCV RBC AUTO: 95.5 FL (ref 82–102)
MONOCYTES # BLD: 0.75 K/UL (ref 0.1–1.3)
MONOCYTES NFR BLD: 9 % (ref 4–12)
NEUTS SEG # BLD: 6.12 K/UL (ref 1.7–8.2)
NEUTS SEG NFR BLD: 73.6 % (ref 43–78)
NRBC # BLD: 0 K/UL (ref 0–0.2)
PLATELET # BLD AUTO: 286 K/UL (ref 150–450)
PMV BLD AUTO: 9.7 FL (ref 9.4–12.3)
RBC # BLD AUTO: 2.9 M/UL (ref 4.23–5.6)
WBC # BLD AUTO: 8.3 K/UL (ref 4.3–11.1)

## 2025-07-10 PROCEDURE — 6360000002 HC RX W HCPCS: Performed by: PHYSICIAN ASSISTANT

## 2025-07-10 PROCEDURE — 49083 ABD PARACENTESIS W/IMAGING: CPT

## 2025-07-10 PROCEDURE — 85025 COMPLETE CBC W/AUTO DIFF WBC: CPT

## 2025-07-10 RX ORDER — LIDOCAINE HYDROCHLORIDE 10 MG/ML
INJECTION, SOLUTION INFILTRATION; PERINEURAL PRN
Status: DISCONTINUED | OUTPATIENT
Start: 2025-07-10 | End: 2025-07-13 | Stop reason: HOSPADM

## 2025-07-10 RX ADMIN — LIDOCAINE HYDROCHLORIDE 10 ML: 10 INJECTION, SOLUTION INFILTRATION; PERINEURAL at 10:41

## 2025-07-10 NOTE — DISCHARGE INSTRUCTIONS
[facility], located within [facility]     Department of Interventional Radiology     [facility]    [phone] Office     [phone] Fax       PARACENTESIS DISCHARGE INSTRUCTIONS           General Information:     During this procedure, the doctor will insert a needle into the abdomen to drain fluid. After the procedure, you will be able to take a deep breath much easier. The site of the puncture may ooze the first day. This will decrease and eventually stop. Paracentesis (draining fluid from the abdomen) sometimes makes patients hypotensive (low blood pressure). Your doctor may order for you to receive fluids or albumin (a volume booster) during the procedure through an IV site.            Home Care Instructions:     Keep the puncture site clean and dry. No tub baths or swimming until puncture site heals. Showering is acceptable. Resume your normal diet, and resume your normal activity slowly and as you tolerate. If you are short of breath, rest. If shortness of breath does not ease, please call your ordering doctor. Fluid can re-accumulate in the chest and/or in the abdomen. If this should occur, your doctor needs to know as you may need to have the procedure done again.           Call If:        You should call your Physician and/or the Radiology Nurse if you notice any signs of infection, like pus draining, or if it is swollen or reddened. Also call if you have a fever, or if you are bleeding from the puncture site more than a small amount on the dressing. Call if the puncture site keeps draining fluid. Some oozing is to be expected, but should slow and then stop. Call if you feel like you have pressure in your abdomen. SEEK IMMEDIATE CARE OR CALL 911 IF YOU SUDDENLY HAVE TROUBLE BREATHING, OR IF YOUR LIPS TURN BLUE, OR IF YOU NOTICE BLOOD IN YOUR SPUTUM.           Follow-Up Instructions:   Please see your ordering doctor as he/she has requested.            To Reach Us:

## 2025-07-10 NOTE — PROCEDURES
PROCEDURE NOTE  Date: 7/10/2025   Name: Feliciano Bettencourt  YOB: 1975    Procedures      Reading Physician Reading Date Result Priority   Robson Frankel MD  [phone] 7/10/2025    Sonia Stockton PA  [phone] 7/10/2025      Result in progress  This result has not been signed. Information might be incomplete.     Narrative & Impression  Title: Ultrasound guided paracentesis.       History: 49-year-old male with ESRD on hemodialysis, alcoholic cirrhosis with  recurrent ascites.     :  Evelyne Stockton PA-C     Supervising Physician: Dr. Dave Frankel MD. Supervising physician attest  that he was immediately available to supervise entire procedure. He reviewed the  permanently stored images and agrees with the report as written.     Consent:  Informed written and oral consent was obtained from the patient after  the risks and benefits were discussed.  All questions were answered and the  patient requested that we proceed.     Procedure:  Maximal sterile barrier technique was used.  Following routine prep  and drape of the abdomen, a local field block with 1% lidocaine was achieved.   Ultrasound evaluation was performed.     Fluid was identified in the peritoneal cavity.  Using a #11 blade, a small skin  incision was made on the abdomen.  Using real-time ultrasound guidance, the  peritoneal cavity was accessed with an 8 Czech centesis catheter.  The catheter  was connected to wall suction.     A total of 8300 cc of thin blood tinged ascites fluid was removed. The catheter  was removed and a bandage applied.     Complications: None.     Findings: Blood-tinged ascites. Due to this and new finding, CBC was obtained  and hemoglobin is 8.5 with normal platelet count. I recommended patient  follow-up with his [facility] gastroenterologist and have hemoglobin rechecked  within 1 week. Suspect the blood is related to prior paracentesis. Patient's  abdomen is nontender and vital  Detail Level: Simple

## 2025-07-31 ENCOUNTER — HOSPITAL ENCOUNTER (OUTPATIENT)
Dept: INTERVENTIONAL RADIOLOGY/VASCULAR | Age: 50
Discharge: HOME | End: 2025-08-02
Payer: MEDICARE

## 2025-07-31 VITALS
SYSTOLIC BLOOD PRESSURE: 161 MMHG | HEART RATE: 81 BPM | OXYGEN SATURATION: 97 % | RESPIRATION RATE: 16 BRPM | DIASTOLIC BLOOD PRESSURE: 95 MMHG | TEMPERATURE: 97.7 F

## 2025-07-31 DIAGNOSIS — R18.8 OTHER ASCITES: ICD-10-CM

## 2025-07-31 PROCEDURE — 49083 ABD PARACENTESIS W/IMAGING: CPT

## 2025-07-31 NOTE — OR NURSING
Interventional Radiology Post Paracentesis/Thoracentesis Note    7/31/2025    Procedure(s): Ultrasound guided Therapeutic Paracentesis Performed with 8 Bahraini catheter total volume 7900 ml.       Preliminary Findings: large yellow.    Complications: None    Plan:  Discharge

## 2025-07-31 NOTE — OR NURSING
Recovery period without difficulty. Pt alert and oriented and denies pain. Dressing is clean, dry, and intact. Reviewed discharge instructions with patient, patient verbalized understanding. Pt escorted to lobby discharge area. Patient ambulatory of own power without apparent difficulty.

## 2025-07-31 NOTE — PROCEDURES
PROCEDURE NOTE  Date: 7/31/2025   Name: Feliciano Bettencourt  YOB: 1975    Procedures        Reading Physician Reading Date Result Priority   Varun Austin MD  [phone] 7/31/2025    Sonia Stockton PA  [phone] 7/31/2025      Narrative & Impression  Title: Ultrasound guided paracentesis.       History: 49-year-old male with ESRD on hemodialysis, alcoholic cirrhosis with  recurrent ascites.     :  Evelyne Stockton PA-C     Supervising Physician: Dr. Varun Austin MD. Supervising physician attests  that he was immediately available to supervise the entire procedure. He reviewed  the permanently stored images and agrees with the report as written     Consent:  Informed written and oral consent was obtained from the patient after  the risks and benefits were discussed.  All questions were answered and the  patient requested that we proceed.     Procedure:  Maximal sterile barrier technique was used.  Following routine prep  and drape of the abdomen, a local field block with 1% lidocaine was achieved.   Ultrasound evaluation was performed.     Fluid was identified in the peritoneal cavity.  Using a #11 blade, a small skin  incision was made on the abdomen.  Using real-time ultrasound guidance, the  peritoneal cavity was accessed with an 8 Spanish centesis catheter.  The catheter  was connected to wall suction.     A total of 7900 cc of thin dark yellow fluid was removed. The catheter was  removed and a bandage applied.     Complications: None.     Findings: Large volume ascites.     IMPRESSION:  Uncomplicated ultrasound guided paracentesis.

## 2025-08-26 ENCOUNTER — HOSPITAL ENCOUNTER (OUTPATIENT)
Dept: INTERVENTIONAL RADIOLOGY/VASCULAR | Age: 50
Discharge: HOME | End: 2025-08-28
Payer: MEDICARE

## 2025-08-26 VITALS
DIASTOLIC BLOOD PRESSURE: 85 MMHG | WEIGHT: 196.21 LBS | OXYGEN SATURATION: 98 % | HEART RATE: 70 BPM | SYSTOLIC BLOOD PRESSURE: 151 MMHG | RESPIRATION RATE: 16 BRPM | TEMPERATURE: 97.9 F | BODY MASS INDEX: 26.61 KG/M2

## 2025-08-26 DIAGNOSIS — R18.8 OTHER ASCITES: ICD-10-CM

## 2025-08-26 PROCEDURE — C1729 CATH, DRAINAGE: HCPCS

## 2025-08-26 PROCEDURE — 49083 ABD PARACENTESIS W/IMAGING: CPT | Performed by: PHYSICIAN ASSISTANT

## 2025-08-26 PROCEDURE — 49083 ABD PARACENTESIS W/IMAGING: CPT

## 2025-08-26 ASSESSMENT — PAIN SCALES - GENERAL
PAINLEVEL_OUTOF10: 0
PAINLEVEL_OUTOF10: 2
